# Patient Record
Sex: MALE | Race: WHITE | NOT HISPANIC OR LATINO | URBAN - METROPOLITAN AREA
[De-identification: names, ages, dates, MRNs, and addresses within clinical notes are randomized per-mention and may not be internally consistent; named-entity substitution may affect disease eponyms.]

---

## 2024-08-30 ENCOUNTER — EMERGENCY (EMERGENCY)
Facility: HOSPITAL | Age: 79
LOS: 1 days | Discharge: ANOTHER HOSPITAL | End: 2024-08-30
Attending: STUDENT IN AN ORGANIZED HEALTH CARE EDUCATION/TRAINING PROGRAM | Admitting: EMERGENCY MEDICINE
Payer: MEDICARE

## 2024-08-30 VITALS
DIASTOLIC BLOOD PRESSURE: 72 MMHG | OXYGEN SATURATION: 99 % | TEMPERATURE: 98 F | HEART RATE: 155 BPM | WEIGHT: 139.99 LBS | SYSTOLIC BLOOD PRESSURE: 105 MMHG | RESPIRATION RATE: 22 BRPM

## 2024-08-30 LAB
ALBUMIN SERPL ELPH-MCNC: 2.9 G/DL — LOW (ref 3.3–5)
ALP SERPL-CCNC: 204 U/L — HIGH (ref 40–120)
ALT FLD-CCNC: 15 U/L — SIGNIFICANT CHANGE UP (ref 4–41)
ANION GAP SERPL CALC-SCNC: 22 MMOL/L — HIGH (ref 7–14)
ANISOCYTOSIS BLD QL: SLIGHT — SIGNIFICANT CHANGE UP
APTT BLD: 21.9 SEC — LOW (ref 24.5–35.6)
AST SERPL-CCNC: 71 U/L — HIGH (ref 4–40)
BASOPHILS # BLD AUTO: 0 K/UL — SIGNIFICANT CHANGE UP (ref 0–0.2)
BASOPHILS NFR BLD AUTO: 0 % — SIGNIFICANT CHANGE UP (ref 0–2)
BILIRUB SERPL-MCNC: 1 MG/DL — SIGNIFICANT CHANGE UP (ref 0.2–1.2)
BLD GP AB SCN SERPL QL: NEGATIVE — SIGNIFICANT CHANGE UP
BLOOD GAS VENOUS COMPREHENSIVE RESULT: SIGNIFICANT CHANGE UP
BLOOD GAS VENOUS COMPREHENSIVE RESULT: SIGNIFICANT CHANGE UP
BUN SERPL-MCNC: 47 MG/DL — HIGH (ref 7–23)
CALCIUM SERPL-MCNC: 9.6 MG/DL — SIGNIFICANT CHANGE UP (ref 8.4–10.5)
CHLORIDE SERPL-SCNC: 98 MMOL/L — SIGNIFICANT CHANGE UP (ref 98–107)
CK MB BLD-MCNC: 0.9 % — SIGNIFICANT CHANGE UP (ref 0–2.5)
CK MB CFR SERPL CALC: 5.3 NG/ML — SIGNIFICANT CHANGE UP
CK SERPL-CCNC: 585 U/L — HIGH (ref 30–200)
CO2 SERPL-SCNC: 15 MMOL/L — LOW (ref 22–31)
CREAT SERPL-MCNC: 1.39 MG/DL — HIGH (ref 0.5–1.3)
EGFR: 52 ML/MIN/1.73M2 — LOW
EOSINOPHIL # BLD AUTO: 0 K/UL — SIGNIFICANT CHANGE UP (ref 0–0.5)
EOSINOPHIL NFR BLD AUTO: 0 % — SIGNIFICANT CHANGE UP (ref 0–6)
GLUCOSE SERPL-MCNC: 86 MG/DL — SIGNIFICANT CHANGE UP (ref 70–99)
HCT VFR BLD CALC: 28.8 % — LOW (ref 39–50)
HGB BLD-MCNC: 9 G/DL — LOW (ref 13–17)
HYPOCHROMIA BLD QL: SLIGHT — SIGNIFICANT CHANGE UP
IANC: 20.24 K/UL — HIGH (ref 1.8–7.4)
INR BLD: 1.18 RATIO — SIGNIFICANT CHANGE UP (ref 0.85–1.18)
LYMPHOCYTES # BLD AUTO: 0.38 K/UL — LOW (ref 1–3.3)
LYMPHOCYTES # BLD AUTO: 1.7 % — LOW (ref 13–44)
MCHC RBC-ENTMCNC: 26.5 PG — LOW (ref 27–34)
MCHC RBC-ENTMCNC: 31.3 GM/DL — LOW (ref 32–36)
MCV RBC AUTO: 85 FL — SIGNIFICANT CHANGE UP (ref 80–100)
MICROCYTES BLD QL: SLIGHT — SIGNIFICANT CHANGE UP
MONOCYTES # BLD AUTO: 1.56 K/UL — HIGH (ref 0–0.9)
MONOCYTES NFR BLD AUTO: 7 % — SIGNIFICANT CHANGE UP (ref 2–14)
NEUTROPHILS # BLD AUTO: 20.29 K/UL — HIGH (ref 1.8–7.4)
NEUTROPHILS NFR BLD AUTO: 81.7 % — HIGH (ref 43–77)
NEUTS BAND # BLD: 9.6 % — HIGH (ref 0–6)
NRBC # BLD: 1 /100 WBCS — HIGH (ref 0–0)
PLAT MORPH BLD: NORMAL — SIGNIFICANT CHANGE UP
PLATELET # BLD AUTO: 191 K/UL — SIGNIFICANT CHANGE UP (ref 150–400)
PLATELET COUNT - ESTIMATE: NORMAL — SIGNIFICANT CHANGE UP
POIKILOCYTOSIS BLD QL AUTO: SLIGHT — SIGNIFICANT CHANGE UP
POLYCHROMASIA BLD QL SMEAR: SLIGHT — SIGNIFICANT CHANGE UP
POTASSIUM SERPL-MCNC: 4.3 MMOL/L — SIGNIFICANT CHANGE UP (ref 3.5–5.3)
POTASSIUM SERPL-SCNC: 4.3 MMOL/L — SIGNIFICANT CHANGE UP (ref 3.5–5.3)
PROT SERPL-MCNC: 7.3 G/DL — SIGNIFICANT CHANGE UP (ref 6–8.3)
PROTHROM AB SERPL-ACNC: 13.2 SEC — HIGH (ref 9.5–13)
RBC # BLD: 3.39 M/UL — LOW (ref 4.2–5.8)
RBC # FLD: 14.6 % — HIGH (ref 10.3–14.5)
RBC BLD AUTO: ABNORMAL
RH IG SCN BLD-IMP: NEGATIVE — SIGNIFICANT CHANGE UP
SODIUM SERPL-SCNC: 135 MMOL/L — SIGNIFICANT CHANGE UP (ref 135–145)
TROPONIN T, HIGH SENSITIVITY RESULT: 2192 NG/L — CRITICAL HIGH
TSH SERPL-MCNC: 5.99 UIU/ML — HIGH (ref 0.27–4.2)
WBC # BLD: 22.22 K/UL — HIGH (ref 3.8–10.5)
WBC # FLD AUTO: 22.22 K/UL — HIGH (ref 3.8–10.5)

## 2024-08-30 PROCEDURE — 70450 CT HEAD/BRAIN W/O DYE: CPT | Mod: 26,MC

## 2024-08-30 PROCEDURE — 99284 EMERGENCY DEPT VISIT MOD MDM: CPT

## 2024-08-30 PROCEDURE — 99291 CRITICAL CARE FIRST HOUR: CPT

## 2024-08-30 PROCEDURE — 74177 CT ABD & PELVIS W/CONTRAST: CPT | Mod: 26,MC

## 2024-08-30 PROCEDURE — 71260 CT THORAX DX C+: CPT | Mod: 26,MC

## 2024-08-30 PROCEDURE — 72125 CT NECK SPINE W/O DYE: CPT | Mod: 26,MC

## 2024-08-30 PROCEDURE — 93010 ELECTROCARDIOGRAM REPORT: CPT

## 2024-08-30 RX ORDER — SODIUM CHLORIDE 9 MG/ML
2000 INJECTION INTRAMUSCULAR; INTRAVENOUS; SUBCUTANEOUS ONCE
Refills: 0 | Status: COMPLETED | OUTPATIENT
Start: 2024-08-30 | End: 2024-08-30

## 2024-08-30 RX ORDER — CEFEPIME 2 G/1
1000 INJECTION, POWDER, FOR SOLUTION INTRAVENOUS ONCE
Refills: 0 | Status: COMPLETED | OUTPATIENT
Start: 2024-08-30 | End: 2024-08-30

## 2024-08-30 RX ORDER — VANCOMYCIN/0.9 % SOD CHLORIDE 1.75G/25
1000 PLASTIC BAG, INJECTION (ML) INTRAVENOUS ONCE
Refills: 0 | Status: COMPLETED | OUTPATIENT
Start: 2024-08-30 | End: 2024-08-30

## 2024-08-30 RX ADMIN — Medication 250 MILLIGRAM(S): at 22:20

## 2024-08-30 RX ADMIN — CEFEPIME 100 MILLIGRAM(S): 2 INJECTION, POWDER, FOR SOLUTION INTRAVENOUS at 21:20

## 2024-08-30 RX ADMIN — SODIUM CHLORIDE 2000 MILLILITER(S): 9 INJECTION INTRAMUSCULAR; INTRAVENOUS; SUBCUTANEOUS at 20:26

## 2024-08-30 NOTE — ED ADULT NURSE NOTE - OBJECTIVE STATEMENT
Linnea RN: Bedside report received from DALILA Rosales. Pt received in stretcher, A&O x 3 (family at bedside reports poor recall for past few months), ambulatory self at baseline, pmhx MI, HTN, CAD w/stents, coming to ED as code sepsis. As per pt family members, pt had multiple falls today, fell out of bathtub and rolled out of bed with impact to elbow and knee, small abrasion noted to left side knee, no active bleeding noted, denies blood thinners, head strike, or LOC. Pt family members also report decreased p.o intake, and appeared pale and weak at home with slower response. Upon assessment, pt noted to be tachycardic and tachypneic, non-rebreather mask in place, zoll pads in place, MD Burk at bedside, VS as noted in flowsheets, bilateral 20G IVs noted to right and left ACs, labs drawn and sent, fluid boluses infusing. At 2028, cardiac rhythm changed from A-fib to NSR on monitor, repeat EKG completed and given to MD. Pt reporting thrist, responds to physical and verbal stimuli, appears pale. Safety maintained, comfort provided, report given to primary DALILA Musa and DALILA Barrera.

## 2024-08-30 NOTE — ED PROVIDER NOTE - CLINICAL SUMMARY MEDICAL DECISION MAKING FREE TEXT BOX
79 yr male with PMH HTN, CAD, cardiac stent presents today with his son. Sons states he has been feeling weak, low energy, barely getting out of bed and three falls within the past week. He has been upset, not eating since his wife . Brought in he was hypotensive, in rapid afib with RVR.     : at first HR was 150-160 on monitor, tachypneic, hypotensive with systolic in the 70s. Pads were put on, he got 79 yr male with PMH HTN, CAD, cardiac stent presents today with his son. Sons states he has been feeling weak, low energy, barely getting out of bed and three falls within the past week. He has been upset, not eating since his wife . HE has had BMs but has decreased urination. Brought in he was hypotensive, in rapid afib with RVR.     : at first HR was 150-160 on monitor, tachypneic, hypotensive with systolic in the 70s. Pads were put on, his BP improved with 2L NS  PE: diffuse abdominal tenderness, scrotal edema, extreme scrotal tenderness  ddx: Sepsis, UTI, abscess  Plan: Abx, CT AP w contrast, tele, consult surgery and urology

## 2024-08-30 NOTE — ED PROVIDER NOTE - ATTENDING CONTRIBUTION TO CARE
I have discussed the patient's case presentation with resident. I have also personally performed a face-to-face evaluation of the patient. I agree with the resident's assessment and plan, with the following exceptions:    79 year old male with PMHx HTN, CAD s/p PCI, hx prostate cancer in remission (last chemo over one year ago), BIB EMS from home for severe AMS a/w tachycardia. Patient does not have a known history of AFib. Initial EKG performed in triage shows AF w/ RVR, HR in 130s. Patient is severely lethargic, profoundly hypotensive 60s/30s. We placed pads and prepped airway equipment with preparation for electrical cardioversion per ACLS protocol for unstable tachycardia; however, his HR improved dramatically after initiating IV crystalloid bolus. RUSH exam revealed very collapsible IVC, supporting dehydration as a primary cause. Repeat EKG after HR improvement reveals conversion to NSR with no e/o acute ischemia, no ST elevations ot depressions.    DDx includes shock from sepsis vs. hypovolemia; DDx also includes ACS vs. renal failure vs. other metabolic derangement. HR has improved dramatically at this time. BP hypotensive, slowly improving with 2L NS bolus. Initiated broad-spectrum antibiotics for sepsis. Will continue to monitor. May require vasopressors if still hypotensive despite adequate IV crystalloid. Will pursue CT head, CT C/A/P, and admission.

## 2024-08-30 NOTE — ED ADULT NURSE REASSESSMENT NOTE - NS ED NURSE REASSESS COMMENT FT1
Linnea CONNER and MD Miles is at the bedside. Pt has Zole pads on. Pt came in to ED with Afib. Pt is on NSR on the cardiac monitor. Pt is hypotensive, MD Miles is aware. Pt is on non rebreather mask. Family is at the bedside.

## 2024-08-30 NOTE — ED ADULT TRIAGE NOTE - CHIEF COMPLAINT QUOTE
as per pt's son, pt fell in bath tub today injuring left elbow, multiple falls as per son, pt has decreased po intake x 3 days, 2 weeks diminished activity at home. dry mucus membranes, pt appears weak, pale, slow to respond to stimulation.   med hx MI, HTN, cad with stents, poor recall/ memories over 3 months

## 2024-08-30 NOTE — ED PROVIDER NOTE - OBJECTIVE STATEMENT
79 yr male with PMH HTN, CAD, cardiac stent presents today with his son. Sons states he has been feeling weak, low energy, barely getting out of bed and three falls within the past week. He has been upseat, not eating since his wife . Brought in he was hypotensive, in rapid afib with RVR. 79 yr male with PMH HTN, CAD, cardiac stent presents today with his son. Sons states he has been feeling weak, low energy, barely getting out of bed and three falls within the past week. He has been upset, not eating since his wife . Brought in he was hypotensive, in rapid afib with RVR.

## 2024-08-30 NOTE — ED ADULT NURSE REASSESSMENT NOTE - NS ED NURSE REASSESS COMMENT FT1
Pt is resting comfortably in the stretcher. Pt went to CT scan, I accompanied the patient with the zole. Pt is awaiting CT scan results. Bed is in the lowest position an safety maintained throughout.

## 2024-08-30 NOTE — ED PROVIDER NOTE - PROGRESS NOTE DETAILS
Spoke with urology they said they would do a formal consult but that they wouldn't do anything for a mass invasion into the penile shaft Spoke with gen surgery they will see the patient Nathaniel: Received in sign out pending CAT scans.  CAT scans came back with concern for rectal mass perforation with extension into the penile shaft and perineal abscess ( follows with [facility] for mass).  Urology consulted will see patient surgery consulted to see patient daughter Tiffanie ( [phone]) was updated at bedside and all questions answered clinda was also added.  Genital exam performed patient having exquisite tenderness to palpation of the bilateral testicles no overt crepitus felt abdomen is soft nontender at this time.  He is stating that he has 8 out of 10 pain therefore will write for a dose of ofirmev which we discussed with the daughter and his persistent pain will then advance to narcotics. While trying to expedite the MR, radiology mentioned they don't think MR is best study for urologies hopes. I called urology and spoke with how they can get in touch with rads directly - resident I spoke with general surgery who stated they were speaking with the patient and family to determine if patient is surgical candidate and whether not he would like surgery.  They inform me they will call me back once they have determined whether or not patient is willing to have surgery and if he is a surgery candidate.    Jose Denise MD (PGY 2) [place] att: Spoke with the patient's daughter who requests transfer [place] [facility] [place] if a surgical intervention is required. Spoke with transfer center; obtained cardiology clearance since the patient came in with afib RVR initially and elevated troponins but a non-ischemic ecg; therefore, will transfer.

## 2024-08-30 NOTE — ED ADULT NURSE NOTE - NSFALLRISKINTERV_ED_ALL_ED

## 2024-08-30 NOTE — ED PROVIDER NOTE - DATE/TIME 4
[FreeTextEntry1] : This 74yo ,  x2, LMP at 52 referred by Dr. Lisa for evaluation of poorly differentiated adenocarcinoma. Patient reports PMB began in September. On 21, pt presented to [facility] with VB and a pelvic US revealed AV 4.5 x 5.5 x 4.9cm uterus, enlarged abnormal appearance to endometrial canal which is hypervascular, ovaries not visualized. She followed up with Dr. Lisa and due to pelvic pressure and hematuria was referred to urology. Urine cytology on 10/1/21 revealed suspicious for malignancy. On 10/7/21, patient had a pap smear revealing atypical glandular cells favor neoplastic, HPV neg.  On 21, had combined gyn and urology surgery, D&C, cystoscopy and left ureter biopsy and stent placement with pathology revealing fragments of poorly differentiated carcinoma with foci of adenocarcinoma ? serous features; Tissue at cervical os with mostly poorly differentiated carcinoma associated with abundant necrosis and inflammation,left ureter biopsy with mostly crushed tissue and no definite malignancy.  She was placed on Provera 10mg. She received PRBC's prior to surgery. She admits to pelvic cramping.\par \par Ctscan abd/[id number]/18/21-enlarged heterogeneous myomatous uterus compressed, 3.6cm lower right mesenteric or duplication cyst, diverticulosis. \par \par Health maintenance:\par \par Pap smear-10/7/21-reports wnl \par Breast US --reports wnl, Mammo-years ago \par Colonoscopy-unsure\par DEXA-never\par 
31-Aug-2024 06:21

## 2024-08-31 VITALS
HEART RATE: 80 BPM | SYSTOLIC BLOOD PRESSURE: 105 MMHG | DIASTOLIC BLOOD PRESSURE: 70 MMHG | RESPIRATION RATE: 16 BRPM | OXYGEN SATURATION: 99 %

## 2024-08-31 LAB
ADD ON TEST-SPECIMEN IN LAB: SIGNIFICANT CHANGE UP
ALBUMIN SERPL ELPH-MCNC: 2.7 G/DL — LOW (ref 3.3–5)
ALP SERPL-CCNC: 101 U/L — SIGNIFICANT CHANGE UP (ref 40–120)
ALT FLD-CCNC: 13 U/L — SIGNIFICANT CHANGE UP (ref 4–41)
ANION GAP SERPL CALC-SCNC: 14 MMOL/L — SIGNIFICANT CHANGE UP (ref 7–14)
AST SERPL-CCNC: 59 U/L — HIGH (ref 4–40)
BASOPHILS # BLD AUTO: 0.05 K/UL — SIGNIFICANT CHANGE UP (ref 0–0.2)
BASOPHILS NFR BLD AUTO: 0.3 % — SIGNIFICANT CHANGE UP (ref 0–2)
BILIRUB SERPL-MCNC: 0.6 MG/DL — SIGNIFICANT CHANGE UP (ref 0.2–1.2)
BLD GP AB SCN SERPL QL: NEGATIVE — SIGNIFICANT CHANGE UP
BLOOD GAS VENOUS COMPREHENSIVE RESULT: SIGNIFICANT CHANGE UP
BUN SERPL-MCNC: 46 MG/DL — HIGH (ref 7–23)
CALCIUM SERPL-MCNC: 8.5 MG/DL — SIGNIFICANT CHANGE UP (ref 8.4–10.5)
CHLORIDE SERPL-SCNC: 100 MMOL/L — SIGNIFICANT CHANGE UP (ref 98–107)
CO2 SERPL-SCNC: 22 MMOL/L — SIGNIFICANT CHANGE UP (ref 22–31)
CREAT SERPL-MCNC: 1.49 MG/DL — HIGH (ref 0.5–1.3)
EGFR: 47 ML/MIN/1.73M2 — LOW
EOSINOPHIL # BLD AUTO: 0.02 K/UL — SIGNIFICANT CHANGE UP (ref 0–0.5)
EOSINOPHIL NFR BLD AUTO: 0.1 % — SIGNIFICANT CHANGE UP (ref 0–6)
GLUCOSE SERPL-MCNC: 113 MG/DL — HIGH (ref 70–99)
HCT VFR BLD CALC: 24.3 % — LOW (ref 39–50)
HGB BLD-MCNC: 7.6 G/DL — LOW (ref 13–17)
IANC: 15.34 K/UL — HIGH (ref 1.8–7.4)
IMM GRANULOCYTES NFR BLD AUTO: 1.5 % — HIGH (ref 0–0.9)
INR BLD: 1.23 RATIO — HIGH (ref 0.85–1.18)
LYMPHOCYTES # BLD AUTO: 0.77 K/UL — LOW (ref 1–3.3)
LYMPHOCYTES # BLD AUTO: 4.2 % — LOW (ref 13–44)
MCHC RBC-ENTMCNC: 26.3 PG — LOW (ref 27–34)
MCHC RBC-ENTMCNC: 31.3 GM/DL — LOW (ref 32–36)
MCV RBC AUTO: 84.1 FL — SIGNIFICANT CHANGE UP (ref 80–100)
MONOCYTES # BLD AUTO: 2.07 K/UL — HIGH (ref 0–0.9)
MONOCYTES NFR BLD AUTO: 11.2 % — SIGNIFICANT CHANGE UP (ref 2–14)
NEUTROPHILS # BLD AUTO: 15.34 K/UL — HIGH (ref 1.8–7.4)
NEUTROPHILS NFR BLD AUTO: 82.7 % — HIGH (ref 43–77)
NRBC # BLD: 0 /100 WBCS — SIGNIFICANT CHANGE UP (ref 0–0)
NRBC # FLD: 0 K/UL — SIGNIFICANT CHANGE UP (ref 0–0)
PLATELET # BLD AUTO: 144 K/UL — LOW (ref 150–400)
POTASSIUM SERPL-MCNC: 3.9 MMOL/L — SIGNIFICANT CHANGE UP (ref 3.5–5.3)
POTASSIUM SERPL-SCNC: 3.9 MMOL/L — SIGNIFICANT CHANGE UP (ref 3.5–5.3)
PROT SERPL-MCNC: 5.9 G/DL — LOW (ref 6–8.3)
PROTHROM AB SERPL-ACNC: 13.8 SEC — HIGH (ref 9.5–13)
RBC # BLD: 2.89 M/UL — LOW (ref 4.2–5.8)
RBC # FLD: 15 % — HIGH (ref 10.3–14.5)
RH IG SCN BLD-IMP: NEGATIVE — SIGNIFICANT CHANGE UP
SODIUM SERPL-SCNC: 136 MMOL/L — SIGNIFICANT CHANGE UP (ref 135–145)
TROPONIN T, HIGH SENSITIVITY RESULT: 1352 NG/L — CRITICAL HIGH
WBC # BLD: 18.53 K/UL — HIGH (ref 3.8–10.5)
WBC # FLD AUTO: 18.53 K/UL — HIGH (ref 3.8–10.5)

## 2024-08-31 PROCEDURE — 99282 EMERGENCY DEPT VISIT SF MDM: CPT

## 2024-08-31 PROCEDURE — 99284 EMERGENCY DEPT VISIT MOD MDM: CPT | Mod: GC

## 2024-08-31 RX ORDER — HYDROMORPHONE HYDROCHLORIDE 2 MG/1
0.2 TABLET ORAL ONCE
Refills: 0 | Status: DISCONTINUED | OUTPATIENT
Start: 2024-08-31 | End: 2024-08-31

## 2024-08-31 RX ORDER — CLINDAMYCIN PHOSPHATE 150 MG/ML
900 VIAL (ML) INJECTION ONCE
Refills: 0 | Status: COMPLETED | OUTPATIENT
Start: 2024-08-31 | End: 2024-08-31

## 2024-08-31 RX ORDER — LIDOCAINE HCL 20 MG/ML
15 VIAL (ML) INJECTION ONCE
Refills: 0 | Status: COMPLETED | OUTPATIENT
Start: 2024-08-31 | End: 2024-08-31

## 2024-08-31 RX ORDER — ACETAMINOPHEN 325 MG/1
1000 TABLET ORAL ONCE
Refills: 0 | Status: COMPLETED | OUTPATIENT
Start: 2024-08-31 | End: 2024-08-31

## 2024-08-31 RX ORDER — HYDROMORPHONE HYDROCHLORIDE 2 MG/1
1 TABLET ORAL ONCE
Refills: 0 | Status: DISCONTINUED | OUTPATIENT
Start: 2024-08-31 | End: 2024-08-31

## 2024-08-31 RX ADMIN — HYDROMORPHONE HYDROCHLORIDE 1 MILLIGRAM(S): 2 TABLET ORAL at 09:13

## 2024-08-31 RX ADMIN — HYDROMORPHONE HYDROCHLORIDE 1 MILLIGRAM(S): 2 TABLET ORAL at 08:42

## 2024-08-31 RX ADMIN — Medication 15 MILLILITER(S): at 08:15

## 2024-08-31 RX ADMIN — Medication 100 MILLIGRAM(S): at 01:47

## 2024-08-31 RX ADMIN — HYDROMORPHONE HYDROCHLORIDE 0.2 MILLIGRAM(S): 2 TABLET ORAL at 03:19

## 2024-08-31 RX ADMIN — ACETAMINOPHEN 400 MILLIGRAM(S): 325 TABLET ORAL at 01:19

## 2024-08-31 NOTE — ED ADULT NURSE REASSESSMENT NOTE - NS ED NURSE REASSESS COMMENT FT1
pt yelling  for help on arrival/ pt has swelling to scrotum and testicular area/  sacral area red pt turned and positioned  and cleaned up/ pt made comfortable and  given med for pain

## 2024-08-31 NOTE — CONSULT NOTE ADULT - ATTENDING COMMENTS
Recommended colostomy with I&D of perineal abscess today, spoke with daughter, she refused colostomy and general anesthesia, asked for [place] [facility] [place], discussed with ED physicians.
Followup MRI

## 2024-08-31 NOTE — ED ADULT NURSE REASSESSMENT NOTE - NS ED NURSE REASSESS COMMENT FT1
Break RN: Pt received in stretcher in room 27. Pt resting comfortably in stretcher. pt offered no complains at present. respiration even and non-labored. NSR on monitor. in NAD. Abx administered as per ordered. Pending official CT result and surgery consult

## 2024-08-31 NOTE — CHART NOTE - NSCHARTNOTEFT_GEN_A_CORE
Brief Cardiology Chart Note    Called by ED for cardiac eval prior to transfer to [facility] for ongoing care.    In brief, 78yo M with CAD, HTN, HLD, metastatic colon ca who presented with fatigue and weakness, found to have perforated rectal mass with associated fluid collection. Initially in AFib with RVR which resolved, troponins found to be elevated 2192->1352. Per pt and family request, they would like to be transferred to [facility] where they receive their cancer care. Cardiology asked to comment on elevated troponins prior to transfer.    On eval, patient lying in bed, denies having CP, SOB, or palpitations during hospital stay. ECG reviewed, no ischemic changes. Last TTE 9/7/2023 with EF 72%, normal RV function, no valvular abnormalities. Last cath on file from 2010, notable for significant 3VD, no intervention performed at that time.     Presentation most c/w demand ischemia secondary to perforated rectal mass, AFib with RVR likely secondary to perforation and sepsis as well. Unlikely Type I NSTEMI, though patient with significant coronary disease. At this time it appears likely patient will need urgent surgical intervention, which benefit of surgery outweigh risks of continued cardiac workup. Patient is asymptomatic from a cardiac standpoint with a nonischemic ECG. No clear contraindication to transfer to [facility] at this time.    Mikie Dykes MD  Cardiology Fellow

## 2024-08-31 NOTE — CONSULT NOTE ADULT - ASSESSMENT
ASSESSMENT:  Sami Chong is a 79 y.o. man with history of CAD s/p stents (ASA), HTN, HLD, and rectal cancer (dx 2022) s/p chemo and radiation ([id number]) who presented to the ED on 8/30 for fatigue, weakness, and falls. Surgery consultation placed for concern for perirectal abscess.     CT demonstrating a perforated rectal mass with an associated perirectal collection. Air also noted in the perineum and at the root of the penis.    US used at bedside in an attempt to find a drainable collection. Nothing accessible at bedside identified. Long discussion with urology. Determined that further, dedicated scrotum imaging would be needed.       PLAN:  - Repeat labs; CBC, CMP, troponin, lactate  - Request cardiology consultation for evaluation of elevated troponin and new onset of rapid atrial fibrillation  - Urology recommending Scrotum US and MRI  - Further intervention pending imaging and discussion with urology      B-team Surgery  x [id number] ASSESSMENT:  Sami Chong is a 79 y.o. man with history of CAD s/p stents (ASA), HTN, HLD, and rectal cancer (dx 2022) s/p chemo and radiation ([id number]) who presented to the ED on 8/30 for fatigue, weakness, and falls. Surgery consultation placed for concern for perirectal abscess.     CT demonstrating a perforated rectal mass with an associated perirectal collection. Air also noted in the perineum and at the root of the penis.    US used at bedside in an attempt to find a drainable collection. Nothing accessible at bedside identified. Long discussion with urology. Determined that further, dedicated scrotum imaging would be needed.       PLAN:  - Repeat labs; CBC, CMP, troponin, lactate  - Request cardiology consultation for evaluation of elevated troponin and new onset of rapid atrial fibrillation  - Urology recommending Scrotum US and MRI  - Further intervention pending imaging and discussion with urology  - Plan discussed with Dr. Lee Lindquist on behalf of Dr. Paco Santoro.       A-team Surgery  x [phone]

## 2024-08-31 NOTE — CONSULT NOTE ADULT - ASSESSMENT
80 y/o male with a h/o CAD, s/p cardiac stents, HTN and rectal ca presenting to the ER after a fall with overall progressive decline over the past couple of months with weight loss, lack of appetite, weakness, and decreased mobility, hypotensive and in rapid Afib upon presentation, leokocytosis to 22, lactate 8.5, troponin to 2192 with improvement after IV fluids and antibiotics, CT concerning for perforated rectal mass, left perirectal collection located above the pelvic floor with spread of infection below the pelvic floor, to the anorectal region, and into the perineum and partially imaged abscess in the perineum.  Attempt to drain abscess at bedside along with Gen Surg, but drainable collection not clearly visualized on bedside ultrasound.    -Continue supportive care  -IV antibiotics  -follow-up all cultures  -please obtain MRI of pelvis to include the scrotum to better visualize the extent of infection.  -Distended bladder on imaging, discussed placement of naranjo, but both daughter (HCP), and son are refusing for naranjo to be placed.  -[facility] addressed with daughterTiffanie, the HCP, ([phone]) and patient is a full code, wanting most measures performed if it were life vs death situations.   He has voiced his wishes to not have surgery for the rectal cancer in the past to avoid needing an ostomy.    -Discussed with Dr. Willis.  -Urology, [id number] 78 y/o male with a h/o CAD, s/p cardiac stents, HTN and rectal ca presenting to the ER after a fall with overall progressive decline over the past couple of months with weight loss, lack of appetite, weakness, and decreased mobility, hypotensive and in rapid Afib upon presentation, leokocytosis to 22, lactate 8.5, troponin to 2192 with improvement after IV fluids and antibiotics, CT concerning for perforated rectal mass, left perirectal collection located above the pelvic floor with spread of infection below the pelvic floor, to the anorectal region, and into the perineum and partially imaged abscess in the perineum.  Attempt to drain abscess at bedside along with Gen Surg, but drainable collection not clearly visualized on bedside ultrasound.    -Continue supportive care  -IV antibiotics  -follow-up all cultures  -please obtain MRI of pelvis to include the scrotum to better visualize the extent of infection.  - urology available if general surgery wishes to pursue operative intervention  -Distended bladder on imaging, discussed placement of naranjo, but both daughter (HCP), and son are refusing for naranjo to be placed.  -[facility] addressed with daughterTiffanie, the HCP, ([phone]) and patient is a full code, wanting most measures performed if it were life vs death situations.   He has voiced his wishes to not have surgery for the rectal cancer in the past to avoid needing an ostomy.    -Discussed with Dr. Willis.  -Urology, [id number]

## 2024-08-31 NOTE — CONSULT NOTE ADULT - SUBJECTIVE AND OBJECTIVE BOX
General Surgery Consult      Consulting surgical team: CHIDI      HPI:  Sami Chong is a 79 y.o. man with history of CAD s/p stents (ASA), HTN, HLD, and rectal cancer (dx 2022) s/p chemo and radiation ([id number]) who presented to the ED on 8/30 for fatigue, weakness, and falls. Surgery consultation placed for concern for perirectal abscess.     Daughter states that the patient finished his last chemotherapy treatment 3/2024; all treatment was at [facility] [place]. Daughter reports that the patient had an MRI at that time which showed that the tumor was improved. States that it was initially stage 2, but reduced in size.     Daughter states that the patient was initially well following his treatment; ambulatory, able to leave the house, and performing his ADLs. For the past month, the patient has been mostly confined to his bed, not eating or drinking.     Daughter states that the patient has a long history of fecal incontinence that pre-dates his rectal cancer. She states that it was somewhat improved following his treatment, but worsened again.     Discussion with daughter concerning the patient's goals of care. Daughter, who is the healthcare proxy, states that the patient would want all life-saving measures, including prolonged intubation, CPR, and any type of surgical intervention. Initially hesitant regarding possible ostomy, but agreed to the possibility following family discussion.     Social history is significant for being an active daily smoker. Daughter states that the patient continued to smoke throughout his chemotherapy treatment.     At the time of presentation to the ED, the patient was found to be in rapid atrial fibrillation. The patients tachycardia and hypotension resolved with fluid resuscitation. Initial troponin was 2192; reduced to 1352 6 hours later.       PAST MEDICAL HISTORY:  HTN  HLD  CAD  Rectal Cancer      PAST SURGICAL HISTORY:  Coronary stent      ALLERGIES:  No Known Allergies      VITALS & I/Os:  Vital Signs Last 24 Hrs  T(C): 36.9 (31 Aug 2024 04:07), Max: 37.9 (30 Aug 2024 20:45)  T(F): 98.5 (31 Aug 2024 04:07), Max: 100.2 (30 Aug 2024 20:45)  HR: 74 (31 Aug 2024 04:07) (74 - 167)  BP: 100/66 (31 Aug 2024 04:45) (76/55 - 112/78)  BP(mean): 75 (31 Aug 2024 04:45) (63 - 75)  RR: 15 (31 Aug 2024 04:07) (15 - 28)  SpO2: 100% (31 Aug 2024 04:07) (98% - 100%)    Parameters below as of 31 Aug 2024 04:07  Patient On (Oxygen Delivery Method): room air      PHYSICAL EXAM:  General: awake, answering simple questions, redirectable   Respiratory: Nonlabored  Cardiovascular: normotensive, regular rate  Abdominal: Soft, nondistended, nontender. No rebound or guarding. No organomegaly, no palpable mass.  Rectum: patulous anus with minimal surrounding erythema, no induration or fluctuance, left side of rectum is mildly tender to palpation, tenderness continues up the left side of the perineum to the left hemiscrotum and base of penis  Extremities: Warm      LABS:                        9.0    22.22 )-----------( 191      ( 30 Aug 2024 20:49 )             28.8     08-30    135  |  98  |  47<H>  ----------------------------<  86  4.3   |  15<L>  |  1.39<H>    Ca    9.6      30 Aug 2024 20:49    TPro  7.3  /  Alb  2.9<L>  /  TBili  1.0  /  DBili  x   /  AST  71<H>  /  ALT  15  /  AlkPhos  204<H>  08-30    Lactate:  08-30 @ 23:17  2.3  08-30 @ 20:49  8.5    PT/INR - ( 30 Aug 2024 20:49 )   PT: 13.2 sec;   INR: 1.18 ratio      PTT - ( 30 Aug 2024 20:49 )  PTT:21.9 sec    CARDIAC MARKERS ( 30 Aug 2024 23:31 )  x     / x     / x     / x     / 5.3 ng/mL  CARDIAC MARKERS ( 30 Aug 2024 20:49 )  x     / x     / x     / x     / 5.6 ng/mL    Urinalysis Basic - ( 30 Aug 2024 20:49 )    Color: x / Appearance: x / SG: x / pH: x  Gluc: 86 mg/dL / Ketone: x  / Bili: x / Urobili: x   Blood: x / Protein: x / Nitrite: x   Leuk Esterase: x / RBC: x / WBC x   Sq Epi: x / Non Sq Epi: x / Bacteria: x      IMAGING:  CT CAP:  LUNGS AND LARGE AIRWAYS: Patent central airways.  Bibasilar subsegmental   atelectasis.  Right upper lobe calcified granuloma.  Approximately 1.8 x 1.4 cm right middle lobe nodule (301:50).  Right   upper lobe nodules measure approximately 2.5 mm (301:22) and 3 mm   (301:23), 2 mm (301:27).  Approximately 5 mm left upper lobe nodule   (301:28).  Right lower lobe pulmonary nodules measure approximately 4 mm   (301:37), 2 mm (301:62), 2 mm (301:63), 5 mm (301:65), and 6 mm (301:71).    A left lower lobe pulmonary nodule measures 6 mm (301:60).  PLEURA: Trace pleural effusions bilaterally, right greater than left.  VESSELS: Aortic root is fusiformly dilated to approximately 4 cm in   transverse caliber at the sinuses of Valsalva (602:50).  No   atherosclerotic calcifications of the thoracic aorta and arch vessels.  HEART: Heart size is normal.  Severe atherosclerotic calcification of the   coronary arteries. No pericardial effusion.  MEDIASTINUM AND SUJATA: No lymphadenopathy.  CHEST WALL AND LOWER NECK: Within normal limits.    ABDOMEN AND PELVIS:  LIVER: Indeterminate hypoattenuating left renal lesion measuring 1.4 x 1   cm, high CT attenuation greater than simple fluid and also demonstrates a   thin internal septation.  Punctate parenchymal calcification in the liver   (301:79), probably a granuloma.  BILE DUCTS: Normal caliber.  GALLBLADDER: Within normal limits.  SPLEEN: Within normal limits.  PANCREAS: Within normal limits.  ADRENALS: Heterogeneous cystic and solid left adrenal mass measures   approximately 3.5 x 3 cm (301:81).  KIDNEYS/URETERS: Left renal cysts measure up to 7.2 cm (301:102).  Right   renal cysts measure up to 2.6 cm (301:111).  Indeterminant right lower   pole renal lesion measures 1.6 x 1.6 cm (301:113).  Subcentimeter   hypoattenuating foci in the kidneys are too small to be characterized by   CT technique.    BLADDER: Distended with small bladder diverticulum.  Dependent   calcification in the bladder may represent bladder wall calcification   and/or small bladder calculi.  REPRODUCTIVE ORGANS: Enlarged prostate.  Edema and gas in the penis   related to a perineal abscess; see further details below.    BOWEL/PERITONEUM/RETROPERITONEUM: No bowel obstruction.  Appendix not   visualized.  Right-sided and left-sided colonic diverticulosis without   evidence for acute diverticulitis.  There is perforated rectal wall mass   with a kenny defect in the left lateral wall of the rectum.  There is an   adjacent 2.5 x 1.5 cm (AP x LR) loculated left perirectal collection of   gas and fluid along the left side of the rectum, located superior to the   pelvic floor.  There is ill-defined fluid and gas tracking inferiorly,   along the posterior aspect of the rectum, to the anorectal region, below   the pelvic floor.  There is partially imaged abscess in the perineum   extending into the root of the penis, that measures at least 7.4 x 2.3 cm   when measured in the oblique axial plane (key image #6). There are   additional foci of fluid and gas tracking along the penile shaft.  There   appear to be multiple surgical clips/biopsy clips around the rectum and   posterior to the left side of the bladder.  There is no remote   pneumoperitoneum.  VESSELS: Infrarenal abdominal aortic aneurysm measures up to   approximately 4.8 x 5.1 cm (AP x LR), when measured in the sagittal and   coronal planes (601:70 and 602:53-54).  LYMPH NODES: An aortocaval lymph node measures 8 mm short axis (301:111).  ABDOMINAL WALL: Perineal abscess as described above.  BONES: Mild degenerative changes spine.  Mild hip osteoarthrosis   bilaterally..  No acute fracture or suspicious osseous lesion..    IMPRESSION:  CT CHEST:  1.  No CT evidence of pneumonia or pulmonary edema.  2.  Trace pleural effusions bilaterally, right greater than left.  3.  Approximately 1.8 x 1.4 cm right middle lobe nodule, suspicious for   pulmonary metastasis.  4.  Multiple additional pulmonary nodules in both lungs, measuring up to   approximately 6 mm.  3.4 cm left adrenal heterogenous enhancing nodule,   which may represent an angiomyolipoma versus a metastasis.  5.  Fusiform dilatation of the aortic root to 4 cm at the sinuses of   Valsalva.    CT ABDOMEN/PELVIS:  1.  Perforated rectal mass.  Approximately 2.5 x 1.5 cm (AP x LR) left   perirectal collection located above the pelvic floor.  There is spread of   infection below the pelvic floor, to the anorectal region, and into the   perineum .  Partially imaged abscess in the perineum, extending into the   root of the penis, measures at least 7.4 x 2.3 cm (AP x LR).  There are   additional foci of fluid and gas tracking along the penile shaft.   Surgical consultation is recommended.  2.  Heterogeneous cystic and solid left adrenal mass measuring 3.5 x 3 cm   is suspicious for a metastasis until proven otherwise.  This may be   further characterized with MRI.  3. An indeterminate 1.4 x 1 cm left hepatic lesion may represent a   complex cyst or hemangioma comma however a metastasis is not completely   excluded.  This may be further characterized with MRI  4. An indeterminate 1.6 x 1.6 cm right lower pole renal lesion may   represent a complex/hemorrhagic cyst or solid mass This may be further   characterized with MRI.  5.  Aneurysmal dilatation of the infrarenal abdominal aorta to   approximately 4.8 x 5.1 cm.                                                                                               Colorectal Surgery Consult      Consulting surgical team: JAN      HPI:  Sami Chong is a 79 y.o. man with history of CAD s/p stents (ASA), HTN, HLD, and rectal cancer (dx 2022) s/p chemo and radiation ([id number]) who presented to the ED on 8/30 for fatigue, weakness, and falls. Surgery consultation placed for concern for perirectal abscess.     Daughter states that the patient finished his last chemotherapy treatment 3/2024; all treatment was at [facility] [place]. Daughter reports that the patient had an MRI at that time which showed that the tumor was improved. States that it was initially stage 2, but reduced in size.     Daughter states that the patient was initially well following his treatment; ambulatory, able to leave the house, and performing his ADLs. For the past month, the patient has been mostly confined to his bed, not eating or drinking.     Daughter states that the patient has a long history of fecal incontinence that pre-dates his rectal cancer. She states that it was somewhat improved following his treatment, but worsened again.     Discussion with daughter concerning the patient's goals of care. Daughter, who is the healthcare proxy, states that the patient would want all life-saving measures, including prolonged intubation, CPR, and any type of surgical intervention. Initially hesitant regarding possible ostomy, but agreed to the possibility following family discussion.     Social history is significant for being an active daily smoker. Daughter states that the patient continued to smoke throughout his chemotherapy treatment.     At the time of presentation to the ED, the patient was found to be in rapid atrial fibrillation. The patients tachycardia and hypotension resolved with fluid resuscitation. Initial troponin was 2192; reduced to 1352 6 hours later.       PAST MEDICAL HISTORY:  HTN  HLD  CAD  Rectal Cancer      PAST SURGICAL HISTORY:  Coronary stent      ALLERGIES:  No Known Allergies      VITALS & I/Os:  Vital Signs Last 24 Hrs  T(C): 36.9 (31 Aug 2024 04:07), Max: 37.9 (30 Aug 2024 20:45)  T(F): 98.5 (31 Aug 2024 04:07), Max: 100.2 (30 Aug 2024 20:45)  HR: 74 (31 Aug 2024 04:07) (74 - 167)  BP: 100/66 (31 Aug 2024 04:45) (76/55 - 112/78)  BP(mean): 75 (31 Aug 2024 04:45) (63 - 75)  RR: 15 (31 Aug 2024 04:07) (15 - 28)  SpO2: 100% (31 Aug 2024 04:07) (98% - 100%)    Parameters below as of 31 Aug 2024 04:07  Patient On (Oxygen Delivery Method): room air      PHYSICAL EXAM:  General: awake, answering simple questions, redirectable   Respiratory: Nonlabored  Cardiovascular: normotensive, regular rate  Abdominal: Soft, nondistended, nontender. No rebound or guarding. No organomegaly, no palpable mass.  Rectum: patulous anus with minimal surrounding erythema, no induration or fluctuance, left side of rectum is mildly tender to palpation, tenderness continues up the left side of the perineum to the left hemiscrotum and base of penis  Extremities: Warm      LABS:                        9.0    22.22 )-----------( 191      ( 30 Aug 2024 20:49 )             28.8     08-30    135  |  98  |  47<H>  ----------------------------<  86  4.3   |  15<L>  |  1.39<H>    Ca    9.6      30 Aug 2024 20:49    TPro  7.3  /  Alb  2.9<L>  /  TBili  1.0  /  DBili  x   /  AST  71<H>  /  ALT  15  /  AlkPhos  204<H>  08-30    Lactate:  08-30 @ 23:17  2.3  08-30 @ 20:49  8.5    PT/INR - ( 30 Aug 2024 20:49 )   PT: 13.2 sec;   INR: 1.18 ratio      PTT - ( 30 Aug 2024 20:49 )  PTT:21.9 sec    CARDIAC MARKERS ( 30 Aug 2024 23:31 )  x     / x     / x     / x     / 5.3 ng/mL  CARDIAC MARKERS ( 30 Aug 2024 20:49 )  x     / x     / x     / x     / 5.6 ng/mL    Urinalysis Basic - ( 30 Aug 2024 20:49 )    Color: x / Appearance: x / SG: x / pH: x  Gluc: 86 mg/dL / Ketone: x  / Bili: x / Urobili: x   Blood: x / Protein: x / Nitrite: x   Leuk Esterase: x / RBC: x / WBC x   Sq Epi: x / Non Sq Epi: x / Bacteria: x      IMAGING:  CT CAP:  LUNGS AND LARGE AIRWAYS: Patent central airways.  Bibasilar subsegmental   atelectasis.  Right upper lobe calcified granuloma.  Approximately 1.8 x 1.4 cm right middle lobe nodule (301:50).  Right   upper lobe nodules measure approximately 2.5 mm (301:22) and 3 mm   (301:23), 2 mm (301:27).  Approximately 5 mm left upper lobe nodule   (301:28).  Right lower lobe pulmonary nodules measure approximately 4 mm   (301:37), 2 mm (301:62), 2 mm (301:63), 5 mm (301:65), and 6 mm (301:71).    A left lower lobe pulmonary nodule measures 6 mm (301:60).  PLEURA: Trace pleural effusions bilaterally, right greater than left.  VESSELS: Aortic root is fusiformly dilated to approximately 4 cm in   transverse caliber at the sinuses of Valsalva (602:50).  No   atherosclerotic calcifications of the thoracic aorta and arch vessels.  HEART: Heart size is normal.  Severe atherosclerotic calcification of the   coronary arteries. No pericardial effusion.  MEDIASTINUM AND SUJATA: No lymphadenopathy.  CHEST WALL AND LOWER NECK: Within normal limits.    ABDOMEN AND PELVIS:  LIVER: Indeterminate hypoattenuating left renal lesion measuring 1.4 x 1   cm, high CT attenuation greater than simple fluid and also demonstrates a   thin internal septation.  Punctate parenchymal calcification in the liver   (301:79), probably a granuloma.  BILE DUCTS: Normal caliber.  GALLBLADDER: Within normal limits.  SPLEEN: Within normal limits.  PANCREAS: Within normal limits.  ADRENALS: Heterogeneous cystic and solid left adrenal mass measures   approximately 3.5 x 3 cm (301:81).  KIDNEYS/URETERS: Left renal cysts measure up to 7.2 cm (301:102).  Right   renal cysts measure up to 2.6 cm (301:111).  Indeterminant right lower   pole renal lesion measures 1.6 x 1.6 cm (301:113).  Subcentimeter   hypoattenuating foci in the kidneys are too small to be characterized by   CT technique.    BLADDER: Distended with small bladder diverticulum.  Dependent   calcification in the bladder may represent bladder wall calcification   and/or small bladder calculi.  REPRODUCTIVE ORGANS: Enlarged prostate.  Edema and gas in the penis   related to a perineal abscess; see further details below.    BOWEL/PERITONEUM/RETROPERITONEUM: No bowel obstruction.  Appendix not   visualized.  Right-sided and left-sided colonic diverticulosis without   evidence for acute diverticulitis.  There is perforated rectal wall mass   with a kenny defect in the left lateral wall of the rectum.  There is an   adjacent 2.5 x 1.5 cm (AP x LR) loculated left perirectal collection of   gas and fluid along the left side of the rectum, located superior to the   pelvic floor.  There is ill-defined fluid and gas tracking inferiorly,   along the posterior aspect of the rectum, to the anorectal region, below   the pelvic floor.  There is partially imaged abscess in the perineum   extending into the root of the penis, that measures at least 7.4 x 2.3 cm   when measured in the oblique axial plane (key image #6). There are   additional foci of fluid and gas tracking along the penile shaft.  There   appear to be multiple surgical clips/biopsy clips around the rectum and   posterior to the left side of the bladder.  There is no remote   pneumoperitoneum.  VESSELS: Infrarenal abdominal aortic aneurysm measures up to   approximately 4.8 x 5.1 cm (AP x LR), when measured in the sagittal and   coronal planes (601:70 and 602:53-54).  LYMPH NODES: An aortocaval lymph node measures 8 mm short axis (301:111).  ABDOMINAL WALL: Perineal abscess as described above.  BONES: Mild degenerative changes spine.  Mild hip osteoarthrosis   bilaterally..  No acute fracture or suspicious osseous lesion..    IMPRESSION:  CT CHEST:  1.  No CT evidence of pneumonia or pulmonary edema.  2.  Trace pleural effusions bilaterally, right greater than left.  3.  Approximately 1.8 x 1.4 cm right middle lobe nodule, suspicious for   pulmonary metastasis.  4.  Multiple additional pulmonary nodules in both lungs, measuring up to   approximately 6 mm.  3.4 cm left adrenal heterogenous enhancing nodule,   which may represent an angiomyolipoma versus a metastasis.  5.  Fusiform dilatation of the aortic root to 4 cm at the sinuses of   Valsalva.    CT ABDOMEN/PELVIS:  1.  Perforated rectal mass.  Approximately 2.5 x 1.5 cm (AP x LR) left   perirectal collection located above the pelvic floor.  There is spread of   infection below the pelvic floor, to the anorectal region, and into the   perineum .  Partially imaged abscess in the perineum, extending into the   root of the penis, measures at least 7.4 x 2.3 cm (AP x LR).  There are   additional foci of fluid and gas tracking along the penile shaft.   Surgical consultation is recommended.  2.  Heterogeneous cystic and solid left adrenal mass measuring 3.5 x 3 cm   is suspicious for a metastasis until proven otherwise.  This may be   further characterized with MRI.  3. An indeterminate 1.4 x 1 cm left hepatic lesion may represent a   complex cyst or hemangioma comma however a metastasis is not completely   excluded.  This may be further characterized with MRI  4. An indeterminate 1.6 x 1.6 cm right lower pole renal lesion may   represent a complex/hemorrhagic cyst or solid mass This may be further   characterized with MRI.  5.  Aneurysmal dilatation of the infrarenal abdominal aorta to   approximately 4.8 x 5.1 cm.

## 2024-08-31 NOTE — ED ADULT NURSE REASSESSMENT NOTE - NS ED NURSE REASSESS COMMENT FT1
Pt is resting in the stretcher. Family is at the bedside. Pt is medicated as ordered. Surgery team is at the bedside assessing the patient. MD Rivera is aware of patients BP. Bed is in the lowest position, safety rails are up and safety maintained throughout. Pt is resting in the stretcher. Family is at the bedside. Pt is medicated as ordered. Surgery team is at the bedside assessing the patient. MD Rivera is aware of patients BP. Pt changed, cleaned and repositioned. Bed is in the lowest position, safety rails are up and safety maintained throughout.

## 2024-08-31 NOTE — CONSULT NOTE ADULT - SUBJECTIVE AND OBJECTIVE BOX
HPI:  78 y/o male with a h/o CAD, s/p cardiac stents, HTN and rectal ca presenting to the ER after a fall.  Overall progressive decline over the past couple of months with weight loss, lack of appetite, weakness, and decreased mobility.  He was found by his family after a fall and was brought to the ER where he was found to be hypotensive to the 70s, and in AFib with RVR, WBC 22, creatinine 1.39, lactate 8.5, troponin. He received 2L NS bolus, and IV Vanco, Cefepime and Clinda with improvement in vitals and conversion back to NSR.  Lactate improved to 2.5 and troponin down to 1352. CT performed showing perforated rectal mass.  Approximately 2.5 x 1.5 cm (AP x LR) left perirectal collection located above the pelvic floor.  There is spread of infection below the pelvic floor, to the anorectal region, and into the perineum.  Partially imaged abscess in the perineum, extending into the root of the penis, measures at least 7.4 x 2.3 cm (AP x LR).  There are additional foci of fluid and gas tracking along the penile shaft.     PAST MEDICAL & SURGICAL HISTORY:  CAD s/p cardiac stent    HTN    rectal cancer, s/p chemo at [facility] [place], last scan about a year ago    MEDICATIONS:  Norvasc 5mg daily  ASA 81mg po daily  Irbesartan 300mg po daily  Crestor 10mg po daily  Niacin 500mg  Toprol XL 50mg QD  Travatan 0.004% drops  Xiidra 5% drops  Brimonidine 0.1% drops    FAMILY HISTORY:    Allergies  No Known Allergies    SOCIAL HISTORY:  Lives with son  Active smoker    REVIEW OF SYSTEMS: Otherwise negative as stated in HPI    PHYSICAL EXAM:  Vital Signs Last 24 Hrs  T(C): 36.9 (31 Aug 2024 04:07), Max: 37.9 (30 Aug 2024 20:45)  T(F): 98.5 (31 Aug 2024 04:07), Max: 100.2 (30 Aug 2024 20:45)  HR: 74 (31 Aug 2024 04:07) (74 - 167)  BP: 100/66 (31 Aug 2024 04:45) (76/55 - 112/78)  BP(mean): 75 (31 Aug 2024 04:45) (63 - 75)  RR: 15 (31 Aug 2024 04:07) (15 - 28)  SpO2: 100% (31 Aug 2024 04:07) (98% - 100%)    Parameters below as of 31 Aug 2024 04:07  Patient On (Oxygen Delivery Method): room air    General: chronically ill appearing,     Respiratory and Thorax: no resp distress   	  Cardiovascular:     Gastrointestinal: soft, non tender, no distention.    Genitourinary: Glans uncircumcised, foreskin retracts easily, no penile lesions or drainage, no tenderness, minimal crepitus appreciated on ventral aspect at base.    Scrotal edema and erythema present with diffuse tenderness more severe on the left side., no crepitus appreciated, no fluctuance appreciated.     Perineum with minimum tenderness, no crepitus or fluctuance appreciated.   Bedside u/s of the scrotum performed with Gen Surg without drainable collection visualized.    Extremities: no tenderness    LABS:                        9.0    22.22 )-----------( 191      ( 30 Aug 2024 20:49 )             28.8     08-30    135  |  98  |  47<H>  ----------------------------<  86  4.3   |  15<L>  |  1.39<H>    Ca    9.6      30 Aug 2024 20:49    TPro  7.3  /  Alb  2.9<L>  /  TBili  1.0  /  DBili  x   /  AST  71<H>  /  ALT  15  /  AlkPhos  204<H>  08-30    PT/INR - ( 30 Aug 2024 20:49 )   PT: 13.2 sec;   INR: 1.18 ratio    PTT - ( 30 Aug 2024 20:49 )  PTT:21.9 sec    Urinalysis Basic - ( 30 Aug 2024 20:49 )  Color: x / Appearance: x / SG: x / pH: x  Gluc: 86 mg/dL / Ketone: x  / Bili: x / Urobili: x   Blood: x / Protein: x / Nitrite: x   Leuk Esterase: x / RBC: x / WBC x   Sq Epi: x / Non Sq Epi: x / Bacteria: x    RADIOLOGY:  < from: CT Abdomen and Pelvis w/ IV Cont (08.30.24 @ 23:47) >  FINDINGS:  CHEST:  LUNGS AND LARGE AIRWAYS: Patent central airways.  Bibasilar subsegmental   atelectasis.  Right upper lobe calcified granuloma.  Approximately 1.8 x 1.4 cm right middle lobe nodule (301:50).  Right   upper lobe nodules measure approximately 2.5 mm (301:22) and 3 mm   (301:23), 2 mm (301:27).  Approximately 5 mmleft upper lobe nodule   (301:28).  Right lower lobe pulmonary nodules measure approximately 4 mm   (301:37), 2 mm (301:62), 2 mm (301:63), 5 mm (301:65), and 6 mm (301:71).    A left lower lobe pulmonary nodule measures 6 mm (301:60).  PLEURA: Tracepleural effusions bilaterally, right greater than left.  VESSELS: Aortic root is fusiformly dilated to approximately 4 cm in   transverse caliber at the sinuses of Valsalva (602:50).  No   atherosclerotic calcifications of the thoracic aorta and archvessels.  HEART: Heart size is normal.  Severe atherosclerotic calcification of the   coronary arteries. No pericardial effusion.  MEDIASTINUM AND SUJATA: No lymphadenopathy.  CHEST WALL AND LOWER NECK: Within normal limits.    ABDOMEN AND PELVIS:  LIVER: Indeterminate hypoattenuating left renal lesion measuring 1.4 x 1   cm, high CT attenuation greater than simple fluid and also demonstrates a   thin internal septation.  Punctate parenchymal calcification in the liver   (301:79), probably a granuloma.  BILE DUCTS: Normal caliber.  GALLBLADDER: Within normal limits.  SPLEEN: Within normal limits.  PANCREAS: Within normal limits.  ADRENALS: Heterogeneous cystic and solid left adrenal mass measures   approximately 3.5 x 3 cm (301:81).  KIDNEYS/URETERS: Left renal cysts measure up to 7.2 cm (301:102).  Right   renal cysts measure up to 2.6 cm (301:111).  Indeterminant right lower   pole renal lesion measures 1.6 x 1.6 cm (301:113).  Subcentimeter   hypoattenuating foci in the kidneys are too small to be characterized by   CT technique.    BLADDER: Distended with small bladder diverticulum.  Dependent   calcification in the bladder may represent bladder wall calcification   and/or small bladder calculi.  REPRODUCTIVE ORGANS: Enlarged prostate.  Edema and gas in the penis   related to a perineal abscess; see further details below.    BOWEL/PERITONEUM/RETROPERITONEUM: No bowel obstruction.  Appendix not   visualized.  Right-sided and left-sided colonic diverticulosis without   evidence for acute diverticulitis.  There is perforated rectal wall mass   with a kenny defect in the left lateral wall of the rectum.  There is an   adjacent 2.5 x 1.5 cm (AP x LR) loculated left perirectal collection of   gas and fluid along the left side of the rectum, located superior to the   pelvic floor.  There is ill-defined fluid and gas tracking inferiorly,   along the posterior aspect of the rectum, to the anorectal region, below   the pelvic floor.  There is partially imaged abscess in the perineum   extending into the root of the penis, that measures at least 7.4 x 2.3 cm   when measured in the oblique axial plane (key image #6). There are   additional foci of fluid and gas tracking along the penile shaft.  There   appear to be multiple surgical clips/biopsy clips around the rectum and   posterior to the left side of the bladder.  There is no remote   pneumoperitoneum.  VESSELS: Infrarenal abdominal aortic aneurysm measures up to   approximately 4.8 x 5.1 cm (AP x LR), when measured in the sagittal and   coronal planes (601:70 and 602:53-54).  LYMPH NODES: An aortocaval lymph node measures 8 mm short axis (301:111).  ABDOMINAL WALL: Perineal abscess as described above.  BONES: Mild degenerative changes spine.  Mild hip osteoarthrosis  bilaterally..  No acute fracture or suspicious osseous lesion..    IMPRESSION:  CT CHEST:  1.  No CT evidence of pneumonia or pulmonary edema.  2.  Trace pleural effusions bilaterally, right greater than left.  3.  Approximately 1.8 x 1.4 cm right middle lobe nodule, suspicious for   pulmonary metastasis.  4.  Multiple additional pulmonary nodules in both lungs, measuring up to   approximately 6 mm.  3.4 cm left adrenal heterogenous enhancing nodule,   which may represent an angiomyolipoma versus a metastasis.  5.  Fusiform dilatation of the aortic root to 4 cm at the sinuses of   Valsalva.    CT ABDOMEN/PELVIS:  1.  Perforated rectal mass.  Approximately 2.5 x 1.5 cm (AP x LR) left   perirectal collection located above the pelvic floor.  There is spread of   infection below the pelvic floor, to the anorectal region, and into the   perineum .  Partially imaged abscess in the perineum, extending into the   root of the penis, measures at least 7.4 x 2.3 cm (AP x LR).  There are   additional foci of fluid and gas tracking along the penile shaft.   Surgical consultation is recommended.  2.  Heterogeneous cystic and solid left adrenal mass measuring 3.5 x 3 cm   is suspicious for a metastasis until proven otherwise.  This may be   further characterized with MRI.  3. An indeterminate 1.4 x 1 cm left hepatic lesion may represent a   complex cyst or hemangioma comma however a metastasis is not completely   excluded.  This may be further characterized with MRI  4. An indeterminate 1.6 x 1.6 cm right lower pole renal lesion may   represent a complex/hemorrhagic cyst or solid mass This may be further   characterized with MRI.  5.  Aneurysmal dilatation of the infrarenal abdominal aorta to   approximately 4.8 x 5.1 cm.    Dr. Daysi Fritz radiology discussed the major findings with Dr. DAVE Armstrong on 8/31/2024 at 12:55 AM.  Read back confirmation was obtained.    --- End of Report ---    ***Please see the addendum at the top of this report. It may contain   additional important information or changes.****      DAYSI QUINTANA MD; Resident Radiologist  This document has been electronically signed.  DRAGAN BLACKMAN MD; Attending Radiologist  This document has been electronically signed. Aug 31 2024  1:49AM  1st Addendum: DRAGAN BLACKMAN MD; Attending Radiologist  The first addendum was electronically signed on: Aug 31 2024  1:57AM.    < end of copied text >   HPI:  80 y/o male with a h/o CAD, s/p cardiac stents, HTN and rectal ca presenting to the ER after a fall.  Overall progressive decline over the past couple of months with weight loss, lack of appetite, weakness, and decreased mobility.  He was found by his family after a fall and was brought to the ER where he was found to be hypotensive to the 70s, and in AFib with RVR, WBC 22, creatinine 1.39, lactate 8.5, troponin. He received 2L NS bolus, and IV Vanco, Cefepime and Clinda with improvement in vitals and conversion back to NSR.  Lactate improved to 2.5 and troponin down to 1352. CT performed showing perforated rectal mass.  Approximately 2.5 x 1.5 cm (AP x LR) left perirectal collection located above the pelvic floor.  There is spread of infection below the pelvic floor, to the anorectal region, and into the perineum.  Partially imaged abscess in the perineum, extending into the root of the penis, measures at least 7.4 x 2.3 cm (AP x LR).  There are additional foci of fluid and gas tracking along the penile shaft.     PAST MEDICAL & SURGICAL HISTORY:  CAD s/p cardiac stent    HTN    rectal cancer, s/p chemo at [facility] [place], last scan about a year ago    MEDICATIONS:  Norvasc 5mg daily  ASA 81mg po daily  Irbesartan 300mg po daily  Crestor 10mg po daily  Niacin 500mg  Toprol XL 50mg QD  Travatan 0.004% drops  Xiidra 5% drops  Brimonidine 0.1% drops    FAMILY HISTORY:    Allergies  No Known Allergies    SOCIAL HISTORY:  Lives with son  Active smoker    REVIEW OF SYSTEMS: Otherwise negative as stated in HPI    PHYSICAL EXAM:  Vital Signs Last 24 Hrs  T(C): 36.9 (31 Aug 2024 04:07), Max: 37.9 (30 Aug 2024 20:45)  T(F): 98.5 (31 Aug 2024 04:07), Max: 100.2 (30 Aug 2024 20:45)  HR: 74 (31 Aug 2024 04:07) (74 - 167)  BP: 100/66 (31 Aug 2024 04:45) (76/55 - 112/78)  BP(mean): 75 (31 Aug 2024 04:45) (63 - 75)  RR: 15 (31 Aug 2024 04:07) (15 - 28)  SpO2: 100% (31 Aug 2024 04:07) (98% - 100%)    Parameters below as of 31 Aug 2024 04:07  Patient On (Oxygen Delivery Method): room air    General: chronically ill appearing,     Respiratory and Thorax: no resp distress   	  Cardiovascular: regular    Gastrointestinal: soft, non tender, no distention.    Genitourinary: Glans uncircumcised, foreskin retracts easily, no penile lesions or drainage, no tenderness, minimal crepitus appreciated on ventral aspect at base.    Scrotal edema and erythema present with diffuse tenderness more severe on the left side., no crepitus appreciated, no fluctuance appreciated.     Perineum with minimum tenderness, no crepitus or fluctuance appreciated.   Bedside u/s of the scrotum performed with Gen Surg without drainable collection visualized.    Extremities: no tenderness    LABS:                        9.0    22.22 )-----------( 191      ( 30 Aug 2024 20:49 )             28.8     08-30    135  |  98  |  47<H>  ----------------------------<  86  4.3   |  15<L>  |  1.39<H>    Ca    9.6      30 Aug 2024 20:49    TPro  7.3  /  Alb  2.9<L>  /  TBili  1.0  /  DBili  x   /  AST  71<H>  /  ALT  15  /  AlkPhos  204<H>  08-30    PT/INR - ( 30 Aug 2024 20:49 )   PT: 13.2 sec;   INR: 1.18 ratio    PTT - ( 30 Aug 2024 20:49 )  PTT:21.9 sec    Urinalysis Basic - ( 30 Aug 2024 20:49 )  Color: x / Appearance: x / SG: x / pH: x  Gluc: 86 mg/dL / Ketone: x  / Bili: x / Urobili: x   Blood: x / Protein: x / Nitrite: x   Leuk Esterase: x / RBC: x / WBC x   Sq Epi: x / Non Sq Epi: x / Bacteria: x    RADIOLOGY:  < from: CT Abdomen and Pelvis w/ IV Cont (08.30.24 @ 23:47) >  FINDINGS:  CHEST:  LUNGS AND LARGE AIRWAYS: Patent central airways.  Bibasilar subsegmental   atelectasis.  Right upper lobe calcified granuloma.  Approximately 1.8 x 1.4 cm right middle lobe nodule (301:50).  Right   upper lobe nodules measure approximately 2.5 mm (301:22) and 3 mm   (301:23), 2 mm (301:27).  Approximately 5 mmleft upper lobe nodule   (301:28).  Right lower lobe pulmonary nodules measure approximately 4 mm   (301:37), 2 mm (301:62), 2 mm (301:63), 5 mm (301:65), and 6 mm (301:71).    A left lower lobe pulmonary nodule measures 6 mm (301:60).  PLEURA: Tracepleural effusions bilaterally, right greater than left.  VESSELS: Aortic root is fusiformly dilated to approximately 4 cm in   transverse caliber at the sinuses of Valsalva (602:50).  No   atherosclerotic calcifications of the thoracic aorta and archvessels.  HEART: Heart size is normal.  Severe atherosclerotic calcification of the   coronary arteries. No pericardial effusion.  MEDIASTINUM AND SUJATA: No lymphadenopathy.  CHEST WALL AND LOWER NECK: Within normal limits.    ABDOMEN AND PELVIS:  LIVER: Indeterminate hypoattenuating left renal lesion measuring 1.4 x 1   cm, high CT attenuation greater than simple fluid and also demonstrates a   thin internal septation.  Punctate parenchymal calcification in the liver   (301:79), probably a granuloma.  BILE DUCTS: Normal caliber.  GALLBLADDER: Within normal limits.  SPLEEN: Within normal limits.  PANCREAS: Within normal limits.  ADRENALS: Heterogeneous cystic and solid left adrenal mass measures   approximately 3.5 x 3 cm (301:81).  KIDNEYS/URETERS: Left renal cysts measure up to 7.2 cm (301:102).  Right   renal cysts measure up to 2.6 cm (301:111).  Indeterminant right lower   pole renal lesion measures 1.6 x 1.6 cm (301:113).  Subcentimeter   hypoattenuating foci in the kidneys are too small to be characterized by   CT technique.    BLADDER: Distended with small bladder diverticulum.  Dependent   calcification in the bladder may represent bladder wall calcification   and/or small bladder calculi.  REPRODUCTIVE ORGANS: Enlarged prostate.  Edema and gas in the penis   related to a perineal abscess; see further details below.    BOWEL/PERITONEUM/RETROPERITONEUM: No bowel obstruction.  Appendix not   visualized.  Right-sided and left-sided colonic diverticulosis without   evidence for acute diverticulitis.  There is perforated rectal wall mass   with a kenny defect in the left lateral wall of the rectum.  There is an   adjacent 2.5 x 1.5 cm (AP x LR) loculated left perirectal collection of   gas and fluid along the left side of the rectum, located superior to the   pelvic floor.  There is ill-defined fluid and gas tracking inferiorly,   along the posterior aspect of the rectum, to the anorectal region, below   the pelvic floor.  There is partially imaged abscess in the perineum   extending into the root of the penis, that measures at least 7.4 x 2.3 cm   when measured in the oblique axial plane (key image #6). There are   additional foci of fluid and gas tracking along the penile shaft.  There   appear to be multiple surgical clips/biopsy clips around the rectum and   posterior to the left side of the bladder.  There is no remote   pneumoperitoneum.  VESSELS: Infrarenal abdominal aortic aneurysm measures up to   approximately 4.8 x 5.1 cm (AP x LR), when measured in the sagittal and   coronal planes (601:70 and 602:53-54).  LYMPH NODES: An aortocaval lymph node measures 8 mm short axis (301:111).  ABDOMINAL WALL: Perineal abscess as described above.  BONES: Mild degenerative changes spine.  Mild hip osteoarthrosis  bilaterally..  No acute fracture or suspicious osseous lesion..    IMPRESSION:  CT CHEST:  1.  No CT evidence of pneumonia or pulmonary edema.  2.  Trace pleural effusions bilaterally, right greater than left.  3.  Approximately 1.8 x 1.4 cm right middle lobe nodule, suspicious for   pulmonary metastasis.  4.  Multiple additional pulmonary nodules in both lungs, measuring up to   approximately 6 mm.  3.4 cm left adrenal heterogenous enhancing nodule,   which may represent an angiomyolipoma versus a metastasis.  5.  Fusiform dilatation of the aortic root to 4 cm at the sinuses of   Valsalva.    CT ABDOMEN/PELVIS:  1.  Perforated rectal mass.  Approximately 2.5 x 1.5 cm (AP x LR) left   perirectal collection located above the pelvic floor.  There is spread of   infection below the pelvic floor, to the anorectal region, and into the   perineum .  Partially imaged abscess in the perineum, extending into the   root of the penis, measures at least 7.4 x 2.3 cm (AP x LR).  There are   additional foci of fluid and gas tracking along the penile shaft.   Surgical consultation is recommended.  2.  Heterogeneous cystic and solid left adrenal mass measuring 3.5 x 3 cm   is suspicious for a metastasis until proven otherwise.  This may be   further characterized with MRI.  3. An indeterminate 1.4 x 1 cm left hepatic lesion may represent a   complex cyst or hemangioma comma however a metastasis is not completely   excluded.  This may be further characterized with MRI  4. An indeterminate 1.6 x 1.6 cm right lower pole renal lesion may   represent a complex/hemorrhagic cyst or solid mass This may be further   characterized with MRI.  5.  Aneurysmal dilatation of the infrarenal abdominal aorta to   approximately 4.8 x 5.1 cm.    Dr. Daysi Fritz radiology discussed the major findings with Dr. DAVE Armstrong on 8/31/2024 at 12:55 AM.  Read back confirmation was obtained.    --- End of Report ---    ***Please see the addendum at the top of this report. It may contain   additional important information or changes.****      DAYSI QUINTANA MD; Resident Radiologist  This document has been electronically signed.  DRAGAN BLACKMAN MD; Attending Radiologist  This document has been electronically signed. Aug 31 2024  1:49AM  1st Addendum: DRAGAN BLACKMAN MD; Attending Radiologist  The first addendum was electronically signed on: Aug 31 2024  1:57AM.    < end of copied text >   HPI:  80 y/o male with a h/o CAD, s/p cardiac stents, HTN and rectal ca presenting to the ER after a fall.  Overall progressive decline over the past couple of months with weight loss, lack of appetite, weakness, and decreased mobility.  He was found by his family after a fall and was brought to the ER where he was found to be hypotensive to the 70s, and in AFib with RVR, WBC 22, creatinine 1.39, lactate 8.5, troponin 2192. He received 2L NS bolus, and IV Vanco, Cefepime and Clinda with improvement in vitals and conversion back to NSR.  Lactate improved to 2.5 and troponin down to 1352. CT performed showing perforated rectal mass.  Approximately 2.5 x 1.5 cm (AP x LR) left perirectal collection located above the pelvic floor.  There is spread of infection below the pelvic floor, to the anorectal region, and into the perineum.  Partially imaged abscess in the perineum, extending into the root of the penis, measures at least 7.4 x 2.3 cm (AP x LR).  There are additional foci of fluid and gas tracking along the penile shaft.     PAST MEDICAL & SURGICAL HISTORY:  CAD s/p cardiac stent    HTN    rectal cancer, s/p chemo at [facility] [place], last scan about a year ago    MEDICATIONS:  Norvasc 5mg daily  ASA 81mg po daily  Irbesartan 300mg po daily  Crestor 10mg po daily  Niacin 500mg  Toprol XL 50mg QD  Travatan 0.004% drops  Xiidra 5% drops  Brimonidine 0.1% drops    FAMILY HISTORY:    Allergies  No Known Allergies    SOCIAL HISTORY:  Lives with son  Active smoker    REVIEW OF SYSTEMS: Otherwise negative as stated in HPI    PHYSICAL EXAM:  Vital Signs Last 24 Hrs  T(C): 36.9 (31 Aug 2024 04:07), Max: 37.9 (30 Aug 2024 20:45)  T(F): 98.5 (31 Aug 2024 04:07), Max: 100.2 (30 Aug 2024 20:45)  HR: 74 (31 Aug 2024 04:07) (74 - 167)  BP: 100/66 (31 Aug 2024 04:45) (76/55 - 112/78)  BP(mean): 75 (31 Aug 2024 04:45) (63 - 75)  RR: 15 (31 Aug 2024 04:07) (15 - 28)  SpO2: 100% (31 Aug 2024 04:07) (98% - 100%)    Parameters below as of 31 Aug 2024 04:07  Patient On (Oxygen Delivery Method): room air    General: chronically ill appearing,     Respiratory and Thorax: no resp distress   	  Cardiovascular: regular    Gastrointestinal: soft, non tender, no distention.    Genitourinary: Glans uncircumcised, foreskin retracts easily, no penile lesions or drainage, no tenderness, minimal crepitus appreciated on ventral aspect at base.    Scrotal edema and erythema present with diffuse tenderness more severe on the left side., no crepitus appreciated, no fluctuance appreciated.     Perineum with minimum tenderness, no crepitus or fluctuance appreciated.   Bedside u/s of the scrotum performed with Gen Surg without drainable collection visualized.    Extremities: no tenderness    LABS:                        9.0    22.22 )-----------( 191      ( 30 Aug 2024 20:49 )             28.8     08-30    135  |  98  |  47<H>  ----------------------------<  86  4.3   |  15<L>  |  1.39<H>    Ca    9.6      30 Aug 2024 20:49    TPro  7.3  /  Alb  2.9<L>  /  TBili  1.0  /  DBili  x   /  AST  71<H>  /  ALT  15  /  AlkPhos  204<H>  08-30    PT/INR - ( 30 Aug 2024 20:49 )   PT: 13.2 sec;   INR: 1.18 ratio    PTT - ( 30 Aug 2024 20:49 )  PTT:21.9 sec    Urinalysis Basic - ( 30 Aug 2024 20:49 )  Color: x / Appearance: x / SG: x / pH: x  Gluc: 86 mg/dL / Ketone: x  / Bili: x / Urobili: x   Blood: x / Protein: x / Nitrite: x   Leuk Esterase: x / RBC: x / WBC x   Sq Epi: x / Non Sq Epi: x / Bacteria: x    RADIOLOGY:  < from: CT Abdomen and Pelvis w/ IV Cont (08.30.24 @ 23:47) >  FINDINGS:  CHEST:  LUNGS AND LARGE AIRWAYS: Patent central airways.  Bibasilar subsegmental   atelectasis.  Right upper lobe calcified granuloma.  Approximately 1.8 x 1.4 cm right middle lobe nodule (301:50).  Right   upper lobe nodules measure approximately 2.5 mm (301:22) and 3 mm   (301:23), 2 mm (301:27).  Approximately 5 mmleft upper lobe nodule   (301:28).  Right lower lobe pulmonary nodules measure approximately 4 mm   (301:37), 2 mm (301:62), 2 mm (301:63), 5 mm (301:65), and 6 mm (301:71).    A left lower lobe pulmonary nodule measures 6 mm (301:60).  PLEURA: Tracepleural effusions bilaterally, right greater than left.  VESSELS: Aortic root is fusiformly dilated to approximately 4 cm in   transverse caliber at the sinuses of Valsalva (602:50).  No   atherosclerotic calcifications of the thoracic aorta and archvessels.  HEART: Heart size is normal.  Severe atherosclerotic calcification of the   coronary arteries. No pericardial effusion.  MEDIASTINUM AND SUJATA: No lymphadenopathy.  CHEST WALL AND LOWER NECK: Within normal limits.    ABDOMEN AND PELVIS:  LIVER: Indeterminate hypoattenuating left renal lesion measuring 1.4 x 1   cm, high CT attenuation greater than simple fluid and also demonstrates a   thin internal septation.  Punctate parenchymal calcification in the liver   (301:79), probably a granuloma.  BILE DUCTS: Normal caliber.  GALLBLADDER: Within normal limits.  SPLEEN: Within normal limits.  PANCREAS: Within normal limits.  ADRENALS: Heterogeneous cystic and solid left adrenal mass measures   approximately 3.5 x 3 cm (301:81).  KIDNEYS/URETERS: Left renal cysts measure up to 7.2 cm (301:102).  Right   renal cysts measure up to 2.6 cm (301:111).  Indeterminant right lower   pole renal lesion measures 1.6 x 1.6 cm (301:113).  Subcentimeter   hypoattenuating foci in the kidneys are too small to be characterized by   CT technique.    BLADDER: Distended with small bladder diverticulum.  Dependent   calcification in the bladder may represent bladder wall calcification   and/or small bladder calculi.  REPRODUCTIVE ORGANS: Enlarged prostate.  Edema and gas in the penis   related to a perineal abscess; see further details below.    BOWEL/PERITONEUM/RETROPERITONEUM: No bowel obstruction.  Appendix not   visualized.  Right-sided and left-sided colonic diverticulosis without   evidence for acute diverticulitis.  There is perforated rectal wall mass   with a kenny defect in the left lateral wall of the rectum.  There is an   adjacent 2.5 x 1.5 cm (AP x LR) loculated left perirectal collection of   gas and fluid along the left side of the rectum, located superior to the   pelvic floor.  There is ill-defined fluid and gas tracking inferiorly,   along the posterior aspect of the rectum, to the anorectal region, below   the pelvic floor.  There is partially imaged abscess in the perineum   extending into the root of the penis, that measures at least 7.4 x 2.3 cm   when measured in the oblique axial plane (key image #6). There are   additional foci of fluid and gas tracking along the penile shaft.  There   appear to be multiple surgical clips/biopsy clips around the rectum and   posterior to the left side of the bladder.  There is no remote   pneumoperitoneum.  VESSELS: Infrarenal abdominal aortic aneurysm measures up to   approximately 4.8 x 5.1 cm (AP x LR), when measured in the sagittal and   coronal planes (601:70 and 602:53-54).  LYMPH NODES: An aortocaval lymph node measures 8 mm short axis (301:111).  ABDOMINAL WALL: Perineal abscess as described above.  BONES: Mild degenerative changes spine.  Mild hip osteoarthrosis  bilaterally..  No acute fracture or suspicious osseous lesion..    IMPRESSION:  CT CHEST:  1.  No CT evidence of pneumonia or pulmonary edema.  2.  Trace pleural effusions bilaterally, right greater than left.  3.  Approximately 1.8 x 1.4 cm right middle lobe nodule, suspicious for   pulmonary metastasis.  4.  Multiple additional pulmonary nodules in both lungs, measuring up to   approximately 6 mm.  3.4 cm left adrenal heterogenous enhancing nodule,   which may represent an angiomyolipoma versus a metastasis.  5.  Fusiform dilatation of the aortic root to 4 cm at the sinuses of   Valsalva.    CT ABDOMEN/PELVIS:  1.  Perforated rectal mass.  Approximately 2.5 x 1.5 cm (AP x LR) left   perirectal collection located above the pelvic floor.  There is spread of   infection below the pelvic floor, to the anorectal region, and into the   perineum .  Partially imaged abscess in the perineum, extending into the   root of the penis, measures at least 7.4 x 2.3 cm (AP x LR).  There are   additional foci of fluid and gas tracking along the penile shaft.   Surgical consultation is recommended.  2.  Heterogeneous cystic and solid left adrenal mass measuring 3.5 x 3 cm   is suspicious for a metastasis until proven otherwise.  This may be   further characterized with MRI.  3. An indeterminate 1.4 x 1 cm left hepatic lesion may represent a   complex cyst or hemangioma comma however a metastasis is not completely   excluded.  This may be further characterized with MRI  4. An indeterminate 1.6 x 1.6 cm right lower pole renal lesion may   represent a complex/hemorrhagic cyst or solid mass This may be further   characterized with MRI.  5.  Aneurysmal dilatation of the infrarenal abdominal aorta to   approximately 4.8 x 5.1 cm.    Dr. Daysi Fritz radiology discussed the major findings with Dr. DAVE Armstrong on 8/31/2024 at 12:55 AM.  Read back confirmation was obtained.    --- End of Report ---    ***Please see the addendum at the top of this report. It may contain   additional important information or changes.****      DAYSI QUINTANA MD; Resident Radiologist  This document has been electronically signed.  DRAGAN BLACKMAN MD; Attending Radiologist  This document has been electronically signed. Aug 31 2024  1:49AM  1st Addendum: DRAGAN BLACKMAN MD; Attending Radiologist  The first addendum was electronically signed on: Aug 31 2024  1:57AM.    < end of copied text >

## 2024-09-01 LAB
-  BACTEROIDES FRAGILIS: SIGNIFICANT CHANGE UP
GRAM STN FLD: ABNORMAL
GRAM STN FLD: ABNORMAL
METHOD TYPE: SIGNIFICANT CHANGE UP

## 2024-09-01 NOTE — ED POST DISCHARGE NOTE - ADDITIONAL DOCUMENTATION
Received call for +blood culture gram neg rods in anaerobic bottle.  Attempted to call all numbers on file and unable to leave a message.  Pt transferred to [facility].  will call [phone] line and leave message to follow up.

## 2024-09-02 LAB
CULTURE RESULTS: ABNORMAL
E COLI DNA BLD POS QL NAA+NON-PROBE: SIGNIFICANT CHANGE UP
METHOD TYPE: SIGNIFICANT CHANGE UP
ORGANISM # SPEC MICROSCOPIC CNT: ABNORMAL
ORGANISM # SPEC MICROSCOPIC CNT: ABNORMAL
SPECIMEN SOURCE: SIGNIFICANT CHANGE UP

## 2024-09-03 LAB
-  AMPICILLIN/SULBACTAM: SIGNIFICANT CHANGE UP
-  AMPICILLIN: SIGNIFICANT CHANGE UP
-  AZTREONAM: SIGNIFICANT CHANGE UP
-  CEFAZOLIN: SIGNIFICANT CHANGE UP
-  CEFEPIME: SIGNIFICANT CHANGE UP
-  CEFOXITIN: SIGNIFICANT CHANGE UP
-  CEFTRIAXONE: SIGNIFICANT CHANGE UP
-  CIPROFLOXACIN: SIGNIFICANT CHANGE UP
-  ERTAPENEM: SIGNIFICANT CHANGE UP
-  GENTAMICIN: SIGNIFICANT CHANGE UP
-  IMIPENEM: SIGNIFICANT CHANGE UP
-  LEVOFLOXACIN: SIGNIFICANT CHANGE UP
-  MEROPENEM: SIGNIFICANT CHANGE UP
-  PIPERACILLIN/TAZOBACTAM: SIGNIFICANT CHANGE UP
-  TOBRAMYCIN: SIGNIFICANT CHANGE UP
-  TRIMETHOPRIM/SULFAMETHOXAZOLE: SIGNIFICANT CHANGE UP
CULTURE RESULTS: ABNORMAL
METHOD TYPE: SIGNIFICANT CHANGE UP
ORGANISM # SPEC MICROSCOPIC CNT: ABNORMAL
SPECIMEN SOURCE: SIGNIFICANT CHANGE UP

## 2024-10-12 ENCOUNTER — INPATIENT (INPATIENT)
Facility: HOSPITAL | Age: 79
LOS: 30 days | Discharge: HOME | End: 2024-11-12
Attending: UROLOGY | Admitting: UROLOGY
Payer: MEDICARE

## 2024-10-12 VITALS
DIASTOLIC BLOOD PRESSURE: 73 MMHG | OXYGEN SATURATION: 98 % | TEMPERATURE: 98 F | HEART RATE: 126 BPM | SYSTOLIC BLOOD PRESSURE: 103 MMHG | RESPIRATION RATE: 18 BRPM

## 2024-10-12 DIAGNOSIS — L02.219 CUTANEOUS ABSCESS OF TRUNK, UNSPECIFIED: ICD-10-CM

## 2024-10-12 LAB
ADD ON TEST-SPECIMEN IN LAB: SIGNIFICANT CHANGE UP
ALBUMIN SERPL ELPH-MCNC: 2.5 G/DL — LOW (ref 3.3–5)
ALP SERPL-CCNC: 73 U/L — SIGNIFICANT CHANGE UP (ref 40–120)
ALT FLD-CCNC: <5 U/L — SIGNIFICANT CHANGE UP (ref 4–41)
ANION GAP SERPL CALC-SCNC: 11 MMOL/L — SIGNIFICANT CHANGE UP (ref 7–14)
ANION GAP SERPL CALC-SCNC: 14 MMOL/L — SIGNIFICANT CHANGE UP (ref 7–14)
APPEARANCE UR: ABNORMAL
APTT BLD: 30.5 SEC — SIGNIFICANT CHANGE UP (ref 24.5–35.6)
AST SERPL-CCNC: 32 U/L — SIGNIFICANT CHANGE UP (ref 4–40)
BASOPHILS # BLD AUTO: 0.07 K/UL — SIGNIFICANT CHANGE UP (ref 0–0.2)
BASOPHILS NFR BLD AUTO: 1.1 % — SIGNIFICANT CHANGE UP (ref 0–2)
BILIRUB SERPL-MCNC: 0.6 MG/DL — SIGNIFICANT CHANGE UP (ref 0.2–1.2)
BILIRUB UR-MCNC: NEGATIVE — SIGNIFICANT CHANGE UP
BLD GP AB SCN SERPL QL: NEGATIVE — SIGNIFICANT CHANGE UP
BLOOD GAS VENOUS COMPREHENSIVE RESULT: SIGNIFICANT CHANGE UP
BUN SERPL-MCNC: 12 MG/DL — SIGNIFICANT CHANGE UP (ref 7–23)
BUN SERPL-MCNC: 9 MG/DL — SIGNIFICANT CHANGE UP (ref 7–23)
CALCIUM SERPL-MCNC: 8.6 MG/DL — SIGNIFICANT CHANGE UP (ref 8.4–10.5)
CALCIUM SERPL-MCNC: 8.7 MG/DL — SIGNIFICANT CHANGE UP (ref 8.4–10.5)
CHLORIDE SERPL-SCNC: 96 MMOL/L — LOW (ref 98–107)
CHLORIDE SERPL-SCNC: 98 MMOL/L — SIGNIFICANT CHANGE UP (ref 98–107)
CO2 SERPL-SCNC: 22 MMOL/L — SIGNIFICANT CHANGE UP (ref 22–31)
CO2 SERPL-SCNC: 23 MMOL/L — SIGNIFICANT CHANGE UP (ref 22–31)
COLOR SPEC: YELLOW — SIGNIFICANT CHANGE UP
CREAT SERPL-MCNC: 0.62 MG/DL — SIGNIFICANT CHANGE UP (ref 0.5–1.3)
CREAT SERPL-MCNC: 0.74 MG/DL — SIGNIFICANT CHANGE UP (ref 0.5–1.3)
DIFF PNL FLD: NEGATIVE — SIGNIFICANT CHANGE UP
EGFR: 92 ML/MIN/1.73M2 — SIGNIFICANT CHANGE UP
EGFR: 97 ML/MIN/1.73M2 — SIGNIFICANT CHANGE UP
EOSINOPHIL # BLD AUTO: 0.03 K/UL — SIGNIFICANT CHANGE UP (ref 0–0.5)
EOSINOPHIL NFR BLD AUTO: 0.5 % — SIGNIFICANT CHANGE UP (ref 0–6)
GAS PNL BLDV: SIGNIFICANT CHANGE UP
GLUCOSE SERPL-MCNC: 108 MG/DL — HIGH (ref 70–99)
GLUCOSE SERPL-MCNC: 99 MG/DL — SIGNIFICANT CHANGE UP (ref 70–99)
GLUCOSE UR QL: NEGATIVE MG/DL — SIGNIFICANT CHANGE UP
HCT VFR BLD CALC: 31.8 % — LOW (ref 39–50)
HCT VFR BLD CALC: 33.5 % — LOW (ref 39–50)
HGB BLD-MCNC: 9.6 G/DL — LOW (ref 13–17)
HGB BLD-MCNC: 9.8 G/DL — LOW (ref 13–17)
IANC: 3.97 K/UL — SIGNIFICANT CHANGE UP (ref 1.8–7.4)
IMM GRANULOCYTES NFR BLD AUTO: 0.5 % — SIGNIFICANT CHANGE UP (ref 0–0.9)
INR BLD: 1.17 RATIO — HIGH (ref 0.85–1.16)
KETONES UR-MCNC: NEGATIVE MG/DL — SIGNIFICANT CHANGE UP
LEUKOCYTE ESTERASE UR-ACNC: ABNORMAL
LYMPHOCYTES # BLD AUTO: 1.17 K/UL — SIGNIFICANT CHANGE UP (ref 1–3.3)
LYMPHOCYTES # BLD AUTO: 18.8 % — SIGNIFICANT CHANGE UP (ref 13–44)
MAGNESIUM SERPL-MCNC: 2 MG/DL — SIGNIFICANT CHANGE UP (ref 1.6–2.6)
MAGNESIUM SERPL-MCNC: 2 MG/DL — SIGNIFICANT CHANGE UP (ref 1.6–2.6)
MCHC RBC-ENTMCNC: 24.3 PG — LOW (ref 27–34)
MCHC RBC-ENTMCNC: 25.1 PG — LOW (ref 27–34)
MCHC RBC-ENTMCNC: 29.3 GM/DL — LOW (ref 32–36)
MCHC RBC-ENTMCNC: 30.2 GM/DL — LOW (ref 32–36)
MCV RBC AUTO: 82.9 FL — SIGNIFICANT CHANGE UP (ref 80–100)
MCV RBC AUTO: 83.2 FL — SIGNIFICANT CHANGE UP (ref 80–100)
MONOCYTES # BLD AUTO: 0.95 K/UL — HIGH (ref 0–0.9)
MONOCYTES NFR BLD AUTO: 15.3 % — HIGH (ref 2–14)
NEUTROPHILS # BLD AUTO: 3.97 K/UL — SIGNIFICANT CHANGE UP (ref 1.8–7.4)
NEUTROPHILS NFR BLD AUTO: 63.8 % — SIGNIFICANT CHANGE UP (ref 43–77)
NITRITE UR-MCNC: NEGATIVE — SIGNIFICANT CHANGE UP
NRBC # BLD: 0 /100 WBCS — SIGNIFICANT CHANGE UP (ref 0–0)
NRBC # BLD: 0 /100 WBCS — SIGNIFICANT CHANGE UP (ref 0–0)
NRBC # FLD: 0 K/UL — SIGNIFICANT CHANGE UP (ref 0–0)
NRBC # FLD: 0 K/UL — SIGNIFICANT CHANGE UP (ref 0–0)
NT-PROBNP SERPL-SCNC: HIGH PG/ML
PH UR: 7 — SIGNIFICANT CHANGE UP (ref 5–8)
PHOSPHATE SERPL-MCNC: 3.4 MG/DL — SIGNIFICANT CHANGE UP (ref 2.5–4.5)
PLATELET # BLD AUTO: 204 K/UL — SIGNIFICANT CHANGE UP (ref 150–400)
PLATELET # BLD AUTO: 230 K/UL — SIGNIFICANT CHANGE UP (ref 150–400)
POTASSIUM SERPL-MCNC: 3.6 MMOL/L — SIGNIFICANT CHANGE UP (ref 3.5–5.3)
POTASSIUM SERPL-MCNC: 3.8 MMOL/L — SIGNIFICANT CHANGE UP (ref 3.5–5.3)
POTASSIUM SERPL-SCNC: 3.6 MMOL/L — SIGNIFICANT CHANGE UP (ref 3.5–5.3)
POTASSIUM SERPL-SCNC: 3.8 MMOL/L — SIGNIFICANT CHANGE UP (ref 3.5–5.3)
PROT SERPL-MCNC: 6.3 G/DL — SIGNIFICANT CHANGE UP (ref 6–8.3)
PROT UR-MCNC: SIGNIFICANT CHANGE UP MG/DL
PROTHROM AB SERPL-ACNC: 13.5 SEC — HIGH (ref 9.9–13.4)
RBC # BLD: 3.82 M/UL — LOW (ref 4.2–5.8)
RBC # BLD: 4.04 M/UL — LOW (ref 4.2–5.8)
RBC # FLD: 16.9 % — HIGH (ref 10.3–14.5)
RBC # FLD: 17.2 % — HIGH (ref 10.3–14.5)
RH IG SCN BLD-IMP: NEGATIVE — SIGNIFICANT CHANGE UP
SODIUM SERPL-SCNC: 132 MMOL/L — LOW (ref 135–145)
SODIUM SERPL-SCNC: 132 MMOL/L — LOW (ref 135–145)
SP GR SPEC: 1.01 — SIGNIFICANT CHANGE UP (ref 1–1.03)
TROPONIN T, HIGH SENSITIVITY RESULT: 62 NG/L — CRITICAL HIGH
TROPONIN T, HIGH SENSITIVITY RESULT: 63 NG/L — CRITICAL HIGH
TROPONIN T, HIGH SENSITIVITY RESULT: 81 NG/L — CRITICAL HIGH
TSH SERPL-MCNC: 23.86 UIU/ML — HIGH (ref 0.27–4.2)
UROBILINOGEN FLD QL: 1 MG/DL — SIGNIFICANT CHANGE UP (ref 0.2–1)
WBC # BLD: 6.22 K/UL — SIGNIFICANT CHANGE UP (ref 3.8–10.5)
WBC # BLD: 6.46 K/UL — SIGNIFICANT CHANGE UP (ref 3.8–10.5)
WBC # FLD AUTO: 6.22 K/UL — SIGNIFICANT CHANGE UP (ref 3.8–10.5)
WBC # FLD AUTO: 6.46 K/UL — SIGNIFICANT CHANGE UP (ref 3.8–10.5)

## 2024-10-12 PROCEDURE — 11006 DBRDMT SKIN XTRNL GENT PER: CPT

## 2024-10-12 PROCEDURE — 46040 I&D ISCHIORCT&/PERIRCT ABSC: CPT

## 2024-10-12 PROCEDURE — 74176 CT ABD & PELVIS W/O CONTRAST: CPT | Mod: 26,MC

## 2024-10-12 PROCEDURE — 99291 CRITICAL CARE FIRST HOUR: CPT

## 2024-10-12 PROCEDURE — 99222 1ST HOSP IP/OBS MODERATE 55: CPT

## 2024-10-12 PROCEDURE — 99223 1ST HOSP IP/OBS HIGH 75: CPT | Mod: 57,25

## 2024-10-12 PROCEDURE — 55100 DRAINAGE OF SCROTUM ABSCESS: CPT

## 2024-10-12 PROCEDURE — 71045 X-RAY EXAM CHEST 1 VIEW: CPT | Mod: 26

## 2024-10-12 RX ORDER — ASPIRIN/MAG CARB/ALUMINUM AMIN 325 MG
81 TABLET ORAL DAILY
Refills: 0 | Status: DISCONTINUED | OUTPATIENT
Start: 2024-10-12 | End: 2024-11-12

## 2024-10-12 RX ORDER — FENTANYL CITRAT/DEXTROSE 5%/PF 1250MCG/50
25 PATIENT CONTROLLED ANALGESIA SYRINGE INTRAVENOUS
Refills: 0 | Status: DISCONTINUED | OUTPATIENT
Start: 2024-10-12 | End: 2024-10-12

## 2024-10-12 RX ORDER — HEPARIN SODIUM 10000 [USP'U]/ML
5000 INJECTION INTRAVENOUS; SUBCUTANEOUS EVERY 8 HOURS
Refills: 0 | Status: DISCONTINUED | OUTPATIENT
Start: 2024-10-12 | End: 2024-10-19

## 2024-10-12 RX ORDER — SODIUM CHLORIDE 9 MG/ML
500 INJECTION, SOLUTION INTRAMUSCULAR; INTRAVENOUS; SUBCUTANEOUS ONCE
Refills: 0 | Status: COMPLETED | OUTPATIENT
Start: 2024-10-12 | End: 2024-10-12

## 2024-10-12 RX ORDER — ACETAMINOPHEN 500 MG
1000 TABLET ORAL EVERY 6 HOURS
Refills: 0 | Status: DISCONTINUED | OUTPATIENT
Start: 2024-10-12 | End: 2024-10-13

## 2024-10-12 RX ORDER — NICOTINE POLACRILEX 4 MG/1
1 GUM, CHEWING ORAL DAILY
Refills: 0 | Status: DISCONTINUED | OUTPATIENT
Start: 2024-10-12 | End: 2024-10-28

## 2024-10-12 RX ORDER — MORPHINE SULFATE 30 MG/1
4 TABLET, EXTENDED RELEASE ORAL ONCE
Refills: 0 | Status: DISCONTINUED | OUTPATIENT
Start: 2024-10-12 | End: 2024-10-12

## 2024-10-12 RX ORDER — FENTANYL CITRAT/DEXTROSE 5%/PF 1250MCG/50
50 PATIENT CONTROLLED ANALGESIA SYRINGE INTRAVENOUS
Refills: 0 | Status: DISCONTINUED | OUTPATIENT
Start: 2024-10-12 | End: 2024-10-12

## 2024-10-12 RX ORDER — VANCOMYCIN HYDROCHLORIDE 50 MG/ML
1000 KIT ORAL EVERY 12 HOURS
Refills: 0 | Status: DISCONTINUED | OUTPATIENT
Start: 2024-10-12 | End: 2024-10-14

## 2024-10-12 RX ORDER — HYDROMORPHONE HCL/0.9% NACL/PF 6 MG/30 ML
0.2 PATIENT CONTROLLED ANALGESIA SYRINGE INTRAVENOUS EVERY 4 HOURS
Refills: 0 | Status: DISCONTINUED | OUTPATIENT
Start: 2024-10-12 | End: 2024-10-13

## 2024-10-12 RX ORDER — PIPERACILLIN AND TAZOBACTAM .5; 4 G/20ML; G/20ML
3.38 INJECTION, POWDER, LYOPHILIZED, FOR SOLUTION INTRAVENOUS ONCE
Refills: 0 | Status: DISCONTINUED | OUTPATIENT
Start: 2024-10-12 | End: 2024-10-12

## 2024-10-12 RX ORDER — CLINDAMYCIN PHOSPHATE 150 MG/ML
900 VIAL (ML) INJECTION ONCE
Refills: 0 | Status: COMPLETED | OUTPATIENT
Start: 2024-10-12 | End: 2024-10-12

## 2024-10-12 RX ORDER — ROSUVASTATIN CALCIUM 10 MG
10 TABLET ORAL AT BEDTIME
Refills: 0 | Status: DISCONTINUED | OUTPATIENT
Start: 2024-10-12 | End: 2024-11-12

## 2024-10-12 RX ORDER — PIPERACILLIN AND TAZOBACTAM .5; 4 G/20ML; G/20ML
3.38 INJECTION, POWDER, LYOPHILIZED, FOR SOLUTION INTRAVENOUS ONCE
Refills: 0 | Status: COMPLETED | OUTPATIENT
Start: 2024-10-12 | End: 2024-10-12

## 2024-10-12 RX ORDER — CLINDAMYCIN PHOSPHATE 150 MG/ML
VIAL (ML) INJECTION
Refills: 0 | Status: DISCONTINUED | OUTPATIENT
Start: 2024-10-12 | End: 2024-10-14

## 2024-10-12 RX ORDER — ONDANSETRON HYDROCHLORIDE 2 MG/ML
4 INJECTION, SOLUTION INTRAMUSCULAR; INTRAVENOUS ONCE
Refills: 0 | Status: DISCONTINUED | OUTPATIENT
Start: 2024-10-12 | End: 2024-10-12

## 2024-10-12 RX ORDER — PIPERACILLIN AND TAZOBACTAM .5; 4 G/20ML; G/20ML
3.38 INJECTION, POWDER, LYOPHILIZED, FOR SOLUTION INTRAVENOUS EVERY 8 HOURS
Refills: 0 | Status: DISCONTINUED | OUTPATIENT
Start: 2024-10-12 | End: 2024-10-16

## 2024-10-12 RX ORDER — CLINDAMYCIN PHOSPHATE 150 MG/ML
900 VIAL (ML) INJECTION EVERY 8 HOURS
Refills: 0 | Status: DISCONTINUED | OUTPATIENT
Start: 2024-10-12 | End: 2024-10-14

## 2024-10-12 RX ORDER — LIDOCAINE HCL 60 MG/3 ML
20 SYRINGE (ML) INJECTION ONCE
Refills: 0 | Status: COMPLETED | OUTPATIENT
Start: 2024-10-12 | End: 2024-10-12

## 2024-10-12 RX ORDER — METOPROLOL TARTRATE 50 MG
50 TABLET ORAL DAILY
Refills: 0 | Status: DISCONTINUED | OUTPATIENT
Start: 2024-10-12 | End: 2024-11-12

## 2024-10-12 RX ORDER — TIMOLOL MALEATE 0.5 %
1 DROPS OPHTHALMIC (EYE) EVERY 12 HOURS
Refills: 0 | Status: DISCONTINUED | OUTPATIENT
Start: 2024-10-12 | End: 2024-11-12

## 2024-10-12 RX ORDER — HYDROMORPHONE HCL/0.9% NACL/PF 6 MG/30 ML
0.5 PATIENT CONTROLLED ANALGESIA SYRINGE INTRAVENOUS EVERY 4 HOURS
Refills: 0 | Status: DISCONTINUED | OUTPATIENT
Start: 2024-10-12 | End: 2024-10-13

## 2024-10-12 RX ORDER — LATANOPROST 0.005 %
1 DROPS OPHTHALMIC (EYE) AT BEDTIME
Refills: 0 | Status: DISCONTINUED | OUTPATIENT
Start: 2024-10-12 | End: 2024-11-12

## 2024-10-12 RX ORDER — VANCOMYCIN HYDROCHLORIDE 50 MG/ML
1000 KIT ORAL ONCE
Refills: 0 | Status: COMPLETED | OUTPATIENT
Start: 2024-10-12 | End: 2024-10-12

## 2024-10-12 RX ORDER — BRIMONIDINE TARTRATE 0.2 %
1 DROPS OPHTHALMIC (EYE) EVERY 12 HOURS
Refills: 0 | Status: DISCONTINUED | OUTPATIENT
Start: 2024-10-12 | End: 2024-11-12

## 2024-10-12 RX ADMIN — Medication 100 MILLIGRAM(S): at 08:00

## 2024-10-12 RX ADMIN — Medication 75 MILLILITER(S): at 23:38

## 2024-10-12 RX ADMIN — VANCOMYCIN HYDROCHLORIDE 250 MILLIGRAM(S): KIT at 08:06

## 2024-10-12 RX ADMIN — Medication 75 MILLILITER(S): at 18:16

## 2024-10-12 RX ADMIN — Medication 20 MILLILITER(S): at 11:00

## 2024-10-12 RX ADMIN — PIPERACILLIN AND TAZOBACTAM 25 GRAM(S): .5; 4 INJECTION, POWDER, LYOPHILIZED, FOR SOLUTION INTRAVENOUS at 18:19

## 2024-10-12 RX ADMIN — VANCOMYCIN HYDROCHLORIDE 250 MILLIGRAM(S): KIT at 20:13

## 2024-10-12 RX ADMIN — MORPHINE SULFATE 4 MILLIGRAM(S): 30 TABLET, EXTENDED RELEASE ORAL at 06:45

## 2024-10-12 RX ADMIN — MORPHINE SULFATE 4 MILLIGRAM(S): 30 TABLET, EXTENDED RELEASE ORAL at 10:35

## 2024-10-12 RX ADMIN — MORPHINE SULFATE 4 MILLIGRAM(S): 30 TABLET, EXTENDED RELEASE ORAL at 07:09

## 2024-10-12 RX ADMIN — SODIUM CHLORIDE 500 MILLILITER(S): 9 INJECTION, SOLUTION INTRAMUSCULAR; INTRAVENOUS; SUBCUTANEOUS at 03:45

## 2024-10-12 RX ADMIN — Medication 1000 MILLIGRAM(S): at 18:43

## 2024-10-12 RX ADMIN — SODIUM CHLORIDE 500 MILLILITER(S): 9 INJECTION, SOLUTION INTRAMUSCULAR; INTRAVENOUS; SUBCUTANEOUS at 05:40

## 2024-10-12 RX ADMIN — SODIUM CHLORIDE 500 MILLILITER(S): 9 INJECTION, SOLUTION INTRAMUSCULAR; INTRAVENOUS; SUBCUTANEOUS at 10:35

## 2024-10-12 RX ADMIN — PIPERACILLIN AND TAZOBACTAM 200 GRAM(S): .5; 4 INJECTION, POWDER, LYOPHILIZED, FOR SOLUTION INTRAVENOUS at 03:45

## 2024-10-12 RX ADMIN — SODIUM CHLORIDE 500 MILLILITER(S): 9 INJECTION, SOLUTION INTRAMUSCULAR; INTRAVENOUS; SUBCUTANEOUS at 02:00

## 2024-10-12 RX ADMIN — Medication 1000 MILLILITER(S): at 17:30

## 2024-10-12 RX ADMIN — Medication 400 MILLIGRAM(S): at 23:37

## 2024-10-12 RX ADMIN — HEPARIN SODIUM 5000 UNIT(S): 10000 INJECTION INTRAVENOUS; SUBCUTANEOUS at 22:54

## 2024-10-12 RX ADMIN — Medication 100 MILLIGRAM(S): at 18:16

## 2024-10-12 RX ADMIN — Medication 400 MILLIGRAM(S): at 18:19

## 2024-10-12 RX ADMIN — MORPHINE SULFATE 4 MILLIGRAM(S): 30 TABLET, EXTENDED RELEASE ORAL at 02:00

## 2024-10-12 RX ADMIN — MORPHINE SULFATE 4 MILLIGRAM(S): 30 TABLET, EXTENDED RELEASE ORAL at 03:45

## 2024-10-12 NOTE — H&P ADULT - NSHPLABSRESULTS_GEN_ALL_CORE
LABS:      10-12 @ 02:28    WBC 6.22  / Hct 33.5  / SCr 0.62     10-12    132[L]  |  98  |  9   ----------------------------<  99  3.8   |  23  |  0.62    Ca    8.7      12 Oct 2024 02:28  Mg     2.00     10-12    TPro  6.3  /  Alb  2.5[L]  /  TBili  0.6  /  DBili  x   /  AST  32  /  ALT  <5  /  AlkPhos  73  10-12    PT/INR - ( 12 Oct 2024 02:28 )   PT: 13.5 sec;   INR: 1.17 ratio         PTT - ( 12 Oct 2024 02:28 )  PTT:30.5 sec  Urinalysis Basic - ( 12 Oct 2024 04:12 )    Color: Yellow / Appearance: Cloudy / S.012 / pH: x  Gluc: x / Ketone: Negative mg/dL  / Bili: Negative / Urobili: 1.0 mg/dL   Blood: x / Protein: Trace mg/dL / Nitrite: Negative   Leuk Esterase: Large / RBC: 1 /HPF / WBC 58 /HPF   Sq Epi: x / Non Sq Epi: 0 /HPF / Bacteria: Occasional /HPF        Urine Cx: pending  Blood Cx: pending    RADIOLOGY:  FINDINGS:  LOWER CHEST: Coronary artery calcifications versus stents. Moderate left   and small right pleural effusions with associated atelectasis. Lower lobe   nodules measuring up to 0.8 cm in the left lower lobe. Centrilobular and   paraseptal emphysema. Atelectasis or scarring in the right middle lobe.    LIVER: Lefthepatic lobe cysts. Scattered calcified granulomas.  BILE DUCTS: Normal caliber.  GALLBLADDER: Within normal limits.  SPLEEN: Within normal limits.  PANCREAS: Within normal limits.  ADRENALS: 3.2 cm indeterminate left adrenal nodule.  KIDNEYS/URETERS: Bilateral renal cysts as well as additional   indeterminate lesions which may represent hemorrhagic or proteinaceous   cysts.    BLADDER: Zamarripa catheter.  REPRODUCTIVE ORGANS: Prostate gland is enlarged. Ill-defined fluid and   gas within the scrotum.    BOWEL: Surgical clips in the perirectal space. Diverticulosis of the   colon. No evidence of acute diverticulitis. Diverting left lower quadrant   ostomy. No bowel obstruction. Appendix is normal. Fluid and gas is also   present in the perirectal space and probably perineum.  PERITONEUM/RETROPERITONEUM: Within normal limits.  VESSELS: Atherosclerotic disease of the abdominal aorta. There is bilobed   infrarenal abdominal aortic aneurysm measuring up to 5.1 cm. 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  LYMPH NODES: No lymphadenopathy.  ABDOMINAL WALL: Diffuse anasarca. Left lower quadrant diverting colostomy  BONES: Degenerative changes.    IMPRESSION:  Ill-defined fluid and gas collection in the scrotum. Fluid and gas is   also present in the perirectal space and probably perineum. 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    Moderate left and small right pleural effusions with adjacent compressive   atelectasis. Bibasilar pulmonary nodules compatible with metastatic   disease.    There is bilobed infrarenal abdominal aortic aneurysm measuring up to 5.1   cm. 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    Bilateral indeterminant renal lesions. Consider renal ultrasound or   abdominal MRI for further evaluation.

## 2024-10-12 NOTE — ED PROVIDER NOTE - GASTROINTESTINAL, MLM
Abdomen soft, non-tender, no guarding., colostomy present, chronic naranjo, wounds to suprapubic area, penile shaft

## 2024-10-12 NOTE — PATIENT PROFILE ADULT - NSPROPTRIGHTBILLOFRIGHTS_GEN_A_NUR
Writer spoke with internist Rickey Karimi to verify patient's chemo regimen. Patient received cycle 1 BR, cycle 2 R, cycle 3 BR. She had not further questions. patient

## 2024-10-12 NOTE — ED ADULT NURSE NOTE - OBJECTIVE STATEMENT
79 year old male brought to room 3. Pt c/o chest pain x1 hour. Received 324 ASA, 20G Lft hand infusing 1LNS.  Denies SOB, n/v. Hx MI, HTN, CHF, CAD x2 stents  Patient started by DALILA Riggs.   patient laying in semi fowlers position on the stretcher. patient alert and oriented times three. patient denies shortness of breath, chest pain, nausea, vomiting, chill, fever. Patient normal sinus on the monitor. Respirations equal and adequate. Patients 20 gauge left hand and 20 gauge left a.c patent, no signs of infiltration. Safety measures in place, call bell within reach. Patient stable upon leaving the room.   Patient has a wound on back but does not allow us to examine the wound. MD Jara at the bedside also aware.   Patient came in with naranjo, patient refuses for me to assess the naranjo size. MD Jara aware.

## 2024-10-12 NOTE — ASU PATIENT PROFILE, ADULT - CAREGIVER RELATION TO PATIENT
daughter HCP Tremfya Counseling: I discussed with the patient the risks of guselkumab including but not limited to immunosuppression, serious infections, and drug reactions.  The patient understands that monitoring is required including a PPD at baseline and must alert us or the primary physician if symptoms of infection or other concerning signs are noted.

## 2024-10-12 NOTE — ED ADULT TRIAGE NOTE - CHIEF COMPLAINT QUOTE
Pt c/o chest pain x1 hour. Hx MI, HTN, CHF, CAD x2 Pt c/o chest pain x1 hour. Denies SOB, n/v. Hx MI, HTN, CHF, CAD x2 Pt c/o chest pain x1 hour. Received 324 ASA, 20G Lft hand infusing 1LNS.  Denies SOB, n/v. Hx MI, HTN, CHF, CAD x2 Pt c/o chest pain x1 hour. Received 324 ASA, 20G Lft hand infusing 1LNS.  Denies SOB, n/v. Hx MI, HTN, CHF, CAD x2 stents

## 2024-10-12 NOTE — BRIEF OPERATIVE NOTE - NSICDXBRIEFPROCEDURE_GEN_ALL_CORE_FT
PROCEDURES:  Debridement of necrotic skin and soft tissue from genitalia and perineum 12-Oct-2024 14:48:25  Jennifer Vigil   Ears: no ear pain and no hearing problems. Nose: no nasal congestion and no nasal drainage. Mouth/Throat: no dysphagia, no hoarseness and no throat pain. Neck: no lumps, no pain, no stiffness and no swollen glands.

## 2024-10-12 NOTE — PRE-OP CHECKLIST - 1.
Pt examined and  skin breakdowns noted. fragile     pressure injury sacral unstageable, left forearm Skin tear , IAD MAD

## 2024-10-12 NOTE — ED PROVIDER NOTE - CLINICAL SUMMARY MEDICAL DECISION MAKING FREE TEXT BOX
Marek LUCIANO:  Patient is a 80 yo M with history of CAD s/p stents (ASA), HTN, HLD, and rectal cancer (dx 2022) s/p chemo and radiation ([id number]) at [facility] [place], who presented to the ED on 8/30 for fatigue, weakness, and falls, found to have perirectal abscess, Per previous admission CT 8/30/24: There is a perforated rectal mass.  Approximately 2.5 x 1.5 cm (AP x LR) left perirectal collection located above the pelvic floor.  There is spread of infection below the pelvic floor, to the anorectal region, and into the perineum.  Partially imaged abscess in the perineum, extending into the root of the penis, measures at least 7.4 x 2.3 cm (AP x LR).  There are additional foci of fluid and gas tracking along the penile shaft.   Patient was transferred to [facility] for further treatment, now here after being discharged earlier today for not feeling well and complaining of dizziness. Upon arrival, patient found to be in atrial fibrillation, not on AC. He has a known right leg DVT. Patient's son is at bedside and he called his sister who is the HCP. Accordingly, patient has a history of atrial fibrillation. He has been hospitalized for about 4 weeks at [facility] (after being discharged and returning there), treated with IV antibiotics for the open wound infection. Daughter states that patient is full code, wants all treatments and [facility] physicians discharged him today without antibiotics. She states that they disagreed with their plan and feels they wanted to make him palliative but that is not what the patient wants. Patient reports he was home and standing and felt very dizzy and felt he needed to go back to the hospital. He denies any fevers. He states he has pain in his rectum. On exam, patient is chronically ill, underweight, clear lungs, irregularly irregular rhythm, abd soft, open wounds are dressed, tender. Plan for sepsis labs, zosyn, pain control, IVF. Patient is in afib. Will give fluids before trying Cardizem. Daughter states that patient did not react well to contrast and it caused him to bleed. She states [facility] did noncontrast CT. Will order noncontrast CT as last imaging is from 8/2024.

## 2024-10-12 NOTE — H&P ADULT - HISTORY OF PRESENT ILLNESS
HPI:  78 yo M with history of CAD s/p stents in  (ASA), locally advanced rectal cancer s/p neoadjuvant therapy with chemoRT (completed 2022), consolidation CAPOX (2023), brachytherapy (3/2024), presenting for generalized weakness, chest discomfort, rectal pain. Pt was here at [facility] in  for fatigue, weakness, and falls, found to have perforated rectal mass with perirectal collection, 2.5 x 1.5 cm. Partially imaged abscess in the perineum, extending into the root of the penis, measures at least 7.4 x 2.3 cm.  There are additional foci of fluid and gas tracking along the penile shaft.     Seen by Dr. Santoro at that time, offered I&D and diverting colostomy but left and went to [facility] for care, and at [facility] s/p lap diverting end sigmoid colostomy with cystoscopy and naranjo placement across defect and perineal/scrotal drainage by [facility] .    He presented to [facility] 1 day after discharge from [facility] complaining of dizziness and malaise. In ED he was found to be in atrial fibrillation. He is not on AC, h/o a known right leg DVT. Per daughter, patient had recently been treated for multiple scrotal/perineal area abscess with IV abx and debridement at [facility]. Patient is full code at this time.    On evaluation in the ED, patient is afebrile, -120s in Afib, SBP high 90s-low 100s. Pt AOx1. Denies pain at rest,  fever, chills, dizziness, SOB.     WBC 6.22  Hgb 9.8  Cr. 0.62  Troponins 62 and BNP 31,000  Lactate 2.5  Urine from naranjo showing LE, bacteria, WBC    In ED, received clinda/vanc/zosyn        PAST MEDICAL & SURGICAL HISTORY:  CAD s/p cardiac stent    HTN    rectal cancer, s/p chemo at [facility]       MEDICATIONS  (STANDING):  piperacillin/tazobactam IVPB.- 3.375 Gram(s) IV Intermittent once  piperacillin/tazobactam IVPB.. 3.375 Gram(s) IV Intermittent every 8 hours    MEDICATIONS  (PRN):      FAMILY HISTORY:      Allergies    No Known Allergies    Intolerances        SOCIAL HISTORY:active smoker    REVIEW OF SYSTEMS:   Otherwise negative as stated in HPI    Physical Exam  Vital signs  T(C): 36.7 (10-12-24 @ 11:36), Max: 36.8 (10-12-24 @ 00:53)  HR: 118 (10-12-24 @ 11:44)  BP: 112/75 (10-12-24 @ 11:44)  SpO2: 100% (10-12-24 @ 11:44)  Wt(kg): --    Output      Gen: AOx1, resting with mild discomfort.  Abd: suprapubic tenderness, fluctuance, generalized pain on palpation.   : naranjo in place with erosion at the meatus.   Penis - There are multiple abscesses with some active purulent drainage and a scrotal eschar.   Scrotum - Skin is dusky in appearance. There is an open wound on the scrotum through which the naranjo can be palpated as well as purulent drainage. There is pain on palpation of the scrotum. There are multiple areas of induration but no crepitus.   Perineum - Indurated skin, abscess with fluctuance and purulence on expression    LABS:      10-12 @ 02:28    WBC 6.22  / Hct 33.5  / SCr 0.62     10-12    132[L]  |  98  |  9   ----------------------------<  99  3.8   |  23  |  0.62    Ca    8.7      12 Oct 2024 02:28  Mg     2.00     10-12    TPro  6.3  /  Alb  2.5[L]  /  TBili  0.6  /  DBili  x   /  AST  32  /  ALT  <5  /  AlkPhos  73  10-12    PT/INR - ( 12 Oct 2024 02:28 )   PT: 13.5 sec;   INR: 1.17 ratio         PTT - ( 12 Oct 2024 02:28 )  PTT:30.5 sec  Urinalysis Basic - ( 12 Oct 2024 04:12 )    Color: Yellow / Appearance: Cloudy / S.012 / pH: x  Gluc: x / Ketone: Negative mg/dL  / Bili: Negative / Urobili: 1.0 mg/dL   Blood: x / Protein: Trace mg/dL / Nitrite: Negative   Leuk Esterase: Large / RBC: 1 /HPF / WBC 58 /HPF   Sq Epi: x / Non Sq Epi: 0 /HPF / Bacteria: Occasional /HPF        Urine Cx: pending  Blood Cx: pending    RADIOLOGY:  FINDINGS:  LOWER CHEST: Coronary artery calcifications versus stents. Moderate left   and small right pleural effusions with associated atelectasis. Lower lobe   nodules measuring up to 0.8 cm in the left lower lobe. Centrilobular and   paraseptal emphysema. Atelectasis or scarring in the right middle lobe.    LIVER: Lefthepatic lobe cysts. Scattered calcified granulomas.  BILE DUCTS: Normal caliber.  GALLBLADDER: Within normal limits.  SPLEEN: Within normal limits.  PANCREAS: Within normal limits.  ADRENALS: 3.2 cm indeterminate left adrenal nodule.  KIDNEYS/URETERS: Bilateral renal cysts as well as additional   indeterminate lesions which may represent hemorrhagic or proteinaceous   cysts.    BLADDER: Naranjo catheter.  REPRODUCTIVE ORGANS: Prostate gland is enlarged. Ill-defined fluid and   gas within the scrotum.    BOWEL: Surgical clips in the perirectal space. Diverticulosis of the   colon. No evidence of acute diverticulitis. Diverting left lower quadrant   ostomy. No bowel obstruction. Appendix is normal. Fluid and gas is also   present in the perirectal space and probably perineum.  PERITONEUM/RETROPERITONEUM: Within normal limits.  VESSELS: Atherosclerotic disease of the abdominal aorta. There is bilobed   infrarenal abdominal aortic aneurysm measuring up to 5.1 cm. 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  LYMPH NODES: No lymphadenopathy.  ABDOMINAL WALL: Diffuse anasarca. Left lower quadrant diverting colostomy  BONES: Degenerative changes.    IMPRESSION:  Ill-defined fluid and gas collection in the scrotum. Fluid and gas is   also present in the perirectal space and probably perineum. 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    Moderate left and small right pleural effusions with adjacent compressive   atelectasis. Bibasilar pulmonary nodules compatible with metastatic   disease.    There is bilobed infrarenal abdominal aortic aneurysm measuring up to 5.1   cm. 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    Bilateral indeterminant renal lesions. Consider renal ultrasound or   abdominal MRI for further evaluation.     HPI:  80 yo M with history of CAD s/p stents in 2012 (ASA), locally advanced rectal cancer s/p neoadjuvant therapy with chemoRT (completed 11/2022), consolidation CAPOX (2/2023), brachytherapy (3/2024), presenting for generalized weakness, chest discomfort, rectal pain. Pt was here at [facility] in 8/30 for fatigue, weakness, and falls, found to have perforated rectal mass with perirectal collection, 2.5 x 1.5 cm. Partially imaged abscess in the perineum, extending into the root of the penis, measures at least 7.4 x 2.3 cm.  There are additional foci of fluid and gas tracking along the penile shaft.     Seen by Dr. Santoro at that time, offered I&D and diverting colostomy but left and went to [facility] for care, and at [facility] s/p lap diverting end sigmoid colostomy with cystoscopy and naranjo placement across defect and perineal/scrotal drainage by [facility] 8/30.    He presented to [facility] 1 day after discharge from [facility] complaining of dizziness and malaise. In ED he was found to be in atrial fibrillation. He is not on AC, h/o a known right leg DVT. Per daughter, patient had recently been treated for multiple scrotal/perineal area abscess with IV abx and debridement at [facility]. Patient is full code at this time.    On evaluation in the ED, patient is afebrile, -120s in Afib, SBP high 90s-low 100s. Pt AOx1. Denies pain at rest,  fever, chills, dizziness, SOB.     WBC 6.22  Hgb 9.8  Cr. 0.62  Troponins 62 and BNP 31,000  Lactate 2.5  Urine from naranjo showing LE, bacteria, WBC    In ED, received clinda/vanc/zosyn        PAST MEDICAL & SURGICAL HISTORY:  CAD s/p cardiac stent    HTN    rectal cancer, s/p chemo at [facility]       MEDICATIONS  (STANDING):  piperacillin/tazobactam IVPB.- 3.375 Gram(s) IV Intermittent once  piperacillin/tazobactam IVPB.. 3.375 Gram(s) IV Intermittent every 8 hours    MEDICATIONS  (PRN):      FAMILY HISTORY:      Allergies    No Known Allergies    Intolerances        SOCIAL HISTORY:active smoker    REVIEW OF SYSTEMS:   Otherwise negative as stated in HPI

## 2024-10-12 NOTE — H&P ADULT - NSHPPHYSICALEXAM_GEN_ALL_CORE
Physical Exam  Vital signs  T(C): 36.7 (10-12-24 @ 11:36), Max: 36.8 (10-12-24 @ 00:53)  HR: 118 (10-12-24 @ 11:44)  BP: 112/75 (10-12-24 @ 11:44)  SpO2: 100% (10-12-24 @ 11:44)  Wt(kg): --    Output      Gen: AOx1, resting with mild discomfort.  Abd: suprapubic tenderness, fluctuance, generalized pain on palpation.   : naranjo in place with erosion at the meatus.   Penis - There are multiple abscesses with some active purulent drainage and a scrotal eschar.   Scrotum - Skin is dusky in appearance. There is an open wound on the scrotum through which the naranjo can be palpated as well as purulent drainage. There is pain on palpation of the scrotum. There are multiple areas of induration but no crepitus.   Perineum - Indurated skin, abscess with fluctuance and purulence on expression

## 2024-10-12 NOTE — ED PROVIDER NOTE - PROGRESS NOTE DETAILS
Per radiology, CT IMPRESSION:  Ill-defined fluid and gas collection in the scrotum. Fluid and gas is also  present in the perirectal space and probably perineum. Findings   compatible with Tara&apos;s gangrene.    Moderate left and small right pleural effusions with adjacent compressive  atelectasis.    There is bilobed infrarenal abdominal aortic aneurysm measuring up to 5.1   cm. 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    Surgery paged. [place] PGY3: Surgery paged and spectra [phone] called twice with no response for Tara's gangrene Consult discussed with surgery, will see patient. Requesting urology be on board. Urology paged. Discussed consult with urology, will see patient. Tarun Blanton, PGY3 - Urology coordinated with surgery regarding bedside versus OR procedure versus operation regarding the flexion collection in the perineum and also the suprapubic area.  Daughter was updated by the consulting team.  Pending recommendations from surgery and urology. Tarun Blanton, PGY3 - Urology coordinated with surgery regarding bedside versus OR procedure versus operation regarding the infection/gas collection in the perineum and also the suprapubic area.  Daughter was updated by the consulting team.  Pending recommendations from surgery and urology. Tarun Blanton, PGY3 - Neurology updated regarding the plan.  Plan stated to bedside drain at the suprapubic area that is concerning for abscess.  Surgery not doing any bedside or OR interventions for this.  Urology recommending medicine admission due to complicated medical problems not only pertaining to the urology.  Will start admission to medicine.

## 2024-10-12 NOTE — ED ADULT NURSE REASSESSMENT NOTE - NS ED NURSE REASSESS COMMENT FT1
Colostomy bag emptied at bedside, wound care MD at bedside working with pt, will continue to monitor.

## 2024-10-12 NOTE — ED PROVIDER NOTE - ATTENDING CONTRIBUTION TO CARE
Marek LUCIANO:  Patient is a 78 yo M with history of CAD s/p stents (ASA), HTN, HLD, and rectal cancer (dx 2022) s/p chemo and radiation ([id number]) at [facility], who presented to the ED on 8/30 for fatigue, weakness, and falls, found to have perirectal abscess, Per previous admission CT 8/30/24: There is a perforated rectal mass.  Approximately 2.5 x 1.5 cm (AP x LR) left perirectal collection located above the pelvic floor.  There is spread of infection below the pelvic floor, to the anorectal region, and into the perineum.  Partially imaged abscess in the perineum, extending into the root of the penis, measures at least 7.4 x 2.3 cm (AP x LR).  There are additional foci of fluid and gas tracking along the penile shaft.   Patient was transferred to [facility] for further treatment, now here after being discharged earlier today for not feeling well and complaining of dizziness. Upon arrival, patient found to be in atrial fibrillation, not on AC. He has a known right leg DVT. Patient's son is at bedside and he called his sister who is the HCP. Accordingly, patient has a history of atrial fibrillation. He has been hospitalized for about 4 weeks at [facility] (after being discharged and returning there), treated with IV antibiotics for the open wound infection. Daughter states that patient is full code, wants all treatments and [facility] physicians discharged him today without antibiotics. She states that they disagreed with their plan and feels they wanted to make him palliative but that is not what the patient wants. Patient reports he was home and standing and felt very dizzy and felt he needed to go back to the hospital. He denies any fevers. He states he has pain in his rectum. On exam, patient is chronically ill, underweight, clear lungs, irregularly irregular rhythm, abd soft, open wounds are dressed, tender. Limited exam secondary to patient pain and declining to turn. Plan for sepsis labs, zosyn, pain control, IVF. Patient is in afib. Will give fluids before trying Cardizem. Daughter states that patient did not react well to contrast and it caused him to bleed. She states [facility] did noncontrast CT. Will order noncontrast CT as last imaging is from 8/2024.    Update: Per radiology, CT IMPRESSION:  Ill-defined fluid and gas collection in the scrotum. Fluid and gas is also  present in the perirectal space and probably perineum. Findings   compatible with Tara&apos;s gangrene.    Moderate left and small right pleural effusions with adjacent compressive  atelectasis.    There is bilobed infrarenal abdominal aortic aneurysm measuring up to 5.1   cm. 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    ********    Vancomycin/ Clindamycin ordered.  Surgery and urology evaluated patient. Admitted to urology for emergent OR debridement.

## 2024-10-12 NOTE — CONSULT NOTE ADULT - SUBJECTIVE AND OBJECTIVE BOX
DENISE HISTORY OF PRESENT ILLNESS:  TR LEWIS is a 79y Male     PAST MEDICAL HISTORY:     PAST SURGICAL HISTORY: No significant past surgical history        FAMILY HISTORY:     SOCIAL HISTORY:    CODE STATUS:     HOME MEDICATIONS:    ALLERGIES: No Known Allergies      VITAL SIGNS:  ICU Vital Signs Last 24 Hrs  T(C): 36.8 (12 Oct 2024 12:44), Max: 36.8 (12 Oct 2024 00:53)  T(F): 98.2 (12 Oct 2024 12:44), Max: 98.3 (12 Oct 2024 04:48)  HR: 98 (12 Oct 2024 12:44) (75 - 126)  BP: 115/79 (12 Oct 2024 12:44) (101/77 - 126/77)  BP(mean): --  ABP: --  ABP(mean): --  RR: 18 (12 Oct 2024 12:44) (15 - 18)  SpO2: 98% (12 Oct 2024 12:44) (98% - 100%)    O2 Parameters below as of 12 Oct 2024 11:44  Patient On (Oxygen Delivery Method): room air            NEURO  Exam:  fentaNYL    Injectable 50 MICROGram(s) IV Push every 30 minutes PRN Severe Pain (7 - 10)  fentaNYL    Injectable 25 MICROGram(s) IV Push every 5 minutes PRN Moderate Pain (4 - 6)  ondansetron Injectable 4 milliGRAM(s) IV Push once PRN Nausea and/or Vomiting      RESPIRATORY  Mechanical Ventilation:     Exam:      CARDIOVASCULAR  VBG - ( 12 Oct 2024 02:28 )  pH: 7.40  /  pCO2: 45    /  pO2: 27    / HCO3: 28    / Base Excess: 2.7   /  SaO2: 39.2   Lactate: 2.5              Exam:  Cardiac Rhythm:      GI/NUTRITION  Exam:  Diet:      GENITOURINARY/RENAL      Weight (kg): 63 (10-12 @ 14:08)  10-12    132[L]  |  98  |  9   ----------------------------<  99  3.8   |  23  |  0.62    Ca    8.7      12 Oct 2024 02:28  Mg     2.00     10-12    TPro  6.3  /  Alb  2.5[L]  /  TBili  0.6  /  DBili  x   /  AST  32  /  ALT  <5  /  AlkPhos  73  10-12    [ ] Zamarripa catheter, indication: urine output monitoring in critically ill patient    HEMATOLOGIC  [ ] VTE Prophylaxis:                          9.8    6.22  )-----------( 230      ( 12 Oct 2024 02:28 )             33.5     PT/INR - ( 12 Oct 2024 02:28 )   PT: 13.5 sec;   INR: 1.17 ratio         PTT - ( 12 Oct 2024 02:28 )  PTT:30.5 sec  Transfusion: [ ] PRBC	[ ] Platelets	[ ] FFP	[ ] Cryoprecipitate      INFECTIOUS DISEASES  piperacillin/tazobactam IVPB.- 3.375 Gram(s) IV Intermittent once  piperacillin/tazobactam IVPB.. 3.375 Gram(s) IV Intermittent every 8 hours    RECENT CULTURES:      ENDOCRINE    CAPILLARY BLOOD GLUCOSE      POCT Blood Glucose.: 115 mg/dL (12 Oct 2024 01:59)      PATIENT CARE ACCESS DEVICES:  [ ] Peripheral IV  [ ] Central Venous Line	[ ] R	[ ] L	[ ] IJ	[ ] Fem	[ ] SC	Placed:   [ ] Arterial Line		[ ] R	[ ] L	[ ] Fem	[ ] Rad	[ ] Ax	Placed:   [ ] PICC:					[ ] Mediport  [ ] Urinary Catheter, Date Placed:   [x] Necessity of urinary, arterial, and venous catheters discussed    OTHER MEDICATIONS:     IMAGING STUDIES: [facility] SICU Consult Note    HPI: 79yr M with past medical history of CAD s/p stents in 2012 (ASA), locally advanced rectal cancer s/p neoadjuvant therapy with chemoRT (completed 11/2022), consolidation CAPOX (2/2023), brachytherapy (3/2024), presenting for generalized weakness, chest discomfort, rectal pain. 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 Patient was transferred to [facility] at that the time per his request and underwent lap diverting end sigmoid colostomy with cystoscopy and naranjo placement across defect and perineal/scrotal drainage. Patient now presenting to the [facility] ED with one day of dizziness and malaise since discharge from [facility]. In ED he was found to be in atrial fibrillation (not on a/c) but hemodynamically stable, Labs significant for WBC 6.22, lactate 2.5, trops 62, BNP 31,000. Patient is now s/p scrotal incision and drainage with placement of penrose drains x3. Patient requiring vasopressors, Jovani up to 0.5 during the case, and patient transferred to SICU for further hemodynamic monitoring.    PAST MEDICAL HISTORY:   Rectal ca s/p chemo/rads  CAD s/p stents 2012    PAST SURGICAL HISTORY:  s/p diverting end colostomy at [facility] 8/30/24    ALLERGIES: No Known Allergies    VITAL SIGNS:  ICU Vital Signs Last 24 Hrs  T(C): 36.8 (12 Oct 2024 12:44), Max: 36.8 (12 Oct 2024 00:53)  T(F): 98.2 (12 Oct 2024 12:44), Max: 98.3 (12 Oct 2024 04:48)  HR: 98 (12 Oct 2024 12:44) (75 - 126)  BP: 115/79 (12 Oct 2024 12:44) (101/77 - 126/77)  BP(mean): --  ABP: --  ABP(mean): --  RR: 18 (12 Oct 2024 12:44) (15 - 18)  SpO2: 98% (12 Oct 2024 12:44) (98% - 100%)    O2 Parameters below as of 12 Oct 2024 11:44  Patient On (Oxygen Delivery Method): room air    NEURO  Exam:  fentaNYL    Injectable 50 MICROGram(s) IV Push every 30 minutes PRN Severe Pain (7 - 10)  fentaNYL    Injectable 25 MICROGram(s) IV Push every 5 minutes PRN Moderate Pain (4 - 6)  ondansetron Injectable 4 milliGRAM(s) IV Push once PRN Nausea and/or Vomiting    RESPIRATORY  Exam: Symmetric chest rise bilaterally, on 2L NC, saturating well    CARDIOVASCULAR  VBG - ( 12 Oct 2024 02:28 )  pH: 7.40  /  pCO2: 45    /  pO2: 27    / HCO3: 28    / Base Excess: 2.7   /  SaO2: 39.2   Lactate: 2.5    Exam: Regular rate and rhythm, sinus in the 80s    GI/NUTRITION  Exam: Soft, non-tender, non-distended, RLQ end colostomy with stool in bag  Diet: NPO    GENITOURINARY/RENAL  Exam: Chronic naranjo in place, scrotal penrose x3 with overlying abd    Weight (kg): 63 (10-12 @ 14:08)  10-12    132[L]  |  98  |  9   ----------------------------<  99  3.8   |  23  |  0.62    Ca    8.7      12 Oct 2024 02:28  Mg     2.00     10-12    TPro  6.3  /  Alb  2.5[L]  /  TBili  0.6  /  DBili  x   /  AST  32  /  ALT  <5  /  AlkPhos  73  10-12    [x] Naranjo catheter, indication: chronic naranjo, multiple recent I&D of perineum/scrotum    HEMATOLOGIC  [x] VTE Prophylaxis:                9.8    6.22  )-----------( 230      ( 12 Oct 2024 02:28 )             33.5     PT/INR - ( 12 Oct 2024 02:28 )   PT: 13.5 sec;   INR: 1.17 ratio      PTT - ( 12 Oct 2024 02:28 )  PTT:30.5 sec  Transfusion: [ ] PRBC	[ ] Platelets	[ ] FFP	[ ] Cryoprecipitate    INFECTIOUS DISEASES  piperacillin/tazobactam IVPB.- 3.375 Gram(s) IV Intermittent once  piperacillin/tazobactam IVPB.. 3.375 Gram(s) IV Intermittent every 8 hours      POCT Blood Glucose.: 115 mg/dL (12 Oct 2024 01:59)    PATIENT CARE ACCESS DEVICES:  [x] Peripheral IV  [ ] Central Venous Line	[ ] R	[ ] L	[ ] IJ	[ ] Fem	[ ] SC	Placed:   [ ] Arterial Line		[ ] R	[ ] L	[ ] Fem	[ ] Rad	[ ] Ax	Placed:   [ ] PICC:					[ ] Mediport  [x] Urinary Catheter, Chronic naranjo, unknown when placed  [x] Necessity of urinary, arterial, and venous catheters discussed [facility] SICU Consult Note    HPI: 79yr M with past medical history of CAD s/p stents in 2012 (ASA), locally advanced rectal cancer s/p neoadjuvant therapy with chemoRT (completed 11/2022), consolidation CAPOX (2/2023), brachytherapy (3/2024), presenting for generalized weakness, chest discomfort, rectal pain. 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 Patient was transferred to [facility] at that the time per his request and underwent lap diverting end sigmoid colostomy with cystoscopy and naranjo placement across defect and perineal/scrotal drainage. Patient now presenting to the [facility] ED with one day of dizziness and malaise since discharge from [facility]. In ED he was found to be in atrial fibrillation (not on a/c) but hemodynamically stable, Labs significant for WBC 6.22, lactate 2.5, trops 62, BNP 31,000. Patient is now s/p scrotal incision and drainage with placement of penrose drains x3. Patient requiring vasopressors, Jovani up to 0.5 during the case, and patient transferred to SICU for further hemodynamic monitoring.    PAST MEDICAL HISTORY:   Rectal ca s/p chemo/rads  CAD s/p stents 2012    PAST SURGICAL HISTORY:  s/p diverting end colostomy at [facility] 8/30/24    ALLERGIES: No Known Allergies    VITAL SIGNS:  ICU Vital Signs Last 24 Hrs  T(C): 36.8 (12 Oct 2024 12:44), Max: 36.8 (12 Oct 2024 00:53)  T(F): 98.2 (12 Oct 2024 12:44), Max: 98.3 (12 Oct 2024 04:48)  HR: 98 (12 Oct 2024 12:44) (75 - 126)  BP: 115/79 (12 Oct 2024 12:44) (101/77 - 126/77)  BP(mean): --  ABP: --  ABP(mean): --  RR: 18 (12 Oct 2024 12:44) (15 - 18)  SpO2: 98% (12 Oct 2024 12:44) (98% - 100%)    O2 Parameters below as of 12 Oct 2024 11:44  Patient On (Oxygen Delivery Method): room air    NEURO  Exam: A&O x2, moving all 4 spontaneously  fentaNYL    Injectable 50 MICROGram(s) IV Push every 30 minutes PRN Severe Pain (7 - 10)  fentaNYL    Injectable 25 MICROGram(s) IV Push every 5 minutes PRN Moderate Pain (4 - 6)  ondansetron Injectable 4 milliGRAM(s) IV Push once PRN Nausea and/or Vomiting    RESPIRATORY  Exam: Symmetric chest rise bilaterally, on 2L NC, saturating well    CARDIOVASCULAR  VBG - ( 12 Oct 2024 02:28 )  pH: 7.40  /  pCO2: 45    /  pO2: 27    / HCO3: 28    / Base Excess: 2.7   /  SaO2: 39.2   Lactate: 2.5    Exam: Regular rate and rhythm, sinus in the 80s    GI/NUTRITION  Exam: Soft, non-tender, non-distended, RLQ end colostomy with stool in bag  Diet: NPO    GENITOURINARY/RENAL  Exam: Chronic naranjo in place, scrotal penrose x3 with overlying abd    Weight (kg): 63 (10-12 @ 14:08)  10-12    132[L]  |  98  |  9   ----------------------------<  99  3.8   |  23  |  0.62    Ca    8.7      12 Oct 2024 02:28  Mg     2.00     10-12    TPro  6.3  /  Alb  2.5[L]  /  TBili  0.6  /  DBili  x   /  AST  32  /  ALT  <5  /  AlkPhos  73  10-12    [x] Naranjo catheter, indication: chronic naranjo, multiple recent I&D of perineum/scrotum    HEMATOLOGIC  [x] VTE Prophylaxis:                9.8    6.22  )-----------( 230      ( 12 Oct 2024 02:28 )             33.5     PT/INR - ( 12 Oct 2024 02:28 )   PT: 13.5 sec;   INR: 1.17 ratio      PTT - ( 12 Oct 2024 02:28 )  PTT:30.5 sec  Transfusion: [ ] PRBC	[ ] Platelets	[ ] FFP	[ ] Cryoprecipitate    INFECTIOUS DISEASES  piperacillin/tazobactam IVPB.- 3.375 Gram(s) IV Intermittent once  piperacillin/tazobactam IVPB.. 3.375 Gram(s) IV Intermittent every 8 hours      POCT Blood Glucose.: 115 mg/dL (12 Oct 2024 01:59)    PATIENT CARE ACCESS DEVICES:  [x] Peripheral IV  [ ] Central Venous Line	[ ] R	[ ] L	[ ] IJ	[ ] Fem	[ ] SC	Placed:   [ ] Arterial Line		[ ] R	[ ] L	[ ] Fem	[ ] Rad	[ ] Ax	Placed:   [ ] PICC:					[ ] Mediport  [x] Urinary Catheter, Chronic naranjo, unknown when placed  [x] Necessity of urinary, arterial, and venous catheters discussed

## 2024-10-12 NOTE — CONSULT NOTE ADULT - ASSESSMENT
80 yo M with history of CAD s/p stents in 2012 (ASA), locally advanced rectal cancer s/p neoadjuvant therapy with chemoRT (completed 11/2022), consolidation CAPOX (2/2023), brachytherapy (3/2024), presenting for generalized weakness, chest discomfort, rectal pain. Pt was here at [facility] in 8/30 for fatigue, weakness, and falls, found to have perforated rectal mass with perirectal collection, 2.5 x 1.5 cm with spread to infection in anorectal/perineal region with fluid/gas tracking along penile shaft, however pt left for [facility] and s/p diverting end sigmoid colostomy, cystoscopy and naranjo placement across defect and perineal/scrotal drainage 8/30. Recently at [facility] for several weeks, discharged day prior to presentation for perineal infections, treated with abx. 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    RECS:  - No acute colorectal surgery intervention  - IV abx  - Urology to do bedside I&D of suprapubic collection  - Recommend GOC discussion    Discussed with fellow    A Team [id number]

## 2024-10-12 NOTE — H&P ADULT - ATTENDING COMMENTS
seen and examined  reviewed prior imaging and hospital course  79 y.o. 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 Was admitted for almost 2 weeks (sent home and then returned to ER) and was discharged yesterday. Represented today with worsening abdominal pain  CT w/ gas forming collection in suprapubic region, para-penile region, scrotal region (although this is open)    Initially planned for bedside I+D of suprapubic region, but exam notable for worsening appearance of skin (from erythematous to dusky) and decision to take patient emergenly to OR for debridement    Discussed w/ patient's son and daughter (who is health care proxy) - patient w/ elevated troponins, locally advanced cancer and life threatening infection. Discussed poor prognosis. She expressed her dad's wishes to be full code and to perform life-saving measures. Discussed that he will likely go to ICU after this procedure and may require additional debridements. Discussed unlikely to heal well given patient's poor condition and locally advanced malignancy  Will proceed to OR for fourniers debridement. Gen surg consulted as well - no plans for colorectal intervention  Will continue naranjo catheter (naranjo can be palpated through scrotum - likely eroded due to local infection / malignancy  Recommend palliative care consult - was also done at [facility] and family elected to forgo comfort care and continue full code / all measures

## 2024-10-12 NOTE — ED ADULT NURSE REASSESSMENT NOTE - NS ED NURSE REASSESS COMMENT FT1
patient laying in semi fowlers position on the stretcher. patient alert and oriented times three. patient denies shortness of breath, chest pain, nausea, vomiting, chill, fever. Patient normal sinus on the monitor. Respirations equal and adequate. Patients IVs patent, no signs of infiltration. Safety measures in place, call bell within reach. Patient stable upon assessment.

## 2024-10-12 NOTE — ED ADULT NURSE NOTE - CHIEF COMPLAINT QUOTE
Pt c/o chest pain x1 hour. Received 324 ASA, 20G Lft hand infusing 1LNS.  Denies SOB, n/v. Hx MI, HTN, CHF, CAD x2 stents

## 2024-10-12 NOTE — ED PROVIDER NOTE - OBJECTIVE STATEMENT
Marek LUCIANO:  Patient is a 78 yo M with history of CAD s/p stents (ASA), HTN, HLD, and rectal cancer (dx 2022) s/p chemo and radiation ([id number]) [place] [facility] [place], who presented to the ED on 8/30 for fatigue, weakness, and falls, found to have perirectal abscess, transferred to [facility] for further treatment, now here after being discharged earlier today for not feeling well and complaining of dizziness. Upon arrival     Daughter states that the patient was initially well following his treatment; ambulatory, able to leave the house, and performing his ADLs. For the past month, the patient has been mostly confined to his bed, not eating or drinking.     Daughter states that the patient has a long history of fecal incontinence that pre-dates his rectal cancer. She states that it was somewhat improved following his treatment, but worsened again.     Discussion with daughter concerning the patient's goals of care. Daughter, who is the healthcare proxy, states that the patient would want all life-saving measures, including prolonged intubation, CPR, and any type of surgical intervention. Initially hesitant regarding possible ostomy, but agreed to the possibility following family discussion.     Social history is significant for being an active daily smoker. Daughter states that the patient continued to smoke throughout his chemotherapy treatment.     At the time of presentation to the ED, the patient was found to be in rapid atrial fibrillation. The patients tachycardia and hypotension resolved with fluid resuscitation. Initial troponin was 2192; reduced to 1352 6 hours later. Marek LUCIANO:  Patient is a 78 yo M with history of CAD s/p stents (ASA), HTN, HLD, and rectal cancer (dx 2022) s/p chemo and [place] ([id number][place] [facility] to [place] ED on 8/30 for fatigue, weakness, and falls, found to have perirectal abscess, Per previous admission CT 8/30/24: There is a perforated rectal mass.  Approximately 2.5 x 1.5 cm (AP x LR) left perirectal collection located above the pelvic floor.  There is spread of infection below the pelvic floor, to the anorectal region, and into the perineum.  Partially imaged abscess in the perineum, extending into the root of the penis, measures at least 7.4 x 2.3 cm (AP x LR).  There are additional foci of fluid and gas tracking along the penile shaft.   Patient was [place] to [facility] [place], now here after being discharged earlier today for not feeling well and complaining of dizziness. Upon arrival, patient found to be in atrial fibrillation, not on AC. He has a known right leg DVT. Patient's son is at bedside and he called his sister who is the HCP. Accordingly, patient has a history of atrial fibrillation. He has been hospitalized for about 4 weeks at [facility] (after being [place] and returning there), treated with IV antibiotics for the open wound infection. Daughter states that patient is full code, wants all treatments and [facility] physicians discharged him today without antibiotics. She states that they disagreed with their plan and feels they wanted to make him palliative but that is not what the patient wants. Patient reports he was home and standing and felt very dizzy and felt he needed to go back to the hospital. He denies any fevers. He states he has pain in his rectum.     Medications: Crestor, Metoprolol, morphine, ativan, combigan Marek LUCIANO:  Patient is a 80 yo M with history of CAD s/p stents (ASA), HTN, HLD, and rectal cancer (dx 2022) s/p chemo and radiation ([id number]) [place] [facility] to the ED on 8/30 for fatigue, weakness, and falls, found to have perirectal abscess, Per previous admission CT 8/30/24: There is a perforated rectal mass.  Approximately 2.5 x 1.5 cm (AP x LR) left perirectal collection located above the pelvic floor.  There is spread of infection below the pelvic floor, to the anorectal region, and into the perineum.  Partially imaged abscess in the perineum, extending into the root of the penis, measures at least 7.4 x 2.3 cm (AP x LR).  There are additional foci of fluid and gas tracking along the penile shaft. On 8/31/2024, patient had a laparoscopic diverting end sigmoid colostomy with cystoscopy and Zamarripa placement across defect and perineal/scrotal drainage by urology.  Patient was transferred to [facility] [place], now here after being discharged earlier today for not feeling well and complaining of dizziness. Upon arrival, patient found to be in atrial fibrillation, not on AC. He has a known right leg DVT. Patient's son is at bedside and he called his sister who is the HCP. Accordingly, patient has a history of atrial fibrillation. He has been hospitalized for about 4 weeks at [facility] [place] and returning there), treated with IV antibiotics for the open wound infection. Daughter states that patient is full code, wants all treatments and [facility] physicians discharged him today without antibiotics. She states that they disagreed with their plan and feels they wanted to make him palliative but that is not what the patient wants. Patient reports he was home and standing and felt very dizzy and felt he needed to go back to the hospital. He denies any fevers. He states he has pain in his rectum.     Medications: Crestor, Metoprolol, morphine, ativan, combigan Marek LUCIANO:  Patient is a 80 yo M with history of CAD s/p stents (ASA), HTN, HLD, and rectal cancer (dx 2022) s/p chemo and radiation ([id number]) [place] [facility] [place], who presented to the ED on 8/30 for fatigue, weakness, and falls, found to have perirectal abscess, Per previous admission CT 8/30/24: There is a perforated rectal mass.  Approximately 2.5 x 1.5 cm (AP x LR) left perirectal collection located above the pelvic floor.  There is spread of infection below the pelvic floor, to the anorectal region, and into the perineum.  Partially imaged abscess in the perineum, extending into the root of the penis, measures at least 7.4 x 2.3 cm (AP x LR).  There are additional foci of fluid and gas tracking along the penile shaft.       Patient was transferred to [facility] [place]. On 8/31/2024, patient had a laparoscopic diverting end sigmoid colostomy with cystoscopy and Zamarripa , placement across defect and perineal/scrotal drainage by urology.    Patient is now here after being discharged earlier today for not feeling well and complaining of dizziness. Upon arrival, patient found to be in atrial fibrillation, not on AC. He has a known right leg DVT. Patient's son is at bedside and he called his sister who is the HCP. Accordingly, patient has a history of atrial fibrillation. He has been hospitalized for about 4 weeks at [facility] (after being discharged and returning there), treated with IV antibiotics for the open wound infection. Daughter states that patient is full code, wants all treatments and [facility] physicians discharged him today without antibiotics. She states that they disagreed with their plan and feels they wanted to make him palliative but that is not what the patient wants. Patient reports he was home and standing and felt very dizzy and felt he needed to go back to the hospital. He denies any fevers. He states he has pain in his rectum.     Medications: Crestor, Metoprolol, morphine, ativan, combigan

## 2024-10-12 NOTE — CONSULT NOTE ADULT - SUBJECTIVE AND OBJECTIVE BOX
COLORECTAL SURGERY CONSULT NOTE  --------------------------------------------------------------------------------------------  HPI:  78 yo M with history of CAD s/p stents in  (ASA), locally advanced rectal cancer s/p neoadjuvant therapy with chemoRT (completed 2022), consolidation CAPOX (2023), brachytherapy (3/2024), presenting for generalized weakness, chest discomfort, rectal pain. Pt was here at [facility] in  for fatigue, weakness, and falls, found to have perforated rectal mass with perirectal collection, 2.5 x 1.5 cm. Partially imaged abscess in the perineum, extending into the root of the penis, measures at least 7.4 x 2.3 cm.  There are additional foci of fluid and gas tracking along the penile shaft.     Seen by Dr. Santoro at that time, offered I&D and diverting colostomy but left and went to [facility] [place], and at [facility] s/p lap diverting end sigmoid colostomy with cystoscopy and naranjo placement across defect and perineal/scrotal drainage by CRS and Urology .      Now here after being discharged earlier today from [facility] for not feeling well and complaining of dizziness. Upon arrival, patient found to be in atrial fibrillation, not on AC. He has a known right leg DVT. Patient's son is at bedside and he called his sister who is the HCP. Accordingly, patient has a history of atrial fibrillation. He has been hospitalized for about 4 weeks at [facility] (after being discharged and returning there), treated with IV antibiotics for the open wound infection. Daughter states that patient is full code, wants all treatments and [facility] physicians discharged him today without antibiotics. She states that they disagreed with their plan and feels they wanted to make him palliative but that is not what the patient wants. Patient reports he was home and standing and felt very dizzy and felt he needed to go back to the hospital. He denies any fevers. He states he has pain in his rectum.       Afebrile, tachycardic to 120s, -110s        PAST MEDICAL & SURGICAL HISTORY:    FAMILY HISTORY:  [] Family history not pertinent as reviewed with the patient and family    SOCIAL HISTORY:  ***    ALLERGIES: No Known Allergies      HOME MEDICATIONS: ***    CURRENT MEDICATIONS  MEDICATIONS (STANDING):   MEDICATIONS (PRN):  --------------------------------------------------------------------------------------------    Vitals:   T(C): 36.7 (10-12-24 @ 07:09), Max: 36.8 (10-12-24 @ 00:53)  HR: 75 (10-12-24 @ 07:09) (75 - 126)  BP: 105/80 (10-12-24 @ 07:09) (103/73 - 110/80)  RR: 18 (10-12-24 @ 07:09) (18 - 18)  SpO2: 98% (10-12-24 @ 07:09) (98% - 98%)  CAPILLARY BLOOD GLUCOSE      POCT Blood Glucose.: 115 mg/dL (12 Oct 2024 01:59)            PHYSICAL EXAM:  General: NAD, Lying in bed comfortably  Neuro:   HEENT:   Cardio:  Resp:  GI/Abd:  Vascular:   Musculoskeletal:   --------------------------------------------------------------------------------------------    LABS  CBC (10-12 @ 02:)                              9.8[L]                         6.22    )----------------(  230        63.8  % Neutrophils, 18.8  % Lymphocytes, ANC: 3.97                                33.5[L]    BMP (10-12 @ 02:28)             132[L]  |  98      |  9     		Ca++ --      Ca 8.7                ---------------------------------( 99    		Mg 2.00               3.8     |  23      |  0.62  			Ph --        LFTs (10-12 @ 02:28)      TPro 6.3 / Alb 2.5[L] / TBili 0.6 / DBili -- / AST 32 / ALT <5 / AlkPhos 73    Coags (10-12 @ 02:28)  aPTT 30.5 / INR 1.17[H] / PT 13.5[H]        VBG (10-12 @ 02:28)     7.40 / 45 / 27 / 28 / 2.7 / 39.2[L]%     Lactate: 2.5[H]    --------------------------------------------------------------------------------------------    MICROBIOLOGY  Urinalysis (10-12 @ 04:12):     Color: Yellow / Appearance: Cloudy[!] / S.012 / pH: 7.0 / Gluc: Negative / Ketones: Negative / Bili: Negative / Urobili: 1.0 / Protein :Trace / Nitrites: Negative / Leuk.Est: Large[!] / RBC: 1 / WBC: 58[H] / Sq Epi:  / Non Sq Epi: 0 / Bacteria Occasional[!]         --------------------------------------------------------------------------------------------    IMAGING  ***    -------------------------------------------------------------------------------------------- COLORECTAL SURGERY CONSULT NOTE  --------------------------------------------------------------------------------------------  HPI:  80 yo M with history of CAD s/p stents in  (ASA), locally advanced rectal cancer s/p neoadjuvant therapy with chemoRT (completed 2022), consolidation CAPOX (2023), brachytherapy (3/2024), presenting for generalized weakness, chest discomfort, rectal pain. Pt was here at [facility] in  for fatigue, weakness, and falls, found to have perforated rectal mass with perirectal collection, 2.5 x 1.5 cm. Partially imaged abscess in the perineum, extending into the root of the penis, measures at least 7.4 x 2.3 cm.  There are additional foci of fluid and gas tracking along the penile shaft.     Seen by Dr. Santoro at that time, offered I&D and diverting colostomy but left and went [facility], and at [facility] s/p lap diverting end sigmoid colostomy with cystoscopy and naranjo placement across defect and perineal/scrotal drainage by CRS and Urology .      Now here after being discharged earlier today from [facility] for not feeling well and complaining of dizziness. Upon arrival, patient found to be in atrial fibrillation, not on AC. He has a known right leg DVT. Patient's son is at bedside and he called his sister who is the HCP. Accordingly, patient has a history of atrial fibrillation. He has been hospitalized for about 4 weeks at [facility] (after being discharged and returning there), treated with IV antibiotics for the open wound infection. Daughter states that patient is full code, wants all treatments and [facility] physicians discharged him today without antibiotics. She states that they disagreed with their plan and feels they wanted to make him palliative but that is not what the patient wants. Patient reports he was home and standing and felt very dizzy and felt he needed to go back to the hospital. He denies any fevers. He states he has pain in his rectum.       Afebrile, tachycardic to 120s, -110s    WBC 6.22  Hgb 9.8  Troponins 62 and BNP 31,000  Lactate 2.5  Urine from naranjo showing LE, bacteria, WBC    In ED, clinda/vanc/zosyn  1.5L NS      PAST MEDICAL & SURGICAL HISTORY:    FAMILY HISTORY:  [] Family history not pertinent as reviewed with the patient and family    SOCIAL HISTORY:  ***    ALLERGIES: No Known Allergies      HOME MEDICATIONS: ***    CURRENT MEDICATIONS  MEDICATIONS (STANDING):   MEDICATIONS (PRN):  --------------------------------------------------------------------------------------------    Vitals:   T(C): 36.7 (10-12-24 @ 07:09), Max: 36.8 (10-12-24 @ 00:53)  HR: 75 (10-12-24 @ 07:09) (75 - 126)  BP: 105/80 (10-12-24 @ 07:09) (103/73 - 110/80)  RR: 18 (10-12-24 @ 07:09) (18 - 18)  SpO2: 98% (10-12-24 @ 07:09) (98% - 98%)  CAPILLARY BLOOD GLUCOSE      POCT Blood Glucose.: 115 mg/dL (12 Oct 2024 01:59)            PHYSICAL EXAM:  General: NAD, Lying in bed comfortably  Neuro:   HEENT:   Cardio:  Resp:  GI/Abd:  Vascular:   Musculoskeletal:   --------------------------------------------------------------------------------------------    LABS  CBC (10-12 @ 02:28)                              9.8[L]                         6.22    )----------------(  230        63.8  % Neutrophils, 18.8  % Lymphocytes, ANC: 3.97                                33.5[L]    BMP (10-12 @ 02:)             132[L]  |  98      |  9     		Ca++ --      Ca 8.7                ---------------------------------( 99    		Mg 2.00               3.8     |  23      |  0.62  			Ph --        LFTs (10-12 @ 02:28)      TPro 6.3 / Alb 2.5[L] / TBili 0.6 / DBili -- / AST 32 / ALT <5 / AlkPhos 73    Coags (10-12 @ 02:)  aPTT 30.5 / INR 1.17[H] / PT 13.5[H]        VBG (10-12 @ 02:)     7.40 / 45 / 27 / 28 / 2.7 / 39.2[L]%     Lactate: 2.5[H]    --------------------------------------------------------------------------------------------    MICROBIOLOGY  Urinalysis (10-12 @ 04:12):     Color: Yellow / Appearance: Cloudy[!] / S.012 / pH: 7.0 / Gluc: Negative / Ketones: Negative / Bili: Negative / Urobili: 1.0 / Protein :Trace / Nitrites: Negative / Leuk.Est: Large[!] / RBC: 1 / WBC: 58[H] / Sq Epi:  / Non Sq Epi: 0 / Bacteria Occasional[!]         --------------------------------------------------------------------------------------------    IMAGING  ***    -------------------------------------------------------------------------------------------- COLORECTAL SURGERY CONSULT NOTE  --------------------------------------------------------------------------------------------  HPI:  78 yo M with history of CAD s/p stents in  (ASA), locally advanced rectal cancer s/p neoadjuvant therapy with chemoRT (completed 2022), consolidation CAPOX (2023), brachytherapy (3/2024), presenting for generalized weakness, chest discomfort, rectal pain. Pt was here at [facility] in  for fatigue, weakness, and falls, found to have perforated rectal mass with perirectal collection, 2.5 x 1.5 cm. Partially imaged abscess in the perineum, extending into the root of the penis, measures at least 7.4 x 2.3 cm.  There are additional foci of fluid and gas tracking along the penile shaft.     Seen by Dr. Santoro at that time, offered I&D and diverting colostomy but left and went to [facility] for care, and at [facility] s/p lap diverting end sigmoid colostomy with cystoscopy and naranjo placement across defect and perineal/scrotal drainage by Colorectal Surgery and Urology .    Presents today after being discharged earlier yesterday from [facility] for not feeling well and complaining of dizziness. Upon arrival, patient found to be in atrial fibrillation, not on AC. He has a known right leg DVT. Per daughter, patient was at [facility] for about 4 weeks being treated for infections in his scrotal/perineal area with IV abx. Daughter reports [facility] approached [facility] and possible transition to comfort care however daughter who is health care proxy states patient is full code at this time.    On evaluation in the ED, patient is afebrile, -120s in Afib, SBP high 90s-low 100s. Pt AOx1, currently denying pain, fever, chills, dizziness, palpitations, difficulty breathing.    WBC 6.22  Hgb 9.8  Troponins 62 and BNP 31,000  Lactate 2.5  Urine from naranjo showing LE, bacteria, WBC    In ED, received clinda/vanc/zosyn  1.5L NS      PAST MEDICAL & SURGICAL HISTORY:    FAMILY HISTORY:  [] Family history not pertinent as reviewed with the patient and family    SOCIAL HISTORY:  ***    ALLERGIES: No Known Allergies      HOME MEDICATIONS: ***    CURRENT MEDICATIONS  MEDICATIONS (STANDING):   MEDICATIONS (PRN):  --------------------------------------------------------------------------------------------    Vitals:   T(C): 36.7 (10-12-24 @ 07:09), Max: 36.8 (10-12-24 @ 00:53)  HR: 75 (10-12-24 @ 07:09) (75 - 126)  BP: 105/80 (10-12-24 @ 07:09) (103/73 - 110/80)  RR: 18 (10-12-24 @ 07:09) (18 - 18)  SpO2: 98% (10-12-24 @ 07:09) (98% - 98%)  CAPILLARY BLOOD GLUCOSE      POCT Blood Glucose.: 115 mg/dL (12 Oct 2024 01:59)            PHYSICAL EXAM:  General: NAD, Lying in bed comfortably  Neuro: AO x1  HEENT: No gross deformities  Cardio: Afib with -120s, SBP high 90s-low 100s  Resp: Breathing comfortably on room air  GI/Abd: Nontender, nondistended, colostomy pink/patent with gas and stool in bag  : Multiple open wounds in suprapubic, penile shaft, and scrotum, erythema at suprapubic region; ~5x5cm necrotic wound at scrotum; ~3x3cm necrotic wound at penile shaft  --------------------------------------------------------------------------------------------    LABS  CBC (10-12 @ 02:)                              9.8[L]                         6.22    )----------------(  230        63.8  % Neutrophils, 18.8  % Lymphocytes, ANC: 3.97                                33.5[L]    BMP (10-12 @ 02:)             132[L]  |  98      |  9     		Ca++ --      Ca 8.7                ---------------------------------( 99    		Mg 2.00               3.8     |  23      |  0.62  			Ph --        LFTs (10-12 @ 02:)      TPro 6.3 / Alb 2.5[L] / TBili 0.6 / DBili -- / AST 32 / ALT <5 / AlkPhos 73    Coags (10-12 @ 02:)  aPTT 30.5 / INR 1.17[H] / PT 13.5[H]        VBG (10-12 @ 02:)     7.40 / 45 / 27 / 28 / 2.7 / 39.2[L]%     Lactate: 2.5[H]    --------------------------------------------------------------------------------------------    MICROBIOLOGY  Urinalysis (10-12 @ 04:12):     Color: Yellow / Appearance: Cloudy[!] / S.012 / pH: 7.0 / Gluc: Negative / Ketones: Negative / Bili: Negative / Urobili: 1.0 / Protein :Trace / Nitrites: Negative / Leuk.Est: Large[!] / RBC: 1 / WBC: 58[H] / Sq Epi:  / Non Sq Epi: 0 / Bacteria Occasional[!]         --------------------------------------------------------------------------------------------    IMAGING  < from: CT Abdomen and Pelvis No Cont (10.12.24 @ 02:27) >  FINDINGS:  Lungs: Multiple bilateral pulmonary nodules. Centrilobular and paraseptal  emphysema. Atelectasis or scarring in the right middle lobe.  Pleural spaces: Moderate left and small right pleural effusions with   adjacent  compressive atelectasis.  Coronary arteries: Atherosclerotic disease of the coronary arteries.    Liver: Calcified granulomata in the liver.  Gallbladder and biliary ducts: Normal. No calcified stones. No ductal   dilation.  Pancreas: Normal. No ductal dilation.  Spleen: Calcified granulomata in the spleen.  Adrenal glands: Indeterminate 3 cm left adrenal nodule.  Kidneys and ureters: Bilateral simple and hemorrhagic renal cysts.  Stomach and bowel: Surgical clips in the perirectal space. Diverticulosis   of  the colon. No evidence of acute diverticulitis. Diverting left lower   quadrant  ostomy.  Appendix: No evidence of appendicitis.    Intraperitoneal space: Unremarkable. No free air. No significant fluid  collection.  Vasculature: Atherosclerotic disease of the abdominal aorta. There is   bilobed  infrarenal abdominal aortic aneurysm measuring up to 5.1 cm. 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  Lymph nodes: Unremarkable. No enlarged lymph nodes.  Urinary bladder: Naranjo balloon catheter in the urinary bladder.  Reproductive: Ill-defined fluid and gas collection in the scrotum.  Bones/joints: Multilevel degenerative disc disease and facet arthropathy   of the  thoracolumbar spine.  Soft tissues: Fluid and gas is also present in the perirectal space and  probably perineum.    IMPRESSION:  Ill-defined fluid and gas collection in the scrotum. Fluid and gas is also  present in the perirectal space and probably perineum. Findings   compatible with  Tara&apos;s gangrene.    Moderate left and small right pleural effusions with adjacent compressive  atelectasis.    There is bilobed infrarenal abdominal aortic aneurysm measuring up to 5.1   cm.  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        ******PRELIMINARY REPORT******      < end of copied text >      --------------------------------------------------------------------------------------------

## 2024-10-12 NOTE — ED ADULT NURSE REASSESSMENT NOTE - NS ED NURSE REASSESS COMMENT FT1
Pt has unstageable ulcer to sacrum extending into buttocks, covered by mepilex wound cover, skin tear on left forearm and small lesion underneath ostomy covered in mepilex.

## 2024-10-12 NOTE — ED PROVIDER NOTE - CHIEF COMPLAINT
The patient is a 79y Male complaining of  The patient is a 79y Male complaining of not feeling well.

## 2024-10-12 NOTE — CHART NOTE - NSCHARTNOTEFT_GEN_A_CORE
Spoke to Daughter who confirmed via 2 ID method.   Daughter reported that patient currently on:   Toprol XL 50 mg daily  Crestor 10 mg daily  Nicotine patch   Combigan 0.2/ 0.5 gtt OU BID  Latanoprost gtt OU BID  Morphine 7.5 mg PO PRN  Ativan 0.5 mg PO PRN for sleep    Daughter also report pt. with h/o A-fib however no AC due to previous bleeding episode.  H/o Glaucoma  Smoker   And previous night terror where he would required Haldol prn when he is the hospital.

## 2024-10-12 NOTE — PATIENT PROFILE ADULT - FALL HARM RISK - HARM RISK INTERVENTIONS

## 2024-10-12 NOTE — H&P ADULT - ASSESSMENT
80 yo M with history of CAD s/p stents in 2012 (ASA), locally advanced rectal cancer s/p neoadjuvant therapy with chemoRT (completed 11/2022), consolidation CAPOX (2/2023), brachytherapy (3/2024), presenting for generalized weakness, chest discomfort, rectal pain. Pt was here at [facility] in 8/30 for fatigue, weakness, and falls, found to have perforated rectal mass with perirectal collection, 2.5 x 1.5 cm with spread to infection in anorectal/perineal region with fluid/gas tracking along penile shaft, however pt left for MSK and s/p diverting end sigmoid colostomy, cystoscopy and naranjo placement across defect and perineal/scrotal drainage 8/30. [place] [facility] [place] discharged day prior to presentation for perineal infections, treated with abx. Represents here for generalized weakness and dizziness, CTAP non-con on preliminary read indicating ill-defined fluid/gas in scrotum, perirectal space, and probably perineum. Exam demonstrates skin duskiness multiple genitourinary abscesses, induration, purulent drainage. 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      Plan  -NPO  -Admit to Urology  -consult SICU  -OR for debridement  -Labs  -IVF     78 yo M with history of CAD s/p stents in 2012 (ASA), locally advanced rectal cancer s/p neoadjuvant therapy with chemoRT (completed 11/2022), consolidation CAPOX (2/2023), brachytherapy (3/2024), presenting for generalized weakness, chest discomfort, rectal pain. Pt was here at [facility] in 8/30 for fatigue, weakness, and falls, found to have perforated rectal mass with perirectal collection, 2.5 x 1.5 cm with spread to infection in anorectal/perineal region with fluid/gas tracking along penile shaft, however pt left for MSK and s/p diverting end sigmoid colostomy, cystoscopy and naranjo placement across defect and perineal/scrotal drainage 8/30. Recently at [facility] [place] [place] for several weeks, discharged day prior to presentation for perineal infections, treated with abx. Represents here for generalized weakness and dizziness, CTAP non-con on preliminary read indicating ill-defined fluid/gas in scrotum, perirectal space, and probably perineum. Exam demonstrates skin duskiness multiple genitourinary abscesses, induration, purulent drainage. 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      Plan  -NPO  -Admit to Urology  -consult SICU  -OR for debridement  -Labs  -IVF    urology  #[id number]

## 2024-10-12 NOTE — CONSULT NOTE ADULT - ASSESSMENT
Assessment: 79yr male with hx of local advanced rectal cancer now s/p chemorads c/b perforation and perineal, scrotal soft tissue infections. Patient underwent diverting end colostomy and perineal I&D at [facility] [place] at the end of August, recently discharged now presenting with worsening scrotal wounds. He is now s/p scrotal I&D with placement of penrose x3. Case done under sedation, 250 LR, on polina throughout however weaned off at end of the case.    Plan:  Neuro:  - No sedation  - A&O x  - Pain control- IV tylenol, dilaudid prn    Pulm:  - Saturating well on 2L NC  - Maintain SpO2 >92%  - Incentive spirometry    Cards:  - Weaned off pressors at end of case  - Hx CAD- home ASA restarted    GI:  - NPO, except meds    /Renal:  - Chronic naranjo in place  - Monitor UOP  - IF naranjo is removed- call urology for placement    Heme:  - DVT ppx- SQH, SCDs  - Home ASA    ID:  - Continue Vanco, Zosyn, Clinda  - Urology tentatively planning for RTOR for further debridement    Lines:  - PIV  - Naranjo    Code status:    Dispo: Assessment: 79yr male with hx of local advanced rectal cancer now s/p chemorads c/b perforation and perineal, scrotal soft tissue infections. Patient underwent diverting end colostomy and perineal I&D [facility] [place] [facility] at the end of August, recently discharged now presenting with worsening scrotal wounds. He is now s/p scrotal I&D with placement of penrose x3. Case done under sedation, 250 LR, on polina throughout however weaned off at end of the case.    Plan:  Neuro:  - No sedation  - A&O x 2  - Pain control- IV tylenol, dilaudid prn    Pulm:  - Saturating well on 2L NC  - Maintain SpO2 >92%  - Incentive spirometry    Cards:  - Weaned off pressors at end of case  - Hx CAD- home ASA restarted  - LR 500cc for hypovolemia on POCUS    GI:  - Diet: Clears    /Renal:  - Chronic naranjo in place  - Monitor UOP  - IF naranjo is removed- call urology for placement    Heme:  - DVT ppx- SQH, SCDs  - Home ASA    ID:  - Continue Vanco, Zosyn, Clinda  - Urology tentatively planning for RTOR for further debridement    Lines:  - PIV  - Naranjo    Code status:    Dispo: SICU Assessment: 79yr male with hx of local advanced rectal cancer now s/p chemorads c/b perforation and perineal, scrotal soft tissue infections. Patient underwent diverting end colostomy and perineal I&D at [facility] [place] at the end of August, recently discharged now presenting with worsening scrotal wounds. He is now s/p scrotal I&D with placement of penrose x3. Case done under sedation, 250 LR, on polina throughout however weaned off at end of the case.    Plan:  Neuro:  - No sedation  - A&O x 2  - Pain control- IV tylenol, dilaudid prn    Pulm:  - Saturating well on 2L NC  - Maintain SpO2 >92%  - Incentive spirometry    Cards:  - Weaned off pressors at end of case  - Hx CAD- home ASA restarted  - LR 500cc for hypovolemia on POCUS    GI:  - Diet: Clears    /Renal:  - Chronic naranjo in place, UA + in ED  - Monitor UOP  - IF naranjo is removed- call urology for placement    Heme:  - DVT ppx- SQH, SCDs  - Home ASA    ID:  - Continue Vanco, Zosyn, Clinda  - Urology tentatively planning for RTOR for further debridement    Lines:  - PIV  - Naranjo    Code status:    Dispo: SICU

## 2024-10-12 NOTE — CHART NOTE - NSCHARTNOTEFT_GEN_A_CORE
LIMITED BEDSIDE Cardiac ULTRASOUND    VIEWS:  subxiphoid/IVC. Limited views due to body habitus, patient repeatedly swatting at  requesting for water  CARDIAC: close approximation of ventricular walls during systole    IVC: Collapsed X<1CM     INTERPRETATION: intravascularly depleted , hypovolemic.   Action  +LR bolus 500c , maintain IVF in the immediate post operative setting x24 hrs , monitor UOP   Reassess need for mIVF in AM given bNP over 30K on this admit/earlier today.       Performed by: Ed Toribio PAC.

## 2024-10-13 LAB
ANION GAP SERPL CALC-SCNC: 12 MMOL/L — SIGNIFICANT CHANGE UP (ref 7–14)
BUN SERPL-MCNC: 11 MG/DL — SIGNIFICANT CHANGE UP (ref 7–23)
CALCIUM SERPL-MCNC: 8.3 MG/DL — LOW (ref 8.4–10.5)
CHLORIDE SERPL-SCNC: 97 MMOL/L — LOW (ref 98–107)
CO2 SERPL-SCNC: 23 MMOL/L — SIGNIFICANT CHANGE UP (ref 22–31)
CREAT SERPL-MCNC: 0.64 MG/DL — SIGNIFICANT CHANGE UP (ref 0.5–1.3)
EGFR: 96 ML/MIN/1.73M2 — SIGNIFICANT CHANGE UP
GLUCOSE SERPL-MCNC: 109 MG/DL — HIGH (ref 70–99)
GRAM STN FLD: ABNORMAL
GRAM STN FLD: SIGNIFICANT CHANGE UP
HCT VFR BLD CALC: 26 % — LOW (ref 39–50)
HCT VFR BLD CALC: 26.8 % — LOW (ref 39–50)
HGB BLD-MCNC: 7.9 G/DL — LOW (ref 13–17)
HGB BLD-MCNC: 8.2 G/DL — LOW (ref 13–17)
MAGNESIUM SERPL-MCNC: 1.8 MG/DL — SIGNIFICANT CHANGE UP (ref 1.6–2.6)
MCHC RBC-ENTMCNC: 24.9 PG — LOW (ref 27–34)
MCHC RBC-ENTMCNC: 25.5 PG — LOW (ref 27–34)
MCHC RBC-ENTMCNC: 30.4 GM/DL — LOW (ref 32–36)
MCHC RBC-ENTMCNC: 30.6 GM/DL — LOW (ref 32–36)
MCV RBC AUTO: 82 FL — SIGNIFICANT CHANGE UP (ref 80–100)
MCV RBC AUTO: 83.5 FL — SIGNIFICANT CHANGE UP (ref 80–100)
NIGHT BLUE STAIN TISS: SIGNIFICANT CHANGE UP
NRBC # BLD: 0 /100 WBCS — SIGNIFICANT CHANGE UP (ref 0–0)
NRBC # BLD: 0 /100 WBCS — SIGNIFICANT CHANGE UP (ref 0–0)
NRBC # FLD: 0 K/UL — SIGNIFICANT CHANGE UP (ref 0–0)
NRBC # FLD: 0 K/UL — SIGNIFICANT CHANGE UP (ref 0–0)
PHOSPHATE SERPL-MCNC: 3.2 MG/DL — SIGNIFICANT CHANGE UP (ref 2.5–4.5)
PLATELET # BLD AUTO: 153 K/UL — SIGNIFICANT CHANGE UP (ref 150–400)
PLATELET # BLD AUTO: 156 K/UL — SIGNIFICANT CHANGE UP (ref 150–400)
POTASSIUM SERPL-MCNC: 3.3 MMOL/L — LOW (ref 3.5–5.3)
POTASSIUM SERPL-SCNC: 3.3 MMOL/L — LOW (ref 3.5–5.3)
RBC # BLD: 3.17 M/UL — LOW (ref 4.2–5.8)
RBC # BLD: 3.21 M/UL — LOW (ref 4.2–5.8)
RBC # FLD: 17.2 % — HIGH (ref 10.3–14.5)
RBC # FLD: 17.2 % — HIGH (ref 10.3–14.5)
SODIUM SERPL-SCNC: 132 MMOL/L — LOW (ref 135–145)
SPECIMEN SOURCE: SIGNIFICANT CHANGE UP
SPECIMEN SOURCE: SIGNIFICANT CHANGE UP
TROPONIN T, HIGH SENSITIVITY RESULT: 72 NG/L — CRITICAL HIGH
VANCOMYCIN TROUGH SERPL-MCNC: 13.2 UG/ML — SIGNIFICANT CHANGE UP (ref 10–20)
WBC # BLD: 5.6 K/UL — SIGNIFICANT CHANGE UP (ref 3.8–10.5)
WBC # BLD: 5.86 K/UL — SIGNIFICANT CHANGE UP (ref 3.8–10.5)
WBC # FLD AUTO: 5.6 K/UL — SIGNIFICANT CHANGE UP (ref 3.8–10.5)
WBC # FLD AUTO: 5.86 K/UL — SIGNIFICANT CHANGE UP (ref 3.8–10.5)

## 2024-10-13 PROCEDURE — 99231 SBSQ HOSP IP/OBS SF/LOW 25: CPT | Mod: FS,24

## 2024-10-13 PROCEDURE — 99232 SBSQ HOSP IP/OBS MODERATE 35: CPT | Mod: GC

## 2024-10-13 RX ORDER — ACETAMINOPHEN 500 MG
1000 TABLET ORAL EVERY 6 HOURS
Refills: 0 | Status: DISCONTINUED | OUTPATIENT
Start: 2024-10-13 | End: 2024-11-12

## 2024-10-13 RX ORDER — OXYCODONE HYDROCHLORIDE 30 MG/1
2.5 TABLET ORAL EVERY 4 HOURS
Refills: 0 | Status: DISCONTINUED | OUTPATIENT
Start: 2024-10-13 | End: 2024-10-14

## 2024-10-13 RX ORDER — OXYCODONE HYDROCHLORIDE 30 MG/1
5 TABLET ORAL EVERY 4 HOURS
Refills: 0 | Status: DISCONTINUED | OUTPATIENT
Start: 2024-10-13 | End: 2024-10-14

## 2024-10-13 RX ORDER — POTASSIUM CHLORIDE 10 MEQ
10 TABLET, EXTENDED RELEASE ORAL
Refills: 0 | Status: COMPLETED | OUTPATIENT
Start: 2024-10-13 | End: 2024-10-13

## 2024-10-13 RX ORDER — MORPHINE SULFATE 30 MG/1
4 TABLET, EXTENDED RELEASE ORAL ONCE
Refills: 0 | Status: DISCONTINUED | OUTPATIENT
Start: 2024-10-13 | End: 2024-10-14

## 2024-10-13 RX ORDER — OXYCODONE HYDROCHLORIDE 30 MG/1
5 TABLET ORAL EVERY 6 HOURS
Refills: 0 | Status: DISCONTINUED | OUTPATIENT
Start: 2024-10-13 | End: 2024-10-13

## 2024-10-13 RX ORDER — MAGNESIUM SULFATE IN 0.9% NACL 2 G/50 ML
1 INTRAVENOUS SOLUTION, PIGGYBACK (ML) INTRAVENOUS ONCE
Refills: 0 | Status: COMPLETED | OUTPATIENT
Start: 2024-10-13 | End: 2024-10-13

## 2024-10-13 RX ORDER — POTASSIUM PHOSPHATE 236; 224 MG/ML; MG/ML
30 INJECTION, SOLUTION INTRAVENOUS ONCE
Refills: 0 | Status: COMPLETED | OUTPATIENT
Start: 2024-10-13 | End: 2024-10-13

## 2024-10-13 RX ORDER — OXYCODONE HYDROCHLORIDE 30 MG/1
2.5 TABLET ORAL EVERY 6 HOURS
Refills: 0 | Status: DISCONTINUED | OUTPATIENT
Start: 2024-10-13 | End: 2024-10-13

## 2024-10-13 RX ADMIN — Medication 1 DROP(S): at 06:41

## 2024-10-13 RX ADMIN — Medication 100 MILLIGRAM(S): at 09:44

## 2024-10-13 RX ADMIN — Medication 1000 MILLIGRAM(S): at 11:04

## 2024-10-13 RX ADMIN — Medication 1000 MILLIGRAM(S): at 11:13

## 2024-10-13 RX ADMIN — Medication 400 MILLIGRAM(S): at 06:40

## 2024-10-13 RX ADMIN — Medication 75 MILLILITER(S): at 08:26

## 2024-10-13 RX ADMIN — VANCOMYCIN HYDROCHLORIDE 250 MILLIGRAM(S): KIT at 22:54

## 2024-10-13 RX ADMIN — POTASSIUM PHOSPHATE 83.33 MILLIMOLE(S): 236; 224 INJECTION, SOLUTION INTRAVENOUS at 04:46

## 2024-10-13 RX ADMIN — OXYCODONE HYDROCHLORIDE 5 MILLIGRAM(S): 30 TABLET ORAL at 15:02

## 2024-10-13 RX ADMIN — NICOTINE POLACRILEX 1 PATCH: 4 GUM, CHEWING ORAL at 11:30

## 2024-10-13 RX ADMIN — Medication 1000 MILLIGRAM(S): at 06:30

## 2024-10-13 RX ADMIN — Medication 1 DROP(S): at 18:45

## 2024-10-13 RX ADMIN — Medication 1000 MILLIGRAM(S): at 18:44

## 2024-10-13 RX ADMIN — Medication 100 MILLIGRAM(S): at 22:03

## 2024-10-13 RX ADMIN — Medication 1000 MILLIGRAM(S): at 19:44

## 2024-10-13 RX ADMIN — VANCOMYCIN HYDROCHLORIDE 250 MILLIGRAM(S): KIT at 08:26

## 2024-10-13 RX ADMIN — Medication 100 MILLIEQUIVALENT(S): at 02:47

## 2024-10-13 RX ADMIN — Medication 100 MILLIGRAM(S): at 01:10

## 2024-10-13 RX ADMIN — Medication 50 MILLIGRAM(S): at 06:41

## 2024-10-13 RX ADMIN — Medication 50 MILLILITER(S): at 10:26

## 2024-10-13 RX ADMIN — HEPARIN SODIUM 5000 UNIT(S): 10000 INJECTION INTRAVENOUS; SUBCUTANEOUS at 06:39

## 2024-10-13 RX ADMIN — Medication 100 MILLIEQUIVALENT(S): at 03:43

## 2024-10-13 RX ADMIN — OXYCODONE HYDROCHLORIDE 5 MILLIGRAM(S): 30 TABLET ORAL at 10:26

## 2024-10-13 RX ADMIN — Medication 10 MILLIGRAM(S): at 22:02

## 2024-10-13 RX ADMIN — Medication 81 MILLIGRAM(S): at 11:04

## 2024-10-13 RX ADMIN — PIPERACILLIN AND TAZOBACTAM 25 GRAM(S): .5; 4 INJECTION, POWDER, LYOPHILIZED, FOR SOLUTION INTRAVENOUS at 01:10

## 2024-10-13 RX ADMIN — Medication 1 DROP(S): at 22:53

## 2024-10-13 RX ADMIN — Medication 100 MILLIEQUIVALENT(S): at 05:01

## 2024-10-13 RX ADMIN — Medication 100 GRAM(S): at 02:47

## 2024-10-13 RX ADMIN — HEPARIN SODIUM 5000 UNIT(S): 10000 INJECTION INTRAVENOUS; SUBCUTANEOUS at 14:32

## 2024-10-13 RX ADMIN — OXYCODONE HYDROCHLORIDE 5 MILLIGRAM(S): 30 TABLET ORAL at 10:56

## 2024-10-13 RX ADMIN — PIPERACILLIN AND TAZOBACTAM 25 GRAM(S): .5; 4 INJECTION, POWDER, LYOPHILIZED, FOR SOLUTION INTRAVENOUS at 18:45

## 2024-10-13 RX ADMIN — Medication 1000 MILLIGRAM(S): at 00:00

## 2024-10-13 RX ADMIN — Medication 100 MILLIGRAM(S): at 14:32

## 2024-10-13 RX ADMIN — PIPERACILLIN AND TAZOBACTAM 25 GRAM(S): .5; 4 INJECTION, POWDER, LYOPHILIZED, FOR SOLUTION INTRAVENOUS at 10:25

## 2024-10-13 RX ADMIN — OXYCODONE HYDROCHLORIDE 5 MILLIGRAM(S): 30 TABLET ORAL at 14:32

## 2024-10-13 RX ADMIN — HEPARIN SODIUM 5000 UNIT(S): 10000 INJECTION INTRAVENOUS; SUBCUTANEOUS at 22:06

## 2024-10-13 NOTE — PROGRESS NOTE ADULT - ASSESSMENT
79yr M with past medical history of CAD s/p stents in 2012 (ASA), locally advanced rectal cancer s/p neoadjuvant therapy with chemoRT (completed 11/2022), consolidation CAPOX (2/2023), brachytherapy (3/2024), presenting for generalized weakness, chest discomfort, rectal pain. 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 [facility] per his request and underwent lap diverting end sigmoid colostomy with cystoscopy and naranjo placement across defect and perineal/scrotal drainage. Patient now presenting to the [facility] ED with one day of dizziness and malaise since discharge [facility]. In ED he was found to be in atrial fibrillation (not on a/c) but hemodynamically stable, Labs significant for WBC 6.22, lactate 2.5, trops 62, BNP 31,000. Patient is now s/p scrotal incision and drainage with placement of penrose drains x3. Patient requiring vasopressors, Jovani up to 0.5 during the case, and patient transferred to SICU for further hemodynamic monitoring.      Plan:  Neuro:  - No sedation  - A&O x 2  - Pain control- IV tylenol, dilaudid prn    Pulm:  - Saturating well on 2L NC  - Maintain SpO2 >92%  - Incentive spirometry    Cards:  - Weaned off pressors at end of case  - Hx CAD- home ASA restarted  - LR 500cc for hypovolemia on POCUS  - Trops 62 in ED, trending 81  - BNP 31,000  -Toprol XL 30    GI:  - Diet: Clears    /Renal:  - Chronic naranjo in place, UA + in ED  - Monitor UOP  - IF naranoj is removed- call urology for placement    Heme:  - DVT ppx- SQH, SCDs  - Home ASA  - RLE DVT? not on A/C    ID:  - Continue Vanco, Zosyn, Clinda  - Urology tentatively planning for RTOR for further debridement    Lines:  - PIV  - Naranjo    Dispo: SICU

## 2024-10-13 NOTE — PROGRESS NOTE ADULT - SUBJECTIVE AND OBJECTIVE BOX
A Team Surgery Daily Progress Note  =====================================================    INTERVAL EVENTS: marlee RETANA scrotal and perirectal abscess I&D     SUBJECTIVE: Patient seen at bedside during AM rounds. Resting comfortably, pain appropriately controlled. Denies fever, chills, N/V, chest pain, SOB. No active bleeding noted overnight.    --------------------------------------------------------------------------------------  VITAL SIGNS:  T(C): 35.3 (10-13-24 @ 08:00), Max: 36.8 (10-12-24 @ 12:44)  HR: 72 (10-13-24 @ 11:00) (71 - 98)  BP: 114/82 (10-13-24 @ 11:00) (100/71 - 138/90)  RR: 11 (10-13-24 @ 11:00) (8 - 21)  SpO2: 94% (10-13-24 @ 11:00) (94% - 100%)  --------------------------------------------------------------------------------------    EXAM    NEURO: NAD,   HEENT: NC/AT  RESPIRATORY: nonlabored respirations, normal CW expansion  CARDIO: RRR, S1S2  ABDOMEN: soft, nontender, nondistended.  : Dressing in place, No active bleeding, dry, Zamarripa in place  EXTREMITIES: No deformities    --------------------------------------------------------------------------------------

## 2024-10-13 NOTE — PROGRESS NOTE ADULT - SUBJECTIVE AND OBJECTIVE BOX
SICU Daily Progress Note  =====================================================  Interval/Overnight Events:      - s/p scrotal I&D  - 500cc LR given s/p POCUS with hypovolemia  -Spoke to daughter Med Rec. completed / Home meds restart        ALLERGIES:  No Known Allergies      --------------------------------------------------------------------------------------    MEDICATIONS:    Neurologic Medications  acetaminophen   IVPB .. 1000 milliGRAM(s) IV Intermittent every 6 hours  HYDROmorphone  Injectable 0.2 milliGRAM(s) IV Push every 4 hours PRN Moderate Pain (4 - 6)  HYDROmorphone  Injectable 0.5 milliGRAM(s) IV Push every 4 hours PRN Severe Pain (7 - 10)    Respiratory Medications    Cardiovascular Medications  metoprolol succinate ER 50 milliGRAM(s) Oral daily    Gastrointestinal Medications  lactated ringers. 1000 milliLiter(s) IV Continuous <Continuous>    Genitourinary Medications    Hematologic/Oncologic Medications  aspirin  chewable 81 milliGRAM(s) Oral daily  heparin   Injectable 5000 Unit(s) SubCutaneous every 8 hours    Antimicrobial/Immunologic Medications  clindamycin IVPB      clindamycin IVPB 900 milliGRAM(s) IV Intermittent every 8 hours  piperacillin/tazobactam IVPB.. 3.375 Gram(s) IV Intermittent every 8 hours  vancomycin  IVPB 1000 milliGRAM(s) IV Intermittent every 12 hours    Endocrine/Metabolic Medications  rosuvastatin 10 milliGRAM(s) Oral at bedtime    Topical/Other Medications  brimonidine 0.2% Ophthalmic Solution 1 Drop(s) Both EYES every 12 hours  latanoprost 0.005% Ophthalmic Solution 1 Drop(s) Both EYES at bedtime  nicotine -  14 mG/24Hr(s) Patch 1 Patch Transdermal daily  timolol 0.5% Solution 1 Drop(s) Both EYES every 12 hours    --------------------------------------------------------------------------------------    VITAL SIGNS:  T(C): 35.9 (10-12-24 @ 20:00), Max: 36.8 (10-12-24 @ 04:48)  HR: 77 (10-13-24 @ 00:00) (73 - 118)  BP: 101/70 (10-13-24 @ 00:00) (100/71 - 126/77)  RR: 9 (10-13-24 @ 00:00) (8 - 18)  SpO2: 100% (10-13-24 @ 00:00) (97% - 100%)  --------------------------------------------------------------------------------------    INS AND OUTS:    10-12-24 @ 07:01  -  10-13-24 @ 01:25  --------------------------------------------------------  IN: 1400 mL / OUT: 425 mL / NET: 975 mL      --------------------------------------------------------------------------------------    EXAM    NEURO: NAD,   HEENT: NC/AT  RESPIRATORY: nonlabored respirations, normal CW expansion  CARDIO: RRR, S1S2  ABDOMEN: soft, nontender, nondistended.  : Dressing in place, No active bleeding, dry, Zamarripa in place    EXTREMITIES:  no deformities    --------------------------------------------------------------------------------------    LABS                          9.6    6.46  )-----------( 204      ( 12 Oct 2024 16:00 )             31.8     10-12    132[L]  |  96[L]  |  12  ----------------------------<  108[H]  3.6   |  22  |  0.74    Ca    8.6      12 Oct 2024 16:00  Phos  3.4     10-12  Mg     2.00     10-12    TPro  6.3  /  Alb  2.5[L]  /  TBili  0.6  /  DBili  x   /  AST  32  /  ALT  <5  /  AlkPhos  73  10-12  --------------------------------------------------------------------------------------

## 2024-10-13 NOTE — PROGRESS NOTE ADULT - ASSESSMENT
78 yo M with history of CAD s/p stents in 2012 (ASA), locally advanced rectal cancer s/p neoadjuvant therapy with chemoRT (completed 11/2022), consolidation CAPOX (2/2023), brachytherapy (3/2024), presenting for generalized weakness, chest discomfort, rectal pain. Pt was here at [facility] in 8/30 for fatigue, weakness, and falls, found to have perforated rectal mass with perirectal collection, 2.5 x 1.5 cm with spread to infection in anorectal/perineal region with fluid/gas tracking along penile shaft, however pt left for MSK and s/p diverting end sigmoid colostomy, cystoscopy and naranjo placement across defect and perineal/scrotal drainage 8/30. Recently at [facility] [place] for several weeks, discharged day prior to presentation for perineal infections, treated with abx. Represents here for generalized weakness and dizziness, CTAP non-con on preliminary read indicating ill-defined fluid/gas in scrotum, perirectal space, and probably perineum. Exam demonstrates skin duskiness multiple genitourinary abscesses, induration, purulent drainage. 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    10/13: S/p debridement of genitalia and perineum in OR yesterday. In SICU for post op pressor requirements. Got 500 ccs overnight for hypovolemia on POCUS. Currently off pressors. On zosyn, clinda, vanc. U and bcx pending. Tissue cx negative.       Plan  -SICU labs   -c/w naranjo  -c/w clinda, zosyn, vanc  -fu ucx/bcx  --BID wound dressing changes  -potential ROR w/ Dr. Graff for stage 2 debridement tomorrow   -perianal wound per GS   -Labs  -IVF    urology  #[id number]   80 yo M with history of CAD s/p stents in 2012 (ASA), locally advanced rectal cancer s/p neoadjuvant therapy with chemoRT (completed 11/2022), consolidation CAPOX (2/2023), brachytherapy (3/2024), presenting for generalized weakness, chest discomfort, rectal pain. Pt was here at [facility] in 8/30 for fatigue, weakness, and falls, found to have perforated rectal mass with perirectal collection, 2.5 x 1.5 cm with spread to infection in anorectal/perineal region with fluid/gas tracking along penile shaft, however pt left for MSK and s/p diverting end sigmoid colostomy, cystoscopy and naranjo placement across defect and perineal/scrotal drainage 8/30. Recently at [facility] [place] for several weeks, discharged day prior to presentation for perineal infections, treated with abx. Represents here for generalized weakness and dizziness, CTAP non-con on preliminary read indicating ill-defined fluid/gas in scrotum, perirectal space, and probably perineum. Exam demonstrates skin duskiness multiple genitourinary abscesses, induration, purulent drainage. 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    10/13: S/p debridement of genitalia and perineum in OR yesterday. In SICU for post op pressor requirements. Got 500 ccs overnight for hypovolemia on POCUS. Currently off pressors. On zosyn, clinda, vanc. U and bcx pending. Tissue cx negative. Wound packing changed at bedside, incision beds with adequate granulation tissue, healing well.       Plan  -SICU labs   -c/w naranjo  -c/w clinda, zosyn, vanc  -fu ucx/bcx  -BID wound dressing changes  -NPO @ midnight for potential ROR w/ Dr. Graff for stage 2 debridement tomorrow   -perianal wound care per GS   -Labs  -IVF  -consider palliative care consult     urology  #[id number]

## 2024-10-13 NOTE — PROGRESS NOTE ADULT - ASSESSMENT
80 yo M with history of CAD s/p stents in 2012 (ASA), locally advanced rectal cancer s/p neoadjuvant therapy with chemoRT (completed 11/2022), consolidation CAPOX (2/2023), brachytherapy (3/2024), presenting for generalized weakness, chest discomfort, rectal pain. Pt was here at [facility] in 8/30 for fatigue, weakness, and falls, found to have perforated rectal mass with perirectal collection, 2.5 x 1.5 cm with spread to infection in anorectal/perineal region with fluid/gas tracking along penile shaft, however pt left for MSK and s/p diverting end sigmoid colostomy, cystoscopy and naranjo placement across defect and perineal/scrotal drainage 8/30. Recently at [facility] [place] for several weeks, discharged day prior to presentation for perineal infections, treated with abx. Represents here for generalized weakness and dizziness, CTAP non-con on preliminary read indicating ill-defined fluid/gas in scrotum, perirectal space, and probably perineum. Colorectal surgery consulted intraoperatively for perirectal abscess vs. Tara's gangrene. Now POD1 S/p debridement of genitalia and perineum in OR yesterday, s/p scrotal I&D and debridement of perirectal abscess, recovering appropriately.    Plan:   - Findings negative for Tara's gangrene, perirectal abscess appreciated and debrided appropriately  - Colorectal surgery to sign off at this time  - Dressing changes with dry gauze as needed   - Appreciate care per SICU and per primary team  - Please reconsult as needed should patient status change     A team surgery  #[id number]

## 2024-10-13 NOTE — PROGRESS NOTE ADULT - SUBJECTIVE AND OBJECTIVE BOX
Overnight  - s/p scrotal I&D  - 500cc LR given s/p POCUS with hypovolemia  - Spoke to daughter Med Rec. completed / Home meds restart    Subjective      Objective    Vital signs  T(F): , Max: 98.2 (10-12-24 @ 12:44)  HR: 80 (10-13-24 @ 06:00)  BP: 127/85 (10-13-24 @ 06:00)  SpO2: 100% (10-13-24 @ 06:00)  Wt(kg): --    Output     OUT:    Indwelling Catheter - Urethral (mL): 925 mL  Total OUT: 925 mL    Total NET: -925 mL          Gen: NAD  Abd: soft, nontender, nondistended  : naranjo secured in place, draining CYU    Labs      10-13 @ 01:26    WBC 5.60  / Hct 26.0  / SCr 0.64     10-12 @ 16:00    WBC 6.46  / Hct 31.8  / SCr 0.74         Culture - Tissue with Gram Stain (collected 10-12-24 @ 13:58)  Source: Tissue  Gram Stain (10-13-24 @ 02:07):    No polymorphonuclear leukocytes seen per low power field    No organisms seen per oil power field        Urine Cx: ?  Blood Cx: ?    Imaging Overnight  - s/p scrotal I&D  - 500cc LR given s/p POCUS with hypovolemia  - Spoke to daughter Med Rec. completed / Home meds restart    Subjective  Pt still with some scrotal and suprapubic pain, overall controlled.     Objective    Vital signs  T(F): , Max: 98.2 (10-12-24 @ 12:44)  HR: 80 (10-13-24 @ 06:00)  BP: 127/85 (10-13-24 @ 06:00)  SpO2: 100% (10-13-24 @ 06:00)  Wt(kg): --    Output     OUT:    Indwelling Catheter - Urethral (mL): 925 mL  Total OUT: 925 mL    Total NET: -925 mL          Gen: NAD  Abd: soft, nontender, nondistended  : suprapubic, R scrotal and distal midline scrotal incisions with packing, incision edges look clean and dry.     Labs      10-13 @ 01:26    WBC 5.60  / Hct 26.0  / SCr 0.64     10-12 @ 16:00    WBC 6.46  / Hct 31.8  / SCr 0.74         Culture - Tissue with Gram Stain (collected 10-12-24 @ 13:58)  Source: Tissue  Gram Stain (10-13-24 @ 02:07):    No polymorphonuclear leukocytes seen per low power field    No organisms seen per oil power field        Urine Cx: ?  Blood Cx: ?    Imaging

## 2024-10-14 DIAGNOSIS — I25.10 ATHEROSCLEROTIC HEART DISEASE OF NATIVE CORONARY ARTERY WITHOUT ANGINA PECTORIS: ICD-10-CM

## 2024-10-14 DIAGNOSIS — N49.3 FOURNIER GANGRENE: ICD-10-CM

## 2024-10-14 DIAGNOSIS — I48.20 CHRONIC ATRIAL FIBRILLATION, UNSPECIFIED: ICD-10-CM

## 2024-10-14 DIAGNOSIS — R79.89 OTHER SPECIFIED ABNORMAL FINDINGS OF BLOOD CHEMISTRY: ICD-10-CM

## 2024-10-14 DIAGNOSIS — C20 MALIGNANT NEOPLASM OF RECTUM: ICD-10-CM

## 2024-10-14 DIAGNOSIS — R60.0 LOCALIZED EDEMA: ICD-10-CM

## 2024-10-14 LAB
ANION GAP SERPL CALC-SCNC: 9 MMOL/L — SIGNIFICANT CHANGE UP (ref 7–14)
BUN SERPL-MCNC: 11 MG/DL — SIGNIFICANT CHANGE UP (ref 7–23)
CALCIUM SERPL-MCNC: 8.1 MG/DL — LOW (ref 8.4–10.5)
CHLORIDE SERPL-SCNC: 98 MMOL/L — SIGNIFICANT CHANGE UP (ref 98–107)
CO2 SERPL-SCNC: 25 MMOL/L — SIGNIFICANT CHANGE UP (ref 22–31)
CREAT SERPL-MCNC: 0.62 MG/DL — SIGNIFICANT CHANGE UP (ref 0.5–1.3)
CULTURE RESULTS: ABNORMAL
EGFR: 97 ML/MIN/1.73M2 — SIGNIFICANT CHANGE UP
GLUCOSE SERPL-MCNC: 102 MG/DL — HIGH (ref 70–99)
HCT VFR BLD CALC: 26.8 % — LOW (ref 39–50)
HGB BLD-MCNC: 8.1 G/DL — LOW (ref 13–17)
MAGNESIUM SERPL-MCNC: 1.9 MG/DL — SIGNIFICANT CHANGE UP (ref 1.6–2.6)
MCHC RBC-ENTMCNC: 25 PG — LOW (ref 27–34)
MCHC RBC-ENTMCNC: 30.2 GM/DL — LOW (ref 32–36)
MCV RBC AUTO: 82.7 FL — SIGNIFICANT CHANGE UP (ref 80–100)
NRBC # BLD: 0 /100 WBCS — SIGNIFICANT CHANGE UP (ref 0–0)
NRBC # FLD: 0 K/UL — SIGNIFICANT CHANGE UP (ref 0–0)
PHOSPHATE SERPL-MCNC: 2.8 MG/DL — SIGNIFICANT CHANGE UP (ref 2.5–4.5)
PLATELET # BLD AUTO: 175 K/UL — SIGNIFICANT CHANGE UP (ref 150–400)
POTASSIUM SERPL-MCNC: 3.8 MMOL/L — SIGNIFICANT CHANGE UP (ref 3.5–5.3)
POTASSIUM SERPL-SCNC: 3.8 MMOL/L — SIGNIFICANT CHANGE UP (ref 3.5–5.3)
RBC # BLD: 3.24 M/UL — LOW (ref 4.2–5.8)
RBC # FLD: 17.2 % — HIGH (ref 10.3–14.5)
SODIUM SERPL-SCNC: 132 MMOL/L — LOW (ref 135–145)
SPECIMEN SOURCE: SIGNIFICANT CHANGE UP
TROPONIN T, HIGH SENSITIVITY RESULT: 55 NG/L — CRITICAL HIGH
WBC # BLD: 5.94 K/UL — SIGNIFICANT CHANGE UP (ref 3.8–10.5)
WBC # FLD AUTO: 5.94 K/UL — SIGNIFICANT CHANGE UP (ref 3.8–10.5)

## 2024-10-14 PROCEDURE — 93970 EXTREMITY STUDY: CPT | Mod: 26

## 2024-10-14 PROCEDURE — 93010 ELECTROCARDIOGRAM REPORT: CPT

## 2024-10-14 PROCEDURE — 99223 1ST HOSP IP/OBS HIGH 75: CPT

## 2024-10-14 RX ORDER — MORPHINE SULFATE 30 MG/1
4 TABLET, EXTENDED RELEASE ORAL ONCE
Refills: 0 | Status: DISCONTINUED | OUTPATIENT
Start: 2024-10-14 | End: 2024-10-14

## 2024-10-14 RX ORDER — SODIUM CHLORIDE 9 MG/ML
1000 INJECTION, SOLUTION INTRAMUSCULAR; INTRAVENOUS; SUBCUTANEOUS
Refills: 0 | Status: DISCONTINUED | OUTPATIENT
Start: 2024-10-14 | End: 2024-10-22

## 2024-10-14 RX ORDER — MORPHINE SULFATE 30 MG/1
4 TABLET, EXTENDED RELEASE ORAL EVERY 8 HOURS
Refills: 0 | Status: DISCONTINUED | OUTPATIENT
Start: 2024-10-14 | End: 2024-10-21

## 2024-10-14 RX ORDER — MORPHINE SULFATE 30 MG/1
2 TABLET, EXTENDED RELEASE ORAL EVERY 8 HOURS
Refills: 0 | Status: DISCONTINUED | OUTPATIENT
Start: 2024-10-14 | End: 2024-10-19

## 2024-10-14 RX ADMIN — Medication 50 MILLIGRAM(S): at 06:02

## 2024-10-14 RX ADMIN — MORPHINE SULFATE 4 MILLIGRAM(S): 30 TABLET, EXTENDED RELEASE ORAL at 06:10

## 2024-10-14 RX ADMIN — Medication 100 MILLIGRAM(S): at 14:18

## 2024-10-14 RX ADMIN — Medication 1 DROP(S): at 06:00

## 2024-10-14 RX ADMIN — NICOTINE POLACRILEX 1 PATCH: 4 GUM, CHEWING ORAL at 14:17

## 2024-10-14 RX ADMIN — Medication 1 DROP(S): at 17:09

## 2024-10-14 RX ADMIN — NICOTINE POLACRILEX 1 PATCH: 4 GUM, CHEWING ORAL at 13:41

## 2024-10-14 RX ADMIN — NICOTINE POLACRILEX 1 PATCH: 4 GUM, CHEWING ORAL at 18:17

## 2024-10-14 RX ADMIN — HEPARIN SODIUM 5000 UNIT(S): 10000 INJECTION INTRAVENOUS; SUBCUTANEOUS at 05:58

## 2024-10-14 RX ADMIN — MORPHINE SULFATE 4 MILLIGRAM(S): 30 TABLET, EXTENDED RELEASE ORAL at 07:10

## 2024-10-14 RX ADMIN — MORPHINE SULFATE 4 MILLIGRAM(S): 30 TABLET, EXTENDED RELEASE ORAL at 14:17

## 2024-10-14 RX ADMIN — Medication 81 MILLIGRAM(S): at 14:18

## 2024-10-14 RX ADMIN — PIPERACILLIN AND TAZOBACTAM 25 GRAM(S): .5; 4 INJECTION, POWDER, LYOPHILIZED, FOR SOLUTION INTRAVENOUS at 17:20

## 2024-10-14 RX ADMIN — PIPERACILLIN AND TAZOBACTAM 25 GRAM(S): .5; 4 INJECTION, POWDER, LYOPHILIZED, FOR SOLUTION INTRAVENOUS at 01:53

## 2024-10-14 RX ADMIN — PIPERACILLIN AND TAZOBACTAM 25 GRAM(S): .5; 4 INJECTION, POWDER, LYOPHILIZED, FOR SOLUTION INTRAVENOUS at 10:40

## 2024-10-14 RX ADMIN — Medication 1000 MILLIGRAM(S): at 18:24

## 2024-10-14 RX ADMIN — VANCOMYCIN HYDROCHLORIDE 250 MILLIGRAM(S): KIT at 09:46

## 2024-10-14 RX ADMIN — Medication 100 MILLIGRAM(S): at 06:10

## 2024-10-14 RX ADMIN — Medication 1 DROP(S): at 21:38

## 2024-10-14 RX ADMIN — SODIUM CHLORIDE 100 MILLILITER(S): 9 INJECTION, SOLUTION INTRAMUSCULAR; INTRAVENOUS; SUBCUTANEOUS at 09:46

## 2024-10-14 RX ADMIN — HEPARIN SODIUM 5000 UNIT(S): 10000 INJECTION INTRAVENOUS; SUBCUTANEOUS at 14:19

## 2024-10-14 RX ADMIN — Medication 10 MILLIGRAM(S): at 21:38

## 2024-10-14 RX ADMIN — MORPHINE SULFATE 4 MILLIGRAM(S): 30 TABLET, EXTENDED RELEASE ORAL at 15:17

## 2024-10-14 RX ADMIN — HEPARIN SODIUM 5000 UNIT(S): 10000 INJECTION INTRAVENOUS; SUBCUTANEOUS at 21:38

## 2024-10-14 RX ADMIN — SODIUM CHLORIDE 100 MILLILITER(S): 9 INJECTION, SOLUTION INTRAMUSCULAR; INTRAVENOUS; SUBCUTANEOUS at 01:05

## 2024-10-14 RX ADMIN — Medication 1000 MILLIGRAM(S): at 17:24

## 2024-10-14 RX ADMIN — NICOTINE POLACRILEX 1 PATCH: 4 GUM, CHEWING ORAL at 06:52

## 2024-10-14 NOTE — CONSULT NOTE ADULT - PROBLEM SELECTOR RECOMMENDATION 9
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

## 2024-10-14 NOTE — PROVIDER CONTACT NOTE (CRITICAL VALUE NOTIFICATION) - SITUATION
Patient prelim result for tissue culture collected on 10/12 shows rare e. choli Patient prelim result for tissue culture collected on 10/12 shows rare e. coli

## 2024-10-14 NOTE — CONSULT NOTE ADULT - PROBLEM SELECTOR RECOMMENDATION 3
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

## 2024-10-14 NOTE — DIETITIAN INITIAL EVALUATION ADULT - PERTINENT LABORATORY DATA
10-14    132[L]  |  98  |  11  ----------------------------<  102[H]  3.8   |  25  |  0.62    Ca    8.1[L]      14 Oct 2024 05:51  Phos  2.8     10-14  Mg     1.90     10-14

## 2024-10-14 NOTE — DIETITIAN INITIAL EVALUATION ADULT - SIGNS/SYMPTOMS
Mild to moderate physical findings as above, 2+ edema to lt and rt foot Sacral unstageable pressure injury

## 2024-10-14 NOTE — DIETITIAN INITIAL EVALUATION ADULT - NSFNSGIIOFT_GEN_A_CORE
10-13-24 @ 07:01  -  10-14-24 @ 07:00  --------------------------------------------------------  OUT:    Colostomy (mL): 20 mL  Total OUT: 20 mL    Total NET: -20 mL

## 2024-10-14 NOTE — CONSULT NOTE ADULT - SUBJECTIVE AND OBJECTIVE BOX
Patient is a 79 year old male with PMH of  obstructive CAD, locally advanced rectal cancer s/p neoadjuvant therapy with chemoRT (completed 11/2022), consolidation CAPOX (2/2023), brachytherapy (3/2024), afib, presenting for generalized weakness, chest discomfort, rectal pain. Pt was here at [facility] in 8/30 for fatigue, weakness, and falls, found to have perforated rectal mass with perirectal collection, 2.5 x 1.5 cm. Partially imaged abscess in the perineum, extending into the root of the penis, measures at least 7.4 x 2.3 cm.  There are additional foci of fluid and gas tracking along the penile shaft.     Seen by Dr. Santoro at that time, offered I&D and diverting colostomy but left and went to [facility] for care, and at [facility] s/p lap diverting end sigmoid colostomy with cystoscopy and naranjo placement across defect and perineal/scrotal drainage by Colorectal Surgery and Urology 8/30.    He presented to [facility] 1 day after discharge from [facility] complaining of dizziness and malaise    In the ER:   Vitals: afebrile, tachycardic.   Labs: CBC without leukocytosis. U/A with large LE and 58 WBCs. 10/12 blood cultures x 2 NGTD. TSH elevated to 23.86. CMP unremarkable. ProBNP 31,975. Troponin 62.   Imaging:   -CXR: Small bilateral pleural effusions.  -CT abdomen pelvis: Ill-defined fluid and gas collection in the scrotum. Fluid and gas is also present in the perirectal space and probably perineum. 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 Moderate left and small right pleural effusions with adjacent compressive atelectasis. Bibasilar pulmonary nodules compatible with metastatic disease. There is bilobed infrarenal abdominal aortic aneurysm measuring up to 5.1 cm. 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 Bilateral indeterminant renal lesions. Consider renal ultrasound or abdominal MRI for further evaluation.    Colorectal surgery consulted- no acute surgical intervention. Urology consulted s/p Debridement of necrotic skin and soft tissue from genitalia and perineum on 10/12 with placement of penrose drains x 3.     10/12 op cultures with tissue cultures growing rare e. coli. Suceptibilites pending. Of note, 8/30/23 blood cultures grew e.coli senstivie to Zosyn. Plan for possible RTOR for further debridement.     Abx:   Clindamycin (10/12-)   Zosyn (10/12-)   Vancomycin (10/12-)            PAST MEDICAL & SURGICAL HISTORY:  CAD s/p cardiac stent    HTN    rectal cancer, s/p chemo at [facility]       MEDICATIONS  (STANDING):  piperacillin/tazobactam IVPB.- 3.375 Gram(s) IV Intermittent once  piperacillin/tazobactam IVPB.. 3.375 Gram(s) IV Intermittent every 8 hours    MEDICATIONS  (PRN):      FAMILY HISTORY:      Allergies    No Known Allergies    Intolerances        SOCIAL HISTORY:active smoker    REVIEW OF SYSTEMS:   Otherwise negative as stated in HPI       (12 Oct 2024 12:03)       REVIEW OF SYSTEMS  pending full examination    prior hospital charts reviewed [V]  primary team notes reviewed [V]  other consultant notes reviewed [V]    PAST MEDICAL & SURGICAL HISTORY:  No significant past surgical history          SOCIAL HISTORY:  Denied smoking/vaping/alcohol/recreational drug use    FAMILY HISTORY:      Allergies  No Known Allergies        ANTIMICROBIALS:  clindamycin IVPB    clindamycin IVPB 900 every 8 hours  piperacillin/tazobactam IVPB.. 3.375 every 8 hours  vancomycin  IVPB 1000 every 12 hours      ANTIMICROBIALS (past 90 days):  MEDICATIONS  (STANDING):    clindamycin IVPB   100 mL/Hr IV Intermittent (10-12-24 @ 08:00)    clindamycin IVPB   100 mL/Hr IV Intermittent (10-12-24 @ 18:16)    clindamycin IVPB   100 mL/Hr IV Intermittent (10-14-24 @ 06:10)   100 mL/Hr IV Intermittent (10-13-24 @ 22:03)   100 mL/Hr IV Intermittent (10-13-24 @ 14:32)   100 mL/Hr IV Intermittent (10-13-24 @ 09:44)   100 mL/Hr IV Intermittent (10-13-24 @ 01:10)    piperacillin/tazobactam IVPB..   25 mL/Hr IV Intermittent (10-14-24 @ 10:40)   25 mL/Hr IV Intermittent (10-14-24 @ 01:53)   25 mL/Hr IV Intermittent (10-13-24 @ 18:45)   25 mL/Hr IV Intermittent (10-13-24 @ 10:25)   25 mL/Hr IV Intermittent (10-13-24 @ 01:10)   25 mL/Hr IV Intermittent (10-12-24 @ 18:19)    piperacillin/tazobactam IVPB...   200 mL/Hr IV Intermittent (10-12-24 @ 03:45)    vancomycin  IVPB   250 mL/Hr IV Intermittent (10-14-24 @ 09:46)   250 mL/Hr IV Intermittent (10-13-24 @ 22:54)   250 mL/Hr IV Intermittent (10-13-24 @ 08:26)   250 mL/Hr IV Intermittent (10-12-24 @ 20:13)    vancomycin  IVPB.   250 mL/Hr IV Intermittent (10-12-24 @ 08:06)        OTHER MEDS:   MEDICATIONS  (STANDING):  acetaminophen     Tablet .. 1000 every 6 hours  aspirin  chewable 81 daily  heparin   Injectable 5000 every 8 hours  metoprolol succinate ER 50 daily  oxyCODONE    IR 5 every 4 hours PRN  oxyCODONE    IR 2.5 every 4 hours PRN  rosuvastatin 10 at bedtime      VITALS:  Vital Signs Last 24 Hrs  T(F): 97.7 (10-14-24 @ 10:00), Max: 98.3 (10-12-24 @ 04:48)    Vital Signs Last 24 Hrs  HR: 78 (10-14-24 @ 10:00) (69 - 78)  BP: 124/76 (10-14-24 @ 10:00) (89/53 - 129/81)  RR: 17 (10-14-24 @ 10:00)  SpO2: 97% (10-14-24 @ 10:00) (93% - 100%)  Wt(kg): --    EXAM:  pending full examination      Labs:                        8.1    5.94  )-----------( 175      ( 14 Oct 2024 05:51 )             26.8     10-14    132[L]  |  98  |  11  ----------------------------<  102[H]  3.8   |  25  |  0.62    Ca    8.1[L]      14 Oct 2024 05:51  Phos  2.8     10-14  Mg     1.90     10-14        WBC Trend:  WBC Count: 5.94 (10-14-24 @ 05:51)  WBC Count: 5.86 (10-13-24 @ 08:30)  WBC Count: 5.60 (10-13-24 @ 01:26)  WBC Count: 6.46 (10-12-24 @ 16:00)      Auto Neutrophil #: 3.97 K/uL (10-12-24 @ 02:28)  Auto Neutrophil #: 15.34 K/uL (08-31-24 @ 05:48)  Auto Neutrophil #: 20.29 K/uL (08-30-24 @ 20:49)  Band Neutrophils %: 9.6 % (08-30-24 @ 20:49)      Creatine Trend:  Creatinine: 0.62 (10-14)  Creatinine: 0.64 (10-13)  Creatinine: 0.74 (10-12)  Creatinine: 0.62 (10-12)      Liver Biochemical Testing Trend:  Alanine Aminotransferase (ALT/SGPT): <5 (10-12)  Alanine Aminotransferase (ALT/SGPT): 13 (08-31)  Alanine Aminotransferase (ALT/SGPT): 15 (08-30)  Aspartate Aminotransferase (AST/SGOT): 32 (10-12-24 @ 02:28)  Aspartate Aminotransferase (AST/SGOT): 59 (08-31-24 @ 05:48)  Aspartate Aminotransferase (AST/SGOT): 71 (08-30-24 @ 20:49)  Bilirubin Total: 0.6 (10-12)  Bilirubin Total: 0.6 (08-31)  Bilirubin Total: 1.0 (08-30)      Trend LDH      Auto Eosinophil %: 0.5 % (10-12-24 @ 02:28)      MICROBIOLOGY:  Vancomycin Level, Trough: 13.2 (10-13 @ 21:20)        Culture - Fungal, Tissue (collected 12 Oct 2024 13:58)  Source: Tissue  Preliminary Report:    No growth    Culture - Acid Fast - Tissue w/Smear (collected 12 Oct 2024 13:58)  Source: Tissue    Culture - Tissue with Gram Stain (collected 12 Oct 2024 13:58)  Source: Tissue  Preliminary Report:    Rare Escherichia coli    Culture - Blood (collected 12 Oct 2024 02:10)  Source: .Blood BLOOD  Preliminary Report:    No growth at 48 Hours    Culture - Blood (collected 12 Oct 2024 01:55)  Source: .Blood BLOOD  Preliminary Report:    No growth at 48 Hours    Culture - Blood (collected 30 Aug 2024 20:40)  Source: .Blood Blood-Peripheral  Final Report:    Growth in anaerobic bottle: Bacteroides fragilis "Susceptibilities not    performed"    Direct identification is available within approximately 3-5    hours either by Blood Panel Multiplexed PCR or Direct    MALDI-TOF. Details: [email or web address]  Organism: Blood Culture PCR  Organism: Blood Culture PCR    Sensitivities:      Method Type: PCR      -  Bacteroides fragilis: Detec    Culture - Blood (collected 30 Aug 2024 20:20)  Source: .Blood Blood-Peripheral  Final Report:    Growth in anaerobic bottle: Escherichia coli  Organism: Escherichia coli  Blood Culture PCR  Organism: Blood Culture PCR    Sensitivities:      Method Type: PCR      -  Escherichia coli: Detec  Organism: Escherichia coli    Sensitivities:      Method Type: JOHAN      -  Ampicillin: R >16 These ampicillin results predict results for amoxicillin      -  Ampicillin/Sulbactam: I 16/8      -  Aztreonam: S <=4      -  Cefazolin: R >16      -  Cefepime: S <=2      -  Cefoxitin: I 16      -  Ceftriaxone: S <=1      -  Ciprofloxacin: R >2      -  Ertapenem: S <=0.5      -  Gentamicin: S <=2      -  Imipenem: S <=1      -  Levofloxacin: R >4      -  Meropenem: S <=1      -  Piperacillin/Tazobactam: S <=8      -  Tobramycin: S <=2      -  Trimethoprim/Sulfamethoxazole: R >2/38                                            Troponin T, High Sensitivity Result: 55 (10-14)  Troponin T, High Sensitivity Result: 72 (10-13)  Troponin T, High Sensitivity Result: 81 (10-12)  Troponin T, High Sensitivity Result: 63 (10-12)  Troponin T, High Sensitivity Result: 62 (10-12)    Blood Gas Venous - Lactate: 1.6 (10-12 @ 16:00)  Blood Gas Venous - Lactate: 2.5 (10-12 @ 02:28)        RADIOLOGY:  imaging below personally reviewed   "Hx obtained from chart review as patient is a poor historian; declines to answers most questions and physical exam. Patient is a 79 year old male with PMH of obstructive CAD, locally advanced rectal cancer s/p neoadjuvant therapy with chemoRT (completed 11/2022), consolidation CAPOX (2/2023), brachytherapy (3/2024), afib, presenting for generalized weakness, chest discomfort, rectal pain. Pt was here at [facility] in 8/30 for fatigue, weakness, and falls, found to have perforated rectal mass with perirectal collection, 2.5 x 1.5 cm. Partially imaged abscess in the perineum, extending into the root of the penis, measures at least 7.4 x 2.3 cm.  There are additional foci of fluid and gas tracking along the penile shaft. Seen by Dr. Santoro at that time, offered I&D and diverting colostomy but left and went [place] [facility] [place], and [place] [facility] [place] s/p lap diverting end sigmoid colostomy with cystoscopy and naranjo placement across defect and perineal/scrotal drainage by Colorectal Surgery and Urology 8/30. He presented to [facility] 1 day after discharge from [facility] complaining of dizziness and malaise."    In the ER:   Vitals: afebrile, tachycardic.   Labs: CBC without leukocytosis. U/A with large LE and 58 WBCs. 10/12 blood cultures x 2 NGTD. TSH elevated to 23.86. CMP unremarkable. ProBNP 31,975. Troponin 62.   Imaging:   -CXR: Small bilateral pleural effusions.  -CT abdomen pelvis: Ill-defined fluid and gas collection in the scrotum. Fluid and gas is also present in the perirectal space and probably perineum. 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 Moderate left and small right pleural effusions with adjacent compressive atelectasis. Bibasilar pulmonary nodules compatible with metastatic disease. There is bilobed infrarenal abdominal aortic aneurysm measuring up to 5.1 cm. 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 Bilateral indeterminant renal lesions. Consider renal ultrasound or abdominal MRI for further evaluation.    Colorectal surgery consulted- no acute surgical intervention. Urology consulted s/p Debridement of necrotic skin and soft tissue from genitalia and perineum on 10/12 with placement of penrose drains x 3.     10/12 op cultures with tissue cultures growing rare e. coli. Suceptibilites pending. Of note, 8/30/23 blood cultures grew e.coli senstivie to Zosyn.     ROS limited at this time given patient clinical status     Abx:   Clindamycin (10/12-)   Zosyn (10/12-)   Vancomycin (10/12-)            PAST MEDICAL & SURGICAL HISTORY:  CAD s/p cardiac stent    HTN    rectal cancer, s/p chemo at [facility]       MEDICATIONS  (STANDING):  piperacillin/tazobactam IVPB.- 3.375 Gram(s) IV Intermittent once  piperacillin/tazobactam IVPB.. 3.375 Gram(s) IV Intermittent every 8 hours    MEDICATIONS  (PRN):      FAMILY HISTORY:  -unable to obtain at this time     Allergies    No Known Allergies    Intolerances        SOCIAL HISTORY:active smoker    REVIEW OF SYSTEMS:   Otherwise negative as stated in HPI       (12 Oct 2024 12:03)       REVIEW OF SYSTEMS  Limited secondary to patient's clinical condition.     prior hospital charts reviewed [V]  primary team notes reviewed [V]  other consultant notes reviewed [V]    PAST MEDICAL & SURGICAL HISTORY:  No significant past surgical history          SOCIAL HISTORY:  Denied smoking/vaping/alcohol/recreational drug use    FAMILY HISTORY:  -unable to obtain       Allergies  No Known Allergies        ANTIMICROBIALS:  clindamycin IVPB    clindamycin IVPB 900 every 8 hours  piperacillin/tazobactam IVPB.. 3.375 every 8 hours  vancomycin  IVPB 1000 every 12 hours      ANTIMICROBIALS (past 90 days):  MEDICATIONS  (STANDING):    clindamycin IVPB   100 mL/Hr IV Intermittent (10-12-24 @ 08:00)    clindamycin IVPB   100 mL/Hr IV Intermittent (10-12-24 @ 18:16)    clindamycin IVPB   100 mL/Hr IV Intermittent (10-14-24 @ 06:10)   100 mL/Hr IV Intermittent (10-13-24 @ 22:03)   100 mL/Hr IV Intermittent (10-13-24 @ 14:32)   100 mL/Hr IV Intermittent (10-13-24 @ 09:44)   100 mL/Hr IV Intermittent (10-13-24 @ 01:10)    piperacillin/tazobactam IVPB..   25 mL/Hr IV Intermittent (10-14-24 @ 10:40)   25 mL/Hr IV Intermittent (10-14-24 @ 01:53)   25 mL/Hr IV Intermittent (10-13-24 @ 18:45)   25 mL/Hr IV Intermittent (10-13-24 @ 10:25)   25 mL/Hr IV Intermittent (10-13-24 @ 01:10)   25 mL/Hr IV Intermittent (10-12-24 @ 18:19)    piperacillin/tazobactam IVPB...   200 mL/Hr IV Intermittent (10-12-24 @ 03:45)    vancomycin  IVPB   250 mL/Hr IV Intermittent (10-14-24 @ 09:46)   250 mL/Hr IV Intermittent (10-13-24 @ 22:54)   250 mL/Hr IV Intermittent (10-13-24 @ 08:26)   250 mL/Hr IV Intermittent (10-12-24 @ 20:13)    vancomycin  IVPB.   250 mL/Hr IV Intermittent (10-12-24 @ 08:06)        OTHER MEDS:   MEDICATIONS  (STANDING):  acetaminophen     Tablet .. 1000 every 6 hours  aspirin  chewable 81 daily  heparin   Injectable 5000 every 8 hours  metoprolol succinate ER 50 daily  oxyCODONE    IR 5 every 4 hours PRN  oxyCODONE    IR 2.5 every 4 hours PRN  rosuvastatin 10 at bedtime      VITALS:  Vital Signs Last 24 Hrs  T(F): 97.7 (10-14-24 @ 10:00), Max: 98.3 (10-12-24 @ 04:48)    Vital Signs Last 24 Hrs  HR: 78 (10-14-24 @ 10:00) (69 - 78)  BP: 124/76 (10-14-24 @ 10:00) (89/53 - 129/81)  RR: 17 (10-14-24 @ 10:00)  SpO2: 97% (10-14-24 @ 10:00) (93% - 100%)  Wt(kg): --    EXAM:  General: Patient appears comfortable, no acute distress but intermittently declining physical exam   HEENT: NCAT, anicteric sclera  CV: +S1/S2, RRR, no M/R/G  Lungs: +decreased breath sounds at the bases bilaterally   Abd:  BS4+, Soft, NTND, no guarding  : able to only visualize superior aspect of wound as patient not amenable to exam at this time. Suprapubic region with clean open wound with subcutaneous tissue noted with overlying packing- no superimposed cellulitis. +scrotal region with penrose drains in place with surrounding erythema but unable to inspect further   Neuro: AAOx 1. No focal deficits noted.   Ext: No cyanosis. +2+ pitting edema of b/l lower extremities   Msk: freely moving upper and lower extremities   Skin: sacral wound not visualized     Labs:                        8.1    5.94  )-----------( 175      ( 14 Oct 2024 05:51 )             26.8     10-14    132[L]  |  98  |  11  ----------------------------<  102[H]  3.8   |  25  |  0.62    Ca    8.1[L]      14 Oct 2024 05:51  Phos  2.8     10-14  Mg     1.90     10-14        WBC Trend:  WBC Count: 5.94 (10-14-24 @ 05:51)  WBC Count: 5.86 (10-13-24 @ 08:30)  WBC Count: 5.60 (10-13-24 @ 01:26)  WBC Count: 6.46 (10-12-24 @ 16:00)      Auto Neutrophil #: 3.97 K/uL (10-12-24 @ 02:28)  Auto Neutrophil #: 15.34 K/uL (08-31-24 @ 05:48)  Auto Neutrophil #: 20.29 K/uL (08-30-24 @ 20:49)  Band Neutrophils %: 9.6 % (08-30-24 @ 20:49)      Creatine Trend:  Creatinine: 0.62 (10-14)  Creatinine: 0.64 (10-13)  Creatinine: 0.74 (10-12)  Creatinine: 0.62 (10-12)      Liver Biochemical Testing Trend:  Alanine Aminotransferase (ALT/SGPT): <5 (10-12)  Alanine Aminotransferase (ALT/SGPT): 13 (08-31)  Alanine Aminotransferase (ALT/SGPT): 15 (08-30)  Aspartate Aminotransferase (AST/SGOT): 32 (10-12-24 @ 02:28)  Aspartate Aminotransferase (AST/SGOT): 59 (08-31-24 @ 05:48)  Aspartate Aminotransferase (AST/SGOT): 71 (08-30-24 @ 20:49)  Bilirubin Total: 0.6 (10-12)  Bilirubin Total: 0.6 (08-31)  Bilirubin Total: 1.0 (08-30)      Trend LDH      Auto Eosinophil %: 0.5 % (10-12-24 @ 02:28)      MICROBIOLOGY:  Vancomycin Level, Trough: 13.2 (10-13 @ 21:20)        Culture - Fungal, Tissue (collected 12 Oct 2024 13:58)  Source: Tissue  Preliminary Report:    No growth    Culture - Acid Fast - Tissue w/Smear (collected 12 Oct 2024 13:58)  Source: Tissue    Culture - Tissue with Gram Stain (collected 12 Oct 2024 13:58)  Source: Tissue  Preliminary Report:    Rare Escherichia coli    Culture - Blood (collected 12 Oct 2024 02:10)  Source: .Blood BLOOD  Preliminary Report:    No growth at 48 Hours    Culture - Blood (collected 12 Oct 2024 01:55)  Source: .Blood BLOOD  Preliminary Report:    No growth at 48 Hours    Culture - Blood (collected 30 Aug 2024 20:40)  Source: .Blood Blood-Peripheral  Final Report:    Growth in anaerobic bottle: Bacteroides fragilis "Susceptibilities not    performed"    Direct identification is available within approximately 3-5    hours either by Blood Panel Multiplexed PCR or Direct    MALDI-TOF. Details: [email or web address]  Organism: Blood Culture PCR  Organism: Blood Culture PCR    Sensitivities:      Method Type: PCR      -  Bacteroides fragilis: Detec    Culture - Blood (collected 30 Aug 2024 20:20)  Source: .Blood Blood-Peripheral  Final Report:    Growth in anaerobic bottle: Escherichia coli  Organism: Escherichia coli  Blood Culture PCR  Organism: Blood Culture PCR    Sensitivities:      Method Type: PCR      -  Escherichia coli: Detec  Organism: Escherichia coli    Sensitivities:      Method Type: JOHAN      -  Ampicillin: R >16 These ampicillin results predict results for amoxicillin      -  Ampicillin/Sulbactam: I 16/8      -  Aztreonam: S <=4      -  Cefazolin: R >16      -  Cefepime: S <=2      -  Cefoxitin: I 16      -  Ceftriaxone: S <=1      -  Ciprofloxacin: R >2      -  Ertapenem: S <=0.5      -  Gentamicin: S <=2      -  Imipenem: S <=1      -  Levofloxacin: R >4      -  Meropenem: S <=1      -  Piperacillin/Tazobactam: S <=8      -  Tobramycin: S <=2      -  Trimethoprim/Sulfamethoxazole: R >2/38                                            Troponin T, High Sensitivity Result: 55 (10-14)  Troponin T, High Sensitivity Result: 72 (10-13)  Troponin T, High Sensitivity Result: 81 (10-12)  Troponin T, High Sensitivity Result: 63 (10-12)  Troponin T, High Sensitivity Result: 62 (10-12)    Blood Gas Venous - Lactate: 1.6 (10-12 @ 16:00)  Blood Gas Venous - Lactate: 2.5 (10-12 @ 02:28)        RADIOLOGY:    ACC: [id number] EXAM:  CT ABDOMEN AND PELVIS   ORDERED BY: SANTIAGO BRICEÑO     PROCEDURE DATE:  10/12/2024          INTERPRETATION:  CLINICAL INFORMATION: Colorectal cancer, history of open   wound and abscess    COMPARISON: 8/30/2024    CONTRAST/COMPLICATIONS:  IV Contrast: None  Oral Contrast: None  Complications: None    PROCEDURE:  CT of the Abdomen and Pelvis was performed.  Sagittal and coronal reformats were performed.    FINDINGS:  LOWER CHEST: Coronary artery calcifications versus stents. Moderate left   and small right pleural effusions with associated atelectasis. Lower lobe   nodules measuring up to 0.8 cm in the left lower lobe. Centrilobular and   paraseptal emphysema. Atelectasis or scarring in the right middle lobe.    LIVER: Lefthepatic lobe cysts. Scattered calcified granulomas.  BILE DUCTS: Normal caliber.  GALLBLADDER: Within normal limits.  SPLEEN: Within normal limits.  PANCREAS: Within normal limits.  ADRENALS: 3.2 cm indeterminate left adrenal nodule.  KIDNEYS/URETERS: Bilateral renal cysts as well as additional   indeterminate lesions which may represent hemorrhagic or proteinaceous   cysts.    BLADDER: Naranjo catheter.  REPRODUCTIVE ORGANS: Prostate gland is enlarged. Ill-defined fluid and   gas within the scrotum.    BOWEL: Surgical clips in the perirectal space. Diverticulosis of the   colon. No evidence of acute diverticulitis. Diverting left lower quadrant   ostomy. No bowel obstruction. Appendix is normal. Fluid and gas is also   present in the perirectal space and probably perineum.  PERITONEUM/RETROPERITONEUM: Within normal limits.  VESSELS: Atherosclerotic disease of the abdominal aorta. There is bilobed   infrarenal abdominal aortic aneurysm measuring up to 5.1 cm. 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  LYMPH NODES: No lymphadenopathy.  ABDOMINAL WALL: Diffuse anasarca. Left lower quadrant diverting colostomy  BONES: Degenerative changes.    IMPRESSION:  Ill-defined fluid and gas collection in the scrotum. Fluid and gas is   also present in the perirectal space and probably perineum. 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    Moderate left and small right pleural effusions with adjacent compressive   atelectasis. Bibasilar pulmonary nodules compatible with metastatic   disease.    There is bilobed infrarenal abdominal aortic aneurysm measuring up to 5.1   cm. 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    Bilateral indeterminant renal lesions. Consider renal ultrasound or   abdominal MRI for further evaluation.    Additional findings as above.    Critical findings were discussed with Dr. Briceño  by Dr. Mcclain on   10/12/2024 6:22:30 AM.    --- End of Report ---            DESEAN MCNULTY MD; Attending Radiologist  This document has been electronically signed. Oct 12 2024 10:38AM   "Hx obtained from chart review as patient is a poor historian; declines to answers most questions and physical exam. Patient is a 79 year old male with PMH of obstructive CAD, locally advanced rectal cancer s/p neoadjuvant therapy with chemoRT (completed 11/2022), consolidation CAPOX (2/2023), brachytherapy (3/2024), afib, presenting for generalized weakness, chest discomfort, rectal pain. Pt was here at [facility] in 8/30 for fatigue, weakness, and falls, found to have perforated rectal mass with perirectal collection, 2.5 x 1.5 cm. Partially imaged abscess in the perineum, extending into the root of the penis, measures at least 7.4 x 2.3 cm.  There are additional foci of fluid and gas tracking along the penile shaft. Seen by Dr. Santoro at that time, offered I&D and diverting colostomy but left and went to [facility], and at [facility] s/p lap diverting end sigmoid colostomy with cystoscopy and naranjo placement across defect and perineal/scrotal drainage by Colorectal Surgery and Urology 8/30. He presented to [facility] 1 day after discharge from [facility] [place] complaining of dizziness and malaise."    In the ER:   Vitals: afebrile, tachycardic.   Labs: CBC without leukocytosis. U/A with large LE and 58 WBCs. 10/12 blood cultures x 2 NGTD. TSH elevated to 23.86. CMP unremarkable. ProBNP 31,975. Troponin 62.   Imaging:   -CXR: Small bilateral pleural effusions.  -CT abdomen pelvis: Ill-defined fluid and gas collection in the scrotum. Fluid and gas is also present in the perirectal space and probably perineum. 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 Moderate left and small right pleural effusions with adjacent compressive atelectasis. Bibasilar pulmonary nodules compatible with metastatic disease. There is bilobed infrarenal abdominal aortic aneurysm measuring up to 5.1 cm. 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 Bilateral indeterminant renal lesions. Consider renal ultrasound or abdominal MRI for further evaluation.    Colorectal surgery consulted- no acute surgical intervention. Urology consulted s/p Debridement of necrotic skin and soft tissue from genitalia and perineum on 10/12 with placement of penrose drains x 3.     10/12 op cultures with tissue cultures growing rare e. coli. Suceptibilites pending. Of note, 8/30/23 blood cultures grew bacteroides and e.coli senstivie to Zosyn.     ROS limited at this time given patient clinical status     Abx:   Clindamycin (10/12-)   Zosyn (10/12-)   Vancomycin (10/12-)            PAST MEDICAL & SURGICAL HISTORY:  CAD s/p cardiac stent    HTN    rectal cancer, s/p chemo at [facility] [place]       MEDICATIONS  (STANDING):  piperacillin/tazobactam IVPB.- 3.375 Gram(s) IV Intermittent once  piperacillin/tazobactam IVPB.. 3.375 Gram(s) IV Intermittent every 8 hours    MEDICATIONS  (PRN):      FAMILY HISTORY:  -unable to obtain at this time     Allergies    No Known Allergies    Intolerances        SOCIAL HISTORY:active smoker    REVIEW OF SYSTEMS:   Otherwise negative as stated in HPI       (12 Oct 2024 12:03)       REVIEW OF SYSTEMS  Limited secondary to patient's clinical condition.     prior hospital charts reviewed [V]  primary team notes reviewed [V]  other consultant notes reviewed [V]    PAST MEDICAL & SURGICAL HISTORY:  No significant past surgical history          SOCIAL HISTORY:  Denied smoking/vaping/alcohol/recreational drug use    FAMILY HISTORY:  -unable to obtain       Allergies  No Known Allergies        ANTIMICROBIALS:  clindamycin IVPB    clindamycin IVPB 900 every 8 hours  piperacillin/tazobactam IVPB.. 3.375 every 8 hours  vancomycin  IVPB 1000 every 12 hours      ANTIMICROBIALS (past 90 days):  MEDICATIONS  (STANDING):    clindamycin IVPB   100 mL/Hr IV Intermittent (10-12-24 @ 08:00)    clindamycin IVPB   100 mL/Hr IV Intermittent (10-12-24 @ 18:16)    clindamycin IVPB   100 mL/Hr IV Intermittent (10-14-24 @ 06:10)   100 mL/Hr IV Intermittent (10-13-24 @ 22:03)   100 mL/Hr IV Intermittent (10-13-24 @ 14:32)   100 mL/Hr IV Intermittent (10-13-24 @ 09:44)   100 mL/Hr IV Intermittent (10-13-24 @ 01:10)    piperacillin/tazobactam IVPB..   25 mL/Hr IV Intermittent (10-14-24 @ 10:40)   25 mL/Hr IV Intermittent (10-14-24 @ 01:53)   25 mL/Hr IV Intermittent (10-13-24 @ 18:45)   25 mL/Hr IV Intermittent (10-13-24 @ 10:25)   25 mL/Hr IV Intermittent (10-13-24 @ 01:10)   25 mL/Hr IV Intermittent (10-12-24 @ 18:19)    piperacillin/tazobactam IVPB...   200 mL/Hr IV Intermittent (10-12-24 @ 03:45)    vancomycin  IVPB   250 mL/Hr IV Intermittent (10-14-24 @ 09:46)   250 mL/Hr IV Intermittent (10-13-24 @ 22:54)   250 mL/Hr IV Intermittent (10-13-24 @ 08:26)   250 mL/Hr IV Intermittent (10-12-24 @ 20:13)    vancomycin  IVPB.   250 mL/Hr IV Intermittent (10-12-24 @ 08:06)        OTHER MEDS:   MEDICATIONS  (STANDING):  acetaminophen     Tablet .. 1000 every 6 hours  aspirin  chewable 81 daily  heparin   Injectable 5000 every 8 hours  metoprolol succinate ER 50 daily  oxyCODONE    IR 5 every 4 hours PRN  oxyCODONE    IR 2.5 every 4 hours PRN  rosuvastatin 10 at bedtime      VITALS:  Vital Signs Last 24 Hrs  T(F): 97.7 (10-14-24 @ 10:00), Max: 98.3 (10-12-24 @ 04:48)    Vital Signs Last 24 Hrs  HR: 78 (10-14-24 @ 10:00) (69 - 78)  BP: 124/76 (10-14-24 @ 10:00) (89/53 - 129/81)  RR: 17 (10-14-24 @ 10:00)  SpO2: 97% (10-14-24 @ 10:00) (93% - 100%)  Wt(kg): --    EXAM:  General: Patient appears comfortable, no acute distress but intermittently declining physical exam   HEENT: NCAT, anicteric sclera  CV: +S1/S2, RRR, no M/R/G  Lungs: +decreased breath sounds at the bases bilaterally   Abd:  +colostomy bag in place with pink stoma visualized. BS4+, Soft, NTND, no guarding  : able to only visualize superior aspect of wound as patient not amenable to exam at this time. Suprapubic region with clean open wound with subcutaneous tissue noted with overlying packing- no superimposed cellulitis. +scrotal region with penrose drains in place with surrounding erythema but unable to inspect further   Neuro: AAOx 1. No focal deficits noted.   Ext: No cyanosis. +2+ pitting edema of b/l lower extremities   Msk: freely moving upper and lower extremities   Skin: sacral wound not visualized     Labs:                        8.1    5.94  )-----------( 175      ( 14 Oct 2024 05:51 )             26.8     10-14    132[L]  |  98  |  11  ----------------------------<  102[H]  3.8   |  25  |  0.62    Ca    8.1[L]      14 Oct 2024 05:51  Phos  2.8     10-14  Mg     1.90     10-14        WBC Trend:  WBC Count: 5.94 (10-14-24 @ 05:51)  WBC Count: 5.86 (10-13-24 @ 08:30)  WBC Count: 5.60 (10-13-24 @ 01:26)  WBC Count: 6.46 (10-12-24 @ 16:00)      Auto Neutrophil #: 3.97 K/uL (10-12-24 @ 02:28)  Auto Neutrophil #: 15.34 K/uL (08-31-24 @ 05:48)  Auto Neutrophil #: 20.29 K/uL (08-30-24 @ 20:49)  Band Neutrophils %: 9.6 % (08-30-24 @ 20:49)      Creatine Trend:  Creatinine: 0.62 (10-14)  Creatinine: 0.64 (10-13)  Creatinine: 0.74 (10-12)  Creatinine: 0.62 (10-12)      Liver Biochemical Testing Trend:  Alanine Aminotransferase (ALT/SGPT): <5 (10-12)  Alanine Aminotransferase (ALT/SGPT): 13 (08-31)  Alanine Aminotransferase (ALT/SGPT): 15 (08-30)  Aspartate Aminotransferase (AST/SGOT): 32 (10-12-24 @ 02:28)  Aspartate Aminotransferase (AST/SGOT): 59 (08-31-24 @ 05:48)  Aspartate Aminotransferase (AST/SGOT): 71 (08-30-24 @ 20:49)  Bilirubin Total: 0.6 (10-12)  Bilirubin Total: 0.6 (08-31)  Bilirubin Total: 1.0 (08-30)      Trend LDH      Auto Eosinophil %: 0.5 % (10-12-24 @ 02:28)      MICROBIOLOGY:  Vancomycin Level, Trough: 13.2 (10-13 @ 21:20)        Culture - Fungal, Tissue (collected 12 Oct 2024 13:58)  Source: Tissue  Preliminary Report:    No growth    Culture - Acid Fast - Tissue w/Smear (collected 12 Oct 2024 13:58)  Source: Tissue    Culture - Tissue with Gram Stain (collected 12 Oct 2024 13:58)  Source: Tissue  Preliminary Report:    Rare Escherichia coli    Culture - Blood (collected 12 Oct 2024 02:10)  Source: .Blood BLOOD  Preliminary Report:    No growth at 48 Hours    Culture - Blood (collected 12 Oct 2024 01:55)  Source: .Blood BLOOD  Preliminary Report:    No growth at 48 Hours    Culture - Blood (collected 30 Aug 2024 20:40)  Source: .Blood Blood-Peripheral  Final Report:    Growth in anaerobic bottle: Bacteroides fragilis "Susceptibilities not    performed"    Direct identification is available within approximately 3-5    hours either by Blood Panel Multiplexed PCR or Direct    MALDI-TOF. Details: [email or web address]  Organism: Blood Culture PCR  Organism: Blood Culture PCR    Sensitivities:      Method Type: PCR      -  Bacteroides fragilis: Detec    Culture - Blood (collected 30 Aug 2024 20:20)  Source: .Blood Blood-Peripheral  Final Report:    Growth in anaerobic bottle: Escherichia coli  Organism: Escherichia coli  Blood Culture PCR  Organism: Blood Culture PCR    Sensitivities:      Method Type: PCR      -  Escherichia coli: Detec  Organism: Escherichia coli    Sensitivities:      Method Type: JOHAN      -  Ampicillin: R >16 These ampicillin results predict results for amoxicillin      -  Ampicillin/Sulbactam: I 16/8      -  Aztreonam: S <=4      -  Cefazolin: R >16      -  Cefepime: S <=2      -  Cefoxitin: I 16      -  Ceftriaxone: S <=1      -  Ciprofloxacin: R >2      -  Ertapenem: S <=0.5      -  Gentamicin: S <=2      -  Imipenem: S <=1      -  Levofloxacin: R >4      -  Meropenem: S <=1      -  Piperacillin/Tazobactam: S <=8      -  Tobramycin: S <=2      -  Trimethoprim/Sulfamethoxazole: R >2/38                                            Troponin T, High Sensitivity Result: 55 (10-14)  Troponin T, High Sensitivity Result: 72 (10-13)  Troponin T, High Sensitivity Result: 81 (10-12)  Troponin T, High Sensitivity Result: 63 (10-12)  Troponin T, High Sensitivity Result: 62 (10-12)    Blood Gas Venous - Lactate: 1.6 (10-12 @ 16:00)  Blood Gas Venous - Lactate: 2.5 (10-12 @ 02:28)        RADIOLOGY:    ACC: [id number] EXAM:  CT ABDOMEN AND PELVIS   ORDERED BY: SANTIAGO BRICEÑO     PROCEDURE DATE:  10/12/2024          INTERPRETATION:  CLINICAL INFORMATION: Colorectal cancer, history of open   wound and abscess    COMPARISON: 8/30/2024    CONTRAST/COMPLICATIONS:  IV Contrast: None  Oral Contrast: None  Complications: None    PROCEDURE:  CT of the Abdomen and Pelvis was performed.  Sagittal and coronal reformats were performed.    FINDINGS:  LOWER CHEST: Coronary artery calcifications versus stents. Moderate left   and small right pleural effusions with associated atelectasis. Lower lobe   nodules measuring up to 0.8 cm in the left lower lobe. Centrilobular and   paraseptal emphysema. Atelectasis or scarring in the right middle lobe.    LIVER: Lefthepatic lobe cysts. Scattered calcified granulomas.  BILE DUCTS: Normal caliber.  GALLBLADDER: Within normal limits.  SPLEEN: Within normal limits.  PANCREAS: Within normal limits.  ADRENALS: 3.2 cm indeterminate left adrenal nodule.  KIDNEYS/URETERS: Bilateral renal cysts as well as additional   indeterminate lesions which may represent hemorrhagic or proteinaceous   cysts.    BLADDER: Naranjo catheter.  REPRODUCTIVE ORGANS: Prostate gland is enlarged. Ill-defined fluid and   gas within the scrotum.    BOWEL: Surgical clips in the perirectal space. Diverticulosis of the   colon. No evidence of acute diverticulitis. Diverting left lower quadrant   ostomy. No bowel obstruction. Appendix is normal. Fluid and gas is also   present in the perirectal space and probably perineum.  PERITONEUM/RETROPERITONEUM: Within normal limits.  VESSELS: Atherosclerotic disease of the abdominal aorta. There is bilobed   infrarenal abdominal aortic aneurysm measuring up to 5.1 cm. 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  LYMPH NODES: No lymphadenopathy.  ABDOMINAL WALL: Diffuse anasarca. Left lower quadrant diverting colostomy  BONES: Degenerative changes.    IMPRESSION:  Ill-defined fluid and gas collection in the scrotum. Fluid and gas is   also present in the perirectal space and probably perineum. 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    Moderate left and small right pleural effusions with adjacent compressive   atelectasis. Bibasilar pulmonary nodules compatible with metastatic   disease.    There is bilobed infrarenal abdominal aortic aneurysm measuring up to 5.1   cm. 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    Bilateral indeterminant renal lesions. Consider renal ultrasound or   abdominal MRI for further evaluation.    Additional findings as above.    Critical findings were discussed with Dr. Briceño  by Dr. Mcclain on   10/12/2024 6:22:30 AM.    --- End of Report ---            DESEAN MCNULTY MD; Attending Radiologist  This document has been electronically signed. Oct 12 2024 10:38AM

## 2024-10-14 NOTE — DIETITIAN INITIAL EVALUATION ADULT - ADD RECOMMEND
1) Monitor weights, PO intake/diet tolerance, skin integrity, pertinent labs.   2) PO intake to meet >75% estimated needs.

## 2024-10-14 NOTE — PHYSICAL THERAPY INITIAL EVALUATION ADULT - NSPTDISCHREC_GEN_A_CORE
to be determined, patient refusing all functional mobility at this time, please continue to follow PT notes as patient may be a rehab candidate to be determined, patient refusing all functional mobility at this time, please continue to follow PT notes and progress, as patient may be a rehab candidate

## 2024-10-14 NOTE — DIETITIAN INITIAL EVALUATION ADULT - ETIOLOGY
in setting of chronic illness - h/o rectal CA; rectal mass, s/p cp;pstp,y In setting of increased physiological demand  in setting of chronic illness - h/o rectal CA; rectal mass, s/p colostomy

## 2024-10-14 NOTE — PROGRESS NOTE ADULT - ASSESSMENT
80 yo M with history of CAD s/p stents in 2012 (ASA), locally advanced rectal cancer s/p neoadjuvant therapy with chemoRT (completed 11/2022), consolidation CAPOX (2/2023), brachytherapy (3/2024), presenting for generalized weakness, chest discomfort, rectal pain. Pt was here at [facility] in 8/30 for fatigue, weakness, and falls, found to have perforated rectal mass with perirectal collection, 2.5 x 1.5 cm with spread to infection in anorectal/perineal region with fluid/gas tracking along penile shaft, however pt left for MSK and s/p diverting end sigmoid colostomy, cystoscopy and naranjo placement across defect and perineal/scrotal drainage 8/30. [place] [facility] [place] day prior to presentation for perineal infections, treated with abx. Represents here for generalized weakness and dizziness, CTAP non-con on preliminary read indicating ill-defined fluid/gas in scrotum, perirectal space, and probably perineum. Exam demonstrates skin duskiness multiple genitourinary abscesses, induration, purulent drainage. 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    10/13: S/p debridement of genitalia and perineum in OR yesterday. In SICU for post op pressor requirements. Got 500 ccs overnight for hypovolemia on POCUS. Currently off pressors. On zosyn, clinda, vanc. U and bcx pending. Tissue cx negative. Wound packing changed at bedside, incision beds with adequate granulation tissue, healing well. Transferred to floor  10/14 - wounds clean; re-packed; penroses in place    Plan  -labs   -c/w naranjo  -c/w clinda, zosyn, vanc  -fu ucx/bcx  -BID wound dressing change

## 2024-10-14 NOTE — DIETITIAN INITIAL EVALUATION ADULT - NS FNS DIET ORDER
Diet, Regular:     Start Time: 20:00  Liquid Protein Supplement     Qty per Day:  3  Supplement Feeding Modality:  Oral  Ensure Plus High Protein Cans or Servings Per Day:  1       Frequency:  Two Times a day (10-14-24 @ 10:33)

## 2024-10-14 NOTE — PHYSICAL THERAPY INITIAL EVALUATION ADULT - PERTINENT HX OF CURRENT PROBLEM, REHAB EVAL
Patient is a 79 year old male with history locally advanced rectal cancer s/p neoadjuvant therapy with chemo radiation (completed 11/2022), presenting for generalized weakness, chest discomfort, rectal pain. Pt was here at [facility] in 8/30 for fatigue, weakness, and falls, found to have perforated rectal mass with perirectal collection, spread to infection in anorectal/perineal region with fluid/gas tracking along penile shaft, however pt left for MSK and s/p diverting end sigmoid colostomy, cystoscopy and naranjo placement across defect and perineal/scrotal drainage 8/30. [place] [facility] [place] day prior to presentation for perineal infections, treated with abx. Represents here for generalized weakness and dizziness, CTAP non-con on preliminary read indicating ill-defined fluid/gas in scrotum, perirectal space, and probably perineum. Colorectal surgery consulted intraoperatively for perirectal abscess vs. Tara's gangrene. Now POD #2 S/p debridement of genitalia and perineum. s/p scrotal I&D and debridement of perirectal abscess.

## 2024-10-14 NOTE — CONSULT NOTE ADULT - ASSESSMENT
Patient is a 79 year old male with PMH of obstructive CAD, locally advanced rectal cancer s/p neoadjuvant therapy with chemoRT (completed 11/2022), consolidation CAPOX (2/2023), brachytherapy (3/2024), afib, presenting for generalized weakness, chest discomfort, rectal pain. Pt was here at [facility] in 8/30 for fatigue, weakness, and falls, found to have perforated rectal mass with perirectal collection, 2.5 x 1.5 cm. Patient offered I&D and diverting colostomy at the time but left and went to [facility] for care, and at [facility] s/p lap diverting end sigmoid colostomy with cystoscopy and naranjo placement across defect and perineal/scrotal drainage by Colorectal Surgery and Urology 8/30. He presented to [facility] 1 day after discharge from [facility] complaining of dizziness and malaise.. Infectious workup here notable for CT abdominal imaging concerning for Tara's gangrene s/p Debridement of necrotic skin and soft tissue from genitalia and perineum on 10/12 with placement of penrose drains x 3. 10/12 op cultures with tissue cultures growing rare e. coli. Suceptibilites pending. Patient clinically stable since OR intervention without recurrent fevers and no leukocytosis    Abx:   Clindamycin (10/12-)   Zosyn (10/12-)   Vancomycin (10/12-)     #Tara's gangrene   #perirectal abscess   -stop Clindamycin and Vancomycin. continue with Zosyn 3.375 gm IVPB q8h pending susceptibilities   -f/u all culture data   -monitor WBC and fever curve     Case seen and discussed with Dr. Julian who agrees with assessment and plan. Note not final until attending addendum.

## 2024-10-14 NOTE — DIETITIAN NUTRITION RISK NOTIFICATION - TREATMENT: THE FOLLOWING DIET HAS BEEN RECOMMENDED
Diet, Regular:     Start Time: 20:00  Liquid Protein Supplement     Qty per Day:  3  Supplement Feeding Modality:  Oral  Ensure Plus High Protein Cans or Servings Per Day:  1       Frequency:  Two Times a day (10-14-24 @ 10:33) [Active]

## 2024-10-14 NOTE — DIETITIAN INITIAL EVALUATION ADULT - ORAL NUTRITION SUPPLEMENTS
Change supplement to Ensure Plus High Protein Therapeutic Shake 3x daily (1050kcal, 60g protein).

## 2024-10-14 NOTE — CONSULT NOTE ADULT - PROBLEM SELECTOR RECOMMENDATION 6
Patient noted with RLE pitting edema up to shin  - no erythema or calf tenderness  - also noted with elevated pBNP up to 31K on 10/12  - no previous values available for comparison  - patient currently appearing largely euvolemic on exam  - will check RLE DVT study  - recheck pBNP with next set of labs

## 2024-10-14 NOTE — CONSULT NOTE ADULT - ASSESSMENT
Assessment/Plan: 80 yo M with history of CAD s/p stents in 2012 (ASA), locally advanced rectal cancer s/p neoadjuvant therapy with chemoRT (completed 11/2022), consolidation CAPOX (2/2023), brachytherapy (3/2024), presenting for generalized weakness, chest discomfort, rectal pain. Pt was here at [facility] on 8/30 for fatigue, weakness, and falls, found to have perforated rectal mass with perirectal collection, 2.5 x 1.5 cm with spread to infection in anorectal/perineal region with fluid/gas tracking along penile shaft, however pt left for MSK. At [facility] underwent laparoscopic diverting end sigmoid colostomy, cystoscopy and naranjo placement across defect and perineal/scrotal drainage 8/30. At [facility] for several weeks for perineal infections, treated with Abx as well. Discharged day prior to presentation at [facility]. 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 Now POD 2 s/p debridement of genitalia and perineum in OR, scrotal I&D and debridement of perirectal abscess with Urology.    Wound Consult requested to assist w/ management of sacrum extending distally to sacral/gluteal cleft stage 4 pressure injury  -Does not connected to wounds of scrotal/perineum  -Wound base with moistening adherent eschar/slough, bone palpable in close proximity just beneath necrotic base.  -No sharp debridement indicated at this time; eschar adherent; no palpable drainable collections, no fluctuance, no induration, no crepitus.  -CT with no mention of sacral ulcer  -urine and stool contained, (+) colostomy and indwelling naranjo catheter.  -Topical recommendations: cleanse wound and periwound skin with NS, pat dry. Apply Liquid barrier film to periwound skin. Apply medihoney gel to wound base, pack sacral/gluteal cleft with Aquacel hydrofiber ribbon. Cover with silicone foam with border. Change daily and prn if soiled/compromised.  -Continue to offload pressure; low airloss support surface, turn and position per protocol with use of fluidized positioning devices, continue moisture management with use of single breathable incontinence pad, continue to offload heels with complete cair boots.  -Upon discharge patient should qualify for low airloss support surface; case management/social work to please follow up when medically appropriate.  -Nutritional consult for optimization in patient with healing full thickness surgical wounds and stage 4 pressure injury.  -Appreciate Urology recommendations/management of surgical wounds to genitalia perineum, scrotum.  -Consider Ostomy consult if needed for Sigmoid Colostomy; pouching recommendations.    Upon discharge follow up at outpatient [facility]. [address] [place] [address]. [phone].    Patient seen with Dr. Marcum today. Findings and plan discussed with patient, primary RN, and Urology PA JAROCHO Jones.   All questions and concerns addressed to meet patient's satisfaction.  Will continue to follow periodically while inpatient, please reconsult earlier as needed.  Remainder of care per primary team.  Thank you.    AMRITA Dooley, CWN (MS Teams)    If after 4PM or before 7:30AM on Mon-Friday or weekend/holiday please contact general surgery for urgent matters.   Team A- [id number]   Team B- [id number]  For non-urgent matters e-mail [email or web address]    We spent  75 minutes face to face with this patient of which more than 50% of the time was spent counseling & coordinating care of this pt Assessment/Plan: 78 yo M with history of CAD s/p stents in 2012 (ASA), locally advanced rectal cancer s/p neoadjuvant therapy with chemoRT (completed 11/2022), consolidation CAPOX (2/2023), brachytherapy (3/2024), presenting for generalized weakness, chest discomfort, rectal pain. Pt was here at [facility] on 8/30 for fatigue, weakness, and falls, found to have perforated rectal mass with perirectal collection, 2.5 x 1.5 cm with spread to infection in anorectal/perineal region with fluid/gas tracking along penile shaft, however pt left for MSK. At [facility] underwent laparoscopic diverting end sigmoid colostomy, cystoscopy and naranjo placement across defect and perineal/scrotal drainage 8/30. At [facility] for several weeks for perineal infections, treated with Abx as well. Discharged day prior to presentation at [facility]. 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 Now POD 2 s/p debridement of genitalia and perineum in OR, scrotal I&D and debridement of perirectal abscess with Urology.    Wound Consult requested to assist w/ management of sacrum extending distally to sacral/gluteal cleft stage 4 pressure injury  -Does not connected to wounds of scrotal/perineum  -Wound base with moistening adherent eschar/slough, bone palpable in close proximity just beneath necrotic base.  -No sharp debridement indicated at this time; eschar adherent; no palpable drainable collections, no fluctuance, no induration, no crepitus.  -CT with no mention of sacral ulcer  -urine and stool contained, (+) colostomy and indwelling naranjo catheter.  -Topical recommendations: cleanse wound and periwound skin with NS, pat dry. Apply Liquid barrier film to periwound skin. Apply medihoney gel to wound base, pack sacral/gluteal cleft with Aquacel hydrofiber ribbon. Cover with silicone foam with border. Change daily and prn if soiled/compromised.  -Continue to offload pressure; low airloss support surface, turn and position per protocol with use of fluidized positioning devices, continue moisture management with use of single breathable incontinence pad, continue to offload heels with complete cair boots.  - Foam dressing to RLQ abdominal laparoscopic incision site  -Upon discharge patient should qualify for low airloss support surface; case management/social work to please follow up when medically appropriate.  -Nutritional consult for optimization in patient with healing full thickness surgical wounds and stage 4 pressure injury.  -Appreciate Urology recommendations/management of surgical wounds to genitalia perineum, scrotum.  -Consider Ostomy consult if needed for Sigmoid Colostomy; pouching recommendations.    Upon discharge follow up at outpatient [facility]. [address] [facility]. [phone].    Patient seen with Dr. Marcum today. Findings and plan discussed with patient, primary RN, and Urology PA JAROCHO Jones.   All questions and concerns addressed to meet patient's satisfaction.  Will continue to follow periodically while inpatient, please reconsult earlier as needed.  Remainder of care per primary team.  Thank you.    AMRITA Dooley, ERIC (MS Teams)    If after 4PM or before 7:30AM on Mon-Friday or weekend/holiday please contact general surgery for urgent matters.   Team A- [id number]   Team B- [id number]  For non-urgent matters e-mail [email or web address]

## 2024-10-14 NOTE — CONSULT NOTE ADULT - SUBJECTIVE AND OBJECTIVE BOX
HPI: Briefly, this is a 78 y/o M with history of CAD s/p stents in 2012, locally advanced rectal cancer s/p neoadjuvant therapy with chemoRT (completed 11/2022), consolidation CAPOX (2/2023), brachytherapy (3/2024), presenting for generalized weakness, chest discomfort, rectal pain. Patient was recently admitted at [facility] in 8/30 for fatigue, weakness, and falls, found to have perforated rectal mass with perirectal collection, 2.5 x 1.5 cm with spread to infection in anorectal/perineal region with fluid/gas tracking along penile shaft, however [place] [facility] and is now s/p diverting end sigmoid colostomy, cystoscopy and naranjo placement across defect and perineal/scrotal drainage 8/30. He was [place] [facility] [place] and was discharged day prior to presentation for perineal infections, treated with abx. 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 Patient now s/p OR on 10/12 for debridement of genitalia and perineum. Patient was briefly in SICU due to post-operative pressor requirements. Now weaned off pressors and downgraded to the surgical floor. Medicine consulted for comanagement of medical conditions.    Patient seen and examined at bedside. Patient resting comfortably. Notes that he still has significant pain around his rectal/perineal area. Denies any fevers or chills. Denies any chest pain, palpitations, dizziness or shortness of breath.     PAST MEDICAL & SURGICAL HISTORY:  No significant past surgical history    Review of Systems:   CONSTITUTIONAL: No fever, weight loss, or fatigue  HEENT: No eye pain, no sinus or throat pain  RESPIRATORY: No cough, wheezing, No shortness of breath  CARDIOVASCULAR: No chest pain, palpitations, dizziness, or leg swelling  GASTROINTESTINAL: No abdominal or epigastric pain. No nausea, vomiting, or hematemesis; No diarrhea or constipation. No melena or hematochezia.  GENITOURINARY: No dysuria, frequency, hematuria, or incontinence  NEUROLOGICAL: No headaches, memory loss, loss of strength, numbness, or tremors  SKIN: No itching, burning, rashes, or lesions   MUSCULOSKELETAL: No joint pain or swelling; No muscle, back, or extremity pain    Allergies  No Known Allergies  Intolerances    Social History: Current Smoker    FAMILY HISTORY:    MEDICATIONS  (STANDING):  acetaminophen     Tablet .. 1000 milliGRAM(s) Oral every 6 hours  aspirin  chewable 81 milliGRAM(s) Oral daily  brimonidine 0.2% Ophthalmic Solution 1 Drop(s) Both EYES every 12 hours  clindamycin IVPB      clindamycin IVPB 900 milliGRAM(s) IV Intermittent every 8 hours  heparin   Injectable 5000 Unit(s) SubCutaneous every 8 hours  latanoprost 0.005% Ophthalmic Solution 1 Drop(s) Both EYES at bedtime  metoprolol succinate ER 50 milliGRAM(s) Oral daily  nicotine -  14 mG/24Hr(s) Patch 1 Patch Transdermal daily  piperacillin/tazobactam IVPB.. 3.375 Gram(s) IV Intermittent every 8 hours  rosuvastatin 10 milliGRAM(s) Oral at bedtime  sodium chloride 0.9%. 1000 milliLiter(s) (100 mL/Hr) IV Continuous <Continuous>  timolol 0.5% Solution 1 Drop(s) Both EYES every 12 hours  vancomycin  IVPB 1000 milliGRAM(s) IV Intermittent every 12 hours    MEDICATIONS  (PRN):  oxyCODONE    IR 5 milliGRAM(s) Oral every 4 hours PRN Severe Pain (7 - 10)  oxyCODONE    IR 2.5 milliGRAM(s) Oral every 4 hours PRN Moderate Pain (4 - 6)    Vital Signs Last 24 Hrs  T(C): 36.5 (14 Oct 2024 10:00), Max: 36.6 (14 Oct 2024 02:17)  T(F): 97.7 (14 Oct 2024 10:00), Max: 97.9 (14 Oct 2024 02:17)  HR: 78 (14 Oct 2024 10:00) (69 - 78)  BP: 124/76 (14 Oct 2024 10:00) (89/53 - 129/81)  BP(mean): 85 (13 Oct 2024 16:00) (79 - 85)  RR: 17 (14 Oct 2024 10:00) (12 - 18)  SpO2: 97% (14 Oct 2024 10:00) (93% - 100%)    Parameters below as of 14 Oct 2024 10:00  Patient On (Oxygen Delivery Method): room air    CAPILLARY BLOOD GLUCOSE    PHYSICAL EXAM:  GENERAL: NAD, well-developed  HEAD:  Atraumatic, Normocephalic  EYES: eyes tracking  NECK: Supple, No JVD  CHEST/LUNG: Clear to auscultation bilaterally; No wheeze  HEART: Regular rate and rhythm; No murmurs, rubs, or gallops  ABDOMEN: Soft, Nontender, Nondistended; Bowel sounds present  : naranjo in place, perineal wounds with pack/drains present  EXTREMITIES: 1+ pitting edema noted over RLE  NEUROLOGY: non-focal  SKIN: No rashes or lesions    LABS:                        8.1    5.94  )-----------( 175      ( 14 Oct 2024 05:51 )             26.8     10-14    132[L]  |  98  |  11  ----------------------------<  102[H]  3.8   |  25  |  0.62    Ca    8.1[L]      14 Oct 2024 05:51  Phos  2.8     10-14  Mg     1.90     10-14    Urinalysis Basic - ( 14 Oct 2024 05:51 )    Color: x / Appearance: x / SG: x / pH: x  Gluc: 102 mg/dL / Ketone: x  / Bili: x / Urobili: x   Blood: x / Protein: x / Nitrite: x   Leuk Esterase: x / RBC: x / WBC x   Sq Epi: x / Non Sq Epi: x / Bacteria: x    EKG(personally reviewed):    RADIOLOGY & ADDITIONAL TESTS:    Imaging Personally Reviewed: EDI    Consultant(s) Notes Reviewed: Y    Care Discussed with Consultants/Other Providers: Y, discussed with primary urology team

## 2024-10-14 NOTE — PHYSICAL THERAPY INITIAL EVALUATION ADULT - ADDITIONAL COMMENTS
Patient presenting with confusion throughout interview; please confirm with case management/social work documentation for accurate prior level of function and home set up. Patient state he lives in a private home with his dog with 7 steps to enter and was fully independent with all aspects of mobility and self care.    Patient left in bed, NAD, all lines and tubes intact, bed alarm on, call bell within reach, head of bed elevated >30 degrees, RN Ernestina aware of PT

## 2024-10-14 NOTE — CONSULT NOTE ADULT - PROBLEM SELECTOR RECOMMENDATION 5
Per chart review, patients family reported that patient does have history of atrial fibrillation  - not on AC currently due to history of bleeding episodes  - most recent EKG from 10/12 reviewed, patient with atrial fibrillation  - now with rates controlled, and appearing to be back in sinus rhythm  - please recheck EKG, now that rates have improved and patient is more stable  - c/w toprol 50mg qd

## 2024-10-14 NOTE — CONSULT NOTE ADULT - PROBLEM SELECTOR RECOMMENDATION 4
Patient noted with troponin elevated to 70s-80s on current admission  - on recent admission in August noted with troponin elevation to 2000s  - at that time suspected to be 2/2 demand ischemia per cardiology  - current troponin trend noted to be downtrending, again likely demand i/s/o active infection/hypotension while in SICU  - patient currently without any chest pain/SOB  - no need to trend troponin further unless patient develops active symptoms

## 2024-10-14 NOTE — PHYSICAL THERAPY INITIAL EVALUATION ADULT - RANGE OF MOTION EXAMINATION, REHAB EVAL
patient refused active/passive assessment of right upper extremity, stated he broke his arm following a fall and stated "DO NOT TOUCH IT"/Left UE ROM was WFL (within functional limits)/bilateral lower extremity ROM was WFL (within functional limits)

## 2024-10-14 NOTE — PHYSICAL THERAPY INITIAL EVALUATION ADULT - BED MOBILITY LIMITATIONS, REHAB EVAL
patient refused any functional mobility at this time, became agitated with encouragement and passive movement, DALILA Do at bedside

## 2024-10-14 NOTE — CONSULT NOTE ADULT - PROBLEM SELECTOR RECOMMENDATION 2
Patient with history of locally advanced rectal cancer  - follows with [facility] oncology  - GOC addressed on previous admissions with patient's daughter, Tiffanie.   - patient and family reportedly wanted all interventions  - agree with palliative care consult to further discuss goals of care

## 2024-10-14 NOTE — PROVIDER CONTACT NOTE (CRITICAL VALUE NOTIFICATION) - ASSESSMENT
prelim result for tissue culture collected on 10/12 shows rare e. choli prelim result for tissue culture collected on 10/12 shows rare e. coli

## 2024-10-14 NOTE — PHYSICAL THERAPY INITIAL EVALUATION ADULT - MANUAL MUSCLE TESTING RESULTS, REHAB EVAL
Left upper extremity strength grossly 3-/5, bilateral lower extremity strength grossly 2+/5 upon MMT and functional assessment

## 2024-10-14 NOTE — DIETITIAN INITIAL EVALUATION ADULT - OTHER INFO
Nutrition assessment for consult as above.    10/14/24 Urology PA: 78 yo M with history of CAD s/p stents in 2012 (ASA), locally advanced rectal cancer s/p neoadjuvant therapy with chemoRT (completed 11/2022), consolidation CAPOX (2/2023), brachytherapy (3/2024), presenting for generalized weakness, chest discomfort, rectal pain. Pt was here at [facility] in 8/30 for fatigue, weakness, and falls, found to have perforated rectal mass with perirectal collection, 2.5 x 1.5 cm with spread to infection in anorectal/perineal region with fluid/gas tracking along penile shaft, however pt left for MSK and s/p diverting end sigmoid colostomy, cystoscopy and naranjo placement across defect and perineal/scrotal drainage 8/30. Recently at [facility] for several weeks, discharged day prior to presentation for perineal infections, treated with abx. Represents here for generalized weakness and dizziness, CTAP non-con on preliminary read indicating ill-defined fluid/gas in scrotum, perirectal space, and probably perineum. Exam demonstrates skin duskiness multiple genitourinary abscesses, induration, purulent drainage. 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      Patient with fair PO Intake; diet being supplemented with Ensure Max and LPS, noted with pressure injury to sacrum (unstageable). Spoke to PA, discussed changing Ensure Max to Ensure Plus High Protein.  Patient has not had breakfast yet at time of RD visit. Patient denies having any issues with eating and presented as an overall poor historian surrounding diet and weight history. Estimated usual body weight to be ~141 pounds.  Adm weight 10/12 - 63kg; 10/13 - 62.1kg; weight today 10/14 - 58.3kg.  Weight Hx:  8/30/24 per HIE - 63.5kg. Patient with 5.2kg wt loss / 8.2% in ~1.5 months.     Patient with LLQ colostomy, no reported nausea, vomiting.

## 2024-10-14 NOTE — CONSULT NOTE ADULT - SUBJECTIVE AND OBJECTIVE BOX
Wound SURGERY CONSULT NOTE    HPI:  80 yo M with history of CAD s/p stents in 2012 (ASA), locally advanced rectal cancer s/p neoadjuvant therapy with chemoRT (completed 11/2022), consolidation CAPOX (2/2023), brachytherapy (3/2024), presenting for generalized weakness, chest discomfort, rectal pain. Pt was here at [facility] in 8/30 for fatigue, weakness, and falls, found to have perforated rectal mass with perirectal collection, 2.5 x 1.5 cm. Partially imaged abscess in the perineum, extending into the root of the penis, measures at least 7.4 x 2.3 cm.  There are additional foci of fluid and gas tracking along the penile shaft.     Seen by Dr. Santoro at that time, offered I&D and diverting colostomy but [place] [facility] [place]. [place] [facility] [place]p laparoscopic diverting end sigmoid colostomy with cystoscopy and naranjo placement across defect and perineal/scrotal drainage by Colorectal Surgery and Urology 8/30 with prolonged hospital stay.    He presented to [facility] 1 day after [place] [facility] complaining of dizziness and malaise. In ED he was found to be in atrial fibrillation. He is not on AC, h/o a known right leg DVT. Per daughter, patient had recently been treated for multiple scrotal/perineal area abscess with IV abx and [place] [facility] [place] Patient is full code at this time.    On evaluation in the ED, patient afebrile, -120s in Afib, SBP high 90s-low 100s. Pt AOx1. Denies pain at rest,  fever, chills, dizziness, SOB.     WBC 6.22  Hgb 9.8  Cr. 0.62  Troponins 62 and BNP 31,000  Lactate 2.5  Urine from naranjo showing LE, bacteria, WBC    In ED, received clinda/vanc/zosyn  (12 Oct 2024 12:03)    Seen by Urology c/f Tara Gangrene. 10/12 s/p OR debridement of necrotic skin and soft tissue from genitalia and perineum. Required SICU for post op pressors. Received 500ccs for hypovolemia on POCUS. Subsequently off pressors. On zosyn, clinda, vanc. Urine and bcx pending. Tissue cx negative. Per urology notes wound bases clean, penrose drains in place. Transferred to floor on 10/13.      Wound consult requested to assist w/ management of sacral pressure injury, present on admission. Per nursing documentation sacral wound with nonviable base. Patient A&Ox 1 - 2, agitated during encounter, unable to provide subjective data. Repeatedly requesting water; primary RN at bedside addressing patient's requests. Unable to answer questions appropriately due to agitation/current mental status.      Current Diet: Diet, Regular:     Start Time: 20:00  Liquid Protein Supplement     Qty per Day:  3  Supplement Feeding Modality:  Oral  Ensure Plus High Protein Cans or Servings Per Day:  1       Frequency:  Two Times a day (10-14-24 @ 10:33)      PAST MEDICAL & SURGICAL HISTORY:  CAD s/p stents in 2012 (ASA)   locally advanced rectal cancer s/p neoadjuvant therapy with chemoRT (completed 11/2022), consolidation CAPOX (2/2023), brachytherapy (3/2024)    laparoscopic diverting end sigmoid colostomy with cystoscopy and naranjo placement across defect and perineal/scrotal drainage- 8/30/2024      REVIEW OF SYSTEMS: Limited, A&O x 1-2.   Agitated during encounter, unable to answer questions appropriately at time of encounter.      MEDICATIONS  (STANDING):  acetaminophen     Tablet .. 1000 milliGRAM(s) Oral every 6 hours  aspirin  chewable 81 milliGRAM(s) Oral daily  brimonidine 0.2% Ophthalmic Solution 1 Drop(s) Both EYES every 12 hours  clindamycin IVPB      clindamycin IVPB 900 milliGRAM(s) IV Intermittent every 8 hours  heparin   Injectable 5000 Unit(s) SubCutaneous every 8 hours  latanoprost 0.005% Ophthalmic Solution 1 Drop(s) Both EYES at bedtime  metoprolol succinate ER 50 milliGRAM(s) Oral daily  nicotine -  14 mG/24Hr(s) Patch 1 Patch Transdermal daily  piperacillin/tazobactam IVPB.. 3.375 Gram(s) IV Intermittent every 8 hours  rosuvastatin 10 milliGRAM(s) Oral at bedtime  sodium chloride 0.9%. 1000 milliLiter(s) (100 mL/Hr) IV Continuous <Continuous>  timolol 0.5% Solution 1 Drop(s) Both EYES every 12 hours  vancomycin  IVPB 1000 milliGRAM(s) IV Intermittent every 12 hours    MEDICATIONS  (PRN):  oxyCODONE    IR 5 milliGRAM(s) Oral every 4 hours PRN Severe Pain (7 - 10)  oxyCODONE    IR 2.5 milliGRAM(s) Oral every 4 hours PRN Moderate Pain (4 - 6)      Allergies    No Known Allergies    Intolerances        SOCIAL HISTORY:  Unable to obtain, see ROS.  Per H&P; active smoker.    FAMILY HISTORY:      PHYSICAL EXAM:  Vital Signs Last 24 Hrs  T(C): 36.5 (14 Oct 2024 10:00), Max: 36.6 (14 Oct 2024 02:17)  T(F): 97.7 (14 Oct 2024 10:00), Max: 97.9 (14 Oct 2024 02:17)  HR: 78 (14 Oct 2024 10:00) (69 - 78)  BP: 124/76 (14 Oct 2024 10:00) (89/53 - 129/81)  BP(mean): 85 (13 Oct 2024 16:00) (79 - 85)  RR: 17 (14 Oct 2024 10:00) (12 - 21)  SpO2: 97% (14 Oct 2024 10:00) (93% - 100%)    Parameters below as of 14 Oct 2024 10:00  Patient On (Oxygen Delivery Method): room air    Wt: 63 kg (10-)      Constitutional: A&O x 1-2, agitated, NAD. Frail  (+) low airloss support surface, (+) fluidized positioning devices, (+) complete cair boots  HEENT:  NC/AT, nonicteric, mucosa moist  Cardiovascular: rate regular  Respiratory: nonlabored, room air, equal chest expansion.  Gastrointestinal: soft NT/ND, LLQ Colostomy with intact pouch in place; stoma pink-moist viable; scant mucous in pouch (+) flatulence.  Multiple laparoscopic healing surgical sites; RLQ transverse laparoscopic site with pin-point ulceration to medial aspect extending 0.3cm in depth, scant serous drainage; no purulent drainage; juan jose-incisional skin intact; no erythema, no edema, no increased warmth, no induration, no fluctuance.   : (+) indwelling naranjo catheter; exam deferred to Urology, dressing c/d/i  Musculoskeletal: no gross deformities/contractures  Vascular: BLE equally warm, (+)2 dp pulses, capillary refill < 3 seconds; left heel scar, skin intact.  Skin:  frail, good skin turgor  Sacrum extending distally within sacral/gluteal cleft- stage 4 pressure injury (patient turned to left side)  -[id number]  -Sacrum with adherent softening eschar; tan-moist adherent slough within sacral/gluteal cleft, bone palpable beneath slough, no visible.  -Small serofibrinous drainage; no purulent drainage, no odor.   -Periwound skin without induration, no fluctuance, no crepitus, no erythema, no edema, no increased warmth.        LABS/ CULTURES/ RADIOLOGY:                        8.1    5.94  )-----------( 175      ( 14 Oct 2024 05:51 )             26.8       132  |  98  |  11  ----------------------------<  102      [10-14-24 @ 05:51]  3.8   |  25  |  0.62        Ca     8.1     [10-14-24 @ 05:51]      Mg     1.90     [10-14-24 @ 05:51]      Phos  2.8     [10-14-24 @ 05:51]          Culture - Blood (collected 10-12-24 @ 02:10)  Source: .Blood BLOOD  Preliminary Report (10-14-24 @ 09:01):    No growth at 48 Hours    Culture - Blood (collected 10-12-24 @ 01:55)  Source: .Blood BLOOD  Preliminary Report (10-14-24 @ 09:01):    No growth at 48 Hours        Culture - Tissue with Gram Stain (10.12.24 @ 13:58)   Gram Stain:   No polymorphonuclear leukocytes seen per low power field   No organisms seen per oil power field  Specimen Source: Tissue  Culture Results:   Rare Escherichia coli        ACC: [id number] EXAM:  CT ABDOMEN AND PELVIS   ORDERED BY: SANTIAGO BRICEÑO     PROCEDURE DATE:  10/12/2024          INTERPRETATION:  CLINICAL INFORMATION: Colorectal cancer, history of open   wound and abscess    COMPARISON: 8/30/2024    CONTRAST/COMPLICATIONS:  IV Contrast: None  Oral Contrast: None  Complications: None    PROCEDURE:  CT of the Abdomen and Pelvis was performed.  Sagittal and coronal reformats were performed.    FINDINGS:  LOWER CHEST: Coronary artery calcifications versus stents. Moderate left   and small right pleural effusions with associated atelectasis. Lower lobe   nodules measuring up to 0.8 cm in the left lower lobe. Centrilobular and   paraseptal emphysema. Atelectasis or scarring in the right middle lobe.    LIVER: Lefthepatic lobe cysts. Scattered calcified granulomas.  BILE DUCTS: Normal caliber.  GALLBLADDER: Within normal limits.  SPLEEN: Within normal limits.  PANCREAS: Within normal limits.  ADRENALS: 3.2 cm indeterminate left adrenal nodule.  KIDNEYS/URETERS: Bilateral renal cysts as well as additional   indeterminate lesions which may represent hemorrhagic or proteinaceous   cysts.    BLADDER: Naranjo catheter.  REPRODUCTIVE ORGANS: Prostate gland is enlarged. Ill-defined fluid and   gas within the scrotum.    BOWEL: Surgical clips in the perirectal space. Diverticulosis of the   colon. No evidence of acute diverticulitis. Diverting left lower quadrant   ostomy. No bowel obstruction. Appendix is normal. Fluid and gas is also   present in the perirectal space and probably perineum.  PERITONEUM/RETROPERITONEUM: Within normal limits.  VESSELS: Atherosclerotic disease of the abdominal aorta. There is bilobed   infrarenal abdominal aortic aneurysm measuring up to 5.1 cm. 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  LYMPH NODES: No lymphadenopathy.  ABDOMINAL WALL: Diffuse anasarca. Left lower quadrant diverting colostomy  BONES: Degenerative changes.    IMPRESSION:  Ill-defined fluid and gas collection in the scrotum. Fluid and gas is   also present in the perirectal space and probably perineum. 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    Moderate left and small right pleural effusions with adjacent compressive   atelectasis. Bibasilar pulmonary nodules compatible with metastatic   disease.    There is bilobed infrarenal abdominal aortic aneurysm measuring up to 5.1   cm. 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    Bilateral indeterminant renal lesions. Consider renal ultrasound or   abdominal MRI for further evaluation.    Additional findings as above.    Critical findings were discussed with Dr. Briceño  by Dr. Mcclain on   10/12/2024 6:22:30 AM.    --- End of Report ---            DESEAN MCNULTY MD; Attending Radiologist  This document has been electronically signed. Oct 12 2024 10:38AM

## 2024-10-14 NOTE — CONSULT NOTE ADULT - ASSESSMENT
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 Medicine following for comanagement.

## 2024-10-14 NOTE — DIETITIAN INITIAL EVALUATION ADULT - PERTINENT MEDS FT
MEDICATIONS  (STANDING):  acetaminophen     Tablet .. 1000 milliGRAM(s) Oral every 6 hours  aspirin  chewable 81 milliGRAM(s) Oral daily  brimonidine 0.2% Ophthalmic Solution 1 Drop(s) Both EYES every 12 hours  clindamycin IVPB      clindamycin IVPB 900 milliGRAM(s) IV Intermittent every 8 hours  heparin   Injectable 5000 Unit(s) SubCutaneous every 8 hours  latanoprost 0.005% Ophthalmic Solution 1 Drop(s) Both EYES at bedtime  metoprolol succinate ER 50 milliGRAM(s) Oral daily  nicotine -  14 mG/24Hr(s) Patch 1 Patch Transdermal daily  piperacillin/tazobactam IVPB.. 3.375 Gram(s) IV Intermittent every 8 hours  rosuvastatin 10 milliGRAM(s) Oral at bedtime  sodium chloride 0.9%. 1000 milliLiter(s) (100 mL/Hr) IV Continuous <Continuous>  timolol 0.5% Solution 1 Drop(s) Both EYES every 12 hours  vancomycin  IVPB 1000 milliGRAM(s) IV Intermittent every 12 hours    MEDICATIONS  (PRN):  oxyCODONE    IR 2.5 milliGRAM(s) Oral every 4 hours PRN Moderate Pain (4 - 6)  oxyCODONE    IR 5 milliGRAM(s) Oral every 4 hours PRN Severe Pain (7 - 10)

## 2024-10-14 NOTE — PHYSICAL THERAPY INITIAL EVALUATION ADULT - GENERAL OBSERVATIONS, REHAB EVAL
Patient found semi-reclined in bed NAD, A&Ox1, +naranjo, +ostomy, patient OK for PT per RN Ernestina, spO2 97% on room air HR 79

## 2024-10-14 NOTE — PROGRESS NOTE ADULT - SUBJECTIVE AND OBJECTIVE BOX
POD #2  Afeb 108/65 88 99%RA    Pt has no c/o  Abd- soft  Wounds - re-packed; clean  Penroses in place  Zamarripa - 1L (in perineum)

## 2024-10-14 NOTE — PROVIDER CONTACT NOTE (CRITICAL VALUE NOTIFICATION) - TEST AND RESULT REPORTED:
Pharmacy requesting refill for...    olmesartan-hydrochlorothiazide (BENICAR HCT) 20-12.5 MG per tablet 90 tablet 4 11/13/2019     Sig: TAKE ONE TABLET BY MOUTH ONE TIME DAILY     Sent to pharmacy as: Olmesartan Medoxomil-HCTZ 20-12.5 MG Oral Tablet    Class: Eprescribe      Last visit - 11/3/20  No appt  scheduled   Troponin - 55

## 2024-10-15 DIAGNOSIS — E03.8 OTHER SPECIFIED HYPOTHYROIDISM: ICD-10-CM

## 2024-10-15 LAB
ADD ON TEST-SPECIMEN IN LAB: SIGNIFICANT CHANGE UP
ANION GAP SERPL CALC-SCNC: 9 MMOL/L — SIGNIFICANT CHANGE UP (ref 7–14)
BUN SERPL-MCNC: 7 MG/DL — SIGNIFICANT CHANGE UP (ref 7–23)
CALCIUM SERPL-MCNC: 8 MG/DL — LOW (ref 8.4–10.5)
CHLORIDE SERPL-SCNC: 100 MMOL/L — SIGNIFICANT CHANGE UP (ref 98–107)
CO2 SERPL-SCNC: 25 MMOL/L — SIGNIFICANT CHANGE UP (ref 22–31)
CREAT SERPL-MCNC: 0.56 MG/DL — SIGNIFICANT CHANGE UP (ref 0.5–1.3)
EGFR: 100 ML/MIN/1.73M2 — SIGNIFICANT CHANGE UP
GLUCOSE SERPL-MCNC: 138 MG/DL — HIGH (ref 70–99)
HCT VFR BLD CALC: 27.2 % — LOW (ref 39–50)
HGB BLD-MCNC: 8.3 G/DL — LOW (ref 13–17)
MAGNESIUM SERPL-MCNC: 1.7 MG/DL — SIGNIFICANT CHANGE UP (ref 1.6–2.6)
MCHC RBC-ENTMCNC: 25.4 PG — LOW (ref 27–34)
MCHC RBC-ENTMCNC: 30.5 GM/DL — LOW (ref 32–36)
MCV RBC AUTO: 83.2 FL — SIGNIFICANT CHANGE UP (ref 80–100)
NRBC # BLD: 0 /100 WBCS — SIGNIFICANT CHANGE UP (ref 0–0)
NRBC # FLD: 0 K/UL — SIGNIFICANT CHANGE UP (ref 0–0)
NT-PROBNP SERPL-SCNC: HIGH PG/ML
PHOSPHATE SERPL-MCNC: 2 MG/DL — LOW (ref 2.5–4.5)
PLATELET # BLD AUTO: 182 K/UL — SIGNIFICANT CHANGE UP (ref 150–400)
POTASSIUM SERPL-MCNC: 3.3 MMOL/L — LOW (ref 3.5–5.3)
POTASSIUM SERPL-SCNC: 3.3 MMOL/L — LOW (ref 3.5–5.3)
RBC # BLD: 3.27 M/UL — LOW (ref 4.2–5.8)
RBC # FLD: 17.4 % — HIGH (ref 10.3–14.5)
SODIUM SERPL-SCNC: 134 MMOL/L — LOW (ref 135–145)
T4 FREE SERPL-MCNC: 1 NG/DL — SIGNIFICANT CHANGE UP (ref 0.9–1.8)
TSH SERPL-MCNC: 26.1 UIU/ML — HIGH (ref 0.27–4.2)
WBC # BLD: 5.14 K/UL — SIGNIFICANT CHANGE UP (ref 3.8–10.5)
WBC # FLD AUTO: 5.14 K/UL — SIGNIFICANT CHANGE UP (ref 3.8–10.5)

## 2024-10-15 PROCEDURE — 99233 SBSQ HOSP IP/OBS HIGH 50: CPT

## 2024-10-15 PROCEDURE — 99232 SBSQ HOSP IP/OBS MODERATE 35: CPT

## 2024-10-15 RX ORDER — POTASSIUM CHLORIDE 10 MEQ
20 TABLET, EXTENDED RELEASE ORAL ONCE
Refills: 0 | Status: COMPLETED | OUTPATIENT
Start: 2024-10-15 | End: 2024-10-15

## 2024-10-15 RX ORDER — POTASSIUM PHOSPHATE 236; 224 MG/ML; MG/ML
15 INJECTION, SOLUTION INTRAVENOUS ONCE
Refills: 0 | Status: COMPLETED | OUTPATIENT
Start: 2024-10-15 | End: 2024-10-15

## 2024-10-15 RX ORDER — MORPHINE SULFATE 30 MG/1
2 TABLET, EXTENDED RELEASE ORAL ONCE
Refills: 0 | Status: DISCONTINUED | OUTPATIENT
Start: 2024-10-15 | End: 2024-10-15

## 2024-10-15 RX ORDER — FLUCONAZOLE, SODIUM CHLORIDE 2 MG/ML
200 INJECTION INTRAVENOUS DAILY
Refills: 0 | Status: ACTIVE | OUTPATIENT
Start: 2024-10-15 | End: 2025-09-13

## 2024-10-15 RX ORDER — FLUCONAZOLE, SODIUM CHLORIDE 2 MG/ML
100 INJECTION INTRAVENOUS EVERY 24 HOURS
Refills: 0 | Status: DISCONTINUED | OUTPATIENT
Start: 2024-10-15 | End: 2024-10-15

## 2024-10-15 RX ADMIN — MORPHINE SULFATE 2 MILLIGRAM(S): 30 TABLET, EXTENDED RELEASE ORAL at 06:46

## 2024-10-15 RX ADMIN — Medication 1 DROP(S): at 06:17

## 2024-10-15 RX ADMIN — MORPHINE SULFATE 2 MILLIGRAM(S): 30 TABLET, EXTENDED RELEASE ORAL at 04:45

## 2024-10-15 RX ADMIN — PIPERACILLIN AND TAZOBACTAM 25 GRAM(S): .5; 4 INJECTION, POWDER, LYOPHILIZED, FOR SOLUTION INTRAVENOUS at 01:40

## 2024-10-15 RX ADMIN — Medication 1 DROP(S): at 18:06

## 2024-10-15 RX ADMIN — MORPHINE SULFATE 4 MILLIGRAM(S): 30 TABLET, EXTENDED RELEASE ORAL at 12:50

## 2024-10-15 RX ADMIN — NICOTINE POLACRILEX 1 PATCH: 4 GUM, CHEWING ORAL at 14:30

## 2024-10-15 RX ADMIN — PIPERACILLIN AND TAZOBACTAM 25 GRAM(S): .5; 4 INJECTION, POWDER, LYOPHILIZED, FOR SOLUTION INTRAVENOUS at 19:17

## 2024-10-15 RX ADMIN — HEPARIN SODIUM 5000 UNIT(S): 10000 INJECTION INTRAVENOUS; SUBCUTANEOUS at 06:17

## 2024-10-15 RX ADMIN — Medication 20 MILLIEQUIVALENT(S): at 14:46

## 2024-10-15 RX ADMIN — NICOTINE POLACRILEX 1 PATCH: 4 GUM, CHEWING ORAL at 18:21

## 2024-10-15 RX ADMIN — Medication 50 MILLIGRAM(S): at 06:17

## 2024-10-15 RX ADMIN — MORPHINE SULFATE 2 MILLIGRAM(S): 30 TABLET, EXTENDED RELEASE ORAL at 03:45

## 2024-10-15 RX ADMIN — FLUCONAZOLE, SODIUM CHLORIDE 200 MILLIGRAM(S): 2 INJECTION INTRAVENOUS at 12:24

## 2024-10-15 RX ADMIN — Medication 1 DROP(S): at 22:30

## 2024-10-15 RX ADMIN — POTASSIUM PHOSPHATE 62.5 MILLIMOLE(S): 236; 224 INJECTION, SOLUTION INTRAVENOUS at 14:45

## 2024-10-15 RX ADMIN — Medication 10 MILLIGRAM(S): at 22:30

## 2024-10-15 RX ADMIN — Medication 1000 MILLIGRAM(S): at 07:17

## 2024-10-15 RX ADMIN — Medication 1000 MILLIGRAM(S): at 06:17

## 2024-10-15 RX ADMIN — MORPHINE SULFATE 4 MILLIGRAM(S): 30 TABLET, EXTENDED RELEASE ORAL at 23:41

## 2024-10-15 RX ADMIN — MORPHINE SULFATE 4 MILLIGRAM(S): 30 TABLET, EXTENDED RELEASE ORAL at 12:07

## 2024-10-15 RX ADMIN — MORPHINE SULFATE 2 MILLIGRAM(S): 30 TABLET, EXTENDED RELEASE ORAL at 07:46

## 2024-10-15 RX ADMIN — NICOTINE POLACRILEX 1 PATCH: 4 GUM, CHEWING ORAL at 06:32

## 2024-10-15 RX ADMIN — Medication 1000 MILLIGRAM(S): at 13:23

## 2024-10-15 RX ADMIN — Medication 1000 MILLIGRAM(S): at 18:07

## 2024-10-15 RX ADMIN — HEPARIN SODIUM 5000 UNIT(S): 10000 INJECTION INTRAVENOUS; SUBCUTANEOUS at 22:30

## 2024-10-15 RX ADMIN — NICOTINE POLACRILEX 1 PATCH: 4 GUM, CHEWING ORAL at 14:38

## 2024-10-15 RX ADMIN — MORPHINE SULFATE 4 MILLIGRAM(S): 30 TABLET, EXTENDED RELEASE ORAL at 22:41

## 2024-10-15 RX ADMIN — PIPERACILLIN AND TAZOBACTAM 25 GRAM(S): .5; 4 INJECTION, POWDER, LYOPHILIZED, FOR SOLUTION INTRAVENOUS at 12:05

## 2024-10-15 RX ADMIN — Medication 1000 MILLIGRAM(S): at 12:23

## 2024-10-15 RX ADMIN — HEPARIN SODIUM 5000 UNIT(S): 10000 INJECTION INTRAVENOUS; SUBCUTANEOUS at 14:37

## 2024-10-15 NOTE — PROGRESS NOTE ADULT - SUBJECTIVE AND OBJECTIVE BOX
[facility] Department of Hospital Medicine  Pankaj Butler MD  Available on MS Teams  Pager: [phone]    Patient is a 79y old  Male who presents with a chief complaint of suprapubic pain (15 Oct 2024 07:15)    OVERNIGHT EVENTS: No acute events overnight.    SUBJECTIVE: Pt seen and examined at bedside this morning. Alert and conversant. Reports that he continues to experience pain around his groin/rectum. Denies any chest pain or shortness of breath. Denies any palpitations or dizziness. Notes that he has been told that he has had right leg swelling in the past. Denies any pain or weakness in his legs.     ADDITIONAL REVIEW OF SYSTEMS: as above    MEDICATIONS  (STANDING):  acetaminophen     Tablet .. 1000 milliGRAM(s) Oral every 6 hours  aspirin  chewable 81 milliGRAM(s) Oral daily  brimonidine 0.2% Ophthalmic Solution 1 Drop(s) Both EYES every 12 hours  fluconAZOLE   Tablet 200 milliGRAM(s) Oral daily  heparin   Injectable 5000 Unit(s) SubCutaneous every 8 hours  latanoprost 0.005% Ophthalmic Solution 1 Drop(s) Both EYES at bedtime  metoprolol succinate ER 50 milliGRAM(s) Oral daily  nicotine -  14 mG/24Hr(s) Patch 1 Patch Transdermal daily  piperacillin/tazobactam IVPB.. 3.375 Gram(s) IV Intermittent every 8 hours  rosuvastatin 10 milliGRAM(s) Oral at bedtime  sodium chloride 0.9%. 1000 milliLiter(s) (100 mL/Hr) IV Continuous <Continuous>  timolol 0.5% Solution 1 Drop(s) Both EYES every 12 hours    MEDICATIONS  (PRN):  morphine  - Injectable 2 milliGRAM(s) IV Push every 8 hours PRN Moderate Pain (4 - 6)  morphine  - Injectable 4 milliGRAM(s) IV Push every 8 hours PRN Severe Pain (7 - 10)    CAPILLARY BLOOD GLUCOSE    I&O's Summary    14 Oct 2024 07:01  -  15 Oct 2024 07:00  --------------------------------------------------------  IN: 0 mL / OUT: 2575 mL / NET: -2575 mL    15 Oct 2024 07:01  -  15 Oct 2024 13:22  --------------------------------------------------------  IN: 0 mL / OUT: 400 mL / NET: -400 mL    PHYSICAL EXAM:  Vital Signs Last 24 Hrs  T(C): 36.8 (15 Oct 2024 09:42), Max: 36.8 (15 Oct 2024 09:42)  T(F): 98.2 (15 Oct 2024 09:42), Max: 98.2 (15 Oct 2024 09:42)  HR: 76 (15 Oct 2024 09:42) (67 - 76)  BP: 128/76 (15 Oct 2024 09:42) (110/68 - 132/77)  BP(mean): --  RR: 18 (15 Oct 2024 09:42) (16 - 18)  SpO2: 97% (15 Oct 2024 09:42) (96% - 99%)    Parameters below as of 15 Oct 2024 09:42  Patient On (Oxygen Delivery Method): room air    PHYSICAL EXAM:  GENERAL: NAD, well-developed  HEAD:  Atraumatic, Normocephalic  EYES: eyes tracking  NECK: Supple, No JVD  CHEST/LUNG: Clear to auscultation bilaterally; No wheeze  HEART: Regular rate and rhythm; No murmurs, rubs, or gallops  ABDOMEN: Soft, Nontender, Nondistended; Bowel sounds present  : naranjo in place, perineal wounds with packing/drains present  EXTREMITIES: 1+ pitting edema noted over RLE  NEUROLOGY: non-focal    LABS:                        8.3    5.14  )-----------( 182      ( 15 Oct 2024 09:27 )             27.2     10-15    134[L]  |  100  |  7   ----------------------------<  138[H]  3.3[L]   |  25  |  0.56    Ca    8.0[L]      15 Oct 2024 09:27  Phos  2.0     10-15  Mg     1.70     10-15    Urinalysis Basic - ( 15 Oct 2024 09:27 )    Color: x / Appearance: x / SG: x / pH: x  Gluc: 138 mg/dL / Ketone: x  / Bili: x / Urobili: x   Blood: x / Protein: x / Nitrite: x   Leuk Esterase: x / RBC: x / WBC x   Sq Epi: x / Non Sq Epi: x / Bacteria: x    Culture - Fungal, Tissue (collected 12 Oct 2024 13:58)  Source: Tissue  Preliminary Report (14 Oct 2024 10:09):    No growth    Culture - Acid Fast - Tissue w/Smear (collected 12 Oct 2024 13:58)  Source: Tissue    Culture - Tissue with Gram Stain (collected 12 Oct 2024 13:58)  Source: Tissue  Gram Stain (13 Oct 2024 22:35):    No polymorphonuclear leukocytes seen per low power field    No organisms seen per oil power field  Preliminary Report (13 Oct 2024 22:35):    Rare Escherichia coli    RADIOLOGY & ADDITIONAL TESTS:    Results Reviewed: Y  Imaging Personally Reviewed: Y  Electrocardiogram Personally Reviewed: Y, reviewed EKG from 10/14    COORDINATION OF CARE:  Care Discussed with Consultants/Other Providers [Y/N]: Y, discussed with primary urology team  Prior or Outpatient Records Reviewed [Y/N]:

## 2024-10-15 NOTE — PROGRESS NOTE ADULT - ASSESSMENT
78 yo M with history of CAD s/p stents in 2012 (ASA), locally advanced rectal cancer s/p neoadjuvant therapy with chemoRT (completed 11/2022), consolidation CAPOX (2/2023), brachytherapy (3/2024), presenting for generalized weakness, chest discomfort, rectal pain. Pt was here at [facility] in 8/30 for fatigue, weakness, and falls, found to have perforated rectal mass with perirectal collection, 2.5 x 1.5 cm with spread to infection in anorectal/perineal region with fluid/gas tracking along penile shaft, however pt left for [facility] and s/p diverting end sigmoid colostomy, cystoscopy and naranjo placement across defect and perineal/scrotal drainage 8/30. Recently at [facility] for several weeks, discharged day prior to presentation for perineal infections, treated with abx. Represents here for generalized weakness and dizziness, CTAP non-con on preliminary read indicating ill-defined fluid/gas in scrotum, perirectal space, and probably perineum. Exam demonstrates skin duskiness multiple genitourinary abscesses, induration, purulent drainage. 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    10/13: S/p debridement of genitalia and perineum in OR yesterday. In SICU for post op pressor requirements. Got 500 ccs overnight for hypovolemia on POCUS. Currently off pressors. On zosyn, clinda, vanc. U and bcx pending. Tissue cx negative. Wound packing changed at bedside, incision beds with adequate granulation tissue, healing well. Transferred to floor  10/14 - wounds clean; re-packed; penroses in place; ID consult  10/15 - pt refuses blood draw, and refuses dressing change (it has been 24 hrs since last one; he refused last nite also)    Plan  - cont zosyn  - will continue to try to do dressing change and blood draw  - will speak to daughter

## 2024-10-15 NOTE — PROGRESS NOTE ADULT - PROBLEM SELECTOR PLAN 1
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

## 2024-10-15 NOTE — PROGRESS NOTE ADULT - PROBLEM SELECTOR PLAN 2
Patient with history of locally advanced rectal cancer  - follows with [facility] oncology  - GOC addressed on previous admissions with patient's daughter, Tiffanie.   - patient and family reportedly wanted all interventions  - agree with palliative care consult to further discuss goals of care. Patient with history of locally advanced rectal cancer  - follows with [facility] oncology  - GOC addressed on previous admissions with patient's daughter, Tiffanie.   - patient and family reportedly wish to have all interventions

## 2024-10-15 NOTE — PROGRESS NOTE ADULT - PROBLEM SELECTOR PLAN 4
Patient noted with troponin elevated to 70s-80s on current admission  - on recent admission in August noted with troponin elevation to 2000s  - at that time suspected to be 2/2 demand ischemia per cardiology  - current troponin trend noted to be downtrending, again likely demand i/s/o active infection/hypotension while in SICU  - patient currently without any chest pain/SOB  - no need to trend troponin further unless patient develops active symptoms. Patient noted with troponin elevated to 70s-80s on current admission  - on recent admission in August noted with troponin elevation to 2000s  - at that time suspected to be 2/2 demand ischemia per cardiology  - current troponin trend noted to be downtrending, again likely demand i/s/o active infection/hypotension while in SICU  - patient currently without any chest pain/SOB  - no need to trend troponin further unless patient develops active symptoms.  - plan to check TTE i/s/o elevated pBNP

## 2024-10-15 NOTE — PROGRESS NOTE ADULT - SUBJECTIVE AND OBJECTIVE BOX
POD #3  Afeb 118/69 74 96%RA    Pt adamantly refuses blood draw, and will not allow us to change dressing;       our resident was on the phone with his daughter last nite, claims he is not demented;       pt refused dressing change last nite also  Abd- not examined  Zamarripa [phone]  Colostomy - 0

## 2024-10-15 NOTE — PROGRESS NOTE ADULT - PROBLEM SELECTOR PLAN 5
Patient noted with RLE pitting edema up to shin  - no erythema or calf tenderness  - also noted with elevated pBNP up to 31K on 10/12  - no previous values available for comparison  - patient currently appearing largely euvolemic on exam  - will check RLE DVT study  - recheck pBNP with next set of labs. Patient noted with RLE pitting edema up to shin  - no erythema or calf tenderness  - also noted with elevated pBNP up to 31K on 10/12  - no previous values available for comparison  - patient currently appearing largely euvolemic on exam  - bilateral LE DVT study without any DVTs  - repeat pBNP showing improvement to [id number]  - would recommend checking TTE to reassess EF as patient with high pBNP but otherwise does not appear grossly overloaded

## 2024-10-15 NOTE — PROGRESS NOTE ADULT - PROBLEM SELECTOR PLAN 7
Per chart review, patients family reported that patient does have history of atrial fibrillation  - not on AC currently due to history of bleeding episodes  - most recent EKG from 10/12 reviewed, patient with atrial fibrillation  - now with rates controlled, and appearing to be back in sinus rhythm  - please recheck EKG, now that rates have improved and patient is more stable  - c/w toprol 50mg qd. Per chart review, patients family reported that patient does have history of atrial fibrillation  - not on AC currently due to history of bleeding episodes  - most recent EKG from 10/12 reviewed, patient with atrial fibrillation  - now with rates controlled, and appearing to be back in sinus rhythm  - repeat EKG from 10/14 reviewed, NSR, no gross ischemic changes, unchanged BBB noted in lateral leads  - c/w toprol 50mg qd

## 2024-10-16 LAB
-  AMPICILLIN/SULBACTAM: SIGNIFICANT CHANGE UP
-  AMPICILLIN: SIGNIFICANT CHANGE UP
-  AZTREONAM: SIGNIFICANT CHANGE UP
-  CEFAZOLIN: SIGNIFICANT CHANGE UP
-  CEFEPIME: SIGNIFICANT CHANGE UP
-  CEFTRIAXONE: SIGNIFICANT CHANGE UP
-  CIPROFLOXACIN: SIGNIFICANT CHANGE UP
-  ERTAPENEM: SIGNIFICANT CHANGE UP
-  GENTAMICIN: SIGNIFICANT CHANGE UP
-  IMIPENEM: SIGNIFICANT CHANGE UP
-  LEVOFLOXACIN: SIGNIFICANT CHANGE UP
-  MEROPENEM: SIGNIFICANT CHANGE UP
-  PIPERACILLIN/TAZOBACTAM: SIGNIFICANT CHANGE UP
-  TOBRAMYCIN: SIGNIFICANT CHANGE UP
-  TRIMETHOPRIM/SULFAMETHOXAZOLE: SIGNIFICANT CHANGE UP
METHOD TYPE: SIGNIFICANT CHANGE UP

## 2024-10-16 PROCEDURE — 93306 TTE W/DOPPLER COMPLETE: CPT | Mod: 26

## 2024-10-16 PROCEDURE — 99232 SBSQ HOSP IP/OBS MODERATE 35: CPT

## 2024-10-16 RX ORDER — ERTAPENEM SODIUM 1 G/1
1000 INJECTION, POWDER, LYOPHILIZED, FOR SOLUTION INTRAMUSCULAR; INTRAVENOUS EVERY 24 HOURS
Refills: 0 | Status: COMPLETED | OUTPATIENT
Start: 2024-10-16 | End: 2024-10-23

## 2024-10-16 RX ORDER — MORPHINE SULFATE 30 MG/1
2 TABLET, EXTENDED RELEASE ORAL ONCE
Refills: 0 | Status: DISCONTINUED | OUTPATIENT
Start: 2024-10-16 | End: 2024-10-16

## 2024-10-16 RX ADMIN — Medication 1000 MILLIGRAM(S): at 17:30

## 2024-10-16 RX ADMIN — Medication 50 MILLIGRAM(S): at 05:35

## 2024-10-16 RX ADMIN — Medication 1 DROP(S): at 05:32

## 2024-10-16 RX ADMIN — MORPHINE SULFATE 4 MILLIGRAM(S): 30 TABLET, EXTENDED RELEASE ORAL at 10:04

## 2024-10-16 RX ADMIN — HEPARIN SODIUM 5000 UNIT(S): 10000 INJECTION INTRAVENOUS; SUBCUTANEOUS at 15:50

## 2024-10-16 RX ADMIN — Medication 10 MILLIGRAM(S): at 22:37

## 2024-10-16 RX ADMIN — MORPHINE SULFATE 2 MILLIGRAM(S): 30 TABLET, EXTENDED RELEASE ORAL at 23:37

## 2024-10-16 RX ADMIN — Medication 1 DROP(S): at 17:30

## 2024-10-16 RX ADMIN — FLUCONAZOLE, SODIUM CHLORIDE 200 MILLIGRAM(S): 2 INJECTION INTRAVENOUS at 12:58

## 2024-10-16 RX ADMIN — Medication 81 MILLIGRAM(S): at 12:58

## 2024-10-16 RX ADMIN — Medication 1000 MILLIGRAM(S): at 12:59

## 2024-10-16 RX ADMIN — NICOTINE POLACRILEX 1 PATCH: 4 GUM, CHEWING ORAL at 19:34

## 2024-10-16 RX ADMIN — NICOTINE POLACRILEX 1 PATCH: 4 GUM, CHEWING ORAL at 12:58

## 2024-10-16 RX ADMIN — MORPHINE SULFATE 4 MILLIGRAM(S): 30 TABLET, EXTENDED RELEASE ORAL at 17:30

## 2024-10-16 RX ADMIN — Medication 1 DROP(S): at 22:37

## 2024-10-16 RX ADMIN — PIPERACILLIN AND TAZOBACTAM 25 GRAM(S): .5; 4 INJECTION, POWDER, LYOPHILIZED, FOR SOLUTION INTRAVENOUS at 01:10

## 2024-10-16 RX ADMIN — MORPHINE SULFATE 4 MILLIGRAM(S): 30 TABLET, EXTENDED RELEASE ORAL at 09:04

## 2024-10-16 RX ADMIN — Medication 1000 MILLIGRAM(S): at 13:59

## 2024-10-16 RX ADMIN — MORPHINE SULFATE 2 MILLIGRAM(S): 30 TABLET, EXTENDED RELEASE ORAL at 22:37

## 2024-10-16 RX ADMIN — MORPHINE SULFATE 4 MILLIGRAM(S): 30 TABLET, EXTENDED RELEASE ORAL at 18:30

## 2024-10-16 RX ADMIN — ERTAPENEM SODIUM 120 MILLIGRAM(S): 1 INJECTION, POWDER, LYOPHILIZED, FOR SOLUTION INTRAMUSCULAR; INTRAVENOUS at 22:37

## 2024-10-16 RX ADMIN — HEPARIN SODIUM 5000 UNIT(S): 10000 INJECTION INTRAVENOUS; SUBCUTANEOUS at 05:35

## 2024-10-16 RX ADMIN — Medication 1000 MILLIGRAM(S): at 18:30

## 2024-10-16 RX ADMIN — PIPERACILLIN AND TAZOBACTAM 25 GRAM(S): .5; 4 INJECTION, POWDER, LYOPHILIZED, FOR SOLUTION INTRAVENOUS at 10:56

## 2024-10-16 RX ADMIN — HEPARIN SODIUM 5000 UNIT(S): 10000 INJECTION INTRAVENOUS; SUBCUTANEOUS at 22:37

## 2024-10-16 RX ADMIN — SODIUM CHLORIDE 100 MILLILITER(S): 9 INJECTION, SOLUTION INTRAMUSCULAR; INTRAVENOUS; SUBCUTANEOUS at 05:40

## 2024-10-16 NOTE — PROGRESS NOTE ADULT - ASSESSMENT
79-year-old male with a past medical history of CAD status post PCI, locally advanced rectal care concern status post neoadjuvant therapy with chemotherapy, presented to the hospital due to weakness, chest discomfort and rectal pain.     Patient with recent hospital admission 8/30/2024 found to have perforated rectal mass with perirectal collection.  Patient left hospital 1 to [facility] undergoing a lap diverting end sigmoid colostomy with cystoscopy and Zamarripa placement as well as perineal scrotal drainage.     [facility] a day after discharge due to dizziness and malaise.  In the ED, patient found to have atrial fibrillation, right leg DVT.  Patient is afebrile, otherwise hemodynamically stable.  Labs show no leukocytosis, CT abdomen/pelvis showed gas-forming collection of the suprapubic region, Toribio penile region and scrotal region.  Initial I&D planned however aborted due to worsening exam and patient taken emergently to the OR for debridement.  Patient was given vancomycin, piperacillin/tazobactam and clindamycin.     In the OR, perirectal abscess was noted and debrided.  Latest labs show no leukocytosis, anemia 0.1/26.8, BMP with renal function within normal limits, blood cultures from 8/30/2024 with E. coli, sensitivities reviewed.  Culture obtained from the OR also with E. coli.     #Abnormal imaging of the pelvis   #Perirectal abscess , yeast  growth  #History of E.coli bacteremia    Recommendations  Continue piperacillin/tazobactam   OR culture with yeast  Continue fluconazole for yeast in OR culture - follow speciation and sensitivity  Bcx negative to date  Sugery input  Follow fever curve and WBC count    Phillip Julian MD  Division of Infectious Diseases 79-year-old male with a past medical history of CAD status post PCI, locally advanced rectal care concern status post neoadjuvant therapy with chemotherapy, presented to the hospital due to weakness, chest discomfort and rectal pain.     Patient with recent hospital admission 8/30/2024 found to have perforated rectal mass with perirectal collection.  Patient left hospital 1 to [facility] undergoing a lap diverting end sigmoid colostomy with cystoscopy and Zamarripa placement as well as perineal scrotal drainage.     [facility] a day after discharge due to dizziness and malaise.  In the ED, patient found to have atrial fibrillation, right leg DVT.  Patient is afebrile, otherwise hemodynamically stable.  Labs show no leukocytosis, CT abdomen/pelvis showed gas-forming collection of the suprapubic region, Toribio penile region and scrotal region.  Initial I&D planned however aborted due to worsening exam and patient taken emergently to the OR for debridement.  Patient was given vancomycin, piperacillin/tazobactam and clindamycin.     In the OR, perirectal abscess was noted and debrided.  Latest labs show no leukocytosis, anemia 0.1/26.8, BMP with renal function within normal limits, blood cultures from 8/30/2024 with E. coli, sensitivities reviewed.  Culture obtained from the OR also with E. coli.     #Abnormal imaging of the pelvis   #Perirectal abscess , yeast  growth  #History of E.coli bacteremia    Recommendations  Continue piperacillin/tazobactam   OR culture with yeast, E.coli  Continue fluconazole for yeast in OR culture  Follow finalization of OR cultures  Bcx negative to date  Surgery input  Follow fever curve and WBC count    Phillip Julian MD  Division of Infectious Diseases

## 2024-10-16 NOTE — PROGRESS NOTE ADULT - SUBJECTIVE AND OBJECTIVE BOX
[facility] Department of Hospital Medicine  Pankaj Butler MD  Available on MS Teams  Pager: [phone]    Patient is a 79y old  Male who presents with a chief complaint of suprapubic pain (16 Oct 2024 07:43)    OVERNIGHT EVENTS: No acute events overnight.    SUBJECTIVE: Pt seen and examined at bedside this morning. Resting comfortably. Continues to note rectal pain. Denies any other acute complaints. Denies any CP or SOB. Denies any dizziness or palpitations. States that he has had swelling of his right leg for 30 years.    ADDITIONAL REVIEW OF SYSTEMS: as above    MEDICATIONS  (STANDING):  acetaminophen     Tablet .. 1000 milliGRAM(s) Oral every 6 hours  aspirin  chewable 81 milliGRAM(s) Oral daily  brimonidine 0.2% Ophthalmic Solution 1 Drop(s) Both EYES every 12 hours  fluconAZOLE   Tablet 200 milliGRAM(s) Oral daily  heparin   Injectable 5000 Unit(s) SubCutaneous every 8 hours  latanoprost 0.005% Ophthalmic Solution 1 Drop(s) Both EYES at bedtime  metoprolol succinate ER 50 milliGRAM(s) Oral daily  nicotine -  14 mG/24Hr(s) Patch 1 Patch Transdermal daily  piperacillin/tazobactam IVPB.. 3.375 Gram(s) IV Intermittent every 8 hours  rosuvastatin 10 milliGRAM(s) Oral at bedtime  sodium chloride 0.9%. 1000 milliLiter(s) (100 mL/Hr) IV Continuous <Continuous>  timolol 0.5% Solution 1 Drop(s) Both EYES every 12 hours    MEDICATIONS  (PRN):  morphine  - Injectable 2 milliGRAM(s) IV Push every 8 hours PRN Moderate Pain (4 - 6)  morphine  - Injectable 4 milliGRAM(s) IV Push every 8 hours PRN Severe Pain (7 - 10)    CAPILLARY BLOOD GLUCOSE    I&O's Summary    15 Oct 2024 07:01  -  16 Oct 2024 07:00  --------------------------------------------------------  IN: 0 mL / OUT: 3350 mL / NET: -3350 mL    16 Oct 2024 07:01  -  16 Oct 2024 12:32  --------------------------------------------------------  IN: 0 mL / OUT: 1000 mL / NET: -1000 mL    PHYSICAL EXAM:  Vital Signs Last 24 Hrs  T(C): 36.7 (16 Oct 2024 09:07), Max: 36.7 (16 Oct 2024 09:07)  T(F): 98 (16 Oct 2024 09:07), Max: 98 (16 Oct 2024 09:07)  HR: 72 (16 Oct 2024 09:07) (70 - 78)  BP: 142/86 (16 Oct 2024 09:07) (119/64 - 142/87)  BP(mean): --  RR: 18 (16 Oct 2024 09:07) (16 - 18)  SpO2: 98% (16 Oct 2024 09:07) (95% - 100%)    Parameters below as of 16 Oct 2024 09:07  Patient On (Oxygen Delivery Method): room air    GENERAL: NAD, well-developed  HEAD:  Atraumatic, Normocephalic  EYES: eyes tracking  NECK: Supple, No JVD  CHEST/LUNG: Clear to auscultation bilaterally; No wheeze  HEART: Regular rate and rhythm; No murmurs, rubs, or gallops  ABDOMEN: Soft, Nontender, Nondistended; Bowel sounds present  : naranjo in place, perineal wounds with packing/drains present  EXTREMITIES: 1+ pitting edema noted over RLE  NEUROLOGY: non-focal    LABS:                        8.3    5.14  )-----------( 182      ( 15 Oct 2024 09:27 )             27.2     10-15    134[L]  |  100  |  7   ----------------------------<  138[H]  3.3[L]   |  25  |  0.56    Ca    8.0[L]      15 Oct 2024 09:27  Phos  2.0     10-15  Mg     1.70     10-15    Urinalysis Basic - ( 15 Oct 2024 09:27 )    Color: x / Appearance: x / SG: x / pH: x  Gluc: 138 mg/dL / Ketone: x  / Bili: x / Urobili: x   Blood: x / Protein: x / Nitrite: x   Leuk Esterase: x / RBC: x / WBC x   Sq Epi: x / Non Sq Epi: x / Bacteria: x    RADIOLOGY & ADDITIONAL TESTS:    Results Reviewed: Y  Imaging Personally Reviewed:  Electrocardiogram Personally Reviewed:    COORDINATION OF CARE:  Care Discussed with Consultants/Other Providers [Y/N]: Y, discussed with primary urology team.  Prior or Outpatient Records Reviewed [Y/N]:

## 2024-10-16 NOTE — PROGRESS NOTE ADULT - ASSESSMENT
78 yo M with history of CAD s/p stents in 2012 (ASA), locally advanced rectal cancer s/p neoadjuvant therapy with chemoRT (completed 11/2022), consolidation CAPOX (2/2023), brachytherapy (3/2024), presenting for generalized weakness, chest discomfort, rectal pain. Pt was here at [facility] in 8/30 for fatigue, weakness, and falls, found to have perforated rectal mass with perirectal collection, 2.5 x 1.5 cm with spread to infection in anorectal/perineal region with fluid/gas tracking along penile shaft, however pt left for [facility]K and s/p diverting end sigmoid colostomy, cystoscopy and naranjo placement across defect and perineal/scrotal drainage 8/30. Recently at [facility] for several weeks, discharged day prior to presentation for perineal infections, treated with abx. Represents here for generalized weakness and dizziness, CTAP non-con on preliminary read indicating ill-defined fluid/gas in scrotum, perirectal space, and probably perineum. Exam demonstrates skin duskiness multiple genitourinary abscesses, induration, purulent drainage. 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    10/13: S/p debridement of genitalia and perineum in OR yesterday. In SICU for post op pressor requirements. Got 500 ccs overnight for hypovolemia on POCUS. Currently off pressors. On zosyn, clinda, vanc. U and bcx pending. Tissue cx negative. Wound packing changed at bedside, incision beds with adequate granulation tissue, healing well. Transferred to floor  10/14 - wounds clean; re-packed; penroses in place; ID consult  10/15 - pt refuses blood draw, and refuses dressing change (it has been 24 hrs since last one; he refused last nite also)                 later on he did allow it - wound appears clean, no necrotic tissue; labs drawn  10/16 - pro-BNP high so echo today    Plan  - cont zosyn  - labs  - echo  - dressing change

## 2024-10-16 NOTE — PROGRESS NOTE ADULT - PROBLEM SELECTOR PLAN 1
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

## 2024-10-16 NOTE — PROGRESS NOTE ADULT - PROBLEM SELECTOR PLAN 4
Patient noted with troponin elevated to 70s-80s on current admission  - on recent admission in August noted with troponin elevation to 2000s  - at that time suspected to be 2/2 demand ischemia per cardiology  - current troponin trend noted to be downtrending, again likely demand i/s/o active infection/hypotension while in SICU  - patient currently without any chest pain/SOB  - no need to trend troponin further unless patient develops active symptoms.  - plan to check TTE i/s/o elevated pBNP

## 2024-10-16 NOTE — PROGRESS NOTE ADULT - PROBLEM SELECTOR PLAN 5
Patient noted with RLE pitting edema up to shin  - no erythema or calf tenderness  - also noted with elevated pBNP up to 31K on 10/12  - no previous values available for comparison  - patient currently appearing largely euvolemic on exam  - bilateral LE DVT study without any DVTs  - repeat pBNP showing improvement to 74656m  - would recommend checking TTE to reassess EF as patient with high pBNP but otherwise does not appear grossly overloaded

## 2024-10-16 NOTE — PROGRESS NOTE ADULT - PROBLEM SELECTOR PLAN 7
Per chart review, patients family reported that patient does have history of atrial fibrillation  - not on AC currently due to history of bleeding episodes  - most recent EKG from 10/12 reviewed, patient with atrial fibrillation  - now with rates controlled, and appearing to be back in sinus rhythm  - repeat EKG from 10/14 reviewed, NSR, no gross ischemic changes, unchanged BBB noted in lateral leads  - c/w toprol 50mg qd

## 2024-10-16 NOTE — PROGRESS NOTE ADULT - PROBLEM SELECTOR PLAN 2
Patient with history of locally advanced rectal cancer  - [place] [facility] [place]  - GOC addressed on previous admissions with patient's daughter, Tiffanie.   - patient and family reportedly wish to have all interventions

## 2024-10-16 NOTE — PROGRESS NOTE ADULT - SUBJECTIVE AND OBJECTIVE BOX
POD #4  Afeb 138/80 70 96%RA    Pt has no new c/o  Abd- soft NT ND   Zamarripa 2L  Col - gas  Pt refused twice to get up with P.T.; claims he has too much pain  Refused dressing change

## 2024-10-17 LAB
ANION GAP SERPL CALC-SCNC: 10 MMOL/L — SIGNIFICANT CHANGE UP (ref 7–14)
BUN SERPL-MCNC: 6 MG/DL — LOW (ref 7–23)
CALCIUM SERPL-MCNC: 8.2 MG/DL — LOW (ref 8.4–10.5)
CHLORIDE SERPL-SCNC: 101 MMOL/L — SIGNIFICANT CHANGE UP (ref 98–107)
CO2 SERPL-SCNC: 23 MMOL/L — SIGNIFICANT CHANGE UP (ref 22–31)
CREAT SERPL-MCNC: 0.47 MG/DL — LOW (ref 0.5–1.3)
CULTURE RESULTS: ABNORMAL
CULTURE RESULTS: SIGNIFICANT CHANGE UP
CULTURE RESULTS: SIGNIFICANT CHANGE UP
EGFR: 106 ML/MIN/1.73M2 — SIGNIFICANT CHANGE UP
GLUCOSE SERPL-MCNC: 99 MG/DL — SIGNIFICANT CHANGE UP (ref 70–99)
HCT VFR BLD CALC: 29.9 % — LOW (ref 39–50)
HGB BLD-MCNC: 9 G/DL — LOW (ref 13–17)
MCHC RBC-ENTMCNC: 25.3 PG — LOW (ref 27–34)
MCHC RBC-ENTMCNC: 30.1 GM/DL — LOW (ref 32–36)
MCV RBC AUTO: 84 FL — SIGNIFICANT CHANGE UP (ref 80–100)
NRBC # BLD: 0 /100 WBCS — SIGNIFICANT CHANGE UP (ref 0–0)
NRBC # FLD: 0 K/UL — SIGNIFICANT CHANGE UP (ref 0–0)
ORGANISM # SPEC MICROSCOPIC CNT: ABNORMAL
ORGANISM # SPEC MICROSCOPIC CNT: ABNORMAL
PLATELET # BLD AUTO: 183 K/UL — SIGNIFICANT CHANGE UP (ref 150–400)
POTASSIUM SERPL-MCNC: 3.4 MMOL/L — LOW (ref 3.5–5.3)
POTASSIUM SERPL-SCNC: 3.4 MMOL/L — LOW (ref 3.5–5.3)
RBC # BLD: 3.56 M/UL — LOW (ref 4.2–5.8)
RBC # FLD: 18 % — HIGH (ref 10.3–14.5)
SODIUM SERPL-SCNC: 134 MMOL/L — LOW (ref 135–145)
SPECIMEN SOURCE: SIGNIFICANT CHANGE UP
WBC # BLD: 5.33 K/UL — SIGNIFICANT CHANGE UP (ref 3.8–10.5)
WBC # FLD AUTO: 5.33 K/UL — SIGNIFICANT CHANGE UP (ref 3.8–10.5)

## 2024-10-17 PROCEDURE — 99233 SBSQ HOSP IP/OBS HIGH 50: CPT

## 2024-10-17 PROCEDURE — 99223 1ST HOSP IP/OBS HIGH 75: CPT

## 2024-10-17 RX ORDER — LOSARTAN POTASSIUM 25 MG/1
25 TABLET ORAL DAILY
Refills: 0 | Status: DISCONTINUED | OUTPATIENT
Start: 2024-10-17 | End: 2024-11-12

## 2024-10-17 RX ORDER — CASPOFUNGIN ACETATE 70 MG/10.8ML
50 INJECTION, POWDER, LYOPHILIZED, FOR SOLUTION INTRAVENOUS EVERY 24 HOURS
Refills: 0 | Status: DISCONTINUED | OUTPATIENT
Start: 2024-10-17 | End: 2024-10-18

## 2024-10-17 RX ORDER — MORPHINE SULFATE 30 MG/1
2 TABLET, EXTENDED RELEASE ORAL ONCE
Refills: 0 | Status: DISCONTINUED | OUTPATIENT
Start: 2024-10-17 | End: 2024-10-17

## 2024-10-17 RX ORDER — POTASSIUM CHLORIDE 10 MEQ
40 TABLET, EXTENDED RELEASE ORAL ONCE
Refills: 0 | Status: COMPLETED | OUTPATIENT
Start: 2024-10-17 | End: 2024-10-17

## 2024-10-17 RX ADMIN — Medication 1000 MILLIGRAM(S): at 06:25

## 2024-10-17 RX ADMIN — Medication 1 DROP(S): at 05:25

## 2024-10-17 RX ADMIN — HEPARIN SODIUM 5000 UNIT(S): 10000 INJECTION INTRAVENOUS; SUBCUTANEOUS at 13:26

## 2024-10-17 RX ADMIN — MORPHINE SULFATE 4 MILLIGRAM(S): 30 TABLET, EXTENDED RELEASE ORAL at 18:19

## 2024-10-17 RX ADMIN — MORPHINE SULFATE 4 MILLIGRAM(S): 30 TABLET, EXTENDED RELEASE ORAL at 17:22

## 2024-10-17 RX ADMIN — HEPARIN SODIUM 5000 UNIT(S): 10000 INJECTION INTRAVENOUS; SUBCUTANEOUS at 05:25

## 2024-10-17 RX ADMIN — Medication 1 DROP(S): at 17:18

## 2024-10-17 RX ADMIN — MORPHINE SULFATE 4 MILLIGRAM(S): 30 TABLET, EXTENDED RELEASE ORAL at 05:24

## 2024-10-17 RX ADMIN — MORPHINE SULFATE 2 MILLIGRAM(S): 30 TABLET, EXTENDED RELEASE ORAL at 22:15

## 2024-10-17 RX ADMIN — ERTAPENEM SODIUM 120 MILLIGRAM(S): 1 INJECTION, POWDER, LYOPHILIZED, FOR SOLUTION INTRAMUSCULAR; INTRAVENOUS at 22:08

## 2024-10-17 RX ADMIN — Medication 1 DROP(S): at 22:08

## 2024-10-17 RX ADMIN — Medication 1000 MILLIGRAM(S): at 12:06

## 2024-10-17 RX ADMIN — Medication 40 MILLIEQUIVALENT(S): at 11:51

## 2024-10-17 RX ADMIN — Medication 1 DROP(S): at 05:26

## 2024-10-17 RX ADMIN — Medication 10 MILLIGRAM(S): at 22:08

## 2024-10-17 RX ADMIN — MORPHINE SULFATE 4 MILLIGRAM(S): 30 TABLET, EXTENDED RELEASE ORAL at 06:25

## 2024-10-17 RX ADMIN — CASPOFUNGIN ACETATE 260 MILLIGRAM(S): 70 INJECTION, POWDER, LYOPHILIZED, FOR SOLUTION INTRAVENOUS at 22:08

## 2024-10-17 RX ADMIN — NICOTINE POLACRILEX 1 PATCH: 4 GUM, CHEWING ORAL at 08:00

## 2024-10-17 RX ADMIN — MORPHINE SULFATE 2 MILLIGRAM(S): 30 TABLET, EXTENDED RELEASE ORAL at 23:15

## 2024-10-17 RX ADMIN — NICOTINE POLACRILEX 1 PATCH: 4 GUM, CHEWING ORAL at 12:06

## 2024-10-17 RX ADMIN — Medication 1000 MILLIGRAM(S): at 05:25

## 2024-10-17 RX ADMIN — HEPARIN SODIUM 5000 UNIT(S): 10000 INJECTION INTRAVENOUS; SUBCUTANEOUS at 22:08

## 2024-10-17 RX ADMIN — NICOTINE POLACRILEX 1 PATCH: 4 GUM, CHEWING ORAL at 18:40

## 2024-10-17 RX ADMIN — Medication 50 MILLIGRAM(S): at 05:25

## 2024-10-17 NOTE — CONSULT NOTE ADULT - SUBJECTIVE AND OBJECTIVE BOX
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 At time of admission patient noted to be in AF and converted to NSR. The hospital course was c/b septic shock requiring SICU stay.  Cardiology now consulted for newly depressed EF noted on TTE and AF management.   HE is currently asymptomatic from a cardiovascular standpoint without CP or SOB. At baseline he is able to ambulated about 1-2 blocks, he does not describe why he cannot go further but denies GALARZA on CP. Only complaint is pain at surgical site.       WBC 6.22  Hgb 9.8  Cr. 0.62  Troponins 62 and BNP 31,000  Lactate 2.5  Urine from naranjo showing LE, bacteria, WBC    In ED, received clinda/vanc/zosyn        PAST MEDICAL & SURGICAL HISTORY:  CAD s/p cardiac stent    HTN    rectal cancer, s/p chemo at [facility] [place]       MEDICATIONS  (STANDING):  piperacillin/tazobactam IVPB.- 3.375 Gram(s) IV Intermittent once  piperacillin/tazobactam IVPB.. 3.375 Gram(s) IV Intermittent every 8 hours    MEDICATIONS  (PRN):      FAMILY HISTORY:      Allergies    No Known Allergies    Intolerances        SOCIAL HISTORY:active smoker    REVIEW OF SYSTEMS:   Otherwise negative as stated in HPI       (12 Oct 2024 12:03)      PMHx:       PSHx:   No significant past surgical history        Allergies:  No Known Allergies      Home Meds:    Current Medications:   acetaminophen     Tablet .. 1000 milliGRAM(s) Oral every 6 hours  aspirin  chewable 81 milliGRAM(s) Oral daily  brimonidine 0.2% Ophthalmic Solution 1 Drop(s) Both EYES every 12 hours  ertapenem  IVPB 1000 milliGRAM(s) IV Intermittent every 24 hours  fluconAZOLE   Tablet 200 milliGRAM(s) Oral daily  heparin   Injectable 5000 Unit(s) SubCutaneous every 8 hours  latanoprost 0.005% Ophthalmic Solution 1 Drop(s) Both EYES at bedtime  metoprolol succinate ER 50 milliGRAM(s) Oral daily  morphine  - Injectable 4 milliGRAM(s) IV Push every 8 hours PRN  morphine  - Injectable 2 milliGRAM(s) IV Push every 8 hours PRN  nicotine -  14 mG/24Hr(s) Patch 1 Patch Transdermal daily  rosuvastatin 10 milliGRAM(s) Oral at bedtime  sodium chloride 0.9%. 1000 milliLiter(s) IV Continuous <Continuous>  timolol 0.5% Solution 1 Drop(s) Both EYES every 12 hours      FAMILY HISTORY:      REVIEW OF SYSTEMS:  All other review of systems is negative unless indicated above.    Physical Exam:  T(F): 97.5 (10-17), Max: 97.8 (10-16)  HR: 73 (10-17) (72 - 81)  BP: 136/84 (10-17) (136/80 - 140/81)  RR: 18 (10-17)  SpO2: 98% (10-17)  Appearance: No acute distress  Eyes:  EOMI  HEENT: Normal oral mucosa  Cardiovascular: RRR, S1, S2, no murmurs, rubs, or gallops; trace LE edema; no JVD  Respiratory: Clear to auscultation bilaterally  Gastrointestinal: soft, non-tender, non-distended  Extremities: no lower extremity edema   Neurologic: Non-focal  Psychiatry: AAOx3, mood & affect appropriate    Cardiovascular Diagnostic Testing:    ECG:   NSR    AF    Echo:   CONCLUSIONS:      1. Left ventricular cavity is normal in size. Left ventricular wall thickness is normal. Left ventricular systolic function is moderately to severely decreased with an ejection fraction visually estimated at 35to 40 %. Global left ventricular hypokinesis.   2. Enlarged right ventricular cavity size, with normal wall thickness, and probably normal right ventricular systolic function.   3. Left atrium is moderately dilated.   4. No prior echocardiogram is available for comparison.   5. Small pericardial effusion.   6. Left pleural effusion noted.      Labs: Personally reviewed                        9.0    5.33  )-----------( 183      ( 17 Oct 2024 05:40 )             29.9     10-17    134[L]  |  101  |  6[L]  ----------------------------<  99  3.4[L]   |  23  |  0.47[L]    Ca    8.2[L]      17 Oct 2024 05:40          CARDIAC MARKERS ( 14 Oct 2024 05:51 )  55 ng/L / x     / x     / x     / x     / x      CARDIAC MARKERS ( 13 Oct 2024 01:26 )  72 ng/L / x     / x     / x     / x     / x      CARDIAC MARKERS ( 12 Oct 2024 16:00 )  81 ng/L / x     / x     / x     / x     / x      CARDIAC MARKERS ( 12 Oct 2024 03:40 )  63 ng/L / x     / x     / x     / x     / x      CARDIAC MARKERS ( 12 Oct 2024 02:28 )  62 ng/L / x     / x     / x     / x     / x                  Thyroid Stimulating Hormone, Serum: 26.10 uIU/mL (10-15 @ 09:27)  Thyroid Stimulating Hormone, Serum: 23.86 uIU/mL (10-12 @ 02:28)       Patient seen and evaluated at bedside    HPI:  80 yo M with history of CAD s/p PCI LAD in 2012 (ASA), AF, locally advanced rectal cancer s/p neoadjuvant therapy with chemoRT (completed 11/2022), consolidation CAPOX (2/2023), brachytherapy (3/2024), presenting for generalized weakness, rectal pain, 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 At time of admission patient noted to be in AF and converted to NSR. The hospital course was c/b septic shock requiring SICU stay.  Cardiology now consulted for newly depressed EF noted on TTE and AF management.   HE is currently asymptomatic from a cardiovascular standpoint without CP or SOB. At baseline he is able to ambulated about 1-2 blocks, he does not describe why he cannot go further but denies GALARZA on CP. Only complaint is pain at surgical site.       WBC 6.22  Hgb 9.8  Cr. 0.62  Troponins 62 and BNP 31,000  Lactate 2.5  Urine from naranjo showing LE, bacteria, WBC    In ED, received clinda/vanc/zosyn        PAST MEDICAL & SURGICAL HISTORY:  CAD s/p cardiac stent    HTN    rectal cancer, s/p chemo at [facility] [place]       MEDICATIONS  (STANDING):  piperacillin/tazobactam IVPB.- 3.375 Gram(s) IV Intermittent once  piperacillin/tazobactam IVPB.. 3.375 Gram(s) IV Intermittent every 8 hours    MEDICATIONS  (PRN):      FAMILY HISTORY:      Allergies    No Known Allergies    Intolerances        SOCIAL HISTORY:active smoker    REVIEW OF SYSTEMS:   Otherwise negative as stated in HPI       (12 Oct 2024 12:03)      PMHx:       PSHx:   No significant past surgical history        Allergies:  No Known Allergies      Home Meds:    Current Medications:   acetaminophen     Tablet .. 1000 milliGRAM(s) Oral every 6 hours  aspirin  chewable 81 milliGRAM(s) Oral daily  brimonidine 0.2% Ophthalmic Solution 1 Drop(s) Both EYES every 12 hours  ertapenem  IVPB 1000 milliGRAM(s) IV Intermittent every 24 hours  fluconAZOLE   Tablet 200 milliGRAM(s) Oral daily  heparin   Injectable 5000 Unit(s) SubCutaneous every 8 hours  latanoprost 0.005% Ophthalmic Solution 1 Drop(s) Both EYES at bedtime  metoprolol succinate ER 50 milliGRAM(s) Oral daily  morphine  - Injectable 4 milliGRAM(s) IV Push every 8 hours PRN  morphine  - Injectable 2 milliGRAM(s) IV Push every 8 hours PRN  nicotine -  14 mG/24Hr(s) Patch 1 Patch Transdermal daily  rosuvastatin 10 milliGRAM(s) Oral at bedtime  sodium chloride 0.9%. 1000 milliLiter(s) IV Continuous <Continuous>  timolol 0.5% Solution 1 Drop(s) Both EYES every 12 hours      FAMILY HISTORY:      REVIEW OF SYSTEMS:  All other review of systems is negative unless indicated above.    Physical Exam:  T(F): 97.5 (10-17), Max: 97.8 (10-16)  HR: 73 (10-17) (72 - 81)  BP: 136/84 (10-17) (136/80 - 140/81)  RR: 18 (10-17)  SpO2: 98% (10-17)  Appearance: No acute distress  Eyes:  EOMI  HEENT: Normal oral mucosa  Cardiovascular: RRR, S1, S2, no murmurs, rubs, or gallops; trace LE edema; no JVD  Respiratory: Clear to auscultation bilaterally  Gastrointestinal: soft, non-tender, non-distended  Extremities: no lower extremity edema   Neurologic: Non-focal  Psychiatry: AAOx3, mood & affect appropriate    Cardiovascular Diagnostic Testing:    ECG:   NSR    AF    Echo:   CONCLUSIONS:      1. Left ventricular cavity is normal in size. Left ventricular wall thickness is normal. Left ventricular systolic function is moderately to severely decreased with an ejection fraction visually estimated at 35to 40 %. Global left ventricular hypokinesis.   2. Enlarged right ventricular cavity size, with normal wall thickness, and probably normal right ventricular systolic function.   3. Left atrium is moderately dilated.   4. No prior echocardiogram is available for comparison.   5. Small pericardial effusion.   6. Left pleural effusion noted.      Labs: Personally reviewed                        9.0    5.33  )-----------( 183      ( 17 Oct 2024 05:40 )             29.9     10-17    134[L]  |  101  |  6[L]  ----------------------------<  99  3.4[L]   |  23  |  0.47[L]    Ca    8.2[L]      17 Oct 2024 05:40          CARDIAC MARKERS ( 14 Oct 2024 05:51 )  55 ng/L / x     / x     / x     / x     / x      CARDIAC MARKERS ( 13 Oct 2024 01:26 )  72 ng/L / x     / x     / x     / x     / x      CARDIAC MARKERS ( 12 Oct 2024 16:00 )  81 ng/L / x     / x     / x     / x     / x      CARDIAC MARKERS ( 12 Oct 2024 03:40 )  63 ng/L / x     / x     / x     / x     / x      CARDIAC MARKERS ( 12 Oct 2024 02:28 )  62 ng/L / x     / x     / x     / x     / x                  Thyroid Stimulating Hormone, Serum: 26.10 uIU/mL (10-15 @ 09:27)  Thyroid Stimulating Hormone, Serum: 23.86 uIU/mL (10-12 @ 02:28)

## 2024-10-17 NOTE — PROGRESS NOTE ADULT - SUBJECTIVE AND OBJECTIVE BOX
Subjective  More lucid this evening, allowed team to change dressing after giving morphine.     Objective    Vital signs  T(F): , Max: 97.8 (10-16-24 @ 22:00)  HR: 69 (10-17-24 @ 17:31)  BP: 132/79 (10-17-24 @ 17:31)  SpO2: 99% (10-17-24 @ 17:31)  Wt(kg): --    Output     OUT:    Indwelling Catheter - Urethral (mL): 3125 mL  Total OUT: 3125 mL    Total NET: -3125 mL      OUT:    Indwelling Catheter - Urethral (mL): 1100 mL  Total OUT: 1100 mL    Total NET: -1100 mL          Gen: NAD  Abd: soft, nontender, nondistended  : incisions with clean margins and appropriate granulation tissue, penrose drains in place    Labs      10-17 @ 05:40    WBC 5.33  / Hct 29.9  / SCr 0.47         Culture - Fungal, Tissue (collected 10-12-24 @ 13:58)  Source: Tissue  Preliminary Report (10-16-24 @ 10:16):    Rare Yeast    Identification and susceptibility to follow.    Culture - Tissue with Gram Stain (collected 10-12-24 @ 13:58)  Source: Tissue  Gram Stain (10-13-24 @ 22:35):    No polymorphonuclear leukocytes seen per low power field    No organisms seen per oil power field  Final Report (10-17-24 @ 17:34):    Rare Escherichia coli ESBL  Organism: Escherichia coli ESBL (10-17-24 @ 17:34)  Organism: Escherichia coli ESBL (10-17-24 @ 17:34)      -  Levofloxacin: R >4      -  Tobramycin: S <=2      -  Aztreonam: R <=4      -  Gentamicin: S <=2      -  Cefazolin: R >16      -  Cefepime: R <=2      -  Piperacillin/Tazobactam: R <=8      -  Ciprofloxacin: R >2      -  Imipenem: S <=1      -  Ceftriaxone: R 2      -  Ampicillin: R >16 These ampicillin results predict results for amoxicillin      Method Type: JOHAN      -  Meropenem: S <=1      -  Ampicillin/Sulbactam: R >16/8      -  Trimethoprim/Sulfamethoxazole: R >2/38      -  Ertapenem: S <=0.5    Culture - Acid Fast - Tissue w/Smear (collected 10-12-24 @ 13:58)  Source: Tissue  Preliminary Report (10-16-24 @ 23:07):    Culture is being performed.    Culture - Urine (collected 10-12-24 @ 04:12)  Source: Clean Catch Clean Catch (Midstream)  Final Report (10-14-24 @ 22:08):    10,000 - 49,000 CFU/mL Candida albicans "Susceptibilities not performed"    Culture - Blood (collected 10-12-24 @ 02:10)  Source: .Blood BLOOD  Final Report (10-17-24 @ 09:01):    No growth at 5 days    Culture - Blood (collected 10-12-24 @ 01:55)  Source: .Blood BLOOD  Final Report (10-17-24 @ 09:01):    No growth at 5 days        Urine Cx: ?  Blood Cx: ?    Imaging

## 2024-10-17 NOTE — PROVIDER CONTACT NOTE (CRITICAL VALUE NOTIFICATION) - ACTION/TREATMENT ORDERED:
Provider aware troponin is trending down
Team aware. Safety maintained, monitoring ongoing
Patient antibiotic regimen modified

## 2024-10-17 NOTE — PROGRESS NOTE ADULT - ASSESSMENT
78 yo M with history of CAD s/p stents in 2012 (ASA), locally advanced rectal cancer s/p neoadjuvant therapy with chemoRT (completed 11/2022), consolidation CAPOX (2/2023), brachytherapy (3/2024), presenting for generalized weakness, chest discomfort, rectal pain. Pt was here at [facility] in 8/30 for fatigue, weakness, and falls, found to have perforated rectal mass with perirectal collection, 2.5 x 1.5 cm with spread to infection in anorectal/perineal region with fluid/gas tracking along penile shaft, however pt left for MSK and s/p diverting end sigmoid colostomy, cystoscopy and naranjo placement across defect and perineal/scrotal drainage 8/30. Recently at [facility] for several weeks, discharged day prior to presentation for perineal infections, treated with abx. Represents here for generalized weakness and dizziness, CTAP non-con on preliminary read indicating ill-defined fluid/gas in scrotum, perirectal space, and probably perineum. Exam demonstrates skin duskiness multiple genitourinary abscesses, induration, purulent drainage. 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    10/13: S/p debridement of genitalia and perineum in OR yesterday. In SICU for post op pressor requirements. Got 500 ccs overnight for hypovolemia on POCUS. Currently off pressors. On zosyn, clinda, vanc. U and bcx pending. Tissue cx negative. Wound packing changed at bedside, incision beds with adequate granulation tissue, healing well. Transferred to floor  10/14 - wounds clean; re-packed; penroses in place; ID consult  10/15 - pt refuses blood draw, and refuses dressing change (it has been 24 hrs since last one; he refused last nite also)                 later on he did allow it - wound appears clean, no necrotic tissue; labs drawn  10/16 - pro-BNP high so echo today  10/17 - changed dressing, took out subprapubic incision penrose drain, left scrotal penrose drain. Echo with reduced EF - cards says iso sepsis, no further workup needed. Changed abx from fluconazole to caspo (2 week course) per ID. Can c/w erta for ESBL e.coli. Cards saying he will need AC eventually,     Plan  - cont erta and caspo   - labs  - dressing change

## 2024-10-17 NOTE — PROGRESS NOTE ADULT - PROBLEM SELECTOR PLAN 4
Patient noted with troponin elevated to 70s-80s on current admission  - on recent admission in August noted with troponin elevation to 2000s  - at that time suspected to be 2/2 demand ischemia per cardiology  - current troponin trend noted to be downtrending, again likely demand i/s/o active infection/hypotension while in SICU  - patient currently without any chest pain/SOB  - no need to trend troponin further unless patient develops active symptoms.  - TTE performed 10/16, showing global LV hypokinesis, EF 35-40%  - per cardiology documentation from Aug 2024, patient had normal EF in 2023  - recommend cardiology evaluation for possible ischemic eval (as EF seems to have decreased from prior) as well as for optimization of GDMT  - patient otherwise appearing euvolemic at this time

## 2024-10-17 NOTE — PROGRESS NOTE ADULT - PROBLEM SELECTOR PLAN 2
Patient with history of locally advanced rectal cancer  - follows with [facility] oncology  - GOC addressed on previous admissions with patient's daughter, Tiffanie.   - patient and family reportedly wish to have all interventions

## 2024-10-17 NOTE — PROGRESS NOTE ADULT - ASSESSMENT
79-year-old male with a past medical history of CAD status post PCI, locally advanced rectal CA s/p neoadjuvant therapy with chemotherapy, presented to the hospital due to weakness, chest discomfort and rectal pain.     Patient with recent hospital admission 8/30/2024 found to have perforated rectal mass with perirectal collection.  Patient left hospital 1 to [facility] undergoing a lap diverting end sigmoid colostomy with cystoscopy and Zamarripa placement as well as perineal scrotal drainage.     [facility] a day after discharge due to dizziness and malaise.  In the ED, patient found to have atrial fibrillation, right leg DVT.  Patient is afebrile, otherwise hemodynamically stable.  Labs show no leukocytosis, CT abdomen/pelvis showed gas-forming collection of the suprapubic region, Toribio penile region and scrotal region.  Initial I&D planned however aborted due to worsening exam and patient taken emergently to the OR for debridement.  Patient was given vancomycin, piperacillin/tazobactam and clindamycin.     In the OR, perirectal abscess was noted and debrided.  Latest labs show no leukocytosis, anemia 0.1/26.8, BMP with renal function within normal limits, blood cultures from 8/30/2024 with E. coli, sensitivities reviewed.  Culture obtained from the OR also with E. coli.     #Abnormal imaging of the pelvis   #Perirectal abscess , yeast  growth  #History of E.coli bacteremia    Overall, 78 y/o M w/ PMHx of CAD status post PCI, locally advanced rectal CA s/p neoadjuvant therapy w/ chemo, recent perforated rectal mass with perirectal collection s/p lap diverting end sigmoid colostomy with cystoscopy and Zamarripa placement as well as perineal scrotal drainage, 8/30/24, now presenting with CT abdomen/pelvis showed gas-forming collection of the suprapubic region now s/p debridement of necrotic skin and soft tissue from genitalia and perineum on 10/12.  Cx w/ ESBL E Coli, yeast     Recommendations:   1. C/w Ertapenem 1 g q24, change fluconazole to Caspofungin 50 mg daily awaiting ID of yeast   2. Will likely need 2 week course given extensive infection     Thank you for this consult. Inpatient ID team will follow.    Adam Toro M.D.  Attending Physician  Division of Infectious Diseases  Department of Medicine    Please contact through MS Teams message.  Office: [phone] (after 5 PM or weekend)

## 2024-10-17 NOTE — PROGRESS NOTE ADULT - SUBJECTIVE AND OBJECTIVE BOX
[facility] Department of Hospital Medicine  Pankaj Butler MD  Available on MS Teams  Pager: [phone]    Patient is a 79y old  Male who presents with a chief complaint of suprapubic pain (16 Oct 2024 16:11)    OVERNIGHT EVENTS: No acute events overnight.    SUBJECTIVE: Pt seen and examined at bedside this morning. Resting comfortably. Denies any acute complaints today. Notes that pain is well controlled at the moment. Denies any nausea or vomiting. Denies any CP or difficulty breathing. Laying flat without any discomfort.    ADDITIONAL REVIEW OF SYSTEMS: as above.    MEDICATIONS  (STANDING):  acetaminophen     Tablet .. 1000 milliGRAM(s) Oral every 6 hours  aspirin  chewable 81 milliGRAM(s) Oral daily  brimonidine 0.2% Ophthalmic Solution 1 Drop(s) Both EYES every 12 hours  ertapenem  IVPB 1000 milliGRAM(s) IV Intermittent every 24 hours  fluconAZOLE   Tablet 200 milliGRAM(s) Oral daily  heparin   Injectable 5000 Unit(s) SubCutaneous every 8 hours  latanoprost 0.005% Ophthalmic Solution 1 Drop(s) Both EYES at bedtime  metoprolol succinate ER 50 milliGRAM(s) Oral daily  nicotine -  14 mG/24Hr(s) Patch 1 Patch Transdermal daily  rosuvastatin 10 milliGRAM(s) Oral at bedtime  sodium chloride 0.9%. 1000 milliLiter(s) (100 mL/Hr) IV Continuous <Continuous>  timolol 0.5% Solution 1 Drop(s) Both EYES every 12 hours    MEDICATIONS  (PRN):  morphine  - Injectable 4 milliGRAM(s) IV Push every 8 hours PRN Severe Pain (7 - 10)  morphine  - Injectable 2 milliGRAM(s) IV Push every 8 hours PRN Moderate Pain (4 - 6)    CAPILLARY BLOOD GLUCOSE    I&O's Summary    16 Oct 2024 07:01  -  17 Oct 2024 07:00  --------------------------------------------------------  IN: 0 mL / OUT: 3125 mL / NET: -3125 mL    17 Oct 2024 07:01  -  17 Oct 2024 12:11  --------------------------------------------------------  IN: 0 mL / OUT: 700 mL / NET: -700 mL    PHYSICAL EXAM:  Vital Signs Last 24 Hrs  T(C): 36.3 (17 Oct 2024 09:31), Max: 36.6 (16 Oct 2024 17:25)  T(F): 97.3 (17 Oct 2024 09:31), Max: 97.8 (16 Oct 2024 17:25)  HR: 73 (17 Oct 2024 09:31) (72 - 81)  BP: 136/80 (17 Oct 2024 09:31) (136/80 - 140/81)  BP(mean): --  RR: 18 (17 Oct 2024 09:31) (17 - 18)  SpO2: 99% (17 Oct 2024 09:31) (97% - 99%)    Parameters below as of 17 Oct 2024 09:31  Patient On (Oxygen Delivery Method): room air    GENERAL: NAD, well-developed  HEAD:  Atraumatic, Normocephalic  EYES: eyes tracking  NECK: Supple, No JVD  CHEST/LUNG: Clear to auscultation bilaterally; No wheeze  HEART: Regular rate and rhythm; No murmurs, rubs, or gallops  ABDOMEN: Soft, Nontender, Nondistended; Bowel sounds present  : naranjo in place, perineal wounds with packing/drains present  EXTREMITIES: 1+ pitting edema noted over RLE  NEUROLOGY: non-focal    LABS:                        9.0    5.33  )-----------( 183      ( 17 Oct 2024 05:40 )             29.9     10-17    134[L]  |  101  |  6[L]  ----------------------------<  99  3.4[L]   |  23  |  0.47[L]    Ca    8.2[L]      17 Oct 2024 05:40    Urinalysis Basic - ( 17 Oct 2024 05:40 )    Color: x / Appearance: x / SG: x / pH: x  Gluc: 99 mg/dL / Ketone: x  / Bili: x / Urobili: x   Blood: x / Protein: x / Nitrite: x   Leuk Esterase: x / RBC: x / WBC x   Sq Epi: x / Non Sq Epi: x / Bacteria: x    RADIOLOGY & ADDITIONAL TESTS:  < from: TTE W or WO Ultrasound Enhancing Agent (10.16.24 @ 07:13) >  CONCLUSIONS:      1. Left ventricular cavity is normal in size. Left ventricular wall thickness is normal. Left ventricular systolic function is moderately to severely decreased with an ejection fraction visually estimated at 35to 40 %. Global left ventricular hypokinesis.   2. Enlarged right ventricular cavity size, with normal wall thickness, and probably normal right ventricular systolic function.   3. Left atrium is moderately dilated.   4. No prior echocardiogram is available for comparison.   5. Small pericardial effusion.   6. Left pleural effusion noted.    < end of copied text >    Results Reviewed: Y  Imaging Personally Reviewed:   Electrocardiogram Personally Reviewed: Y    COORDINATION OF CARE:  Care Discussed with Consultants/Other Providers [Y/N]: Y, discussed with primary team  Prior or Outpatient Records Reviewed [Y/N]:

## 2024-10-17 NOTE — CONSULT NOTE ADULT - ASSESSMENT
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 TTE is now notable for newly reduced LVEF to 35-40%, globally reduced. He does have a history of multivessel CAD which may be contributing, however, he a sepsis induced myopathy is high on the differential given lack of RWMA. He remains HDS and near euvolemia.       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 TTE is now notable for newly reduced LVEF to 35-40%, lobally reduced. He does have a history of multivessel CAD which may be contributing, however, he a sepsis induced myopathy is high on the differential given lack of RWMA. He remains HDS and near euvolemia.       #AF CHADSVASC 4  #HFrEF  - GDMT: Continue Toprol XL 50 mg, Start losartan 25 mg daily - will add additional GDMT in coming days  - If no contraindication from surgical standpoint - high thromboembolic risk from AF, recommend AC. If procedures are planned can start heparin gtt vs therapeutic lovenox. If no procedures planned, can start Eliquis 5 mg BID  - Will consider ischemic eval when acute medical illness is resolved   - Continue ASA and statin    Please see attending attestation for final recommendations        Frankie Mendoza MD  Cardiology Fellow     All Cardiology service information can be found 24/7 on [email or web address], password: [employer]

## 2024-10-17 NOTE — PROVIDER CONTACT NOTE (CRITICAL VALUE NOTIFICATION) - BACKGROUND
Pt admitted 10/12 for abscess of tissue
admitted for cutaneous abscess
admitted for cutaneous abscess of trunk

## 2024-10-17 NOTE — PROGRESS NOTE ADULT - SUBJECTIVE AND OBJECTIVE BOX
Infectious Diseases Follow Up:    Patient is a 79y old  Male who presents with a chief complaint of suprapubic pain (17 Oct 2024 12:10)      Interval History/ROS:  Afebrile ON, pt denies abdominal pain     Allergies  No Known Allergies        ANTIMICROBIALS:  ertapenem  IVPB 1000 every 24 hours  fluconAZOLE   Tablet 200 daily      Current Abx:     Previous Abx     OTHER MEDS:  MEDICATIONS  (STANDING):  acetaminophen     Tablet .. 1000 every 6 hours  aspirin  chewable 81 daily  heparin   Injectable 5000 every 8 hours  metoprolol succinate ER 50 daily  morphine  - Injectable 4 every 8 hours PRN  morphine  - Injectable 2 every 8 hours PRN  rosuvastatin 10 at bedtime      Vital Signs Last 24 Hrs  T(C): 36.4 (17 Oct 2024 13:30), Max: 36.6 (16 Oct 2024 17:25)  T(F): 97.5 (17 Oct 2024 13:30), Max: 97.8 (16 Oct 2024 17:25)  HR: 73 (17 Oct 2024 13:30) (72 - 81)  BP: 136/84 (17 Oct 2024 13:30) (136/80 - 140/81)  BP(mean): --  RR: 18 (17 Oct 2024 13:30) (18 - 18)  SpO2: 98% (17 Oct 2024 13:30) (97% - 99%)    Parameters below as of 17 Oct 2024 13:30  Patient On (Oxygen Delivery Method): room air        PHYSICAL EXAM:  GENERAL: NAD, well-developed  HEAD:  Atraumatic, Normocephalic  EYES: EOMI, , conjunctiva and sclera clear  CHEST/LUNG: Clear to auscultation bilaterally; No wheeze  HEART: Regular rate and rhythm; No murmurs, rubs, or gallops  ABDOMEN: Soft, Nontender, Nondistended; Bowel sounds present  PSYCH: AAOx3                            9.0    5.33  )-----------( 183      ( 17 Oct 2024 05:40 )             29.9       10-17    134[L]  |  101  |  6[L]  ----------------------------<  99  3.4[L]   |  23  |  0.47[L]    Ca    8.2[L]      17 Oct 2024 05:40        Urinalysis Basic - ( 17 Oct 2024 05:40 )    Color: x / Appearance: x / SG: x / pH: x  Gluc: 99 mg/dL / Ketone: x  / Bili: x / Urobili: x   Blood: x / Protein: x / Nitrite: x   Leuk Esterase: x / RBC: x / WBC x   Sq Epi: x / Non Sq Epi: x / Bacteria: x        MICROBIOLOGY:  v  Tissue  10-12-24   Rare Escherichia coli  --  Escherichia coli ESBL      Clean Catch Clean Catch (Midstream)  10-12-24   10,000 - 49,000 CFU/mL Candida albicans "Susceptibilities not performed"  --  --      .Blood BLOOD  10-12-24   No growth at 5 days  --  --      .Blood BLOOD  10-12-24   No growth at 5 days  --  --                RADIOLOGY:  < from: CT Abdomen and Pelvis No Cont (10.12.24 @ 02:27) >  IMPRESSION:  Ill-defined fluid and gas collection in the scrotum. Fluid and gas is   also present in the perirectal space and probably perineum. 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    Moderate left and small right pleural effusions with adjacent compressive   atelectasis. Bibasilar pulmonary nodules compatible with metastatic   disease.    There is bilobed infrarenal abdominal aortic aneurysm measuring up to 5.1   cm. 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    Bilateral indeterminant renal lesions. Consider renal ultrasound or   abdominal MRI for further evaluation.    < end of copied text >

## 2024-10-17 NOTE — PROGRESS NOTE ADULT - PROBLEM SELECTOR PLAN 5
Patient noted with RLE pitting edema up to shin  - no erythema or calf tenderness  - also noted with elevated pBNP up to 31K on 10/12  - no previous values available for comparison  - patient currently appearing largely euvolemic on exam  - bilateral LE DVT study without any DVTs  - repeat pBNP showing improvement to [id number]  - TTE performed 10/16, showing global LV hypokinesis, EF 35-40%  - LE edema otherwise unilateral and appearing stable

## 2024-10-17 NOTE — PROGRESS NOTE ADULT - PROBLEM SELECTOR PLAN 1
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

## 2024-10-18 PROCEDURE — 99232 SBSQ HOSP IP/OBS MODERATE 35: CPT

## 2024-10-18 RX ORDER — MORPHINE SULFATE 30 MG/1
2 TABLET, EXTENDED RELEASE ORAL ONCE
Refills: 0 | Status: DISCONTINUED | OUTPATIENT
Start: 2024-10-18 | End: 2024-10-18

## 2024-10-18 RX ORDER — FLUCONAZOLE, SODIUM CHLORIDE 2 MG/ML
400 INJECTION INTRAVENOUS EVERY 24 HOURS
Refills: 0 | Status: DISCONTINUED | OUTPATIENT
Start: 2024-10-18 | End: 2024-10-30

## 2024-10-18 RX ADMIN — LOSARTAN POTASSIUM 25 MILLIGRAM(S): 25 TABLET ORAL at 05:01

## 2024-10-18 RX ADMIN — HEPARIN SODIUM 5000 UNIT(S): 10000 INJECTION INTRAVENOUS; SUBCUTANEOUS at 13:08

## 2024-10-18 RX ADMIN — Medication 10 MILLIGRAM(S): at 21:39

## 2024-10-18 RX ADMIN — Medication 1 DROP(S): at 18:30

## 2024-10-18 RX ADMIN — NICOTINE POLACRILEX 1 PATCH: 4 GUM, CHEWING ORAL at 05:10

## 2024-10-18 RX ADMIN — FLUCONAZOLE, SODIUM CHLORIDE 400 MILLIGRAM(S): 2 INJECTION INTRAVENOUS at 21:38

## 2024-10-18 RX ADMIN — Medication 1000 MILLIGRAM(S): at 05:02

## 2024-10-18 RX ADMIN — NICOTINE POLACRILEX 1 PATCH: 4 GUM, CHEWING ORAL at 12:49

## 2024-10-18 RX ADMIN — MORPHINE SULFATE 4 MILLIGRAM(S): 30 TABLET, EXTENDED RELEASE ORAL at 19:00

## 2024-10-18 RX ADMIN — HEPARIN SODIUM 5000 UNIT(S): 10000 INJECTION INTRAVENOUS; SUBCUTANEOUS at 05:01

## 2024-10-18 RX ADMIN — MORPHINE SULFATE 4 MILLIGRAM(S): 30 TABLET, EXTENDED RELEASE ORAL at 18:34

## 2024-10-18 RX ADMIN — NICOTINE POLACRILEX 1 PATCH: 4 GUM, CHEWING ORAL at 18:54

## 2024-10-18 RX ADMIN — MORPHINE SULFATE 2 MILLIGRAM(S): 30 TABLET, EXTENDED RELEASE ORAL at 21:40

## 2024-10-18 RX ADMIN — Medication 1000 MILLIGRAM(S): at 13:08

## 2024-10-18 RX ADMIN — Medication 1000 MILLIGRAM(S): at 06:02

## 2024-10-18 RX ADMIN — Medication 1 DROP(S): at 21:39

## 2024-10-18 RX ADMIN — Medication 1 DROP(S): at 05:01

## 2024-10-18 RX ADMIN — Medication 81 MILLIGRAM(S): at 13:08

## 2024-10-18 RX ADMIN — MORPHINE SULFATE 2 MILLIGRAM(S): 30 TABLET, EXTENDED RELEASE ORAL at 22:40

## 2024-10-18 RX ADMIN — HEPARIN SODIUM 5000 UNIT(S): 10000 INJECTION INTRAVENOUS; SUBCUTANEOUS at 21:39

## 2024-10-18 RX ADMIN — MORPHINE SULFATE 4 MILLIGRAM(S): 30 TABLET, EXTENDED RELEASE ORAL at 05:02

## 2024-10-18 RX ADMIN — MORPHINE SULFATE 4 MILLIGRAM(S): 30 TABLET, EXTENDED RELEASE ORAL at 04:22

## 2024-10-18 RX ADMIN — Medication 1000 MILLIGRAM(S): at 13:15

## 2024-10-18 RX ADMIN — ERTAPENEM SODIUM 120 MILLIGRAM(S): 1 INJECTION, POWDER, LYOPHILIZED, FOR SOLUTION INTRAMUSCULAR; INTRAVENOUS at 21:39

## 2024-10-18 RX ADMIN — NICOTINE POLACRILEX 1 PATCH: 4 GUM, CHEWING ORAL at 13:09

## 2024-10-18 NOTE — PROGRESS NOTE ADULT - SUBJECTIVE AND OBJECTIVE BOX
Subjective  Feels ok, pain controlled. Allowed dressing change this morning.     Objective    Vital signs  T(F): , Max: 98.3 (10-18-24 @ 05:00)  HR: 75 (10-18-24 @ 09:32)  BP: 142/82 (10-18-24 @ 09:32)  SpO2: 98% (10-18-24 @ 09:32)  Wt(kg): --    Output     OUT:    Indwelling Catheter - Urethral (mL): 3350 mL  Total OUT: 3350 mL    Total NET: -3350 mL      OUT:    Indwelling Catheter - Urethral (mL): 700 mL  Total OUT: 700 mL    Total NET: -700 mL        Gen: NAD  Abd: soft, nontender, nondistended  : incisions with clean margins and appropriate granulation tissue, penrose drains out       Labs      10-17 @ 05:40    WBC 5.33  / Hct 29.9  / SCr 0.47         Culture - Fungal, Tissue (collected 10-12-24 @ 13:58)  Source: Tissue  Preliminary Report (10-16-24 @ 10:16):    Rare Yeast    Identification and susceptibility to follow.    Culture - Acid Fast - Tissue w/Smear (collected 10-12-24 @ 13:58)  Source: Tissue  Preliminary Report (10-16-24 @ 23:07):    Culture is being performed.    Culture - Tissue with Gram Stain (collected 10-12-24 @ 13:58)  Source: Tissue  Gram Stain (10-13-24 @ 22:35):    No polymorphonuclear leukocytes seen per low power field    No organisms seen per oil power field  Final Report (10-17-24 @ 17:34):    Rare Escherichia coli ESBL  Organism: Escherichia coli ESBL (10-17-24 @ 17:34)  Organism: Escherichia coli ESBL (10-17-24 @ 17:34)      Method Type: JOHAN      -  Ampicillin: R >16 These ampicillin results predict results for amoxicillin      -  Ampicillin/Sulbactam: R >16/8      -  Aztreonam: R <=4      -  Cefazolin: R >16      -  Cefepime: R <=2      -  Ceftriaxone: R 2      -  Ciprofloxacin: R >2      -  Ertapenem: S <=0.5      -  Gentamicin: S <=2      -  Imipenem: S <=1      -  Levofloxacin: R >4      -  Meropenem: S <=1      -  Piperacillin/Tazobactam: R <=8      -  Tobramycin: S <=2      -  Trimethoprim/Sulfamethoxazole: R >2/38    Culture - Urine (collected 10-12-24 @ 04:12)  Source: Clean Catch Clean Catch (Midstream)  Final Report (10-14-24 @ 22:08):    10,000 - 49,000 CFU/mL Candida albicans "Susceptibilities not performed"    Culture - Blood (collected 10-12-24 @ 02:10)  Source: .Blood BLOOD  Final Report (10-17-24 @ 09:01):    No growth at 5 days    Culture - Blood (collected 10-12-24 @ 01:55)  Source: .Blood BLOOD  Final Report (10-17-24 @ 09:01):    No growth at 5 days        Urine Cx: ?  Blood Cx: ?    Imaging

## 2024-10-18 NOTE — PROGRESS NOTE ADULT - PROBLEM SELECTOR PLAN 4
Patient noted with troponin elevated to 70s-80s on current admission  - on recent admission in August noted with troponin elevation to 2000s  - at that time suspected to be 2/2 demand ischemia per cardiology  - current troponin trend noted to be downtrending, again likely demand i/s/o active infection/hypotension while in SICU  - patient currently without any chest pain/SOB  - no need to trend troponin further unless patient develops active symptoms.  - TTE performed 10/16, showing global LV hypokinesis, EF 35-40%  - per cardiology documentation from Aug 2024, patient had normal EF in 2023  - patient otherwise appearing euvolemic at this time  - cardiology eval appreciated, patient started on Losartan 25mg qd for GDMT, also recommended for ischemic eval once stable from infection/surgical standpoint

## 2024-10-18 NOTE — PROGRESS NOTE ADULT - SUBJECTIVE AND OBJECTIVE BOX
Patient seen and examined at bedside.    Overnight Events:   NAEON  No acute complaints today   Tolerating GDMT     REVIEW OF SYSTEMS:  All other review of systems is negative unless indicated above.            Current Meds:  acetaminophen     Tablet .. 1000 milliGRAM(s) Oral every 6 hours  aspirin  chewable 81 milliGRAM(s) Oral daily  brimonidine 0.2% Ophthalmic Solution 1 Drop(s) Both EYES every 12 hours  ertapenem  IVPB 1000 milliGRAM(s) IV Intermittent every 24 hours  fluconAZOLE   Tablet 400 milliGRAM(s) Oral every 24 hours  heparin   Injectable 5000 Unit(s) SubCutaneous every 8 hours  latanoprost 0.005% Ophthalmic Solution 1 Drop(s) Both EYES at bedtime  losartan 25 milliGRAM(s) Oral daily  metoprolol succinate ER 50 milliGRAM(s) Oral daily  morphine  - Injectable 2 milliGRAM(s) IV Push every 8 hours PRN  morphine  - Injectable 4 milliGRAM(s) IV Push every 8 hours PRN  nicotine -  14 mG/24Hr(s) Patch 1 Patch Transdermal daily  rosuvastatin 10 milliGRAM(s) Oral at bedtime  sodium chloride 0.9%. 1000 milliLiter(s) IV Continuous <Continuous>  timolol 0.5% Solution 1 Drop(s) Both EYES every 12 hours      Vitals:  T(F): 97.6 (10-18), Max: 98.3 (10-18)  HR: 75 (10-18) (55 - 75)  BP: 142/82 (10-18) (132/79 - 142/82)  RR: 18 (10-18)  SpO2: 98% (10-18)  I&O's Summary    17 Oct 2024 07:01  -  18 Oct 2024 07:00  --------------------------------------------------------  IN: 0 mL / OUT: 3375 mL / NET: -3375 mL    18 Oct 2024 07:01  -  18 Oct 2024 12:49  --------------------------------------------------------  IN: 0 mL / OUT: 700 mL / NET: -700 mL        Physical Exam:  Appearance: No acute distress  Eyes:  EOMI  HEENT: Normal oral mucosa  Cardiovascular: RRR, S1, S2, no murmurs, rubs, or gallops; trace LE edema; no JVD  Respiratory: Clear to auscultation bilaterally  Gastrointestinal: soft, non-tender, non-distended  Extremities: no lower extremity edema   Neurologic: Non-focal  Psychiatry: AAOx3, mood & affect appropriate                          9.0    5.33  )-----------( 183      ( 17 Oct 2024 05:40 )             29.9     10-17    134[L]  |  101  |  6[L]  ----------------------------<  99  3.4[L]   |  23  |  0.47[L]    Ca    8.2[L]      17 Oct 2024 05:40        Echo:   CONCLUSIONS:      1. Left ventricular cavity is normal in size. Left ventricular wall thickness is normal. Left ventricular systolic function is moderately to severely decreased with an ejection fraction visually estimated at 35to 40 %. Global left ventricular hypokinesis.   2. Enlarged right ventricular cavity size, with normal wall thickness, and probably normal right ventricular systolic function.   3. Left atrium is moderately dilated.   4. No prior echocardiogram is available for comparison.   5. Small pericardial effusion.   6. Left pleural effusion noted.

## 2024-10-18 NOTE — PROGRESS NOTE ADULT - PROBLEM SELECTOR PLAN 2
Patient with history of locally advanced rectal cancer  - follows with [facility] oncology  - GOC addressed on previous admissions with patient's daughter, Tiffanie.   - patient and family reportedly wish to have all interventions

## 2024-10-18 NOTE — PROGRESS NOTE ADULT - ASSESSMENT
78 yo M with history of CAD s/p stents in 2012 (ASA), locally advanced rectal cancer s/p neoadjuvant therapy with chemoRT (completed 11/2022), consolidation CAPOX (2/2023), brachytherapy (3/2024), presenting for generalized weakness, chest discomfort, rectal pain. Pt was here at [facility] in 8/30 for fatigue, weakness, and falls, found to have perforated rectal mass with perirectal collection, 2.5 x 1.5 cm with spread to infection in anorectal/perineal region with fluid/gas tracking along penile shaft, however pt left for MSK and s/p diverting end sigmoid colostomy, cystoscopy and naranjo placement across defect and perineal/scrotal drainage 8/30. Recently at [facility] for several weeks, discharged day prior to presentation for perineal infections, treated with abx. Represents here for generalized weakness and dizziness, CTAP non-con on preliminary read indicating ill-defined fluid/gas in scrotum, perirectal space, and probably perineum. Exam demonstrates skin duskiness multiple genitourinary abscesses, induration, purulent drainage. 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    10/13: S/p debridement of genitalia and perineum in OR yesterday. In SICU for post op pressor requirements. Got 500 ccs overnight for hypovolemia on POCUS. Currently off pressors. On zosyn, clinda, vanc. U and bcx pending. Tissue cx negative. Wound packing changed at bedside, incision beds with adequate granulation tissue, healing well. Transferred to floor  10/14 - wounds clean; re-packed; penroses in place; ID consult  10/15 - pt refuses blood draw, and refuses dressing change (it has been 24 hrs since last one; he refused last nite also)                 later on he did allow it - wound appears clean, no necrotic tissue; labs drawn  10/16 - pro-BNP high so echo today  10/17 - changed dressing, took out subprapubic incision penrose drain, left scrotal penrose drain. Echo with reduced EF - cards says iso sepsis, no further workup needed. Changed abx from fluconazole to caspo (2 week course) per ID. Can c/w erta for ESBL e.coli. Cards saying he will need AC eventually,   10/18 - changed dressing this am, last penrose in scrotum d/c'ed. wound care spoke with, can try changing wet to dry dressings to aquacel and change qd. Pt's daughter will hire own aid for wound care and PT, but will need homecare for erta and caspo IV at home.     Plan  - cont erta and caspo   - labs  - dressing change  - PT suggesting rehab

## 2024-10-18 NOTE — PROGRESS NOTE ADULT - SUBJECTIVE AND OBJECTIVE BOX
[facility] Department of Hospital Medicine  Pankaj Butler MD  Available on MS Teams  Pager: [phone]    Patient is a 79y old  Male who presents with a chief complaint of suprapubic pain (18 Oct 2024 10:19)    OVERNIGHT EVENTS: No acute events overnight.    SUBJECTIVE: Pt seen and examined at bedside this morning. Endorsing rectal/perineal pain. Continues to deny any chest pain or shortness of breath. Denies any fevers, dizziness or nausea/vomiting.    ADDITIONAL REVIEW OF SYSTEMS: as above.    MEDICATIONS  (STANDING):  acetaminophen     Tablet .. 1000 milliGRAM(s) Oral every 6 hours  aspirin  chewable 81 milliGRAM(s) Oral daily  brimonidine 0.2% Ophthalmic Solution 1 Drop(s) Both EYES every 12 hours  caspofungin IVPB 50 milliGRAM(s) IV Intermittent every 24 hours  ertapenem  IVPB 1000 milliGRAM(s) IV Intermittent every 24 hours  heparin   Injectable 5000 Unit(s) SubCutaneous every 8 hours  latanoprost 0.005% Ophthalmic Solution 1 Drop(s) Both EYES at bedtime  losartan 25 milliGRAM(s) Oral daily  metoprolol succinate ER 50 milliGRAM(s) Oral daily  nicotine -  14 mG/24Hr(s) Patch 1 Patch Transdermal daily  rosuvastatin 10 milliGRAM(s) Oral at bedtime  sodium chloride 0.9%. 1000 milliLiter(s) (100 mL/Hr) IV Continuous <Continuous>  timolol 0.5% Solution 1 Drop(s) Both EYES every 12 hours    MEDICATIONS  (PRN):  morphine  - Injectable 2 milliGRAM(s) IV Push every 8 hours PRN Moderate Pain (4 - 6)  morphine  - Injectable 4 milliGRAM(s) IV Push every 8 hours PRN Severe Pain (7 - 10)    CAPILLARY BLOOD GLUCOSE    I&O's Summary    17 Oct 2024 07:01  -  18 Oct 2024 07:00  --------------------------------------------------------  IN: 0 mL / OUT: 3375 mL / NET: -3375 mL    18 Oct 2024 07:01  -  18 Oct 2024 12:01  --------------------------------------------------------  IN: 0 mL / OUT: 700 mL / NET: -700 mL    PHYSICAL EXAM:  Vital Signs Last 24 Hrs  T(C): 36.4 (18 Oct 2024 09:32), Max: 36.8 (17 Oct 2024 22:08)  T(F): 97.6 (18 Oct 2024 09:32), Max: 98.3 (18 Oct 2024 05:00)  HR: 75 (18 Oct 2024 09:32) (55 - 75)  BP: 142/82 (18 Oct 2024 09:32) (132/79 - 142/82)  BP(mean): --  RR: 18 (18 Oct 2024 09:32) (17 - 18)  SpO2: 98% (18 Oct 2024 09:32) (97% - 99%)    Parameters below as of 18 Oct 2024 09:32  Patient On (Oxygen Delivery Method): room air    GENERAL: NAD, well-developed  HEAD:  Atraumatic, Normocephalic  EYES: eyes tracking  NECK: Supple, No JVD  CHEST/LUNG: Clear to auscultation bilaterally; No wheeze  HEART: Regular rate and rhythm; No murmurs, rubs, or gallops  ABDOMEN: Soft, Nontender, Nondistended; Bowel sounds present  EXTREMITIES: 1+ pitting edema noted over RLE  NEUROLOGY: non-focal    LABS:                        9.0    5.33  )-----------( 183      ( 17 Oct 2024 05:40 )             29.9     10-17    134[L]  |  101  |  6[L]  ----------------------------<  99  3.4[L]   |  23  |  0.47[L]    Ca    8.2[L]      17 Oct 2024 05:40    Urinalysis Basic - ( 17 Oct 2024 05:40 )    Color: x / Appearance: x / SG: x / pH: x  Gluc: 99 mg/dL / Ketone: x  / Bili: x / Urobili: x   Blood: x / Protein: x / Nitrite: x   Leuk Esterase: x / RBC: x / WBC x   Sq Epi: x / Non Sq Epi: x / Bacteria: x    RADIOLOGY & ADDITIONAL TESTS:    Results Reviewed: Y  Imaging Personally Reviewed:   Electrocardiogram Personally Reviewed: Y    COORDINATION OF CARE:  Care Discussed with Consultants/Other Providers [Y/N]:  Prior or Outpatient Records Reviewed [Y/N]:

## 2024-10-18 NOTE — PROGRESS NOTE ADULT - ASSESSMENT
79-year-old male with a past medical history of CAD status post PCI, locally advanced rectal CA s/p neoadjuvant therapy with chemotherapy, presented to the hospital due to weakness, chest discomfort and rectal pain.     Patient with recent hospital admission 8/30/2024 found to have perforated rectal mass with perirectal collection.  Patient left hospital 1 to [facility] undergoing a lap diverting end sigmoid colostomy with cystoscopy and Zamarripa placement as well as perineal scrotal drainage.     [facility] a day after discharge due to dizziness and malaise.  In the ED, patient found to have atrial fibrillation, right leg DVT.  Patient is afebrile, otherwise hemodynamically stable.  Labs show no leukocytosis, CT abdomen/pelvis showed gas-forming collection of the suprapubic region, Toribio penile region and scrotal region.  Initial I&D planned however aborted due to worsening exam and patient taken emergently to the OR for debridement.  Patient was given vancomycin, piperacillin/tazobactam and clindamycin.     In the OR, perirectal abscess was noted and debrided.  Latest labs show no leukocytosis, anemia 0.1/26.8, BMP with renal function within normal limits, blood cultures from 8/30/2024 with E. coli, sensitivities reviewed.  Culture obtained from the OR also with E. coli.     #Tara's gangrene   #Abnormal imaging of the pelvis   #Perirectal abscess , yeast  growth  #History of E.coli bacteremia    Overall, 80 y/o M w/ PMHx of CAD status post PCI, locally advanced rectal CA s/p neoadjuvant therapy w/ chemo, recent perforated rectal mass with perirectal collection s/p lap diverting end sigmoid colostomy with cystoscopy and Zamarripa placement as well as perineal scrotal drainage, 8/30/24, now presenting with CT abdomen/pelvis showed gas-forming collection of the suprapubic region now s/p debridement of necrotic skin and soft tissue from genitalia and perineum on 10/12.  Cx w/ ESBL E Coli, yeast     Recommendations:   1. C/w Ertapenem 1 g q24, can restart Fluconazole 400 mg q24 given candida albicans.   2. Will likely need 2 week course given extensive infection , EOT 10/29    Thank you for this consult. Inpatient ID team will follow.    Adam Toro M.D.  Attending Physician  Division of Infectious Diseases  Department of Medicine    Please contact through MS Teams message.  Office: [phone] (after 5 PM or weekend)

## 2024-10-18 NOTE — PROGRESS NOTE ADULT - PROBLEM SELECTOR PLAN 1
Patient presenting with rectal pain and generalized weakness  - CT imaging and exam on admission with findings concerning for Tara's gangrene  - patient now s/p debridement of genitalia and perineum by urology team on 10/12  - briefly requiring SICU level of care for pressor support due to post-operative hypotension  - now downgraded to surgical floor, wounds healing well  - ID following, recommendations appreciated  - most recent OR cultures with E Coli ESBL, patient switched to ertapenem/caspofungin per sensitivities

## 2024-10-18 NOTE — PROGRESS NOTE ADULT - PROBLEM SELECTOR PLAN 5
Patient noted with RLE pitting edema up to shin  - no erythema or calf tenderness  - also noted with elevated pBNP up to 31K on 10/12  - no previous values available for comparison  - patient currently appearing largely euvolemic on exam  - bilateral LE DVT study without any DVTs  - repeat pBNP showing improvement to [id number]  - TTE performed 10/16, showing global LV hypokinesis, EF 35-40%  - LE edema otherwise unilateral and appearing stable

## 2024-10-18 NOTE — PROGRESS NOTE ADULT - PROBLEM SELECTOR PLAN 7
Per chart review, patients family reported that patient does have history of atrial fibrillation  - not on AC currently due to history of bleeding episodes  - most recent EKG from 10/12 reviewed, patient with atrial fibrillation  - now with rates controlled, and appearing to be back in sinus rhythm  - repeat EKG from 10/14 reviewed, NSR, no gross ischemic changes, unchanged BBB noted in lateral leads  - c/w toprol 50mg qd  - can consider starting full AC with therapeutic lovenox if cleared from surgical standpoint however another risk/benefit discussion may be warranted with patient/family

## 2024-10-18 NOTE — PROGRESS NOTE ADULT - ASSESSMENT
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 TTE is now notable for newly reduced LVEF to 35-40%, lobally reduced. He does have a history of multivessel CAD which may be contributing, however, he a sepsis induced myopathy is high on the differential given lack of RWMA. He remains HDS and near euvolemia.       #AF CHADSVASC 4  #HFrEF  - GDMT: Continue Toprol XL 50 mg, losartan 25 mg daily, start spironolactone 25 mg on 10/19 if renal function and K stable/WNL  - If no contraindication from surgical standpoint - high thromboembolic risk from AF, recommend AC. If procedures are planned can start heparin gtt vs therapeutic lovenox. If no procedures planned, can start Eliquis 5 mg BID  - Will consider ischemic eval when acute medical illness is resolved but not indicated at this time  - Continue ASA and statin    Please see attending attestation for final recommendations        Frankie Mendoza MD  Cardiology Fellow     All Cardiology service information can be found 24/7 on [email or web address], password: [email or web address]

## 2024-10-18 NOTE — PROGRESS NOTE ADULT - SUBJECTIVE AND OBJECTIVE BOX
Infectious Diseases Follow Up:    Patient is a 79y old  Male who presents with a chief complaint of suprapubic pain (18 Oct 2024 10:19)      Interval History/ROS:  Afebrile, denies abdominal pain     Allergies  No Known Allergies        ANTIMICROBIALS:  caspofungin IVPB 50 every 24 hours  ertapenem  IVPB 1000 every 24 hours      Current Abx:     Previous Abx     OTHER MEDS:  MEDICATIONS  (STANDING):  acetaminophen     Tablet .. 1000 every 6 hours  aspirin  chewable 81 daily  heparin   Injectable 5000 every 8 hours  losartan 25 daily  metoprolol succinate ER 50 daily  morphine  - Injectable 4 every 8 hours PRN  morphine  - Injectable 2 every 8 hours PRN  rosuvastatin 10 at bedtime      Vital Signs Last 24 Hrs  T(C): 36.4 (18 Oct 2024 09:32), Max: 36.8 (17 Oct 2024 22:08)  T(F): 97.6 (18 Oct 2024 09:32), Max: 98.3 (18 Oct 2024 05:00)  HR: 75 (18 Oct 2024 09:32) (55 - 75)  BP: 142/82 (18 Oct 2024 09:32) (132/79 - 142/82)  BP(mean): --  RR: 18 (18 Oct 2024 09:32) (17 - 18)  SpO2: 98% (18 Oct 2024 09:32) (97% - 99%)    Parameters below as of 18 Oct 2024 09:32  Patient On (Oxygen Delivery Method): room air        PHYSICAL EXAM:  GENERAL: NAD, well-developed  HEAD:  Atraumatic, Normocephalic  EYES: EOMI, conjunctiva and sclera clear  CHEST/LUNG: Clear to auscultation bilaterally; No wheeze  HEART: Regular rate and rhythm; No murmurs, rubs, or gallops  ABDOMEN: Soft, Nontender, Nondistended; +Ostomy w/ stool   PSYCH: AAOx3                          9.0    5.33  )-----------( 183      ( 17 Oct 2024 05:40 )             29.9       10-17    134[L]  |  101  |  6[L]  ----------------------------<  99  3.4[L]   |  23  |  0.47[L]    Ca    8.2[L]      17 Oct 2024 05:40        Urinalysis Basic - ( 17 Oct 2024 05:40 )    Color: x / Appearance: x / SG: x / pH: x  Gluc: 99 mg/dL / Ketone: x  / Bili: x / Urobili: x   Blood: x / Protein: x / Nitrite: x   Leuk Esterase: x / RBC: x / WBC x   Sq Epi: x / Non Sq Epi: x / Bacteria: x        MICROBIOLOGY:  v  Tissue  10-12-24   Rare Escherichia coli ESBL  --  Escherichia coli ESBL      Clean Catch Clean Catch (Midstream)  10-12-24   10,000 - 49,000 CFU/mL Candida albicans "Susceptibilities not performed"  --  --      .Blood BLOOD  10-12-24   No growth at 5 days  --  --      .Blood BLOOD  10-12-24   No growth at 5 days  --  --                RADIOLOGY:

## 2024-10-19 LAB
ANION GAP SERPL CALC-SCNC: 10 MMOL/L — SIGNIFICANT CHANGE UP (ref 7–14)
BUN SERPL-MCNC: 6 MG/DL — LOW (ref 7–23)
CALCIUM SERPL-MCNC: 8.1 MG/DL — LOW (ref 8.4–10.5)
CHLORIDE SERPL-SCNC: 102 MMOL/L — SIGNIFICANT CHANGE UP (ref 98–107)
CO2 SERPL-SCNC: 23 MMOL/L — SIGNIFICANT CHANGE UP (ref 22–31)
CREAT SERPL-MCNC: 0.44 MG/DL — LOW (ref 0.5–1.3)
EGFR: 108 ML/MIN/1.73M2 — SIGNIFICANT CHANGE UP
GLUCOSE SERPL-MCNC: 108 MG/DL — HIGH (ref 70–99)
HCT VFR BLD CALC: 29.3 % — LOW (ref 39–50)
HGB BLD-MCNC: 8.9 G/DL — LOW (ref 13–17)
MCHC RBC-ENTMCNC: 25.6 PG — LOW (ref 27–34)
MCHC RBC-ENTMCNC: 30.4 GM/DL — LOW (ref 32–36)
MCV RBC AUTO: 84.2 FL — SIGNIFICANT CHANGE UP (ref 80–100)
NRBC # BLD: 0 /100 WBCS — SIGNIFICANT CHANGE UP (ref 0–0)
NRBC # FLD: 0 K/UL — SIGNIFICANT CHANGE UP (ref 0–0)
PLATELET # BLD AUTO: 183 K/UL — SIGNIFICANT CHANGE UP (ref 150–400)
POTASSIUM SERPL-MCNC: 3.2 MMOL/L — LOW (ref 3.5–5.3)
POTASSIUM SERPL-SCNC: 3.2 MMOL/L — LOW (ref 3.5–5.3)
RBC # BLD: 3.48 M/UL — LOW (ref 4.2–5.8)
RBC # FLD: 18.2 % — HIGH (ref 10.3–14.5)
SODIUM SERPL-SCNC: 135 MMOL/L — SIGNIFICANT CHANGE UP (ref 135–145)
WBC # BLD: 6.87 K/UL — SIGNIFICANT CHANGE UP (ref 3.8–10.5)
WBC # FLD AUTO: 6.87 K/UL — SIGNIFICANT CHANGE UP (ref 3.8–10.5)

## 2024-10-19 PROCEDURE — 99232 SBSQ HOSP IP/OBS MODERATE 35: CPT

## 2024-10-19 RX ORDER — POTASSIUM CHLORIDE 10 MEQ
20 TABLET, EXTENDED RELEASE ORAL
Refills: 0 | Status: DISCONTINUED | OUTPATIENT
Start: 2024-10-19 | End: 2024-10-19

## 2024-10-19 RX ORDER — APIXABAN 5 MG/1
5 TABLET, FILM COATED ORAL EVERY 12 HOURS
Refills: 0 | Status: DISCONTINUED | OUTPATIENT
Start: 2024-10-19 | End: 2024-10-22

## 2024-10-19 RX ORDER — MORPHINE SULFATE 30 MG/1
2 TABLET, EXTENDED RELEASE ORAL ONCE
Refills: 0 | Status: DISCONTINUED | OUTPATIENT
Start: 2024-10-19 | End: 2024-10-19

## 2024-10-19 RX ORDER — POTASSIUM CHLORIDE 10 MEQ
10 TABLET, EXTENDED RELEASE ORAL
Refills: 0 | Status: COMPLETED | OUTPATIENT
Start: 2024-10-19 | End: 2024-10-19

## 2024-10-19 RX ORDER — SPIRONOLACTONE 100 MG
25 TABLET ORAL DAILY
Refills: 0 | Status: DISCONTINUED | OUTPATIENT
Start: 2024-10-19 | End: 2024-11-12

## 2024-10-19 RX ADMIN — MORPHINE SULFATE 4 MILLIGRAM(S): 30 TABLET, EXTENDED RELEASE ORAL at 03:34

## 2024-10-19 RX ADMIN — MORPHINE SULFATE 2 MILLIGRAM(S): 30 TABLET, EXTENDED RELEASE ORAL at 11:37

## 2024-10-19 RX ADMIN — Medication 1 DROP(S): at 22:16

## 2024-10-19 RX ADMIN — Medication 1 DROP(S): at 18:58

## 2024-10-19 RX ADMIN — NICOTINE POLACRILEX 1 PATCH: 4 GUM, CHEWING ORAL at 05:32

## 2024-10-19 RX ADMIN — MORPHINE SULFATE 2 MILLIGRAM(S): 30 TABLET, EXTENDED RELEASE ORAL at 12:37

## 2024-10-19 RX ADMIN — MORPHINE SULFATE 2 MILLIGRAM(S): 30 TABLET, EXTENDED RELEASE ORAL at 17:49

## 2024-10-19 RX ADMIN — HEPARIN SODIUM 5000 UNIT(S): 10000 INJECTION INTRAVENOUS; SUBCUTANEOUS at 05:22

## 2024-10-19 RX ADMIN — NICOTINE POLACRILEX 1 PATCH: 4 GUM, CHEWING ORAL at 14:26

## 2024-10-19 RX ADMIN — MORPHINE SULFATE 4 MILLIGRAM(S): 30 TABLET, EXTENDED RELEASE ORAL at 02:34

## 2024-10-19 RX ADMIN — Medication 1000 MILLIGRAM(S): at 13:29

## 2024-10-19 RX ADMIN — Medication 1000 MILLIGRAM(S): at 06:22

## 2024-10-19 RX ADMIN — ERTAPENEM SODIUM 120 MILLIGRAM(S): 1 INJECTION, POWDER, LYOPHILIZED, FOR SOLUTION INTRAMUSCULAR; INTRAVENOUS at 22:07

## 2024-10-19 RX ADMIN — Medication 100 MILLIEQUIVALENT(S): at 14:47

## 2024-10-19 RX ADMIN — Medication 1 DROP(S): at 05:22

## 2024-10-19 RX ADMIN — Medication 10 MILLIGRAM(S): at 22:08

## 2024-10-19 RX ADMIN — Medication 1000 MILLIGRAM(S): at 18:57

## 2024-10-19 RX ADMIN — Medication 1000 MILLIGRAM(S): at 19:57

## 2024-10-19 RX ADMIN — Medication 100 MILLIEQUIVALENT(S): at 12:12

## 2024-10-19 RX ADMIN — MORPHINE SULFATE 2 MILLIGRAM(S): 30 TABLET, EXTENDED RELEASE ORAL at 16:49

## 2024-10-19 RX ADMIN — Medication 81 MILLIGRAM(S): at 11:37

## 2024-10-19 RX ADMIN — LOSARTAN POTASSIUM 25 MILLIGRAM(S): 25 TABLET ORAL at 05:22

## 2024-10-19 RX ADMIN — MORPHINE SULFATE 4 MILLIGRAM(S): 30 TABLET, EXTENDED RELEASE ORAL at 23:11

## 2024-10-19 RX ADMIN — NICOTINE POLACRILEX 1 PATCH: 4 GUM, CHEWING ORAL at 18:59

## 2024-10-19 RX ADMIN — FLUCONAZOLE, SODIUM CHLORIDE 400 MILLIGRAM(S): 2 INJECTION INTRAVENOUS at 20:55

## 2024-10-19 RX ADMIN — NICOTINE POLACRILEX 1 PATCH: 4 GUM, CHEWING ORAL at 11:38

## 2024-10-19 RX ADMIN — APIXABAN 5 MILLIGRAM(S): 5 TABLET, FILM COATED ORAL at 18:58

## 2024-10-19 RX ADMIN — Medication 100 MILLIEQUIVALENT(S): at 13:29

## 2024-10-19 RX ADMIN — Medication 1000 MILLIGRAM(S): at 14:29

## 2024-10-19 RX ADMIN — Medication 1000 MILLIGRAM(S): at 23:03

## 2024-10-19 RX ADMIN — Medication 50 MILLIGRAM(S): at 05:23

## 2024-10-19 RX ADMIN — Medication 1000 MILLIGRAM(S): at 05:22

## 2024-10-19 NOTE — PROGRESS NOTE ADULT - PROBLEM SELECTOR PLAN 2
Patient with history of locally advanced rectal cancer  - follows with [facility] oncology  - GOC addressed on previous admissions with patient's daughter, Tiffanie.   - patient and family reportedly wish to have all interventions

## 2024-10-19 NOTE — PROGRESS NOTE ADULT - ASSESSMENT
80 yo M with history of CAD s/p stents in 2012 (ASA), locally advanced rectal cancer s/p neoadjuvant therapy with chemoRT (completed 11/2022), consolidation CAPOX (2/2023), brachytherapy (3/2024), presenting for generalized weakness, chest discomfort, rectal pain. Pt was here at [facility] in 8/30 for fatigue, weakness, and falls, found to have perforated rectal mass with perirectal collection, 2.5 x 1.5 cm with spread to infection in anorectal/perineal region with fluid/gas tracking along penile shaft, however pt left for MSK and s/p diverting end sigmoid colostomy, cystoscopy and naranjo placement across defect and perineal/scrotal drainage 8/30. Recently at [facility] for several weeks, discharged day prior to presentation for perineal infections, treated with abx. Represents here for generalized weakness and dizziness, CTAP non-con on preliminary read indicating ill-defined fluid/gas in scrotum, perirectal space, and probably perineum. Exam demonstrates skin duskiness multiple genitourinary abscesses, induration, purulent drainage. 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    10/13: S/p debridement of genitalia and perineum in OR yesterday. In SICU for post op pressor requirements. Got 500 ccs overnight for hypovolemia on POCUS. Currently off pressors. On zosyn, clinda, vanc. U and bcx pending. Tissue cx negative. Wound packing changed at bedside, incision beds with adequate granulation tissue, healing well. Transferred to floor  10/14 - wounds clean; re-packed; penroses in place; ID consult  10/15 - pt refuses blood draw, and refuses dressing change (it has been 24 hrs since last one; he refused last nite also)                 later on he did allow it - wound appears clean, no necrotic tissue; labs drawn  10/16 - pro-BNP high so echo today  10/17 - changed dressing, took out subprapubic incision penrose drain, left scrotal penrose drain. Echo with reduced EF - cards says iso sepsis, no further workup needed. Changed abx from fluconazole to caspo (2 week course) per ID. Can c/w erta for ESBL e.coli. Cards saying he will need AC eventually,   10/18 - changed dressing this am, last penrose in scrotum d/c'ed. wound care spoke with, can try changing wet to dry dressings to aquacel and change qd. Pt's daughter will hire own aid for wound care and PT, but will need homecare for erta and caspo IV at home.   10/19- changed dressing to aquacel, started Eliquis    Plan  - cont erta and fluconazole  - labs stable; K repleted  - dressing change qd with aquacel per wound care   - continue naranjo  - restart Eliquis today   - PT suggesting rehab  - f/u medicine, cards, ID, wound care

## 2024-10-19 NOTE — PROGRESS NOTE ADULT - SUBJECTIVE AND OBJECTIVE BOX
Merlin Mathew, MD   Hospitalist  Pager #[phone]    PROGRESS NOTE:     Patient is a 79y old  Male who presents with a chief complaint of suprapubic pain (19 Oct 2024 11:37)      SUBJECTIVE / OVERNIGHT EVENTS:  Patient frustrated, reporting pain, feels he has not been cleaned up   Per RN- pt refused to be cleaned up, now agreeable   ADDITIONAL REVIEW OF SYSTEMS:    MEDICATIONS  (STANDING):  acetaminophen     Tablet .. 1000 milliGRAM(s) Oral every 6 hours  apixaban 5 milliGRAM(s) Oral every 12 hours  aspirin  chewable 81 milliGRAM(s) Oral daily  brimonidine 0.2% Ophthalmic Solution 1 Drop(s) Both EYES every 12 hours  ertapenem  IVPB 1000 milliGRAM(s) IV Intermittent every 24 hours  fluconAZOLE   Tablet 400 milliGRAM(s) Oral every 24 hours  latanoprost 0.005% Ophthalmic Solution 1 Drop(s) Both EYES at bedtime  losartan 25 milliGRAM(s) Oral daily  metoprolol succinate ER 50 milliGRAM(s) Oral daily  nicotine -  14 mG/24Hr(s) Patch 1 Patch Transdermal daily  potassium chloride  10 mEq/100 mL IVPB 10 milliEquivalent(s) IV Intermittent every 1 hour  rosuvastatin 10 milliGRAM(s) Oral at bedtime  sodium chloride 0.9%. 1000 milliLiter(s) (100 mL/Hr) IV Continuous <Continuous>  timolol 0.5% Solution 1 Drop(s) Both EYES every 12 hours    MEDICATIONS  (PRN):  morphine  - Injectable 2 milliGRAM(s) IV Push every 8 hours PRN Moderate Pain (4 - 6)  morphine  - Injectable 4 milliGRAM(s) IV Push every 8 hours PRN Severe Pain (7 - 10)      CAPILLARY BLOOD GLUCOSE        I&O's Summary    18 Oct 2024 07:01  -  19 Oct 2024 07:00  --------------------------------------------------------  IN: 0 mL / OUT: 2360 mL / NET: -2360 mL    19 Oct 2024 07:01  -  19 Oct 2024 13:33  --------------------------------------------------------  IN: 0 mL / OUT: 300 mL / NET: -300 mL        PHYSICAL EXAM:  Vital Signs Last 24 Hrs  T(C): 36.3 (19 Oct 2024 08:50), Max: 36.9 (18 Oct 2024 17:02)  T(F): 97.4 (19 Oct 2024 08:50), Max: 98.5 (18 Oct 2024 17:02)  HR: 75 (19 Oct 2024 12:03) (74 - 92)  BP: 140/88 (19 Oct 2024 12:03) (123/71 - 147/86)  BP(mean): --  RR: 18 (19 Oct 2024 08:50) (17 - 18)  SpO2: 98% (19 Oct 2024 08:50) (98% - 100%)    Parameters below as of 19 Oct 2024 08:50  Patient On (Oxygen Delivery Method): room air      Patient refused PE   Gen: Laying in bed comfortably   Resp: Symmetrical chest rise, no accessory muscle use, normal respiratory effort  Abd: Does not appear distended   Neuro: Speaking in full sentences       LABS:                        8.9    6.87  )-----------( 183      ( 19 Oct 2024 08:00 )             29.3     10-19    135  |  102  |  6[L]  ----------------------------<  108[H]  3.2[L]   |  23  |  0.44[L]    Ca    8.1[L]      19 Oct 2024 08:00            Urinalysis Basic - ( 19 Oct 2024 08:00 )    Color: x / Appearance: x / SG: x / pH: x  Gluc: 108 mg/dL / Ketone: x  / Bili: x / Urobili: x   Blood: x / Protein: x / Nitrite: x   Leuk Esterase: x / RBC: x / WBC x   Sq Epi: x / Non Sq Epi: x / Bacteria: x          RADIOLOGY & ADDITIONAL TESTS:  Results Reviewed:   Imaging Personally Reviewed:  Electrocardiogram Personally Reviewed:    COORDINATION OF CARE:  Care Discussed with Consultants/Other Providers [Y/N]:  Prior or Outpatient Records Reviewed [Y/N]:

## 2024-10-19 NOTE — PROGRESS NOTE ADULT - SUBJECTIVE AND OBJECTIVE BOX
Subjective    Patient seen and examined. Complains of thirst. Denies pain at rest.     Objective    Vital signs  T(F): , Max: 98.5 (10-18-24 @ 17:02)  HR: 74 (10-19-24 @ 08:50)  BP: 130/74 (10-19-24 @ 08:50)  SpO2: 98% (10-19-24 @ 08:50)  Wt(kg): --    Output     10-18 @ 07:01  -  10-19 @ 07:00  --------------------------------------------------------  IN: 0 mL / OUT: 2360 mL / NET: -2360 mL    10-19 @ 07:01  -  10-19 @ 11:37  --------------------------------------------------------  IN: 0 mL / OUT: 300 mL / NET: -300 mL        General: NAD  Abdomen: soft/non-tender/non-distended  : naranjo in place draining cyu. Debridement wounds at base of mons, base of penile shaft and scrotum. Wet dressings removed and dry aquacel placed     Labs      10-19 @ 08:00    WBC 6.87  / Hct 29.3  / SCr 0.44

## 2024-10-19 NOTE — PROGRESS NOTE ADULT - ASSESSMENT
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 Medicine following for comanagement.

## 2024-10-19 NOTE — PROGRESS NOTE ADULT - PROBLEM SELECTOR PLAN 4
Patient noted with troponin elevated to 70s-80s on current admission. Recent admission in August with Gabrielle elevation to 2000's thought to be 2/2 demand ischemia per Cardiology.   - current troponin trend noted to be downtrending, again likely demand i/s/o active infection/hypotension while in SICU  - patient currently without any chest pain/SOB, no need to trend troponin further unless patient develops active symptoms.  - TTE performed 10/16, showing global LV hypokinesis, EF 35-40%  - cardiology eval appreciated, patient started on Losartan 25mg qd for GDMT, also recommended for ischemic eval once stable from infection/surgical standpoint

## 2024-10-19 NOTE — PROGRESS NOTE ADULT - PROBLEM SELECTOR PLAN 1
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

## 2024-10-19 NOTE — PROGRESS NOTE ADULT - PROBLEM SELECTOR PLAN 7
Per chart review, patients family reported that patient does have history of atrial fibrillation  - not on AC currently due to history of bleeding episodes  - most recent EKG from 10/12 reviewed, patient with atrial fibrillation  - now with rates controlled, and appearing to be back in sinus rhythm  - repeat EKG from 10/14 reviewed, NSR, no gross ischemic changes, unchanged BBB noted in lateral leads  - c/w toprol 50mg qd  [ ] Consider initiating Lovenox or Eliquis if cleared from surgical standpoint after discussion with family Per chart review, patients family reported that patient does have history of atrial fibrillation  - not on AC currently due to history of bleeding episodes  - most recent EKG from 10/12 reviewed, patient with atrial fibrillation  - now with rates controlled, and appearing to be back in sinus rhythm  - repeat EKG from 10/14 reviewed, NSR, no gross ischemic changes, unchanged BBB noted in lateral leads  - c/w toprol 50mg qd  - Eliquis started

## 2024-10-19 NOTE — PROGRESS NOTE ADULT - PROBLEM SELECTOR PLAN 5
Patient noted with RLE pitting edema up to shin, no erythema or calf tenderness  - also noted with elevated pBNP up to 31K on 10/12  - patient currently appearing largely euvolemic on exam  - bilateral LE DVT study without any DVTs  - repeat pBNP showing improvement to [id number]  - TTE performed 10/16, showing global LV hypokinesis, EF 35-40%  - LE edema otherwise unilateral and appearing stable

## 2024-10-20 LAB
-  FLUCONAZOLE: SIGNIFICANT CHANGE UP
ANION GAP SERPL CALC-SCNC: 9 MMOL/L — SIGNIFICANT CHANGE UP (ref 7–14)
BUN SERPL-MCNC: 11 MG/DL — SIGNIFICANT CHANGE UP (ref 7–23)
CALCIUM SERPL-MCNC: 8.1 MG/DL — LOW (ref 8.4–10.5)
CHLORIDE SERPL-SCNC: 101 MMOL/L — SIGNIFICANT CHANGE UP (ref 98–107)
CO2 SERPL-SCNC: 23 MMOL/L — SIGNIFICANT CHANGE UP (ref 22–31)
CREAT SERPL-MCNC: 0.47 MG/DL — LOW (ref 0.5–1.3)
EGFR: 106 ML/MIN/1.73M2 — SIGNIFICANT CHANGE UP
GLUCOSE SERPL-MCNC: 104 MG/DL — HIGH (ref 70–99)
HCT VFR BLD CALC: 29.3 % — LOW (ref 39–50)
HGB BLD-MCNC: 9 G/DL — LOW (ref 13–17)
MCHC RBC-ENTMCNC: 25.7 PG — LOW (ref 27–34)
MCHC RBC-ENTMCNC: 30.7 GM/DL — LOW (ref 32–36)
MCV RBC AUTO: 83.7 FL — SIGNIFICANT CHANGE UP (ref 80–100)
METHOD TYPE: SIGNIFICANT CHANGE UP
NRBC # BLD: 0 /100 WBCS — SIGNIFICANT CHANGE UP (ref 0–0)
NRBC # FLD: 0 K/UL — SIGNIFICANT CHANGE UP (ref 0–0)
PLATELET # BLD AUTO: 178 K/UL — SIGNIFICANT CHANGE UP (ref 150–400)
POTASSIUM SERPL-MCNC: 3.5 MMOL/L — SIGNIFICANT CHANGE UP (ref 3.5–5.3)
POTASSIUM SERPL-SCNC: 3.5 MMOL/L — SIGNIFICANT CHANGE UP (ref 3.5–5.3)
RBC # BLD: 3.5 M/UL — LOW (ref 4.2–5.8)
RBC # FLD: 18.2 % — HIGH (ref 10.3–14.5)
SODIUM SERPL-SCNC: 133 MMOL/L — LOW (ref 135–145)
WBC # BLD: 5.41 K/UL — SIGNIFICANT CHANGE UP (ref 3.8–10.5)
WBC # FLD AUTO: 5.41 K/UL — SIGNIFICANT CHANGE UP (ref 3.8–10.5)

## 2024-10-20 PROCEDURE — 99232 SBSQ HOSP IP/OBS MODERATE 35: CPT

## 2024-10-20 RX ORDER — MORPHINE SULFATE 30 MG/1
2 TABLET, EXTENDED RELEASE ORAL ONCE
Refills: 0 | Status: DISCONTINUED | OUTPATIENT
Start: 2024-10-20 | End: 2024-10-24

## 2024-10-20 RX ADMIN — FLUCONAZOLE, SODIUM CHLORIDE 400 MILLIGRAM(S): 2 INJECTION INTRAVENOUS at 21:14

## 2024-10-20 RX ADMIN — Medication 1000 MILLIGRAM(S): at 06:35

## 2024-10-20 RX ADMIN — NICOTINE POLACRILEX 1 PATCH: 4 GUM, CHEWING ORAL at 07:00

## 2024-10-20 RX ADMIN — ERTAPENEM SODIUM 120 MILLIGRAM(S): 1 INJECTION, POWDER, LYOPHILIZED, FOR SOLUTION INTRAMUSCULAR; INTRAVENOUS at 22:20

## 2024-10-20 RX ADMIN — MORPHINE SULFATE 4 MILLIGRAM(S): 30 TABLET, EXTENDED RELEASE ORAL at 23:27

## 2024-10-20 RX ADMIN — APIXABAN 5 MILLIGRAM(S): 5 TABLET, FILM COATED ORAL at 18:25

## 2024-10-20 RX ADMIN — Medication 1 DROP(S): at 21:13

## 2024-10-20 RX ADMIN — Medication 1 DROP(S): at 18:25

## 2024-10-20 RX ADMIN — Medication 10 MILLIGRAM(S): at 21:13

## 2024-10-20 RX ADMIN — Medication 1000 MILLIGRAM(S): at 13:03

## 2024-10-20 RX ADMIN — MORPHINE SULFATE 4 MILLIGRAM(S): 30 TABLET, EXTENDED RELEASE ORAL at 08:00

## 2024-10-20 RX ADMIN — Medication 1000 MILLIGRAM(S): at 18:25

## 2024-10-20 RX ADMIN — NICOTINE POLACRILEX 1 PATCH: 4 GUM, CHEWING ORAL at 17:55

## 2024-10-20 RX ADMIN — Medication 81 MILLIGRAM(S): at 13:02

## 2024-10-20 RX ADMIN — MORPHINE SULFATE 4 MILLIGRAM(S): 30 TABLET, EXTENDED RELEASE ORAL at 00:11

## 2024-10-20 RX ADMIN — Medication 25 MILLIGRAM(S): at 05:34

## 2024-10-20 RX ADMIN — Medication 1000 MILLIGRAM(S): at 05:35

## 2024-10-20 RX ADMIN — Medication 1000 MILLIGRAM(S): at 14:03

## 2024-10-20 RX ADMIN — NICOTINE POLACRILEX 1 PATCH: 4 GUM, CHEWING ORAL at 13:01

## 2024-10-20 RX ADMIN — Medication 1 DROP(S): at 05:35

## 2024-10-20 RX ADMIN — MORPHINE SULFATE 4 MILLIGRAM(S): 30 TABLET, EXTENDED RELEASE ORAL at 15:47

## 2024-10-20 RX ADMIN — APIXABAN 5 MILLIGRAM(S): 5 TABLET, FILM COATED ORAL at 05:33

## 2024-10-20 RX ADMIN — LOSARTAN POTASSIUM 25 MILLIGRAM(S): 25 TABLET ORAL at 05:34

## 2024-10-20 RX ADMIN — MORPHINE SULFATE 4 MILLIGRAM(S): 30 TABLET, EXTENDED RELEASE ORAL at 15:27

## 2024-10-20 RX ADMIN — MORPHINE SULFATE 4 MILLIGRAM(S): 30 TABLET, EXTENDED RELEASE ORAL at 07:19

## 2024-10-20 RX ADMIN — Medication 1000 MILLIGRAM(S): at 00:03

## 2024-10-20 RX ADMIN — Medication 50 MILLIGRAM(S): at 05:34

## 2024-10-20 NOTE — PROGRESS NOTE ADULT - PROBLEM SELECTOR PLAN 2
Patient with history of locally advanced rectal cancer  - [place] [facility] [place]  - GOC addressed on previous admissions with patient's daughter, Tiffanie.   - patient and family reportedly wish to have all interventions

## 2024-10-20 NOTE — PROGRESS NOTE ADULT - PROBLEM SELECTOR PLAN 5
Patient noted with RLE pitting edema up to shin, no erythema or calf tenderness  - also noted with elevated pBNP up to 31K on 10/12  - patient currently appearing largely euvolemic on exam  - bilateral LE DVT study without any DVTs  - repeat pBNP showing improvement to [id number]  - TTE performed 10/16, showing global LV hypokinesis, EF 35-40%  - LE edema otherwise unilateral and appearing stable

## 2024-10-20 NOTE — PROGRESS NOTE ADULT - PROBLEM SELECTOR PLAN 7
Per chart review, patients family reported that patient does have history of atrial fibrillation  - not on AC currently due to history of bleeding episodes  - most recent EKG from 10/12 reviewed, patient with atrial fibrillation  - now with rates controlled, and appearing to be back in sinus rhythm  - repeat EKG from 10/14 reviewed, NSR, no gross ischemic changes, unchanged BBB noted in lateral leads  - c/w toprol 50mg qd  - Eliquis started

## 2024-10-20 NOTE — PROGRESS NOTE ADULT - SUBJECTIVE AND OBJECTIVE BOX
Subjective    Patient seen and examined. Feels okay, no complaints.   Refused exam during AM rounds      Objective    Vital signs  T(F): , Max: 97.9 (10-19-24 @ 13:38)  HR: 81 (10-20-24 @ 09:29)  BP: 135/93 (10-20-24 @ 09:29)  SpO2: 100% (10-20-24 @ 09:29)  Wt(kg): --    Output     10-19 @ 07:01  -  10-20 @ 07:00  --------------------------------------------------------  IN: 0 mL / OUT: 2100 mL / NET: -2100 mL        General: NAD  Refused exam during AM rounds  Will change wound dressing later today with pre-medication for pain     Labs      10-20 @ 06:23    WBC 5.41  / Hct 29.3  / SCr 0.47     10-19 @ 08:00    WBC 6.87  / Hct 29.3  / SCr 0.44

## 2024-10-20 NOTE — PROGRESS NOTE ADULT - SUBJECTIVE AND OBJECTIVE BOX
Merlin Mathew, MD   Hospitalist  Pager #[phone]    PROGRESS NOTE:     Patient is a 79y old  Male who presents with a chief complaint of suprapubic pain (20 Oct 2024 10:36)      SUBJECTIVE / OVERNIGHT EVENTS:  NEON   Patient confused, thinks hospital staff stole food  Reports some pain     ADDITIONAL REVIEW OF SYSTEMS:    MEDICATIONS  (STANDING):  acetaminophen     Tablet .. 1000 milliGRAM(s) Oral every 6 hours  apixaban 5 milliGRAM(s) Oral every 12 hours  aspirin  chewable 81 milliGRAM(s) Oral daily  brimonidine 0.2% Ophthalmic Solution 1 Drop(s) Both EYES every 12 hours  ertapenem  IVPB 1000 milliGRAM(s) IV Intermittent every 24 hours  fluconAZOLE   Tablet 400 milliGRAM(s) Oral every 24 hours  latanoprost 0.005% Ophthalmic Solution 1 Drop(s) Both EYES at bedtime  losartan 25 milliGRAM(s) Oral daily  metoprolol succinate ER 50 milliGRAM(s) Oral daily  morphine  - Injectable 2 milliGRAM(s) IV Push once  nicotine -  14 mG/24Hr(s) Patch 1 Patch Transdermal daily  rosuvastatin 10 milliGRAM(s) Oral at bedtime  sodium chloride 0.9%. 1000 milliLiter(s) (100 mL/Hr) IV Continuous <Continuous>  spironolactone 25 milliGRAM(s) Oral daily  timolol 0.5% Solution 1 Drop(s) Both EYES every 12 hours    MEDICATIONS  (PRN):  morphine  - Injectable 4 milliGRAM(s) IV Push every 8 hours PRN Severe Pain (7 - 10)  morphine  - Injectable 2 milliGRAM(s) IV Push every 8 hours PRN Moderate Pain (4 - 6)      CAPILLARY BLOOD GLUCOSE        I&O's Summary    19 Oct 2024 07:01  -  20 Oct 2024 07:00  --------------------------------------------------------  IN: 0 mL / OUT: 2100 mL / NET: -2100 mL        PHYSICAL EXAM:  Vital Signs Last 24 Hrs  T(C): 36.3 (20 Oct 2024 09:29), Max: 36.5 (19 Oct 2024 18:02)  T(F): 97.4 (20 Oct 2024 09:29), Max: 97.7 (19 Oct 2024 18:02)  HR: 81 (20 Oct 2024 09:29) (75 - 81)  BP: 135/93 (20 Oct 2024 09:29) (120/77 - 154/92)  BP(mean): --  RR: 18 (20 Oct 2024 09:29) (17 - 19)  SpO2: 100% (20 Oct 2024 09:29) (97% - 100%)    Parameters below as of 20 Oct 2024 09:29  Patient On (Oxygen Delivery Method): room air        CONSTITUTIONAL: NAD, resting comfortably   RESPIRATORY: Normal respiratory effort; lungs are clear to auscultation bilaterally  CARDIOVASCULAR: Regular rate and rhythm, normal S1 and S2, no murmur/rub/gallop; 1+ LE edema   ABDOMEN: Nontender to palpation, normoactive bowel sounds, no rebound/guarding; No hepatosplenomegaly  MUSCULOSKELETAL: no clubbing or cyanosis of digits; no joint swelling or tenderness to palpation  PSYCH: A+O to person, place, and time; affect appropriate    LABS:                        9.0    5.41  )-----------( 178      ( 20 Oct 2024 06:23 )             29.3     10-20    133[L]  |  101  |  11  ----------------------------<  104[H]  3.5   |  23  |  0.47[L]    Ca    8.1[L]      20 Oct 2024 06:23            Urinalysis Basic - ( 20 Oct 2024 06:23 )    Color: x / Appearance: x / SG: x / pH: x  Gluc: 104 mg/dL / Ketone: x  / Bili: x / Urobili: x   Blood: x / Protein: x / Nitrite: x   Leuk Esterase: x / RBC: x / WBC x   Sq Epi: x / Non Sq Epi: x / Bacteria: x          RADIOLOGY & ADDITIONAL TESTS:  Results Reviewed:   Imaging Personally Reviewed:  Electrocardiogram Personally Reviewed:    COORDINATION OF CARE:  Care Discussed with Consultants/Other Providers [Y/N]:  Prior or Outpatient Records Reviewed [Y/N]:

## 2024-10-20 NOTE — PROGRESS NOTE ADULT - ASSESSMENT
78 yo M with history of CAD s/p stents in 2012 (ASA), locally advanced rectal cancer s/p neoadjuvant therapy with chemoRT (completed 11/2022), consolidation CAPOX (2/2023), brachytherapy (3/2024), presenting for generalized weakness, chest discomfort, rectal pain. Pt was here at [facility] in 8/30 for fatigue, weakness, and falls, found to have perforated rectal mass with perirectal collection, 2.5 x 1.5 cm with spread to infection in anorectal/perineal region with fluid/gas tracking along penile shaft, however pt left for MSK and s/p diverting end sigmoid colostomy, cystoscopy and naranjo placement across defect and perineal/scrotal drainage 8/30. [place] [facility] [place] discharged day prior to presentation for perineal infections, treated with abx. Represents here for generalized weakness and dizziness, CTAP non-con on preliminary read indicating ill-defined fluid/gas in scrotum, perirectal space, and probably perineum. Exam demonstrates skin duskiness multiple genitourinary abscesses, induration, purulent drainage. 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    10/13: S/p debridement of genitalia and perineum in OR yesterday. In SICU for post op pressor requirements. Got 500 ccs overnight for hypovolemia on POCUS. Currently off pressors. On zosyn, clinda, vanc. U and bcx pending. Tissue cx negative. Wound packing changed at bedside, incision beds with adequate granulation tissue, healing well. Transferred to floor  10/14 - wounds clean; re-packed; penroses in place; ID consult  10/15 - pt refuses blood draw, and refuses dressing change (it has been 24 hrs since last one; he refused last nite also)                 later on he did allow it - wound appears clean, no necrotic tissue; labs drawn  10/16 - pro-BNP high so echo today  10/17 - changed dressing, took out subprapubic incision penrose drain, left scrotal penrose drain. Echo with reduced EF - cards says iso sepsis, no further workup needed. Changed abx from fluconazole to caspo (2 week course) per ID. Can c/w erta for ESBL e.coli. Cards saying he will need AC eventually,   10/18 - changed dressing this am, last penrose in scrotum d/c'ed. wound care spoke with, can try changing wet to dry dressings to aquacel and change qd. Pt's daughter will hire own aid for wound care and PT, but will need homecare for erta and caspo IV at home.   10/19- changed dressing to aquacel, started Eliquis  10/20- refused exam in AM, dressing to be changed with aquacel in PM     Plan  - cont erta and fluconazole  - labs stable; K improved to 3.5  - dressing change qd with aquacel per wound care   - continue naranjo  - Eliquis restarted 10/19   - PT suggesting rehab  - f/u medicine, cards, ID, wound care  - will need home care on discharge for IV ertapenem

## 2024-10-21 PROCEDURE — 99232 SBSQ HOSP IP/OBS MODERATE 35: CPT

## 2024-10-21 PROCEDURE — 99232 SBSQ HOSP IP/OBS MODERATE 35: CPT | Mod: 24

## 2024-10-21 RX ORDER — FAMOTIDINE 10 MG/ML
20 INJECTION INTRAVENOUS ONCE
Refills: 0 | Status: COMPLETED | OUTPATIENT
Start: 2024-10-21 | End: 2024-10-21

## 2024-10-21 RX ADMIN — Medication 1 DROP(S): at 05:34

## 2024-10-21 RX ADMIN — NICOTINE POLACRILEX 1 PATCH: 4 GUM, CHEWING ORAL at 07:40

## 2024-10-21 RX ADMIN — FLUCONAZOLE, SODIUM CHLORIDE 400 MILLIGRAM(S): 2 INJECTION INTRAVENOUS at 21:33

## 2024-10-21 RX ADMIN — Medication 50 MILLIGRAM(S): at 05:34

## 2024-10-21 RX ADMIN — NICOTINE POLACRILEX 1 PATCH: 4 GUM, CHEWING ORAL at 12:42

## 2024-10-21 RX ADMIN — Medication 1 DROP(S): at 18:24

## 2024-10-21 RX ADMIN — Medication 10 MILLIGRAM(S): at 21:33

## 2024-10-21 RX ADMIN — Medication 81 MILLIGRAM(S): at 12:41

## 2024-10-21 RX ADMIN — MORPHINE SULFATE 4 MILLIGRAM(S): 30 TABLET, EXTENDED RELEASE ORAL at 22:35

## 2024-10-21 RX ADMIN — Medication 25 MILLIGRAM(S): at 05:35

## 2024-10-21 RX ADMIN — Medication 1 DROP(S): at 21:34

## 2024-10-21 RX ADMIN — FAMOTIDINE 20 MILLIGRAM(S): 10 INJECTION INTRAVENOUS at 23:04

## 2024-10-21 RX ADMIN — Medication 1000 MILLIGRAM(S): at 14:26

## 2024-10-21 RX ADMIN — Medication 1000 MILLIGRAM(S): at 07:37

## 2024-10-21 RX ADMIN — Medication 1000 MILLIGRAM(S): at 22:34

## 2024-10-21 RX ADMIN — Medication 1000 MILLIGRAM(S): at 15:20

## 2024-10-21 RX ADMIN — MORPHINE SULFATE 4 MILLIGRAM(S): 30 TABLET, EXTENDED RELEASE ORAL at 21:35

## 2024-10-21 RX ADMIN — MORPHINE SULFATE 4 MILLIGRAM(S): 30 TABLET, EXTENDED RELEASE ORAL at 00:10

## 2024-10-21 RX ADMIN — ERTAPENEM SODIUM 120 MILLIGRAM(S): 1 INJECTION, POWDER, LYOPHILIZED, FOR SOLUTION INTRAMUSCULAR; INTRAVENOUS at 21:42

## 2024-10-21 RX ADMIN — Medication 1000 MILLIGRAM(S): at 07:53

## 2024-10-21 RX ADMIN — Medication 1000 MILLIGRAM(S): at 21:34

## 2024-10-21 RX ADMIN — APIXABAN 5 MILLIGRAM(S): 5 TABLET, FILM COATED ORAL at 05:35

## 2024-10-21 RX ADMIN — LOSARTAN POTASSIUM 25 MILLIGRAM(S): 25 TABLET ORAL at 05:35

## 2024-10-21 RX ADMIN — APIXABAN 5 MILLIGRAM(S): 5 TABLET, FILM COATED ORAL at 18:25

## 2024-10-21 NOTE — PROGRESS NOTE ADULT - SUBJECTIVE AND OBJECTIVE BOX
Infectious Diseases Follow Up:    Patient is a 79y old  Male who presents with a chief complaint of suprapubic pain (21 Oct 2024 09:50)      Interval History/ROS:  Pt w/o acute complaints     Allergies  No Known Allergies        ANTIMICROBIALS:  ertapenem  IVPB 1000 every 24 hours  fluconAZOLE   Tablet 400 every 24 hours      Current Abx:     Previous Abx     OTHER MEDS:  MEDICATIONS  (STANDING):  acetaminophen     Tablet .. 1000 every 6 hours  apixaban 5 every 12 hours  aspirin  chewable 81 daily  losartan 25 daily  metoprolol succinate ER 50 daily  morphine  - Injectable 2 once  morphine  - Injectable 4 every 8 hours PRN  morphine  - Injectable 2 every 8 hours PRN  rosuvastatin 10 at bedtime  spironolactone 25 daily      Vital Signs Last 24 Hrs  T(C): 36.5 (21 Oct 2024 14:00), Max: 36.7 (21 Oct 2024 09:18)  T(F): 97.7 (21 Oct 2024 14:00), Max: 98.1 (21 Oct 2024 09:18)  HR: 77 (21 Oct 2024 14:00) (77 - 85)  BP: 114/70 (21 Oct 2024 14:00) (114/70 - 142/71)  BP(mean): 92 (20 Oct 2024 15:22) (92 - 92)  RR: 16 (21 Oct 2024 14:00) (16 - 18)  SpO2: 100% (21 Oct 2024 14:00) (98% - 100%)    Parameters below as of 21 Oct 2024 14:00  Patient On (Oxygen Delivery Method): room air        PHYSICAL EXAM:  GENERAL: NAD, well-developed  HEAD:  Atraumatic, Normocephalic  EYES: EOMI, conjunctiva and sclera clear  CHEST/LUNG: Clear to auscultation bilaterally; No wheeze  HEART: Regular rate and rhythm; No murmurs, rubs, or gallops  ABDOMEN: Soft, Nontender, Nondistended; +Ostomy w/ stool   PSYCH: AAOx3                            9.0    5.41  )-----------( 178      ( 20 Oct 2024 06:23 )             29.3       10-20    133[L]  |  101  |  11  ----------------------------<  104[H]  3.5   |  23  |  0.47[L]    Ca    8.1[L]      20 Oct 2024 06:23        Urinalysis Basic - ( 20 Oct 2024 06:23 )    Color: x / Appearance: x / SG: x / pH: x  Gluc: 104 mg/dL / Ketone: x  / Bili: x / Urobili: x   Blood: x / Protein: x / Nitrite: x   Leuk Esterase: x / RBC: x / WBC x   Sq Epi: x / Non Sq Epi: x / Bacteria: x        MICROBIOLOGY:  v  Tissue  10-12-24   Rare Escherichia coli ESBL  --  Escherichia coli ESBL      Clean Catch Clean Catch (Midstream)  10-12-24   10,000 - 49,000 CFU/mL Candida albicans "Susceptibilities not performed"  --  --      .Blood BLOOD  10-12-24   No growth at 5 days  --  --      .Blood BLOOD  10-12-24   No growth at 5 days  --  --                RADIOLOGY:

## 2024-10-21 NOTE — PROGRESS NOTE ADULT - ASSESSMENT
79-year-old male with a past medical history of CAD status post PCI, locally advanced rectal CA s/p neoadjuvant therapy with chemotherapy, presented to the hospital due to weakness, chest discomfort and rectal pain.     Patient with recent hospital admission 8/30/2024 found to have perforated rectal mass with perirectal collection.  Patient left hospital 1 to [facility] undergoing a lap diverting end sigmoid colostomy with cystoscopy and Zamarripa placement as well as perineal scrotal drainage.     [facility] a day after discharge due to dizziness and malaise.  In the ED, patient found to have atrial fibrillation, right leg DVT.  Patient is afebrile, otherwise hemodynamically stable.  Labs show no leukocytosis, CT abdomen/pelvis showed gas-forming collection of the suprapubic region, Toribio penile region and scrotal region.  Initial I&D planned however aborted due to worsening exam and patient taken emergently to the OR for debridement.  Patient was given vancomycin, piperacillin/tazobactam and clindamycin.     In the OR, perirectal abscess was noted and debrided.  Latest labs show no leukocytosis, anemia 0.1/26.8, BMP with renal function within normal limits, blood cultures from 8/30/2024 with E. coli, sensitivities reviewed.  Culture obtained from the OR also with E. coli.     #Tara's gangrene   #Abnormal imaging of the pelvis   #Perirectal abscess , yeast  growth  #History of E.coli bacteremia    Overall, 78 y/o M w/ PMHx of CAD status post PCI, locally advanced rectal CA s/p neoadjuvant therapy w/ chemo, recent perforated rectal mass with perirectal collection s/p lap diverting end sigmoid colostomy with cystoscopy and Zamarripa placement as well as perineal scrotal drainage, 8/30/24, now presenting with CT abdomen/pelvis showed gas-forming collection of the suprapubic region now s/p debridement of necrotic skin and soft tissue from genitalia and perineum on 10/12.  Cx w/ ESBL E Coli, yeast     Recommendations:   1. C/w Ertapenem 1 g q24, Fluconazole 400 mg q24 given candida albicans.   2. Will likely need 2 week course given extensive infection , EOT 10/29    Thank you for this consult. Inpatient ID team will follow.    Adam Toro M.D.  Attending Physician  Division of Infectious Diseases  Department of Medicine    Please contact through MS Teams message.  Office: [phone] (after 5 PM or weekend)

## 2024-10-21 NOTE — PROGRESS NOTE ADULT - PROBLEM SELECTOR PLAN 5
Patient noted with RLE pitting edema up to shin, no erythema or calf tenderness  - also noted with elevated pBNP up to 31K on 10/12  - patient currently appearing largely euvolemic on exam  - bilateral LE DVT study without any DVTs  - repeat pBNP showing improvement to 56671t  - TTE performed 10/16, showing global LV hypokinesis, EF 35-40%  - LE edema otherwise unilateral and appearing stable

## 2024-10-21 NOTE — PROGRESS NOTE ADULT - SUBJECTIVE AND OBJECTIVE BOX
Merlin Mathew, MD   Hospitalist  Pager #[phone]    PROGRESS NOTE:     Patient is a 79y old  Male who presents with a chief complaint of suprapubic pain (21 Oct 2024 07:44)      SUBJECTIVE / OVERNIGHT EVENTS:  NEON     ADDITIONAL REVIEW OF SYSTEMS:    MEDICATIONS  (STANDING):  acetaminophen     Tablet .. 1000 milliGRAM(s) Oral every 6 hours  apixaban 5 milliGRAM(s) Oral every 12 hours  aspirin  chewable 81 milliGRAM(s) Oral daily  brimonidine 0.2% Ophthalmic Solution 1 Drop(s) Both EYES every 12 hours  ertapenem  IVPB 1000 milliGRAM(s) IV Intermittent every 24 hours  fluconAZOLE   Tablet 400 milliGRAM(s) Oral every 24 hours  latanoprost 0.005% Ophthalmic Solution 1 Drop(s) Both EYES at bedtime  losartan 25 milliGRAM(s) Oral daily  metoprolol succinate ER 50 milliGRAM(s) Oral daily  morphine  - Injectable 2 milliGRAM(s) IV Push once  nicotine -  14 mG/24Hr(s) Patch 1 Patch Transdermal daily  rosuvastatin 10 milliGRAM(s) Oral at bedtime  sodium chloride 0.9%. 1000 milliLiter(s) (100 mL/Hr) IV Continuous <Continuous>  spironolactone 25 milliGRAM(s) Oral daily  timolol 0.5% Solution 1 Drop(s) Both EYES every 12 hours    MEDICATIONS  (PRN):  morphine  - Injectable 4 milliGRAM(s) IV Push every 8 hours PRN Severe Pain (7 - 10)  morphine  - Injectable 2 milliGRAM(s) IV Push every 8 hours PRN Moderate Pain (4 - 6)      CAPILLARY BLOOD GLUCOSE        I&O's Summary    20 Oct 2024 07:01  -  21 Oct 2024 07:00  --------------------------------------------------------  IN: 0 mL / OUT: 2200 mL / NET: -2200 mL    21 Oct 2024 07:01  -  21 Oct 2024 09:50  --------------------------------------------------------  IN: 0 mL / OUT: 600 mL / NET: -600 mL        PHYSICAL EXAM:  Vital Signs Last 24 Hrs  T(C): 36.7 (21 Oct 2024 09:18), Max: 36.7 (21 Oct 2024 09:18)  T(F): 98.1 (21 Oct 2024 09:18), Max: 98.1 (21 Oct 2024 09:18)  HR: 77 (21 Oct 2024 09:18) (77 - 85)  BP: 142/71 (21 Oct 2024 09:18) (134/75 - 142/71)  BP(mean): 92 (20 Oct 2024 15:22) (92 - 92)  RR: 17 (21 Oct 2024 09:18) (16 - 18)  SpO2: 98% (21 Oct 2024 09:18) (98% - 100%)    Parameters below as of 21 Oct 2024 09:18  Patient On (Oxygen Delivery Method): room air      CONSTITUTIONAL: NAD, resting comfortably   RESPIRATORY: Normal respiratory effort; lungs are clear to auscultation bilaterally  CARDIOVASCULAR: Regular rate and rhythm, normal S1 and S2, no murmur/rub/gallop; 1+ LE edema   ABDOMEN: Nontender to palpation, normoactive bowel sounds, no rebound/guarding; No hepatosplenomegaly  MUSCULOSKELETAL: no clubbing or cyanosis of digits; no joint swelling or tenderness to palpation  PSYCH: A+O to person, place, and time; affect appropriate      LABS:                        9.0    5.41  )-----------( 178      ( 20 Oct 2024 06:23 )             29.3     10-20    133[L]  |  101  |  11  ----------------------------<  104[H]  3.5   |  23  |  0.47[L]    Ca    8.1[L]      20 Oct 2024 06:23            Urinalysis Basic - ( 20 Oct 2024 06:23 )    Color: x / Appearance: x / SG: x / pH: x  Gluc: 104 mg/dL / Ketone: x  / Bili: x / Urobili: x   Blood: x / Protein: x / Nitrite: x   Leuk Esterase: x / RBC: x / WBC x   Sq Epi: x / Non Sq Epi: x / Bacteria: x          RADIOLOGY & ADDITIONAL TESTS:  Results Reviewed:   Imaging Personally Reviewed:  Electrocardiogram Personally Reviewed:    COORDINATION OF CARE:  Care Discussed with Consultants/Other Providers [Y/N]:  Prior or Outpatient Records Reviewed [Y/N]:   Merlin Mathew, MD   Hospitalist  Pager #[phone]    PROGRESS NOTE:     Patient is a 79y old  Male who presents with a chief complaint of suprapubic pain (21 Oct 2024 07:44)      SUBJECTIVE / OVERNIGHT EVENTS:  NEON   Requesting to eat   ADDITIONAL REVIEW OF SYSTEMS:    MEDICATIONS  (STANDING):  acetaminophen     Tablet .. 1000 milliGRAM(s) Oral every 6 hours  apixaban 5 milliGRAM(s) Oral every 12 hours  aspirin  chewable 81 milliGRAM(s) Oral daily  brimonidine 0.2% Ophthalmic Solution 1 Drop(s) Both EYES every 12 hours  ertapenem  IVPB 1000 milliGRAM(s) IV Intermittent every 24 hours  fluconAZOLE   Tablet 400 milliGRAM(s) Oral every 24 hours  latanoprost 0.005% Ophthalmic Solution 1 Drop(s) Both EYES at bedtime  losartan 25 milliGRAM(s) Oral daily  metoprolol succinate ER 50 milliGRAM(s) Oral daily  morphine  - Injectable 2 milliGRAM(s) IV Push once  nicotine -  14 mG/24Hr(s) Patch 1 Patch Transdermal daily  rosuvastatin 10 milliGRAM(s) Oral at bedtime  sodium chloride 0.9%. 1000 milliLiter(s) (100 mL/Hr) IV Continuous <Continuous>  spironolactone 25 milliGRAM(s) Oral daily  timolol 0.5% Solution 1 Drop(s) Both EYES every 12 hours    MEDICATIONS  (PRN):  morphine  - Injectable 4 milliGRAM(s) IV Push every 8 hours PRN Severe Pain (7 - 10)  morphine  - Injectable 2 milliGRAM(s) IV Push every 8 hours PRN Moderate Pain (4 - 6)      CAPILLARY BLOOD GLUCOSE        I&O's Summary    20 Oct 2024 07:01  -  21 Oct 2024 07:00  --------------------------------------------------------  IN: 0 mL / OUT: 2200 mL / NET: -2200 mL    21 Oct 2024 07:01  -  21 Oct 2024 09:50  --------------------------------------------------------  IN: 0 mL / OUT: 600 mL / NET: -600 mL        PHYSICAL EXAM:  Vital Signs Last 24 Hrs  T(C): 36.7 (21 Oct 2024 09:18), Max: 36.7 (21 Oct 2024 09:18)  T(F): 98.1 (21 Oct 2024 09:18), Max: 98.1 (21 Oct 2024 09:18)  HR: 77 (21 Oct 2024 09:18) (77 - 85)  BP: 142/71 (21 Oct 2024 09:18) (134/75 - 142/71)  BP(mean): 92 (20 Oct 2024 15:22) (92 - 92)  RR: 17 (21 Oct 2024 09:18) (16 - 18)  SpO2: 98% (21 Oct 2024 09:18) (98% - 100%)    Parameters below as of 21 Oct 2024 09:18  Patient On (Oxygen Delivery Method): room air      CONSTITUTIONAL: NAD, resting comfortably   RESPIRATORY: Normal respiratory effort; lungs are clear to auscultation bilaterally  CARDIOVASCULAR: Regular rate and rhythm, normal S1 and S2, no murmur/rub/gallop; 1+ LE edema   ABDOMEN: Nontender to palpation, normoactive bowel sounds, no rebound/guarding; No hepatosplenomegaly  MUSCULOSKELETAL: RUE swelling, no erythema or TTP, otherwise no clubbing or cyanosis of digits; no joint swelling or tenderness to palpation  PSYCH: A+O to person, place, and time; affect appropriate      LABS:                        9.0    5.41  )-----------( 178      ( 20 Oct 2024 06:23 )             29.3     10-20    133[L]  |  101  |  11  ----------------------------<  104[H]  3.5   |  23  |  0.47[L]    Ca    8.1[L]      20 Oct 2024 06:23            Urinalysis Basic - ( 20 Oct 2024 06:23 )    Color: x / Appearance: x / SG: x / pH: x  Gluc: 104 mg/dL / Ketone: x  / Bili: x / Urobili: x   Blood: x / Protein: x / Nitrite: x   Leuk Esterase: x / RBC: x / WBC x   Sq Epi: x / Non Sq Epi: x / Bacteria: x          RADIOLOGY & ADDITIONAL TESTS:  Results Reviewed:   Imaging Personally Reviewed:  Electrocardiogram Personally Reviewed:    COORDINATION OF CARE:  Care Discussed with Consultants/Other Providers [Y/N]:  Prior or Outpatient Records Reviewed [Y/N]:

## 2024-10-21 NOTE — PROGRESS NOTE ADULT - ASSESSMENT
78 yo M with history of CAD s/p stents in 2012 (ASA), locally advanced rectal cancer s/p neoadjuvant therapy with chemoRT (completed 11/2022), consolidation CAPOX (2/2023), brachytherapy (3/2024), presenting for generalized weakness, chest discomfort, rectal pain. Pt was here at [facility] in 8/30 for fatigue, weakness, and falls, found to have perforated rectal mass with perirectal collection, 2.5 x 1.5 cm with spread to infection in anorectal/perineal region with fluid/gas tracking along penile shaft, however pt left for MSK and s/p diverting end sigmoid colostomy, cystoscopy and naranjo placement across defect and perineal/scrotal drainage 8/30. Recently at [facility] [place] weeks, discharged day prior to presentation for perineal infections, treated with abx. Represents here for generalized weakness and dizziness, CTAP non-con on preliminary read indicating ill-defined fluid/gas in scrotum, perirectal space, and probably perineum. Exam demonstrates skin duskiness multiple genitourinary abscesses, induration, purulent drainage. 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    10/13: S/p debridement of genitalia and perineum in OR yesterday. In SICU for post op pressor requirements. Got 500 ccs overnight for hypovolemia on POCUS. Currently off pressors. On zosyn, clinda, vanc. U and bcx pending. Tissue cx negative. Wound packing changed at bedside, incision beds with adequate granulation tissue, healing well. Transferred to floor  10/14 - wounds clean; re-packed; penroses in place; ID consult  10/15 - pt refuses blood draw, and refuses dressing change (it has been 24 hrs since last one; he refused last nite also)                 later on he did allow it - wound appears clean, no necrotic tissue; labs drawn  10/16 - pro-BNP high so echo today  10/17 - changed dressing, took out subprapubic incision penrose drain, left scrotal penrose drain. Echo with reduced EF - cards says iso sepsis, no further workup needed. Changed abx from fluconazole to caspo (2 week course) per ID. Can c/w erta for ESBL e.coli. Cards saying he will need AC eventually,   10/18 - changed dressing this am, last penrose in scrotum d/c'ed. wound care spoke with, can try changing wet to dry dressings to aquacel and change qd. Pt's daughter will hire own aid for wound care and PT, but will need homecare for erta and caspo IV at home.   10/19- changed dressing to aquacel, started Eliquis  10/20- refused exam in AM, dressing to be changed with aquacel in PM   10/21 - changed dressing against pt wishes, held hands down; will speak with SW/HC about dispo planning    Plan  - cont erta and fluconazole  - dressing change qd with aquacel per wound care   - continue naranjo; will change before d/c  - Eliquis restarted 10/19   - PT suggesting rehab but daughter wants him to go home  - f/u medicine, cards, ID, wound care  - will need home care on discharge for IV ertapenem

## 2024-10-21 NOTE — PROGRESS NOTE ADULT - PROBLEM SELECTOR PLAN 2
Patient with history of locally advanced rectal cancer  - follows with [facility]ology  - GOC addressed on previous admissions with patient's daughter, Tiffanie.   - patient and family reportedly wish to have all interventions Patient with history of locally advanced rectal cancer  - follows with [facility] oncology  - GOC addressed on previous admissions with patient's daughter, Tiffanie.   - patient and family reportedly wish to have all interventions    # RUE swelling   [ ] Check Duplex of RUE to r/o VTE

## 2024-10-21 NOTE — PROGRESS NOTE ADULT - SUBJECTIVE AND OBJECTIVE BOX
POD #9  Pt refuses VS and labs  Pt has no new c/o  Pt held down for exam and dressing change  Abd- soft ; aquacel applied to SP area and scrotum; some necrotic tissue noted  Zamarripa 900  Col - +   POD #9  Pt refuses VS and labs  Pt has no new c/o  Pt at first refused dressing change, but we just held his hands and he was ok with it;  dressing changed  Abd- soft ; aquacel applied to SP area and scrotum; some necrotic tissue noted  Zamarripa 900  Col - +

## 2024-10-21 NOTE — PROGRESS NOTE ADULT - PROBLEM SELECTOR PLAN 1
Patient presenting with rectal pain and generalized weakness  - CT imaging and exam on admission with findings concerning for Tara's gangrene  - patient now s/p debridement of genitalia and perineum by urology team on 10/12  - briefly requiring SICU level of care for pressor support due to post-operative hypotension  - most recent OR cultures with E Coli ESBL  - Cont Ertapenem and Fluconazole   - ID following, recommendations appreciated

## 2024-10-22 PROCEDURE — 99233 SBSQ HOSP IP/OBS HIGH 50: CPT

## 2024-10-22 PROCEDURE — 93010 ELECTROCARDIOGRAM REPORT: CPT

## 2024-10-22 PROCEDURE — 99232 SBSQ HOSP IP/OBS MODERATE 35: CPT

## 2024-10-22 PROCEDURE — 93971 EXTREMITY STUDY: CPT | Mod: 26,RT

## 2024-10-22 RX ORDER — MORPHINE SULFATE 30 MG/1
4 TABLET, EXTENDED RELEASE ORAL EVERY 8 HOURS
Refills: 0 | Status: DISCONTINUED | OUTPATIENT
Start: 2024-10-22 | End: 2024-10-24

## 2024-10-22 RX ORDER — HYDROXYZINE HCL 25 MG
25 TABLET ORAL EVERY 6 HOURS
Refills: 0 | Status: DISCONTINUED | OUTPATIENT
Start: 2024-10-22 | End: 2024-10-23

## 2024-10-22 RX ORDER — HEPARIN SODIUM 10000 [USP'U]/ML
5000 INJECTION INTRAVENOUS; SUBCUTANEOUS EVERY 8 HOURS
Refills: 0 | Status: DISCONTINUED | OUTPATIENT
Start: 2024-10-22 | End: 2024-11-12

## 2024-10-22 RX ADMIN — Medication 1 DROP(S): at 06:54

## 2024-10-22 RX ADMIN — NICOTINE POLACRILEX 1 PATCH: 4 GUM, CHEWING ORAL at 08:12

## 2024-10-22 RX ADMIN — FLUCONAZOLE, SODIUM CHLORIDE 400 MILLIGRAM(S): 2 INJECTION INTRAVENOUS at 21:26

## 2024-10-22 RX ADMIN — NICOTINE POLACRILEX 1 PATCH: 4 GUM, CHEWING ORAL at 17:38

## 2024-10-22 RX ADMIN — Medication 25 MILLIGRAM(S): at 06:54

## 2024-10-22 RX ADMIN — Medication 1000 MILLIGRAM(S): at 21:26

## 2024-10-22 RX ADMIN — APIXABAN 5 MILLIGRAM(S): 5 TABLET, FILM COATED ORAL at 06:54

## 2024-10-22 RX ADMIN — Medication 1000 MILLIGRAM(S): at 10:42

## 2024-10-22 RX ADMIN — Medication 1000 MILLIGRAM(S): at 11:42

## 2024-10-22 RX ADMIN — MORPHINE SULFATE 4 MILLIGRAM(S): 30 TABLET, EXTENDED RELEASE ORAL at 17:31

## 2024-10-22 RX ADMIN — Medication 1 DROP(S): at 21:26

## 2024-10-22 RX ADMIN — ERTAPENEM SODIUM 120 MILLIGRAM(S): 1 INJECTION, POWDER, LYOPHILIZED, FOR SOLUTION INTRAMUSCULAR; INTRAVENOUS at 19:34

## 2024-10-22 RX ADMIN — Medication 1 DROP(S): at 17:32

## 2024-10-22 RX ADMIN — NICOTINE POLACRILEX 1 PATCH: 4 GUM, CHEWING ORAL at 12:28

## 2024-10-22 RX ADMIN — MORPHINE SULFATE 4 MILLIGRAM(S): 30 TABLET, EXTENDED RELEASE ORAL at 18:31

## 2024-10-22 RX ADMIN — MORPHINE SULFATE 4 MILLIGRAM(S): 30 TABLET, EXTENDED RELEASE ORAL at 09:57

## 2024-10-22 RX ADMIN — MORPHINE SULFATE 4 MILLIGRAM(S): 30 TABLET, EXTENDED RELEASE ORAL at 08:57

## 2024-10-22 RX ADMIN — HEPARIN SODIUM 5000 UNIT(S): 10000 INJECTION INTRAVENOUS; SUBCUTANEOUS at 15:24

## 2024-10-22 RX ADMIN — Medication 1000 MILLIGRAM(S): at 23:26

## 2024-10-22 RX ADMIN — LOSARTAN POTASSIUM 25 MILLIGRAM(S): 25 TABLET ORAL at 06:54

## 2024-10-22 RX ADMIN — Medication 50 MILLIGRAM(S): at 06:53

## 2024-10-22 RX ADMIN — Medication 81 MILLIGRAM(S): at 12:28

## 2024-10-22 RX ADMIN — NICOTINE POLACRILEX 1 PATCH: 4 GUM, CHEWING ORAL at 12:00

## 2024-10-22 RX ADMIN — Medication 10 MILLIGRAM(S): at 21:25

## 2024-10-22 RX ADMIN — HEPARIN SODIUM 5000 UNIT(S): 10000 INJECTION INTRAVENOUS; SUBCUTANEOUS at 21:25

## 2024-10-22 NOTE — PROGRESS NOTE ADULT - PROBLEM SELECTOR PLAN 2
Patient with history of locally advanced rectal cancer  - follows with [facility] oncology  - GOC addressed on previous admissions with patient's daughter, Tiffanie.   - patient and family reportedly wish to have all interventions    # RUE swelling   [ ] Check Duplex of RUE to r/o VTE

## 2024-10-22 NOTE — PROGRESS NOTE ADULT - ASSESSMENT
79-year-old male with a past medical history of CAD status post PCI, locally advanced rectal CA s/p neoadjuvant therapy with chemotherapy, presented to the hospital due to weakness, chest discomfort and rectal pain.     Patient with recent hospital admission 8/30/2024 found to have perforated rectal mass with perirectal collection.  Patient left hospital 1 to [facility] undergoing a lap diverting end sigmoid colostomy with cystoscopy and Zamarripa placement as well as perineal scrotal drainage.     [facility] a day after discharge due to dizziness and malaise.  In the ED, patient found to have atrial fibrillation, right leg DVT.  Patient is afebrile, otherwise hemodynamically stable.  Labs show no leukocytosis, CT abdomen/pelvis showed gas-forming collection of the suprapubic region, Toribio penile region and scrotal region.  Initial I&D planned however aborted due to worsening exam and patient taken emergently to the OR for debridement.  Patient was given vancomycin, piperacillin/tazobactam and clindamycin.     In the OR, perirectal abscess was noted and debrided.  Latest labs show no leukocytosis, anemia 0.1/26.8, BMP with renal function within normal limits, blood cultures from 8/30/2024 with E. coli, sensitivities reviewed.  Culture obtained from the OR also with E. coli.     #Tara's gangrene   #Abnormal imaging of the pelvis   #Perirectal abscess , yeast  growth  #History of E.coli bacteremia    Overall, 78 y/o M w/ PMHx of CAD status post PCI, locally advanced rectal CA s/p neoadjuvant therapy w/ chemo, recent perforated rectal mass with perirectal collection s/p lap diverting end sigmoid colostomy with cystoscopy and Zamarripa placement as well as perineal scrotal drainage, 8/30/24, now presenting with CT abdomen/pelvis showed gas-forming collection of the suprapubic region now s/p debridement of necrotic skin and soft tissue from genitalia and perineum on 10/12.  Cx w/ ESBL E Coli, yeast     Recommendations:   1. C/w Ertapenem 1 g q24, Fluconazole 400 mg q24 given candida albicans.   2. Will need 2 week course given extensive infection , EOT 10/29    Thank you for this consult. Inpatient ID team will follow.    Adam Toro M.D.  Attending Physician  Division of Infectious Diseases  Department of Medicine    Please contact through MS Teams message.  Office: [phone] (after 5 PM or weekend)

## 2024-10-22 NOTE — PROGRESS NOTE ADULT - PROBLEM SELECTOR PLAN 7
Per chart review, patients family reported that patient does have history of atrial fibrillation  - not on AC currently due to history of bleeding episodes  - most recent EKG from 10/12 reviewed, patient with atrial fibrillation  - now with rates controlled, and appearing to be back in sinus rhythm  - repeat EKG from 10/22 reviewed, NSR, no gross ischemic changes, unchanged BBB noted in lateral leads  - c/w toprol 50mg qd  - Eliquis started, patient w/ rectal bleeding noted this PM. Would recommend risk v benefit discussion w/ family re continuing AC.

## 2024-10-22 NOTE — PROGRESS NOTE ADULT - ASSESSMENT
Assessment/Plan: 78 yo M with history of CAD s/p stents in 2012 (ASA), locally advanced rectal cancer s/p neoadjuvant therapy with chemoRT (completed 11/2022), consolidation CAPOX (2/2023), brachytherapy (3/2024), presenting for generalized weakness, chest discomfort, rectal pain. Pt was here at [facility] on 8/30 for fatigue, weakness, and falls, found to have perforated rectal mass with perirectal collection, 2.5 x 1.5 cm with spread to infection in anorectal/perineal region with fluid/gas tracking along penile shaft, however pt left for [facility]. At [facility] underwent laparoscopic diverting end sigmoid colostomy, cystoscopy and naranjo placement across defect and perineal/scrotal drainage 8/30. At [facility] for several weeks for perineal infections, treated with Abx as well. Discharged day prior to presentation at [facility]. 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 Now s/p debridement of genitalia and perineum in OR, scrotal I&D and debridement of perirectal abscess with Urology. Followed by ID, tissue cultures with ESBL E Coli, Yumiko albicans, on ertapenem and fluconazole with end date 10/29; abx management per ID.     Wound follow up for sacrum extending distally to sacral/gluteal cleft stage 4 pressure injury. Urology requesting topical recommendations for surgical sites.    Surgical Sites s/p debridement of genitalia and perineum in OR  -Suprapubic, anterior base and right of penile shaft, anterior scrotum; all full thickness wounds some with minimally moist adherent eschar/slough, areas of pink-moist granulation. Anterior scrotum with depth of 2.5cm; remaining wounds without undermining/tunneling.  -No purulent drainage, no odor.   -No s/s of acute soft tissue infection  -Topical recommendations: cleanse wounds and periwound skin with NS. Apply Liquid barrier film to periwound skin. Apply Medihoney gel to wound bases. Pack depth of anterior scrotum with Aquacel ribbon, leave at least 2" out at end to ensure full removal of packing with subsequent dressing changes. Cover with all with 4x4 gauze and abdominal pads. Can secure with disposable stretch underwear or paper tape. Change daily.      Sacral/gluteal cleft stage 4 pressure injury  -Does not connected to wounds of scrotal/perineum  -Wound base with moistening adherent eschar/slough, bone palpable in close proximity just beneath necrotic base.  -No sharp debridement indicated at this time; eschar adherent; no palpable drainable collections, no fluctuance, no induration, no crepitus.  -CT with no mention of sacral ulcer  -urine and stool contained, (+) colostomy and indwelling naranjo catheter.  -Topical recommendations: cleanse wound and periwound skin with NS, pat dry. Apply Liquid barrier film to periwound skin. Apply medihoney gel to wound base, pack sacral/gluteal cleft with Aquacel hydrofiber ribbon. Cover with silicone foam with border. Change daily and prn if soiled/compromised.  -Continue to offload pressure; low airloss support surface, turn and position per protocol with use of fluidized positioning devices, continue moisture management with use of single breathable incontinence pad, continue to offload heels with complete cair boots.  - Foam dressing to RLQ abdominal laparoscopic incision site  -Upon discharge patient should qualify for low airloss support surface; case management/social work to please follow up when medically appropriate.  -Nutritional consult for optimization in patient with healing full thickness surgical wounds and stage 4 pressure injury.  -Appreciate Urology recommendations/management of surgical wounds to genitalia perineum, scrotum.  -Consider Ostomy consult if needed for Sigmoid Colostomy; pouching recommendations.    Upon discharge follow up at outpatient [facility]. [address] [place] [address]. [phone].    Findings and plan discussed with patient, family and primary team. All questions and concerns addressed to meet patient and family's satisfaction.  Will continue to follow perioidcally while inpatient, please reconsult earlier as needed.  Remainder of care per primary team.  Thank you.    AMRITA Dooley, ZENONN   MS TEAMS    If after 4PM or before 7:30AM on Mon-Friday or weekend/holiday please contact general surgery for urgent matters.   Team A- [id number]   Team B- [id number]  For non-urgent matters e-mail [email or web address]    I spent 35 minutes face-to-face with this patient of which more than 50% of the time was spent counseling/coordinating care of this patient. Assessment/Plan: 78 yo M with history of CAD s/p stents in 2012 (ASA), locally advanced rectal cancer s/p neoadjuvant therapy with chemoRT (completed 11/2022), consolidation CAPOX (2/2023), brachytherapy (3/2024), presenting for generalized weakness, chest discomfort, rectal pain. Pt was here at [facility] on 8/30 for fatigue, weakness, and falls, found to have perforated rectal mass with perirectal collection, 2.5 x 1.5 cm with spread to infection in anorectal/perineal region with fluid/gas tracking along penile shaft, however pt left for [facility]. At [facility] underwent laparoscopic diverting end sigmoid colostomy, cystoscopy and naranjo placement across defect and perineal/scrotal drainage 8/30. At [facility] for several weeks for perineal infections, treated with Abx as well. Discharged day prior to presentation at [facility]. 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 Now s/p debridement of genitalia and perineum in OR, scrotal I&D and debridement of perirectal abscess with Urology. Followed by ID, tissue cultures with ESBL E Coli, Uymiko albicans, on ertapenem and fluconazole with end date 10/29; abx management per ID.     Wound follow up for sacrum extending distally to sacral/gluteal cleft stage 4 pressure injury. Urology requesting topical recommendations for surgical sites.    Surgical Sites s/p debridement of genitalia and perineum in OR  -Suprapubic, anterior base and right of penile shaft, anterior scrotum; all full thickness wounds some with minimally moist adherent eschar/slough, areas of pink-moist granulation. Anterior scrotum with depth of 2.5cm; remaining wounds without undermining/tunneling.  -No purulent drainage, no odor.   -No s/s of acute soft tissue infection  -Topical recommendations: cleanse wounds and periwound skin with NS. Apply Liquid barrier film to periwound skin. Apply Medihoney gel to wound bases. Pack depth of anterior scrotum with Aquacel ribbon, leave at least 2" out at end to ensure full removal of packing with subsequent dressing changes. Cover with all with 4x4 gauze and abdominal pads. Can secure with disposable stretch underwear or paper tape. Change daily.      Sacral/gluteal cleft stage 4 pressure injury  -stable, no major changes in tissue type/dimensions.   -Does not connect to wounds of scrotal/perineum  -Wound base with moistening adherent eschar/slough, bone palpable in close proximity just beneath necrotic base.  -No sharp debridement indicated at this time; eschar adherent; no palpable drainable collections, no fluctuance, no induration, no crepitus.  -CT with no mention of sacral ulcer  -urine and stool contained, (+) colostomy and indwelling naranjo catheter.  -Topical recommendations: cleanse wound and periwound skin with NS, pat dry. Apply Liquid barrier film to periwound skin. Apply medihoney gel to wound base, pack sacral/gluteal cleft with Aquacel hydrofiber ribbon. Cover with silicone foam with border. Change daily and prn if soiled/compromised.  -Continue to offload pressure; low airloss support surface, turn and position per protocol with use of fluidized positioning devices, continue moisture management with use of single breathable incontinence pad, continue to offload heels with complete cair boots.  -Upon discharge patient should qualify for low airloss support surface; case management/social work to please follow up when medically appropriate.    Additional recommendations:  -Urology team JAYSON Truong made aware of serosanguineous drainage from rectum, consider Colorectal surgery consult.  -Appreciate nutrition recommendations for optimization in patient with healing full thickness surgical wounds and stage 4 pressure injury.  -Appreciate Urology recommendations/management of surgical wounds to genitalia perineum, scrotum; see topical recommendations above..  -Appreciate Ostomy recommendations/education per [facility] nursing service line for Sigmoid Colostomy.  -Consider Psychiatry consult; patient unable to sleep; per daughter patient with Anxiety, recurrent nightmares disrupting sleep patterns; progressively worsened since his wife passed 5 years ago, exacerbated by his sister's passing this year. Of note: Tiffanie (patient's daughter and HCP) shared Seroquel was not helpful, Ramelteon resulted in hallucinations. Was successful with Ativan.  -Per records, Tiffanie did not want palliative care consult; if patient/family becomes amenable, I agree with Palliative care consult and goals of care conversation.      Upon discharge follow up at outpatient [facility]. [address] [facility]. [phone].    Findings and plan discussed with patient, daughter Tiffanie and primary team, Urology JAYSON Truong.   All questions and concerns addressed to meet patient and family's satisfaction.  Will continue to follow periodically while inpatient, please reconsult earlier as needed.  Remainder of care per primary team.  Thank you.    AMRITA Dooley, ZENONN   MS TEAMS    If after 4PM or before 7:30AM on Mon-Friday or weekend/holiday please contact general surgery for urgent matters.   Team A- [id number]   Team B- [id number]  For non-urgent matters e-mail [email or web address]    I spent 35 minutes face-to-face with this patient of which more than 50% of the time was spent counseling/coordinating care of this patient.

## 2024-10-22 NOTE — PROGRESS NOTE ADULT - SUBJECTIVE AND OBJECTIVE BOX
Merlin Mathew, MD   Hospitalist  Pager #[phone]    PROGRESS NOTE:     Patient is a 79y old  Male who presents with a chief complaint of suprapubic pain (22 Oct 2024 10:05)      SUBJECTIVE / OVERNIGHT EVENTS:  NEON   Patient reporting pain in bilateral shoulders, abdomen and backside. Reported chest pain to family earlier today but points to epigastric area when asked where chest pain is   Denies pain with deep breathing     ADDITIONAL REVIEW OF SYSTEMS:    MEDICATIONS  (STANDING):  acetaminophen     Tablet .. 1000 milliGRAM(s) Oral every 6 hours  aspirin  chewable 81 milliGRAM(s) Oral daily  brimonidine 0.2% Ophthalmic Solution 1 Drop(s) Both EYES every 12 hours  ertapenem  IVPB 1000 milliGRAM(s) IV Intermittent every 24 hours  fluconAZOLE   Tablet 400 milliGRAM(s) Oral every 24 hours  heparin   Injectable 5000 Unit(s) SubCutaneous every 8 hours  latanoprost 0.005% Ophthalmic Solution 1 Drop(s) Both EYES at bedtime  losartan 25 milliGRAM(s) Oral daily  metoprolol succinate ER 50 milliGRAM(s) Oral daily  morphine  - Injectable 2 milliGRAM(s) IV Push once  nicotine -  14 mG/24Hr(s) Patch 1 Patch Transdermal daily  rosuvastatin 10 milliGRAM(s) Oral at bedtime  spironolactone 25 milliGRAM(s) Oral daily  timolol 0.5% Solution 1 Drop(s) Both EYES every 12 hours    MEDICATIONS  (PRN):  hydrOXYzine hydrochloride 25 milliGRAM(s) Oral every 6 hours PRN Anxiety  morphine  - Injectable 4 milliGRAM(s) IV Push every 8 hours PRN Severe Pain (7 - 10)      CAPILLARY BLOOD GLUCOSE        I&O's Summary    21 Oct 2024 07:01  -  22 Oct 2024 07:00  --------------------------------------------------------  IN: 0 mL / OUT: 2150 mL / NET: -2150 mL    22 Oct 2024 07:01  -  22 Oct 2024 12:37  --------------------------------------------------------  IN: 0 mL / OUT: 700 mL / NET: -700 mL        PHYSICAL EXAM:  Vital Signs Last 24 Hrs  T(C): 36.7 (22 Oct 2024 09:16), Max: 36.9 (21 Oct 2024 21:10)  T(F): 98.1 (22 Oct 2024 09:16), Max: 98.5 (21 Oct 2024 21:10)  HR: 67 (22 Oct 2024 09:16) (67 - 82)  BP: 135/82 (22 Oct 2024 09:16) (114/70 - 142/88)  BP(mean): --  RR: 18 (22 Oct 2024 09:16) (16 - 18)  SpO2: 100% (22 Oct 2024 09:16) (98% - 100%)    Parameters below as of 22 Oct 2024 09:16  Patient On (Oxygen Delivery Method): room air      CONSTITUTIONAL: NAD, resting comfortably   RESPIRATORY: Normal respiratory effort; lungs are clear to auscultation bilaterally  CARDIOVASCULAR: Regular rate and rhythm, normal S1 and S2, no murmur/rub/gallop; 1+ LE edema   ABDOMEN: + Colostomy w/ gas and stool in bag, no sig TTP  MUSCULOSKELETAL: RUE swelling, no erythema or TTP, otherwise no clubbing or cyanosis of digits; no joint swelling or tenderness to palpation  PSYCH: Alert, answering questions appropriately     LABS:    RADIOLOGY & ADDITIONAL TESTS:  Results Reviewed:   Imaging Personally Reviewed:  Electrocardiogram Personally Reviewed:    COORDINATION OF CARE:  Care Discussed with Consultants/Other Providers [Y/N]:  Prior or Outpatient Records Reviewed [Y/N]:

## 2024-10-22 NOTE — PROGRESS NOTE ADULT - PROBLEM SELECTOR PLAN 5
Patient noted with RLE pitting edema up to shin, no erythema or calf tenderness  - also noted with elevated pBNP up to 31K on 10/12  - patient currently appearing largely euvolemic on exam  - bilateral LE DVT study without any DVTs  - repeat pBNP showing improvement to 09585u  - TTE performed 10/16, showing global LV hypokinesis, EF 35-40%  - LE edema otherwise unilateral and appearing stable

## 2024-10-22 NOTE — PROGRESS NOTE ADULT - SUBJECTIVE AND OBJECTIVE BOX
[facility]-- WOUND TEAM -- FOLLOW UP NOTE  --------------------------------------------------------------------------------    subjective: Patient seen and examined at bedside. Patient reports he has not been able to sleep the past few nights, does not have an appetite, feels generalized pain. Denies CP, fever, chills, n/v. Asked that we call his daughter Tiffanie while at bedside.  Per patient's daughter he struggles with anxiety regularly. Started about 5 years ago when his wife passed, was exacerbated after the passing of his sister this year. Wakes up often throughout the night with nightmares, difficulty falling back to sleep. Per Tiffanie, Seroquel was not helpful, Ramelteon resulted in hallucinations. Was successful with Ativan. Additionally, Tiffanie reported that her dad's eating habits are "weird," feels he is currently at baseline with appetite.    Interval HPI/24 hour events:   -Surgical sites currently managed with Aquacel daily  -Followed by Ostomy nurse for Ostomy teaching, meeting planned with Daughter for ostomy teaching/wound care education for 10AM tomorrow (10/23)  -On ertapenem and fluconazole with end date 10/29  -PT recommended discharge to Rehab, per notes Tiffanie would like patient to be discharged home.      Chart reviewed including labs and relevant images      Diet:  Diet, Regular:     Start Time: 20:00  Liquid Protein Supplement     Qty per Day:  3  Supplement Feeding Modality:  Oral  Ensure Plus High Protein Cans or Servings Per Day:  1       Frequency:  Two Times a day (10-14-24 @ 10:33)      ROS: General, skin see above.  All other systems negative.    ALLERGIES & MEDICATIONS  --------------------------------------------------------------------------------  Allergies    No Known Allergies    Intolerances          STANDING INPATIENT MEDICATIONS    acetaminophen     Tablet .. 1000 milliGRAM(s) Oral every 6 hours  apixaban 5 milliGRAM(s) Oral every 12 hours  aspirin  chewable 81 milliGRAM(s) Oral daily  brimonidine 0.2% Ophthalmic Solution 1 Drop(s) Both EYES every 12 hours  ertapenem  IVPB 1000 milliGRAM(s) IV Intermittent every 24 hours  fluconAZOLE   Tablet 400 milliGRAM(s) Oral every 24 hours  latanoprost 0.005% Ophthalmic Solution 1 Drop(s) Both EYES at bedtime  losartan 25 milliGRAM(s) Oral daily  metoprolol succinate ER 50 milliGRAM(s) Oral daily  morphine  - Injectable 2 milliGRAM(s) IV Push once  nicotine -  14 mG/24Hr(s) Patch 1 Patch Transdermal daily  rosuvastatin 10 milliGRAM(s) Oral at bedtime  spironolactone 25 milliGRAM(s) Oral daily  timolol 0.5% Solution 1 Drop(s) Both EYES every 12 hours      PRN INPATIENT MEDICATION  morphine  - Injectable 4 milliGRAM(s) IV Push every 8 hours PRN        Vital signs:  T(C): 36.7 (10-22-24 @ 09:16), Max: 36.9 (10-21-24 @ 21:10)  HR: 67 (10-22-24 @ 09:16) (67 - 82)  BP: 135/82 (10-22-24 @ 09:16) (114/70 - 142/88)  RR: 18 (10-22-24 @ 09:16) (16 - 18)  SpO2: 100% (10-22-24 @ 09:16) (98% - 100%)  Wt(kg): 59.6 kg (10-20-24)        10-21-24 @ 07:01  -  10-22-24 @ 07:00  --------------------------------------------------------  IN: 0 mL / OUT: 2150 mL / NET: -2150 mL    10-22-24 @ 07:01  -  10-22-24 @ 10:06  --------------------------------------------------------  IN: 0 mL / OUT: 700 mL / NET: -700 mL        Constitutional: A&O x 1-2, agitated, was able to cooperate for first half of encounter. Became aggressive during encounter refused assessment of sacrum.   NAD. Frail. Bedound. (+) low airloss support surface, (+) fluidized positioning devices, (+) complete cair boots  HEENT:  NC/AT, nonicteric, mucosa dry.  Cardiovascular: rate regular  Respiratory: nonlabored, room air, equal chest expansion.  Gastrointestinal: soft NT/ND, LLQ Colostomy with intact pouch in place; stoma pink-moist viable; moderate pasty stool. Healed laparoscopic surgical sites.  : (+) indwelling naranjo catheter.  Multiple surgical wounds:  -Suprapubic- [id number]- 25% adherent tan moist slough, 25% dry-adherent nonfluctuant eschar, 50% pink-moist granular base  -Anterior base of penile shaft- 1cmx2.5cmx0.5cm- 25% adherent tan moist slough, 75% pink-moist granular base  -Right of penile shaft- 1.5cmx0.3cmx0.5cm- 100% pink-moist granular base.  -Anterior scrotum- 2.5cmx1.8cmx2.5cm- 100% tan-moist loosening slough, fixed at base.  *All with scant serous drainage. No purulent drainage, no odor. Periwound skin intact; no erythema, no edema, no increased warmth, no fluctuance, no crepitus.  Musculoskeletal: no gross deformities/contractures  Vascular: BLE equally warm, (+)2 dp pulses, capillary refill < 3 seconds; left heel scar, skin intact.  Skin:  frail, good skin turgor  Sacrum extending distally within sacral/gluteal cleft- stage 4 pressure injury (patient turned to left side)  **Patient refused assessment of posterior trunk/sacrum, holding TV remote threatening to throw it/hit me with it. Exam was terminated. Patient's daughter was called. Agreed to re-attempt assessment in 1- 2 hours.        LABS/ CULTURES/ RADIOLOGY:    Culture - Tissue with Gram Stain (10.12.24 @ 13:58)   - Ampicillin: R >16 These ampicillin results predict results for amoxicillin  - Tobramycin: S <=2  - Trimethoprim/Sulfamethoxazole: R >2/38  - Ceftriaxone: R 2  - Ciprofloxacin: R >2  - Ertapenem: S <=0.5  - Gentamicin: S <=2  - Imipenem: S <=1  - Levofloxacin: R >4  - Meropenem: S <=1  - Piperacillin/Tazobactam: R <=8  Gram Stain:   No polymorphonuclear leukocytes seen per low power field   No organisms seen per oil power field  - Ampicillin/Sulbactam: R >16/8  - Aztreonam: R <=4  - Cefazolin: R >16  - Cefepime: R <=2  Specimen Source: Tissue  Culture Results:   Rare Escherichia coli ESBL  Organism Identification: Escherichia coli ESBL  Organism: Escherichia coli ESBL  Method Type: JOHAN      Culture - Fungal, Tissue (10.12.24 @ 13:58)   - Fluconazole: S 0.5 Fluconazole: Susceptibility depends on achieving the maximum possible blood level. Doses higher than the standard dosing amount (6mg/kg/day) may be needed in adults with normal renal function and body habitus.   This test was developed and itsperformance characteristics determined by [facility], [place], [place] It has not been cleared or approved by the U.S. Food and Drug Administration. S - Susceptible; SDD - Susceptible Dose Dependent; I - Intermediate; R - Resistant; N/I - No Interpretation Available  Specimen Source: Tissue  Culture Results:   Rare Candida albicans  Organism Identification: Candida albicans  Organism: Candida albicans  Method Type: YSTMIC      Culture - Blood (10.12.24 @ 01:55)   Specimen Source: .Blood BLOOD  Culture Results:   No growth at 5 days        CAPILLARY BLOOD GLUCOSE            ACC: [id number] EXAM:  CT ABDOMEN AND PELVIS   ORDERED BY: SANTIAGO BRICEÑO     PROCEDURE DATE:  10/12/2024          INTERPRETATION:  CLINICAL INFORMATION: Colorectal cancer, history of open   wound and abscess    COMPARISON: 8/30/2024    CONTRAST/COMPLICATIONS:  IV Contrast: None  Oral Contrast: None  Complications: None    PROCEDURE:  CT of the Abdomen and Pelvis was performed.  Sagittal and coronal reformats were performed.    FINDINGS:  LOWER CHEST: Coronary artery calcifications versus stents. Moderate left   and small right pleural effusions with associated atelectasis. Lower lobe   nodules measuring up to 0.8 cm in the left lower lobe. Centrilobular and   paraseptal emphysema. Atelectasis or scarring in the right middle lobe.    LIVER: Lefthepatic lobe cysts. Scattered calcified granulomas.  BILE DUCTS: Normal caliber.  GALLBLADDER: Within normal limits.  SPLEEN: Within normal limits.  PANCREAS: Within normal limits.  ADRENALS: 3.2 cm indeterminate left adrenal nodule.  KIDNEYS/URETERS: Bilateral renal cysts as well as additional   indeterminate lesions which may represent hemorrhagic or proteinaceous   cysts.    BLADDER: Naranjo catheter.  REPRODUCTIVE ORGANS: Prostate gland is enlarged. Ill-defined fluid and   gas within the scrotum.    BOWEL: Surgical clips in the perirectal space. Diverticulosis of the   colon. No evidence of acute diverticulitis. Diverting left lower quadrant   ostomy. No bowel obstruction. Appendix is normal. Fluid and gas is also   present in the perirectal space and probably perineum.  PERITONEUM/RETROPERITONEUM: Within normal limits.  VESSELS: Atherosclerotic disease of the abdominal aorta. There is bilobed   infrarenal abdominal aortic aneurysm measuring up to 5.1 cm. 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  LYMPH NODES: No lymphadenopathy.  ABDOMINAL WALL: Diffuse anasarca. Left lower quadrant diverting colostomy  BONES: Degenerative changes.    IMPRESSION:  Ill-defined fluid and gas collection in the scrotum. Fluid and gas is   also present in the perirectal space and probably perineum. 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    Moderate left and small right pleural effusions with adjacent compressive   atelectasis. Bibasilar pulmonary nodules compatible with metastatic   disease.    There is bilobed infrarenal abdominal aortic aneurysm measuring up to 5.1   cm. 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    Bilateral indeterminant renal lesions. Consider renal ultrasound or   abdominal MRI for further evaluation.    Additional findings as above.    Critical findings were discussed with Dr. Briceño  by Dr. Mcclain on   10/12/2024 6:22:30 AM.    --- End of Report ---            DESEAN MCNULTY MD; Attending Radiologist  This document has been electronically signed. Oct 12 2024 10:38AM                               [facility]-- WOUND TEAM -- FOLLOW UP NOTE  --------------------------------------------------------------------------------    subjective: Patient seen and examined at bedside. Patient reports he has not been able to sleep the past few nights, does not have an appetite, feels generalized pain. Denies CP, fever, chills, n/v. Asked that we call his daughter Tiffanie while at bedside.  Per patient's daughter he struggles with anxiety regularly. Started about 5 years ago when his wife passed, was exacerbated after the passing of his sister this year. Wakes up often throughout the night with nightmares, difficulty falling back to sleep. Per Tiffanie, Seroquel was not helpful, Ramelteon resulted in hallucinations. Was successful with Ativan. Additionally, Tiffanie reported that her dad's eating habits are "weird," feels he is currently at baseline with appetite.    Interval HPI/24 hour events:   -Surgical sites currently managed with Aquacel daily  -Followed by Ostomy nurse for Ostomy teaching, meeting planned with Daughter for ostomy teaching/wound care education for 10AM tomorrow (10/23)  -On ertapenem and fluconazole with end date 10/29  -PT recommended discharge to Rehab, per notes Tiffanie would like patient to be discharged home.      Chart reviewed including labs and relevant images      Diet:  Diet, Regular:     Start Time: 20:00  Liquid Protein Supplement     Qty per Day:  3  Supplement Feeding Modality:  Oral  Ensure Plus High Protein Cans or Servings Per Day:  1       Frequency:  Two Times a day (10-14-24 @ 10:33)      ROS: General, skin see above.  All other systems negative.    ALLERGIES & MEDICATIONS  --------------------------------------------------------------------------------  Allergies    No Known Allergies    Intolerances          STANDING INPATIENT MEDICATIONS    acetaminophen     Tablet .. 1000 milliGRAM(s) Oral every 6 hours  apixaban 5 milliGRAM(s) Oral every 12 hours  aspirin  chewable 81 milliGRAM(s) Oral daily  brimonidine 0.2% Ophthalmic Solution 1 Drop(s) Both EYES every 12 hours  ertapenem  IVPB 1000 milliGRAM(s) IV Intermittent every 24 hours  fluconAZOLE   Tablet 400 milliGRAM(s) Oral every 24 hours  latanoprost 0.005% Ophthalmic Solution 1 Drop(s) Both EYES at bedtime  losartan 25 milliGRAM(s) Oral daily  metoprolol succinate ER 50 milliGRAM(s) Oral daily  morphine  - Injectable 2 milliGRAM(s) IV Push once  nicotine -  14 mG/24Hr(s) Patch 1 Patch Transdermal daily  rosuvastatin 10 milliGRAM(s) Oral at bedtime  spironolactone 25 milliGRAM(s) Oral daily  timolol 0.5% Solution 1 Drop(s) Both EYES every 12 hours      PRN INPATIENT MEDICATION  morphine  - Injectable 4 milliGRAM(s) IV Push every 8 hours PRN        Vital signs:  T(C): 36.7 (10-22-24 @ 09:16), Max: 36.9 (10-21-24 @ 21:10)  HR: 67 (10-22-24 @ 09:16) (67 - 82)  BP: 135/82 (10-22-24 @ 09:16) (114/70 - 142/88)  RR: 18 (10-22-24 @ 09:16) (16 - 18)  SpO2: 100% (10-22-24 @ 09:16) (98% - 100%)  Wt(kg): 59.6 kg (10-20-24)        10-21-24 @ 07:01  -  10-22-24 @ 07:00  --------------------------------------------------------  IN: 0 mL / OUT: 2150 mL / NET: -2150 mL    10-22-24 @ 07:01  -  10-22-24 @ 10:06  --------------------------------------------------------  IN: 0 mL / OUT: 700 mL / NET: -700 mL        Constitutional: A&O x 1-2, agitated, was able to cooperate for first half of encounter. Became aggressive during encounter refused assessment of sacrum.   NAD. Frail. Bedound. (+) low airloss support surface, (+) fluidized positioning devices, (+) complete cair boots  HEENT:  NC/AT, nonicteric, mucosa dry.  Cardiovascular: rate regular  Respiratory: nonlabored, room air, equal chest expansion.  Gastrointestinal: soft NT/ND, LLQ Colostomy with intact pouch in place; stoma pink-moist viable; moderate pasty stool. Healed laparoscopic surgical sites.  Small-moderate serosanguineous drainage from rectum.  : (+) indwelling naranjo catheter.  Multiple surgical wounds:  -Suprapubic- [id number]- 25% adherent tan moist slough, 25% dry-adherent nonfluctuant eschar, 50% pink-moist granular base  -Anterior base of penile shaft- 1cmx2.5cmx0.5cm- 25% adherent tan moist slough, 75% pink-moist granular base  -Right of penile shaft- 1.5cmx0.3cmx0.5cm- 100% pink-moist granular base.  -Anterior scrotum- 2.5cmx1.8cmx2.5cm- 100% tan-moist loosening slough, fixed at base.  *All with scant serous drainage. No purulent drainage, no odor. Periwound skin intact; no erythema, no edema, no increased warmth, no fluctuance, no crepitus.  Musculoskeletal: no gross deformities/contractures  Vascular: BLE equally warm, (+)2 dp pulses, capillary refill < 3 seconds.  Skin:  frail, good skin turgor  Sacrum extending distally within sacral/gluteal cleft- stage 4 pressure injury   **Patient refused assessment of posterior trunk/sacrum, holding TV remote threatening to throw it/hit me with it. Exam was terminated. Patient's daughter was called. Agreed to re-attempt assessment in 1- 2 hours.    Addendum: Returned to patient for assessment of sacrum with primary RN, patient cooperative.  Sacrum extending distally within sacral/gluteal cleft- stage 4 pressure injury (patient turned to right side)  -2rtx1yrg1.5cm (prev 0qel1jwq0.5cm)  -Sacrum with adherent softening nonfluctuant eschar; tan-moist adherent slough within sacral/gluteal cleft, bone palpable beneath slough, not visible.  -Small serofibrinous drainage; no purulent drainage, no odor.   -Periwound skin without induration, no fluctuance, no crepitus, no erythema, no edema, no increased warmth.        LABS/ CULTURES/ RADIOLOGY:    Culture - Tissue with Gram Stain (10.12.24 @ 13:58)   - Ampicillin: R >16 These ampicillin results predict results for amoxicillin  - Tobramycin: S <=2  - Trimethoprim/Sulfamethoxazole: R >2/38  - Ceftriaxone: R 2  - Ciprofloxacin: R >2  - Ertapenem: S <=0.5  - Gentamicin: S <=2  - Imipenem: S <=1  - Levofloxacin: R >4  - Meropenem: S <=1  - Piperacillin/Tazobactam: R <=8  Gram Stain:   No polymorphonuclear leukocytes seen per low power field   No organisms seen per oil power field  - Ampicillin/Sulbactam: R >16/8  - Aztreonam: R <=4  - Cefazolin: R >16  - Cefepime: R <=2  Specimen Source: Tissue  Culture Results:   Rare Escherichia coli ESBL  Organism Identification: Escherichia coli ESBL  Organism: Escherichia coli ESBL  Method Type: JOHAN      Culture - Fungal, Tissue (10.12.24 @ 13:58)   - Fluconazole: S 0.5 Fluconazole: Susceptibility depends on achieving the maximum possible blood level. Doses higher than the standard dosing amount (6mg/kg/day) may be needed in adults with normal renal function and body habitus.   This test was developed and itsperformance characteristics determined by [facility], [place], [place] It has not been cleared or approved by the U.S. Food and Drug Administration. S - Susceptible; SDD - Susceptible Dose Dependent; I - Intermediate; R - Resistant; N/I - No Interpretation Available  Specimen Source: Tissue  Culture Results:   Rare Candida albicans  Organism Identification: Candida albicans  Organism: Candida albicans  Method Type: YSTMIC      Culture - Blood (10.12.24 @ 01:55)   Specimen Source: .Blood BLOOD  Culture Results:   No growth at 5 days        CAPILLARY BLOOD GLUCOSE            ACC: [id number] EXAM:  CT ABDOMEN AND PELVIS   ORDERED BY: SANTIAGO BRICEÑO     PROCEDURE DATE:  10/12/2024          INTERPRETATION:  CLINICAL INFORMATION: Colorectal cancer, history of open   wound and abscess    COMPARISON: 8/30/2024    CONTRAST/COMPLICATIONS:  IV Contrast: None  Oral Contrast: None  Complications: None    PROCEDURE:  CT of the Abdomen and Pelvis was performed.  Sagittal and coronal reformats were performed.    FINDINGS:  LOWER CHEST: Coronary artery calcifications versus stents. Moderate left   and small right pleural effusions with associated atelectasis. Lower lobe   nodules measuring up to 0.8 cm in the left lower lobe. Centrilobular and   paraseptal emphysema. Atelectasis or scarring in the right middle lobe.    LIVER: Lefthepatic lobe cysts. Scattered calcified granulomas.  BILE DUCTS: Normal caliber.  GALLBLADDER: Within normal limits.  SPLEEN: Within normal limits.  PANCREAS: Within normal limits.  ADRENALS: 3.2 cm indeterminate left adrenal nodule.  KIDNEYS/URETERS: Bilateral renal cysts as well as additional   indeterminate lesions which may represent hemorrhagic or proteinaceous   cysts.    BLADDER: Naranjo catheter.  REPRODUCTIVE ORGANS: Prostate gland is enlarged. Ill-defined fluid and   gas within the scrotum.    BOWEL: Surgical clips in the perirectal space. Diverticulosis of the   colon. No evidence of acute diverticulitis. Diverting left lower quadrant   ostomy. No bowel obstruction. Appendix is normal. Fluid and gas is also   present in the perirectal space and probably perineum.  PERITONEUM/RETROPERITONEUM: Within normal limits.  VESSELS: Atherosclerotic disease of the abdominal aorta. There is bilobed   infrarenal abdominal aortic aneurysm measuring up to 5.1 cm. 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  LYMPH NODES: No lymphadenopathy.  ABDOMINAL WALL: Diffuse anasarca. Left lower quadrant diverting colostomy  BONES: Degenerative changes.    IMPRESSION:  Ill-defined fluid and gas collection in the scrotum. Fluid and gas is   also present in the perirectal space and probably perineum. 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    Moderate left and small right pleural effusions with adjacent compressive   atelectasis. Bibasilar pulmonary nodules compatible with metastatic   disease.    There is bilobed infrarenal abdominal aortic aneurysm measuring up to 5.1   cm. 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    Bilateral indeterminant renal lesions. Consider renal ultrasound or   abdominal MRI for further evaluation.    Additional findings as above.    Critical findings were discussed with Dr. Briceño  by Dr. Mcclain on   10/12/2024 6:22:30 AM.    --- End of Report ---            DESEAN MCNULTY MD; Attending Radiologist  This document has been electronically signed. Oct 12 2024 10:38AM

## 2024-10-22 NOTE — DIETITIAN NUTRITION RISK NOTIFICATION - MALNUTRITION EVALUATION AS DEMONSTRATED BY (ADULTS > 20 YEARS OF AGE)
Loss of subcutaneous fat.../Loss of muscle...
Loss of subcutaneous fat.../Loss of muscle.../Fluid accumulation...

## 2024-10-22 NOTE — PROGRESS NOTE ADULT - SUBJECTIVE AND OBJECTIVE BOX
Infectious Diseases Follow Up:    Patient is a 79y old  Male who presents with a chief complaint of suprapubic pain (22 Oct 2024 12:37)      Interval History/ROS:  Pt w/ some buttocks pain, denies fevers, chills, abdominal pain     Allergies  No Known Allergies        ANTIMICROBIALS:  ertapenem  IVPB 1000 every 24 hours  fluconAZOLE   Tablet 400 every 24 hours      Current Abx:     Previous Abx     OTHER MEDS:  MEDICATIONS  (STANDING):  acetaminophen     Tablet .. 1000 every 6 hours  aspirin  chewable 81 daily  heparin   Injectable 5000 every 8 hours  hydrOXYzine hydrochloride 25 every 6 hours PRN  losartan 25 daily  metoprolol succinate ER 50 daily  morphine  - Injectable 2 once  morphine  - Injectable 4 every 8 hours PRN  rosuvastatin 10 at bedtime  spironolactone 25 daily      Vital Signs Last 24 Hrs  T(C): 36.8 (22 Oct 2024 13:42), Max: 36.9 (21 Oct 2024 21:10)  T(F): 98.2 (22 Oct 2024 13:42), Max: 98.5 (21 Oct 2024 21:10)  HR: 71 (22 Oct 2024 13:42) (67 - 82)  BP: 111/61 (22 Oct 2024 13:42) (111/61 - 142/88)  BP(mean): --  RR: 18 (22 Oct 2024 13:42) (16 - 18)  SpO2: 100% (22 Oct 2024 13:42) (98% - 100%)    Parameters below as of 22 Oct 2024 13:42  Patient On (Oxygen Delivery Method): room air        PHYSICAL EXAM:  GENERAL: NAD, well-developed  HEAD:  Atraumatic, Normocephalic  EYES: EOMI, conjunctiva and sclera clear  CHEST/LUNG: Clear to auscultation bilaterally; No wheeze  HEART: Regular rate and rhythm; No murmurs, rubs, or gallops  ABDOMEN: Soft, Nontender, Nondistended; +Ostomy w/ stool   PSYCH: AAOx3            MICROBIOLOGY:  v  Tissue  10-12-24   Rare Escherichia coli ESBL  --  Escherichia coli ESBL      Clean Catch Clean Catch (Midstream)  10-12-24   10,000 - 49,000 CFU/mL Candida albicans "Susceptibilities not performed"  --  --      .Blood BLOOD  10-12-24   No growth at 5 days  --  --      .Blood BLOOD  10-12-24   No growth at 5 days  --  --                RADIOLOGY:

## 2024-10-22 NOTE — PROGRESS NOTE ADULT - PROBLEM SELECTOR PLAN 1
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

## 2024-10-22 NOTE — CHART NOTE - NSCHARTNOTEFT_GEN_A_CORE
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 Medicine following for comanagement.    Diet Prescription: Diet, Regular:     Start Time: 20:00  Liquid Protein Supplement     Qty per Day:  3  Supplement Feeding Modality:  Oral  Ensure Plus High Protein Cans or Servings Per Day:  1       Frequency:  Two Times a day (10-14-24 @ 10:33)      Food Allergy/Intolerance: NKFA    Nutrition Course:   -   Propofol at current rate of  mL/hr which if maintained over 24 hours would provide ~ non-nutritive lipid kcals.  Enteral Nutrition: Current nutrition prescription provides mL formula, kcal, gm protein, mL free water. This meets kcal/kg, gm protein/kg @ dosing/current/ideal weight __ kg.   Parenteral Nutrition: TPN 2L infusing at 83ml/hr (105 gm amino acids, 280 gm dextrose, 45 gm SMOF lipids) to provide 1822 сергей/day (30 сергей/kg and 1.7 gm protein/kg per dosing wt 60.9 kg)  -    Pertinent Medications: MEDICATIONS  (STANDING):  acetaminophen     Tablet .. 1000 milliGRAM(s) Oral every 6 hours  apixaban 5 milliGRAM(s) Oral every 12 hours  aspirin  chewable 81 milliGRAM(s) Oral daily  brimonidine 0.2% Ophthalmic Solution 1 Drop(s) Both EYES every 12 hours  ertapenem  IVPB 1000 milliGRAM(s) IV Intermittent every 24 hours  fluconAZOLE   Tablet 400 milliGRAM(s) Oral every 24 hours  latanoprost 0.005% Ophthalmic Solution 1 Drop(s) Both EYES at bedtime  losartan 25 milliGRAM(s) Oral daily  metoprolol succinate ER 50 milliGRAM(s) Oral daily  morphine  - Injectable 2 milliGRAM(s) IV Push once  nicotine -  14 mG/24Hr(s) Patch 1 Patch Transdermal daily  rosuvastatin 10 milliGRAM(s) Oral at bedtime  spironolactone 25 milliGRAM(s) Oral daily  timolol 0.5% Solution 1 Drop(s) Both EYES every 12 hours    MEDICATIONS  (PRN):  morphine  - Injectable 4 milliGRAM(s) IV Push every 8 hours PRN Severe Pain (7 - 10)    [] Relevant notes on medications:     Pertinent Labs: 10-20 Na133 mmol/L[L] Glu 104 mg/dL[H] K+ 3.5 mmol/L Cr  0.47 mg/dL[L] BUN 11 mg/dL            CAPILLARY BLOOD GLUCOSE        [] Relevant notes on labs:     Weight:   Weight Assessment:   Height: in / cm  IBW: lbs / kg +/-10%  BMI: kg/m^2    Physical Assessment, per flowsheets:  Edema: 1+ rt arm per RN flowsheets; previously as much as 2-3+ edema, see RN flowsheets  Pressure Injury: Stage IV sacrum  Nutrition Focused Physical Exam: [] conducted   Muscle wasting [] temporal [] clavicle [] shoulder [] scapula [] thigh [] calf [] dorsal hand    Fat Loss [] buccal [] orbital [] triceps [] ribs     Estimated Needs:   [] No change since previous assessment -OR - [ZX] Recalculated  Est Needs based on: [] actual weight [] dosing weight [X] IBW  Kcal  Protein      Previous Nutrition Diagnosis:   [ ] Inadequate Energy Intake [ ]Inadequate Oral Intake [ ] Excessive Energy Intake   [ ] Underweight [ ] Increased Nutrient Needs [ ] Overweight/Obesity   [ ] Altered GI Function [ ] Unintended Weight Loss [ ] Food & Nutrition Related Knowledge Deficit [ ] Malnutrition   Nutrition Diagnosis is [ ] ongoing  [ ] resolved [ ] not applicable   New Nutrition Diagnosis: [ ] not applicable     Education:  [ ] Provided pt with verbal / written education on   [ ] Provided on previous assessment by RD  [ ] Not applicable secondary to   [ ] Pt refused     Interventions:   1) Continue diet/supplement as ordered:   2) Please document % meal intake in flowsheets to assess adequacy of PO Intake.  3) Obtain and record current weight to best assess for acute changes in nutritional status.    [] Consider alternate means of nutrition/hydration if consistent with goals of care/patient/family wishes.  [] Current medical condition precludes nutrition intervention at this time.   [] Consider appetite stimulating medication if medically appropriate.    Monitor & Evaluate:  PO intake, tolerance to diet/supplement, nutrition related lab values, weight trends, BMs/GI distress, hydration status, skin integrity.    Ernestina Underwood MS, RD, CDN; Pager #[phone]  Also available on [employer] Teams NUTRITION FOLLOW UP NOTE    Pt seen for f/u Moderate Malnutrition     SOURCE: [X] Patient [X] Medical record [] RN/PCA [] Family/Caregiver [] Patient unavailable [] Patient inappropriate (disoriented, nonverbal, intubated/sedated) [X] Other: IDT    Medical Course: 10/21/24 Hospitalist Attending - 78 y/o M with history of CAD s/p stents in 2012, locally advanced rectal cancer s/p neoadjuvant therapy with chemoRT (completed 11/2022), consolidation CAPOX (2/2023), brachytherapy (3/2024), presenting for generalized weakness and rectal pain, 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 Patient continues on IV ABtx.    Diet Prescription: Diet, Regular:     Start Time: 20:00  Liquid Protein Supplement     Qty per Day:  3  Supplement Feeding Modality:  Oral  Ensure Plus High Protein Cans or Servings Per Day:  1       Frequency:  Two Times a day (10-14-24 @ 10:33)    Food Allergy/Intolerance: NKFA    Nutrition Course: Diet being supplemented with Ensure Plus High Protein Therapeutic Shake 3x daily (1050kcal, 60g protein) and LPS SF 30 mL 3x day (300kcal, 45gm protein), as patient with advanced stage pressure injury.  Patient with weight loss since admission, likely in setting of improved fluid edema / diuretic therapy (Spironolactone).      Pertinent Medications: MEDICATIONS  (STANDING):  acetaminophen     Tablet .. 1000 milliGRAM(s) Oral every 6 hours  apixaban 5 milliGRAM(s) Oral every 12 hours  aspirin  chewable 81 milliGRAM(s) Oral daily  brimonidine 0.2% Ophthalmic Solution 1 Drop(s) Both EYES every 12 hours  ertapenem  IVPB 1000 milliGRAM(s) IV Intermittent every 24 hours  fluconAZOLE   Tablet 400 milliGRAM(s) Oral every 24 hours  latanoprost 0.005% Ophthalmic Solution 1 Drop(s) Both EYES at bedtime  losartan 25 milliGRAM(s) Oral daily  metoprolol succinate ER 50 milliGRAM(s) Oral daily  morphine  - Injectable 2 milliGRAM(s) IV Push once  nicotine -  14 mG/24Hr(s) Patch 1 Patch Transdermal daily  rosuvastatin 10 milliGRAM(s) Oral at bedtime  spironolactone 25 milliGRAM(s) Oral daily  timolol 0.5% Solution 1 Drop(s) Both EYES every 12 hours    MEDICATIONS  (PRN):  morphine  - Injectable 4 milliGRAM(s) IV Push every 8 hours PRN Severe Pain (7 - 10)    Pertinent Labs: 10-20 Na133 mmol/L[L] Glu 104 mg/dL[H] K+ 3.5 mmol/L Cr  0.47 mg/dL[L] BUN 11 mg/dL    [] Relevant notes on labs: n/a     Weight: 10/20 - 59.6kg      10/15 - 65.4kg      10/14 - 58.3kg      10/13 - 62.1kg      10/12 - 63kg (Adm)  Weight Assessment:   Height: 185cm  IBW:   BMI:     Physical Assessment, per flowsheets:  Edema: 1+ rt arm per RN flowsheets; previously as much as 2-3+ edema, see RN flowsheets  Pressure Injury: Stage IV sacrum  Nutrition Focused Physical Exam: [] conducted   Muscle wasting [] temporal [] clavicle [] shoulder [] scapula [] thigh [] calf [] dorsal hand    Fat Loss [] buccal [] orbital [] triceps [] ribs     Estimated Needs:   [] No change since previous assessment -OR - [ZX] Recalculated  Est Needs based on: [] actual weight [] dosing weight [X] IBW  Kcal  Protein      Previous Nutrition Diagnosis:   [ ] Inadequate Energy Intake [ ]Inadequate Oral Intake [ ] Excessive Energy Intake   [ ] Underweight [ ] Increased Nutrient Needs [ ] Overweight/Obesity   [ ] Altered GI Function [ ] Unintended Weight Loss [ ] Food & Nutrition Related Knowledge Deficit [ ] Malnutrition   Nutrition Diagnosis is [ ] ongoing  [ ] resolved [ ] not applicable   New Nutrition Diagnosis: [ ] not applicable     Education:  [ ] Provided pt with verbal / written education on   [ ] Provided on previous assessment by RD  [ ] Not applicable secondary to   [ ] Pt refused     Interventions:   1) Continue diet/supplement as ordered:   2) Please document % meal intake in flowsheets to assess adequacy of PO Intake.  3) Obtain and record current weight to best assess for acute changes in nutritional status.    [] Consider alternate means of nutrition/hydration if consistent with goals of care/patient/family wishes.  [] Current medical condition precludes nutrition intervention at this time.   [] Consider appetite stimulating medication if medically appropriate.    Monitor & Evaluate:  PO intake, tolerance to diet/supplement, nutrition related lab values, weight trends, BMs/GI distress, hydration status, skin integrity.    Ernestina Underwood MS, RD, CDN; Pager #[phone]  Also available on [employer] Teams NUTRITION FOLLOW UP NOTE    Pt seen for f/u Moderate Malnutrition     SOURCE: [X] Patient [X] Medical record [] RN/PCA [] Family/Caregiver [] Patient unavailable [] Patient inappropriate (disoriented, nonverbal, intubated/sedated) [X] Other: IDT    Medical Course: 10/21/24 Hospitalist Attending - 80 y/o M with history of CAD s/p stents in 2012, locally advanced rectal cancer s/p neoadjuvant therapy with chemoRT (completed 11/2022), consolidation CAPOX (2/2023), brachytherapy (3/2024), presenting for generalized weakness and rectal pain, 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 Patient continues on IV ABtx.    Diet Prescription: Diet, Regular:     Start Time: 20:00  Liquid Protein Supplement     Qty per Day:  3  Supplement Feeding Modality:  Oral  Ensure Plus High Protein Cans or Servings Per Day:  1       Frequency:  Two Times a day (10-14-24 @ 10:33)    Food Allergy/Intolerance: NKFA    Nutrition Course: PO intake poor to fair based on review of available RN flowsheet documentation. Patient needs assistance with feeding. Diet being supplemented with Ensure Plus High Protein Therapeutic Shake 3x daily (1050kcal, 60g protein) and LPS SF 30 mL 3x day (300kcal, 45gm protein), as patient with advanced stage pressure injury.  Patient with weight loss since admission, likely in setting of improved fluid edema / diuretic therapy (Spironolactone).  Estimated needs reassessed accordingly, see below.    Pertinent Medications: MEDICATIONS  (STANDING):  acetaminophen     Tablet .. 1000 milliGRAM(s) Oral every 6 hours  apixaban 5 milliGRAM(s) Oral every 12 hours  aspirin  chewable 81 milliGRAM(s) Oral daily  brimonidine 0.2% Ophthalmic Solution 1 Drop(s) Both EYES every 12 hours  ertapenem  IVPB 1000 milliGRAM(s) IV Intermittent every 24 hours  fluconAZOLE   Tablet 400 milliGRAM(s) Oral every 24 hours  latanoprost 0.005% Ophthalmic Solution 1 Drop(s) Both EYES at bedtime  losartan 25 milliGRAM(s) Oral daily  metoprolol succinate ER 50 milliGRAM(s) Oral daily  morphine  - Injectable 2 milliGRAM(s) IV Push once  nicotine -  14 mG/24Hr(s) Patch 1 Patch Transdermal daily  rosuvastatin 10 milliGRAM(s) Oral at bedtime  spironolactone 25 milliGRAM(s) Oral daily  timolol 0.5% Solution 1 Drop(s) Both EYES every 12 hours    MEDICATIONS  (PRN):  morphine  - Injectable 4 milliGRAM(s) IV Push every 8 hours PRN Severe Pain (7 - 10)    Pertinent Labs: 10-20 Na133 mmol/L[L] Glu 104 mg/dL[H] K+ 3.5 mmol/L Cr  0.47 mg/dL[L] BUN 11 mg/dL    [] Relevant notes on labs: n/a     Weight: 10/20 - 59.6kg      10/15 - 65.4kg      10/14 - 58.3kg      10/13 - 62.1kg      10/12 - 63kg (Adm)  Weight Assessment: 3.4kg wt loss / 5.4% weight change in ~1 week, possibly contributed by improved edema / diuretic therapy.  Height: 185cm  IBW: 83.6kg +/- 10%  BMI: 17.3kg/m2    Physical Assessment, per flowsheets:  Edema: 1+ rt arm per RN flowsheets; previously as much as 2-3+ edema, see RN flowsheets  Pressure Injury: Stage IV sacrum   Nutrition Focused Physical Exam: [] conducted   Muscle wasting [] temporal [] clavicle [] shoulder [] scapula [] thigh [] calf [] dorsal hand    Fat Loss [] buccal [] orbital [] triceps [] ribs     Estimated Needs:   [] No change since previous assessment -OR - [X] Recalculated  Est Needs based on: [X] actual weight - 59.6kg (10/20) [] dosing weight [ ] IBW  Kcal 30-35kcal/ec=1240-7505mtuq  Protein 1.4-1.6gm/kg=83-95gm protein     Previous Nutrition Diagnosis:   1: Increased Nutrient Needs  2: Moderate Malnutrition   Nutrition Diagnosis is [ ] ongoing  [ ] resolved [ ] not applicable   New Nutrition Diagnosis: [ ] not applicable     Education:  [ ] Provided pt with verbal / written education on   [ ] Provided on previous assessment by RD  [X ] Not applicable secondary to mental status (confused, disoriented)  [ ] Pt refused     Interventions:   1) Continue diet/supplement as ordered.  2) Please document % meal intake in flowsheets to assess adequacy of PO Intake.  3) Obtain and record current weight to best assess for acute changes in nutritional status.    [] Consider alternate means of nutrition/hydration if consistent with goals of care/patient/family wishes.  [] Current medical condition precludes nutrition intervention at this time.   [X] Consider appetite stimulating medication if medically appropriate.    Monitor & Evaluate:  PO intake, tolerance to diet/supplement, nutrition related lab values, weight trends, BMs/GI distress, hydration status, skin integrity.    Ernestina Underwood, MS, RD, CDN; Pager #[phone]  Also available on [employer] Teams NUTRITION FOLLOW UP NOTE    Pt seen for f/u Moderate Malnutrition     SOURCE: [X] Patient [X] Medical record [X] RN/PCA [] Family/Caregiver [] Patient unavailable [] Patient inappropriate (disoriented, nonverbal, intubated/sedated) [X] Other: IDT    Medical Course: 10/21/24 Hospitalist Attending - 78 y/o M with history of CAD s/p stents in 2012, locally advanced rectal cancer s/p neoadjuvant therapy with chemoRT (completed 11/2022), consolidation CAPOX (2/2023), brachytherapy (3/2024), presenting for generalized weakness and rectal pain, 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 Patient continues on IV ABtx.    Diet Prescription: Diet, Regular:     Start Time: 20:00  Liquid Protein Supplement     Qty per Day:  3  Supplement Feeding Modality:  Oral  Ensure Plus High Protein Cans or Servings Per Day:  1       Frequency:  Two Times a day (10-14-24 @ 10:33)    Food Allergy/Intolerance: NKFA    Nutrition Course: PO intake poor to fair based on review of available RN flowsheet documentation. Patient with fair PO intake of lunch, per PCA, had his chicken, 2 yogurts and a fruit cup, tolerated without any reported issues.  Patient needs assistance with feeding. Diet being supplemented with Ensure Plus High Protein Therapeutic Shake 3x daily (1050kcal, 60g protein) and LPS SF 30 mL 3x day (300kcal, 45gm protein), as patient with advanced stage pressure injury.  Patient reported good acceptance to supplement, noted patient with Ensure on bedside table that he has been drinking.  Patient with weight loss since admission, likely in setting of improved fluid edema / diuretic therapy (Spironolactone).  Estimated needs reassessed accordingly, see below. Patient did present with more pronounced muscle / fat loss on reassessment today, see below.    Pertinent Medications: MEDICATIONS  (STANDING):  acetaminophen     Tablet .. 1000 milliGRAM(s) Oral every 6 hours  apixaban 5 milliGRAM(s) Oral every 12 hours  aspirin  chewable 81 milliGRAM(s) Oral daily  brimonidine 0.2% Ophthalmic Solution 1 Drop(s) Both EYES every 12 hours  ertapenem  IVPB 1000 milliGRAM(s) IV Intermittent every 24 hours  fluconAZOLE   Tablet 400 milliGRAM(s) Oral every 24 hours  latanoprost 0.005% Ophthalmic Solution 1 Drop(s) Both EYES at bedtime  losartan 25 milliGRAM(s) Oral daily  metoprolol succinate ER 50 milliGRAM(s) Oral daily  morphine  - Injectable 2 milliGRAM(s) IV Push once  nicotine -  14 mG/24Hr(s) Patch 1 Patch Transdermal daily  rosuvastatin 10 milliGRAM(s) Oral at bedtime  spironolactone 25 milliGRAM(s) Oral daily  timolol 0.5% Solution 1 Drop(s) Both EYES every 12 hours    MEDICATIONS  (PRN):  morphine  - Injectable 4 milliGRAM(s) IV Push every 8 hours PRN Severe Pain (7 - 10)    Pertinent Labs: 10-20 Na133 mmol/L[L] Glu 104 mg/dL[H] K+ 3.5 mmol/L Cr  0.47 mg/dL[L] BUN 11 mg/dL    [] Relevant notes on labs: n/a     Weight: 10/20 - 59.6kg      10/15 - 65.4kg      10/14 - 58.3kg      10/13 - 62.1kg      10/12 - 63kg (Adm)  Weight Assessment: 3.4kg wt loss / 5.4% weight change in ~1 week, possibly contributed by improved edema / diuretic therapy.  Height: 185cm  IBW: 83.6kg +/- 10%  BMI: 17.3kg/m2    Physical Assessment, per flowsheets:  Edema: 1+ rt arm per RN flowsheets; previously as much as 2-3+ edema, see RN flowsheets  Pressure Injury: Stage IV sacrum   Nutrition Focused Physical Exam: [X] conducted   Muscle wasting [MODERATE] temporal [SEVERE] clavicle [unable to assess] shoulder [unable to assess] scapula [deferred] thigh [deferred] calf [MODERATE] dorsal hand    Fat Loss [MILD] buccal [MODERATE] orbital [SEVERE] triceps [SEVERE] ribs     Estimated Needs:   [] No change since previous assessment -OR - [X] Recalculated  Est Needs based on: [X] actual weight - 59.6kg (10/20) [] dosing weight [ ] IBW  Kcal 30-35kcal/bd=6671-1438rsic  Protein 1.4-1.6gm/kg=83-95gm protein     Previous Nutrition Diagnosis:   1: Increased Nutrient Needs - (ongoing)  2: Moderate Malnutrition - not applicable    New Nutrition Diagnosis: *Severe Malnutrition* in setting of chronic illness (catabolic disease process - hx rectal CA, rectal mass, chronic infection) as evidenced by findings of severe muscle and fat depletion on reassessment.     Education:  [ ] Provided pt with verbal / written education on   [ ] Provided on previous assessment by RD  [X ] Not applicable secondary to mental status (confused, disoriented)  [ ] Pt refused     Interventions:   1) Continue diet/supplement as ordered. Encourage PO intake / assist with meals.  2) Please document % meal intake in flowsheets to assess adequacy of PO Intake.  3) Obtain and record current weight to best assess for acute changes in nutritional status.    Monitor & Evaluate:  PO intake, tolerance to diet/supplement, nutrition related lab values, weight trends, BMs/GI distress, hydration status, skin integrity.    Ernestina Underwood, MS, RD, CDN; Pager #[phone]  Also available on [employer] Teams NUTRITION FOLLOW UP NOTE    Pt seen for f/u Moderate Malnutrition     SOURCE: [X] Patient [X] Medical record [X] RN/PCA [] Family/Caregiver [] Patient unavailable [] Patient inappropriate (disoriented, nonverbal, intubated/sedated) [X] Other: IDT    Medical Course: 10/21/24 Hospitalist Attending - 80 y/o M with history of CAD s/p stents in 2012, locally advanced rectal cancer s/p neoadjuvant therapy with chemoRT (completed 11/2022), consolidation CAPOX (2/2023), brachytherapy (3/2024), presenting for generalized weakness and rectal pain, 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 Patient continues on IV ABtx.    Diet Prescription: Diet, Regular:     Start Time: 20:00  Liquid Protein Supplement     Qty per Day:  3  Supplement Feeding Modality:  Oral  Ensure Plus High Protein Cans or Servings Per Day:  1       Frequency:  Two Times a day (10-14-24 @ 10:33)    Food Allergy/Intolerance: NKFA    Nutrition Course: PO intake poor to fair based on review of available RN flowsheet documentation. Patient with fair PO intake of lunch, per PCA, had his chicken, 2 yogurts and a fruit cup, tolerated without any reported issues.  Patient needs assistance with feeding. Diet being supplemented with Ensure Plus High Protein Therapeutic Shake 3x daily (1050kcal, 60g protein) and LPS SF 30 mL 3x day (300kcal, 45gm protein), as patient with advanced stage pressure injury.  Patient reported good acceptance to supplement, noted patient with Ensure on bedside table that he has been drinking.  Patient with weight loss since admission, likely in setting of improved fluid edema / diuretic therapy (Spironolactone), but cannot r/o weight loss unrelated to current illnesses.  Estimated needs reassessed accordingly, see below. Patient did present with more pronounced muscle / fat loss on reassessment today, see below.    Pertinent Medications: MEDICATIONS  (STANDING):  acetaminophen     Tablet .. 1000 milliGRAM(s) Oral every 6 hours  apixaban 5 milliGRAM(s) Oral every 12 hours  aspirin  chewable 81 milliGRAM(s) Oral daily  brimonidine 0.2% Ophthalmic Solution 1 Drop(s) Both EYES every 12 hours  ertapenem  IVPB 1000 milliGRAM(s) IV Intermittent every 24 hours  fluconAZOLE   Tablet 400 milliGRAM(s) Oral every 24 hours  latanoprost 0.005% Ophthalmic Solution 1 Drop(s) Both EYES at bedtime  losartan 25 milliGRAM(s) Oral daily  metoprolol succinate ER 50 milliGRAM(s) Oral daily  morphine  - Injectable 2 milliGRAM(s) IV Push once  nicotine -  14 mG/24Hr(s) Patch 1 Patch Transdermal daily  rosuvastatin 10 milliGRAM(s) Oral at bedtime  spironolactone 25 milliGRAM(s) Oral daily  timolol 0.5% Solution 1 Drop(s) Both EYES every 12 hours    MEDICATIONS  (PRN):  morphine  - Injectable 4 milliGRAM(s) IV Push every 8 hours PRN Severe Pain (7 - 10)    Pertinent Labs: 10-20 Na133 mmol/L[L] Glu 104 mg/dL[H] K+ 3.5 mmol/L Cr  0.47 mg/dL[L] BUN 11 mg/dL    [] Relevant notes on labs: n/a     Weight: 10/20 - 59.6kg      10/15 - 65.4kg      10/14 - 58.3kg      10/13 - 62.1kg      10/12 - 63kg (Adm)  Weight Assessment: 3.4kg wt loss / 5.4% weight change in ~1 week, possibly contributed by improved edema / diuretic therapy.  Height: 185cm  IBW: 83.6kg +/- 10%  BMI: 17.3kg/m2    Physical Assessment, per flowsheets:  Edema: 1+ rt arm per RN flowsheets; previously as much as 2-3+ edema, see RN flowsheets  Pressure Injury: Stage IV sacrum   Nutrition Focused Physical Exam: [X] conducted   Muscle wasting [MODERATE] temporal [SEVERE] clavicle [unable to assess] shoulder [unable to assess] scapula [deferred] thigh [deferred] calf [MODERATE] dorsal hand    Fat Loss [MILD] buccal [MODERATE] orbital [SEVERE] triceps [SEVERE] ribs     Estimated Needs:   [] No change since previous assessment -OR - [X] Recalculated  Est Needs based on: [X] actual weight - 59.6kg (10/20) [] dosing weight [ ] IBW  Kcal 30-35kcal/qq=0588-5398orrp  Protein 1.4-1.6gm/kg=83-95gm protein     Previous Nutrition Diagnosis:   1: Increased Nutrient Needs - (ongoing)  2: Moderate Malnutrition - not applicable    New Nutrition Diagnosis: *Severe Malnutrition* in setting of chronic illness (catabolic disease process - hx rectal CA, rectal mass, chronic infection) as evidenced by findings of severe muscle and fat depletion on reassessment.     Education:  [ ] Provided pt with verbal / written education on   [ ] Provided on previous assessment by RD  [X ] Not applicable secondary to mental status (confused, disoriented)  [ ] Pt refused     Interventions:   1) Continue diet/supplement as ordered. Encourage PO intake / assist with meals.  2) Please document % meal intake in flowsheets to assess adequacy of PO Intake.  3) Obtain and record current weight to best assess for acute changes in nutritional status.    Monitor & Evaluate:  PO intake, tolerance to diet/supplement, nutrition related lab values, weight trends, BMs/GI distress, hydration status, skin integrity.    Ernestina Underwood, MS, RD, CDN; Pager #[phone]  Also available on [employer] Teams

## 2024-10-22 NOTE — PROGRESS NOTE ADULT - SUBJECTIVE AND OBJECTIVE BOX
POD #10  Afeb 115/69 79 100%RA    Pt has no c/o; did not refuse exam today  Abd- soft  SP dressing changed; eschar noted  Scrotum - small incisions, also eschar; aquacel applied  Zamarripa - 1.6 L

## 2024-10-22 NOTE — PROGRESS NOTE ADULT - PROBLEM SELECTOR PLAN 4
Patient noted with troponin elevated to 70s-80s on current admission. Recent admission in August with Tiana elevation to 2000's thought to be 2/2 demand ischemia per Cardiology.   - Elevated TIANA again likely demand i/s/o active infection/hypotension while in SICU  - patient currently without any chest pain/SOB, no need to trend troponin further unless patient develops active symptoms.  - TTE performed 10/16, showing global LV hypokinesis, EF 35-40%  - cardiology eval appreciated, patient started on Losartan 25mg qd for GDMT, also recommended for ischemic eval once stable from infection/surgical standpoint

## 2024-10-22 NOTE — PROGRESS NOTE ADULT - ASSESSMENT
80 yo M with history of CAD s/p stents in 2012 (ASA), locally advanced rectal cancer s/p neoadjuvant therapy with chemoRT (completed 11/2022), consolidation CAPOX (2/2023), brachytherapy (3/2024), presenting for generalized weakness, chest discomfort, rectal pain. Pt was here at [facility] in 8/30 for fatigue, weakness, and falls, found to have perforated rectal mass with perirectal collection, 2.5 x 1.5 cm with spread to infection in anorectal/perineal region with fluid/gas tracking along penile shaft, however pt left for MSK and s/p diverting end sigmoid colostomy, cystoscopy and naranjo placement across defect and perineal/scrotal drainage 8/30. [place] [facility] [place] discharged day prior to presentation for perineal infections, treated with abx. Represents here for generalized weakness and dizziness, CTAP non-con on preliminary read indicating ill-defined fluid/gas in scrotum, perirectal space, and probably perineum. Exam demonstrates skin duskiness multiple genitourinary abscesses, induration, purulent drainage. 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    10/13: S/p debridement of genitalia and perineum in OR yesterday. In SICU for post op pressor requirements. Got 500 ccs overnight for hypovolemia on POCUS. Currently off pressors. On zosyn, clinda, vanc. U and bcx pending. Tissue cx negative. Wound packing changed at bedside, incision beds with adequate granulation tissue, healing well. Transferred to floor  10/14 - wounds clean; re-packed; penroses in place; ID consult  10/15 - pt refuses blood draw, and refuses dressing change (it has been 24 hrs since last one; he refused last nite also)                 later on he did allow it - wound appears clean, no necrotic tissue; labs drawn  10/16 - pro-BNP high so echo today  10/17 - changed dressing, took out subprapubic incision penrose drain, left scrotal penrose drain. Echo with reduced EF - cards says iso sepsis, no further workup needed. Changed abx from fluconazole to caspo (2 week course) per ID. Can c/w erta for ESBL e.coli. Cards saying he will need AC eventually,   10/18 - changed dressing this am, last penrose in scrotum d/c'ed. wound care spoke with, can try changing wet to dry dressings to aquacel and change qd. Pt's daughter will hire own aid for wound care and PT, but will need homecare for erta and caspo IV at home.   10/19- changed dressing to aquacel, started Eliquis  10/20- refused exam in AM, dressing to be changed with aquacel in PM   10/21 - changed dressing ; will speak with SW/HC about dispo planning; spoke with daughter for plan; see note by   10/22 - dressing changed; R. arm has swelling, though improved from last hospitalization, as per daughter; [place] [facility] [place], but we will repeat    Plan  - duplex R. arm  - cont erta and fluconazole 10/29  - dressing change qd with aquacel per wound care   - continue naranjo; will change before d/c  - Eliquis restarted 10/19   - PT suggesting rehab but daughter wants him to go home  - f/u medicine, cards, ID, wound care

## 2024-10-23 LAB
ANION GAP SERPL CALC-SCNC: 14 MMOL/L — SIGNIFICANT CHANGE UP (ref 7–14)
BASOPHILS # BLD AUTO: 0.06 K/UL — SIGNIFICANT CHANGE UP (ref 0–0.2)
BASOPHILS NFR BLD AUTO: 0.8 % — SIGNIFICANT CHANGE UP (ref 0–2)
BLD GP AB SCN SERPL QL: NEGATIVE — SIGNIFICANT CHANGE UP
BUN SERPL-MCNC: 10 MG/DL — SIGNIFICANT CHANGE UP (ref 7–23)
CALCIUM SERPL-MCNC: 8.3 MG/DL — LOW (ref 8.4–10.5)
CHLORIDE SERPL-SCNC: 100 MMOL/L — SIGNIFICANT CHANGE UP (ref 98–107)
CO2 SERPL-SCNC: 18 MMOL/L — LOW (ref 22–31)
CREAT SERPL-MCNC: 0.44 MG/DL — LOW (ref 0.5–1.3)
EGFR: 108 ML/MIN/1.73M2 — SIGNIFICANT CHANGE UP
EOSINOPHIL # BLD AUTO: 0.07 K/UL — SIGNIFICANT CHANGE UP (ref 0–0.5)
EOSINOPHIL NFR BLD AUTO: 0.9 % — SIGNIFICANT CHANGE UP (ref 0–6)
GLUCOSE SERPL-MCNC: 118 MG/DL — HIGH (ref 70–99)
HCT VFR BLD CALC: 32.1 % — LOW (ref 39–50)
HGB BLD-MCNC: 9.6 G/DL — LOW (ref 13–17)
IANC: 4.6 K/UL — SIGNIFICANT CHANGE UP (ref 1.8–7.4)
IMM GRANULOCYTES NFR BLD AUTO: 0.4 % — SIGNIFICANT CHANGE UP (ref 0–0.9)
LYMPHOCYTES # BLD AUTO: 2.06 K/UL — SIGNIFICANT CHANGE UP (ref 1–3.3)
LYMPHOCYTES # BLD AUTO: 26 % — SIGNIFICANT CHANGE UP (ref 13–44)
MCHC RBC-ENTMCNC: 25.7 PG — LOW (ref 27–34)
MCHC RBC-ENTMCNC: 29.9 GM/DL — LOW (ref 32–36)
MCV RBC AUTO: 86.1 FL — SIGNIFICANT CHANGE UP (ref 80–100)
MONOCYTES # BLD AUTO: 1.1 K/UL — HIGH (ref 0–0.9)
MONOCYTES NFR BLD AUTO: 13.9 % — SIGNIFICANT CHANGE UP (ref 2–14)
NEUTROPHILS # BLD AUTO: 4.6 K/UL — SIGNIFICANT CHANGE UP (ref 1.8–7.4)
NEUTROPHILS NFR BLD AUTO: 58 % — SIGNIFICANT CHANGE UP (ref 43–77)
NRBC # BLD: 0 /100 WBCS — SIGNIFICANT CHANGE UP (ref 0–0)
NRBC # FLD: 0 K/UL — SIGNIFICANT CHANGE UP (ref 0–0)
PLATELET # BLD AUTO: 218 K/UL — SIGNIFICANT CHANGE UP (ref 150–400)
POTASSIUM SERPL-MCNC: 4.1 MMOL/L — SIGNIFICANT CHANGE UP (ref 3.5–5.3)
POTASSIUM SERPL-SCNC: 4.1 MMOL/L — SIGNIFICANT CHANGE UP (ref 3.5–5.3)
RBC # BLD: 3.73 M/UL — LOW (ref 4.2–5.8)
RBC # FLD: 19.2 % — HIGH (ref 10.3–14.5)
RH IG SCN BLD-IMP: NEGATIVE — SIGNIFICANT CHANGE UP
SODIUM SERPL-SCNC: 132 MMOL/L — LOW (ref 135–145)
WBC # BLD: 7.92 K/UL — SIGNIFICANT CHANGE UP (ref 3.8–10.5)
WBC # FLD AUTO: 7.92 K/UL — SIGNIFICANT CHANGE UP (ref 3.8–10.5)

## 2024-10-23 PROCEDURE — 73060 X-RAY EXAM OF HUMERUS: CPT | Mod: 26,RT

## 2024-10-23 PROCEDURE — 99233 SBSQ HOSP IP/OBS HIGH 50: CPT

## 2024-10-23 PROCEDURE — 99232 SBSQ HOSP IP/OBS MODERATE 35: CPT

## 2024-10-23 PROCEDURE — 99223 1ST HOSP IP/OBS HIGH 75: CPT

## 2024-10-23 PROCEDURE — 73000 X-RAY EXAM OF COLLAR BONE: CPT | Mod: 26,RT

## 2024-10-23 PROCEDURE — 73030 X-RAY EXAM OF SHOULDER: CPT | Mod: 26,RT

## 2024-10-23 PROCEDURE — 73080 X-RAY EXAM OF ELBOW: CPT | Mod: 26,RT

## 2024-10-23 RX ORDER — ERTAPENEM SODIUM 1 G/1
1000 INJECTION, POWDER, LYOPHILIZED, FOR SOLUTION INTRAMUSCULAR; INTRAVENOUS EVERY 24 HOURS
Refills: 0 | Status: DISCONTINUED | OUTPATIENT
Start: 2024-10-24 | End: 2024-10-29

## 2024-10-23 RX ADMIN — Medication 50 MILLIGRAM(S): at 05:20

## 2024-10-23 RX ADMIN — Medication 1000 MILLIGRAM(S): at 05:19

## 2024-10-23 RX ADMIN — Medication 1000 MILLIGRAM(S): at 12:20

## 2024-10-23 RX ADMIN — Medication 1 DROP(S): at 05:20

## 2024-10-23 RX ADMIN — MORPHINE SULFATE 4 MILLIGRAM(S): 30 TABLET, EXTENDED RELEASE ORAL at 02:00

## 2024-10-23 RX ADMIN — HEPARIN SODIUM 5000 UNIT(S): 10000 INJECTION INTRAVENOUS; SUBCUTANEOUS at 21:13

## 2024-10-23 RX ADMIN — Medication 1 DROP(S): at 18:39

## 2024-10-23 RX ADMIN — Medication 1000 MILLIGRAM(S): at 18:39

## 2024-10-23 RX ADMIN — ERTAPENEM SODIUM 120 MILLIGRAM(S): 1 INJECTION, POWDER, LYOPHILIZED, FOR SOLUTION INTRAMUSCULAR; INTRAVENOUS at 18:39

## 2024-10-23 RX ADMIN — MORPHINE SULFATE 4 MILLIGRAM(S): 30 TABLET, EXTENDED RELEASE ORAL at 01:35

## 2024-10-23 RX ADMIN — Medication 1 DROP(S): at 21:13

## 2024-10-23 RX ADMIN — Medication 1000 MILLIGRAM(S): at 11:25

## 2024-10-23 RX ADMIN — Medication 10 MILLIGRAM(S): at 21:13

## 2024-10-23 RX ADMIN — MORPHINE SULFATE 4 MILLIGRAM(S): 30 TABLET, EXTENDED RELEASE ORAL at 13:00

## 2024-10-23 RX ADMIN — HEPARIN SODIUM 5000 UNIT(S): 10000 INJECTION INTRAVENOUS; SUBCUTANEOUS at 13:24

## 2024-10-23 RX ADMIN — NICOTINE POLACRILEX 1 PATCH: 4 GUM, CHEWING ORAL at 13:23

## 2024-10-23 RX ADMIN — Medication 1000 MILLIGRAM(S): at 07:00

## 2024-10-23 RX ADMIN — HEPARIN SODIUM 5000 UNIT(S): 10000 INJECTION INTRAVENOUS; SUBCUTANEOUS at 05:20

## 2024-10-23 RX ADMIN — Medication 1 DROP(S): at 18:40

## 2024-10-23 RX ADMIN — Medication 1000 MILLIGRAM(S): at 19:16

## 2024-10-23 RX ADMIN — NICOTINE POLACRILEX 1 PATCH: 4 GUM, CHEWING ORAL at 12:45

## 2024-10-23 RX ADMIN — Medication 81 MILLIGRAM(S): at 11:25

## 2024-10-23 RX ADMIN — Medication 25 MILLIGRAM(S): at 05:20

## 2024-10-23 RX ADMIN — NICOTINE POLACRILEX 1 PATCH: 4 GUM, CHEWING ORAL at 18:53

## 2024-10-23 RX ADMIN — FLUCONAZOLE, SODIUM CHLORIDE 400 MILLIGRAM(S): 2 INJECTION INTRAVENOUS at 21:14

## 2024-10-23 RX ADMIN — MORPHINE SULFATE 4 MILLIGRAM(S): 30 TABLET, EXTENDED RELEASE ORAL at 12:01

## 2024-10-23 RX ADMIN — LOSARTAN POTASSIUM 25 MILLIGRAM(S): 25 TABLET ORAL at 05:20

## 2024-10-23 NOTE — PROGRESS NOTE ADULT - PROBLEM SELECTOR PLAN 7
Pafib noted on EKG 10/12, reverted to NSR.   - Was not on AC PTA 2/2 hx of bleeding. Started on Eliquis this admission but had rectal bleeding.   - c/w toprol 50mg qd  - Would recommend risk v benefit discussion w/ family re continuing AC  - Eliquis discontinued

## 2024-10-23 NOTE — PROGRESS NOTE ADULT - SUBJECTIVE AND OBJECTIVE BOX
POD #11  Afeb 129/88 89 100%RA    Pt appears to be confused this AM  Abd- pt not allowing us to look  Zamarripa 1L

## 2024-10-23 NOTE — PROGRESS NOTE ADULT - PROBLEM SELECTOR PLAN 4
Patient noted with troponin elevated to 70s-80s on current admission. Recent admission in August with Tiana elevation to 2000's thought to be 2/2 demand ischemia per Cardiology.   - Elevated TIANA again likely demand i/s/o active infection/hypotension while in SICU  - patient currently without any chest pain/SOB, no need to trend troponin further unless patient develops active symptoms.  - TTE performed 10/16, showing global LV hypokinesis, EF 35-40%  - cardiology eval appreciated, patient started on Losartan 25mg qd for GDMT, also recommended for ischemic eval once stable from infection/surgical standpoint. Primary team to discuss timing.

## 2024-10-23 NOTE — PROGRESS NOTE ADULT - ASSESSMENT
79-year-old male with a past medical history of CAD status post PCI, locally advanced rectal CA s/p neoadjuvant therapy with chemotherapy, presented to the hospital due to weakness, chest discomfort and rectal pain.     Patient with recent hospital admission 8/30/2024 found to have perforated rectal mass with perirectal collection.  Patient left hospital 1 to [facility] undergoing a lap diverting end sigmoid colostomy with cystoscopy and Zamarripa placement as well as perineal scrotal drainage.     LIJ a day after discharge due to dizziness and malaise.  In the ED, patient found to have atrial fibrillation, right leg DVT.  Patient is afebrile, otherwise hemodynamically stable.  Labs show no leukocytosis, CT abdomen/pelvis showed gas-forming collection of the suprapubic region, Toribio penile region and scrotal region.  Initial I&D planned however aborted due to worsening exam and patient taken emergently to the OR for debridement.  Patient was given vancomycin, piperacillin/tazobactam and clindamycin.     In the OR, perirectal abscess was noted and debrided.  Latest labs show no leukocytosis, anemia 0.1/26.8, BMP with renal function within normal limits, blood cultures from 8/30/2024 with E. coli, sensitivities reviewed.  Culture obtained from the OR also with E. coli.     #Tara's gangrene   #Abnormal imaging of the pelvis   #Perirectal abscess , yeast  growth  #History of E.coli bacteremia    Overall, 78 y/o M w/ PMHx of CAD status post PCI, locally advanced rectal CA s/p neoadjuvant therapy w/ chemo, recent perforated rectal mass with perirectal collection s/p lap diverting end sigmoid colostomy with cystoscopy and Zamarripa placement as well as perineal scrotal drainage, 8/30/24, now presenting with CT abdomen/pelvis showed gas-forming collection of the suprapubic region now s/p debridement of necrotic skin and soft tissue from genitalia and perineum on 10/12.  Cx w/ ESBL E Coli, yeast     Recommendations:   1. C/w Ertapenem 1 g q24, Fluconazole 400 mg q24 given candida albicans.   2. Will need 2 week course given extensive infection , EOT 10/29    Thank you for this consult. Inpatient ID team will follow.    Adam Toro M.D.  Attending Physician  Division of Infectious Diseases  Department of Medicine    Please contact through MS Teams message.  Office: [phone] (after 5 PM or weekend)

## 2024-10-23 NOTE — BH CONSULTATION LIAISON ASSESSMENT NOTE - NSBHATTESTCOMMENTATTENDFT_PSY_A_CORE
Chart reviewed. Discussed with student. Likely sleep-wake reversal delirium. Agree with above assessment; will continue to assess on 10/24.

## 2024-10-23 NOTE — PROGRESS NOTE ADULT - SUBJECTIVE AND OBJECTIVE BOX
Merlin Mathew, MD   Hospitalist  Pager #[phone]    PROGRESS NOTE:     Patient is a 79y old  Male who presents with a chief complaint of suprapubic pain (23 Oct 2024 11:23)      SUBJECTIVE / OVERNIGHT EVENTS: NEON   Patient reports some pain in his backside related to recent cleaning   Denies any CP, SOB today     ADDITIONAL REVIEW OF SYSTEMS:    MEDICATIONS  (STANDING):  acetaminophen     Tablet .. 1000 milliGRAM(s) Oral every 6 hours  aspirin  chewable 81 milliGRAM(s) Oral daily  brimonidine 0.2% Ophthalmic Solution 1 Drop(s) Both EYES every 12 hours  ertapenem  IVPB 1000 milliGRAM(s) IV Intermittent every 24 hours  fluconAZOLE   Tablet 400 milliGRAM(s) Oral every 24 hours  heparin   Injectable 5000 Unit(s) SubCutaneous every 8 hours  latanoprost 0.005% Ophthalmic Solution 1 Drop(s) Both EYES at bedtime  losartan 25 milliGRAM(s) Oral daily  metoprolol succinate ER 50 milliGRAM(s) Oral daily  morphine  - Injectable 2 milliGRAM(s) IV Push once  nicotine -  14 mG/24Hr(s) Patch 1 Patch Transdermal daily  rosuvastatin 10 milliGRAM(s) Oral at bedtime  spironolactone 25 milliGRAM(s) Oral daily  timolol 0.5% Solution 1 Drop(s) Both EYES every 12 hours    MEDICATIONS  (PRN):  morphine  - Injectable 4 milliGRAM(s) IV Push every 8 hours PRN Severe Pain (7 - 10)      CAPILLARY BLOOD GLUCOSE        I&O's Summary    22 Oct 2024 07:01  -  23 Oct 2024 07:00  --------------------------------------------------------  IN: 0 mL / OUT: 2152 mL / NET: -2152 mL        PHYSICAL EXAM:  Vital Signs Last 24 Hrs  T(C): 36.6 (23 Oct 2024 10:02), Max: 36.9 (22 Oct 2024 17:28)  T(F): 97.9 (23 Oct 2024 10:02), Max: 98.5 (22 Oct 2024 17:28)  HR: 79 (23 Oct 2024 10:02) (71 - 89)  BP: 139/84 (23 Oct 2024 10:02) (111/61 - 139/84)  BP(mean): --  RR: 18 (23 Oct 2024 10:02) (16 - 18)  SpO2: 100% (23 Oct 2024 10:02) (98% - 100%)    Parameters below as of 23 Oct 2024 10:02  Patient On (Oxygen Delivery Method): room air    CONSTITUTIONAL: NAD, resting comfortably   RESPIRATORY: Normal respiratory effort; lungs are clear to auscultation bilaterally  CARDIOVASCULAR: Regular rate and rhythm, normal S1 and S2, no murmur/rub/gallop; 1+ LE edema   ABDOMEN: + Colostomy w/ gas and stool in bag, no sig TTP  MUSCULOSKELETAL: RUE swelling, no erythema or TTP, otherwise no clubbing or cyanosis of digits; no joint swelling or tenderness to palpation  PSYCH: Alert, answering questions appropriately     LABS:                      RADIOLOGY & ADDITIONAL TESTS:  Results Reviewed:   Imaging Personally Reviewed:  Electrocardiogram Personally Reviewed:    COORDINATION OF CARE:  Care Discussed with Consultants/Other Providers [Y/N]:  Prior or Outpatient Records Reviewed [Y/N]:

## 2024-10-23 NOTE — BH CONSULTATION LIAISON ASSESSMENT NOTE - SUMMARY
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 Psych consulted for insomnia and anxiety.    10/23: Pt was lethargic and fell asleep at beginning of interview. Was not given any PRN medication and does not have past psychiatric history. VS stable. Per chart, pt was confused this morning and refused abdominal exam, although has history of refusing labs/wound care/physical exams during current admission to hospital.     Plan:   - Routine observation  - No standing psychiatric medications needed  - Will f/u tomorrow AM (10/24) to reassess pt

## 2024-10-23 NOTE — PROGRESS NOTE ADULT - ASSESSMENT
78 yo M with history of CAD s/p stents in 2012 (ASA), locally advanced rectal cancer s/p neoadjuvant therapy with chemoRT (completed 11/2022), consolidation CAPOX (2/2023), brachytherapy (3/2024), presenting for generalized weakness, chest discomfort, rectal pain. Pt was here at [facility] in 8/30 for fatigue, weakness, and falls, found to have perforated rectal mass with perirectal collection, 2.5 x 1.5 cm with spread to infection in anorectal/perineal region with fluid/gas tracking along penile shaft, however pt left for MSK and s/p diverting end sigmoid colostomy, cystoscopy and naranjo placement across defect and perineal/scrotal drainage 8/30. Recently at [facility] [place] weeks, discharged day prior to presentation for perineal infections, treated with abx. Represents here for generalized weakness and dizziness, CTAP non-con on preliminary read indicating ill-defined fluid/gas in scrotum, perirectal space, and probably perineum. Exam demonstrates skin duskiness multiple genitourinary abscesses, induration, purulent drainage. 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    10/13: S/p debridement of genitalia and perineum in OR yesterday. In SICU for post op pressor requirements. Got 500 ccs overnight for hypovolemia on POCUS. Currently off pressors. On zosyn, clinda, vanc. U and bcx pending. Tissue cx negative. Wound packing changed at bedside, incision beds with adequate granulation tissue, healing well. Transferred to floor  10/14 - wounds clean; re-packed; penroses in place; ID consult  10/15 - pt refuses blood draw, and refuses dressing change (it has been 24 hrs since last one; he refused last nite also)                 later on he did allow it - wound appears clean, no necrotic tissue; labs drawn  10/16 - pro-BNP high so echo today  10/17 - changed dressing, took out subprapubic incision penrose drain, left scrotal penrose drain. Echo with reduced EF - cards says iso sepsis, no further workup needed. Changed abx from fluconazole to caspo (2 week course) per ID. Can c/w erta for ESBL e.coli. Cards saying he will need AC eventually,   10/18 - changed dressing this am, last penrose in scrotum d/c'ed. wound care spoke with, can try changing wet to dry dressings to aquacel and change qd. Pt's daughter will hire own aid for wound care and PT, but will need homecare for erta and caspo IV at home.   10/19- changed dressing to aquacel, started Eliquis  10/20- refused exam in AM, dressing to be changed with aquacel in PM   10/21 - changed dressing ; will speak with SW/HC about dispo planning; spoke with daughter for plan; see note by   10/22 - dressing changed; R. arm has swelling, though improved from last hospitalization, as per daughter; testing at [facility] did not reveal a clot, but we will repeat                Pt had blood from rectum today, daughter claims that happened at [facility] when eliquis was re-started; I d/c'd eliquis               d/w at great length his needs for home; she does not want to take him home until ABX's are done (next Tues); she wants study R. upper ext swelling even though it was done                at [facility] and was neg  10/23 - daughter supposedly will come today to learn dressing and colostomy care; we will change dressing then, and speak again to daughter about rectal Ca and GOC; apparently she has                  not wanted to discuss it in the past               R. arm duplex neg  Plan  - cont erta and fluconazole 10/29  - dressing change qd with medi-honey aquacel per wound care   - continue naranjo; will change before d/c  - Eliquis stopped  - meeting today with daughter

## 2024-10-23 NOTE — PROGRESS NOTE ADULT - SUBJECTIVE AND OBJECTIVE BOX
Infectious Diseases Follow Up:    Patient is a 79y old  Male who presents with a chief complaint of suprapubic pain (23 Oct 2024 07:43)      Interval History/ROS:  No acute events, denies abdominal pain     Allergies  No Known Allergies        ANTIMICROBIALS:  ertapenem  IVPB 1000 every 24 hours  fluconAZOLE   Tablet 400 every 24 hours      Current Abx:     Previous Abx     OTHER MEDS:  MEDICATIONS  (STANDING):  acetaminophen     Tablet .. 1000 every 6 hours  aspirin  chewable 81 daily  heparin   Injectable 5000 every 8 hours  hydrOXYzine hydrochloride 25 every 6 hours PRN  losartan 25 daily  metoprolol succinate ER 50 daily  morphine  - Injectable 2 once  morphine  - Injectable 4 every 8 hours PRN  rosuvastatin 10 at bedtime  spironolactone 25 daily      Vital Signs Last 24 Hrs  T(C): 36.6 (23 Oct 2024 10:02), Max: 36.9 (22 Oct 2024 17:28)  T(F): 97.9 (23 Oct 2024 10:02), Max: 98.5 (22 Oct 2024 17:28)  HR: 79 (23 Oct 2024 10:02) (71 - 89)  BP: 139/84 (23 Oct 2024 10:02) (111/61 - 139/84)  BP(mean): --  RR: 18 (23 Oct 2024 10:02) (16 - 18)  SpO2: 100% (23 Oct 2024 10:02) (98% - 100%)    Parameters below as of 23 Oct 2024 10:02  Patient On (Oxygen Delivery Method): room air        PHYSICAL EXAM:  GENERAL: NAD, well-developed  HEAD:  Atraumatic, Normocephalic  EYES: EOMI, conjunctiva and sclera clear  CHEST/LUNG: Clear to auscultation bilaterally; No wheeze  HEART: Regular rate and rhythm; No murmurs, rubs, or gallops  ABDOMEN: Soft, Nontender, Nondistended; +Ostomy w/ stool   PSYCH: AAOx3                MICROBIOLOGY:  v  Tissue  10-12-24   Rare Escherichia coli ESBL  --  Escherichia coli ESBL      Clean Catch Clean Catch (Midstream)  10-12-24   10,000 - 49,000 CFU/mL Candida albicans "Susceptibilities not performed"  --  --      .Blood BLOOD  10-12-24   No growth at 5 days  --  --      .Blood BLOOD  10-12-24   No growth at 5 days  --  --                RADIOLOGY:

## 2024-10-23 NOTE — PROGRESS NOTE ADULT - PROBLEM SELECTOR PLAN 1
Patient presenting with rectal pain and generalized weakness  - CT imaging and exam on admission with findings concerning for Taar's gangrene  - patient now s/p debridement of genitalia and perineum by urology team on 10/12  - briefly requiring SICU level of care for pressor support due to post-operative hypotension  - OR cultures with E Coli ESBL  - Cont Ertapenem and Fluconazole through 10/29  - ID following, recommendations appreciated

## 2024-10-23 NOTE — PROGRESS NOTE ADULT - PROBLEM SELECTOR PLAN 2
Patient with history of locally advanced rectal cancer  - [place] [facility] [place]  - GOC addressed on previous admissions with patient's daughter, Tiffanie.   - patient and family reportedly wish to have all interventions    # RUE swelling   - Duplex negative for VTE

## 2024-10-23 NOTE — BH CONSULTATION LIAISON ASSESSMENT NOTE - NSBHCHARTREVIEWVS_PSY_A_CORE FT
Vital Signs Last 24 Hrs  T(C): 36.3 (23 Oct 2024 13:34), Max: 36.9 (22 Oct 2024 17:28)  T(F): 97.4 (23 Oct 2024 13:34), Max: 98.5 (22 Oct 2024 17:28)  HR: 73 (23 Oct 2024 13:34) (73 - 89)  BP: 129/76 (23 Oct 2024 13:34) (113/77 - 139/84)  BP(mean): --  RR: 16 (23 Oct 2024 13:34) (16 - 18)  SpO2: 100% (23 Oct 2024 13:34) (98% - 100%)    Parameters below as of 23 Oct 2024 13:34  Patient On (Oxygen Delivery Method): room air

## 2024-10-23 NOTE — BH CONSULTATION LIAISON ASSESSMENT NOTE - HPI (INCLUDE ILLNESS QUALITY, SEVERITY, DURATION, TIMING, CONTEXT, MODIFYING FACTORS, ASSOCIATED SIGNS AND SYMPTOMS)
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 Psych consulted for insomnia and anxiety.    On initial exam pt was extremely drowsy and was unable to fully participate in conversation. Oriented to person, but fell asleep during interview and unable to arouse so could not fully assess patient.   Per chart review, pt was confused this morning and did not allow for abd exam, has history of refusing labs/wound care occasionally.

## 2024-10-23 NOTE — BH CONSULTATION LIAISON ASSESSMENT NOTE - CURRENT MEDICATION
MEDICATIONS  (STANDING):  acetaminophen     Tablet .. 1000 milliGRAM(s) Oral every 6 hours  aspirin  chewable 81 milliGRAM(s) Oral daily  brimonidine 0.2% Ophthalmic Solution 1 Drop(s) Both EYES every 12 hours  ertapenem  IVPB 1000 milliGRAM(s) IV Intermittent every 24 hours  fluconAZOLE   Tablet 400 milliGRAM(s) Oral every 24 hours  heparin   Injectable 5000 Unit(s) SubCutaneous every 8 hours  latanoprost 0.005% Ophthalmic Solution 1 Drop(s) Both EYES at bedtime  losartan 25 milliGRAM(s) Oral daily  metoprolol succinate ER 50 milliGRAM(s) Oral daily  morphine  - Injectable 2 milliGRAM(s) IV Push once  nicotine -  14 mG/24Hr(s) Patch 1 Patch Transdermal daily  rosuvastatin 10 milliGRAM(s) Oral at bedtime  spironolactone 25 milliGRAM(s) Oral daily  timolol 0.5% Solution 1 Drop(s) Both EYES every 12 hours    MEDICATIONS  (PRN):  morphine  - Injectable 4 milliGRAM(s) IV Push every 8 hours PRN Severe Pain (7 - 10)

## 2024-10-23 NOTE — PROGRESS NOTE ADULT - PROBLEM SELECTOR PLAN 5
Patient noted with RLE pitting edema up to shin, no erythema or calf tenderness  - pBNP 31K >24K, but otherwise appears largely euvolemic   - bilateral LE DVT study without any DVTs  - TTE performed 10/16, showing global LV hypokinesis, EF 35-40%

## 2024-10-24 PROCEDURE — 99232 SBSQ HOSP IP/OBS MODERATE 35: CPT

## 2024-10-24 PROCEDURE — 74019 RADEX ABDOMEN 2 VIEWS: CPT | Mod: 26

## 2024-10-24 PROCEDURE — 99231 SBSQ HOSP IP/OBS SF/LOW 25: CPT

## 2024-10-24 RX ORDER — MORPHINE SULFATE 30 MG/1
2 TABLET, EXTENDED RELEASE ORAL EVERY 8 HOURS
Refills: 0 | Status: DISCONTINUED | OUTPATIENT
Start: 2024-10-24 | End: 2024-10-25

## 2024-10-24 RX ADMIN — MORPHINE SULFATE 4 MILLIGRAM(S): 30 TABLET, EXTENDED RELEASE ORAL at 00:30

## 2024-10-24 RX ADMIN — Medication 1000 MILLIGRAM(S): at 07:23

## 2024-10-24 RX ADMIN — HEPARIN SODIUM 5000 UNIT(S): 10000 INJECTION INTRAVENOUS; SUBCUTANEOUS at 22:27

## 2024-10-24 RX ADMIN — Medication 10 MILLIGRAM(S): at 22:27

## 2024-10-24 RX ADMIN — NICOTINE POLACRILEX 1 PATCH: 4 GUM, CHEWING ORAL at 20:01

## 2024-10-24 RX ADMIN — Medication 1 DROP(S): at 18:36

## 2024-10-24 RX ADMIN — MORPHINE SULFATE 4 MILLIGRAM(S): 30 TABLET, EXTENDED RELEASE ORAL at 00:17

## 2024-10-24 RX ADMIN — Medication 1000 MILLIGRAM(S): at 00:18

## 2024-10-24 RX ADMIN — Medication 1000 MILLIGRAM(S): at 01:00

## 2024-10-24 RX ADMIN — Medication 1 DROP(S): at 06:35

## 2024-10-24 RX ADMIN — FLUCONAZOLE, SODIUM CHLORIDE 400 MILLIGRAM(S): 2 INJECTION INTRAVENOUS at 22:26

## 2024-10-24 RX ADMIN — NICOTINE POLACRILEX 1 PATCH: 4 GUM, CHEWING ORAL at 12:14

## 2024-10-24 RX ADMIN — Medication 1 DROP(S): at 18:37

## 2024-10-24 RX ADMIN — Medication 1000 MILLIGRAM(S): at 18:36

## 2024-10-24 RX ADMIN — NICOTINE POLACRILEX 1 PATCH: 4 GUM, CHEWING ORAL at 07:23

## 2024-10-24 RX ADMIN — Medication 1000 MILLIGRAM(S): at 12:13

## 2024-10-24 RX ADMIN — HEPARIN SODIUM 5000 UNIT(S): 10000 INJECTION INTRAVENOUS; SUBCUTANEOUS at 06:34

## 2024-10-24 RX ADMIN — Medication 1000 MILLIGRAM(S): at 18:30

## 2024-10-24 RX ADMIN — Medication 75 MILLILITER(S): at 19:32

## 2024-10-24 RX ADMIN — Medication 1000 MILLIGRAM(S): at 13:10

## 2024-10-24 RX ADMIN — ERTAPENEM SODIUM 120 MILLIGRAM(S): 1 INJECTION, POWDER, LYOPHILIZED, FOR SOLUTION INTRAMUSCULAR; INTRAVENOUS at 16:27

## 2024-10-24 RX ADMIN — Medication 81 MILLIGRAM(S): at 12:14

## 2024-10-24 RX ADMIN — Medication 1000 MILLIGRAM(S): at 06:34

## 2024-10-24 RX ADMIN — NICOTINE POLACRILEX 1 PATCH: 4 GUM, CHEWING ORAL at 12:10

## 2024-10-24 RX ADMIN — Medication 1 DROP(S): at 22:27

## 2024-10-24 NOTE — PROVIDER CONTACT NOTE (OTHER) - SITUATION
Low urine output 325ml in 12 hrs, poor oral intake- <10% of meal Low urine output 325ml in 12 hrs, poor oral intake- <10% of meal, no output to colostomy bag

## 2024-10-24 NOTE — PROGRESS NOTE ADULT - PROBLEM SELECTOR PLAN 1
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

## 2024-10-24 NOTE — BH CONSULTATION LIAISON PROGRESS NOTE - NSBHFUPINTERVALHXFT_PSY_A_CORE
On exam pt was extremely lethargic and difficult to arouse, with little to no response to verbal stimulation or tactile stimulation, but able to be woken up by sternal rub. However, pt unable to fully answer questions, seemed to be mumbling so could not fully understand pt. Was only able to say name when asked. Per chart pt receives 4 mg morphine q8h for pain, which could be contributing to lethargic state.    Daughter was called (healthcare proxy) who had requested psych consult d/t worries for insomnia secondary to anxiety. Stated that pt has had increased anxiety and poor sleep x 5 yrs after death of wife and sister. Reports pt has nightmares that continue as pt wakes up and contributes to pts confusion. Stated that pt had began cognitive decline x 1 yr ago. Pt inability to communicate began x 2 days ago. According to daughter pt is sometimes unable to "talk clearly" but does have moments of clear speech. Was able to tell daughter he does not know why he cannot speak clearly. States that he is forward thinking and wants to get better.  Per daughter, during [place] to [facility] [place] had similar sx, which was believed to be post op delirium and was treated with Haldol. She endorses that pt is dehydrated after stopping IVF, that he is not able to drink enough water PO and believes that is contributing to presenting sx along with long standing anxiety/depression leading to poor sleep. Daughter does not want pt on antipsychotics

## 2024-10-24 NOTE — PROGRESS NOTE ADULT - SUBJECTIVE AND OBJECTIVE BOX
Merlin Mathew, MD   Hospitalist  Pager #[phone]    PROGRESS NOTE:     Patient is a 79y old  Male who presents with a chief complaint of suprapubic pain (24 Oct 2024 11:26)      SUBJECTIVE / OVERNIGHT EVENTS:  NEON   Patient sleeping, did not answer many questions   ADDITIONAL REVIEW OF SYSTEMS:    MEDICATIONS  (STANDING):  acetaminophen     Tablet .. 1000 milliGRAM(s) Oral every 6 hours  aspirin  chewable 81 milliGRAM(s) Oral daily  brimonidine 0.2% Ophthalmic Solution 1 Drop(s) Both EYES every 12 hours  ertapenem  IVPB 1000 milliGRAM(s) IV Intermittent every 24 hours  fluconAZOLE   Tablet 400 milliGRAM(s) Oral every 24 hours  heparin   Injectable 5000 Unit(s) SubCutaneous every 8 hours  latanoprost 0.005% Ophthalmic Solution 1 Drop(s) Both EYES at bedtime  losartan 25 milliGRAM(s) Oral daily  metoprolol succinate ER 50 milliGRAM(s) Oral daily  morphine  - Injectable 2 milliGRAM(s) IV Push once  nicotine -  14 mG/24Hr(s) Patch 1 Patch Transdermal daily  rosuvastatin 10 milliGRAM(s) Oral at bedtime  spironolactone 25 milliGRAM(s) Oral daily  timolol 0.5% Solution 1 Drop(s) Both EYES every 12 hours    MEDICATIONS  (PRN):  morphine  - Injectable 4 milliGRAM(s) IV Push every 8 hours PRN Severe Pain (7 - 10)      CAPILLARY BLOOD GLUCOSE        I&O's Summary    23 Oct 2024 07:01  -  24 Oct 2024 07:00  --------------------------------------------------------  IN: 0 mL / OUT: 1475 mL / NET: -1475 mL    24 Oct 2024 07:01  -  24 Oct 2024 14:29  --------------------------------------------------------  IN: 0 mL / OUT: 275 mL / NET: -275 mL        PHYSICAL EXAM:  Vital Signs Last 24 Hrs  T(C): 36.6 (24 Oct 2024 13:55), Max: 36.8 (24 Oct 2024 09:30)  T(F): 97.9 (24 Oct 2024 13:55), Max: 98.3 (24 Oct 2024 09:30)  HR: 87 (24 Oct 2024 13:55) (72 - 102)  BP: 105/74 (24 Oct 2024 13:55) (105/74 - 121/85)  BP(mean): --  RR: 16 (24 Oct 2024 13:55) (16 - 18)  SpO2: 100% (24 Oct 2024 13:55) (99% - 100%)    Parameters below as of 24 Oct 2024 13:55  Patient On (Oxygen Delivery Method): room air      CONSTITUTIONAL: NAD, resting comfortably   RESPIRATORY: Normal respiratory effort; lungs are clear to auscultation bilaterally  CARDIOVASCULAR: Regular rate and rhythm, normal S1 and S2, no murmur/rub/gallop; 1+ LE edema   ABDOMEN: + Colostomy w/ gas and stool in bag, no sig TTP  MUSCULOSKELETAL: RUE swelling, no erythema or TTP, otherwise no clubbing or cyanosis of digits; no joint swelling or tenderness to palpation  PSYCH: Alert     LABS:                        9.6    7.92  )-----------( 218      ( 23 Oct 2024 12:45 )             32.1     10-23    132[L]  |  100  |  10  ----------------------------<  118[H]  4.1   |  18[L]  |  0.44[L]    Ca    8.3[L]      23 Oct 2024 12:45            Urinalysis Basic - ( 23 Oct 2024 12:45 )    Color: x / Appearance: x / SG: x / pH: x  Gluc: 118 mg/dL / Ketone: x  / Bili: x / Urobili: x   Blood: x / Protein: x / Nitrite: x   Leuk Esterase: x / RBC: x / WBC x   Sq Epi: x / Non Sq Epi: x / Bacteria: x          RADIOLOGY & ADDITIONAL TESTS:  Results Reviewed:   Imaging Personally Reviewed:  Electrocardiogram Personally Reviewed:    COORDINATION OF CARE:  Care Discussed with Consultants/Other Providers [Y/N]:  Prior or Outpatient Records Reviewed [Y/N]:

## 2024-10-24 NOTE — PROGRESS NOTE ADULT - NS ATTEND AMEND GEN_ALL_CORE FT
pt resting urin in cath yellow clear.    nad ncat   pt seen by consult liason psych today with recommednation for stnadin seroquel.    wound care pending home disposition. pt resting, nad ncat urine yellow     seen by consult liaison psych today   recommendation for standing Seroquel,    wound care pending home dispo and home care

## 2024-10-24 NOTE — PROGRESS NOTE ADULT - PROBLEM SELECTOR PLAN 2
Patient with history of locally advanced rectal cancer  - follows with [facility] [place]ology  - GOC addressed on previous admissions with patient's daughter, Tiffanie.   - patient and family reportedly wish to have all interventions    # RUE swelling   - Duplex negative for VTE

## 2024-10-24 NOTE — BH CONSULTATION LIAISON PROGRESS NOTE - NSBHASSESSMENTFT_PSY_ALL_CORE
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 Psych consulted for insomnia and anxiety.    10/23: Pt was lethargic and fell asleep at beginning of interview. Was not given any PRN medication and does not have past psychiatric history. VS stable. Per chart, pt was confused this morning and refused abdominal exam, although has history of refusing labs/wound care/physical exams during current admission to hospital.     10/24: Pt still lethargic, only arousable to sternal rub. Per daughter (healthcare proxy), pt has history of poor sleep d/t feelings of anxiety and depression x 5 yrs. Has had similar sx during prior admission to [facility] which was treated with Haldol. Pt daughter endorses worsening sx starting after IVF were discontinued and that pt is not able to drink enough water PO.    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 Psych consulted for insomnia and anxiety.    10/23: Pt was lethargic and fell asleep at beginning of interview. Was not given any PRN medication and does not have past psychiatric history. VS stable. Per chart, pt was confused this morning and refused abdominal exam, although has history of refusing labs/wound care/physical exams during current admission to hospital.     10/24: Pt still lethargic, only arousable to sternal rub. Per daughter (healthcare proxy), pt has history of poor sleep d/t feelings of anxiety and depression x 5 yrs. Has had similar sx during prior admission to [facility] which was treated with Haldol. Pt daughter endorses worsening sx starting after IVF were discontinued and that pt is not able to drink enough water PO.    Plan:   - Routine observation  - START Seroquel 35mg PO Q 8PM standing to promote improved sleep overnight. Do not give if QTC above 500.   - Use Haldol 0.5-1mg IM/IV Q 8 PRN severe agitation.   - Dispo: No role for inpt psych. Alternative dispo: [facility] vs home when optimized.

## 2024-10-24 NOTE — PROGRESS NOTE ADULT - SUBJECTIVE AND OBJECTIVE BOX
Overnight events:  None    Subjective:  Pt offers no complaints    Objective:    Vital signs  T(C): , Max: 36.8 (10-24-24 @ 09:30)  HR: 102 (10-24-24 @ 09:30)  BP: 119/89 (10-24-24 @ 09:30)  SpO2: 100% (10-24-24 @ 09:30)  Wt(kg): --    Output   Zamarripa: 500  Colostomy: 0 (according to RN pt had large BM yesterday)  10-23 @ 07:01  -  10-24 @ 07:00  --------------------------------------------------------  IN: 0 mL / OUT: 1475 mL / NET: -1475 mL    10-24 @ 07:01  -  10-24 @ 11:26  --------------------------------------------------------  IN: 0 mL / OUT: 100 mL / NET: -100 mL        Gen: NAD  Abd: soft nontender, suprapubic wound with some eschar, no purulence, penile wound improving and scrotal wounds with some eschar as well but no purulence, no surrounding cellulitis, dressings changed    Lab: none today                        9.6    7.92  )-----------( 218      ( 23 Oct 2024 12:45 )             32.1     23 Oct 2024 12:45    132    |  100    |  10     ----------------------------<  118    4.1     |  18     |  0.44     Ca    8.3        23 Oct 2024 12:45

## 2024-10-24 NOTE — PROVIDER CONTACT NOTE (OTHER) - ASSESSMENT
Pt. is bedbound, confused, on room air, V/S stable. Has pressure injuries and surgical incisional wounds. Pt. is bedbound, confused, on room air, V/S stable. Has pressure injuries and surgical incisional wounds. Abd x-ray was done today to r/o obstruction, pending result.

## 2024-10-25 DIAGNOSIS — R53.83 OTHER FATIGUE: ICD-10-CM

## 2024-10-25 LAB
AMMONIA BLD-MCNC: 12 UMOL/L — SIGNIFICANT CHANGE UP (ref 11–55)
ANION GAP SERPL CALC-SCNC: 9 MMOL/L — SIGNIFICANT CHANGE UP (ref 7–14)
BUN SERPL-MCNC: 11 MG/DL — SIGNIFICANT CHANGE UP (ref 7–23)
CALCIUM SERPL-MCNC: 8.5 MG/DL — SIGNIFICANT CHANGE UP (ref 8.4–10.5)
CHLORIDE SERPL-SCNC: 99 MMOL/L — SIGNIFICANT CHANGE UP (ref 98–107)
CO2 SERPL-SCNC: 25 MMOL/L — SIGNIFICANT CHANGE UP (ref 22–31)
CREAT SERPL-MCNC: 0.54 MG/DL — SIGNIFICANT CHANGE UP (ref 0.5–1.3)
EGFR: 101 ML/MIN/1.73M2 — SIGNIFICANT CHANGE UP
GAS PNL BLDV: SIGNIFICANT CHANGE UP
GLUCOSE SERPL-MCNC: 121 MG/DL — HIGH (ref 70–99)
HCT VFR BLD CALC: 27.2 % — LOW (ref 39–50)
HGB BLD-MCNC: 8.2 G/DL — LOW (ref 13–17)
MCHC RBC-ENTMCNC: 25.5 PG — LOW (ref 27–34)
MCHC RBC-ENTMCNC: 30.1 GM/DL — LOW (ref 32–36)
MCV RBC AUTO: 84.7 FL — SIGNIFICANT CHANGE UP (ref 80–100)
NRBC # BLD: 0 /100 WBCS — SIGNIFICANT CHANGE UP (ref 0–0)
NRBC # FLD: 0 K/UL — SIGNIFICANT CHANGE UP (ref 0–0)
PLATELET # BLD AUTO: 244 K/UL — SIGNIFICANT CHANGE UP (ref 150–400)
POTASSIUM SERPL-MCNC: 3.6 MMOL/L — SIGNIFICANT CHANGE UP (ref 3.5–5.3)
POTASSIUM SERPL-SCNC: 3.6 MMOL/L — SIGNIFICANT CHANGE UP (ref 3.5–5.3)
RBC # BLD: 3.21 M/UL — LOW (ref 4.2–5.8)
RBC # FLD: 19.1 % — HIGH (ref 10.3–14.5)
SODIUM SERPL-SCNC: 133 MMOL/L — LOW (ref 135–145)
WBC # BLD: 6.2 K/UL — SIGNIFICANT CHANGE UP (ref 3.8–10.5)
WBC # FLD AUTO: 6.2 K/UL — SIGNIFICANT CHANGE UP (ref 3.8–10.5)

## 2024-10-25 PROCEDURE — 99232 SBSQ HOSP IP/OBS MODERATE 35: CPT

## 2024-10-25 RX ORDER — MAGNESIUM, ALUMINUM HYDROXIDE 200-200 MG
30 TABLET,CHEWABLE ORAL EVERY 6 HOURS
Refills: 0 | Status: DISCONTINUED | OUTPATIENT
Start: 2024-10-25 | End: 2024-11-12

## 2024-10-25 RX ORDER — MORPHINE SULFATE 30 MG/1
2 TABLET, EXTENDED RELEASE ORAL ONCE
Refills: 0 | Status: DISCONTINUED | OUTPATIENT
Start: 2024-10-25 | End: 2024-10-25

## 2024-10-25 RX ORDER — PANTOPRAZOLE SODIUM 40 MG/1
40 TABLET, DELAYED RELEASE ORAL ONCE
Refills: 0 | Status: COMPLETED | OUTPATIENT
Start: 2024-10-25 | End: 2024-10-25

## 2024-10-25 RX ADMIN — MORPHINE SULFATE 2 MILLIGRAM(S): 30 TABLET, EXTENDED RELEASE ORAL at 14:20

## 2024-10-25 RX ADMIN — FLUCONAZOLE, SODIUM CHLORIDE 400 MILLIGRAM(S): 2 INJECTION INTRAVENOUS at 21:55

## 2024-10-25 RX ADMIN — Medication 25 MILLIGRAM(S): at 07:27

## 2024-10-25 RX ADMIN — PANTOPRAZOLE SODIUM 40 MILLIGRAM(S): 40 TABLET, DELAYED RELEASE ORAL at 23:16

## 2024-10-25 RX ADMIN — Medication 1000 MILLIGRAM(S): at 00:18

## 2024-10-25 RX ADMIN — Medication 1000 MILLIGRAM(S): at 14:20

## 2024-10-25 RX ADMIN — Medication 1 DROP(S): at 21:57

## 2024-10-25 RX ADMIN — MORPHINE SULFATE 2 MILLIGRAM(S): 30 TABLET, EXTENDED RELEASE ORAL at 19:41

## 2024-10-25 RX ADMIN — LOSARTAN POTASSIUM 25 MILLIGRAM(S): 25 TABLET ORAL at 07:27

## 2024-10-25 RX ADMIN — Medication 1000 MILLIGRAM(S): at 08:24

## 2024-10-25 RX ADMIN — NICOTINE POLACRILEX 1 PATCH: 4 GUM, CHEWING ORAL at 07:00

## 2024-10-25 RX ADMIN — NICOTINE POLACRILEX 1 PATCH: 4 GUM, CHEWING ORAL at 18:04

## 2024-10-25 RX ADMIN — Medication 81 MILLIGRAM(S): at 13:20

## 2024-10-25 RX ADMIN — Medication 1 DROP(S): at 18:15

## 2024-10-25 RX ADMIN — Medication 1000 MILLIGRAM(S): at 07:27

## 2024-10-25 RX ADMIN — HEPARIN SODIUM 5000 UNIT(S): 10000 INJECTION INTRAVENOUS; SUBCUTANEOUS at 21:54

## 2024-10-25 RX ADMIN — Medication 1 DROP(S): at 07:28

## 2024-10-25 RX ADMIN — Medication 10 MILLIGRAM(S): at 21:54

## 2024-10-25 RX ADMIN — Medication 1 DROP(S): at 18:14

## 2024-10-25 RX ADMIN — Medication 1 DROP(S): at 07:29

## 2024-10-25 RX ADMIN — HEPARIN SODIUM 5000 UNIT(S): 10000 INJECTION INTRAVENOUS; SUBCUTANEOUS at 07:27

## 2024-10-25 RX ADMIN — Medication 1000 MILLIGRAM(S): at 19:14

## 2024-10-25 RX ADMIN — NICOTINE POLACRILEX 1 PATCH: 4 GUM, CHEWING ORAL at 13:53

## 2024-10-25 RX ADMIN — Medication 1000 MILLIGRAM(S): at 18:14

## 2024-10-25 RX ADMIN — Medication 1000 MILLIGRAM(S): at 13:20

## 2024-10-25 RX ADMIN — ERTAPENEM SODIUM 120 MILLIGRAM(S): 1 INJECTION, POWDER, LYOPHILIZED, FOR SOLUTION INTRAMUSCULAR; INTRAVENOUS at 15:46

## 2024-10-25 RX ADMIN — Medication 50 MILLIGRAM(S): at 07:27

## 2024-10-25 RX ADMIN — NICOTINE POLACRILEX 1 PATCH: 4 GUM, CHEWING ORAL at 13:20

## 2024-10-25 RX ADMIN — Medication 1000 MILLIGRAM(S): at 01:18

## 2024-10-25 RX ADMIN — MORPHINE SULFATE 2 MILLIGRAM(S): 30 TABLET, EXTENDED RELEASE ORAL at 13:34

## 2024-10-25 RX ADMIN — HEPARIN SODIUM 5000 UNIT(S): 10000 INJECTION INTRAVENOUS; SUBCUTANEOUS at 15:46

## 2024-10-25 RX ADMIN — MORPHINE SULFATE 2 MILLIGRAM(S): 30 TABLET, EXTENDED RELEASE ORAL at 20:41

## 2024-10-25 NOTE — PROGRESS NOTE ADULT - PROBLEM SELECTOR PLAN 1
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

## 2024-10-25 NOTE — BH CONSULTATION LIAISON PROGRESS NOTE - NSBHMSESPABN_PSY_A_CORE
[facility] was informed that patient no longer sees Dr. Mauro. They state that they noted it in their system.  
Impaired articulation
Impaired articulation

## 2024-10-25 NOTE — BH CONSULTATION LIAISON PROGRESS NOTE - NSBHATTESTBILLING_PSY_A_CORE
35611-Jxrhwlidcp OBS or IP - moderate complexity OR 35-49 mins
08552-Mfcyedtzlt OBS or IP - moderate complexity OR 35-49 mins

## 2024-10-25 NOTE — CHART NOTE - NSCHARTNOTEFT_GEN_A_CORE
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

## 2024-10-25 NOTE — BH CONSULTATION LIAISON PROGRESS NOTE - CURRENT MEDICATION
MEDICATIONS  (STANDING):  acetaminophen     Tablet .. 1000 milliGRAM(s) Oral every 6 hours  aspirin  chewable 81 milliGRAM(s) Oral daily  brimonidine 0.2% Ophthalmic Solution 1 Drop(s) Both EYES every 12 hours  ertapenem  IVPB 1000 milliGRAM(s) IV Intermittent every 24 hours  fluconAZOLE   Tablet 400 milliGRAM(s) Oral every 24 hours  heparin   Injectable 5000 Unit(s) SubCutaneous every 8 hours  latanoprost 0.005% Ophthalmic Solution 1 Drop(s) Both EYES at bedtime  losartan 25 milliGRAM(s) Oral daily  metoprolol succinate ER 50 milliGRAM(s) Oral daily  morphine  - Injectable 2 milliGRAM(s) IV Push once  nicotine -  14 mG/24Hr(s) Patch 1 Patch Transdermal daily  rosuvastatin 10 milliGRAM(s) Oral at bedtime  spironolactone 25 milliGRAM(s) Oral daily  timolol 0.5% Solution 1 Drop(s) Both EYES every 12 hours    MEDICATIONS  (PRN):  morphine  - Injectable 4 milliGRAM(s) IV Push every 8 hours PRN Severe Pain (7 - 10)  
MEDICATIONS  (STANDING):  acetaminophen     Tablet .. 1000 milliGRAM(s) Oral every 6 hours  aspirin  chewable 81 milliGRAM(s) Oral daily  brimonidine 0.2% Ophthalmic Solution 1 Drop(s) Both EYES every 12 hours  dextrose 5% + sodium chloride 0.45%. 1000 milliLiter(s) (75 mL/Hr) IV Continuous <Continuous>  ertapenem  IVPB 1000 milliGRAM(s) IV Intermittent every 24 hours  fluconAZOLE   Tablet 400 milliGRAM(s) Oral every 24 hours  heparin   Injectable 5000 Unit(s) SubCutaneous every 8 hours  latanoprost 0.005% Ophthalmic Solution 1 Drop(s) Both EYES at bedtime  losartan 25 milliGRAM(s) Oral daily  metoprolol succinate ER 50 milliGRAM(s) Oral daily  nicotine -  14 mG/24Hr(s) Patch 1 Patch Transdermal daily  rosuvastatin 10 milliGRAM(s) Oral at bedtime  spironolactone 25 milliGRAM(s) Oral daily  timolol 0.5% Solution 1 Drop(s) Both EYES every 12 hours    MEDICATIONS  (PRN):  morphine  - Injectable 2 milliGRAM(s) IV Push every 8 hours PRN Severe Pain (7 - 10)

## 2024-10-25 NOTE — PROGRESS NOTE ADULT - SUBJECTIVE AND OBJECTIVE BOX
Infectious Diseases Follow Up:    Patient is a 79y old  Male who presents with a chief complaint of suprapubic pain (25 Oct 2024 08:54)      Interval History/ROS:  More lethargic today, opens eyes to name, denies abdominal pain, but quickly goes back to sleep.     Allergies  No Known Allergies        ANTIMICROBIALS:  ertapenem  IVPB 1000 every 24 hours  fluconAZOLE   Tablet 400 every 24 hours      Current Abx:     Previous Abx     OTHER MEDS:  MEDICATIONS  (STANDING):  acetaminophen     Tablet .. 1000 every 6 hours  aspirin  chewable 81 daily  heparin   Injectable 5000 every 8 hours  losartan 25 daily  metoprolol succinate ER 50 daily  morphine  - Injectable 2 every 8 hours PRN  rosuvastatin 10 at bedtime  spironolactone 25 daily      Vital Signs Last 24 Hrs  T(C): 36.3 (25 Oct 2024 09:51), Max: 36.7 (25 Oct 2024 06:50)  T(F): 97.4 (25 Oct 2024 09:51), Max: 98 (25 Oct 2024 06:50)  HR: 74 (25 Oct 2024 09:51) (74 - 88)  BP: 132/79 (25 Oct 2024 09:51) (105/74 - 148/93)  BP(mean): --  RR: 18 (25 Oct 2024 09:51) (16 - 18)  SpO2: 97% (25 Oct 2024 09:51) (97% - 100%)    Parameters below as of 25 Oct 2024 09:51  Patient On (Oxygen Delivery Method): room air      PHYSICAL EXAM:  GENERAL: NAD, well-developed  HEAD:  Atraumatic, Normocephalic  EYES: EOMI, conjunctiva and sclera clear  CHEST/LUNG: Clear to auscultation bilaterally; No wheeze  HEART: Regular rate and rhythm; No murmurs, rubs, or gallops  ABDOMEN: Soft, Nontender, Nondistended; +Ostomy w/ stool   PSYCH: AAOx3                            9.6    7.92  )-----------( 218      ( 23 Oct 2024 12:45 )             32.1       10-25    133[L]  |  99  |  11  ----------------------------<  121[H]  3.6   |  25  |  0.54    Ca    8.5      25 Oct 2024 10:05        Urinalysis Basic - ( 25 Oct 2024 10:05 )    Color: x / Appearance: x / SG: x / pH: x  Gluc: 121 mg/dL / Ketone: x  / Bili: x / Urobili: x   Blood: x / Protein: x / Nitrite: x   Leuk Esterase: x / RBC: x / WBC x   Sq Epi: x / Non Sq Epi: x / Bacteria: x        MICROBIOLOGY:  v  Tissue  10-12-24   Rare Escherichia coli ESBL  --  Escherichia coli ESBL      Clean Catch Clean Catch (Midstream)  10-12-24   10,000 - 49,000 CFU/mL Candida albicans "Susceptibilities not performed"  --  --      .Blood BLOOD  10-12-24   No growth at 5 days  --  --      .Blood BLOOD  10-12-24   No growth at 5 days  --  --                RADIOLOGY:

## 2024-10-25 NOTE — BH CONSULTATION LIAISON PROGRESS NOTE - NSBHCHARTREVIEWVS_PSY_A_CORE FT
Vital Signs Last 24 Hrs  T(C): 36.6 (24 Oct 2024 13:55), Max: 36.8 (24 Oct 2024 09:30)  T(F): 97.9 (24 Oct 2024 13:55), Max: 98.3 (24 Oct 2024 09:30)  HR: 87 (24 Oct 2024 13:55) (72 - 102)  BP: 105/74 (24 Oct 2024 13:55) (105/74 - 121/85)  BP(mean): --  RR: 16 (24 Oct 2024 13:55) (16 - 18)  SpO2: 100% (24 Oct 2024 13:55) (99% - 100%)    Parameters below as of 24 Oct 2024 13:55  Patient On (Oxygen Delivery Method): room air    
Vital Signs Last 24 Hrs  T(C): 36.3 (25 Oct 2024 09:51), Max: 36.7 (25 Oct 2024 06:50)  T(F): 97.4 (25 Oct 2024 09:51), Max: 98 (25 Oct 2024 06:50)  HR: 74 (25 Oct 2024 09:51) (74 - 88)  BP: 132/79 (25 Oct 2024 09:51) (105/74 - 148/93)  BP(mean): --  RR: 18 (25 Oct 2024 09:51) (16 - 18)  SpO2: 97% (25 Oct 2024 09:51) (97% - 100%)    Parameters below as of 25 Oct 2024 09:51  Patient On (Oxygen Delivery Method): room air

## 2024-10-25 NOTE — BH CONSULTATION LIAISON PROGRESS NOTE - NSBHASSESSMENTFT_PSY_ALL_CORE
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 Psych consulted for insomnia and anxiety.    10/23: Pt was lethargic and fell asleep at beginning of interview. Was not given any PRN medication and does not have past psychiatric history. VS stable. Per chart, pt was confused this morning and refused abdominal exam, although has history of refusing labs/wound care/physical exams during current admission to hospital.     10/24: Pt still lethargic, only arousable to sternal rub. Per daughter (healthcare proxy), pt has history of poor sleep d/t feelings of anxiety and depression x 5 yrs. Has had similar sx during prior [place] [facility] [place] with Haldol. Pt daughter endorses worsening sx starting after IVF were discontinued and that pt is not able to drink enough water PO.    10/25: Remains lethargic on exam, AxO1, was able to be aroused and answer some questions but became more lethargic as interview continued. Speech slightly improved, although not fully clear. Pt seems to be in sleep-wake reversal, as he has consistently been extremely lethargic/drowsy on exam in AM, with reported waking at night. Pt may benefit from PT, to help with daytime somnolence.      Plan:   - Routine observation  - START Seroquel 35mg PO Q 8PM standing to promote improved sleep overnight. Do not give if QTC above 500.   - Use Haldol 0.5-1mg IM/IV Q 8 PRN severe agitation.   - PT consult if indicated  - Dispo: No role for inpt psych. Alternative dispo: [facility] vs home when optimized. 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 Psych consulted for insomnia and anxiety.    10/23: Pt was lethargic and fell asleep at beginning of interview. Was not given any PRN medication and does not have past psychiatric history. VS stable. Per chart, pt was confused this morning and refused abdominal exam, although has history of refusing labs/wound care/physical exams during current admission to hospital.     10/24: Pt still lethargic, only arousable to sternal rub. Per daughter (healthcare proxy), pt has history of poor sleep d/t feelings of anxiety and depression x 5 yrs. Has had similar [place]x [place] [facility] [place]. Pt daughter endorses worsening sx starting after IVF were discontinued and that pt is not able to drink enough water PO.    10/25: Remains lethargic on exam, AxO1, was able to be aroused and answer some questions but became more lethargic as interview continued. Speech slightly improved, although not fully clear. Pt seems to be in sleep-wake reversal, as he has consistently been extremely lethargic/drowsy on exam in AM, with reported waking at night. Pt may benefit from PT, to help with daytime somnolence.    Update 10/25: As per daughter (healthcare proxy) today, does NOT want pt to receive Seroquel. Reports pt had been given seroquel for [place] of similar [place] [facility] but had become more delirious and agitated. Proxy does not want patient given antipsychotics.       Plan:   - Routine observation  - NO Seroquel 35mg PO Q 8PM  - Use Haldol 0.5-1mg IM/IV Q 8 PRN severe agitation.   - PT consult if indicated  - Dispo: No role for inpt psych. Alternative dispo: [facility] vs home when optimized. 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 Psych consulted for insomnia and anxiety.    10/23: Pt was lethargic and fell asleep at beginning of interview. Was not given any PRN medication and does not have past psychiatric history. VS stable. Per chart, pt was confused this morning and refused abdominal exam, although has history of refusing labs/wound care/physical exams during current admission to hospital.     10/24: Pt still lethargic, only arousable to sternal rub. Per daughter (healthcare proxy), pt has history of poor sleep d/t feelings of anxiety and depression x 5 yrs. Has had similar sx during prior admission to [facility] which was treated with Haldol. Pt daughter endorses worsening sx starting after IVF were discontinued and that pt is not able to drink enough water PO.    10/25: Remains lethargic on exam, AxO1, was able to be aroused and answer some questions but became more lethargic as interview continued. Speech slightly improved, although not fully clear. Pt seems to be in sleep-wake reversal, as he has consistently been extremely lethargic/drowsy on exam in AM, with reported waking at night. Pt may benefit from PT, to help with daytime somnolence.    Update 10/25: As per daughter (healthcare proxy) today, does NOT want pt to receive Seroquel. Reports pt had been given seroquel for treatment of similar symptoms at [facility] but had become more delirious and agitated. Proxy does not want patient given antipsychotics.       Plan:   - Routine observation  - DISCONTINUE Seroquel (family request)  - Delirium precautions: avoid anticholinergic/antihistaminic/benzodiazepine agents; keep Mg above 2 and K above 4; manage infectious/metabolic derrangements; maintain awake during day and asleep at night  - Use Haldol 0.5-1mg IM/IV Q 8 PRN severe agitation.   - PT consult if indicated to facilitate with deconditioning and to engage pt with activity  - Dispo: No role for inpt psych. Alternative dispo: [facility] vs home when optimized.  There is no psych acuity in this case (medical delirium) and family is opposed to standing medications to improve sleep-wake reversal; will sign off but please re-consult us as necessary

## 2024-10-25 NOTE — PROGRESS NOTE ADULT - PROBLEM SELECTOR PLAN 7
Patient noted with markedly elevated TSH up to 26 on 10/15  - FT4 noted to be WNL at 1.0  - currently alert and conversant, without gross lethargy/bradycardia or symptoms of hypothyroidism  - suspect that patient may have some degree of subclinical hypothyroidism  - would recommend outpatient follow up in 2-3 weeks for repeat thyroid function testing

## 2024-10-25 NOTE — PROGRESS NOTE ADULT - PROBLEM SELECTOR PLAN 2
Patient with history of locally advanced rectal cancer  - follows with [facility]ology  - GOC addressed on previous admissions with patient's daughter, Tiffanie.   - patient and family reportedly wish to have all interventions    # RUE swelling   - Duplex negative for VTE

## 2024-10-25 NOTE — PROGRESS NOTE ADULT - SUBJECTIVE AND OBJECTIVE BOX
Merlin Mathew, MD   Hospitalist  Pager #[phone]    PROGRESS NOTE:     Patient is a 79y old  Male who presents with a chief complaint of suprapubic pain (25 Oct 2024 10:57)      SUBJECTIVE / OVERNIGHT EVENTS:  NEON   Patient lethargic, wakes up to sternal rub   Poor PO, IVF re-started   ADDITIONAL REVIEW OF SYSTEMS:    MEDICATIONS  (STANDING):  acetaminophen     Tablet .. 1000 milliGRAM(s) Oral every 6 hours  aspirin  chewable 81 milliGRAM(s) Oral daily  brimonidine 0.2% Ophthalmic Solution 1 Drop(s) Both EYES every 12 hours  dextrose 5% + sodium chloride 0.45%. 1000 milliLiter(s) (75 mL/Hr) IV Continuous <Continuous>  ertapenem  IVPB 1000 milliGRAM(s) IV Intermittent every 24 hours  fluconAZOLE   Tablet 400 milliGRAM(s) Oral every 24 hours  heparin   Injectable 5000 Unit(s) SubCutaneous every 8 hours  latanoprost 0.005% Ophthalmic Solution 1 Drop(s) Both EYES at bedtime  losartan 25 milliGRAM(s) Oral daily  metoprolol succinate ER 50 milliGRAM(s) Oral daily  nicotine -  14 mG/24Hr(s) Patch 1 Patch Transdermal daily  rosuvastatin 10 milliGRAM(s) Oral at bedtime  spironolactone 25 milliGRAM(s) Oral daily  timolol 0.5% Solution 1 Drop(s) Both EYES every 12 hours    MEDICATIONS  (PRN):  morphine  - Injectable 2 milliGRAM(s) IV Push every 8 hours PRN Severe Pain (7 - 10)      CAPILLARY BLOOD GLUCOSE        I&O's Summary    24 Oct 2024 07:01  -  25 Oct 2024 07:00  --------------------------------------------------------  IN: 0 mL / OUT: 525 mL / NET: -525 mL    25 Oct 2024 07:01  -  25 Oct 2024 13:52  --------------------------------------------------------  IN: 0 mL / OUT: 125 mL / NET: -125 mL        PHYSICAL EXAM:  Vital Signs Last 24 Hrs  T(C): 36.3 (25 Oct 2024 09:51), Max: 36.7 (25 Oct 2024 06:50)  T(F): 97.4 (25 Oct 2024 09:51), Max: 98 (25 Oct 2024 06:50)  HR: 74 (25 Oct 2024 09:51) (74 - 88)  BP: 132/79 (25 Oct 2024 09:51) (105/74 - 148/93)  BP(mean): --  RR: 18 (25 Oct 2024 09:51) (16 - 18)  SpO2: 97% (25 Oct 2024 09:51) (97% - 100%)    Parameters below as of 25 Oct 2024 09:51  Patient On (Oxygen Delivery Method): room air        CONSTITUTIONAL: NAD, resting comfortably   RESPIRATORY: Normal respiratory effort; lungs are clear to auscultation bilaterally  CARDIOVASCULAR: Regular rate and rhythm, normal S1 and S2, no murmur/rub/gallop; 1+ LE edema   ABDOMEN: + Colostomy w/ gas and stool in bag, no sig TTP  MUSCULOSKELETAL: RUE swelling, no erythema or TTP, otherwise no clubbing or cyanosis of digits; no joint swelling or tenderness to palpation  PSYCH: Lethargic    LABS:    10-25    133[L]  |  99  |  11  ----------------------------<  121[H]  3.6   |  25  |  0.54    Ca    8.5      25 Oct 2024 10:05            Urinalysis Basic - ( 25 Oct 2024 10:05 )    Color: x / Appearance: x / SG: x / pH: x  Gluc: 121 mg/dL / Ketone: x  / Bili: x / Urobili: x   Blood: x / Protein: x / Nitrite: x   Leuk Esterase: x / RBC: x / WBC x   Sq Epi: x / Non Sq Epi: x / Bacteria: x          RADIOLOGY & ADDITIONAL TESTS:  Results Reviewed:   Imaging Personally Reviewed:  Electrocardiogram Personally Reviewed:    COORDINATION OF CARE:  Care Discussed with Consultants/Other Providers [Y/N]:  Prior or Outpatient Records Reviewed [Y/N]:

## 2024-10-25 NOTE — PROGRESS NOTE ADULT - ASSESSMENT
78 yo M with history of CAD s/p stents in 2012 ([facility]A), locally advanced rectal cancer s/p neoadjuvant therapy with chemoRT (completed 11/2022), consolidation CAPOX (2/2023), brachytherapy (3/2024), presenting for generalized weakness, chest discomfort, rectal pain. Pt was here at [facility] in 8/30 for fatigue, weakness, and falls, found to have perforated rectal mass with perirectal collection, 2.5 x 1.5 cm with spread to infection in anorectal/perineal region with fluid/gas tracking along penile shaft, however pt left for [facility] and s/p diverting end sigmoid colostomy, cystoscopy and naranjo placement across defect and perineal/scrotal drainage 8/30. Recently at [facility] for several weeks, discharged day prior to presentation for perineal infections, treated with abx. Represents here for generalized weakness and dizziness, CTAP non-con on preliminary read indicating ill-defined fluid/gas in scrotum, perirectal space, and probably perineum. Exam demonstrates skin duskiness multiple genitourinary abscesses, induration, purulent drainage. 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    10/13: S/p debridement of genitalia and perineum in OR yesterday. In SICU for post op pressor requirements. Got 500 ccs overnight for hypovolemia on POCUS. Currently off pressors. On zosyn, clinda, vanc. U and bcx pending. Tissue cx negative. Wound packing changed at bedside, incision beds with adequate granulation tissue, healing well. Transferred to floor  10/14 - wounds clean; re-packed; penroses in place; ID consult  10/15 - pt refuses blood draw, and refuses dressing change (it has been 24 hrs since last one; he refused last nite also)                 later on he did allow it - wound appears clean, no necrotic tissue; labs drawn  10/16 - pro-BNP high so echo today  10/17 - changed dressing, took out subprapubic incision penrose drain, left scrotal penrose drain. Echo with reduced EF - cards says iso sepsis, no further workup needed. Changed abx from fluconazole to caspo (2 week course) per ID. Can c/w erta for ESBL e.coli. Cards saying he will need AC eventually,   10/18 - changed dressing this am, last penrose in scrotum d/c'ed. wound care spoke with, can try changing wet to dry dressings to aquacel and change qd. Pt's daughter will hire own aid for wound care and PT, but will need homecare for erta and caspo IV at home.   10/19- changed dressing to aquacel, started Eliquis  10/20- refused exam in AM, dressing to be changed with aquacel in PM   10/21 - changed dressing ; will speak with SW/HC about dispo planning; spoke with daughter for plan; see note by   10/22 - dressing changed; R. arm has swelling, though improved from last hospitalization, as per daughter; testing at [facility] did not reveal a clot, but we will repeat                Pt had blood from rectum today, daughter claims that happened at [facility] when eliquis was re-started; I d/c'd eliquis               d/w at great length his needs for home; she does not want to take him home until ABX's are done (next Tues); she wants study R. upper ext swelling even though it was done at [facility] and was neg  10/23 - daughter supposedly will come today to learn dressing and colostomy care; we will change dressing then, and speak again to daughter about rectal Ca and GOC; apparently she has not wanted to discuss it in the past, R. arm duplex neg, xray negative, psych consult requested  10/24 - dressings changed this AM, yesterday daughter instructed as to dressing changes and colostomy care, psych to see pt again today as he did not participate in the interview, no stool in colostomy ? gas overnight, will get AXR  10/25 - dressing changed this AM. AXR shows nonobstructive distension.    - cont erta and fluconazole 10/29  - dressing change qd with medi-honey aquacel per wound care   - continue naranjo; will change before d/c   - continue to hold Eliquis   - f/u medicine  - f/u psych  - f/u ID

## 2024-10-25 NOTE — PROGRESS NOTE ADULT - SUBJECTIVE AND OBJECTIVE BOX
Subjective: no overnight events. seen and examined on round.     Objective    Vital signs  T(F): , Max: 98.3 (10-24-24 @ 09:30)  HR: 85 (10-25-24 @ 06:50)  BP: 148/93 (10-25-24 @ 06:50)  SpO2: 99% (10-25-24 @ 06:50)    Output     OUT:    Indwelling Catheter - Urethral (mL): 525 mL  Total OUT: 525 mL    Total NET: -525 mL    Gen: NAD  Abd: soft nontender, suprapubic wound with some eschar, no purulence, penile wound improving and scrotal wounds with some eschar as well but no purulence, no surrounding cellulitis, dressings changed  : naranjo with yellow urine    Labs: no labs today

## 2024-10-25 NOTE — PROGRESS NOTE ADULT - ASSESSMENT
79-year-old male with a past medical history of CAD status post PCI, locally advanced rectal CA s/p neoadjuvant therapy with chemotherapy, presented to the hospital due to weakness, chest discomfort and rectal pain.     Patient with recent hospital admission 8/30/2024 found to have perforated rectal mass with perirectal collection.  Patient left hospital 1 to [facility] undergoing a lap diverting end sigmoid colostomy with cystoscopy and Zamarripa placement as well as perineal scrotal drainage.     [facility] a day after discharge due to dizziness and malaise.  In the ED, patient found to have atrial fibrillation, right leg DVT.  Patient is afebrile, otherwise hemodynamically stable.  Labs show no leukocytosis, CT abdomen/pelvis showed gas-forming collection of the suprapubic region, Toribio penile region and scrotal region.  Initial I&D planned however aborted due to worsening exam and patient taken emergently to the OR for debridement.  Patient was given vancomycin, piperacillin/tazobactam and clindamycin.     In the OR, perirectal abscess was noted and debrided.  Latest labs show no leukocytosis, anemia 0.1/26.8, BMP with renal function within normal limits, blood cultures from 8/30/2024 with E. coli, sensitivities reviewed.  Culture obtained from the OR also with E. coli.     #Tara's gangrene   #Abnormal imaging of the pelvis   #Perirectal abscess , yeast  growth  #History of E.coli bacteremia    Overall, 80 y/o M w/ PMHx of CAD status post PCI, locally advanced rectal CA s/p neoadjuvant therapy w/ chemo, recent perforated rectal mass with perirectal collection s/p lap diverting end sigmoid colostomy with cystoscopy and Zamarripa placement as well as perineal scrotal drainage, 8/30/24, now presenting with CT abdomen/pelvis showed gas-forming collection of the suprapubic region now s/p debridement of necrotic skin and soft tissue from genitalia and perineum on 10/12.  Cx w/ ESBL E Coli, yeast     Recommendations:   1. C/w Ertapenem 1 g q24, Fluconazole 400 mg q24 given candida albicans.   2. Will need 2 week course given extensive infection , EOT 10/29  3. Pt more lethargic today, monitor for improvement, consider VBG and further w/u if not improving.     Thank you for this consult. Inpatient ID consult team will sign off.    Further changes in lab values, imaging studies, or clinical status will not be known to ID inpatient consultants unless specifically communicated by primary team.    Adam Toro MD  Attending Physician  Division of Infectious Diseases  Department of Medicine    Please contact through MS Teams message.  Office: [phone] (after 5 PM or weekend)

## 2024-10-25 NOTE — PROGRESS NOTE ADULT - PROBLEM SELECTOR PLAN 4
Patient with history of CAD, hx of stents years ago, report of cath in 2010 with triple vessel disease   - patient reportedly with TTE from 9/2023 with EF of 72%  - c/w aspirin 81mg qd  - c/w toprol 50mg qd  - c/w rosuvastatin 10mg qhs  - c/w losartan 25mg qd  - c/w [place] 25 QD  - TTE performed 10/16, showing global LV hypokinesis, EF 35-40%  [ ] Cardiology following, follow up recommendations re ischemic evaluation

## 2024-10-25 NOTE — BH CONSULTATION LIAISON PROGRESS NOTE - NSBHATTESTCOMMENTATTENDFT_PSY_A_CORE
Chart reviewed. Seen with student. Presents as somnolent/sedated, awakens only to sternal rub, but falls asleep rapidly. Cannot maintain wakefulness. No focal neuro deficits. Exam limited. Appears to be sleep-wake reversal associated with delirium after long medical stay. Agree with above assessment/recs. Will continue to follow.
Chart reviewed. Seen with student. Pt is somnolent, difficult to arouse. Irritable when awakened. Somewhat confused. Exam limited due to poor insight. RN reported at night he is awake. Agree with above assessment/recs. Will sign off as above; please re-consult as needed.

## 2024-10-25 NOTE — BH CONSULTATION LIAISON PROGRESS NOTE - NSBHFUPINTERVALHXFT_PSY_A_CORE
Pt was lethargic on exam, arousable to sternal rub. AxO1 (oriented to person only). Pt speech slightly more clear today, but still mumbling and able to answer some questions but as interview continued became lethargic again and was only able to shake head yes/no. Denies any paranoia, any mood symptoms, SI/HI, or AH/VH. Endorses feeling safe in hospital. States that he feels very tired and that he "just wants to sleep". Pts nurse reported pt waking up at night and being unable to fall asleep, although unclear if pt is intermittently sleeping/waking or if pt cannot sleep at all at night. Overnight, started IVF d/t low output along with poor PO intake.  Pt was lethargic on exam, arousable to sternal rub. AxO1 (oriented to person only). Pt speech slightly more clear today, but still mumbling and able to answer some questions but as interview continued became lethargic again and was only able to shake head yes/no. Denies any paranoia, any mood symptoms, SI/HI, or AH/VH. Endorses feeling safe in hospital. States that he feels very tired and that he "just wants to sleep". Pts nurse reported pt waking up at night and being unable to fall asleep, although unclear if pt is intermittently sleeping/waking or if pt cannot sleep at all at night. Overnight, started IVF d/t low output along with poor PO intake.    Per healthcare proxy, does NOT want pt started on Seroquel, d/t past use of seoquel for treatment of similar symptoms at [facility], but pt had caused pt to become more delirious and agitated. Proxy does not want patient given antipsychotics.    Pt was lethargic on exam, arousable to sternal rub. AxO1 (oriented to person only). Pt speech slightly more clear today, but still mumbling and able to answer some questions but as interview continued became lethargic again and was only able to shake head yes/no. Denies any paranoia, any mood symptoms, SI/HI, or AH/VH. Endorses feeling safe in hospital. States that he feels very tired and that he "just wants to sleep". Pts nurse reported pt waking up at night and being unable to fall asleep, although unclear if pt is intermittently sleeping/waking or if pt cannot sleep at all at night. Overnight, started IVF d/t low output along with poor PO intake.    Per healthcare proxy, does NOT want pt started on Seroquel, d/t past use of seoquel for treatment of similar symptoms at [facility], but had caused pt to become more delirious and agitated. Proxy does not want patient given antipsychotics.

## 2024-10-26 LAB
ANION GAP SERPL CALC-SCNC: 10 MMOL/L — SIGNIFICANT CHANGE UP (ref 7–14)
BLD GP AB SCN SERPL QL: NEGATIVE — SIGNIFICANT CHANGE UP
BUN SERPL-MCNC: 10 MG/DL — SIGNIFICANT CHANGE UP (ref 7–23)
CALCIUM SERPL-MCNC: 8.3 MG/DL — LOW (ref 8.4–10.5)
CHLORIDE SERPL-SCNC: 100 MMOL/L — SIGNIFICANT CHANGE UP (ref 98–107)
CO2 SERPL-SCNC: 21 MMOL/L — LOW (ref 22–31)
CREAT SERPL-MCNC: 0.48 MG/DL — LOW (ref 0.5–1.3)
EGFR: 105 ML/MIN/1.73M2 — SIGNIFICANT CHANGE UP
GLUCOSE SERPL-MCNC: 111 MG/DL — HIGH (ref 70–99)
HCT VFR BLD CALC: 28 % — LOW (ref 39–50)
HGB BLD-MCNC: 8.5 G/DL — LOW (ref 13–17)
MAGNESIUM SERPL-MCNC: 1.7 MG/DL — SIGNIFICANT CHANGE UP (ref 1.6–2.6)
MCHC RBC-ENTMCNC: 25.4 PG — LOW (ref 27–34)
MCHC RBC-ENTMCNC: 30.4 GM/DL — LOW (ref 32–36)
MCV RBC AUTO: 83.8 FL — SIGNIFICANT CHANGE UP (ref 80–100)
NRBC # BLD: 0 /100 WBCS — SIGNIFICANT CHANGE UP (ref 0–0)
NRBC # FLD: 0 K/UL — SIGNIFICANT CHANGE UP (ref 0–0)
PHOSPHATE SERPL-MCNC: 2.7 MG/DL — SIGNIFICANT CHANGE UP (ref 2.5–4.5)
PLATELET # BLD AUTO: 226 K/UL — SIGNIFICANT CHANGE UP (ref 150–400)
POTASSIUM SERPL-MCNC: 3.9 MMOL/L — SIGNIFICANT CHANGE UP (ref 3.5–5.3)
POTASSIUM SERPL-SCNC: 3.9 MMOL/L — SIGNIFICANT CHANGE UP (ref 3.5–5.3)
RBC # BLD: 3.34 M/UL — LOW (ref 4.2–5.8)
RBC # FLD: 19 % — HIGH (ref 10.3–14.5)
RH IG SCN BLD-IMP: NEGATIVE — SIGNIFICANT CHANGE UP
SODIUM SERPL-SCNC: 131 MMOL/L — LOW (ref 135–145)
WBC # BLD: 6.15 K/UL — SIGNIFICANT CHANGE UP (ref 3.8–10.5)
WBC # FLD AUTO: 6.15 K/UL — SIGNIFICANT CHANGE UP (ref 3.8–10.5)

## 2024-10-26 PROCEDURE — 99232 SBSQ HOSP IP/OBS MODERATE 35: CPT

## 2024-10-26 PROCEDURE — 99231 SBSQ HOSP IP/OBS SF/LOW 25: CPT | Mod: GC

## 2024-10-26 RX ORDER — OXYCODONE HYDROCHLORIDE 30 MG/1
2.5 TABLET ORAL EVERY 4 HOURS
Refills: 0 | Status: DISCONTINUED | OUTPATIENT
Start: 2024-10-26 | End: 2024-10-29

## 2024-10-26 RX ADMIN — NICOTINE POLACRILEX 1 PATCH: 4 GUM, CHEWING ORAL at 07:07

## 2024-10-26 RX ADMIN — Medication 1000 MILLIGRAM(S): at 07:56

## 2024-10-26 RX ADMIN — Medication 1000 MILLIGRAM(S): at 23:36

## 2024-10-26 RX ADMIN — Medication 1000 MILLIGRAM(S): at 06:56

## 2024-10-26 RX ADMIN — LOSARTAN POTASSIUM 25 MILLIGRAM(S): 25 TABLET ORAL at 06:57

## 2024-10-26 RX ADMIN — Medication 25 MILLIGRAM(S): at 06:57

## 2024-10-26 RX ADMIN — OXYCODONE HYDROCHLORIDE 2.5 MILLIGRAM(S): 30 TABLET ORAL at 23:40

## 2024-10-26 RX ADMIN — OXYCODONE HYDROCHLORIDE 2.5 MILLIGRAM(S): 30 TABLET ORAL at 18:10

## 2024-10-26 RX ADMIN — HEPARIN SODIUM 5000 UNIT(S): 10000 INJECTION INTRAVENOUS; SUBCUTANEOUS at 21:05

## 2024-10-26 RX ADMIN — Medication 1000 MILLIGRAM(S): at 12:03

## 2024-10-26 RX ADMIN — NICOTINE POLACRILEX 1 PATCH: 4 GUM, CHEWING ORAL at 12:26

## 2024-10-26 RX ADMIN — Medication 81 MILLIGRAM(S): at 12:02

## 2024-10-26 RX ADMIN — OXYCODONE HYDROCHLORIDE 2.5 MILLIGRAM(S): 30 TABLET ORAL at 11:03

## 2024-10-26 RX ADMIN — Medication 50 MILLIGRAM(S): at 06:56

## 2024-10-26 RX ADMIN — HEPARIN SODIUM 5000 UNIT(S): 10000 INJECTION INTRAVENOUS; SUBCUTANEOUS at 14:48

## 2024-10-26 RX ADMIN — FLUCONAZOLE, SODIUM CHLORIDE 400 MILLIGRAM(S): 2 INJECTION INTRAVENOUS at 21:05

## 2024-10-26 RX ADMIN — Medication 1 DROP(S): at 06:57

## 2024-10-26 RX ADMIN — OXYCODONE HYDROCHLORIDE 2.5 MILLIGRAM(S): 30 TABLET ORAL at 19:10

## 2024-10-26 RX ADMIN — Medication 1 DROP(S): at 21:05

## 2024-10-26 RX ADMIN — ERTAPENEM SODIUM 120 MILLIGRAM(S): 1 INJECTION, POWDER, LYOPHILIZED, FOR SOLUTION INTRAMUSCULAR; INTRAVENOUS at 16:03

## 2024-10-26 RX ADMIN — Medication 1000 MILLIGRAM(S): at 18:10

## 2024-10-26 RX ADMIN — Medication 1 DROP(S): at 18:17

## 2024-10-26 RX ADMIN — OXYCODONE HYDROCHLORIDE 2.5 MILLIGRAM(S): 30 TABLET ORAL at 10:02

## 2024-10-26 RX ADMIN — Medication 1000 MILLIGRAM(S): at 13:03

## 2024-10-26 RX ADMIN — Medication 1000 MILLIGRAM(S): at 19:10

## 2024-10-26 RX ADMIN — HEPARIN SODIUM 5000 UNIT(S): 10000 INJECTION INTRAVENOUS; SUBCUTANEOUS at 06:56

## 2024-10-26 RX ADMIN — NICOTINE POLACRILEX 1 PATCH: 4 GUM, CHEWING ORAL at 12:02

## 2024-10-26 RX ADMIN — NICOTINE POLACRILEX 1 PATCH: 4 GUM, CHEWING ORAL at 18:30

## 2024-10-26 RX ADMIN — Medication 10 MILLIGRAM(S): at 21:05

## 2024-10-26 NOTE — PROGRESS NOTE ADULT - SUBJECTIVE AND OBJECTIVE BOX
Dr. Dayanara Dominguez  Pager [phone]    PROGRESS NOTE:     Patient is a 79y old  Male who presents with a chief complaint of suprapubic pain (26 Oct 2024 09:13)      SUBJECTIVE / OVERNIGHT EVENTS: pt lucid this morning, talking to daughter on the phone  ADDITIONAL REVIEW OF SYSTEMS: afebrile, no chest pain/sob    MEDICATIONS  (STANDING):  acetaminophen     Tablet .. 1000 milliGRAM(s) Oral every 6 hours  aspirin  chewable 81 milliGRAM(s) Oral daily  brimonidine 0.2% Ophthalmic Solution 1 Drop(s) Both EYES every 12 hours  dextrose 5% + sodium chloride 0.45%. 1000 milliLiter(s) (75 mL/Hr) IV Continuous <Continuous>  ertapenem  IVPB 1000 milliGRAM(s) IV Intermittent every 24 hours  fluconAZOLE   Tablet 400 milliGRAM(s) Oral every 24 hours  heparin   Injectable 5000 Unit(s) SubCutaneous every 8 hours  latanoprost 0.005% Ophthalmic Solution 1 Drop(s) Both EYES at bedtime  losartan 25 milliGRAM(s) Oral daily  metoprolol succinate ER 50 milliGRAM(s) Oral daily  nicotine -  14 mG/24Hr(s) Patch 1 Patch Transdermal daily  rosuvastatin 10 milliGRAM(s) Oral at bedtime  spironolactone 25 milliGRAM(s) Oral daily  timolol 0.5% Solution 1 Drop(s) Both EYES every 12 hours    MEDICATIONS  (PRN):  aluminum hydroxide/magnesium hydroxide/simethicone Suspension 30 milliLiter(s) Oral every 6 hours PRN Dyspepsia  oxyCODONE    IR 2.5 milliGRAM(s) Oral every 4 hours PRN Moderate Pain (4 - 6)      CAPILLARY BLOOD GLUCOSE        I&O's Summary    25 Oct 2024 07:01  -  26 Oct 2024 07:00  --------------------------------------------------------  IN: 0 mL / OUT: 1425 mL / NET: -1425 mL    26 Oct 2024 07:01  -  26 Oct 2024 13:14  --------------------------------------------------------  IN: 0 mL / OUT: 400 mL / NET: -400 mL        PHYSICAL EXAM:  Vital Signs Last 24 Hrs  T(C): 36.6 (26 Oct 2024 10:10), Max: 37 (26 Oct 2024 06:56)  T(F): 97.9 (26 Oct 2024 10:10), Max: 98.6 (26 Oct 2024 06:56)  HR: 71 (26 Oct 2024 10:10) (70 - 72)  BP: 109/68 (26 Oct 2024 10:10) (109/68 - 150/93)  BP(mean): --  RR: 17 (26 Oct 2024 10:10) (17 - 18)  SpO2: 99% (26 Oct 2024 10:10) (98% - 100%)    Parameters below as of 26 Oct 2024 10:10  Patient On (Oxygen Delivery Method): room air      CONSTITUTIONAL: NAD, resting comfortably   RESPIRATORY: Normal respiratory effort; lungs are clear to auscultation bilaterally  CARDIOVASCULAR: Regular rate and rhythm, normal S1 and S2, no murmur/rub/gallop; 1+ LE edema   ABDOMEN: + Colostomy w/ stool in bag, no sig TTP  Zaamrripa in place  MUSCULOSKELETAL: RUE swelling, no erythema or TTP, otherwise no clubbing or cyanosis of digits; no joint swelling or tenderness to palpation  PSYCH: awake/alert x2     LABS:                        8.5    6.15  )-----------( 226      ( 26 Oct 2024 09:39 )             28.0     10-26    131[L]  |  100  |  10  ----------------------------<  111[H]  3.9   |  21[L]  |  0.48[L]    Ca    8.3[L]      26 Oct 2024 07:14  Phos  2.7     10-26  Mg     1.70     10-26            Urinalysis Basic - ( 26 Oct 2024 07:14 )    Color: x / Appearance: x / SG: x / pH: x  Gluc: 111 mg/dL / Ketone: x  / Bili: x / Urobili: x   Blood: x / Protein: x / Nitrite: x   Leuk Esterase: x / RBC: x / WBC x   Sq Epi: x / Non Sq Epi: x / Bacteria: x          RADIOLOGY & ADDITIONAL TESTS:  Results Reviewed:   Imaging Personally Reviewed:  Electrocardiogram Personally Reviewed:    COORDINATION OF CARE:  Care Discussed with Consultants/Other Providers [Y/N]:  Prior or Outpatient Records Reviewed [Y/N]:

## 2024-10-26 NOTE — PROGRESS NOTE ADULT - ASSESSMENT
Detail Level: Zone Topical Steroids Counseling: I discussed with the patient that prolonged use of topical steroids can result in the increased appearance of superficial blood vessels (telangiectasias), lightening (hypopigmentation) and thinning of the skin (atrophy).  Patient understands to avoid using high potency steroids in skin folds, the groin or the face.  The patient verbalized understanding of the proper use and possible adverse effects of topical steroids.  All of the patient's questions and concerns were addressed. 77 yo male with Hx diverticulosis, Afib (off elemis for prior episodes of rectal bleeding), locally advanced [place] [facility], know to the service for prior rectal abscess drainage currently on michael and fluconazole s/p multiple I/D for yuri's gangrene. Colorectal surgery consulted for bleeding stoma. Stoma with minimal scant sanguinous output but with nursing reports of apparent gross rectal bleeding. Patient remains hemodynamically stable with H/H 8.5/28.    Plan:  - Plan pending further discussion with colorectal surgery attending    [id number]  Thank you for allowing us to participate in the care of this patient.

## 2024-10-26 NOTE — PROGRESS NOTE ADULT - SUBJECTIVE AND OBJECTIVE BOX
Colorectal surgery reconsulted for bleeding colostomy.    Subjective:  Patient seen at bedside this AM. slightly confused, fatigued, without complaints. Denies chest pain, SOB. Tolerating diet without N/V.     24h Events:   - Overnight, no acute events    Objective:    Physical Exam:  GEN: resting in bed comfortably in NAD  RESP: no increased WOB  ABD: soft, non-distended, non-tender to palpation without rebound tenderness or guarding, stoma pink with formed stool in bag and scant sanguineus content, no clotting or active bleeding appreciated.   EXTR: warm, well-perfused without gross deformities; spontaneous movement in b/l U/L extrem  NEURO: awake, alert    Vital Signs  T(C): 36.3 (10-26 @ 14:50), Max: 37 (10-26 @ 06:56)  HR: 76 (10-26 @ 14:50) (70 - 76)  BP: 119/74 (10-26 @ 14:50) (109/68 - 150/93)  RR: 18 (10-26 @ 14:50) (17 - 18)  SpO2: 100% (10-26 @ 14:50) (98% - 100%)  10-25-24 @ 07:01  -  10-26-24 @ 07:00  --------------------------------------------------------  IN:  Total IN: 0 mL    OUT:    Colostomy (mL): 0 mL    Indwelling Catheter - Urethral (mL): 1425 mL  Total OUT: 1425 mL    Total NET: -1425 mL      10-26-24 @ 07:01  -  10-26-24 @ 15:43  --------------------------------------------------------  IN:  Total IN: 0 mL    OUT:    Colostomy (mL): 0 mL    Indwelling Catheter - Urethral (mL): 400 mL  Total OUT: 400 mL    Total NET: -400 mL          Labs:                        8.5    6.15  )-----------( 226      ( 26 Oct 2024 09:39 )             28.0   10-26    131[L]  |  100  |  10  ----------------------------<  111[H]  3.9   |  21[L]  |  0.48[L]    Ca    8.3[L]      26 Oct 2024 07:14  Phos  2.7     10-26  Mg     1.70     10-26      CAPILLARY BLOOD GLUCOSE          Medications:  MEDICATIONS  (STANDING):  acetaminophen     Tablet .. 1000 milliGRAM(s) Oral every 6 hours  aspirin  chewable 81 milliGRAM(s) Oral daily  brimonidine 0.2% Ophthalmic Solution 1 Drop(s) Both EYES every 12 hours  dextrose 5% + sodium chloride 0.45%. 1000 milliLiter(s) (75 mL/Hr) IV Continuous <Continuous>  ertapenem  IVPB 1000 milliGRAM(s) IV Intermittent every 24 hours  fluconAZOLE   Tablet 400 milliGRAM(s) Oral every 24 hours  heparin   Injectable 5000 Unit(s) SubCutaneous every 8 hours  latanoprost 0.005% Ophthalmic Solution 1 Drop(s) Both EYES at bedtime  losartan 25 milliGRAM(s) Oral daily  metoprolol succinate ER 50 milliGRAM(s) Oral daily  nicotine -  14 mG/24Hr(s) Patch 1 Patch Transdermal daily  rosuvastatin 10 milliGRAM(s) Oral at bedtime  spironolactone 25 milliGRAM(s) Oral daily  timolol 0.5% Solution 1 Drop(s) Both EYES every 12 hours    MEDICATIONS  (PRN):  aluminum hydroxide/magnesium hydroxide/simethicone Suspension 30 milliLiter(s) Oral every 6 hours PRN Dyspepsia  oxyCODONE    IR 2.5 milliGRAM(s) Oral every 4 hours PRN Moderate Pain (4 - 6)      Imaging:

## 2024-10-26 NOTE — PROGRESS NOTE ADULT - PROBLEM SELECTOR PLAN 1
Patient presenting with rectal pain and generalized weakness  - CT imaging and exam on admission with findings concerning for Tara's gangrene  - patient now s/p debridement of genitalia and perineum by urology team on 10/12  - briefly requiring SICU level of care for pressor support due to post-operative hypotension  - OR cultures with E Coli ESBL and candida albicans  - Cont Ertapenem and Fluconazole through 10/29  - ID following

## 2024-10-26 NOTE — PROGRESS NOTE ADULT - PROBLEM SELECTOR PLAN 2
Patient with history of locally advanced rectal cancer  - follows with [facility] oncology  - GOC addressed on previous admissions with patient's daughter, Tiffanie. Daughter wants pt to be full code.   - patient and family wish to have all interventions    # RUE swelling   - Duplex negative for VTE

## 2024-10-26 NOTE — PROGRESS NOTE ADULT - SUBJECTIVE AND OBJECTIVE BOX
Subjective  Denies fevers, chills, nausea, vomiting, SOB, CP.  Tolerating diet.    Objective    Vital signs  T(F): , Max: 98.6 (10-26-24 @ 06:56)  HR: 71 (10-26-24 @ 06:56)  BP: 129/74 (10-26-24 @ 06:56)  SpO2: 98% (10-26-24 @ 06:56)  Wt(kg): --    Output     OUT:    Indwelling Catheter - Urethral (mL): 1425 mL  Total OUT: 1425 mL    Total NET: -1425 mL          Gen: NAD  Abd: soft nontender, suprapubic wound with some eschar, no purulence, penile wound improving and scrotal wounds with some eschar as well but no purulence, no surrounding cellulitis, dressings changed  : naranjo with yellow urine      Labs      10-26 @ 07:14    WBC --    / Hct --    / SCr 0.48     10-25 @ 15:30    WBC 6.20  / Hct 27.2  / SCr --             Urine Cx: ?  Blood Cx: ?    Imaging

## 2024-10-26 NOTE — PROGRESS NOTE ADULT - ASSESSMENT
78 yo M with history of CAD s/p stents in 2012 (ASA), locally advanced rectal cancer s/p neoadjuvant therapy with chemoRT (completed 11/2022), consolidation CAPOX (2/2023), brachytherapy (3/2024), presenting for generalized weakness, chest discomfort, rectal pain. Pt was here at [facility] in 8/30 for fatigue, weakness, and falls, found to have perforated rectal mass with perirectal collection, 2.5 x 1.5 cm with spread to infection in anorectal/perineal region with fluid/gas tracking along penile shaft, however pt left for MSK and s/p diverting end sigmoid colostomy, cystoscopy and naranjo placement across defect and perineal/scrotal drainage 8/30. Recently at [facility] for several weeks, discharged day prior to presentation for perineal infections, treated with abx. Represents here for generalized weakness and dizziness, CTAP non-con on preliminary read indicating ill-defined fluid/gas in scrotum, perirectal space, and probably perineum. Exam demonstrates skin duskiness multiple genitourinary abscesses, induration, purulent drainage. 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    10/13: S/p debridement of genitalia and perineum in OR yesterday. In SICU for post op pressor requirements. Got 500 ccs overnight for hypovolemia on POCUS. Currently off pressors. On zosyn, clinda, vanc. U and bcx pending. Tissue cx negative. Wound packing changed at bedside, incision beds with adequate granulation tissue, healing well. Transferred to floor  10/14 - wounds clean; re-packed; penroses in place; ID consult  10/15 - pt refuses blood draw, and refuses dressing change (it has been 24 hrs since last one; he refused last nite also)                 later on he did allow it - wound appears clean, no necrotic tissue; labs drawn  10/16 - pro-BNP high so echo today  10/17 - changed dressing, took out subprapubic incision penrose drain, left scrotal penrose drain. Echo with reduced EF - cards says iso sepsis, no further workup needed. Changed abx from fluconazole to caspo (2 week course) per ID. Can c/w erta for ESBL e.coli. Cards saying he will need AC eventually,   10/18 - changed dressing this am, last penrose in scrotum d/c'ed. wound care spoke with, can try changing wet to dry dressings to aquacel and change qd. Pt's daughter will hire own aid for wound care and PT, but will need homecare for erta and caspo IV at home.   10/19- changed dressing to aquacel, started Eliquis  10/20- refused exam in AM, dressing to be changed with aquacel in PM   10/21 - changed dressing ; will speak with SW/HC about dispo planning; spoke with daughter for plan; see note by   10/22 - dressing changed; R. arm has swelling, though improved from last hospitalization, as per daughter; testing at [facility] did not reveal a clot, but we will repeat                Pt had blood from rectum today, daughter claims that happened at [facility] when eliquis was re-started; I d/c'd eliquis               d/w at great length his needs for home; she does not want to take him home until ABX's are done (next Tues); she wants study R. upper ext swelling even though it was done at [facility] and was neg  10/23 - daughter supposedly will come today to learn dressing and colostomy care; we will change dressing then, and speak again to daughter about rectal Ca and GOC; apparently she has not wanted to discuss it in the past, R. arm duplex neg, xray negative, psych consult requested  10/24 - dressings changed this AM, yesterday daughter instructed as to dressing changes and colostomy care, psych to see pt again today as he did not participate in the interview, no stool in colostomy ? gas overnight, will get AXR  10/25 - dressing changed this AM. AXR shows nonobstructive distension.  10/26 - new dark red output from colostomy, dressing changed this AM,     - consult surgery for new dark red colostomy output  - cont erta and fluconazole 10/29  - dressing change qd with medi-honey aquacel per wound care   - continue naranjo; will change before d/c   - continue to hold Eliquis   - f/u medicine  - f/u psych  - f/u ID

## 2024-10-26 NOTE — PROGRESS NOTE ADULT - PROBLEM SELECTOR PLAN 4
You can access the FollowMyHealth Patient Portal offered by [facility] by registering at the following website: [email or web address]. By joining [facility]’s FollowMyHealth portal, you will also be able to view your health information using other applications (apps) compatible with our system. Patient with history of CAD, hx of stents years ago, report of cath in 2010 with triple vessel disease   - patient reportedly with TTE from 9/2023 with EF of 72%  - c/w aspirin 81mg qd  - c/w toprol 50mg qd  - c/w rosuvastatin 10mg qhs  - c/w losartan 25mg qd  - c/w [place] 25 QD  - TTE performed 10/16, showing global LV hypokinesis, EF 35-40%  [ ] Cardiology following, follow up recommendations re ischemic evaluation

## 2024-10-27 PROCEDURE — 99232 SBSQ HOSP IP/OBS MODERATE 35: CPT | Mod: GC

## 2024-10-27 PROCEDURE — 99232 SBSQ HOSP IP/OBS MODERATE 35: CPT

## 2024-10-27 RX ADMIN — Medication 1 DROP(S): at 18:01

## 2024-10-27 RX ADMIN — OXYCODONE HYDROCHLORIDE 2.5 MILLIGRAM(S): 30 TABLET ORAL at 04:02

## 2024-10-27 RX ADMIN — Medication 1000 MILLIGRAM(S): at 00:36

## 2024-10-27 RX ADMIN — HEPARIN SODIUM 5000 UNIT(S): 10000 INJECTION INTRAVENOUS; SUBCUTANEOUS at 13:16

## 2024-10-27 RX ADMIN — HEPARIN SODIUM 5000 UNIT(S): 10000 INJECTION INTRAVENOUS; SUBCUTANEOUS at 05:00

## 2024-10-27 RX ADMIN — FLUCONAZOLE, SODIUM CHLORIDE 400 MILLIGRAM(S): 2 INJECTION INTRAVENOUS at 21:08

## 2024-10-27 RX ADMIN — Medication 1 DROP(S): at 18:00

## 2024-10-27 RX ADMIN — Medication 1 DROP(S): at 21:08

## 2024-10-27 RX ADMIN — NICOTINE POLACRILEX 1 PATCH: 4 GUM, CHEWING ORAL at 14:56

## 2024-10-27 RX ADMIN — Medication 1000 MILLIGRAM(S): at 05:00

## 2024-10-27 RX ADMIN — Medication 1000 MILLIGRAM(S): at 12:45

## 2024-10-27 RX ADMIN — Medication 81 MILLIGRAM(S): at 11:45

## 2024-10-27 RX ADMIN — ERTAPENEM SODIUM 120 MILLIGRAM(S): 1 INJECTION, POWDER, LYOPHILIZED, FOR SOLUTION INTRAMUSCULAR; INTRAVENOUS at 15:15

## 2024-10-27 RX ADMIN — Medication 1 DROP(S): at 05:03

## 2024-10-27 RX ADMIN — LOSARTAN POTASSIUM 25 MILLIGRAM(S): 25 TABLET ORAL at 05:01

## 2024-10-27 RX ADMIN — Medication 10 MILLIGRAM(S): at 21:08

## 2024-10-27 RX ADMIN — OXYCODONE HYDROCHLORIDE 2.5 MILLIGRAM(S): 30 TABLET ORAL at 21:07

## 2024-10-27 RX ADMIN — OXYCODONE HYDROCHLORIDE 2.5 MILLIGRAM(S): 30 TABLET ORAL at 05:02

## 2024-10-27 RX ADMIN — OXYCODONE HYDROCHLORIDE 2.5 MILLIGRAM(S): 30 TABLET ORAL at 00:36

## 2024-10-27 RX ADMIN — Medication 1000 MILLIGRAM(S): at 11:45

## 2024-10-27 RX ADMIN — Medication 25 MILLIGRAM(S): at 05:01

## 2024-10-27 RX ADMIN — HEPARIN SODIUM 5000 UNIT(S): 10000 INJECTION INTRAVENOUS; SUBCUTANEOUS at 21:08

## 2024-10-27 RX ADMIN — NICOTINE POLACRILEX 1 PATCH: 4 GUM, CHEWING ORAL at 07:16

## 2024-10-27 RX ADMIN — Medication 50 MILLIGRAM(S): at 05:00

## 2024-10-27 RX ADMIN — NICOTINE POLACRILEX 1 PATCH: 4 GUM, CHEWING ORAL at 12:22

## 2024-10-27 RX ADMIN — Medication 1000 MILLIGRAM(S): at 06:00

## 2024-10-27 RX ADMIN — OXYCODONE HYDROCHLORIDE 2.5 MILLIGRAM(S): 30 TABLET ORAL at 22:07

## 2024-10-27 RX ADMIN — NICOTINE POLACRILEX 1 PATCH: 4 GUM, CHEWING ORAL at 19:07

## 2024-10-27 NOTE — PROGRESS NOTE ADULT - SUBJECTIVE AND OBJECTIVE BOX
Interval Events:    No acute events overnight  Dressing changed at bedside  General surgery reevaluating  Having blood output from ostomy and when patient being turned    Denies fevers, chills, nausea, vomiting, SOB, CP.  Tolerating diet.    O: Vital Signs Last 24 Hrs  T(C): 37.1 (27 Oct 2024 09:49), Max: 37.1 (27 Oct 2024 09:49)  T(F): 98.8 (27 Oct 2024 09:49), Max: 98.8 (27 Oct 2024 09:49)  HR: 76 (27 Oct 2024 09:49) (64 - 76)  BP: 131/62 (27 Oct 2024 09:49) (110/66 - 136/84)  BP(mean): --  RR: 18 (27 Oct 2024 09:49) (17 - 18)  SpO2: 100% (27 Oct 2024 09:49) (98% - 100%)    Parameters below as of 27 Oct 2024 09:49  Patient On (Oxygen Delivery Method): room air          26 Oct 2024 07:01  -  27 Oct 2024 07:00  --------------------------------------------------------  IN:  Total IN: 0 mL    OUT:    Colostomy (mL): 0 mL    Indwelling Catheter - Urethral (mL): 1500 mL  Total OUT: 1500 mL    Total NET: -1500 mL      27 Oct 2024 07:01  -  27 Oct 2024 13:17  --------------------------------------------------------  IN:  Total IN: 0 mL    OUT:    Colostomy (mL): 150 mL    Indwelling Catheter - Urethral (mL): 900 mL  Total OUT: 1050 mL    Total NET: -1050 mL          Physical Exam:    Gen: NAD  Abd: soft nontender, suprapubic wound with some eschar, no purulence, penile wound improving and scrotal wounds with some eschar as well but no purulence, no surrounding cellulitis, dressings changed  : naranjo with yellow urine    Labs:                        8.5    6.15  )-----------( 226      ( 26 Oct 2024 09:39 )             28.0     26 Oct 2024 07:14    131    |  100    |  10     ----------------------------<  111    3.9     |  21     |  0.48     Ca    8.3        26 Oct 2024 07:14  Phos  2.7       26 Oct 2024 07:14  Mg     1.70      26 Oct 2024 07:14        CAPILLARY BLOOD GLUCOSE                Urinalysis Basic - ( 26 Oct 2024 07:14 )    Color: x / Appearance: x / SG: x / pH: x  Gluc: 111 mg/dL / Ketone: x  / Bili: x / Urobili: x   Blood: x / Protein: x / Nitrite: x   Leuk Esterase: x / RBC: x / WBC x   Sq Epi: x / Non Sq Epi: x / Bacteria: x        MEDICATIONS  (STANDING):  acetaminophen     Tablet .. 1000 milliGRAM(s) Oral every 6 hours  aspirin  chewable 81 milliGRAM(s) Oral daily  brimonidine 0.2% Ophthalmic Solution 1 Drop(s) Both EYES every 12 hours  dextrose 5% + sodium chloride 0.45%. 1000 milliLiter(s) (75 mL/Hr) IV Continuous <Continuous>  ertapenem  IVPB 1000 milliGRAM(s) IV Intermittent every 24 hours  fluconAZOLE   Tablet 400 milliGRAM(s) Oral every 24 hours  heparin   Injectable 5000 Unit(s) SubCutaneous every 8 hours  latanoprost 0.005% Ophthalmic Solution 1 Drop(s) Both EYES at bedtime  losartan 25 milliGRAM(s) Oral daily  metoprolol succinate ER 50 milliGRAM(s) Oral daily  nicotine -  14 mG/24Hr(s) Patch 1 Patch Transdermal daily  rosuvastatin 10 milliGRAM(s) Oral at bedtime  spironolactone 25 milliGRAM(s) Oral daily  timolol 0.5% Solution 1 Drop(s) Both EYES every 12 hours    MEDICATIONS  (PRN):  aluminum hydroxide/magnesium hydroxide/simethicone Suspension 30 milliLiter(s) Oral every 6 hours PRN Dyspepsia  oxyCODONE    IR 2.5 milliGRAM(s) Oral every 4 hours PRN Moderate Pain (4 - 6)

## 2024-10-27 NOTE — PROGRESS NOTE ADULT - SUBJECTIVE AND OBJECTIVE BOX
Dr. Dayanara Dominguez  Pager [phone]    PROGRESS NOTE:     Patient is a 79y old  Male who presents with a chief complaint of suprapubic pain (26 Oct 2024 15:42)      SUBJECTIVE / OVERNIGHT EVENTS: denies chest pain or sob  ADDITIONAL REVIEW OF SYSTEMS: afebrile , confused    MEDICATIONS  (STANDING):  acetaminophen     Tablet .. 1000 milliGRAM(s) Oral every 6 hours  aspirin  chewable 81 milliGRAM(s) Oral daily  brimonidine 0.2% Ophthalmic Solution 1 Drop(s) Both EYES every 12 hours  dextrose 5% + sodium chloride 0.45%. 1000 milliLiter(s) (75 mL/Hr) IV Continuous <Continuous>  ertapenem  IVPB 1000 milliGRAM(s) IV Intermittent every 24 hours  fluconAZOLE   Tablet 400 milliGRAM(s) Oral every 24 hours  heparin   Injectable 5000 Unit(s) SubCutaneous every 8 hours  latanoprost 0.005% Ophthalmic Solution 1 Drop(s) Both EYES at bedtime  losartan 25 milliGRAM(s) Oral daily  metoprolol succinate ER 50 milliGRAM(s) Oral daily  nicotine -  14 mG/24Hr(s) Patch 1 Patch Transdermal daily  rosuvastatin 10 milliGRAM(s) Oral at bedtime  spironolactone 25 milliGRAM(s) Oral daily  timolol 0.5% Solution 1 Drop(s) Both EYES every 12 hours    MEDICATIONS  (PRN):  aluminum hydroxide/magnesium hydroxide/simethicone Suspension 30 milliLiter(s) Oral every 6 hours PRN Dyspepsia  oxyCODONE    IR 2.5 milliGRAM(s) Oral every 4 hours PRN Moderate Pain (4 - 6)      CAPILLARY BLOOD GLUCOSE        I&O's Summary    26 Oct 2024 07:01  -  27 Oct 2024 07:00  --------------------------------------------------------  IN: 0 mL / OUT: 1500 mL / NET: -1500 mL    27 Oct 2024 07:01  -  27 Oct 2024 12:19  --------------------------------------------------------  IN: 0 mL / OUT: 1050 mL / NET: -1050 mL        PHYSICAL EXAM:  Vital Signs Last 24 Hrs  T(C): 37.1 (27 Oct 2024 09:49), Max: 37.1 (27 Oct 2024 09:49)  T(F): 98.8 (27 Oct 2024 09:49), Max: 98.8 (27 Oct 2024 09:49)  HR: 76 (27 Oct 2024 09:49) (64 - 76)  BP: 131/62 (27 Oct 2024 09:49) (110/66 - 136/84)  BP(mean): --  RR: 18 (27 Oct 2024 09:49) (17 - 18)  SpO2: 100% (27 Oct 2024 09:49) (98% - 100%)    Parameters below as of 27 Oct 2024 09:49  Patient On (Oxygen Delivery Method): room air      CONSTITUTIONAL: NAD, resting comfortably   RESPIRATORY: Normal respiratory effort; lungs are clear to auscultation bilaterally  CARDIOVASCULAR: Regular rate and rhythm, normal S1 and S2, no murmur/rub/gallop; 1+ LE edema   ABDOMEN: + Colostomy w/ stool in bag, no sig TTP  Zamarripa in place  MUSCULOSKELETAL: RUE swelling, no erythema or TTP, otherwise no clubbing or cyanosis of digits; no joint swelling or tenderness to palpation  PSYCH: awake/alert x1-2     LABS:                        8.5    6.15  )-----------( 226      ( 26 Oct 2024 09:39 )             28.0     10-26    131[L]  |  100  |  10  ----------------------------<  111[H]  3.9   |  21[L]  |  0.48[L]    Ca    8.3[L]      26 Oct 2024 07:14  Phos  2.7     10-26  Mg     1.70     10-26            Urinalysis Basic - ( 26 Oct 2024 07:14 )    Color: x / Appearance: x / SG: x / pH: x  Gluc: 111 mg/dL / Ketone: x  / Bili: x / Urobili: x   Blood: x / Protein: x / Nitrite: x   Leuk Esterase: x / RBC: x / WBC x   Sq Epi: x / Non Sq Epi: x / Bacteria: x          RADIOLOGY & ADDITIONAL TESTS:  Results Reviewed:   Imaging Personally Reviewed:  Electrocardiogram Personally Reviewed:    COORDINATION OF CARE:  Care Discussed with Consultants/Other Providers [Y/N]:  Prior or Outpatient Records Reviewed [Y/N]:

## 2024-10-27 NOTE — PROGRESS NOTE ADULT - ASSESSMENT
80 yo M with history of CAD s/p stents in 2012 (ASA), locally advanced rectal cancer s/p neoadjuvant therapy with chemoRT (completed 11/2022), consolidation CAPOX (2/2023), brachytherapy (3/2024), presenting for generalized weakness, chest discomfort, rectal pain. Pt was here at [facility] in 8/30 for fatigue, weakness, and falls, found to have perforated rectal mass with perirectal collection, 2.5 x 1.5 cm with spread to infection in anorectal/perineal region with fluid/gas tracking along penile shaft, however pt left for MSK and s/p diverting end sigmoid colostomy, cystoscopy and naranjo placement across defect and perineal/scrotal drainage 8/30. Recently at [facility] for several weeks, discharged day prior to presentation for perineal infections, treated with abx. Represents here for generalized weakness and dizziness, CTAP non-con on preliminary read indicating ill-defined fluid/gas in scrotum, perirectal space, and probably perineum. Exam demonstrates skin duskiness multiple genitourinary abscesses, induration, purulent drainage. 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    10/13: S/p debridement of genitalia and perineum in OR yesterday. In SICU for post op pressor requirements. Got 500 ccs overnight for hypovolemia on POCUS. Currently off pressors. On zosyn, clinda, vanc. U and bcx pending. Tissue cx negative. Wound packing changed at bedside, incision beds with adequate granulation tissue, healing well. Transferred to floor  10/14 - wounds clean; re-packed; penroses in place; ID consult  10/15 - pt refuses blood draw, and refuses dressing change (it has been 24 hrs since last one; he refused last nite also)                 later on he did allow it - wound appears clean, no necrotic tissue; labs drawn  10/16 - pro-BNP high so echo today  10/17 - changed dressing, took out subprapubic incision penrose drain, left scrotal penrose drain. Echo with reduced EF - cards says iso sepsis, no further workup needed. Changed abx from fluconazole to caspo (2 week course) per ID. Can c/w erta for ESBL e.coli. Cards saying he will need AC eventually,   10/18 - changed dressing this am, last penrose in scrotum d/c'ed. wound care spoke with, can try changing wet to dry dressings to aquacel and change qd. Pt's daughter will hire own aid for wound care and PT, but will need homecare for erta and caspo IV at home.   10/19- changed dressing to aquacel, started Eliquis  10/20- refused exam in AM, dressing to be changed with aquacel in PM   10/21 - changed dressing ; will speak with SW/HC about dispo planning; spoke with daughter for plan; see note by   10/22 - dressing changed; R. arm has swelling, though improved from last hospitalization, as per daughter; testing at [facility] did not reveal a clot, but we will repeat                Pt had blood from rectum today, daughter claims that happened at [facility] when eliquis was re-started; I d/c'd eliquis               d/w at great length his needs for home; she does not want to take him home until ABX's are done (next Tues); she wants study R. upper ext swelling even though it was done at [facility] and was neg  10/23 - daughter supposedly will come today to learn dressing and colostomy care; we will change dressing then, and speak again to daughter about rectal Ca and GOC; apparently she has not wanted to discuss it in the past, R. arm duplex neg, xray negative, psych consult requested  10/24 - dressings changed this AM, yesterday daughter instructed as to dressing changes and colostomy care, psych to see pt again today as he did not participate in the interview, no stool in colostomy ? gas overnight, will get AXR  10/25 - dressing changed this AM. AXR shows nonobstructive distension.  10/26 - new dark red output from colostomy, dressing changed this AM,   10/27 - persistent bloody output from ileostomy    - F/usurgery for new dark red colostomy output  - cont erta and fluconazole 10/29  - dressing change qd with medi-honey aquacel per wound care   - continue naranjo; will change before d/c   - continue to hold Eliquis   - f/u medicine  - f/u psych  - f/u ID

## 2024-10-27 NOTE — PROGRESS NOTE ADULT - PROBLEM SELECTOR PLAN 4
Patient with history of CAD, hx of stents years ago, report of cath in 2010 with triple vessel disease   - patient reportedly with TTE from 9/2023 with EF of 72%  - c/w aspirin 81mg qd  - c/w toprol 50mg qd  - c/w rosuvastatin 10mg qhs  - c/w losartan 25mg qd  - c/w [place] 25 QD  - TTE performed 10/16, showing global LV hypokinesis, EF 35-40%  [ ] Cardiology following, follow up recommendations re ischemic evaluation

## 2024-10-27 NOTE — PROGRESS NOTE ADULT - PROBLEM SELECTOR PLAN 2
Patient with history of locally advanced rectal cancer  - follows with [facility] oncology  - episodes of rectal bleed, likely d/t rectal CA, trend H/H, f/up colorectal surgery, H/H 8.5/28 on 10/26  - [facility] addressed on previous admissions with patient's daughter, Tiffanie. Daughter wants pt to be full code   - patient and family wish to have all interventions    # RUE swelling   - Duplex negative for VTE

## 2024-10-28 DIAGNOSIS — K92.1 MELENA: ICD-10-CM

## 2024-10-28 DIAGNOSIS — R41.0 DISORIENTATION, UNSPECIFIED: ICD-10-CM

## 2024-10-28 LAB
ANION GAP SERPL CALC-SCNC: 12 MMOL/L — SIGNIFICANT CHANGE UP (ref 7–14)
BUN SERPL-MCNC: 8 MG/DL — SIGNIFICANT CHANGE UP (ref 7–23)
CALCIUM SERPL-MCNC: 8.6 MG/DL — SIGNIFICANT CHANGE UP (ref 8.4–10.5)
CHLORIDE SERPL-SCNC: 99 MMOL/L — SIGNIFICANT CHANGE UP (ref 98–107)
CO2 SERPL-SCNC: 18 MMOL/L — LOW (ref 22–31)
CREAT SERPL-MCNC: 0.49 MG/DL — LOW (ref 0.5–1.3)
EGFR: 104 ML/MIN/1.73M2 — SIGNIFICANT CHANGE UP
GLUCOSE SERPL-MCNC: 105 MG/DL — HIGH (ref 70–99)
HCT VFR BLD CALC: 31.3 % — LOW (ref 39–50)
HGB BLD-MCNC: 9.3 G/DL — LOW (ref 13–17)
MAGNESIUM SERPL-MCNC: 1.7 MG/DL — SIGNIFICANT CHANGE UP (ref 1.6–2.6)
MCHC RBC-ENTMCNC: 26.6 PG — LOW (ref 27–34)
MCHC RBC-ENTMCNC: 29.7 GM/DL — LOW (ref 32–36)
MCV RBC AUTO: 89.7 FL — SIGNIFICANT CHANGE UP (ref 80–100)
NRBC # BLD: 0 /100 WBCS — SIGNIFICANT CHANGE UP (ref 0–0)
NRBC # FLD: 0 K/UL — SIGNIFICANT CHANGE UP (ref 0–0)
PHOSPHATE SERPL-MCNC: 2.4 MG/DL — LOW (ref 2.5–4.5)
PLATELET # BLD AUTO: 239 K/UL — SIGNIFICANT CHANGE UP (ref 150–400)
POTASSIUM SERPL-MCNC: 4.8 MMOL/L — SIGNIFICANT CHANGE UP (ref 3.5–5.3)
POTASSIUM SERPL-SCNC: 4.8 MMOL/L — SIGNIFICANT CHANGE UP (ref 3.5–5.3)
RBC # BLD: 3.49 M/UL — LOW (ref 4.2–5.8)
RBC # FLD: 19.5 % — HIGH (ref 10.3–14.5)
SODIUM SERPL-SCNC: 129 MMOL/L — LOW (ref 135–145)
WBC # BLD: 9.14 K/UL — SIGNIFICANT CHANGE UP (ref 3.8–10.5)
WBC # FLD AUTO: 9.14 K/UL — SIGNIFICANT CHANGE UP (ref 3.8–10.5)

## 2024-10-28 PROCEDURE — 99223 1ST HOSP IP/OBS HIGH 75: CPT | Mod: GC

## 2024-10-28 PROCEDURE — 99223 1ST HOSP IP/OBS HIGH 75: CPT

## 2024-10-28 PROCEDURE — 99232 SBSQ HOSP IP/OBS MODERATE 35: CPT

## 2024-10-28 RX ORDER — PANTOPRAZOLE SODIUM 40 MG/1
40 TABLET, DELAYED RELEASE ORAL
Refills: 0 | Status: DISCONTINUED | OUTPATIENT
Start: 2024-10-28 | End: 2024-11-12

## 2024-10-28 RX ORDER — NICOTINE POLACRILEX 4 MG/1
1 GUM, CHEWING ORAL DAILY
Refills: 0 | Status: DISCONTINUED | OUTPATIENT
Start: 2024-10-28 | End: 2024-11-12

## 2024-10-28 RX ADMIN — ERTAPENEM SODIUM 120 MILLIGRAM(S): 1 INJECTION, POWDER, LYOPHILIZED, FOR SOLUTION INTRAMUSCULAR; INTRAVENOUS at 17:32

## 2024-10-28 RX ADMIN — Medication 50 MILLIGRAM(S): at 06:35

## 2024-10-28 RX ADMIN — Medication 1000 MILLIGRAM(S): at 06:35

## 2024-10-28 RX ADMIN — Medication 10 MILLIGRAM(S): at 21:51

## 2024-10-28 RX ADMIN — Medication 1000 MILLIGRAM(S): at 14:00

## 2024-10-28 RX ADMIN — Medication 1 DROP(S): at 21:50

## 2024-10-28 RX ADMIN — HEPARIN SODIUM 5000 UNIT(S): 10000 INJECTION INTRAVENOUS; SUBCUTANEOUS at 21:51

## 2024-10-28 RX ADMIN — Medication 25 MILLIGRAM(S): at 06:35

## 2024-10-28 RX ADMIN — NICOTINE POLACRILEX 1 PATCH: 4 GUM, CHEWING ORAL at 06:42

## 2024-10-28 RX ADMIN — HEPARIN SODIUM 5000 UNIT(S): 10000 INJECTION INTRAVENOUS; SUBCUTANEOUS at 13:07

## 2024-10-28 RX ADMIN — Medication 1 DROP(S): at 06:34

## 2024-10-28 RX ADMIN — Medication 1 DROP(S): at 17:34

## 2024-10-28 RX ADMIN — LOSARTAN POTASSIUM 25 MILLIGRAM(S): 25 TABLET ORAL at 06:35

## 2024-10-28 RX ADMIN — FLUCONAZOLE, SODIUM CHLORIDE 400 MILLIGRAM(S): 2 INJECTION INTRAVENOUS at 21:50

## 2024-10-28 RX ADMIN — Medication 1000 MILLIGRAM(S): at 13:05

## 2024-10-28 RX ADMIN — NICOTINE POLACRILEX 1 PATCH: 4 GUM, CHEWING ORAL at 14:00

## 2024-10-28 RX ADMIN — Medication 1 DROP(S): at 17:35

## 2024-10-28 RX ADMIN — NICOTINE POLACRILEX 1 PATCH: 4 GUM, CHEWING ORAL at 18:19

## 2024-10-28 RX ADMIN — NICOTINE POLACRILEX 1 PATCH: 4 GUM, CHEWING ORAL at 13:06

## 2024-10-28 RX ADMIN — Medication 1000 MILLIGRAM(S): at 08:00

## 2024-10-28 RX ADMIN — HEPARIN SODIUM 5000 UNIT(S): 10000 INJECTION INTRAVENOUS; SUBCUTANEOUS at 06:35

## 2024-10-28 RX ADMIN — Medication 81 MILLIGRAM(S): at 13:05

## 2024-10-28 NOTE — PROGRESS NOTE ADULT - ASSESSMENT
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 Medicine following for comanagement.

## 2024-10-28 NOTE — DISCHARGE NOTE PROVIDER - NSDCFUADDINST_GEN_ALL_CORE_FT
Sacrum: cleanse wound and periwound skin with NS, pat dry. Apply Liquid barrier film to periwound skin. Apply medihoney gel to wound base, pack sacral/gluteal cleft with Aquacel hydrofiber ribbon. Cover with silicone foam with border. Change daily and prn if soiled/compromised.     Suprapubic area, and small wounds on scrotum: cleanse wound and periwound skin with NS, pat dry.  Apply medihoney gel to wound base, cover with Aquacel hydrofiber ribbon. Cover with dry dressing. Change daily and prn if soiled/compromised.   Supplies recommended for colostomy:  One piece soft convex drainable pouch (#0452)  LLQ colostomy: Cleanse with water, pat dry. If impaired peristomal skin noted: apply stoma powder, brush off excess, and seal with liquid barrier film by using tapping motions. Apply 1 piece drainable ostomy pouch with soft convex (pouch #[id number]). Change every 3 days and PRN if leaking.   Sacrum: cleanse wound and periwound skin with NS, pat dry. Apply Liquid barrier film to periwound skin. Apply medihoney gel to wound base, pack sacral/gluteal cleft with Aquacel hydrofiber ribbon. Cover with silicone foam with border. Change daily and prn if soiled/compromised.     Suprapubic area, and small wounds on scrotum: cleanse wound and periwound skin with NS, pat dry.  Apply medihoney gel to wound base, cover with Aquacel hydrofiber ribbon. On the small scrotal wounds, same then take a small Aquacel ribbon and place it into the wound. Cover with dry dressing. May change every other day, and prn if soiled/compromised.   Supplies recommended for colostomy:  One piece soft convex drainable pouch (#8958)  LLQ colostomy: Cleanse with water, pat dry. If impaired peristomal skin noted: apply stoma powder, brush off excess, and seal with liquid barrier film by using tapping motions. Apply 1 piece drainable ostomy pouch with soft convex (pouch #[id number]). Change every 3 days and PRN if leaking.  Zaamrripa catheter was changed Friday Nov 1....Sacrum: cleanse wound and periwound skin with NS, pat dry. Apply Liquid barrier film to periwound skin. Apply medihoney gel to wound base, pack sacral/gluteal cleft with Aquacel hydrofiber ribbon. Cover with silicone foam with border. Change daily and prn if soiled/compromised.     Suprapubic area, and small wounds on scrotum: cleanse wound and periwound skin with NS, pat dry.  Apply medihoney gel to wound base, cover with Aquacel hydrofiber ribbon. On the small scrotal wounds, same then take a small Aquacel ribbon and place it into the wound. Cover with dry dressing. May change every other day, and prn if soiled/compromised.   Supplies recommended for colostomy:  One piece soft convex drainable pouch (#8958)  LLQ colostomy: Cleanse with water, pat dry. If impaired peristomal skin noted: apply stoma powder, brush off excess, and seal with liquid barrier film by using tapping motions. Apply 1 piece drainable ostomy pouch with soft convex (pouch #[id number]). Change every 3 days and PRN if leaking.  Zamarripa catheter was changed Friday Nov 1....Sacrum: cleanse wound and periwound skin with NS, pat dry. Apply Liquid barrier film to periwound skin. Apply medihoney gel to wound base, pack sacral/gluteal cleft with Aquacel hydrofiber ribbon. Cover with silicone foam with border. Change daily and prn if soiled/compromised.     Suprapubic area, and small wounds on scrotum: cleanse wound and periwound skin with NS, pat dry.  Apply medihoney gel to wound base, cover with Aquacel hydrofiber ribbon. On the small scrotal wounds, do the same with medihoney, then take a small Aquacel ribbon and place it into the wound, leaving about an inch outside for easy removal. Cover with dry dressing. May change every other day, and prn if soiled/compromised.   Supplies recommended for colostomy:  One piece soft convex drainable pouch (#8958)  LLQ colostomy: Cleanse with water, pat dry. If impaired peristomal skin noted: apply stoma powder, brush off excess, and seal with liquid barrier film by using tapping motions. Apply 1 piece drainable ostomy pouch with soft convex (pouch #[id number]). Change every 3 days and PRN if leaking.

## 2024-10-28 NOTE — PROGRESS NOTE ADULT - PROBLEM SELECTOR PLAN 5
Patient with history of CAD, hx of stents years ago, report of cath in 2010 with triple vessel disease   - patient reportedly with TTE from 9/2023 with EF of 72%  - c/w aspirin 81mg qd  - c/w toprol 50mg qd  - c/w rosuvastatin 10mg qhs  - c/w losartan 25mg qd  - c/w [place] 25 QD  - TTE performed 10/16, showing global LV hypokinesis, EF 35-40%  [ ] Cardiology following, follow up recommendations re ischemic evaluation if in line with goals of care

## 2024-10-28 NOTE — PROGRESS NOTE ADULT - SUBJECTIVE AND OBJECTIVE BOX
SURGERY DAILY PROGRESS NOTE    SUBJECTIVE: No acute events overnight. Patient seen and evaluated on AM rounds. He denies abdominal pain. Continues to have dark red stool in the colostomy.    OBJECTIVE:  Vital Signs Last 24 Hrs  T(C): 36.7 (28 Oct 2024 06:35), Max: 37.1 (27 Oct 2024 09:49)  T(F): 98.1 (28 Oct 2024 06:35), Max: 98.8 (27 Oct 2024 09:49)  HR: 83 (28 Oct 2024 06:35) (76 - 83)  BP: 115/65 (28 Oct 2024 06:35) (115/65 - 131/62)  BP(mean): --  RR: 17 (28 Oct 2024 06:35) (17 - 18)  SpO2: 99% (28 Oct 2024 06:35) (97% - 100%)    Parameters below as of 28 Oct 2024 06:35  Patient On (Oxygen Delivery Method): room air    I&O's Detail    27 Oct 2024 07:01  -  28 Oct 2024 07:00  --------------------------------------------------------  IN:  Total IN: 0 mL    OUT:    Colostomy (mL): 150 mL    Indwelling Catheter - Urethral (mL): 2825 mL  Total OUT: 2975 mL    Total NET: -2975 mL    Daily   MEDICATIONS  (STANDING):  acetaminophen     Tablet .. 1000 milliGRAM(s) Oral every 6 hours  aspirin  chewable 81 milliGRAM(s) Oral daily  brimonidine 0.2% Ophthalmic Solution 1 Drop(s) Both EYES every 12 hours  dextrose 5% + sodium chloride 0.45%. 1000 milliLiter(s) (75 mL/Hr) IV Continuous <Continuous>  ertapenem  IVPB 1000 milliGRAM(s) IV Intermittent every 24 hours  fluconAZOLE   Tablet 400 milliGRAM(s) Oral every 24 hours  heparin   Injectable 5000 Unit(s) SubCutaneous every 8 hours  latanoprost 0.005% Ophthalmic Solution 1 Drop(s) Both EYES at bedtime  losartan 25 milliGRAM(s) Oral daily  metoprolol succinate ER 50 milliGRAM(s) Oral daily  nicotine -  14 mG/24Hr(s) Patch 1 Patch Transdermal daily  rosuvastatin 10 milliGRAM(s) Oral at bedtime  spironolactone 25 milliGRAM(s) Oral daily  timolol 0.5% Solution 1 Drop(s) Both EYES every 12 hours    MEDICATIONS  (PRN):  aluminum hydroxide/magnesium hydroxide/simethicone Suspension 30 milliLiter(s) Oral every 6 hours PRN Dyspepsia  oxyCODONE    IR 2.5 milliGRAM(s) Oral every 4 hours PRN Moderate Pain (4 - 6)      LABS:                        8.5    6.15  )-----------( 226      ( 26 Oct 2024 09:39 )             28.0     PHYSICAL EXAM:  Constitutional: NAD  Pulmonary: Symmetric chest rise bilaterally, no increased WOB  Gastrointestinal: Abdomen soft, nontender, nondistended, colostomy stoma pink with formed dark red output in the bag  Extremities: Warm, well perfused

## 2024-10-28 NOTE — PROGRESS NOTE ADULT - SUBJECTIVE AND OBJECTIVE BOX
POD #16  Afeb 130/87 78 97%RA    Pt has no new c/o; RN says he was up occ. overnite; no agitation  Abd- soft  Wound - pt would not let us change dressing  [address] 800  Colostomy

## 2024-10-28 NOTE — DISCHARGE NOTE PROVIDER - HOSPITAL COURSE
78 yo M with history of CAD s/p stents in 2012 (ASA), locally advanced rectal cancer s/p neoadjuvant therapy with chemoRT (completed 11/2022), consolidation CAPOX (2/2023), brachytherapy (3/2024), presenting for generalized weakness, chest discomfort, rectal pain. Pt was here at [facility] in 8/30 for fatigue, weakness, and falls, found to have perforated rectal mass with perirectal collection, 2.5 x 1.5 cm with spread to infection in anorectal/perineal region with fluid/gas tracking along penile shaft, however pt left for MSK and s/p diverting end sigmoid colostomy, cystoscopy and naranjo placement across defect and perineal/scrotal drainage 8/30. Recently at [facility] for several weeks, discharged day prior to presentation for perineal infections, treated with abx. Represents here for generalized weakness and dizziness, CTAP non-con on preliminary read indicating ill-defined fluid/gas in scrotum, perirectal space, and probably perineum. Exam demonstrates skin duskiness multiple genitourinary abscesses, induration, purulent drainage. 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    10/13: S/p debridement of genitalia and perineum in OR yesterday. In SICU for post op pressor requirements. Got 500 ccs overnight for hypovolemia on POCUS. Currently off pressors. On zosyn, clinda, vanc. U and bcx pending. Tissue cx negative. Wound packing changed at bedside, incision beds with adequate granulation tissue, healing well. Transferred to floor  10/14 - wounds clean; re-packed; penroses in place; ID consult  10/15 - pt refuses blood draw, and refuses dressing change (it has been 24 hrs since last one; he refused last nite also)                 later on he did allow it - wound appears clean, no necrotic tissue; labs drawn  10/16 - pro-BNP high so echo today  10/17 - changed dressing, took out subprapubic incision penrose drain, left scrotal penrose drain. Echo with reduced EF - cards says iso sepsis, no further workup needed. Changed abx from fluconazole to caspo (2 week course) per ID. Can c/w erta for ESBL e.coli. Cards saying he will need AC eventually,   10/18 - changed dressing this am, last penrose in scrotum d/c'ed. wound care spoke with, can try changing wet to dry dressings to aquacel and change qd. Pt's daughter will hire own aid for wound care and PT, but will need homecare for erta and caspo IV at home.   10/19- changed dressing to aquacel, started Eliquis  10/20- refused exam in AM, dressing to be changed with aquacel in PM   10/21 - changed dressing ; will speak with SW/HC about dispo planning; spoke with daughter for plan; see note by   10/22 - dressing changed; R. arm has swelling, though improved from last hospitalization, as per daughter; testing at [facility] did not reveal a clot, but we will repeat                Pt had blood from rectum today, daughter claims that happened at [facility] when eliquis was re-started; I d/c'd eliquis               d/w at great length his needs for home; she does not want to take him home until ABX's are done (next Tues); she wants study R. upper ext swelling even though it was done at [facility] and was neg  10/23 - daughter supposedly will come today to learn dressing and colostomy care; we will change dressing then, and speak again to daughter about rectal Ca and GOC; apparently she has not wanted to discuss it in the past, R. arm duplex neg, xray negative, psych consult requested  10/24 - dressings changed this AM, yesterday daughter instructed as to dressing changes and colostomy care, psych to see pt again today as he did not participate in the interview (haldol for agitation), no stool in colostomy ? gas overnight, will get AXR  10/25 - dressing changed this AM. AXR shows nonobstructive distension.  10/26 - new dark red output from colostomy, dressing changed this AM,   10/27 - persistent bloody output from colostomy  10/28 - no new events; will need to change dressing, pt not letting us do it now  Plan:  - F/u surgery for new dark red colostomy output  - cont erta and fluconazole 10/29  - dressing change qd with medi-honey aquacel per wound care   - continue naranjo; will change before d/c   - continue to hold Eliquis   - f/u medicine  - f/u psych - re-consult as needed 80 yo M with history of CAD s/p stents in 2012 (ASA), locally advanced rectal cancer s/p neoadjuvant therapy with chemoRT (completed 11/2022), consolidation CAPOX (2/2023), brachytherapy (3/2024), presenting for generalized weakness, chest discomfort, rectal pain. Pt was here at [facility] in 8/30 for fatigue, weakness, and falls, found to have perforated rectal mass with perirectal collection, 2.5 x 1.5 cm with spread to infection in anorectal/perineal region with fluid/gas tracking along penile shaft, however pt left for MSK and s/p diverting end sigmoid colostomy, cystoscopy and naranjo placement across defect and perineal/scrotal drainage 8/30. [place] [facility] [place] discharged day prior to presentation for perineal infections, treated with abx. Represents here for generalized weakness and dizziness, CTAP non-con on preliminary read indicating ill-defined fluid/gas in scrotum, perirectal space, and probably perineum. Exam demonstrates skin duskiness multiple genitourinary abscesses, induration, purulent drainage. 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    10/13: S/p debridement of genitalia and perineum in OR yesterday. In SICU for post op pressor requirements. Got 500 ccs overnight for hypovolemia on POCUS. Currently off pressors. On zosyn, clinda, vanc. U and bcx pending. Tissue cx negative. Wound packing changed at bedside, incision beds with adequate granulation tissue, healing well. Transferred to floor  10/14 - wounds clean; re-packed; penroses in place; ID consult  10/15 - pt refuses blood draw, and refuses dressing change (it has been 24 hrs since last one; he refused last nite also)                 later on he did allow it - wound appears clean, no necrotic tissue; labs drawn  10/16 - pro-BNP high so echo today  10/17 - changed dressing, took out subprapubic incision penrose drain, left scrotal penrose drain. Echo with reduced EF - cards says iso sepsis, no further workup needed. Changed abx from fluconazole to caspo (2 week course) per ID. Can c/w erta for ESBL e.coli. Cards saying he will need AC eventually,   10/18 - changed dressing this am, last penrose in scrotum d/c'ed. wound care spoke with, can try changing wet to dry dressings to aquacel and change qd. Pt's daughter will hire own aid for wound care and PT, but will need homecare for erta and caspo IV at home.   10/19- changed dressing to aquacel, started Eliquis  10/20- refused exam in AM, dressing to be changed with aquacel in PM   10/21 - changed dressing ; will speak with SW/HC about dispo planning; spoke with daughter for plan; see note by   10/22 - dressing changed; R. arm has swelling, though improved from last hospitalization, as per daughter; [place] [facility] [place], but we will repeat                Pt had blood from rectum today, daughter claims that happened at [facility] when eliquis was re-started; I d/c'd eliquis               d/w at great length his needs for home; she does not want to take him home until ABX's are done (next Tues); she wants study R. upper ext swelling even though it was done at [facility] and was neg  10/23 - daughter supposedly will come today to learn dressing and colostomy care; we will change dressing then, and speak again to daughter about rectal Ca and GOC; apparently she has not wanted to discuss it in the past, R. arm duplex neg, xray negative, psych consult requested  10/24 - dressings changed this AM, yesterday daughter instructed as to dressing changes and colostomy care, psych to see pt again today as he did not participate in the interview (haldol for agitation), no stool in colostomy ? gas overnight, will get AXR  10/25 - dressing changed this AM. AXR shows nonobstructive distension.  10/26 - new dark red output from colostomy, dressing changed this AM,   10/27 - persistent bloody output from colostomy  10/28 - no new events; will need to change dressing, pt not letting us do it now                     80 yo M with history of CAD s/p stents in 2012 (ASA), locally advanced rectal cancer s/p neoadjuvant therapy with chemoRT (completed 11/2022), consolidation CAPOX (2/2023), brachytherapy (3/2024), presenting for generalized weakness, chest discomfort, rectal pain. Pt was here at [facility] in 8/30 for fatigue, weakness, and falls, found to have perforated rectal mass with perirectal collection, 2.5 x 1.5 cm with spread to infection in anorectal/perineal region with fluid/gas tracking along penile shaft, however pt left for MSK and s/p diverting end sigmoid colostomy, cystoscopy and naranjo placement across defect and perineal/scrotal drainage 8/30. [place] [facility] [place], discharged day prior to presentation for perineal infections, treated with abx. Represents here for generalized weakness and dizziness, CTAP non-con on preliminary read indicating ill-defined fluid/gas in scrotum, perirectal space, and probably perineum. Exam demonstrates skin duskiness multiple genitourinary abscesses, induration, purulent drainage. 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    10/13: S/p debridement of genitalia and perineum in OR yesterday. In SICU for post op pressor requirements. Got 500 ccs overnight for hypovolemia on POCUS. Currently off pressors. On zosyn, clinda, vanc. U and bcx pending. Tissue cx negative. Wound packing changed at bedside, incision beds with adequate granulation tissue, healing well. Transferred to floor  10/14 - wounds clean; re-packed; penroses in place; ID consult  10/15 - pt refuses blood draw, and refuses dressing change (it has been 24 hrs since last one; he refused last nite also)                 later on he did allow it - wound appears clean, no necrotic tissue; labs drawn  10/16 - pro-BNP high so echo today  10/17 - changed dressing, took out subprapubic incision penrose drain, left scrotal penrose drain. Echo with reduced EF - cards says iso sepsis, no further workup needed. Changed abx from fluconazole to caspo (2 week course) per ID. Can c/w erta for ESBL e.coli. Cards saying he will need AC eventually,   10/18 - changed dressing this am, last penrose in scrotum d/c'ed. wound care spoke with, can try changing wet to dry dressings to aquacel and change qd. Pt's daughter will hire own aid for wound care and PT, but will need homecare for erta and caspo IV at home.   10/19- changed dressing to aquacel, started Eliquis  10/20- refused exam in AM, dressing to be changed with aquacel in PM   10/21 - changed dressing ; will speak with SW/HC about dispo planning; spoke with daughter for plan; see note by   10/22 - dressing changed; R. arm has swelling, though improved from last hospitalization, as per daughter; [place] [facility] [place] reveal a [place], but we will repeat                Pt had blood from rectum today, daughter claims that happened at [facility] [place] when eliquis was re-started; I d/c'd eliquis               d/w at great length his needs for home; she does not want to take him home until ABX's are done (next Tues); she wants study R. upper ext swelling even though it was done at [facility] [place] and was neg  10/23 - daughter supposedly will come today to learn dressing and colostomy care; we will change dressing then, and speak again to daughter about rectal Ca and GOC; apparently she has not wanted to discuss it in the past, R. arm duplex neg, xray negative, psych consult requested  10/24 - dressings changed this AM, yesterday daughter instructed as to dressing changes and colostomy care, psych to see pt again today as he did not participate in the interview (haldol for agitation), no stool in colostomy ? gas overnight, will get AXR  10/25 - dressing changed this AM. AXR shows nonobstructive distension.  10/26 - new dark red output from colostomy, dressing changed this AM,   10/27 - persistent bloody output from colostomy  10/28 - no new events; will need to change dressing, pt not letting us do it now               GI called (see note above)  10/29 - will speak to daughter today (tried calling 3X yesterday) to see if she wants us to pursue rectal and colost bleed (labs stable); gen surg at bedside; d/c erta/flucon  10/30 - Cards and GI consulted; daughter spoken to last nite; she did not want our house cardiologist, so no scope today; her cardiologist does not come here; will speak again to her today and hopefully will consent to scope; labs stable, naranjo exchanged over wire  10/31 - Cards spoke w/ pt's PMD cards, for coloscopy today, AM labs stable, Findings: There was evidence of a patent loop colostomy in the transverse colon. This was characterized by healthy appearing mucosa. Two separate stomal openings were appreciated on digital exam. The transverse colon was entered via one of the two stomal openings leading towards the proximal colon. Many small and large-mouthed    diverticula were found in the entire colon. There was no evidence of bleeding in the proximal colon. The terminal ileum appeared normal. There was no evidence of bleeding in the ileum. The colonoscope was subsequently introduced into the second stomal opening leading to the distal colon. A large blood clot was found in the descending colon filling the lumen. No active bleeding was appreciated.                      Impression:    Patent loop colostomy with healthy appearing mucosa in the transverse colon. Diverticulosis in the entire examined proximal colon. There was no evidence of bleeding in the proximal colon                    or ileum. Clotted blood in the descending colon. Suspect blood in the ostomy is secondary to refluxed blood from rectum. No specimens collected.   11/1 - naranjo changed over wire; stable, still with blood from rectum, brown from colostomy, team with speak with daughter pending plan, ? possible transfer back to [facility] [place]  11/2 - stable, still with blood from rectum, colostomy less blood more brown, plan pending conversation with daughter   11/3 - daughter and patient have decided to proceed with transfer to [facility] [place] under Dr. Kris Vega (oncologist) and Dr. Miugel Angel Johnson (surgeon)  11/4 - called [facility] [place] to transfer  11/5 - [facility] will not approve transfer; daughter called; after 35 min, she does not want "palliative"; she wants "curative"; we will call his oncologist today to see what he knows that we can tell her  11/6 - spoke with Dr Paez from [facility] [place];  he confirmed everything we thought; the daughter has been told that there is no more surgery or chemo that he can receive they tried in the past to shrink tumor, but it was not successful, so resection was not an option; Dr Paez believes comfort care is the best thing; he also said that when pt was at [facility] [place] they had to get admin to practically "throw them out".  11/7 - stable, no complaints, daughter still refusing palliative consult  11/8 - refusing exam this AM, will attempt later today  11/9 - refusing exam this AM, will attempt later today  11/10: no events  11/11 - no new events                   80 yo M with history of CAD s/p stents in 2012 (ASA), locally advanced rectal cancer s/p neoadjuvant therapy with chemoRT (completed 11/2022), consolidation CAPOX (2/2023), brachytherapy (3/2024), presenting for generalized weakness, chest discomfort, rectal pain. Pt was here at [facility] in 8/30 for fatigue, weakness, and falls, found to have perforated rectal mass with perirectal collection, 2.5 x 1.5 cm with spread to infection in anorectal/perineal region with fluid/gas tracking along penile shaft, however pt left for MSK and s/p diverting end sigmoid colostomy, cystoscopy and naranjo placement across defect and perineal/scrotal drainage 8/30. Recently at [facility] for several weeks, discharged day prior to presentation for perineal infections, treated with abx. Represents here for generalized weakness and dizziness, CTAP non-con on preliminary read indicating ill-defined fluid/gas in scrotum, perirectal space, and probably perineum. Exam demonstrates skin duskiness multiple genitourinary abscesses, induration, purulent drainage. 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    10/13: S/p debridement of genitalia and perineum in OR yesterday. In SICU for post op pressor requirements. Got 500 ccs overnight for hypovolemia on POCUS. Currently off pressors. On zosyn, clinda, vanc. U and bcx pending. Tissue cx negative. Wound packing changed at bedside, incision beds with adequate granulation tissue, healing well. Transferred to floor  10/14 - wounds clean; re-packed; penroses in place; ID consult  10/15 - pt refuses blood draw, and refuses dressing change (it has been 24 hrs since last one; he refused last nite also)                 later on he did allow it - wound appears clean, no necrotic tissue; labs drawn  10/16 - pro-BNP high so echo today  10/17 - changed dressing, took out subprapubic incision penrose drain, left scrotal penrose drain. Echo with reduced EF - cards says iso sepsis, no further workup needed. Changed abx from fluconazole to caspo (2 week course) per ID. Can c/w erta for ESBL e.coli. Cards saying he will need AC eventually,   10/18 - changed dressing this am, last penrose in scrotum d/c'ed. wound care spoke with, can try changing wet to dry dressings to aquacel and change qd. Pt's daughter will hire own aid for wound care and PT, but will need homecare for erta and caspo IV at home.   10/19- changed dressing to aquacel, started Eliquis  10/20- refused exam in AM, dressing to be changed with aquacel in PM   10/21 - changed dressing ; will speak with SW/HC about dispo planning; spoke with daughter for plan; see note by   10/22 - dressing changed; R. arm has swelling, though improved from last hospitalization, as per daughter; testing at [facility] did not reveal a clot, but we will repeat                Pt had blood from rectum today, daughter claims that happened at [facility] when eliquis was re-started; I d/c'd eliquis               d/w at great length his needs for home; she does not want to take him home until ABX's are done (next Tues); she wants study R. upper ext swelling even though it was done at [facility] and was neg  10/23 - daughter supposedly will come today to learn dressing and colostomy care; we will change dressing then, and speak again to daughter about rectal Ca and GOC; apparently she has not wanted to discuss it in the past, R. arm duplex neg, xray negative, psych consult requested  10/24 - dressings changed this AM, yesterday daughter instructed as to dressing changes and colostomy care, psych to see pt again today as he did not participate in the interview (haldol for agitation), no stool in colostomy ? gas overnight, will get AXR  10/25 - dressing changed this AM. AXR shows nonobstructive distension.  10/26 - new dark red output from colostomy, dressing changed this AM,   10/27 - persistent bloody output from colostomy  10/28 - no new events; will need to change dressing, pt not letting us do it now               GI called (see note above)  10/29 - will speak to daughter today (tried calling [facility]X yesterday) to see if she wants us to pursue rectal and colost bleed (labs stable); gen surg at bedside; d/c erta/flucon  10/30 - Cards and GI consulted; daughter spoken to last nite; she did not want our house cardiologist, so no scope today; her cardiologist does not come here; will speak again to her today and hopefully will consent to scope; labs stable, naranjo exchanged over wire  10/31 - Cards spoke w/ pt's PMD cards, for coloscopy today, AM labs stable, Findings: There was evidence of a patent loop colostomy in the transverse colon. This was characterized by healthy appearing mucosa. Two separate stomal openings were appreciated on digital exam. The transverse colon was entered via one of the two stomal openings leading towards the proximal colon. Many small and large-mouthed    diverticula were found in the entire colon. There was no evidence of bleeding in the proximal colon. The terminal ileum appeared normal. There was no evidence of bleeding in the ileum. The colonoscope was subsequently introduced into the second stomal opening leading to the distal colon. A large blood clot was found in the descending colon filling the lumen. No active bleeding was appreciated.                      Impression:    Patent loop colostomy with healthy appearing mucosa in the transverse colon. Diverticulosis in the entire examined proximal colon. There was no evidence of bleeding in the proximal colon                    or ileum. Clotted blood in the descending colon. Suspect blood in the ostomy is secondary to refluxed blood from rectum. No specimens collected.   11/1 - naranjo changed over wire; stable, still with blood from rectum, brown from colostomy, team with speak with daughter pending plan, ? possible transfer back to [facility]  11/2 - stable, still with blood from rectum, colostomy less blood more brown, plan pending conversation with daughter   11/3 - daughter and patient have decided to proceed with transfer to [facility] under Dr. Kris Vega (oncologist) and Dr. Miguel Angel Johnson (surgeon)  11/4 - called [facility] to transfer  11/5 - [facility] will not approve transfer; daughter called; after 35 min, she does not want "palliative"; she wants "curative"; we will call his oncologist today to see what he knows that we can tell her  11/6 - spoke with Dr Paez from [facility];  he confirmed everything we thought; the daughter has been told that there is no more surgery or chemo that he can receive they tried in the past to shrink tumor, but it was not successful, so resection was not an option; Dr Paez believes comfort care is the best thing; he also said that when pt was at [facility] in Sept they had to get admin to practically "throw them out".  11/7 - stable, no complaints, daughter still refusing palliative consult  11/8 - refusing exam this AM, will attempt later today  11/9 - refusing exam this AM, will attempt later today  11/10: no events  11/11 - no new events  11/12 - home today

## 2024-10-28 NOTE — DISCHARGE NOTE PROVIDER - NSDCMRMEDTOKEN_GEN_ALL_CORE_FT
Combigan 0.2%-0.5% ophthalmic solution: 1 drop(s) in each affected eye 2 times a day  Crestor 10 mg oral tablet: 1 tab(s) orally once a day HL  Latanoprost Ophthalmic: instillation once a day (at bedtime)  niacin 500 mg oral capsule: 1 cap(s) orally once a day  Toprol-XL 50 mg oral tablet, extended release: 1 tab(s) orally once a day   acetaminophen 500 mg oral tablet: 2 tab(s) orally every 6 hours  Combigan 0.2%-0.5% ophthalmic solution: 1 drop(s) in each affected eye 2 times a day  Crestor 10 mg oral tablet: 1 tab(s) orally once a day HL  Latanoprost Ophthalmic: instillation once a day (at bedtime)  niacin 500 mg oral capsule: 1 cap(s) orally once a day  Toprol-XL 50 mg oral tablet, extended release: 1 tab(s) orally once a day  Ultram 50 mg oral tablet: 1 tab(s) orally every 6 hours as needed for  severe pain MDD: 4   acetaminophen 500 mg oral tablet: 2 tab(s) orally every 6 hours  Combigan 0.2%-0.5% ophthalmic solution: 1 drop(s) in each affected eye 2 times a day  Crestor 10 mg oral tablet: 1 tab(s) orally once a day HL  Latanoprost Ophthalmic: instillation once a day (at bedtime)  Toprol-XL 50 mg oral tablet, extended release: 1 tab(s) orally once a day  Ultram 50 mg oral tablet: 1 tab(s) orally every 6 hours as needed for  severe pain MDD: 4   acetaminophen 500 mg oral tablet: 2 tab(s) orally every 6 hours  Combigan 0.2%-0.5% ophthalmic solution: 1 drop(s) in each affected eye 2 times a day  Crestor 10 mg oral tablet: 1 tab(s) orally once a day HL  Latanoprost Ophthalmic: instillation once a day (at bedtime)  losartan 25 mg oral tablet: 1 tab(s) orally once a day  spironolactone 25 mg oral tablet: 1 tab(s) orally once a day  Toprol-XL 50 mg oral tablet, extended release: 1 tab(s) orally once a day  Ultram 50 mg oral tablet: 1 tab(s) orally every 6 hours as needed for  severe pain MDD: 4

## 2024-10-28 NOTE — DISCHARGE NOTE PROVIDER - CARE PROVIDER_API CALL
Mikie Farley  Urology  [address] [place] [address], [facility] [address]  [place] [address]  Phone: [phone]  Fax: [phone]  Follow Up Time:

## 2024-10-28 NOTE — PROGRESS NOTE ADULT - ASSESSMENT
78 yo M with history of CAD s/p stents in 2012 (ASA), locally advanced rectal cancer s/p neoadjuvant therapy with chemoRT (completed 11/2022), consolidation CAPOX (2/2023), brachytherapy (3/2024), presenting for generalized weakness, chest discomfort, rectal pain. Pt was here at [facility] in 8/30 for fatigue, weakness, and falls, found to have perforated rectal mass with perirectal collection, 2.5 x 1.5 cm with spread to infection in anorectal/perineal region with fluid/gas tracking along penile shaft, however pt left for MSK and s/p diverting end sigmoid colostomy, cystoscopy and naranjo placement across defect and perineal/scrotal drainage 8/30. Recently at [facility] for several weeks, discharged day prior to presentation for perineal infections, treated with abx. Represents here for generalized weakness and dizziness, CTAP non-con on preliminary read indicating ill-defined fluid/gas in scrotum, perirectal space, and probably perineum. Exam demonstrates skin duskiness multiple genitourinary abscesses, induration, purulent drainage. 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    10/13: S/p debridement of genitalia and perineum in OR yesterday. In SICU for post op pressor requirements. Got 500 ccs overnight for hypovolemia on POCUS. Currently off pressors. On zosyn, clinda, vanc. U and bcx pending. Tissue cx negative. Wound packing changed at bedside, incision beds with adequate granulation tissue, healing well. Transferred to floor  10/14 - wounds clean; re-packed; penroses in place; ID consult  10/15 - pt refuses blood draw, and refuses dressing change (it has been 24 hrs since last one; he refused last nite also)                 later on he did allow it - wound appears clean, no necrotic tissue; labs drawn  10/16 - pro-BNP high so echo today  10/17 - changed dressing, took out subprapubic incision penrose drain, left scrotal penrose drain. Echo with reduced EF - cards says iso sepsis, no further workup needed. Changed abx from fluconazole to caspo (2 week course) per ID. Can c/w erta for ESBL e.coli. Cards saying he will need AC eventually,   10/18 - changed dressing this am, last penrose in scrotum d/c'ed. wound care spoke with, can try changing wet to dry dressings to aquacel and change qd. Pt's daughter will hire own aid for wound care and PT, but will need homecare for erta and caspo IV at home.   10/19- changed dressing to aquacel, started Eliquis  10/20- refused exam in AM, dressing to be changed with aquacel in PM   10/21 - changed dressing ; will speak with SW/HC about dispo planning; spoke with daughter for plan; see note by   10/22 - dressing changed; R. arm has swelling, though improved from last hospitalization, as per daughter; testing at [facility] did not reveal a clot, but we will repeat                Pt had blood from rectum today, daughter claims that happened at [facility] when eliquis was re-started; I d/c'd eliquis               d/w at great length his needs for home; she does not want to take him home until ABX's are done (next Tues); she wants study R. upper ext swelling even though it was done at [facility] and was neg  10/23 - daughter supposedly will come today to learn dressing and colostomy care; we will change dressing then, and speak again to daughter about rectal Ca and GOC; apparently she has not wanted to discuss it in the past, R. arm duplex neg, xray negative, psych consult requested  10/24 - dressings changed this AM, yesterday daughter instructed as to dressing changes and colostomy care, psych to see pt again today as he did not participate in the interview (haldol for agitation), no stool in colostomy ? gas overnight, will get AXR  10/25 - dressing changed this AM. AXR shows nonobstructive distension.  10/26 - new dark red output from colostomy, dressing changed this AM,   10/27 - persistent bloody output from colostomy  10/28 - no new events; will need to change dressing, pt not letting us do it now  Plan:  - F/u surgery for new dark red colostomy output  - cont erta and fluconazole 10/29  - dressing change qd with medi-honey aquacel per wound care   - continue naranjo; will change before d/c   - continue to hold Eliquis   - f/u medicine  - f/u psych - re-consult as needed

## 2024-10-28 NOTE — DISCHARGE NOTE PROVIDER - NSDCHHCONTACT_GEN_ALL_CORE_FT
As certified below, I, or a nurse practitioner or physician assistant working with me, had a face-to-face encounter that meets the physician face-to-face encounter requirements. (2) more than 100 beats/min

## 2024-10-28 NOTE — CONSULT NOTE ADULT - SUBJECTIVE AND OBJECTIVE BOX
HPI:    History below obtained from chart review, as patient is a poor historian and declines to answer most questions- he is somnolent.      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 Hospital course was complicated by septic shock requiring SICU stay. He also was found to have newly depressed EF on Echo LVEF 35-40%, likely a component of CAD vs. sepsis induced cardiomyopathy. Ischemic evaluation pending until acute medical illness is resolved. Last dose of eliquis was 10/22.     He has remained hemodynamically normal, however started having melena from his ostomy three days ago. He has had no output from his rectum. He has denied any abdominal pain. His last dose of eliquis was 10/23. Hgb has remained stable ~8-9, normocytic MCV 89. Plts are normal. BUN 8. Cr 0.49.     Allergies:  No Known Allergies      Home Medications:    Hospital Medications:  acetaminophen     Tablet .. 1000 milliGRAM(s) Oral every 6 hours  aluminum hydroxide/magnesium hydroxide/simethicone Suspension 30 milliLiter(s) Oral every 6 hours PRN  aspirin  chewable 81 milliGRAM(s) Oral daily  brimonidine 0.2% Ophthalmic Solution 1 Drop(s) Both EYES every 12 hours  dextrose 5% + sodium chloride 0.45%. 1000 milliLiter(s) IV Continuous <Continuous>  ertapenem  IVPB 1000 milliGRAM(s) IV Intermittent every 24 hours  fluconAZOLE   Tablet 400 milliGRAM(s) Oral every 24 hours  heparin   Injectable 5000 Unit(s) SubCutaneous every 8 hours  latanoprost 0.005% Ophthalmic Solution 1 Drop(s) Both EYES at bedtime  losartan 25 milliGRAM(s) Oral daily  metoprolol succinate ER 50 milliGRAM(s) Oral daily  nicotine - 21 mG/24Hr(s) Patch 1 Patch Transdermal daily  oxyCODONE    IR 2.5 milliGRAM(s) Oral every 4 hours PRN  pantoprazole    Tablet 40 milliGRAM(s) Oral before breakfast  rosuvastatin 10 milliGRAM(s) Oral at bedtime  spironolactone 25 milliGRAM(s) Oral daily  timolol 0.5% Solution 1 Drop(s) Both EYES every 12 hours      PMHX/PSHX:  No significant past surgical history    No significant past surgical history      ROS unable to be completed due to patient's mental status.     PHYSICAL EXAM:     GENERAL:  No acute distress  HEENT:  NCAT, no scleral icterus   CHEST:  no respiratory distress  HEART:  Regular rate and rhythm  ABDOMEN:  Soft, non-tender, non-distended, normoactive bowel sounds,  no masses, colostomy in place in RLQ with melena in bag  EXTREMITIES: No edema  SKIN:  No rash/erythema/ecchymoses/petechiae/wounds/abscess/warm/dry  NEURO:  Somnolent, not answering questions appropriately    Vital Signs:  Vital Signs Last 24 Hrs  T(C): 36.6 (28 Oct 2024 13:21), Max: 36.7 (28 Oct 2024 06:35)  T(F): 97.8 (28 Oct 2024 13:21), Max: 98.1 (28 Oct 2024 06:35)  HR: 75 (28 Oct 2024 13:21) (75 - 83)  BP: 117/75 (28 Oct 2024 13:21) (106/65 - 130/87)  BP(mean): --  RR: 18 (28 Oct 2024 13:21) (17 - 18)  SpO2: 100% (28 Oct 2024 13:21) (97% - 100%)    Parameters below as of 28 Oct 2024 13:21  Patient On (Oxygen Delivery Method): room air      Daily     Daily     LABS:                        9.3    9.14  )-----------( 239      ( 28 Oct 2024 08:56 )             31.3     Mean Cell Volume: 89.7 fL (10-28-24 @ 08:56)    10-28    129[L]  |  99  |  8   ----------------------------<  105[H]  4.8   |  18[L]  |  0.49[L]    Ca    8.6      28 Oct 2024 08:56  Phos  2.4     10-28  Mg     1.70     10-28          Urinalysis Basic - ( 28 Oct 2024 08:56 )    Color: x / Appearance: x / SG: x / pH: x  Gluc: 105 mg/dL / Ketone: x  / Bili: x / Urobili: x   Blood: x / Protein: x / Nitrite: x   Leuk Esterase: x / RBC: x / WBC x   Sq Epi: x / Non Sq Epi: x / Bacteria: x                              9.3    9.14  )-----------( 239      ( 28 Oct 2024 08:56 )             31.3                         8.5    6.15  )-----------( 226      ( 26 Oct 2024 09:39 )             28.0                         8.2    6.20  )-----------( 244      ( 25 Oct 2024 15:30 )             27.2       Imaging:           HPI:    History below obtained from chart review, as patient is a poor historian and declines to answer most questions- he is somnolent.      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 Hospital course was complicated by septic shock requiring SICU stay. He also was found to have newly depressed EF on Echo LVEF 35-40%, likely a component of CAD vs. sepsis induced cardiomyopathy. Ischemic evaluation pending until acute medical illness is resolved. Last dose of eliquis was 10/22.     He has remained hemodynamically normal, however started having melena from his ostomy three days ago. He has had no output from his rectum. He has denied any abdominal pain. His last dose of eliquis was 10/23. Hgb has remained stable ~8-9, normocytic MCV 89. Plts are normal. BUN 8. Cr 0.49.     Allergies:  No Known Allergies      Home Medications:    Hospital Medications:  acetaminophen     Tablet .. 1000 milliGRAM(s) Oral every 6 hours  aluminum hydroxide/magnesium hydroxide/simethicone Suspension 30 milliLiter(s) Oral every 6 hours PRN  aspirin  chewable 81 milliGRAM(s) Oral daily  brimonidine 0.2% Ophthalmic Solution 1 Drop(s) Both EYES every 12 hours  dextrose 5% + sodium chloride 0.45%. 1000 milliLiter(s) IV Continuous <Continuous>  ertapenem  IVPB 1000 milliGRAM(s) IV Intermittent every 24 hours  fluconAZOLE   Tablet 400 milliGRAM(s) Oral every 24 hours  heparin   Injectable 5000 Unit(s) SubCutaneous every 8 hours  latanoprost 0.005% Ophthalmic Solution 1 Drop(s) Both EYES at bedtime  losartan 25 milliGRAM(s) Oral daily  metoprolol succinate ER 50 milliGRAM(s) Oral daily  nicotine - 21 mG/24Hr(s) Patch 1 Patch Transdermal daily  oxyCODONE    IR 2.5 milliGRAM(s) Oral every 4 hours PRN  pantoprazole    Tablet 40 milliGRAM(s) Oral before breakfast  rosuvastatin 10 milliGRAM(s) Oral at bedtime  spironolactone 25 milliGRAM(s) Oral daily  timolol 0.5% Solution 1 Drop(s) Both EYES every 12 hours      PMHX/PSHX:  No significant past surgical history    No significant past surgical history      ROS unable to be completed due to patient's mental status.     PHYSICAL EXAM:     GENERAL:  No acute distress  HEENT:  NCAT, no scleral icterus   NECK: supple  CHEST:  no respiratory distress  HEART:  Regular rate and rhythm  ABDOMEN:  Soft, non-tender, non-distended, normoactive bowel sounds,  no masses, colostomy in place in RLQ with melena in bag  EXTREMITIES: No edema  SKIN:  No rash/erythema/ecchymoses/petechiae/wounds/abscess/warm/dry  NEURO:  Somnolent, not answering questions appropriately  PSYCH: poor insight but difficult to assess    Vital Signs:  Vital Signs Last 24 Hrs  T(C): 36.6 (28 Oct 2024 13:21), Max: 36.7 (28 Oct 2024 06:35)  T(F): 97.8 (28 Oct 2024 13:21), Max: 98.1 (28 Oct 2024 06:35)  HR: 75 (28 Oct 2024 13:21) (75 - 83)  BP: 117/75 (28 Oct 2024 13:21) (106/65 - 130/87)  BP(mean): --  RR: 18 (28 Oct 2024 13:21) (17 - 18)  SpO2: 100% (28 Oct 2024 13:21) (97% - 100%)    Parameters below as of 28 Oct 2024 13:21  Patient On (Oxygen Delivery Method): room air      Daily     Daily     LABS:                        9.3    9.14  )-----------( 239      ( 28 Oct 2024 08:56 )             31.3     Mean Cell Volume: 89.7 fL (10-28-24 @ 08:56)    10-28    129[L]  |  99  |  8   ----------------------------<  105[H]  4.8   |  18[L]  |  0.49[L]    Ca    8.6      28 Oct 2024 08:56  Phos  2.4     10-28  Mg     1.70     10-28          Urinalysis Basic - ( 28 Oct 2024 08:56 )    Color: x / Appearance: x / SG: x / pH: x  Gluc: 105 mg/dL / Ketone: x  / Bili: x / Urobili: x   Blood: x / Protein: x / Nitrite: x   Leuk Esterase: x / RBC: x / WBC x   Sq Epi: x / Non Sq Epi: x / Bacteria: x                              9.3    9.14  )-----------( 239      ( 28 Oct 2024 08:56 )             31.3                         8.5    6.15  )-----------( 226      ( 26 Oct 2024 09:39 )             28.0                         8.2    6.20  )-----------( 244      ( 25 Oct 2024 15:30 )             27.2       Imaging:

## 2024-10-28 NOTE — ADVANCED PRACTICE NURSE CONSULT - REASON FOR CONSULT
Patient seen at bedside for follow-up assessment of ostomy. Pouch clean, dry, and intact with soft brown bowel movement in pouch. Changed on Wednesday 10/23.     LLQ colostomy: Cleanse with water, pat dry. If impaired peristomal skin noted: apply stoma powder, brush off excess, and seal with liquid barrier film by using tapping motions. Apply 1 piece soft convex drainable ostomy pouch (pouch #[id number]). Change every 3 days and PRN if leaking.     Please contact Wound/Ostomy Care Service Line if we can be of further assistance (ext [phone]). 
Patient seen during skin care rounds s/p wound care MD consult referral. As per H&P, patient is a 78 yo M with history of CAD s/p stents in 2012 (ASA), locally advanced rectal cancer s/p neoadjuvant therapy with chemoRT (completed 11/2022), consolidation CAPOX (2/2023), brachytherapy (3/2024), presenting for generalized weakness, chest discomfort, rectal pain. Pt was here at [facility] in 8/30 for fatigue, weakness, and falls, found to have perforated rectal mass with perirectal collection, 2.5 x 1.5 cm. Partially imaged abscess in the perineum, extending into the root of the penis, measures at least 7.4 x 2.3 cm.  There are additional foci of fluid and gas tracking along the penile shaft. Seen by Dr. Santoro at that time, offered I&D and diverting colostomy but left and went to [facility] for care, and at [facility] s/p lap diverting end sigmoid colostomy with cystoscopy and naranjo placement across defect and perineal/scrotal drainage by [facility] 8/30.    He presented to [facility] 1 day after discharge from [facility] complaining of dizziness and malaise. In ED he was found to be in atrial fibrillation. He is not on AC, h/o a known right leg DVT. Per daughter, patient had recently been treated for multiple scrotal/perineal area abscess with IV abx and debridement at [facility]. Patient is full code at this time.
Patient seen for initial ostomy assessment. At this time, patient noted to be agitated. Attempted to reorient and redirect patient; patient continues to state "no, get out". Education provided on importance of ostomy assessment and pouch recommendations; at this time patient continues to be agitated and does not want assessment. Will attempt at later time. Please contact Wound/Ostomy Care Service Line if we can be of further assistance (ext [phone]). 
Patient seen at bedside with patient's daughter, Tiffanie, for ostomy teaching and sacrum wound care. Patient's daughter, Tiffanie, at bedside - attentive to learning. Tiffanie reports learning previously at [facility] [place] when patient was admitted there. 
Patient seen for ostomy assessment. As per H&P, patient is a 79yr M with past medical history of CAD s/p stents in 2012 (ASA), locally advanced rectal cancer s/p neoadjuvant therapy with chemoRT (completed 11/2022), consolidation CAPOX (2/2023), brachytherapy (3/2024), presenting for generalized weakness, chest discomfort, rectal pain. 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 Patient was transferred to [facility] at that the time per his request and underwent lap diverting end sigmoid colostomy with cystoscopy and naranjo placement across defect and perineal/scrotal drainage. Patient now presenting to the [facility] ED with one day of dizziness and malaise since discharge from [facility]. In ED he was found to be in atrial fibrillation (not on a/c) but hemodynamically stable, Labs significant for WBC 6.22, lactate 2.5, trops 62, BNP 31,000. Patient is now s/p scrotal incision and drainage with placement of penrose drains x3. Patient requiring vasopressors, Jovani up to 0.5 during the case, and patient transferred to SICU for further hemodynamic monitoring.
Patient seen for ostomy follow up.

## 2024-10-28 NOTE — DISCHARGE NOTE PROVIDER - NSDCCPCAREPLAN_GEN_ALL_CORE_FT
PRINCIPAL DISCHARGE DIAGNOSIS  Diagnosis: Sacral decubitus ulcer  Assessment and Plan of Treatment: .Topical recommendations:  cleanse wound with Dakins solution 1/4 strength,  rinse wound and periwound skin with NS, pat dry. Apply Liquid barrier film to periwound skin. Apply medihoney gel to wound base, pack sacral/gluteal cleft depth and tunneling at 6 o'clock with Aquacel hydrofiber ribbon, leave 2" out at end to wick and visible for fully removal with subsequent dressing changes.  Cover with silicone foam with border. Change daily and prn if soiled/compromised.  -Incontinence/moisture associated dermatitis in lower buttocks and perineum (patient reamins with rectal bleeding)  as evident by hyperpigmentation, no open wounds   .Clean lower buttocks and perineum with skin cleanser, pat dry. Apply Darleen barrier moisture cream to entire area. Apply twice a day.   -->Continue to offload pressure; low airloss support surface, turn and position per protocol with use of fluidized positioning devices, continue moisture management with use of single breathable incontinence pad, continue to offload heels with complete cair boots.  -Continue to offload pressure; low airloss support surface, turn and position per protocol with use of fluidized positioning devices, continue moisture management with use of single breathable incontinence pad, continue to offload heels with complete cair boots.        SECONDARY DISCHARGE DIAGNOSES  Diagnosis: Tara gangrene  Assessment and Plan of Treatment: Topical wound instructions:  -->Surgical Sites s/p debridement of genitalia and perineum: cleanse wounds and periwound skin with NS. Apply Liquid barrier film to periwound skin. Apply Medihoney gel to wound bases. Pack depth of anterior scrotum with Aquacel ribbon, leave at least 2" out at end to ensure full removal of packing with subsequent dressing changes. Cover with all with 4x4 gauze and abdominal pads. Can secure with disposable stretch underwear or paper tape. Change daily.     PRINCIPAL DISCHARGE DIAGNOSIS  Diagnosis: Sacral decubitus ulcer  Assessment and Plan of Treatment: .Topical recommendations:  cleanse wound with Dakins solution 1/4 strength,  rinse wound and periwound skin with NS, pat dry. Apply Liquid barrier film to periwound skin. Apply medihoney gel to wound base, pack sacral/gluteal cleft depth and tunneling at 6 o'clock with Aquacel hydrofiber ribbon, leave 2" out at end to wick and visible for fully removal with subsequent dressing changes.  Cover with silicone foam with border. Change daily and prn if soiled/compromised.  -Incontinence/moisture associated dermatitis in lower buttocks and perineum (patient reamins with rectal bleeding)  as evident by hyperpigmentation, no open wounds   .Clean lower buttocks and perineum with skin cleanser, pat dry. Apply Darleen barrier moisture cream to entire area. Apply twice a day.   -->Continue to offload pressure; low airloss support surface, turn and position per protocol with use of fluidized positioning devices, continue moisture management with use of single breathable incontinence pad, continue to offload heels with complete cair boots.  -Continue to offload pressure; low airloss support surface, turn and position per protocol with use of fluidized positioning devices, continue moisture management with use of single breathable incontinence pad, continue to offload heels with complete cair boots.        SECONDARY DISCHARGE DIAGNOSES  Diagnosis: Tara gangrene  Assessment and Plan of Treatment: Topical wound instructions:  -->Surgical Sites s/p debridement of genitalia and perineum: cleanse wounds and periwound skin with NS. Apply Liquid barrier film to periwound skin. Apply Medihoney gel to wound bases. Pack depth of a scrotum wound sites with Aquacel ribbon, leave at least 2" out at end to ensure full removal of packing with subsequent dressing changes. do same to wound lower abd (suprapubic area). Cover with all with 4x4 gauze and abdominal pads. Can secure with disposable stretch underwear or paper tape. Change every other day.

## 2024-10-28 NOTE — DISCHARGE NOTE PROVIDER - DETAILS OF MALNUTRITION DIAGNOSIS/DIAGNOSES
This patient has been assessed with a concern for Malnutrition and was treated during this hospitalization for the following Nutrition diagnosis/diagnoses:     -  10/22/2024: Severe protein-calorie malnutrition   -  10/22/2024: Underweight (BMI < 19)   -  10/14/2024: Moderate protein-calorie malnutrition

## 2024-10-28 NOTE — PROGRESS NOTE ADULT - SUBJECTIVE AND OBJECTIVE BOX
CHIEF COMPLAINT: f/u     SUBJECTIVE / OVERNIGHT EVENTS: Patient seen and examined, urology team also at bedside. RN reported melena in ostomy bag. Unable to obtain HPI from patient, mumbling to self and not participating with questions. Ostomy in garbage noted to have blood tinged feces.     MEDICATIONS  (STANDING):  acetaminophen     Tablet .. 1000 milliGRAM(s) Oral every 6 hours  aspirin  chewable 81 milliGRAM(s) Oral daily  brimonidine 0.2% Ophthalmic Solution 1 Drop(s) Both EYES every 12 hours  dextrose 5% + sodium chloride 0.45%. 1000 milliLiter(s) (75 mL/Hr) IV Continuous <Continuous>  ertapenem  IVPB 1000 milliGRAM(s) IV Intermittent every 24 hours  fluconAZOLE   Tablet 400 milliGRAM(s) Oral every 24 hours  heparin   Injectable 5000 Unit(s) SubCutaneous every 8 hours  latanoprost 0.005% Ophthalmic Solution 1 Drop(s) Both EYES at bedtime  losartan 25 milliGRAM(s) Oral daily  metoprolol succinate ER 50 milliGRAM(s) Oral daily  nicotine - 21 mG/24Hr(s) Patch 1 Patch Transdermal daily  rosuvastatin 10 milliGRAM(s) Oral at bedtime  spironolactone 25 milliGRAM(s) Oral daily  timolol 0.5% Solution 1 Drop(s) Both EYES every 12 hours    MEDICATIONS  (PRN):  aluminum hydroxide/magnesium hydroxide/simethicone Suspension 30 milliLiter(s) Oral every 6 hours PRN Dyspepsia  oxyCODONE    IR 2.5 milliGRAM(s) Oral every 4 hours PRN Moderate Pain (4 - 6)      VITALS:  T(F): 97.4 (10-28-24 @ 09:28), Max: 98.1 (10-27-24 @ 13:32)  HR: 81 (10-28-24 @ 09:28) (78 - 83)  BP: 106/65 (10-28-24 @ 09:28) (106/65 - 130/87)  RR: 18 (10-28-24 @ 09:28) (17 - 18)  SpO2: 98% (10-28-24 @ 09:28)  Wt(kg): --      PHYSICAL EXAM:  GENERAL: elderly male in NAD  EYES: conjunctiva and sclera clear  CHEST/LUNG: Clear to auscultation bilaterally; No wheeze  HEART: Regular rate and rhythm; No murmurs, rubs, or gallops  ABDOMEN: Soft, Nontender, Nondistended; ostomy in place w/ dark solid stool   EXTREMITIES:  2+ Peripheral Pulses, No clubbing, cyanosis, or edema  NEUROLOGY: mumbling to self, not participating with exam     LABS:              9.3                  129  | 18   | 8            9.14  >-----------< 239     ------------------------< 105                   31.3                 4.8  | 99   | 0.49                                         Ca 8.6   Mg 1.70  Ph 2.4                Urinalysis Basic - ( 28 Oct 2024 08:56 )    Color: x / Appearance: x / SG: x / pH: x  Gluc: 105 mg/dL / Ketone: x  / Bili: x / Urobili: x   Blood: x / Protein: x / Nitrite: x   Leuk Esterase: x / RBC: x / WBC x   Sq Epi: x / Non Sq Epi: x / Bacteria: x      [x] Care Discussed with Consultants/Other Providers: Urology PA - discussed melena and recommendations for GI eval

## 2024-10-28 NOTE — PROGRESS NOTE ADULT - NS ATTEND AMEND GEN_ALL_CORE FT
Rectal cancer locally advanced s/p prior neoadj chemo, radiation and more recent diverting colostomy at [facility] [place]  Was at [facility] for several weeks, on abx.  Discharged and then presented here the following day with SP and genital/perineum Fourniers  Also known sacral decub  S/p debridement of genitalia and perineum 10/13    Undergoing daily dressing changes - wound appears granulating well  Colorectal surgery and GI c/s re blood in ostomy  Cont abx  Wound care  F/u ID, Medicine, Surg, GI

## 2024-10-28 NOTE — ADVANCED PRACTICE NURSE CONSULT - RECOMMEDATIONS
Topical recommendations for sacrum:     Sacrum: cleanse wound and periwound skin with NS, pat dry. Apply Liquid barrier film to periwound skin. Apply medihoney gel to wound base, pack sacral/gluteal cleft with Aquacel hydrofiber ribbon. Cover with silicone foam with border. Change daily and prn if soiled/compromised. All questions and concerns answered.     Supplies recommended for colostomy:  One piece soft convex drainable pouch (#[id number])    LLQ colostomy: Cleanse with water, pat dry. If impaired peristomal skin noted: apply stoma powder, brush off excess, and seal with liquid barrier film by using tapping motions. Apply 1 piece drainable ostomy pouch with soft convex (pouch #[id number]). Change every 3 days and PRN if leaking.     Please contact Wound/Ostomy Care Service Line if we can be of further assistance (ext [phone]). 
Please reconsult if we can be of further assistance (ext [phone]). 
Continue to follow topical recommendations as ordered per wound care MD.     Continue low air loss bed therapy, continue heel elevation, continue to turn & reposition per protocol, soft pillow between bony prominences, continue moisture management with barrier creams & single breathable pad, continue measures to decrease friction/shear/pressure. Continue with nutritional support as per dietary/orders.     Please contact Wound Care Service Line if we can be of further assistance (ext [phone]). 
Supplies recommended:  One piece soft convex drainable pouch (#[id number])    LLQ colostomy: Cleanse with water, pat dry. If impaired peristomal skin noted: apply stoma powder, brush off excess, and seal with liquid barrier film by using tapping motions. Apply 1 piece drainable ostomy pouch with soft convex (pouch #[id number]). Change every 3 days and PRN if leaking.     Please contact Wound/Ostomy Care Service Line if we can be of further assistance (ext [phone]).

## 2024-10-28 NOTE — ADVANCED PRACTICE NURSE CONSULT - ASSESSMENT
Patient AxOx2, periods of confusion. Patient not able to follow steps for pouch change procedure. Patient continues to ask "is it done?". Attempted to educate patient while performing pouch change, patient unable to participate at this time. Family not present at bedside for ostomy education.     LLQ colostomy: Stoma size 1 1/8. See A&I flowsheet for assessment details.   
Patient resting in bed, confused. Ostomy pouch intact, changed with formed stool mixed with blood clots in pouch, surgical team notified.     Stoma size: 7/8" x 1 1/4" Stoma pink moist viable, no bleeding from stoma itself, output bloody. Please see A&I flow sheet for full assessment details. 
Patient's daughter Rafa ready and willing to learn. Actively participating in pouch change. Overview of surgical procedure and need of ostomy reinforced. Terms defined utilized: Ostomy, colostomy, wafer, pouch. Frequency of pouch change and emptying discussed, teach back method utilized. Daughter able to show back how to open, empty and close pouch. Removed colostomy pouch using pull and push technique, cleansed skin with water, patted dry. Taught how to properly assess stoma viability and peristomal skin. Patient's daughter actively participated in stoma measurement, creating template, cutting wafer, applying pouch. All questions and concerns answered.     LLQ colostomy: Stoma size 7/8" x 1 1/4". See A&I flowsheet for assessment details.     Patient's daughter also shown how to perform wound care for sacral area. All questions and concerns answered. Sacrum: cleanse wound and periwound skin with NS, pat dry. Apply Liquid barrier film to periwound skin. Apply medihoney gel to wound base, pack sacral/gluteal cleft with Aquacel hydrofiber ribbon. Cover with silicone foam with border. Change daily and prn if soiled/compromised. All questions and concerns answered.     Urology team at bedside to show patient how wound care for scrotal area. 
Please see A&I flowsheet for assessment of chronic stage 4 PI with measurements and tissue type - please see wound care MD consult note for detailed assessment, thank you.    Patient at high risk for skin breakdown: On assessment patient on a low air loss surface, heel offloading boots in place, Z-flow positioning pillow utilized, moisture management in place with use of one incontinence pad. Turning and positioning as tolerated.

## 2024-10-28 NOTE — CHART NOTE - NSCHARTNOTEFT_GEN_A_CORE
NUTRITION FOLLOW UP NOTE    Pt seen for severe malnutrition.     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 Patient continues on IV ABtx.  Patient noted with dark red stool via colostomy, pending further evaluation; GI consult was recommended per CRS team (10/28).    Diet Prescription: Diet, Regular:     Start Time: 20:00  Liquid Protein Supplement     Qty per Day:  3  Supplement Feeding Modality:  Oral  Ensure Plus High Protein Cans or Servings Per Day:  1       Frequency:  Two Times a day (10-14-24 @ 10:33)    Food Allergy/Intolerance: NKFA    Nutrition Course:  PO intake remains poor to fair based on review of available RN flowsheet documentation.  Patient needs assistance with feeding. Diet being supplemented with Ensure Plus High Protein Therapeutic Shake 3x daily (1050kcal, 60g protein) and LPS SF 30 mL 3x day (300kcal, 45gm protein), as patient with advanced stage pressure injury.  Patient reported by staff to have good acceptance to supplements.  No new weights available for assessment since last RD f/u note on 10/22. Patient with weight loss since admission, likely in setting of improved fluid edema / diuretic therapy (Spironolactone), but cannot r/o weight loss unrelated to current illnesses.  Estimated needs previously reassessed.    Pertinent Medications:   MEDICATIONS  (STANDING):  acetaminophen     Tablet .. 1000 milliGRAM(s) Oral every 6 hours  aspirin  chewable 81 milliGRAM(s) Oral daily  brimonidine 0.2% Ophthalmic Solution 1 Drop(s) Both EYES every 12 hours  dextrose 5% + sodium chloride 0.45%. 1000 milliLiter(s) (75 mL/Hr) IV Continuous <Continuous>  ertapenem  IVPB 1000 milliGRAM(s) IV Intermittent every 24 hours  fluconAZOLE   Tablet 400 milliGRAM(s) Oral every 24 hours  heparin   Injectable 5000 Unit(s) SubCutaneous every 8 hours  latanoprost 0.005% Ophthalmic Solution 1 Drop(s) Both EYES at bedtime  losartan 25 milliGRAM(s) Oral daily  metoprolol succinate ER 50 milliGRAM(s) Oral daily  nicotine - 21 mG/24Hr(s) Patch 1 Patch Transdermal daily  pantoprazole    Tablet 40 milliGRAM(s) Oral before breakfast  rosuvastatin 10 milliGRAM(s) Oral at bedtime  spironolactone 25 milliGRAM(s) Oral daily  timolol 0.5% Solution 1 Drop(s) Both EYES every 12 hours    MEDICATIONS  (PRN):  aluminum hydroxide/magnesium hydroxide/simethicone Suspension 30 milliLiter(s) Oral every 6 hours PRN Dyspepsia  oxyCODONE    IR 2.5 milliGRAM(s) Oral every 4 hours PRN Moderate Pain (4 - 6)    [] Relevant notes on medications:     Pertinent Labs: 10-28 Na129 mmol/L[L] Glu 105 mg/dL[H] K+ 4.8 mmol/L Cr  0.49 mg/dL[L] BUN 8 mg/dL 10-28 Phos 2.4 mg/dL[L]    Weight: 10/20 - 59.6kg      10/15 - 65.4kg      10/14 - 58.3kg      10/13 - 62.1kg      10/12 - 63kg (Adm)  Weight Assessment:  3.4kg wt loss / 5.4% weight change in ~1 week, possibly in part contributed by improved edema / diuretic therapy.  Height: 185cm  IBW: 83.6kg +/- 10%  BMI: 17.3kg/m2    Physical Assessment, per flowsheets:  Edema: 1+ edema to lt and rt ankle   Pressure Injury: Stage IV sacrum  Nutrition Focused Physical Exam: [X] previously conducted 10/22, remains severely malnourished  Muscle wasting [] temporal [] clavicle [] shoulder [] scapula [] thigh [] calf [] dorsal hand    Fat Loss [] buccal [] orbital [] triceps [] ribs     Estimated Needs:   [X] No change since previous assessment -OR - [ ] Recalculated  Est Needs based on: [X] actual weight - 59.6kg [] dosing weight [] IBW  Kcal 30-35kcal/gs=[id number]  Protein 1.4-1.6gm/kg=83-95gm protein     Previous Nutrition Diagnosis: Severe Malnutrition  Nutrition Diagnosis is [X] ongoing  [ ] resolved [ ] not applicable     Education:  [ ] Provided pt with verbal / written education on   [ ] Provided on previous assessment by RD  [X] Not applicable secondary to mental status  [ ] Pt refused     Interventions:   1) Continue diet/supplements as ordered. Assist with meals, encourage PO intake.  2) Please document % meal intake in flowsheets to assess adequacy of PO Intake.  3) Obtain and record current weight to best assess for acute changes in nutritional status.    Monitor & Evaluate:  PO intake, tolerance to diet/supplement, nutrition related lab values, weight trends, BMs/GI distress, hydration status, skin integrity.    Ernestina Underwood, MS, RD, CDN; Pager #[phone]  Also available on [employer] Teams

## 2024-10-28 NOTE — PROGRESS NOTE ADULT - ASSESSMENT
Assessment: 77 yo male with Hx diverticulosis, Afib (off elemis for prior episodes of rectal bleeding), locally advanced rectal cancer treated at [facility], know to the service for prior rectal abscess drainage currently on michael and fluconazole s/p multiple I/D for yuri's gangrene. Colorectal surgery consulted for bleeding stoma. Stoma with minimal scant sanguinous output but with nursing reports of apparent gross rectal bleeding. Patient remains hemodynamically stable with H/H 8.5/28.    Plan:  - No acute surgical intervention at this time  - f/u Hgb on AM CBC  - Recommend GI consult for possible scope given new, dark blood in ostomy bag    A Team Surgery  [id number]

## 2024-10-28 NOTE — CONSULT NOTE ADULT - ATTENDING COMMENTS
79-year-old male with a past medical history of CAD status post PCI, locally advanced rectal care concern status post neoadjuvant therapy with chemotherapy, presented to the hospital due to weakness, chest discomfort and rectal pain.     Patient with recent hospital admission 8/30/2024 found to have perforated rectal mass with perirectal collection.  Patient left hospital 1 to [facility] undergoing a lap diverting end sigmoid colostomy with cystoscopy and Zamarripa placement as well as perineal scrotal drainage.     Nevertheless LIJ a day after discharge due to dizziness and malaise.  In the ED, patient found to have atrial fibrillation, right leg DVT.  Patient is afebrile, otherwise hemodynamically stable.  Labs show no leukocytosis, CT abdomen/pelvis showed gas-forming collection of the suprapubic region, Toribio penile region and scrotal region.  Initial I&D planned however aborted due to worsening exam and patient taken emergently to the OR for debridement.  Patient was given vancomycin, piperacillin/tazobactam and clindamycin.     In the OR, perirectal abscess was noted and debrided.  Latest labs show no leukocytosis, anemia 0.1/26.8, BMP with renal function within normal limits, blood cultures from 8/30/2024 with E. coli, sensitivities reviewed.  Culture obtained from the OR also with E. coli.     #Abnormal imaging of the pelvis   #Perirectal abscess   #E. coli bacteremia     Recommendations  Discontinue vancomycin and clindamycin  Continue piperacillin/tazobactam  Follow sensitivities of Bcx  Follow fever curve and WBC count    Phillip Julian MD  Division of Infectious Diseases
new finding of reduced EF in the setting of sepsis  global hypokinesis  does not appear significantly volume overloaded on exam  no clear anginal sx  given overall clinical status would medically manage with GDMT  AC when able to  eventual ischemia eval pending clinical course
80 yo M h/o CAD s/p stents in 2012 (ASA), locally advanced rectal cancer s/p neoadjuvant therapy with chemoRT (completed 11/2022), consolidation CAPOX (2/2023), brachytherapy (3/2024), perforated rectal mass with perirectal collection, 2.5 x 1.5 cm (8/2024) s/p lap diverting [place] sigmoid [place] [facility] [place] 1 day after [place] [facility] w/ dizziness and malaise.  Found to be in atrial fibrillation.  Gi consulted for dark stool output from colostomy.  Hb stable w/o any requirement for PRBCs.  Unclear if patient has had full colonoscopy at [facility].  Monitor Hb daily and monitor output from ostomy.  If feasible, hold anticoagulation.  Continue conservative management for now.  Will discuss possible colonoscopic evaluation with family but if Hb remains stable with no significant bloody output, would favor conservative management.
I agree with the history, physical, and plan, which I have reviewed and edited where appropriate.  I agree with notes/assessment of health care providers on my service.  I have personally examined the patient.  I was physically present for the key portions of the evaluation and management (E/M) service provided.  I reviewed data and laboratory tests/x-rays and all pertinent electronic images.  The patient is a critical care patient with life threatening hemodynamic and metabolic instability in SICU.  Risk benefit analyses discussed.    The patient is in SICU with diagnosis mentioned in the note.    The plan is specified below.    Assessment: 79yr male with hx of local advanced rectal cancer now s/p chemorads c/b perforation and perineal, scrotal soft tissue infections. Patient underwent diverting end colostomy and perineal I&D at [facility] at the end of August, recently discharged now presenting with worsening scrotal wounds. He is now s/p scrotal I&D with placement of penrose x3. Case done under sedation, 250 LR, on polina throughout however weaned off at end of the case.    Plan:  Neuro: postoperative pain  - Pain control- IV tylenol, dilaudid prn    Pulm:  -  on 2L NC    Cards: Hx CAD  - off pressors   - home ASA restarted  - LR 500cc for hypovolemia on POCUS    GI:  - Diet: Clears    /Renal: perforated rectal ca, diverting colostomy   - Chronic naranjo in place, UA + in ED  - LR @50     Heme:  - DVT ppx- SQH, SCDs  - Home ASA    ID: scrotal infection   - Continue Vanco, Zosyn, Clinda  - Urology tentatively planning for RTOR for further debridement

## 2024-10-28 NOTE — DISCHARGE NOTE PROVIDER - NSDCCPGOAL_GEN_ALL_CORE_FT
To get better and follow your care plan as instructed. To get better and follow your care plan as instructed.  Sacral decub:Topical recommendations:  cleanse wound with Dakins solution 1/4 strength,  rinse wound and periwound skin with NS, pat dry. Apply Liquid barrier film to periwound skin. Apply medihoney gel to wound base, pack sacral/gluteal cleft depth and tunneling at 6 o'clock with Aquacel hydrofiber ribbon, leave 2" out at end to wick and visible for fully removal with subsequent dressing changes.  Cover with silicone foam with border. Change daily and prn if soiled/compromised.  -Incontinence/moisture associated dermatitis in lower buttocks and perineum (patient reamins with rectal bleeding)  as evident by hyperpigmentation, no open wounds   .Clean lower buttocks and perineum with skin cleanser, pat dry. Apply Darleen barrier moisture cream to entire area. Apply twice a day.   -->Continue to offload pressure; low airloss support surface, turn and position per protocol with use of fluidized positioning devices, continue moisture management with use of single breathable incontinence pad, continue to offload heels with complete cair boots.  -Continue to offload pressure; low airloss support surface, turn and position per protocol with use of fluidized positioning devices, continue moisture management with use of single breathable incontinence pad, continue to offload heels with complete cair boots.

## 2024-10-28 NOTE — CONSULT NOTE ADULT - ASSESSMENT
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 Hospital course was complicated by septic shock requiring SICU stay. He also was found to have newly depressed EF on Echo LVEF 35-40%, likely a component of CAD vs. sepsis induced cardiomyopathy. Ischemic evaluation pending until acute medical illness is resolved. Last dose of eliquis was 10/22. He continues on ertapenem and caspofungin.     He has remained hemodynamically normal, however started having melena from his ostomy 10/25 and has been persistent. His last dose of eliquis was 10/23. Hgb has remained stable ~8-9, normocytic MCV 89. Plts are normal. BUN 8. Cr 0.49. CT A/P 10/12 with diverticulosis. Etiology for bleed includes lower GI bleed, potentially an AVM, diverticular bleed, vs rectal cancer that is metastatic, less likely brisk upper GI bleed given hemodynamic stability.    Recommendations:  ***incomplete recs***  -Clear liquid diet, NPO at midnight for potential colonoscopy through stoma  -GI fellow to order prep  -Hgb >7  -Appreciate cardiology comment on risk stratification prior to procedure    Note incomplete until finalized by attending signature/attestation.    Merly Murillo MD  GI/Hepatology Fellow, PGY-4  Long Range Pager: [phone], Short Range Pager: [phone]    MONDAY-FRIDAY 8AM-5PM:  Please message via Quick2LAUNCH or email [email or web address]   On Weekends/Holidays (All day) and Weekdays after 5 PM to 8 AM  For nonurgent consults please email:  Please email [email or web address]    URGENT CONSULTS:  Please contact on call GI team. See Amion schedule ([facility]), Quandorak paging system ([facility]), or call hospital  ([facility])             He is a 79 year old male with a past medical history of obstructive CAD s/p PCI LAD 2012 (asa), AF, bilobed infrarenal abdominal aortic aneurism, locally advanced rectal cancer s/p neoadjuvant therapy with chemoRT completed 11/2022, consolidation CAPOX 2/2023, brachytherapy 3/2024, with recent admission 8/30/2024 found to have perforated rectal mass with perirectal collection s/p diverting end sigmoid colostomy,  presenting for generalized weakness, rectal pain, 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 Hospital course was complicated by septic shock requiring SICU stay. He also was found to have newly depressed EF on Echo LVEF 35-40%, likely a component of CAD vs. sepsis induced cardiomyopathy. Ischemic evaluation pending until acute medical illness is resolved. Last dose of eliquis was 10/22. He continues on ertapenem and caspofungin.     He has remained hemodynamically normal, however started having melena from his ostomy 10/25 and has been persistent. His last dose of eliquis was 10/23. Hgb has remained stable ~8-9, normocytic MCV 89. Plts are normal. BUN 8. Cr 0.49. CT A/P 10/12 with diverticulosis. Etiology for bleed includes lower GI bleed, potentially an AVM, diverticular bleed, vs rectal cancer that is metastatic, less likely brisk upper GI bleed given hemodynamic stability.    Recommendations:  -Appreciate cardiology comment on risk stratification prior to procedure, if colonoscopy is within acceptable risk and family is amenable, can consider inpatient colonoscopy. Patient cannot currently consent to procedure and was not able to reach wife via phone.   -Clear liquid diet for now pending cardiology evaluation.   -Hgb >7.    Note incomplete until finalized by attending signature/attestation.    Merly Murillo MD  GI/Hepatology Fellow, PGY-4  Long Range Pager: [phone], Short Range Pager: [phone]    MONDAY-FRIDAY 8AM-5PM:  Please message via ScaleIO or email [email or web address]   On Weekends/Holidays (All day) and Weekdays after 5 PM to 8 AM  For nonurgent consults please email:  Please email [email or web address] OR [email or web address]    URGENT CONSULTS:  Please contact on call GI team. See Amion schedule ([facility]), Spok paging system ([facility]), or call hospital  ([facility])

## 2024-10-28 NOTE — PROGRESS NOTE ADULT - PROBLEM SELECTOR PLAN 4
Patient with waxing and waning mental status. Per chart review- similar episode at OSH related to hypoactive delirium   - daughter reports mental status improved on IVF  - encourage po intake  - avoid sedatives/benzo, frequent reorientation  -  BMP reviewed, no sig electrolyte issues, kidney function at baseline   VBG pH 7.36, pCO2 48, no CO2 retention  Ammonia level 12 (wnl)  CBC stable, normal WBC 6.1, H/H stable 8.5/28  - Dc'd IV Morphine   - Delirium precautions   - Seen by Psych, daughter declined seroquel  - IVF supplementation

## 2024-10-29 LAB
ANION GAP SERPL CALC-SCNC: 10 MMOL/L — SIGNIFICANT CHANGE UP (ref 7–14)
BUN SERPL-MCNC: 8 MG/DL — SIGNIFICANT CHANGE UP (ref 7–23)
CALCIUM SERPL-MCNC: 8.9 MG/DL — SIGNIFICANT CHANGE UP (ref 8.4–10.5)
CHLORIDE SERPL-SCNC: 98 MMOL/L — SIGNIFICANT CHANGE UP (ref 98–107)
CO2 SERPL-SCNC: 26 MMOL/L — SIGNIFICANT CHANGE UP (ref 22–31)
CREAT SERPL-MCNC: 0.48 MG/DL — LOW (ref 0.5–1.3)
EGFR: 105 ML/MIN/1.73M2 — SIGNIFICANT CHANGE UP
GLUCOSE SERPL-MCNC: 111 MG/DL — HIGH (ref 70–99)
HCT VFR BLD CALC: 26.6 % — LOW (ref 39–50)
HGB BLD-MCNC: 8.2 G/DL — LOW (ref 13–17)
MCHC RBC-ENTMCNC: 26.4 PG — LOW (ref 27–34)
MCHC RBC-ENTMCNC: 30.8 GM/DL — LOW (ref 32–36)
MCV RBC AUTO: 85.5 FL — SIGNIFICANT CHANGE UP (ref 80–100)
NRBC # BLD: 0 /100 WBCS — SIGNIFICANT CHANGE UP (ref 0–0)
NRBC # FLD: 0 K/UL — SIGNIFICANT CHANGE UP (ref 0–0)
PLATELET # BLD AUTO: 203 K/UL — SIGNIFICANT CHANGE UP (ref 150–400)
POTASSIUM SERPL-MCNC: 4.1 MMOL/L — SIGNIFICANT CHANGE UP (ref 3.5–5.3)
POTASSIUM SERPL-SCNC: 4.1 MMOL/L — SIGNIFICANT CHANGE UP (ref 3.5–5.3)
RBC # BLD: 3.11 M/UL — LOW (ref 4.2–5.8)
RBC # FLD: 19.5 % — HIGH (ref 10.3–14.5)
SODIUM SERPL-SCNC: 134 MMOL/L — LOW (ref 135–145)
WBC # BLD: 5.07 K/UL — SIGNIFICANT CHANGE UP (ref 3.8–10.5)
WBC # FLD AUTO: 5.07 K/UL — SIGNIFICANT CHANGE UP (ref 3.8–10.5)

## 2024-10-29 PROCEDURE — 99232 SBSQ HOSP IP/OBS MODERATE 35: CPT

## 2024-10-29 PROCEDURE — 99223 1ST HOSP IP/OBS HIGH 75: CPT

## 2024-10-29 PROCEDURE — 99233 SBSQ HOSP IP/OBS HIGH 50: CPT

## 2024-10-29 RX ORDER — MORPHINE SULFATE 30 MG/1
2 TABLET, EXTENDED RELEASE ORAL EVERY 8 HOURS
Refills: 0 | Status: DISCONTINUED | OUTPATIENT
Start: 2024-10-29 | End: 2024-11-05

## 2024-10-29 RX ORDER — MORPHINE SULFATE 30 MG/1
4 TABLET, EXTENDED RELEASE ORAL EVERY 8 HOURS
Refills: 0 | Status: DISCONTINUED | OUTPATIENT
Start: 2024-10-29 | End: 2024-11-02

## 2024-10-29 RX ORDER — SODIUM HYPOCHLORITE 0.057 %
1 GEL (GRAM) TOPICAL DAILY
Refills: 0 | Status: DISCONTINUED | OUTPATIENT
Start: 2024-10-29 | End: 2024-11-12

## 2024-10-29 RX ADMIN — Medication 10 MILLIGRAM(S): at 22:21

## 2024-10-29 RX ADMIN — NICOTINE POLACRILEX 1 PATCH: 4 GUM, CHEWING ORAL at 14:00

## 2024-10-29 RX ADMIN — Medication 1 DROP(S): at 17:42

## 2024-10-29 RX ADMIN — Medication 1 DROP(S): at 22:21

## 2024-10-29 RX ADMIN — LOSARTAN POTASSIUM 25 MILLIGRAM(S): 25 TABLET ORAL at 06:11

## 2024-10-29 RX ADMIN — ERTAPENEM SODIUM 120 MILLIGRAM(S): 1 INJECTION, POWDER, LYOPHILIZED, FOR SOLUTION INTRAMUSCULAR; INTRAVENOUS at 14:59

## 2024-10-29 RX ADMIN — MORPHINE SULFATE 2 MILLIGRAM(S): 30 TABLET, EXTENDED RELEASE ORAL at 23:30

## 2024-10-29 RX ADMIN — Medication 50 MILLIGRAM(S): at 06:11

## 2024-10-29 RX ADMIN — HEPARIN SODIUM 5000 UNIT(S): 10000 INJECTION INTRAVENOUS; SUBCUTANEOUS at 13:41

## 2024-10-29 RX ADMIN — Medication 75 MILLILITER(S): at 13:52

## 2024-10-29 RX ADMIN — MORPHINE SULFATE 2 MILLIGRAM(S): 30 TABLET, EXTENDED RELEASE ORAL at 22:36

## 2024-10-29 RX ADMIN — NICOTINE POLACRILEX 1 PATCH: 4 GUM, CHEWING ORAL at 08:16

## 2024-10-29 RX ADMIN — Medication 1000 MILLIGRAM(S): at 14:00

## 2024-10-29 RX ADMIN — MORPHINE SULFATE 2 MILLIGRAM(S): 30 TABLET, EXTENDED RELEASE ORAL at 11:06

## 2024-10-29 RX ADMIN — HEPARIN SODIUM 5000 UNIT(S): 10000 INJECTION INTRAVENOUS; SUBCUTANEOUS at 06:11

## 2024-10-29 RX ADMIN — PANTOPRAZOLE SODIUM 40 MILLIGRAM(S): 40 TABLET, DELAYED RELEASE ORAL at 06:11

## 2024-10-29 RX ADMIN — Medication 1 DROP(S): at 06:12

## 2024-10-29 RX ADMIN — NICOTINE POLACRILEX 1 PATCH: 4 GUM, CHEWING ORAL at 13:42

## 2024-10-29 RX ADMIN — Medication 81 MILLIGRAM(S): at 13:40

## 2024-10-29 RX ADMIN — HEPARIN SODIUM 5000 UNIT(S): 10000 INJECTION INTRAVENOUS; SUBCUTANEOUS at 22:21

## 2024-10-29 RX ADMIN — Medication 1000 MILLIGRAM(S): at 06:11

## 2024-10-29 RX ADMIN — MORPHINE SULFATE 2 MILLIGRAM(S): 30 TABLET, EXTENDED RELEASE ORAL at 12:08

## 2024-10-29 RX ADMIN — Medication 1000 MILLIGRAM(S): at 13:40

## 2024-10-29 RX ADMIN — Medication 1000 MILLIGRAM(S): at 08:16

## 2024-10-29 RX ADMIN — FLUCONAZOLE, SODIUM CHLORIDE 400 MILLIGRAM(S): 2 INJECTION INTRAVENOUS at 22:21

## 2024-10-29 RX ADMIN — Medication 25 MILLIGRAM(S): at 06:11

## 2024-10-29 RX ADMIN — NICOTINE POLACRILEX 1 PATCH: 4 GUM, CHEWING ORAL at 18:00

## 2024-10-29 NOTE — PROGRESS NOTE ADULT - NS ATTEND AMEND GEN_ALL_CORE FT
Rectal cancer locally advanced s/p prior neoadj chemo, radiation and more recent diverting colostomy at [facility]  Was at [facility] for several weeks, on abx.  Discharged and then presented here the following day with SP and genital/perineum Fourniers  Also known sacral decub  S/p debridement of genitalia and perineum 10/13    Undergoing daily dressing changes - wound appears granulating well - stable  Colorectal surgery and GI c/s re blood in ostomy and per rectum.  Will rediscuss palliative/GOC, previously daughter had declined  Cont abx  Wound care  F/u ID, Medicine, Surg, GI

## 2024-10-29 NOTE — PROGRESS NOTE ADULT - SUBJECTIVE AND OBJECTIVE BOX
CHIEF COMPLAINT: f/u     SUBJECTIVE / OVERNIGHT EVENTS: Patient seen and examined. Discussed w/  team - patient w/ new bright red bleeding from ostomy and also from rectum. Patient not participating in HPI.     MEDICATIONS  (STANDING):  acetaminophen     Tablet .. 1000 milliGRAM(s) Oral every 6 hours  aspirin  chewable 81 milliGRAM(s) Oral daily  brimonidine 0.2% Ophthalmic Solution 1 Drop(s) Both EYES every 12 hours  ertapenem  IVPB 1000 milliGRAM(s) IV Intermittent every 24 hours  fluconAZOLE   Tablet 400 milliGRAM(s) Oral every 24 hours  heparin   Injectable 5000 Unit(s) SubCutaneous every 8 hours  latanoprost 0.005% Ophthalmic Solution 1 Drop(s) Both EYES at bedtime  losartan 25 milliGRAM(s) Oral daily  metoprolol succinate ER 50 milliGRAM(s) Oral daily  nicotine - 21 mG/24Hr(s) Patch 1 Patch Transdermal daily  pantoprazole    Tablet 40 milliGRAM(s) Oral before breakfast  rosuvastatin 10 milliGRAM(s) Oral at bedtime  spironolactone 25 milliGRAM(s) Oral daily  timolol 0.5% Solution 1 Drop(s) Both EYES every 12 hours    MEDICATIONS  (PRN):  aluminum hydroxide/magnesium hydroxide/simethicone Suspension 30 milliLiter(s) Oral every 6 hours PRN Dyspepsia  morphine  - Injectable 4 milliGRAM(s) IV Push every 8 hours PRN Severe Pain (7 - 10)  morphine  - Injectable 2 milliGRAM(s) IV Push every 8 hours PRN Moderate Pain (4 - 6)      VITALS:  T(F): 98.2 (10-29-24 @ 09:30), Max: 98.2 (10-29-24 @ 09:30)  HR: 71 (10-29-24 @ 09:30) (68 - 75)  BP: 118/75 (10-29-24 @ 09:30) (117/75 - 125/69)  RR: 18 (10-29-24 @ 09:30) (17 - 18)  SpO2: 100% (10-29-24 @ 09:30)  Wt(kg): --    PHYSICAL EXAM:  GENERAL: elderly male in NAD  EYES: conjunctiva and sclera clear  CHEST/LUNG: Clear to auscultation bilaterally; No wheeze  HEART: Regular rate and rhythm; No murmurs, rubs, or gallops  ABDOMEN: Soft, Nontender, Nondistended; ostomy in place w/ dark bloody stool   EXTREMITIES:  2+ Peripheral Pulses, No clubbing, cyanosis, or edema  NEUROLOGY: mumbling to self, not participating with exam     LABS:              8.2                  x    | x    | x            5.07  >-----------< 203     ------------------------< x                     26.6                 x    | x    | x                                            Ca x     Mg x     Ph x                  Urinalysis Basic - ( 28 Oct 2024 08:56 )    Color: x / Appearance: x / SG: x / pH: x  Gluc: 105 mg/dL / Ketone: x  / Bili: x / Urobili: x   Blood: x / Protein: x / Nitrite: x   Leuk Esterase: x / RBC: x / WBC x   Sq Epi: x / Non Sq Epi: x / Bacteria: x      [x] Care Discussed with Consultants/Other Providers: Urology PA - discussed BRB from ostomy

## 2024-10-29 NOTE — CONSULT NOTE ADULT - TIME-BASED BILLING (NON-CRITICAL CARE)
Contacted patient due to disconnect at 072. Patient stated he was doing fine. He stated the light flashing red. Patient will be replacing battery and reconnect again.   
Time-based billing (NON-critical care)
Time-based billing (NON-critical care)

## 2024-10-29 NOTE — PROGRESS NOTE ADULT - SUBJECTIVE AND OBJECTIVE BOX
POD #17  Afeb 118/69 68 95%RA    Pt has no c/o; allowed us to look at colostomy which is filled with maroon colored liquid, different from yesterday; the stool in the bag yesterday was sreaked with fresh blood; crit better than before              blood seen on chuk coming from rectal area  Abd- soft  Zamarripa 750  Col - filled with maroon fluid, as well as blood coming from rectal area  GI consulted yesterday; says we need to do cards w/u before they will scope him

## 2024-10-29 NOTE — PROGRESS NOTE ADULT - PROBLEM SELECTOR PLAN 2
Patient with history of locally advanced rectal cancer  - follows with [facility] oncology  - Consider Palliative care for assistance with GOC and symptom management

## 2024-10-29 NOTE — CONSULT NOTE ADULT - SUBJECTIVE AND OBJECTIVE BOX
Patient is a 79y old  Male who presents with a chief complaint of suprapubic pain (29 Oct 2024 11:32)      HPI:  HPI:  78 yo M with history of CAD s/p stents in 2012 (ASA), locally advanced rectal cancer s/p neoadjuvant therapy with chemoRT (completed 11/2022), consolidation CAPOX (2/2023), brachytherapy (3/2024), presenting for generalized weakness, chest discomfort, rectal pain. Pt was here at [facility] in 8/30 for fatigue, weakness, and falls, found to have perforated rectal mass with perirectal collection, 2.5 x 1.5 cm. Partially imaged abscess in the perineum, extending into the root of the penis, measures at least 7.4 x 2.3 cm.  There are additional foci of fluid and gas tracking along the penile shaft.     Seen by Dr. Santoro at that time, offered I&D and diverting colostomy but left and went to [facility] for care, and at [facility] s/p lap diverting end sigmoid colostomy with cystoscopy and naranjo placement across defect and perineal/scrotal drainage by Colorectal Surgery and Urology 8/30.    He presented to [facility] 1 day after discharge from [facility] complaining of dizziness and malaise. In ED he was found to be in atrial fibrillation. He is not on AC, h/o a known right leg DVT. Per daughter, patient had recently been treated for multiple scrotal/perineal area abscess with IV abx and debridement at [facility]. Patient is full code at this time.    On evaluation in the ED, patient is afebrile, -120s in Afib, SBP high 90s-low 100s. Pt AOx1. Denies pain at rest,  fever, chills, dizziness, SOB.     WBC 6.22  Hgb 9.8  Cr. 0.62  Troponins 62 and BNP 31,000  Lactate 2.5  Urine from naranjo showing LE, bacteria, WBC    In ED, received clinda/vanc/zosyn        PAST MEDICAL & SURGICAL HISTORY:  CAD s/p cardiac stent    HTN    rectal cancer, s/p chemo at [facility]       MEDICATIONS  (STANDING):  piperacillin/tazobactam IVPB.- 3.375 Gram(s) IV Intermittent once  piperacillin/tazobactam IVPB.. 3.375 Gram(s) IV Intermittent every 8 hours    MEDICATIONS  (PRN):      FAMILY HISTORY:      Allergies    No Known Allergies    Intolerances        SOCIAL HISTORY:active smoker    REVIEW OF SYSTEMS:   Otherwise negative as stated in HPI       (12 Oct 2024 12:03)      PAST MEDICAL & SURGICAL HISTORY:  No significant past surgical history          MEDICATIONS  (STANDING):  acetaminophen     Tablet .. 1000 milliGRAM(s) Oral every 6 hours  aspirin  chewable 81 milliGRAM(s) Oral daily  brimonidine 0.2% Ophthalmic Solution 1 Drop(s) Both EYES every 12 hours  dextrose 5% + sodium chloride 0.45%. 1000 milliLiter(s) (75 mL/Hr) IV Continuous <Continuous>  ertapenem  IVPB 1000 milliGRAM(s) IV Intermittent every 24 hours  fluconAZOLE   Tablet 400 milliGRAM(s) Oral every 24 hours  heparin   Injectable 5000 Unit(s) SubCutaneous every 8 hours  latanoprost 0.005% Ophthalmic Solution 1 Drop(s) Both EYES at bedtime  losartan 25 milliGRAM(s) Oral daily  metoprolol succinate ER 50 milliGRAM(s) Oral daily  nicotine - 21 mG/24Hr(s) Patch 1 Patch Transdermal daily  pantoprazole    Tablet 40 milliGRAM(s) Oral before breakfast  rosuvastatin 10 milliGRAM(s) Oral at bedtime  spironolactone 25 milliGRAM(s) Oral daily  timolol 0.5% Solution 1 Drop(s) Both EYES every 12 hours    MEDICATIONS  (PRN):  aluminum hydroxide/magnesium hydroxide/simethicone Suspension 30 milliLiter(s) Oral every 6 hours PRN Dyspepsia  morphine  - Injectable 4 milliGRAM(s) IV Push every 8 hours PRN Severe Pain (7 - 10)  morphine  - Injectable 2 milliGRAM(s) IV Push every 8 hours PRN Moderate Pain (4 - 6)      ICU Vital Signs Last 24 Hrs  T(C): 36.8 (29 Oct 2024 09:30), Max: 36.8 (29 Oct 2024 09:30)  T(F): 98.2 (29 Oct 2024 09:30), Max: 98.2 (29 Oct 2024 09:30)  HR: 71 (29 Oct 2024 09:30) (68 - 75)  BP: 118/75 (29 Oct 2024 09:30) (117/75 - 125/69)  BP(mean): --  ABP: --  ABP(mean): --  RR: 18 (29 Oct 2024 09:30) (17 - 18)  SpO2: 100% (29 Oct 2024 09:30) (95% - 100%)    O2 Parameters below as of 29 Oct 2024 09:30  Patient On (Oxygen Delivery Method): room air            Vital Signs Last 24 Hrs  T(C): 36.8 (29 Oct 2024 09:30), Max: 36.8 (29 Oct 2024 09:30)  T(F): 98.2 (29 Oct 2024 09:30), Max: 98.2 (29 Oct 2024 09:30)  HR: 71 (29 Oct 2024 09:30) (68 - 75)  BP: 118/75 (29 Oct 2024 09:30) (117/75 - 125/69)  BP(mean): --  RR: 18 (29 Oct 2024 09:30) (17 - 18)  SpO2: 100% (29 Oct 2024 09:30) (95% - 100%)    Parameters below as of 29 Oct 2024 09:30  Patient On (Oxygen Delivery Method): room air                              8.2    5.07  )-----------( 203      ( 29 Oct 2024 11:00 )             26.6                 COMMENTS/PLAN:  Patient reports he has no pain and has been relieved by the pain medications he has been receiving. Mentated A&Ox3. Called patient's daughter Tiffanie, who left a note to call her as she is the health care proxy ([phone]), who states her father cannot tolerate Oxycodone or Dilaudid as it causes hallucinations. He had been taking Morphine since his admission at [facility] but currently does not have any outpatient provider to continue. Patient's daughter deferred Palliative involvement as she was concerned by it's connotations and that it would mean a reduction in life-sustaining care despite being a Full Code. She also expressed desire to continue hospital-like care in home setting, such as having constant IV hydration and narcotics. Discussed at length with Tiffanie that care needs to be established an outpatient provider (PCP, Urologist, Oncologist, Pain Management) who will continue Morphine prescriptions and Pain Service is only inpatient and has no outpatient affiliations or prescription privileges. Recommended contacting health insurance to find available covered providers. Discussed plan with primary team.     [place]  and no medications found. Reference #[id number]    -  Recommend continuing orders for Tylenol and IV Morphine.   -  When appropriate, recommend discontinuing IV morphine and order PO Morphine 7.5mg Q6 PRN moderate - severe pain. Hold for sedation. Not to be given within 1 hour of any immediate acting opioid.  -  Recommend maintaining continuous pulse oximetry.  -  Recommend follow up with Chronic Pain doctor when discharged. If patient does not have a Chronic Pain doctor, may acquire one through patient's personal insurance carrier..   No further recommendations at this time, pain service to sign off. May call Pain Service if needed.   Thank you.

## 2024-10-29 NOTE — CONSULT NOTE ADULT - CONSULT REQUESTED DATE/TIME
12-Oct-2024 15:25
14-Oct-2024 13:34
12-Oct-2024 09:29
29-Oct-2024 12:49
17-Oct-2024 14:27
28-Oct-2024 14:48
29-Oct-2024 21:07
14-Oct-2024 12:34
14-Oct-2024 13:07

## 2024-10-29 NOTE — CHART NOTE - NSCHARTNOTEFT_GEN_A_CORE
GI Chart Note-  Notified by primary team that house cardiology clearing pt for endoscopic evaluation. Extensive discussion had with dtr Tiffanie via phone ([phone]), role of colonoscopy discussed, dtr tentatively amenable to proceed, however prior to procedure would like IVF resumed as she is quite worried about pts hydration status and would additionally like pt's OP cardiologist Dr Leiva involved in care/cardiac risk stratification pre procedure.     Recs-  - pending above, dtr amenable to proceed with colonoscopy, can tentatively plan for tomorrow   - clears today, keep NPO p MN  - check 1AM labs (cbc, bmp, t&s, coags)  - await cardiology risk stratification pre procedure  - will need serum Na >130 for procedure  - continue to hold AC if deemed acceptable risk  - if dtr ultimately amenable to proceed, please order bowel prep: MoviPrep 2L to be given this evening (okay to drink past midnight if needed in order to complete in entirety)  - rest of care per primary team     above dw primary team  randy gi attg

## 2024-10-29 NOTE — CONSULT NOTE ADULT - ASSESSMENT
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 TTE at that time was notable for newly reduced LVEF to 35-40% globally reduced thought to be due to stressed-induced CM vs hx of multivessel CAD. Now with melena in the ostomy     #AF CHADSVASC 4- off AC due to above bleed   #HFrEF EF 35-40% globally reduced   #Preop cardiac clearance for colonoscopy     -cp free, not in decompensated HF, rate controlled AF, pt is optimized from cardiac standpoint to proceed with planned low risk procedure   -cont toprol XL 50 mg QD   -cont crestor 10 mg QHS   -cont losartan 25 mg QD   -cont spironolactone 25 mg QD   -will update OP cardiologist Dr. Leiva     Thank you for allowing us to participate in the care of your patient. If you have any questions or concerns please do not hesitate to contact me 24/7.     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 TTE at that time was notable for newly reduced LVEF to 35-40% globally reduced thought to be due to stressed-induced CM vs hx of multivessel CAD. Now with melena in the ostomy     #AF CHADSVASC 4- off AC due to above bleed   #HFrEF EF 35-40% globally reduced   #Preop cardiac clearance for colonoscopy     -cp free, not in decompensated HF, rate controlled AF, pt is at elevated risk for MACE but no further intervention will mitigate his existing risk factors, pt optimized from cardiac standpoint to proceed with planned low risk procedure   -cont toprol XL 50 mg QD   -cont crestor 10 mg QHS   -cont losartan 25 mg QD   -cont spironolactone 25 mg QD   -updated OP cardiologist Dr. Leiva     Thank you for allowing us to participate in the care of your patient. If you have any questions or concerns please do not hesitate to contact me 24/7.     Nanette Herrera MD   Interventional Cardiology and General Cardiology Attending, [facility]  [employer] Team

## 2024-10-29 NOTE — CONSULT NOTE ADULT - REASON FOR ADMISSION
suprapubic pain

## 2024-10-29 NOTE — PROGRESS NOTE ADULT - ASSESSMENT
Assessment/Plan:    FULL NOTE TO FOLLOW WITH FINAL RECS       Continue low airloss support surface.  Continue to turn and position every 2 hours with z-eve fluidized positioning device.  Continue use of Complete Cair pressure offloading boots.  Continue Nutritional management as per RD recommendations.    Upon discharge follow up at outpatient [facility]. [address] [place] [address]. [phone].    Will continue to follow while inpatient. Please reconsult eralier if needed it.   Thank you.    Disussed findings and plan with primary team.  Yoli Barker, MICHELLE-BC, CWOC    pager #[phone] or available in MS teams     If after 4PM or before 7:30AM on Mon-Friday or weekend/holiday please contact general surgery for urgent matters.   Team A- [id number]   Team B- [id number]  For non-urgent matters e-mail [email or web address]    I spent 50 minutes face-to-face with this patient of which more than 50% of the time was spent counseling/coordinating care of this patient.         Assessment/Plan: 80 yo M with history of CAD s/p stents in 2012 (ASA), locally advanced rectal cancer s/p neoadjuvant therapy with chemoRT (completed 11/2022), consolidation CAPOX (2/2023), brachytherapy (3/2024), presenting for generalized weakness, chest discomfort, rectal pain. Pt was here at [facility] on 8/30 for fatigue, weakness, and falls, found to have perforated rectal mass with perirectal collection, 2.5 x 1.5 cm with spread to infection in anorectal/perineal region with fluid/gas tracking along penile shaft, however pt left for [facility] [facility] [place] sigmoid [place], cystoscopy and naranjo placement across defect and perineal/scrotal drainage 8/30. [place] [facility] perineal infections, treated with Abx as well. Discharged day prior to presentation at [facility]. 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 Now s/p debridement of genitalia and perineum in OR, scrotal I&D and debridement of perirectal abscess with Urology. Followed by ID, tissue cultures with ESBL E Coli, Yumiko albicans, on ertapenem and fluconazole with end date 10/29; abx management per ID.     Wound follow up for sacrum extending distally to sacral/gluteal cleft stage 4 pressure injury. Urology requesting topical recommendations for surgical sites.    Surgical Sites s/p debridement of genitalia and perineum in OR  -Suprapubic, anterior base and right of penile shaft, anterior scrotum; all full thickness wounds some with moist/softening  adherent eschar/slough (adherent at base), areas of pink-moist granulation in suprapubic area and shaft region. Anterior shaft, and scrotum wounds with dead space, no purulence, no crepitus.  -Dead space likely secondary to necrotic  tissue lifting from wound bed   -Recommend packing with Aquacel hydrofiber to dead space areas in anterior shaft and scrotum, leave 2" out visible to wick, continue autolytic debridement with Medihoney gel over areas of necrotic tissue   -No s/s of acute soft tissue infection  -Topical recommendations: cleanse wounds and periwound skin with NS. Apply Liquid barrier film to periwound skin. Apply Medihoney gel to wound bases. Pack depth and dead space of anterior scrotum/anterior shaft  with Aquacel ribbon, leave at least 2" out at end to ensure full removal of packing with subsequent dressing changes. Cover with all with 4x4 gauze and abdominal pads. Can secure with disposable stretch underwear or paper tape. Change daily.      Sacral/gluteal cleft stage 4 pressure injury  -s/p selective debridement of necrotic tissue that was  of wound edges, hemostasis achieved, no active bleeding from wound   -Wound bed revealing at least 40% yellow moist adherent slough and remaining gray/black non fluctuant eschar adherent to base of wound   -(+) Tunneling at 6 o'clock extends 1.5cm   -No purulence    -Does not connect to wounds of scrotal/perineum  -bone palpable in close proximity just beneath necrotic base.  - no fluctuance, no induration, no crepitus.  -WBC wnl, afebrile   -Periwound skin with blanching erythema appears reactive, no induration, no fluctuance   -CT with no mention of sacral ulcer  -urine and stool contained, (+) colostomy and indwelling naranjo catheter.  -Recommend to clean with antimicrobial Dakins solution   -Topical recommendations:  cleanse wound with Dakins solution 1/4 strength,  rinse wound and periwound skin with NS, pat dry. Apply Liquid barrier film to periwound skin. Apply medihoney gel to wound base, pack sacral/gluteal cleft depth and tunneling at 6 o'clock with Aquacel hydrofiber ribbon, leave 2" out at end to wick and visible for fully removal with subsequent dressing changes.  Cover with silicone foam with border. Change daily and prn if soiled/compromised.  -->Continue to offload pressure; low airloss support surface, turn and position per protocol with use of fluidized positioning devices, continue moisture management with use of single breathable incontinence pad, continue to offload heels with complete cair boots.  -Continue to offload pressure; low airloss support surface, turn and position per protocol with use of fluidized positioning devices, continue moisture management with use of single breathable incontinence pad, continue to offload heels with complete cair boots.  -Upon discharge patient should qualify for low airloss support surface; case management/social work to please follow up when medically appropriate.    Additional Recs   Left arm non pitting edema and ecchymosis  in setting of infiltrated IV?- Consider VA venous duplex to r/o DVT of left upper extremity   .Elevate left upper extremity with pillows   Continue low airloss support surface.  Continue to turn and position every 2 hours with z-eve fluidized positioning device.  Continue use of Complete Cair pressure offloading boots.  Continue Nutritional management as per RD recommendations.    Upon discharge follow up at outpatient [facility]. [address] [place] [address]. [phone].    Remainder of care as per primary team, will continue to follow periodically while inpatient. Please reconsult earlier if needed   Thank you.    Discussed findings and plan with primary team Urology ACP Dionne Barker, AGNP-BC, CWCN   pager #[phone] or available in MS teams     If after 4PM or before 7:30AM on Mon-Friday or weekend/holiday please contact general surgery for urgent matters.   Team A- [id number]   Team B- [id number]  For non-urgent matters e-mail [email or web address]    I spent 50 minutes face-to-face with this patient of which more than 50% of the time was spent counseling/coordinating care of this patient.

## 2024-10-29 NOTE — PROGRESS NOTE ADULT - ASSESSMENT
Assessment: 76yr male with Hx diverticulosis, Afib (off elemis for prior episodes of rectal bleeding), locally advanced rectal cancer treated at [facility] [place] [place], know to the service for prior rectal abscess drainage currently on michael and fluconazole s/p multiple I/D for yuri's gangrene. Colorectal surgery consulted for bleeding stoma. Stoma with minimal scant sanguinous output but with nursing reports of apparent gross rectal bleeding. Patient remains hemodynamically stable with H/H 8.5/28.    Plan:  - GI planning for colonoscopy tomorrow given melena in ostomy  - Rectal bleeding likely 2/2 known, locally invasive mass  - Hgb 8.2 from 9.3, daily CBC  - Remains hemodynamically stable    A Team Surgery  [id number]

## 2024-10-29 NOTE — PROGRESS NOTE ADULT - PROBLEM SELECTOR PLAN 1
Melena noted in ostomy, now bright red and with rectal bleeding as well   - Has been on ASA and HSQ but off full dose DOAC due to hx of rectal bleeding in the past  - Recommend protonix 40mg iv bid  - GI following - favor conservative management if Hgb remains stable, however may require colonoscopy   - CRS following as well   - Trend CBC, Hgb 9.3 --> 8.2  - If continued downtrending Hgb, consider holding HSQ and ASA   - Transfuse for Hgb < 8 given hx of CAD

## 2024-10-29 NOTE — PROGRESS NOTE ADULT - SUBJECTIVE AND OBJECTIVE BOX
SURGERY DAILY PROGRESS NOTE    SUBJECTIVE: No acute events overnight. Patient seen and evaluated on AM rounds. He remains hemodynamically stable. Patient continues to have small volume melena from colostomy. Some drops of blood per rectum noted overnight. He is tolerating diet, denies N/V.    OBJECTIVE:  Vital Signs Last 24 Hrs  T(C): 36.8 (29 Oct 2024 09:30), Max: 36.8 (29 Oct 2024 09:30)  T(F): 98.2 (29 Oct 2024 09:30), Max: 98.2 (29 Oct 2024 09:30)  HR: 71 (29 Oct 2024 09:30) (68 - 75)  BP: 118/75 (29 Oct 2024 09:30) (117/75 - 125/69)  BP(mean): --  RR: 18 (29 Oct 2024 09:30) (17 - 18)  SpO2: 100% (29 Oct 2024 09:30) (95% - 100%)    Parameters below as of 29 Oct 2024 09:30  Patient On (Oxygen Delivery Method): room air    I&O's Detail    28 Oct 2024 07:01  -  29 Oct 2024 07:00  --------------------------------------------------------  IN:  Total IN: 0 mL    OUT:    Colostomy (mL): 0 mL    Indwelling Catheter - Urethral (mL): 2200 mL  Total OUT: 2200 mL    Total NET: -2200 mL      29 Oct 2024 07:01  -  29 Oct 2024 13:09  --------------------------------------------------------  IN:  Total IN: 0 mL    OUT:    Indwelling Catheter - Urethral (mL): 400 mL  Total OUT: 400 mL    Total NET: -400 mL    Daily   MEDICATIONS  (STANDING):  acetaminophen     Tablet .. 1000 milliGRAM(s) Oral every 6 hours  aspirin  chewable 81 milliGRAM(s) Oral daily  brimonidine 0.2% Ophthalmic Solution 1 Drop(s) Both EYES every 12 hours  dextrose 5% + sodium chloride 0.45%. 1000 milliLiter(s) (75 mL/Hr) IV Continuous <Continuous>  ertapenem  IVPB 1000 milliGRAM(s) IV Intermittent every 24 hours  fluconAZOLE   Tablet 400 milliGRAM(s) Oral every 24 hours  heparin   Injectable 5000 Unit(s) SubCutaneous every 8 hours  latanoprost 0.005% Ophthalmic Solution 1 Drop(s) Both EYES at bedtime  losartan 25 milliGRAM(s) Oral daily  metoprolol succinate ER 50 milliGRAM(s) Oral daily  nicotine - 21 mG/24Hr(s) Patch 1 Patch Transdermal daily  pantoprazole    Tablet 40 milliGRAM(s) Oral before breakfast  rosuvastatin 10 milliGRAM(s) Oral at bedtime  spironolactone 25 milliGRAM(s) Oral daily  timolol 0.5% Solution 1 Drop(s) Both EYES every 12 hours    MEDICATIONS  (PRN):  aluminum hydroxide/magnesium hydroxide/simethicone Suspension 30 milliLiter(s) Oral every 6 hours PRN Dyspepsia  morphine  - Injectable 2 milliGRAM(s) IV Push every 8 hours PRN Moderate Pain (4 - 6)  morphine  - Injectable 4 milliGRAM(s) IV Push every 8 hours PRN Severe Pain (7 - 10)    LABS:                        8.2    5.07  )-----------( 203      ( 29 Oct 2024 11:00 )             26.6     10-29    134[L]  |  98  |  8   ----------------------------<  111[H]  4.1   |  26  |  0.48[L]    Ca    8.9      29 Oct 2024 11:00  Phos  2.4     10-28  Mg     1.70     10-28    Urinalysis Basic - ( 29 Oct 2024 11:00 )    Color: x / Appearance: x / SG: x / pH: x  Gluc: 111 mg/dL / Ketone: x  / Bili: x / Urobili: x   Blood: x / Protein: x / Nitrite: x   Leuk Esterase: x / RBC: x / WBC x   Sq Epi: x / Non Sq Epi: x / Bacteria: x    PHYSICAL EXAM:  Constitutional: NAD  Pulmonary: Symmetric chest rise bilaterally, on room air  Gastrointestinal: Abdomen soft, nontender, nondistended, colostomy with dark red bloody output  Rectal: Palpable rectal mass, minimal bright red blood in rectal vault, no active bleeding  Extremities: Warm

## 2024-10-29 NOTE — CONSULT NOTE ADULT - TIME BILLING
77  minutes were spent on this encounter for extensive review of medical record details including labs and/or imaging studies and/or adjacent care team and consultant records, as well as review and reconciliation of current medications. Time was spent on obtaining a history, performing physical examination of patient, and answering patient and/or family questions regarding plan of care. Time was also spent discussing plan of care with patient’s other care team members including primary and consulting teams. Time also was spent on documentation of this encounter into the EHR.
Preparing to see the patient including review of tests and other providers' notes, confirming history with family members, performing medical examination and evaluation, counseling and educating the patient/family, ordering medications, tests and procedures, communicating with other health care professionals, documenting clinical information in the EMR, independently interpreting results and communicating results to the patient/family, as well as care coordination.

## 2024-10-29 NOTE — PROGRESS NOTE ADULT - SUBJECTIVE AND OBJECTIVE BOX
[facility]-- WOUND TEAM -- FOLLOW UP NOTE  --------------------------------------------------------------------------------    subjective: Patient seen and examined at bedside.     Interval HPI/24 hour events:   Chart reviewed including labs and relevant images      Diet:  Diet, Clear Liquid (10-28-24 @ 14:38)      ROS: General/ SKIN/ see HPI  all other systems negative      ALLERGIES & MEDICATIONS  --------------------------------------------------------------------------------  Allergies    No Known Allergies    Intolerances          STANDING INPATIENT MEDICATIONS    acetaminophen     Tablet .. 1000 milliGRAM(s) Oral every 6 hours  aspirin  chewable 81 milliGRAM(s) Oral daily  brimonidine 0.2% Ophthalmic Solution 1 Drop(s) Both EYES every 12 hours  Dakins Solution - 1/4 Strength 1 Application(s) Topical daily  dextrose 5% + sodium chloride 0.45%. 1000 milliLiter(s) IV Continuous <Continuous>  ertapenem  IVPB 1000 milliGRAM(s) IV Intermittent every 24 hours  fluconAZOLE   Tablet 400 milliGRAM(s) Oral every 24 hours  heparin   Injectable 5000 Unit(s) SubCutaneous every 8 hours  latanoprost 0.005% Ophthalmic Solution 1 Drop(s) Both EYES at bedtime  losartan 25 milliGRAM(s) Oral daily  metoprolol succinate ER 50 milliGRAM(s) Oral daily  nicotine - 21 mG/24Hr(s) Patch 1 Patch Transdermal daily  pantoprazole    Tablet 40 milliGRAM(s) Oral before breakfast  rosuvastatin 10 milliGRAM(s) Oral at bedtime  spironolactone 25 milliGRAM(s) Oral daily  timolol 0.5% Solution 1 Drop(s) Both EYES every 12 hours      PRN INPATIENT MEDICATION  aluminum hydroxide/magnesium hydroxide/simethicone Suspension 30 milliLiter(s) Oral every 6 hours PRN  morphine  - Injectable 2 milliGRAM(s) IV Push every 8 hours PRN  morphine  - Injectable 4 milliGRAM(s) IV Push every 8 hours PRN        Vital signs:  T(C): 36.8 (10-29-24 @ 13:54), Max: 36.8 (10-29-24 @ 09:30)  HR: 70 (10-29-24 @ 13:54) (68 - 72)  BP: 106/64 (10-29-24 @ 13:54) (106/64 - 125/69)  RR: 17 (10-29-24 @ 13:54) (17 - 18)  SpO2: 100% (10-29-24 @ 13:54) (95% - 100%)  Wt(kg): --        10-28-24 @ 07:01  -  10-29-24 @ 07:00  --------------------------------------------------------  IN: 0 mL / OUT: 2200 mL / NET: -2200 mL    10-29-24 @ 07:01  -  10-29-24 @ 14:11  --------------------------------------------------------  IN: 0 mL / OUT: 400 mL / NET: -400 mL        Physical exam:  General: NAD, A & O x 3, lethargic. Cachetic, temporal wasting, prominent bony prominences. Obese. Funtional quadrapalegic  HEENT: NC/NT, mucosa moist/dry. Poor dentation.  GI: Soft, NT/ND. + BS. Fecal incontinence.  : Indwelling naranjo catheter, condom cathete, penile pouch  Vascular: No temperature changes noted. Increased coolness from midfoot to distal toes. + (  ) DP/PT pulses. Capillary refill < 3 seconds. + Edema bilaterally. Thickened toenails. (+) hemosiderin staining.  Skin: moist, adequate skin turgor. Dry, poor skin turgor. Frail.  Psych: mood and demeanor appropriate  full note to follow    LABS/ CULTURES/ RADIOLOGY:              8.2    5.07  >-----------<  203      [10-29-24 @ 11:00]              26.6     134  |  98  |  8   ----------------------------<  111      [10-29-24 @ 11:00]  4.1   |  26  |  0.48        Ca     8.9     [10-29-24 @ 11:00]      Mg     1.70     [10-28-24 @ 08:56]      Phos  2.4     [10-28-24 @ 08:56]                                           [facility]-- WOUND TEAM -- FOLLOW UP NOTE  --------------------------------------------------------------------------------    subjective: Patient seen and examined at bedside. Patient confused at times, unable to provide subjective information due to mental status. Patient reporting he is cold and requesting to keep blankets over him. Wishes respected. No family at the bedside at the time of assessment     Interval HPI/24 hour events:   -->Appreciate malnutrition follow up for severe malnutrition   -->Patient receiving clear diet since 10/28   -->Appreciate GI and colorectal consults for rectal and colostomy bleeding   -->As per notes, on 10/24- low urine  output, poor oral intake and not output from colostomy bag   -->Behavioral health consulted for sleep disturbances   -->Pain management consulted for abdominal pain   -->X-ray of the right clavicle performed as requested by primary team due to pain   -->Per nurse left arm IV infiltrated, called IV nurse for replacement   -->Patient remains under the care of urology team   -->Remains on diflucan for candida UTI-Management as per primary team   Chart reviewed including labs and relevant images      Diet:  Diet, Clear Liquid (10-28-24 @ 14:38)      ROS: limited, see above     ALLERGIES & MEDICATIONS  --------------------------------------------------------------------------------  Allergies    No Known Allergies    Intolerances      STANDING INPATIENT MEDICATIONS    acetaminophen     Tablet .. 1000 milliGRAM(s) Oral every 6 hours  aspirin  chewable 81 milliGRAM(s) Oral daily  brimonidine 0.2% Ophthalmic Solution 1 Drop(s) Both EYES every 12 hours  Dakins Solution - 1/4 Strength 1 Application(s) Topical daily  dextrose 5% + sodium chloride 0.45%. 1000 milliLiter(s) IV Continuous <Continuous>  ertapenem  IVPB 1000 milliGRAM(s) IV Intermittent every 24 hours  fluconAZOLE   Tablet 400 milliGRAM(s) Oral every 24 hours  heparin   Injectable 5000 Unit(s) SubCutaneous every 8 hours  latanoprost 0.005% Ophthalmic Solution 1 Drop(s) Both EYES at bedtime  losartan 25 milliGRAM(s) Oral daily  metoprolol succinate ER 50 milliGRAM(s) Oral daily  nicotine - 21 mG/24Hr(s) Patch 1 Patch Transdermal daily  pantoprazole    Tablet 40 milliGRAM(s) Oral before breakfast  rosuvastatin 10 milliGRAM(s) Oral at bedtime  spironolactone 25 milliGRAM(s) Oral daily  timolol 0.5% Solution 1 Drop(s) Both EYES every 12 hours    PRN INPATIENT MEDICATION  aluminum hydroxide/magnesium hydroxide/simethicone Suspension 30 milliLiter(s) Oral every 6 hours PRN  morphine  - Injectable 2 milliGRAM(s) IV Push every 8 hours PRN  morphine  - Injectable 4 milliGRAM(s) IV Push every 8 hours PRN    Vital signs:  T(C): 36.8 (10-29-24 @ 13:54), Max: 36.8 (10-29-24 @ 09:30)  HR: 70 (10-29-24 @ 13:54) (68 - 72)  BP: 106/64 (10-29-24 @ 13:54) (106/64 - 125/69)  RR: 17 (10-29-24 @ 13:54) (17 - 18)  SpO2: 100% (10-29-24 @ 13:54) (95% - 100%)    Wt(kg): --59.6 (10-20-24)      10-28-24 @ 07:01  -  10-29-24 @ 07:00  --------------------------------------------------------  IN: 0 mL / OUT: 2200 mL / NET: -2200 mL    10-29-24 @ 07:01  -  10-29-24 @ 14:11  --------------------------------------------------------  IN: 0 mL / OUT: 400 mL / NET: -400 mL      Constitutional: A&O x 1-2, confused, restless at times.    NAD. Frail. Bedound. (+) low airloss support surface, (+) fluidized positioning devices, (+) complete cair boots  HEENT:  NC/AT, nonicteric, oral mucosa dry.  Cardiovascular: rate regular  Respiratory: nonlabored, room air, equal chest expansion.  Gastrointestinal: soft NT/ND, LLQ Colostomy with intact pouch in place; (+) bright red blood in pouch.  Healed laparoscopic surgical sites.  Small-moderate bleeding per rectum  : (+) indwelling naranjo catheter, yellow urine noted   Multiple surgical wounds:  -Suprapubic- [id number] (prev 5urr37ymb0.8cm) - 60% softening adherent eschar/slough,  nonfluctuant, remaining  pink-moist granular base  -Anterior base of penile shaft- 1cmx2.3cmx0.5cm (prev 1cmx2.5cmx0.5cm), Undermining from 9-1 o'clock extends extends 2cm towards suprapubic wound, wounds do not connect underneath - 10% adherent thin tan moist slough, and remaining  pink-moist granular base  -Right of penile shaft- 0.6cmx0.5cmx0.5cm (prev 1.5cmx0.3cmx0.5cm)- 100% pink-moist granular base.  -Anterior scrotum- 2cmx1.7cmx3.7cm (prev 2.5cmx1.8cmx2.5cm)- (+) tunneling at 11 0'clock extends 3.5cm, 100% brown/yellow -moist loosening slough/eschar fixed at base.  *All with scant serous drainage. No purulent drainage, no odor. Periwound skin with dry drainage easily removed with wound cleansing, otherwise  intact; no erythema, no edema, no increased warmth, no fluctuance, no crepitus.  Musculoskeletal: no gross deformities/contractures  Vascular: Left posterior arm large ecchymosis and non pitting edema. No coolness, radial pulse (+2)  BLE equally warm, (+)2 dp pulses, capillary refill < 3 seconds.  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  -Small serofibrinous drainage; no odor.   -Periwound skin with blanching erythema and hyperpigmentation circumferentially, erythema  appears reactive, without induration, no fluctuance, no crepitus, no erythema, no edema, no increased warmth.  Procedure Note - Selective Excisional  Debridement of sacrum wound:   Using aseptic technique sacral wound was excisional debrided using a scissor and forceps through necrotic cap/ nonviable epidermis dermis . Pt tolerated procedure well.  Hemostasis was maintained throughout.    Debulking debridement of well demarcated  necrotic tissue up to the level of subcutaneous tissue  Post debridement measurements 0pue4ehu2.7cm        LABS/ CULTURES/ RADIOLOGY:              8.2    5.07  >-----------<  203      [10-29-24 @ 11:00]              26.6     134  |  98  |  8   ----------------------------<  111      [10-29-24 @ 11:00]  4.1   |  26  |  0.48        Ca     8.9     [10-29-24 @ 11:00]      Mg     1.70     [10-28-24 @ 08:56]      Phos  2.4     [10-28-24 @ 08:56]        ACC: [id number] EXAM:  CT ABDOMEN AND PELVIS   ORDERED BY: SANTIAGO BRICEÑO     PROCEDURE DATE:  10/12/2024          INTERPRETATION:  CLINICAL INFORMATION: Colorectal cancer, history of open   wound and abscess    COMPARISON: 8/30/2024    CONTRAST/COMPLICATIONS:  IV Contrast: None  Oral Contrast: None  Complications: None    PROCEDURE:  CT of the Abdomen and Pelvis was performed.  Sagittal and coronal reformats were performed.    FINDINGS:  LOWER CHEST: Coronary artery calcifications versus stents. Moderate left   and small right pleural effusions with associated atelectasis. Lower lobe   nodules measuring up to 0.8 cm in the left lower lobe. Centrilobular and   paraseptal emphysema. Atelectasis or scarring in the right middle lobe.    LIVER: Lefthepatic lobe cysts. Scattered calcified granulomas.  BILE DUCTS: Normal caliber.  GALLBLADDER: Within normal limits.  SPLEEN: Within normal limits.  PANCREAS: Within normal limits.  ADRENALS: 3.2 cm indeterminate left adrenal nodule.  KIDNEYS/URETERS: Bilateral renal cysts as well as additional   indeterminate lesions which may represent hemorrhagic or proteinaceous   cysts.    BLADDER: Naranjo catheter.  REPRODUCTIVE ORGANS: Prostate gland is enlarged. Ill-defined fluid and   gas within the scrotum.    BOWEL: Surgical clips in the perirectal space. Diverticulosis of the   colon. No evidence of acute diverticulitis. Diverting left lower quadrant   ostomy. No bowel obstruction. Appendix is normal. Fluid and gas is also   present in the perirectal space and probably perineum.  PERITONEUM/RETROPERITONEUM: Within normal limits.  VESSELS: Atherosclerotic disease of the abdominal aorta. There is bilobed   infrarenal abdominal aortic aneurysm measuring up to 5.1 cm. 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  LYMPH NODES: No lymphadenopathy.  ABDOMINAL WALL: Diffuse anasarca. Left lower quadrant diverting colostomy  BONES: Degenerative changes.    IMPRESSION:  Ill-defined fluid and gas collection in the scrotum. Fluid and gas is   also present in the perirectal space and probably perineum. 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    Moderate left and small right pleural effusions with adjacent compressive   atelectasis. Bibasilar pulmonary nodules compatible with metastatic   disease.    There is bilobed infrarenal abdominal aortic aneurysm measuring up to 5.1   cm. 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    Bilateral indeterminant renal lesions. Consider renal ultrasound or   abdominal MRI for further evaluation.    Additional findings as above.    Critical findings were discussed with Dr. Briceño  by Dr. Mcclain on   10/12/2024 6:22:30 AM.    --- End of Report ---            DESEAN MCNULTY MD; Attending Radiologist  This document has been electronically signed. Oct 12 2024 10:38AM

## 2024-10-29 NOTE — PROGRESS NOTE ADULT - ASSESSMENT
80 yo M with history of CAD s/p stents in 2012 (ASA), locally advanced rectal cancer s/p neoadjuvant therapy with chemoRT (completed 11/2022), consolidation CAPOX (2/2023), brachytherapy (3/2024), presenting for generalized weakness, chest discomfort, rectal pain. Pt was here at [facility] in 8/30 for fatigue, weakness, and falls, found to have perforated rectal mass with perirectal collection, 2.5 x 1.5 cm with spread to infection in anorectal/perineal region with fluid/gas tracking along penile shaft, however pt left for MSK and s/p diverting end sigmoid colostomy, cystoscopy and naranjo placement across defect and perineal/scrotal drainage 8/30. [place] [facility] [place] day prior to presentation for perineal infections, treated with abx. Represents here for generalized weakness and dizziness, CTAP non-con on preliminary read indicating ill-defined fluid/gas in scrotum, perirectal space, and probably perineum. Exam demonstrates skin duskiness multiple genitourinary abscesses, induration, purulent drainage. 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    10/13: S/p debridement of genitalia and perineum in OR yesterday. In SICU for post op pressor requirements. Got 500 ccs overnight for hypovolemia on POCUS. Currently off pressors. On zosyn, clinda, vanc. U and bcx pending. Tissue cx negative. Wound packing changed at bedside, incision beds with adequate granulation tissue, healing well. Transferred to floor  10/14 - wounds clean; re-packed; penroses in place; ID consult  10/15 - pt refuses blood draw, and refuses dressing change (it has been 24 hrs since last one; he refused last nite also)                 later on he did allow it - wound appears clean, no necrotic tissue; labs drawn  10/16 - pro-BNP high so echo today  10/17 - changed dressing, took out subprapubic incision penrose drain, left scrotal penrose drain. Echo with reduced EF - cards says iso sepsis, no further workup needed. Changed abx from fluconazole to caspo (2 week course) per ID. Can c/w erta for ESBL e.coli. Cards saying he will need AC eventually,   10/18 - changed dressing this am, last penrose in scrotum d/c'ed. wound care spoke with, can try changing wet to dry dressings to aquacel and change qd. Pt's daughter will hire own aid for wound care and PT, but will need homecare for erta and caspo IV at home.   10/19- changed dressing to aquacel, started Eliquis  10/20- refused exam in AM, dressing to be changed with aquacel in PM   10/21 - changed dressing ; will speak with SW/HC about dispo planning; spoke with daughter for plan; see note by   10/22 - dressing changed; R. arm has swelling, though improved from last hospitalization, as per daughter; [place] [facility] [place], but we will repeat                Pt had blood from rectum today, daughter claims that happened [place] [facility] [place] when eliquis was re-started; I d/c'd eliquis               d/w at great length his needs for home; she does not want to take him home until ABX's are done (next Tues); she wants study R. upper ext swelling even though it was done [place] [facility] [place] and was neg  10/23 - daughter supposedly will come today to learn dressing and colostomy care; we will change dressing then, and speak again to daughter about rectal Ca and GOC; apparently she has not wanted to discuss it in the past, R. arm duplex neg, xray negative, psych consult requested  10/24 - dressings changed this AM, yesterday daughter instructed as to dressing changes and colostomy care, psych to see pt again today as he did not participate in the interview (haldol for agitation), no stool in colostomy ? gas overnight, will get AXR  10/25 - dressing changed this AM. AXR shows nonobstructive distension.  10/26 - new dark red output from colostomy, dressing changed this AM,   10/27 - persistent bloody output from colostomy  10/28 - no new events; will need to change dressing, pt not letting us do it now; later on wounds improved               GI called (see note above)  10/29 - will speak to daughter today (tried calling 3X yesterday) to see if she wants us to pursue rectal and colost bleed (labs stable); gen surg at bedside; d/c erta/flucon  Plan:  - speak to daughter today  - F/u surgery for new dark red colostomy output  - cont erta and fluconazole 10/29  - dressing change qd with medi-honey aquacel per wound care   - continue naranjo; will change before d/c   - continue to hold Eliquis   - f/u medicine  - f/u psych - re-consult as needed   78 yo M with history of CAD s/p stents in 2012 (ASA), locally advanced rectal cancer s/p neoadjuvant therapy with chemoRT (completed 11/2022), consolidation CAPOX (2/2023), brachytherapy (3/2024), presenting for generalized weakness, chest discomfort, rectal pain. Pt was here at [facility] in 8/30 for fatigue, weakness, and falls, found to have perforated rectal mass with perirectal collection, 2.5 x 1.5 cm with spread to infection in anorectal/perineal region with fluid/gas tracking along penile shaft, however pt left for MSK and s/p diverting end sigmoid colostomy, cystoscopy and naranjo placement across defect and perineal/scrotal drainage 8/30. [place] [facility] [place] discharged day prior to presentation for perineal infections, treated with abx. Represents here for generalized weakness and dizziness, CTAP non-con on preliminary read indicating ill-defined fluid/gas in scrotum, perirectal space, and probably perineum. Exam demonstrates skin duskiness multiple genitourinary abscesses, induration, purulent drainage. 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    10/13: S/p debridement of genitalia and perineum in OR yesterday. In SICU for post op pressor requirements. Got 500 ccs overnight for hypovolemia on POCUS. Currently off pressors. On zosyn, clinda, vanc. U and bcx pending. Tissue cx negative. Wound packing changed at bedside, incision beds with adequate granulation tissue, healing well. Transferred to floor  10/14 - wounds clean; re-packed; penroses in place; ID consult  10/15 - pt refuses blood draw, and refuses dressing change (it has been 24 hrs since last one; he refused last nite also)                 later on he did allow it - wound appears clean, no necrotic tissue; labs drawn  10/16 - pro-BNP high so echo today  10/17 - changed dressing, took out subprapubic incision penrose drain, left scrotal penrose drain. Echo with reduced EF - cards says iso sepsis, no further workup needed. Changed abx from fluconazole to caspo (2 week course) per ID. Can c/w erta for ESBL e.coli. Cards saying he will need AC eventually,   10/18 - changed dressing this am, last penrose in scrotum d/c'ed. wound care spoke with, can try changing wet to dry dressings to aquacel and change qd. Pt's daughter will hire own aid for wound care and PT, but will need homecare for erta and caspo IV at home.   10/19- changed dressing to aquacel, started Eliquis  10/20- refused exam in AM, dressing to be changed with aquacel in PM   10/21 - changed dressing ; will speak with SW/HC about dispo planning; spoke with daughter for plan; see note by   10/22 - dressing changed; R. arm has swelling, though improved from last hospitalization, as per daughter; [place] [facility] [place], but we will repeat                Pt had blood from rectum today, daughter claims that happened at [facility] when eliquis was re-started; I d/c'd eliquis               d/w at great length his needs for home; she does not want to take him home until ABX's are done (next Tues); she wants study R. upper ext swelling even though it was done [place] [facility] [place] and was neg  10/23 - daughter supposedly will come today to learn dressing and colostomy care; we will change dressing then, and speak again to daughter about rectal Ca and GOC; apparently she has not wanted to discuss it in the past, R. arm duplex neg, xray negative, psych consult requested  10/24 - dressings changed this AM, yesterday daughter instructed as to dressing changes and colostomy care, psych to see pt again today as he did not participate in the interview (haldol for agitation), no stool in colostomy ? gas overnight, will get AXR  10/25 - dressing changed this AM. AXR shows nonobstructive distension.  10/26 - new dark red output from colostomy, dressing changed this AM,   10/27 - persistent bloody output from colostomy  10/28 - no new events; will need to change dressing, pt not letting us do it now; later on wounds improved               GI called (see note above)  10/29 - will speak to daughter today (tried calling 3X yesterday) to see if she wants us to pursue rectal and colost bleed (labs stable); gen surg at bedside; d/c erta/flucon  Plan:  - speak to daughter today  - F/u surgery and GI for new dark red colostomy output  - cont erta and fluconazole 10/29  - dressing change qd with medi-honey aquacel per wound care   - continue naranjo; will change before d/c   - continue to hold Eliquis   - f/u medicine  - f/u psych - re-consult as needed

## 2024-10-29 NOTE — CONSULT NOTE ADULT - PROVIDER SPECIALTY LIST ADULT
Pain Medicine
Cardiology
Gastroenterology
Infectious Disease
Cardiology
Colorectal Surgery
SICU
Wound Care
Hospitalist

## 2024-10-29 NOTE — PROGRESS NOTE ADULT - PROBLEM SELECTOR PLAN 4
Patient with waxing and waning mental status. Per chart review- similar episode at OSH related to hypoactive delirium   - daughter reports mental status improved on IVF  - encourage po intake  - avoid sedatives/benzo, frequent reorientation  -  BMP reviewed, no sig electrolyte issues, kidney function at baseline   VBG pH 7.36, pCO2 48, no CO2 retention  Ammonia level 12 (wnl)  CBC stable, normal WBC 6.1, H/H stable 8.5/28  - Delirium precautions   - Seen by Psych, daughter declined seroquel  - IVF supplementation

## 2024-10-29 NOTE — CONSULT NOTE ADULT - SUBJECTIVE AND OBJECTIVE BOX
Cardiology Attending Consultation Note     Patient seen and evaluated at bedside    Chief Complaint:     HPI:  78 yo M with history of CAD s/p stents in 2012 (ASA), locally advanced rectal cancer s/p neoadjuvant therapy with chemoRT (completed 11/2022), consolidation CAPOX (2/2023), brachytherapy (3/2024), presenting for generalized weakness, chest discomfort, rectal pain. Pt was here at [facility] in 8/30 for fatigue, weakness, and falls, found to have perforated rectal mass with perirectal collection, 2.5 x 1.5 cm. Partially imaged abscess in the perineum, extending into the root of the penis, measures at least 7.4 x 2.3 cm.  There are additional foci of fluid and gas tracking along the penile shaft. Seen by Dr. Santoro at that time, offered I&D and diverting colostomy but left and went to [facility] for care, and at [facility] s/p lap diverting end sigmoid colostomy with cystoscopy and naranjo placement across defect and perineal/scrotal drainage by [facility] 8/30. He presented to [facility] 1 day after discharge from [facility] complaining of dizziness and malaise. In ED he was found to be in atrial fibrillation. He is not on AC, h/o a known right leg DVT. Per daughter, patient had recently been treated for multiple scrotal/perineal area abscess with IV abx and debridement at [facility]. Patient is full code at this time.       PAST MEDICAL & SURGICAL HISTORY:  CAD s/p cardiac stent    HTN    rectal cancer, s/p chemo at [facility]       MEDICATIONS  (STANDING):  piperacillin/tazobactam IVPB.- 3.375 Gram(s) IV Intermittent once  piperacillin/tazobactam IVPB.. 3.375 Gram(s) IV Intermittent every 8 hours    MEDICATIONS  (PRN):      FAMILY HISTORY:      Allergies    No Known Allergies    Intolerances        SOCIAL HISTORY:active smoker    REVIEW OF SYSTEMS:   Otherwise negative as stated in HPI       (12 Oct 2024 12:03)      PMHx:       PSHx:   No significant past surgical history    No significant past surgical history        Allergies:  No Known Allergies      Home Meds:    Current Medications:   acetaminophen     Tablet .. 1000 milliGRAM(s) Oral every 6 hours  aluminum hydroxide/magnesium hydroxide/simethicone Suspension 30 milliLiter(s) Oral every 6 hours PRN  aspirin  chewable 81 milliGRAM(s) Oral daily  brimonidine 0.2% Ophthalmic Solution 1 Drop(s) Both EYES every 12 hours  Dakins Solution - 1/4 Strength 1 Application(s) Topical daily  dextrose 5% + sodium chloride 0.45%. 1000 milliLiter(s) IV Continuous <Continuous>  fluconAZOLE   Tablet 400 milliGRAM(s) Oral every 24 hours  heparin   Injectable 5000 Unit(s) SubCutaneous every 8 hours  latanoprost 0.005% Ophthalmic Solution 1 Drop(s) Both EYES at bedtime  losartan 25 milliGRAM(s) Oral daily  metoprolol succinate ER 50 milliGRAM(s) Oral daily  morphine  - Injectable 4 milliGRAM(s) IV Push every 8 hours PRN  morphine  - Injectable 2 milliGRAM(s) IV Push every 8 hours PRN  nicotine - 21 mG/24Hr(s) Patch 1 Patch Transdermal daily  pantoprazole    Tablet 40 milliGRAM(s) Oral before breakfast  rosuvastatin 10 milliGRAM(s) Oral at bedtime  spironolactone 25 milliGRAM(s) Oral daily  timolol 0.5% Solution 1 Drop(s) Both EYES every 12 hours      FAMILY HISTORY:      Social History: Personally reviewed   No tobacco, EtOH or IVDU     REVIEW OF SYSTEMS:  Constitutional:     [x ] negative [ ] fevers [ ] chills [ ] weight loss [ ] weight gain  HEENT:                  [x ] negative [ ] dry eyes [ ] eye irritation [ ] postnasal drip [ ] nasal congestion  CV:                         [ x] negative  [ ] chest pain [ ] orthopnea [ ] palpitations [ ] murmur  Resp:                     [x ] negative [ ] cough [ ] shortness of breath [ ] dyspnea [ ] wheezing [ ] sputum [ ]hemoptysis  GI:                          [ x] negative [ ] nausea [ ] vomiting [ ] diarrhea [ ] constipation [ ] abd pain [ ] dysphagia   :                        [ x] negative [ ] dysuria [ ] nocturia [ ] hematuria [ ] increased urinary frequency  Musculoskeletal: [x ] negative [ ] back pain [ ] myalgias [ ] arthralgias [ ] fracture  Skin:                       [ x] negative [ ] rash [ ] itch  Neurological:        [ x] negative [ ] headache [ ] dizziness [ ] syncope [ ] weakness [ ] numbness  Psychiatric:           [ x] negative [ ] anxiety [ ] depression  Endocrine:            [ x] negative [ ] diabetes [ ] thyroid problem  Heme/Lymph:      [ x] negative [ ] anemia [ ] bleeding problem  Allergic/Immune: [ x] negative [ ] itchy eyes [ ] nasal discharge [ ] hives [ ] angioedema    [ x] All other systems negative  [ ] Unable to assess ROS due to      Physical Exam:  T(F): 97.3 (10-29), Max: 98.2 (10-29)  HR: 69 (10-29) (68 - 72)  BP: 126/67 (10-29) (106/64 - 126/67)  RR: 17 (10-29)  SpO2: 100% (10-29)    Gen: Well appearing   HENNT: No JVP   CV: regular rate, regular rhtyhm, no murmur   Pulm: clear to auscultation bilaterally   Abdomen: Non-tender, non-distended   Ext: no edema b/l   Neuro: grossly non-focal     Cardiovascular Diagnostic Testing:      Labs: Personally reviewed                        8.2    5.07  )-----------( 203      ( 29 Oct 2024 11:00 )             26.6     10-29    134[L]  |  98  |  8   ----------------------------<  111[H]  4.1   |  26  |  0.48[L]    Ca    8.9      29 Oct 2024 11:00  Phos  2.4     10-28  Mg     1.70     10-28            TTE 2024   CONCLUSIONS:      1. Left ventricular cavity is normal in size. Left ventricular wall thickness is normal. Left ventricular systolic function is moderately to severely decreased with an ejection fraction visually estimated at 35 to 40 %. Global left ventricular hypokinesis.   2. Enlarged right ventricular cavity size, with normal wall thickness, and probably normal right ventricular systolic function.   3. Left atrium is moderately dilated.   4. No prior echocardiogram is available for comparison.   5. Small pericardial effusion.   6. Left pleural effusion noted.

## 2024-10-29 NOTE — CONSULT NOTE ADULT - CONSULT REASON
pre colonoscopy cardiac clearance
NSTI, intra-operative vasopressors
Sacral pressure injury present on admission
Tara's gangrene, hx of perforated rectal mass
abdominal pain
fourniere gangrene
Hematochezia
HF
Medical Comanagement

## 2024-10-29 NOTE — PROGRESS NOTE ADULT - PROBLEM SELECTOR PLAN 5
Patient with history of CAD, hx of stents years ago, report of cath in 2010 with triple vessel disease   - patient reportedly with TTE from 9/2023 with EF of 72%  - c/w aspirin 81mg qd  - c/w toprol 50mg qd  - c/w rosuvastatin 10mg qhs  - c/w losartan 25mg qd  - c/w [place] 25 QD  - TTE performed 10/16, showing global LV hypokinesis, EF 35-40%  [ ] Cardiology following, follow up recommendations re ischemic evaluation if in line with goals of care

## 2024-10-30 DIAGNOSIS — I50.20 UNSPECIFIED SYSTOLIC (CONGESTIVE) HEART FAILURE: ICD-10-CM

## 2024-10-30 LAB
ANION GAP SERPL CALC-SCNC: 10 MMOL/L — SIGNIFICANT CHANGE UP (ref 7–14)
BLD GP AB SCN SERPL QL: NEGATIVE — SIGNIFICANT CHANGE UP
BUN SERPL-MCNC: 7 MG/DL — SIGNIFICANT CHANGE UP (ref 7–23)
CALCIUM SERPL-MCNC: 8.4 MG/DL — SIGNIFICANT CHANGE UP (ref 8.4–10.5)
CHLORIDE SERPL-SCNC: 99 MMOL/L — SIGNIFICANT CHANGE UP (ref 98–107)
CO2 SERPL-SCNC: 23 MMOL/L — SIGNIFICANT CHANGE UP (ref 22–31)
CREAT SERPL-MCNC: 0.43 MG/DL — LOW (ref 0.5–1.3)
EGFR: 109 ML/MIN/1.73M2 — SIGNIFICANT CHANGE UP
GLUCOSE SERPL-MCNC: 112 MG/DL — HIGH (ref 70–99)
HCT VFR BLD CALC: 26.7 % — LOW (ref 39–50)
HGB BLD-MCNC: 8.3 G/DL — LOW (ref 13–17)
MCHC RBC-ENTMCNC: 26.8 PG — LOW (ref 27–34)
MCHC RBC-ENTMCNC: 31.1 G/DL — LOW (ref 32–36)
MCV RBC AUTO: 86.1 FL — SIGNIFICANT CHANGE UP (ref 80–100)
NRBC # BLD: 0 /100 WBCS — SIGNIFICANT CHANGE UP (ref 0–0)
NRBC # FLD: 0 K/UL — SIGNIFICANT CHANGE UP (ref 0–0)
PLATELET # BLD AUTO: 181 K/UL — SIGNIFICANT CHANGE UP (ref 150–400)
POTASSIUM SERPL-MCNC: 3.9 MMOL/L — SIGNIFICANT CHANGE UP (ref 3.5–5.3)
POTASSIUM SERPL-SCNC: 3.9 MMOL/L — SIGNIFICANT CHANGE UP (ref 3.5–5.3)
RBC # BLD: 3.1 M/UL — LOW (ref 4.2–5.8)
RBC # FLD: 19.7 % — HIGH (ref 10.3–14.5)
RH IG SCN BLD-IMP: NEGATIVE — SIGNIFICANT CHANGE UP
SODIUM SERPL-SCNC: 132 MMOL/L — LOW (ref 135–145)
WBC # BLD: 4.93 K/UL — SIGNIFICANT CHANGE UP (ref 3.8–10.5)
WBC # FLD AUTO: 4.93 K/UL — SIGNIFICANT CHANGE UP (ref 3.8–10.5)

## 2024-10-30 PROCEDURE — 99233 SBSQ HOSP IP/OBS HIGH 50: CPT | Mod: GC

## 2024-10-30 PROCEDURE — 99232 SBSQ HOSP IP/OBS MODERATE 35: CPT

## 2024-10-30 PROCEDURE — 99233 SBSQ HOSP IP/OBS HIGH 50: CPT

## 2024-10-30 RX ORDER — POLYETHYLENE GLYCOL 3350, SODIUM SULFATE ANHYDROUS, SODIUM BICARBONATE, SODIUM CHLORIDE, POTASSIUM CHLORIDE 236; 22.74; 6.74; 5.86; 2.97 G/4L; G/4L; G/4L; G/4L; G/4L
2000 POWDER, FOR SOLUTION ORAL ONCE
Refills: 0 | Status: DISCONTINUED | OUTPATIENT
Start: 2024-10-30 | End: 2024-10-30

## 2024-10-30 RX ORDER — POLYETHYLENE GLYCOL 3350, SODIUM SULFATE ANHYDROUS, SODIUM BICARBONATE, SODIUM CHLORIDE, POTASSIUM CHLORIDE 236; 22.74; 6.74; 5.86; 2.97 G/4L; G/4L; G/4L; G/4L; G/4L
2000 POWDER, FOR SOLUTION ORAL ONCE
Refills: 0 | Status: COMPLETED | OUTPATIENT
Start: 2024-10-30 | End: 2024-10-30

## 2024-10-30 RX ADMIN — Medication 1000 MILLIGRAM(S): at 00:46

## 2024-10-30 RX ADMIN — Medication 1000 MILLIGRAM(S): at 13:32

## 2024-10-30 RX ADMIN — Medication 1000 MILLIGRAM(S): at 18:45

## 2024-10-30 RX ADMIN — HEPARIN SODIUM 5000 UNIT(S): 10000 INJECTION INTRAVENOUS; SUBCUTANEOUS at 06:34

## 2024-10-30 RX ADMIN — NICOTINE POLACRILEX 1 PATCH: 4 GUM, CHEWING ORAL at 07:20

## 2024-10-30 RX ADMIN — HEPARIN SODIUM 5000 UNIT(S): 10000 INJECTION INTRAVENOUS; SUBCUTANEOUS at 14:38

## 2024-10-30 RX ADMIN — Medication 1 DROP(S): at 18:45

## 2024-10-30 RX ADMIN — POLYETHYLENE GLYCOL 3350, SODIUM SULFATE ANHYDROUS, SODIUM BICARBONATE, SODIUM CHLORIDE, POTASSIUM CHLORIDE 2000 MILLILITER(S): 236; 22.74; 6.74; 5.86; 2.97 POWDER, FOR SOLUTION ORAL at 12:30

## 2024-10-30 RX ADMIN — Medication 10 MILLIGRAM(S): at 21:10

## 2024-10-30 RX ADMIN — PANTOPRAZOLE SODIUM 40 MILLIGRAM(S): 40 TABLET, DELAYED RELEASE ORAL at 06:35

## 2024-10-30 RX ADMIN — NICOTINE POLACRILEX 1 PATCH: 4 GUM, CHEWING ORAL at 12:32

## 2024-10-30 RX ADMIN — Medication 50 MILLIGRAM(S): at 06:35

## 2024-10-30 RX ADMIN — Medication 1000 MILLIGRAM(S): at 12:32

## 2024-10-30 RX ADMIN — Medication 1 DROP(S): at 21:14

## 2024-10-30 RX ADMIN — Medication 1000 MILLIGRAM(S): at 01:30

## 2024-10-30 RX ADMIN — LOSARTAN POTASSIUM 25 MILLIGRAM(S): 25 TABLET ORAL at 06:35

## 2024-10-30 RX ADMIN — MORPHINE SULFATE 4 MILLIGRAM(S): 30 TABLET, EXTENDED RELEASE ORAL at 22:09

## 2024-10-30 RX ADMIN — Medication 1000 MILLIGRAM(S): at 06:33

## 2024-10-30 RX ADMIN — Medication 81 MILLIGRAM(S): at 12:33

## 2024-10-30 RX ADMIN — Medication 1 DROP(S): at 06:34

## 2024-10-30 RX ADMIN — Medication 25 MILLIGRAM(S): at 06:34

## 2024-10-30 RX ADMIN — HEPARIN SODIUM 5000 UNIT(S): 10000 INJECTION INTRAVENOUS; SUBCUTANEOUS at 21:14

## 2024-10-30 RX ADMIN — Medication 1 APPLICATION(S): at 12:32

## 2024-10-30 RX ADMIN — MORPHINE SULFATE 4 MILLIGRAM(S): 30 TABLET, EXTENDED RELEASE ORAL at 21:09

## 2024-10-30 RX ADMIN — Medication 75 MILLILITER(S): at 06:32

## 2024-10-30 RX ADMIN — Medication 1000 MILLIGRAM(S): at 07:20

## 2024-10-30 NOTE — PROGRESS NOTE ADULT - PROBLEM SELECTOR PLAN 1
Melena noted in ostomy, now bright red and with rectal bleeding as well   - Has been on ASA and HSQ but off full dose DOAC due to hx of rectal bleeding in the past  - Recommend Protonix 40mg iv bid  - GI following, cardiology cleared patient for colonoscopy. Tentative plan for colonoscopy on 10/31   - CRS following as well   - CBC has remained stable at this time   - If continued downtrending Hgb, consider holding HSQ and ASA   - Transfuse for Hgb < 8 given hx of CAD Melena noted in ostomy, now bright red and with rectal bleeding as well   - Has been on ASA and HSQ but off full dose DOAC due to hx of rectal bleeding in the past  - Recommend Protonix 40mg iv bid  - GI following. Patient optimized for colonoscopy from cardiology standpoint. Tentative plan for colonoscopy on 10/30 vs. 10/31   - CRS following as well   - CBC has remained stable at this time   - If continued downtrending Hgb, consider holding HSQ and ASA   - Transfuse for Hgb < 8 given hx of CAD

## 2024-10-30 NOTE — PROVIDER CONTACT NOTE (OTHER) - REASON
Family member gave patient 4 single saltine crackers despite sign being at the entrance of the room.

## 2024-10-30 NOTE — PROGRESS NOTE ADULT - PROBLEM SELECTOR PLAN 2
Patient with history of locally advanced rectal cancer.   - follows with [facility] oncology  - Rectal bleeding noted, likely from known locally invasive rectal cancer  - CRS following   - Consider Palliative care for assistance with GOC and symptom management, however during previous discussions w/ patient's daughter and other providers, patient is full code, would like all interventions performed.

## 2024-10-30 NOTE — CHART NOTE - NSCHARTNOTEFT_GEN_A_CORE
Patient was only able to drink 1L of moviprep before 2PM- therefore will plan for colonoscopy tomorrow.     - clear liquid diet today, NPO at midnight  - please give dulcolax 10mg now   - please give 2nd liter of moviprep at 6PM   - please ensure patient drinks entire prep  - please ensure morning labs are drawn at 2am, electrolytes replete as necessary, and Hg>7  - rest of care per primary team    Note incomplete until finalized by attending signature/attestation.    Merly Murillo MD  GI/Hepatology Fellow, PGY-4  Long Range Pager: [phone], Short Range Pager: [phone]    MONDAY-FRIDAY 8AM-5PM:  Please message via [employer] or email [email or web address]   On Weekends/Holidays (All day) and Weekdays after 5 PM to 8 AM  For nonurgent consults please email:  Please email [email or web address]    URGENT CONSULTS:  Please contact on call GI team. See Amion schedule ([facility]), [employer] paging system ([facility]), or call hospital  ([facility])

## 2024-10-30 NOTE — PROGRESS NOTE ADULT - SUBJECTIVE AND OBJECTIVE BOX
Cardiology Attending Follow-up Note     Patient seen and examined at bedside. Pt seen earlier today.     Overnight Events:     No significant events. plan for colonoscopy tomorrow.     REVIEW OF SYSTEMS:  Constitutional:     [x ] negative [ ] fevers [ ] chills [ ] weight loss [ ] weight gain  HEENT:                  [x ] negative [ ] dry eyes [ ] eye irritation [ ] postnasal drip [ ] nasal congestion  CV:                         [ x] negative  [ ] chest pain [ ] orthopnea [ ] palpitations [ ] murmur  Resp:                     [ x] negative [ ] cough [ ] shortness of breath [ ] dyspnea [ ] wheezing [ ] sputum [ ]hemoptysis  GI:                          [ x] negative [ ] nausea [ ] vomiting [ ] diarrhea [ ] constipation [ ] abd pain [ ] dysphagia   :                        [ x] negative [ ] dysuria [ ] nocturia [ ] hematuria [ ] increased urinary frequency  Musculoskeletal: [ x] negative [ ] back pain [ ] myalgias [ ] arthralgias [ ] fracture  Skin:                       [ x] negative [ ] rash [ ] itch  Neurological:        [x ] negative [ ] headache [ ] dizziness [ ] syncope [ ] weakness [ ] numbness  Psychiatric:           [ x] negative [ ] anxiety [ ] depression  Endocrine:            [ x] negative [ ] diabetes [ ] thyroid problem  Heme/Lymph:      [ x] negative [ ] anemia [ ] bleeding problem  Allergic/Immune: [ x] negative [ ] itchy eyes [ ] nasal discharge [ ] hives [ ] angioedema    [ x] All other systems negative  [ ] Unable to assess ROS due to    Current Meds:  acetaminophen     Tablet .. 1000 milliGRAM(s) Oral every 6 hours  aluminum hydroxide/magnesium hydroxide/simethicone Suspension 30 milliLiter(s) Oral every 6 hours PRN  aspirin  chewable 81 milliGRAM(s) Oral daily  bisacodyl 10 milliGRAM(s) Oral once  brimonidine 0.2% Ophthalmic Solution 1 Drop(s) Both EYES every 12 hours  Dakins Solution - 1/4 Strength 1 Application(s) Topical daily  dextrose 5% + sodium chloride 0.45%. 1000 milliLiter(s) IV Continuous <Continuous>  heparin   Injectable 5000 Unit(s) SubCutaneous every 8 hours  latanoprost 0.005% Ophthalmic Solution 1 Drop(s) Both EYES at bedtime  losartan 25 milliGRAM(s) Oral daily  metoprolol succinate ER 50 milliGRAM(s) Oral daily  morphine  - Injectable 2 milliGRAM(s) IV Push every 8 hours PRN  morphine  - Injectable 4 milliGRAM(s) IV Push every 8 hours PRN  nicotine - 21 mG/24Hr(s) Patch 1 Patch Transdermal daily  pantoprazole    Tablet 40 milliGRAM(s) Oral before breakfast  rosuvastatin 10 milliGRAM(s) Oral at bedtime  spironolactone 25 milliGRAM(s) Oral daily  timolol 0.5% Solution 1 Drop(s) Both EYES every 12 hours      PAST MEDICAL & SURGICAL HISTORY:  No significant past surgical history          Vitals:  T(F): 97.4 (10-30), Max: 98.6 (10-30)  HR: 67 (10-30) (67 - 72)  BP: 107/65 (10-30) (107/65 - 136/78)  RR: 18 (10-30)  SpO2: 97% (10-30)  I&O's Summary    29 Oct 2024 07:01  -  30 Oct 2024 07:00  --------------------------------------------------------  IN: 0 mL / OUT: 1270 mL / NET: -1270 mL    30 Oct 2024 07:01  -  30 Oct 2024 23:57  --------------------------------------------------------  IN: 0 mL / OUT: 2650 mL / NET: -2650 mL        Physical Exam:  Appearance: No acute distress  HENT: No JVD   Cardiovascular: RRR, S1/S2, no murmurs  Respiratory: CTABL  Gastrointestinal: soft, NT ND, +BS  Musculoskeletal: No clubbing, no edema   Neurologic: Non-focal  Skin: No rashes, ecchymoses, or cyanosis                          8.3    4.93  )-----------( 181      ( 30 Oct 2024 06:48 )             26.7     10-30    132[L]  |  99  |  7   ----------------------------<  112[H]  3.9   |  23  |  0.43[L]    Ca    8.4      30 Oct 2024 06:48                    Cardiovascular Testings:

## 2024-10-30 NOTE — PROGRESS NOTE ADULT - SUBJECTIVE AND OBJECTIVE BOX
CHIEF COMPLAINT: f/u     SUBJECTIVE / OVERNIGHT EVENTS: Patient seen and examined. Reports he is feeling cold and has pain today, would like some pain medication. Per RN, continued dark bloody output in ostomy bag.     MEDICATIONS  (STANDING):  acetaminophen     Tablet .. 1000 milliGRAM(s) Oral every 6 hours  aspirin  chewable 81 milliGRAM(s) Oral daily  brimonidine 0.2% Ophthalmic Solution 1 Drop(s) Both EYES every 12 hours  Dakins Solution - 1/4 Strength 1 Application(s) Topical daily  dextrose 5% + sodium chloride 0.45%. 1000 milliLiter(s) (75 mL/Hr) IV Continuous <Continuous>  heparin   Injectable 5000 Unit(s) SubCutaneous every 8 hours  latanoprost 0.005% Ophthalmic Solution 1 Drop(s) Both EYES at bedtime  losartan 25 milliGRAM(s) Oral daily  metoprolol succinate ER 50 milliGRAM(s) Oral daily  nicotine - 21 mG/24Hr(s) Patch 1 Patch Transdermal daily  pantoprazole    Tablet 40 milliGRAM(s) Oral before breakfast  polyethylene glycol/electrolyte Solution 2000 milliLiter(s) Oral once  rosuvastatin 10 milliGRAM(s) Oral at bedtime  spironolactone 25 milliGRAM(s) Oral daily  timolol 0.5% Solution 1 Drop(s) Both EYES every 12 hours    MEDICATIONS  (PRN):  aluminum hydroxide/magnesium hydroxide/simethicone Suspension 30 milliLiter(s) Oral every 6 hours PRN Dyspepsia  morphine  - Injectable 4 milliGRAM(s) IV Push every 8 hours PRN Severe Pain (7 - 10)  morphine  - Injectable 2 milliGRAM(s) IV Push every 8 hours PRN Moderate Pain (4 - 6)      VITALS:  T(F): 98.6 (10-30-24 @ 09:58), Max: 98.6 (10-30-24 @ 09:58)  HR: 72 (10-30-24 @ 09:58) (68 - 72)  BP: 126/86 (10-30-24 @ 09:58) (106/60 - 128/62)  RR: 18 (10-30-24 @ 09:58) (17 - 18)  SpO2: 98% (10-30-24 @ 09:58)  Wt(kg): --      PHYSICAL EXAM:  GENERAL: elderly male in NAD  EYES: conjunctiva and sclera clear  CHEST/LUNG: Clear to auscultation bilaterally; No wheeze  HEART: Regular rate and rhythm; No murmurs, rubs, or gallops  ABDOMEN: Soft, Nontender, Nondistended; ostomy in place w/ dark bloody stool   EXTREMITIES:  2+ Peripheral Pulses, No clubbing, cyanosis, or edema  NEUROLOGY: more alert today, answering questions appropriately and following exam     LABS:              8.3                  132  | 23   | 7            4.93  >-----------< 181     ------------------------< 112                   26.7                 3.9  | 99   | 0.43                                         Ca 8.4   Mg x     Ph x                  Urinalysis Basic - ( 30 Oct 2024 06:48 )    Color: x / Appearance: x / SG: x / pH: x  Gluc: 112 mg/dL / Ketone: x  / Bili: x / Urobili: x   Blood: x / Protein: x / Nitrite: x   Leuk Esterase: x / RBC: x / WBC x   Sq Epi: x / Non Sq Epi: x / Bacteria: x        [x] Care Discussed with Consultants/Other Providers: Urology PA - discussed cardiology cleared patient, GI plan pending for colonoscopy

## 2024-10-30 NOTE — PROGRESS NOTE ADULT - PROBLEM SELECTOR PLAN 7
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

## 2024-10-30 NOTE — PROCEDURE NOTE - NSPROCDETAILS_GEN_ALL_CORE
sterile technique, indwelling urinary device inserted/sterile technique, non-indwelling urinary device inserted/sterile technique, old cysto removed, new tube inserted/prior to placement, an active physician order for the placement of a urinary catheter was verified sterile technique, indwelling urinary device inserted/sterile technique, non-indwelling urinary device inserted/prior to placement, an active physician order for the placement of a urinary catheter was verified/a urinary catheter insertion kit was used for all insertion materials

## 2024-10-30 NOTE — PROGRESS NOTE ADULT - SUBJECTIVE AND OBJECTIVE BOX
SURGERY DAILY PROGRESS NOTE    SUBJECTIVE: No acute events overnight. Patient seen and evaluated on AM rounds. Patient with some melena in the ostomy bag, more brown stool overnight. Bleeding noted on the emma overnight x1. Remains hemodynamically stable.    OBJECTIVE:  Vital Signs Last 24 Hrs  T(C): 36.7 (30 Oct 2024 06:30), Max: 36.8 (29 Oct 2024 09:30)  T(F): 98 (30 Oct 2024 06:30), Max: 98.2 (29 Oct 2024 09:30)  HR: 71 (30 Oct 2024 06:30) (68 - 71)  BP: 128/62 (30 Oct 2024 06:30) (106/60 - 128/62)  BP(mean): --  RR: 17 (30 Oct 2024 06:30) (17 - 18)  SpO2: 98% (30 Oct 2024 06:30) (98% - 100%)    Parameters below as of 30 Oct 2024 06:30  Patient On (Oxygen Delivery Method): room air    I&O's Detail    29 Oct 2024 07:01  -  30 Oct 2024 07:00  --------------------------------------------------------  IN:  Total IN: 0 mL    OUT:    Colostomy (mL): 0 mL    Indwelling Catheter - Urethral (mL): 1270 mL  Total OUT: 1270 mL    Total NET: -1270 mL    Daily   MEDICATIONS  (STANDING):  acetaminophen     Tablet .. 1000 milliGRAM(s) Oral every 6 hours  aspirin  chewable 81 milliGRAM(s) Oral daily  brimonidine 0.2% Ophthalmic Solution 1 Drop(s) Both EYES every 12 hours  Dakins Solution - 1/4 Strength 1 Application(s) Topical daily  dextrose 5% + sodium chloride 0.45%. 1000 milliLiter(s) (75 mL/Hr) IV Continuous <Continuous>  heparin   Injectable 5000 Unit(s) SubCutaneous every 8 hours  latanoprost 0.005% Ophthalmic Solution 1 Drop(s) Both EYES at bedtime  losartan 25 milliGRAM(s) Oral daily  metoprolol succinate ER 50 milliGRAM(s) Oral daily  nicotine - 21 mG/24Hr(s) Patch 1 Patch Transdermal daily  pantoprazole    Tablet 40 milliGRAM(s) Oral before breakfast  rosuvastatin 10 milliGRAM(s) Oral at bedtime  spironolactone 25 milliGRAM(s) Oral daily  timolol 0.5% Solution 1 Drop(s) Both EYES every 12 hours    MEDICATIONS  (PRN):  aluminum hydroxide/magnesium hydroxide/simethicone Suspension 30 milliLiter(s) Oral every 6 hours PRN Dyspepsia  morphine  - Injectable 4 milliGRAM(s) IV Push every 8 hours PRN Severe Pain (7 - 10)  morphine  - Injectable 2 milliGRAM(s) IV Push every 8 hours PRN Moderate Pain (4 - 6)    LABS:                        8.3    4.93  )-----------( 181      ( 30 Oct 2024 06:48 )             26.7     10-30    132[L]  |  99  |  7   ----------------------------<  112[H]  3.9   |  23  |  0.43[L]    Ca    8.4      30 Oct 2024 06:48  Phos  2.4     10-28  Mg     1.70     10-28    Urinalysis Basic - ( 30 Oct 2024 06:48 )    Color: x / Appearance: x / SG: x / pH: x  Gluc: 112 mg/dL / Ketone: x  / Bili: x / Urobili: x   Blood: x / Protein: x / Nitrite: x   Leuk Esterase: x / RBC: x / WBC x   Sq Epi: x / Non Sq Epi: x / Bacteria: x    PHYSICAL EXAM:  Constitutional: NAD  Pulmonary: Symmetric chest rise bilaterally, on room air  Gastrointestinal: Abdomen soft, nontender, nondistended, colostomy with dark maroon stool mixed with lighter brown non-bloody stool in the ostomy bag  Extremities: Warm

## 2024-10-30 NOTE — PROGRESS NOTE ADULT - ASSESSMENT
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 TTE at that time was notable for newly reduced LVEF to 35-40% globally reduced thought to be due to stressed-induced CM vs hx of multivessel CAD. Now with melena in the ostomy planned for colonoscopy     #AF CHADSVASC 4- off AC due to above bleed, now in SR   #HFrEF EF 35-40% globally reduced known TVD on [facility]   #Preop cardiac clearance for colonoscopy     -cp free, not in decompensated HF, rate controlled AF, pt is at elevated risk for MACE but no further intervention will mitigate his existing risk factors, pt optimized from cardiac standpoint to proceed with planned low risk procedure   -cont toprol XL 50 mg QD   -cont crestor 10 mg QHS   -cont losartan 25 mg QD   -cont spironolactone 25 mg QD   -discussed with pt's outpt cardiologist Dr. Leiva over email.     Thank you for allowing us to participate in the care of your patient. If you have any questions or concerns please do not hesitate to contact me 24/7.     Nanette Herrera MD   Interventional Cardiology and General Cardiology Attending, [facility]DENISE  [employer] on [employer] Team

## 2024-10-30 NOTE — PROGRESS NOTE ADULT - ASSESSMENT
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 Hospital course was complicated by septic shock requiring SICU stay. He also was found to have newly depressed EF on Echo LVEF 35-40%, likely a component of CAD vs. sepsis induced cardiomyopathy. Ischemic evaluation pending until acute medical illness is resolved. Last dose of eliquis was 10/22. He continues on ertapenem and caspofungin.     He has remained hemodynamically normal, however started having melena from his ostomy 10/25 and has been persistent. His last dose of eliquis was 10/23. Hgb has remained stable ~8-9, normocytic MCV 89. Plts are normal. BUN 8. Cr 0.49. CT A/P 10/12 with diverticulosis. Etiology for bleed includes lower GI bleed, potentially an AVM, diverticular bleed, vs rectal cancer that is metastatic, less likely brisk upper GI bleed given hemodynamic stability.    Recommendations:  -Cardiology has determined patient is optimized for EGD/Colonoscopy  -NPO now.  -2L moviprep to be completed before 2pm if patient is able to complete this will plan for procedure today, if not will give 2L moviprep again this evening and plan to perform procedure tomorrow.   -Hgb >7.    Note incomplete until finalized by attending signature/attestation.    Merly Murillo MD  GI/Hepatology Fellow, PGY-4  Long Range Pager: [phone], Short Range Pager: [phone]    MONDAY-FRIDAY 8AM-5PM:  Please message via [employer] or email [email or web address] OR [email or web address]   On Weekends/Holidays (All day) and Weekdays after 5 PM to 8 AM  For nonurgent consults please email:  Please email [email or web address] OR [email or web address]    URGENT CONSULTS:  Please contact on call GI team. See Amion schedule ([facility]), [employer] paging system ([facility]), or call hospital  ([facility])             He is a 79 year old male with a past medical history of obstructive CAD s/p PCI LAD 2012 (asa), AF, bilobed infrarenal abdominal aortic aneurism, locally advanced rectal cancer s/p neoadjuvant therapy with chemoRT completed 11/2022, consolidation CAPOX 2/2023, brachytherapy 3/2024, with recent admission 8/30/2024 found to have perforated rectal mass with perirectal collection s/p diverting end sigmoid colostomy,  presenting for generalized weakness, rectal pain, 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 Hospital course was complicated by septic shock requiring SICU stay. He also was found to have newly depressed EF on Echo LVEF 35-40%, likely a component of CAD vs. sepsis induced cardiomyopathy. Ischemic evaluation pending until acute medical illness is resolved. Last dose of eliquis was 10/22. He continues on ertapenem and caspofungin.     He has remained hemodynamically normal, however started having melena from his ostomy 10/25 and has been persistent. Etiology for bleed includes lower GI bleed, potentially an AVM, diverticular bleed, vs rectal cancer that is metastatic, less likely brisk upper GI bleed given hemodynamic stability.    Recommendations:  -Cardiology has determined patient is optimized for EGD/Colonoscopy  -NPO now.  -2L moviprep to be completed before 2pm if patient is able to complete this will plan for procedure today, if not will give 2L moviprep again this evening and plan to perform procedure tomorrow.   -Hgb >7.    Note incomplete until finalized by attending signature/attestation.    Merly Murillo MD  GI/Hepatology Fellow, PGY-4  Long Range Pager: [phone], Short Range Pager: [phone]    MONDAY-FRIDAY 8AM-5PM:  Please message via [employer] or email [email or web address]   On Weekends/Holidays (All day) and Weekdays after 5 PM to 8 AM  For nonurgent consults please email:  Please email [email or web address]    URGENT CONSULTS:  Please contact on call GI team. See Amion schedule ([facility]), [employer] system ([facility]), or call hospital  ([facility])

## 2024-10-30 NOTE — PROGRESS NOTE ADULT - SUBJECTIVE AND OBJECTIVE BOX
Gastroenterology/Hepatology Progress Note      Interval Events:     Mr. Chong denies any abdominal pain, nausea, vomiting, fever, or chills. He wants to proceed with endoscopic evaluation.     Allergies:  No Known Allergies      Hospital Medications:  acetaminophen     Tablet .. 1000 milliGRAM(s) Oral every 6 hours  aluminum hydroxide/magnesium hydroxide/simethicone Suspension 30 milliLiter(s) Oral every 6 hours PRN  aspirin  chewable 81 milliGRAM(s) Oral daily  brimonidine 0.2% Ophthalmic Solution 1 Drop(s) Both EYES every 12 hours  Dakins Solution - 1/4 Strength 1 Application(s) Topical daily  dextrose 5% + sodium chloride 0.45%. 1000 milliLiter(s) IV Continuous <Continuous>  heparin   Injectable 5000 Unit(s) SubCutaneous every 8 hours  latanoprost 0.005% Ophthalmic Solution 1 Drop(s) Both EYES at bedtime  losartan 25 milliGRAM(s) Oral daily  metoprolol succinate ER 50 milliGRAM(s) Oral daily  morphine  - Injectable 2 milliGRAM(s) IV Push every 8 hours PRN  morphine  - Injectable 4 milliGRAM(s) IV Push every 8 hours PRN  nicotine - 21 mG/24Hr(s) Patch 1 Patch Transdermal daily  pantoprazole    Tablet 40 milliGRAM(s) Oral before breakfast  polyethylene glycol/electrolyte Solution 2000 milliLiter(s) Oral once  rosuvastatin 10 milliGRAM(s) Oral at bedtime  spironolactone 25 milliGRAM(s) Oral daily  timolol 0.5% Solution 1 Drop(s) Both EYES every 12 hours      ROS: 14 point ROS negative unless otherwise state in subjective    PHYSICAL EXAM:   Vital Signs:  Vital Signs Last 24 Hrs  T(C): 37 (30 Oct 2024 09:58), Max: 37 (30 Oct 2024 09:58)  T(F): 98.6 (30 Oct 2024 09:58), Max: 98.6 (30 Oct 2024 09:58)  HR: 72 (30 Oct 2024 09:58) (68 - 72)  BP: 126/86 (30 Oct 2024 09:58) (106/60 - 128/62)  BP(mean): --  RR: 18 (30 Oct 2024 09:58) (17 - 18)  SpO2: 98% (30 Oct 2024 09:58) (98% - 100%)    Parameters below as of 30 Oct 2024 09:58  Patient On (Oxygen Delivery Method): room air      Daily     Daily     GENERAL:  No acute distress  HEENT:  NCAT, no scleral icterus  CHEST: no resp distress  HEART:  RRR  ABDOMEN:  Soft, non-tender, non-distended, normoactive bowel sounds, no masses, colostomy with dark red blood in bag  EXTREMITIES:  No cyanosis, clubbing, or edema  SKIN:  No rash/erythema/ecchymoses/petechiae/wounds/abscess/warm/dry  NEURO:  Alert and oriented x 3, no asterixis, no tremor    LABS:                        8.3    4.93  )-----------( 181      ( 30 Oct 2024 06:48 )             26.7     Mean Cell Volume: 86.1 fL (10-30-24 @ 06:48)    10-30    132[L]  |  99  |  7   ----------------------------<  112[H]  3.9   |  23  |  0.43[L]    Ca    8.4      30 Oct 2024 06:48          Urinalysis Basic - ( 30 Oct 2024 06:48 )    Color: x / Appearance: x / SG: x / pH: x  Gluc: 112 mg/dL / Ketone: x  / Bili: x / Urobili: x   Blood: x / Protein: x / Nitrite: x   Leuk Esterase: x / RBC: x / WBC x   Sq Epi: x / Non Sq Epi: x / Bacteria: x            Imaging:

## 2024-10-30 NOTE — PROGRESS NOTE ADULT - NS ATTEND AMEND GEN_ALL_CORE FT
Rectal cancer locally advanced s/p prior neoadj chemo, radiation and more recent diverting colostomy at [facility]  Was at [facility] for several weeks, on abx.  Discharged and then presented here the following day with SP and genital/perineum Fourniers  Also known sacral decub  S/p debridement of genitalia and perineum 10/13    Undergoing daily dressing changes - wound appears granulating well - stable  Colorectal surgery and GI c/s re blood in ostomy and per rectum.  Gi ok to do endoscopy, awaiting pt daughter   Off abx  Wound care  F/u ID, Medicine, Surg, GI

## 2024-10-30 NOTE — PROGRESS NOTE ADULT - ASSESSMENT
Assessment: 76yr male with Hx diverticulosis, Afib (off elemis for prior episodes of rectal bleeding), locally advanced rectal cancer treated at [facility], know to the service for prior rectal abscess drainage currently on michael and fluconazole s/p multiple I/D for yuri's gangrene. Colorectal surgery consulted for bleeding stoma. Stoma with minimal scant sanguinous output but with nursing reports of apparent gross rectal bleeding. Patient remains hemodynamically stable with H/H 8.5/28.    Plan:  - Patient remains hemodynamically stable  - Colonoscopy with GI pending patient's outpatient cardiologist clearance  - Rectal bleeding likely from known locally invasive rectal cancer  - Hgb 8.3 from 8.2, will continue to trend daily CBC    A Team Surgery  [id number]

## 2024-10-30 NOTE — PROGRESS NOTE ADULT - ASSESSMENT
80 yo M with history of CAD s/p stents in 2012 (ASA), locally advanced rectal cancer s/p neoadjuvant therapy with chemoRT (completed 11/2022), consolidation CAPOX (2/2023), brachytherapy (3/2024), presenting for generalized weakness, chest discomfort, rectal pain. Pt was here at [facility] in 8/30 for fatigue, weakness, and falls, found to have perforated rectal mass with perirectal collection, 2.5 x 1.5 cm with spread to infection in anorectal/perineal region with fluid/gas tracking along penile shaft, however pt left for MSK and s/p diverting end sigmoid colostomy, cystoscopy and naranjo placement across defect and perineal/scrotal drainage 8/30. [place] [facility] [place] discharged day prior to presentation for perineal infections, treated with abx. Represents here for generalized weakness and dizziness, CTAP non-con on preliminary read indicating ill-defined fluid/gas in scrotum, perirectal space, and probably perineum. Exam demonstrates skin duskiness multiple genitourinary abscesses, induration, purulent drainage. 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    10/13: S/p debridement of genitalia and perineum in OR yesterday. In SICU for post op pressor requirements. Got 500 ccs overnight for hypovolemia on POCUS. Currently off pressors. On zosyn, clinda, vanc. U and bcx pending. Tissue cx negative. Wound packing changed at bedside, incision beds with adequate granulation tissue, healing well. Transferred to floor  10/14 - wounds clean; re-packed; penroses in place; ID consult  10/15 - pt refuses blood draw, and refuses dressing change (it has been 24 hrs since last one; he refused last nite also)                 later on he did allow it - wound appears clean, no necrotic tissue; labs drawn  10/16 - pro-BNP high so echo today  10/17 - changed dressing, took out subprapubic incision penrose drain, left scrotal penrose drain. Echo with reduced EF - cards says iso sepsis, no further workup needed. Changed abx from fluconazole to caspo (2 week course) per ID. Can c/w erta for ESBL e.coli. Cards saying he will need AC eventually,   10/18 - changed dressing this am, last penrose in scrotum d/c'ed. wound care spoke with, can try changing wet to dry dressings to aquacel and change qd. Pt's daughter will hire own aid for wound care and PT, but will need homecare for erta and caspo IV at home.   10/19- changed dressing to aquacel, started Eliquis  10/20- refused exam in AM, dressing to be changed with aquacel in PM   10/21 - changed dressing ; will speak with SW/HC about dispo planning; spoke with daughter for plan; see note by   10/22 - dressing changed; R. arm has swelling, though improved from last hospitalization, as per daughter; [place] [facility] [place], but we will repeat                Pt had blood from rectum today, daughter claims that happened at [facility] when eliquis was re-started; I d/c'd eliquis               d/w at great length his needs for home; she does not want to take him home until ABX's are done (next Tues); she wants study R. upper ext swelling even though it was done [place] [facility] and was neg  10/23 - daughter supposedly will come today to learn dressing and colostomy care; we will change dressing then, and speak again to daughter about rectal Ca and GOC; apparently she has not wanted to discuss it in the past, R. arm duplex neg, xray negative, psych consult requested  10/24 - dressings changed this AM, yesterday daughter instructed as to dressing changes and colostomy care, psych to see pt again today as he did not participate in the interview (haldol for agitation), no stool in colostomy ? gas overnight, will get AXR  10/25 - dressing changed this AM. AXR shows nonobstructive distension.  10/26 - new dark red output from colostomy, dressing changed this AM,   10/27 - persistent bloody output from colostomy  10/28 - no new events; will need to change dressing, pt not letting us do it now; later on wounds improved               GI called (see note above)  10/29 - will speak to daughter today (tried calling 3X yesterday) to see if she wants us to pursue rectal and colost bleed (labs stable); gen surg at bedside; d/c erta/flucon  10/30 - Cards and GI consulted; daughter spoken to last nite; she did not want our house cardiologist, so no scope today; her cardiologist does not come here; will speak again to her today and hopefully will consent to scope; labs stable  Plan:  - speak to daughter today  -  GI willing to do scope tomorrow  - antibiotics done  - dressing change qd with medi-honey aquacel per wound care   - continue naranjo; will change before d/c   - continue to hold Eliquis   - f/u medicine

## 2024-10-30 NOTE — PROGRESS NOTE ADULT - PROBLEM SELECTOR PLAN 5
Afib noted on EKG 10/12, reverted to NSR.   - Was not on AC PTA 2/2 hx of rectal bleeding. Started on Eliquis this admission but discontinued given rectal bleeding   - c/w toprol 50mg qd

## 2024-10-30 NOTE — PROGRESS NOTE ADULT - SUBJECTIVE AND OBJECTIVE BOX
POD #18  Afeb 119/63 70 99%RA    Pt has no c/o  Abd- soft; wounds granulating  Zamarripa 320  Col - less blood, more brown  Bleeding from rectal area noted  Cardiology and GI notes appreciated  Daughter contacted last nite and was not ready to consent to endoscopy yet

## 2024-10-30 NOTE — PROGRESS NOTE ADULT - PROBLEM SELECTOR PLAN 4
TTE performed 10/16/24, showing global LV hypokinesis, EF 35-40%  Cardiology following, no plans for ischemic evaluation at this time   - cont toprol XL 50 mg QD   - cont crestor 10 mg QHS   - cont losartan 25 mg QD   - cont spironolactone 25 mg QD  - No evidence of acute decompensated HF

## 2024-10-31 LAB
ANION GAP SERPL CALC-SCNC: 11 MMOL/L — SIGNIFICANT CHANGE UP (ref 7–14)
BUN SERPL-MCNC: 7 MG/DL — SIGNIFICANT CHANGE UP (ref 7–23)
CALCIUM SERPL-MCNC: 8.3 MG/DL — LOW (ref 8.4–10.5)
CHLORIDE SERPL-SCNC: 100 MMOL/L — SIGNIFICANT CHANGE UP (ref 98–107)
CO2 SERPL-SCNC: 19 MMOL/L — LOW (ref 22–31)
CREAT SERPL-MCNC: 0.5 MG/DL — SIGNIFICANT CHANGE UP (ref 0.5–1.3)
EGFR: 104 ML/MIN/1.73M2 — SIGNIFICANT CHANGE UP
GLUCOSE SERPL-MCNC: 99 MG/DL — SIGNIFICANT CHANGE UP (ref 70–99)
HCT VFR BLD CALC: 28.2 % — LOW (ref 39–50)
HGB BLD-MCNC: 8.5 G/DL — LOW (ref 13–17)
MAGNESIUM SERPL-MCNC: 1.7 MG/DL — SIGNIFICANT CHANGE UP (ref 1.6–2.6)
MCHC RBC-ENTMCNC: 26.7 PG — LOW (ref 27–34)
MCHC RBC-ENTMCNC: 30.1 G/DL — LOW (ref 32–36)
MCV RBC AUTO: 88.7 FL — SIGNIFICANT CHANGE UP (ref 80–100)
NRBC # BLD: 0 /100 WBCS — SIGNIFICANT CHANGE UP (ref 0–0)
NRBC # FLD: 0 K/UL — SIGNIFICANT CHANGE UP (ref 0–0)
PHOSPHATE SERPL-MCNC: 2.7 MG/DL — SIGNIFICANT CHANGE UP (ref 2.5–4.5)
PLATELET # BLD AUTO: 143 K/UL — LOW (ref 150–400)
POTASSIUM SERPL-MCNC: 4.3 MMOL/L — SIGNIFICANT CHANGE UP (ref 3.5–5.3)
POTASSIUM SERPL-SCNC: 4.3 MMOL/L — SIGNIFICANT CHANGE UP (ref 3.5–5.3)
RBC # BLD: 3.18 M/UL — LOW (ref 4.2–5.8)
RBC # FLD: 19.9 % — HIGH (ref 10.3–14.5)
SODIUM SERPL-SCNC: 130 MMOL/L — LOW (ref 135–145)
WBC # BLD: 4.3 K/UL — SIGNIFICANT CHANGE UP (ref 3.8–10.5)
WBC # FLD AUTO: 4.3 K/UL — SIGNIFICANT CHANGE UP (ref 3.8–10.5)

## 2024-10-31 PROCEDURE — 99232 SBSQ HOSP IP/OBS MODERATE 35: CPT

## 2024-10-31 PROCEDURE — 99233 SBSQ HOSP IP/OBS HIGH 50: CPT

## 2024-10-31 PROCEDURE — 45378 DIAGNOSTIC COLONOSCOPY: CPT | Mod: GC

## 2024-10-31 RX ORDER — MAGNESIUM SULFATE IN 0.9% NACL 2 G/50 ML
2 INTRAVENOUS SOLUTION, PIGGYBACK (ML) INTRAVENOUS ONCE
Refills: 0 | Status: COMPLETED | OUTPATIENT
Start: 2024-10-31 | End: 2024-10-31

## 2024-10-31 RX ADMIN — NICOTINE POLACRILEX 1 PATCH: 4 GUM, CHEWING ORAL at 15:00

## 2024-10-31 RX ADMIN — Medication 1000 MILLIGRAM(S): at 06:06

## 2024-10-31 RX ADMIN — Medication 50 MILLIGRAM(S): at 05:06

## 2024-10-31 RX ADMIN — Medication 1 DROP(S): at 17:19

## 2024-10-31 RX ADMIN — PANTOPRAZOLE SODIUM 40 MILLIGRAM(S): 40 TABLET, DELAYED RELEASE ORAL at 05:07

## 2024-10-31 RX ADMIN — NICOTINE POLACRILEX 1 PATCH: 4 GUM, CHEWING ORAL at 17:21

## 2024-10-31 RX ADMIN — HEPARIN SODIUM 5000 UNIT(S): 10000 INJECTION INTRAVENOUS; SUBCUTANEOUS at 05:06

## 2024-10-31 RX ADMIN — HEPARIN SODIUM 5000 UNIT(S): 10000 INJECTION INTRAVENOUS; SUBCUTANEOUS at 22:00

## 2024-10-31 RX ADMIN — Medication 1 DROP(S): at 17:20

## 2024-10-31 RX ADMIN — Medication 25 MILLIGRAM(S): at 05:06

## 2024-10-31 RX ADMIN — HEPARIN SODIUM 5000 UNIT(S): 10000 INJECTION INTRAVENOUS; SUBCUTANEOUS at 14:11

## 2024-10-31 RX ADMIN — Medication 1 DROP(S): at 21:56

## 2024-10-31 RX ADMIN — NICOTINE POLACRILEX 1 PATCH: 4 GUM, CHEWING ORAL at 06:28

## 2024-10-31 RX ADMIN — Medication 1000 MILLIGRAM(S): at 17:20

## 2024-10-31 RX ADMIN — Medication 25 GRAM(S): at 07:47

## 2024-10-31 RX ADMIN — Medication 1 DROP(S): at 05:06

## 2024-10-31 RX ADMIN — Medication 1 DROP(S): at 05:05

## 2024-10-31 RX ADMIN — Medication 1000 MILLIGRAM(S): at 05:06

## 2024-10-31 RX ADMIN — Medication 75 MILLILITER(S): at 07:25

## 2024-10-31 RX ADMIN — LOSARTAN POTASSIUM 25 MILLIGRAM(S): 25 TABLET ORAL at 05:06

## 2024-10-31 RX ADMIN — Medication 81 MILLIGRAM(S): at 17:20

## 2024-10-31 NOTE — PROGRESS NOTE ADULT - SUBJECTIVE AND OBJECTIVE BOX
Overnight events:  None, colostomy appliance changed overnight    Subjective:  Pt offers no complaints    Objective:    Vital signs  T(C): , Max: 37 (10-30-24 @ 09:58)  HR: 80 (10-31-24 @ 05:00)  BP: 146/91 (10-31-24 @ 05:00)  SpO2: 100% (10-31-24 @ 05:00)  Wt(kg): --    Output   Zamarripa: 500  Colostomy: 950  10-30 @ 07:01  -  10-31 @ 07:00  --------------------------------------------------------  IN: 0 mL / OUT: 3550 mL / NET: -3550 mL        Gen: NAD  Abd- soft; wounds granulating  Zamarripa stat loc replaced  Col - less blood, more brown    Labs                        8.5    4.30  )-----------( 143      ( 31 Oct 2024 01:40 )             28.2     31 Oct 2024 01:40    130    |  100    |  7      ----------------------------<  99     4.3     |  19     |  0.50     Ca    8.3        31 Oct 2024 01:40  Phos  2.7       31 Oct 2024 01:40  Mg     1.70      31 Oct 2024 01:40

## 2024-10-31 NOTE — PROGRESS NOTE ADULT - ASSESSMENT
80 yo M with history of CAD s/p stents in 2012 (ASA), locally advanced rectal cancer s/p neoadjuvant therapy with chemoRT (completed 11/2022), consolidation CAPOX (2/2023), brachytherapy (3/2024), presenting for generalized weakness, chest discomfort, rectal pain. Pt was here at [facility] in 8/30 for fatigue, weakness, and falls, found to have perforated rectal mass with perirectal collection, 2.5 x 1.5 cm with spread to infection in anorectal/perineal region with fluid/gas tracking along penile shaft, however pt left for MSK and s/p diverting end sigmoid colostomy, cystoscopy and naranjo placement across defect and perineal/scrotal drainage 8/30. Recently at [place] for several weeks, discharged day prior to presentation for perineal infections, treated with abx. Represents here for generalized weakness and dizziness, CTAP non-con on preliminary read indicating ill-defined fluid/gas in scrotum, perirectal space, and probably perineum. Exam demonstrates skin duskiness multiple genitourinary abscesses, induration, purulent drainage. 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    10/13: S/p debridement of genitalia and perineum in OR yesterday. In SICU for post op pressor requirements. Got 500 ccs overnight for hypovolemia on POCUS. Currently off pressors. On zosyn, clinda, vanc. U and bcx pending. Tissue cx negative. Wound packing changed at bedside, incision beds with adequate granulation tissue, healing well. Transferred to floor  10/14 - wounds clean; re-packed; penroses in place; ID consult  10/15 - pt refuses blood draw, and refuses dressing change (it has been 24 hrs since last one; he refused last nite also)                 later on he did allow it - wound appears clean, no necrotic tissue; labs drawn  10/16 - pro-BNP high so echo today  10/17 - changed dressing, took out subprapubic incision penrose drain, left scrotal penrose drain. Echo with reduced EF - cards says iso sepsis, no further workup needed. Changed abx from fluconazole to caspo (2 week course) per ID. Can c/w erta for ESBL e.coli. Cards saying he will need AC eventually,   10/18 - changed dressing this am, last penrose in scrotum d/c'ed. wound care spoke with, can try changing wet to dry dressings to aquacel and change qd. Pt's daughter will hire own aid for wound care and PT, but will need homecare for erta and caspo IV at home.   10/19- changed dressing to aquacel, started Eliquis  10/20- refused exam in AM, dressing to be changed with aquacel in PM   10/21 - changed dressing ; will speak with SW/HC about dispo planning; spoke with daughter for plan; see note by   10/22 - dressing changed; R. arm has swelling, though improved from last hospitalization, as per daughter; testing at [facility] did not reveal a clot, but we will repeat                Pt had blood from rectum today, daughter claims that happened at [place] when eliquis was re-started; I d/c'd eliquis               d/w at great length his needs for home; she does not want to take him home until ABX's are done (next Tues); she wants study R. upper ext swelling even though it was done at [facility] and was neg  10/23 - daughter supposedly will come today to learn dressing and colostomy care; we will change dressing then, and speak again to daughter about rectal Ca and GOC; apparently she has not wanted to discuss it in the past, R. arm duplex neg, xray negative, psych consult requested  10/24 - dressings changed this AM, yesterday daughter instructed as to dressing changes and colostomy care, psych to see pt again today as he did not participate in the interview (haldol for agitation), no stool in colostomy ? gas overnight, will get AXR  10/25 - dressing changed this AM. AXR shows nonobstructive distension.  10/26 - new dark red output from colostomy, dressing changed this AM,   10/27 - persistent bloody output from colostomy  10/28 - no new events; will need to change dressing, pt not letting us do it now; later on wounds improved               GI called (see note above)  10/29 - will speak to daughter today (tried calling 3X yesterday) to see if she wants us to pursue rectal and colost bleed (labs stable); gen surg at bedside; d/c erta/flucon  10/30 - Cards and GI consulted; daughter spoken to last nite; she did not want our house cardiologist, so no scope today; her cardiologist does not come here; will speak again to her today and hopefully will consent to scope; labs stable, naranjo exchanged over wire  10/31 - Cards spoke w/ pt's PMD cards, for coloscopy today, AM labs stable    Plan:  - NPO for colostomy today  - dressing change qd with medi-honey aquacel per wound care   - continue naranjo; will change before d/c   - continue to hold Eliquis   - f/u medicine  - f/u GI  - home care referral

## 2024-10-31 NOTE — PROGRESS NOTE ADULT - SUBJECTIVE AND OBJECTIVE BOX
CHIEF COMPLAINT: f/u     SUBJECTIVE / OVERNIGHT EVENTS: Patient seen and examined. Per discussion with  team, only finished 1/4 prep. Patient confused, thinks he is in endoscopy suite and wants to talk to family in waiting room.     MEDICATIONS  (STANDING):  acetaminophen     Tablet .. 1000 milliGRAM(s) Oral every 6 hours  aspirin  chewable 81 milliGRAM(s) Oral daily  bisacodyl 10 milliGRAM(s) Oral once  brimonidine 0.2% Ophthalmic Solution 1 Drop(s) Both EYES every 12 hours  Dakins Solution - 1/4 Strength 1 Application(s) Topical daily  dextrose 5% + sodium chloride 0.9%. 1000 milliLiter(s) (75 mL/Hr) IV Continuous <Continuous>  heparin   Injectable 5000 Unit(s) SubCutaneous every 8 hours  latanoprost 0.005% Ophthalmic Solution 1 Drop(s) Both EYES at bedtime  losartan 25 milliGRAM(s) Oral daily  metoprolol succinate ER 50 milliGRAM(s) Oral daily  nicotine - 21 mG/24Hr(s) Patch 1 Patch Transdermal daily  pantoprazole    Tablet 40 milliGRAM(s) Oral before breakfast  rosuvastatin 10 milliGRAM(s) Oral at bedtime  spironolactone 25 milliGRAM(s) Oral daily  timolol 0.5% Solution 1 Drop(s) Both EYES every 12 hours    MEDICATIONS  (PRN):  aluminum hydroxide/magnesium hydroxide/simethicone Suspension 30 milliLiter(s) Oral every 6 hours PRN Dyspepsia  morphine  - Injectable 2 milliGRAM(s) IV Push every 8 hours PRN Moderate Pain (4 - 6)  morphine  - Injectable 4 milliGRAM(s) IV Push every 8 hours PRN Severe Pain (7 - 10)      VITALS:  T(F): 98.1 (10-31-24 @ 09:41), Max: 98.1 (10-31-24 @ 09:41)  HR: 74 (10-31-24 @ 09:41) (67 - 80)  BP: 127/60 (10-31-24 @ 09:41) (107/65 - 146/91)  RR: 18 (10-31-24 @ 09:41) (17 - 18)  SpO2: 99% (10-31-24 @ 09:41)  Wt(kg): --      PHYSICAL EXAM:  GENERAL: frail, chronically ill appearing elderly male in NAD  EYES: conjunctiva and sclera clear  CHEST/LUNG: Clear to auscultation bilaterally; No wheeze  HEART: Regular rate and rhythm; No murmurs, rubs, or gallops  ABDOMEN: Soft, Nontender, Nondistended; ostomy in place w/ loose brown stool    EXTREMITIES:  2+ Peripheral Pulses, No clubbing, cyanosis, or edema  NEUROLOGY: confused      LABS:              8.5                  130  | 19   | 7            4.30  >-----------< 143     ------------------------< 99                    28.2                 4.3  | 100  | 0.50                                         Ca 8.3   Mg 1.70  Ph 2.7                Urinalysis Basic - ( 31 Oct 2024 01:40 )    Color: x / Appearance: x / SG: x / pH: x  Gluc: 99 mg/dL / Ketone: x  / Bili: x / Urobili: x   Blood: x / Protein: x / Nitrite: x   Leuk Esterase: x / RBC: x / WBC x   Sq Epi: x / Non Sq Epi: x / Bacteria: x    [x] Care Discussed with Consultants/Other Providers: Urology PA - discussed potential plan for colonoscopy today

## 2024-10-31 NOTE — PROGRESS NOTE ADULT - PROBLEM SELECTOR PLAN 1
Melena noted in ostomy, now bright red and with rectal bleeding as well   - Has been on ASA and HSQ but off full dose DOAC due to hx of rectal bleeding in the past  - Recommend Protonix 40mg iv bid  - GI following. Patient optimized for colonoscopy from cardiology standpoint. Tentative plan for colonoscopy on 10/31   - CRS following as well   - CBC has remained stable at this time   - If continued downtrending Hgb, consider holding HSQ and ASA   - Transfuse for Hgb < 8 given hx of CAD

## 2024-10-31 NOTE — PROGRESS NOTE ADULT - ASSESSMENT
Assessment: 76yr male with Hx diverticulosis, Afib (off elemis for prior episodes of rectal bleeding), locally advanced rectal cancer treated at [facility], know to the service for prior rectal abscess drainage currently on michael and fluconazole s/p multiple I/D for yuri's gangrene. Colorectal surgery consulted for bleeding stoma. Stoma with minimal scant sanguinous output but with nursing reports of apparent gross rectal bleeding. Patient remains hemodynamically stable with H/H 8.5/28.    Plan:  - GI today for colonoscopy  - Patient remains hemodynamically stable  - Rectal bleeding likely from known locally invasive rectal cancer  - Hgb 8.5 from 8.3, will continue to trend daily CBC    A Team Surgery  [id number]

## 2024-10-31 NOTE — PROGRESS NOTE ADULT - SUBJECTIVE AND OBJECTIVE BOX
SURGERY DAILY PROGRESS NOTE    SUBJECTIVE: No acute events overnight. Patient seen and evaluated on AM rounds. Reports drinking half of the prep. Denies abdominal pain.    OBJECTIVE:  Vital Signs Last 24 Hrs  T(C): 36.7 (31 Oct 2024 09:41), Max: 36.7 (30 Oct 2024 14:00)  T(F): 98.1 (31 Oct 2024 09:41), Max: 98.1 (31 Oct 2024 09:41)  HR: 74 (31 Oct 2024 09:41) (67 - 80)  BP: 127/60 (31 Oct 2024 09:41) (107/65 - 146/91)  BP(mean): --  RR: 18 (31 Oct 2024 09:41) (17 - 18)  SpO2: 99% (31 Oct 2024 09:41) (96% - 100%)    Parameters below as of 31 Oct 2024 09:41  Patient On (Oxygen Delivery Method): room air    I&O's Detail    30 Oct 2024 07:01  -  31 Oct 2024 07:00  --------------------------------------------------------  IN:  Total IN: 0 mL    OUT:    Colostomy (mL): 1600 mL    Indwelling Catheter - Urethral (mL): 1950 mL  Total OUT: 3550 mL    Total NET: -3550 mL      31 Oct 2024 07:01  -  31 Oct 2024 11:07  --------------------------------------------------------  IN:  Total IN: 0 mL    OUT:    Indwelling Catheter - Urethral (mL): 300 mL  Total OUT: 300 mL    Total NET: -300 mL    Daily   MEDICATIONS  (STANDING):  acetaminophen     Tablet .. 1000 milliGRAM(s) Oral every 6 hours  aspirin  chewable 81 milliGRAM(s) Oral daily  bisacodyl 10 milliGRAM(s) Oral once  brimonidine 0.2% Ophthalmic Solution 1 Drop(s) Both EYES every 12 hours  Dakins Solution - 1/4 Strength 1 Application(s) Topical daily  dextrose 5% + sodium chloride 0.9%. 1000 milliLiter(s) (75 mL/Hr) IV Continuous <Continuous>  heparin   Injectable 5000 Unit(s) SubCutaneous every 8 hours  latanoprost 0.005% Ophthalmic Solution 1 Drop(s) Both EYES at bedtime  losartan 25 milliGRAM(s) Oral daily  metoprolol succinate ER 50 milliGRAM(s) Oral daily  nicotine - 21 mG/24Hr(s) Patch 1 Patch Transdermal daily  pantoprazole    Tablet 40 milliGRAM(s) Oral before breakfast  rosuvastatin 10 milliGRAM(s) Oral at bedtime  spironolactone 25 milliGRAM(s) Oral daily  timolol 0.5% Solution 1 Drop(s) Both EYES every 12 hours    MEDICATIONS  (PRN):  aluminum hydroxide/magnesium hydroxide/simethicone Suspension 30 milliLiter(s) Oral every 6 hours PRN Dyspepsia  morphine  - Injectable 4 milliGRAM(s) IV Push every 8 hours PRN Severe Pain (7 - 10)  morphine  - Injectable 2 milliGRAM(s) IV Push every 8 hours PRN Moderate Pain (4 - 6)      LABS:                        8.5    4.30  )-----------( 143      ( 31 Oct 2024 01:40 )             28.2     10-31    130[L]  |  100  |  7   ----------------------------<  99  4.3   |  19[L]  |  0.50    Ca    8.3[L]      31 Oct 2024 01:40  Phos  2.7     10-31  Mg     1.70     10-31        Urinalysis Basic - ( 31 Oct 2024 01:40 )    Color: x / Appearance: x / SG: x / pH: x  Gluc: 99 mg/dL / Ketone: x  / Bili: x / Urobili: x   Blood: x / Protein: x / Nitrite: x   Leuk Esterase: x / RBC: x / WBC x   Sq Epi: x / Non Sq Epi: x / Bacteria: x    PHYSICAL EXAM:  Constitutional: NAD  Pulmonary: Symmetric chest rise bilaterally, on room air  Gastrointestinal: Abdomen soft, nontender, nondistended, colostomy stoma pink, light red liquid output in the ostomy bag  Extremities: Warm, well perfused

## 2024-10-31 NOTE — PRE-OP CHECKLIST - LAST DOSE WITHIN LAST 24HRS
Yes
General: AAOx3, NAD  Eyes: The sclera and conjunctiva normal, pupils equal in size.  ENT: The ears and nose were normal in appearance; oropharynx clear, moist mucus membranes.  Neck: The appearance of the neck was normal, with no gross masses or nodules.  Respiratory: Unlabored, no retractions.  Neurological: No focal deficit, moves all extremities.  Exercise Tolerance:  METS>4.

## 2024-11-01 LAB — PHOSPHATE SERPL-MCNC: 2.3 MG/DL — LOW (ref 2.5–4.5)

## 2024-11-01 PROCEDURE — 99232 SBSQ HOSP IP/OBS MODERATE 35: CPT

## 2024-11-01 PROCEDURE — 99233 SBSQ HOSP IP/OBS HIGH 50: CPT | Mod: FS

## 2024-11-01 RX ADMIN — NICOTINE POLACRILEX 1 PATCH: 4 GUM, CHEWING ORAL at 12:23

## 2024-11-01 RX ADMIN — HEPARIN SODIUM 5000 UNIT(S): 10000 INJECTION INTRAVENOUS; SUBCUTANEOUS at 06:52

## 2024-11-01 RX ADMIN — NICOTINE POLACRILEX 1 PATCH: 4 GUM, CHEWING ORAL at 07:02

## 2024-11-01 RX ADMIN — Medication 1000 MILLIGRAM(S): at 11:56

## 2024-11-01 RX ADMIN — Medication 1000 MILLIGRAM(S): at 12:56

## 2024-11-01 RX ADMIN — Medication 25 MILLIGRAM(S): at 06:56

## 2024-11-01 RX ADMIN — Medication 1 DROP(S): at 17:48

## 2024-11-01 RX ADMIN — MORPHINE SULFATE 2 MILLIGRAM(S): 30 TABLET, EXTENDED RELEASE ORAL at 10:13

## 2024-11-01 RX ADMIN — Medication 1 DROP(S): at 21:35

## 2024-11-01 RX ADMIN — MORPHINE SULFATE 2 MILLIGRAM(S): 30 TABLET, EXTENDED RELEASE ORAL at 10:43

## 2024-11-01 RX ADMIN — Medication 1 APPLICATION(S): at 11:57

## 2024-11-01 RX ADMIN — Medication 81 MILLIGRAM(S): at 11:56

## 2024-11-01 RX ADMIN — Medication 1 DROP(S): at 06:54

## 2024-11-01 RX ADMIN — Medication 1 DROP(S): at 06:56

## 2024-11-01 RX ADMIN — HEPARIN SODIUM 5000 UNIT(S): 10000 INJECTION INTRAVENOUS; SUBCUTANEOUS at 13:24

## 2024-11-01 RX ADMIN — Medication 1000 MILLIGRAM(S): at 17:47

## 2024-11-01 RX ADMIN — Medication 10 MILLIGRAM(S): at 21:37

## 2024-11-01 RX ADMIN — Medication 1000 MILLIGRAM(S): at 18:47

## 2024-11-01 RX ADMIN — Medication 1 DROP(S): at 17:47

## 2024-11-01 RX ADMIN — PANTOPRAZOLE SODIUM 40 MILLIGRAM(S): 40 TABLET, DELAYED RELEASE ORAL at 06:57

## 2024-11-01 RX ADMIN — Medication 50 MILLIGRAM(S): at 06:56

## 2024-11-01 RX ADMIN — HEPARIN SODIUM 5000 UNIT(S): 10000 INJECTION INTRAVENOUS; SUBCUTANEOUS at 21:33

## 2024-11-01 RX ADMIN — LOSARTAN POTASSIUM 25 MILLIGRAM(S): 25 TABLET ORAL at 06:56

## 2024-11-01 NOTE — PROGRESS NOTE ADULT - PROBLEM SELECTOR PLAN 7
Patient with waxing and waning mental status. Per chart review- similar episode at OSH related to hypoactive delirium   - daughter reports mental status improved on IVF  - encourage po intake  - avoid sedatives/benzo, frequent reorientation  - BMP reviewed, no sig electrolyte issues, kidney function at baseline   - Ammonia level 12 (wnl)  - Delirium precautions   - Seen by Psych during this admission, daughter declined seroquel

## 2024-11-01 NOTE — PROGRESS NOTE ADULT - SUBJECTIVE AND OBJECTIVE BOX
SURGERY DAILY PROGRESS NOTE    SUBJECTIVE: No acute events overnight. Patient seen and evaluated on AM rounds. Denies abdominal pain, nausea, and vomiting.    OBJECTIVE:  Vital Signs Last 24 Hrs  T(C): 36.4 (01 Nov 2024 09:18), Max: 36.6 (31 Oct 2024 14:50)  T(F): 97.6 (01 Nov 2024 09:18), Max: 97.9 (31 Oct 2024 14:50)  HR: 78 (01 Nov 2024 09:18) (63 - 83)  BP: 140/82 (01 Nov 2024 09:18) (112/67 - 165/87)  BP(mean): --  RR: 18 (01 Nov 2024 09:18) (12 - 19)  SpO2: 97% (01 Nov 2024 09:18) (95% - 100%)    Parameters below as of 01 Nov 2024 09:18  Patient On (Oxygen Delivery Method): room air    I&O's Detail    31 Oct 2024 07:01  -  01 Nov 2024 07:00  --------------------------------------------------------  IN:  Total IN: 0 mL    OUT:    Indwelling Catheter - Urethral (mL): 2200 mL  Total OUT: 2200 mL    Total NET: -2200 mL    Daily   MEDICATIONS  (STANDING):  acetaminophen     Tablet .. 1000 milliGRAM(s) Oral every 6 hours  aspirin  chewable 81 milliGRAM(s) Oral daily  brimonidine 0.2% Ophthalmic Solution 1 Drop(s) Both EYES every 12 hours  Dakins Solution - 1/4 Strength 1 Application(s) Topical daily  dextrose 5% + sodium chloride 0.9%. 1000 milliLiter(s) (75 mL/Hr) IV Continuous <Continuous>  heparin   Injectable 5000 Unit(s) SubCutaneous every 8 hours  latanoprost 0.005% Ophthalmic Solution 1 Drop(s) Both EYES at bedtime  losartan 25 milliGRAM(s) Oral daily  metoprolol succinate ER 50 milliGRAM(s) Oral daily  nicotine - 21 mG/24Hr(s) Patch 1 Patch Transdermal daily  pantoprazole    Tablet 40 milliGRAM(s) Oral before breakfast  rosuvastatin 10 milliGRAM(s) Oral at bedtime  spironolactone 25 milliGRAM(s) Oral daily  timolol 0.5% Solution 1 Drop(s) Both EYES every 12 hours    MEDICATIONS  (PRN):  aluminum hydroxide/magnesium hydroxide/simethicone Suspension 30 milliLiter(s) Oral every 6 hours PRN Dyspepsia  morphine  - Injectable 4 milliGRAM(s) IV Push every 8 hours PRN Severe Pain (7 - 10)  morphine  - Injectable 2 milliGRAM(s) IV Push every 8 hours PRN Moderate Pain (4 - 6)      LABS:                        8.5    4.30  )-----------( 143      ( 31 Oct 2024 01:40 )             28.2     10-31    130[L]  |  100  |  7   ----------------------------<  99  4.3   |  19[L]  |  0.50    Ca    8.3[L]      31 Oct 2024 01:40  Phos  2.7     10-31  Mg     1.70     10-31    Urinalysis Basic - ( 31 Oct 2024 01:40 )    Color: x / Appearance: x / SG: x / pH: x  Gluc: 99 mg/dL / Ketone: x  / Bili: x / Urobili: x   Blood: x / Protein: x / Nitrite: x   Leuk Esterase: x / RBC: x / WBC x   Sq Epi: x / Non Sq Epi: x / Bacteria: x    PHYSICAL EXAM:  Constitutional: NAD  Pulmonary: Symmetric chest rise bilaterally, on room air  Gastrointestinal: Abdomen soft, nontender, nondistended, loop colostomy stoma pink, light brown liquid output in ostomy bag  Extremities: Warm, well perfused   O-Z Flap Text: The defect edges were debeveled with a #15 scalpel blade.  Given the location of the defect, shape of the defect and the proximity to free margins an O-Z flap was deemed most appropriate.  Using a sterile surgical marker, an appropriate transposition flap was drawn incorporating the defect and placing the expected incisions within the relaxed skin tension lines where possible. The area thus outlined was incised deep to adipose tissue with a #15 scalpel blade.  The skin margins were undermined to an appropriate distance in all directions utilizing iris scissors.

## 2024-11-01 NOTE — PROGRESS NOTE ADULT - ASSESSMENT
80 yo M with history of CAD s/p stents in 2012 (ASA), locally advanced rectal cancer s/p neoadjuvant therapy with chemoRT (completed 11/2022), consolidation CAPOX (2/2023), brachytherapy (3/2024), presenting for generalized weakness, chest discomfort, rectal pain. Pt was here at [facility] in 8/30 for fatigue, weakness, and falls, found to have perforated rectal mass with perirectal collection, 2.5 x 1.5 cm with spread to infection in anorectal/perineal region with fluid/gas tracking along penile shaft, however pt left for MSK and s/p diverting end sigmoid colostomy, cystoscopy and naranjo placement across defect and perineal/scrotal drainage 8/30. Recently at [facility] [place] for several weeks, discharged day prior to presentation for perineal infections, treated with abx. Represents here for generalized weakness and dizziness, CTAP non-con on preliminary read indicating ill-defined fluid/gas in scrotum, perirectal space, and probably perineum. Exam demonstrates skin duskiness multiple genitourinary abscesses, induration, purulent drainage. 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    10/13: S/p debridement of genitalia and perineum in OR yesterday. In SICU for post op pressor requirements. Got 500 ccs overnight for hypovolemia on POCUS. Currently off pressors. On zosyn, clinda, vanc. U and bcx pending. Tissue cx negative. Wound packing changed at bedside, incision beds with adequate granulation tissue, healing well. Transferred to floor  10/14 - wounds clean; re-packed; penroses in place; ID consult  10/15 - pt refuses blood draw, and refuses dressing change (it has been 24 hrs since last one; he refused last nite also)                 later on he did allow it - wound appears clean, no necrotic tissue; labs drawn  10/16 - pro-BNP high so echo today  10/17 - changed dressing, took out subprapubic incision penrose drain, left scrotal penrose drain. Echo with reduced EF - cards says iso sepsis, no further workup needed. Changed abx from fluconazole to caspo (2 week course) per ID. Can c/w erta for ESBL e.coli. Cards saying he will need AC eventually,   10/18 - changed dressing this am, last penrose in scrotum d/c'ed. wound care spoke with, can try changing wet to dry dressings to aquacel and change qd. Pt's daughter will hire own aid for wound care and PT, but will need homecare for erta and caspo IV at home.   10/19- changed dressing to aquacel, started Eliquis  10/20- refused exam in AM, dressing to be changed with aquacel in PM   10/21 - changed dressing ; will speak with SW/HC about dispo planning; spoke with daughter for plan; see note by   10/22 - dressing changed; R. arm has swelling, though improved from last hospitalization, as per daughter; testing at [facility] did not reveal a clot, but we will repeat                Pt had blood from rectum today, daughter claims that happened at [facility] when eliquis was re-started; I d/c'd eliquis               d/w at great length his needs for home; she does not want to take him home until ABX's are done (next Tues); she wants study R. upper ext swelling even though it was done at [facility] and was neg  10/23 - daughter supposedly will come today to learn dressing and colostomy care; we will change dressing then, and speak again to daughter about rectal Ca and GOC; apparently she has not wanted to discuss it in the past, R. arm duplex neg, xray negative, psych consult requested  10/24 - dressings changed this AM, yesterday daughter instructed as to dressing changes and colostomy care, psych to see pt again today as he did not participate in the interview (haldol for agitation), no stool in colostomy ? gas overnight, will get AXR  10/25 - dressing changed this AM. AXR shows nonobstructive distension.  10/26 - new dark red output from colostomy, dressing changed this AM,   10/27 - persistent bloody output from colostomy  10/28 - no new events; will need to change dressing, pt not letting us do it now; later on wounds improved               GI called (see note above)  10/29 - will speak to daughter today (tried calling 3X yesterday) to see if she wants us to pursue rectal and colost bleed (labs stable); gen surg at bedside; d/c erta/flucon  10/30 - Cards and GI consulted; daughter spoken to last nite; she did not want our house cardiologist, so no scope today; her cardiologist does not come here; will speak again to her today and hopefully will consent to scope; labs stable, naranjo exchanged over wire  10/31 - Cards spoke w/ pt's PMD cards, for coloscopy today, AM labs stable, Findings: There was evidence of a patent loop colostomy in the transverse colon. This was characterized by healthy appearing mucosa. Two separate stomal openings were appreciated on digital exam. The transverse colon was entered via one of the two stomal openings leading towards the proximal colon. Many small and large-mouthed    diverticula were found in the entire colon. There was no evidence of bleeding in the proximal colon. The terminal ileum appeared normal. There was no evidence of bleeding in the ileum. The colonoscope was subsequently introduced into the second stomal opening leading to the distal colon. A large blood clot was found in the descending colon filling the lumen. No active bleeding was appreciated.                      Impression:    Patent loop colostomy with healthy appearing mucosa in the transverse colon. Diverticulosis in the entire examined proximal colon. There was no evidence of bleeding in the proximal colon                    or ileum. Clotted blood in the descending colon. Suspect blood in the ostomy is secondary to refluxed blood from rectum. No specimens collected.   11/1 - stable, still with blood from rectum, brown from colostomy, team with speak with daughter pending plan, ? possible transfer back to [facility]    Plan:  - F/u AM labs  - dressing change qd with medi-honey aquacel per wound care by RN  - continue naranjo  - continue to hold Eliquis   - f/u medicine  - f/u GI  - home care referral

## 2024-11-01 NOTE — PROGRESS NOTE ADULT - SUBJECTIVE AND OBJECTIVE BOX
Interval Events: sp colon yesterday. pt seen and examined. resting in bed, denies abd pain      Allergies:  No Known Allergies      Hospital Medications:  acetaminophen     Tablet .. 1000 milliGRAM(s) Oral every 6 hours  aluminum hydroxide/magnesium hydroxide/simethicone Suspension 30 milliLiter(s) Oral every 6 hours PRN  aspirin  chewable 81 milliGRAM(s) Oral daily  bisacodyl 10 milliGRAM(s) Oral once  brimonidine 0.2% Ophthalmic Solution 1 Drop(s) Both EYES every 12 hours  Dakins Solution - 1/4 Strength 1 Application(s) Topical daily  dextrose 5% + sodium chloride 0.9%. 1000 milliLiter(s) IV Continuous <Continuous>  heparin   Injectable 5000 Unit(s) SubCutaneous every 8 hours  latanoprost 0.005% Ophthalmic Solution 1 Drop(s) Both EYES at bedtime  losartan 25 milliGRAM(s) Oral daily  metoprolol succinate ER 50 milliGRAM(s) Oral daily  morphine  - Injectable 4 milliGRAM(s) IV Push every 8 hours PRN  morphine  - Injectable 2 milliGRAM(s) IV Push every 8 hours PRN  nicotine - 21 mG/24Hr(s) Patch 1 Patch Transdermal daily  pantoprazole    Tablet 40 milliGRAM(s) Oral before breakfast  rosuvastatin 10 milliGRAM(s) Oral at bedtime  spironolactone 25 milliGRAM(s) Oral daily  timolol 0.5% Solution 1 Drop(s) Both EYES every 12 hours      ROS: unable to obtain in entirety     Vital Signs:  Vital Signs Last 24 Hrs  T(C): 36.5 (01 Nov 2024 06:56), Max: 37.2 (31 Oct 2024 09:04)  T(F): 97.7 (01 Nov 2024 06:56), Max: 98.9 (31 Oct 2024 09:04)  HR: 71 (01 Nov 2024 06:56) (63 - 83)  BP: 133/73 (01 Nov 2024 06:56) (112/67 - 165/87)  BP(mean): --  RR: 16 (01 Nov 2024 06:56) (12 - 19)  SpO2: 100% (01 Nov 2024 06:56) (95% - 100%)    Parameters below as of 01 Nov 2024 06:56  Patient On (Oxygen Delivery Method): room air      Daily Height in cm: 185 (31 Oct 2024 09:04)    Daily       10-31-24 @ 07:01  -  11-01-24 @ 07:00  --------------------------------------------------------  IN: 0 mL / OUT: 2200 mL / NET: -2200 mL        LABS:                        8.5    4.30  )-----------( 143      ( 31 Oct 2024 01:40 )             28.2       10-31    130[L]  |  100  |  7   ----------------------------<  99  4.3   |  19[L]  |  0.50    Ca    8.3[L]      31 Oct 2024 01:40  Phos  2.7     10-31  Mg     1.70     10-31          Urinalysis Basic - ( 31 Oct 2024 01:40 )    Color: x / Appearance: x / SG: x / pH: x  Gluc: 99 mg/dL / Ketone: x  / Bili: x / Urobili: x   Blood: x / Protein: x / Nitrite: x   Leuk Esterase: x / RBC: x / WBC x   Sq Epi: x / Non Sq Epi: x / Bacteria: x                              8.5    4.30  )-----------( 143      ( 31 Oct 2024 01:40 )             28.2                         8.3    4.93  )-----------( 181      ( 30 Oct 2024 06:48 )             26.7                         8.2    5.07  )-----------( 203      ( 29 Oct 2024 11:00 )             26.6       PHYSICAL EXAM  GENERAL: lying in bed, chronically ill appearing, in no apparent distress  HEENT: NCAT  NECK: trachea midline  CHEST:  respirations even, non labored  ABDOMEN:  soft, non-tender, non-distended, +ostomy w/ scant loose brown stool/air in bag  EXTREMITIES: WWP  SKIN:  warm/dry  PSYCH: awake, responsive, but confused  NEURO: no tremor noted       [facility]  _______________________________________________________________________________  Patient Name: Sami Chong          Procedure Date: 10/31/2024 11:18 AM  MRN: [id number]                     Account Number: [id number]  YOB: 1945              Admit Type: Inpatient  Room: [place] [address]                         Gender: Male  Attending MD: SAYRA FERNANDEZ MD       _______________________________________________________________________________     Procedure:           Colonoscopy  Indications:         Melena via ostomy, History of rectal cancer  Providers:           SAYRA FERNANDEZ MD, Constanza Peacock MD (Fellow)  Medicines:           Monitored Anesthesia Care  Complications:       No immediate complications.  Procedure:           Pre-Anesthesia Assessment:                       - Prior to the procedure, a History and Physical was                        performed, and patient medications and allergies were                        reviewed. The patientis unable to give consent                        secondary to the patient being legally incompetent to                        consent. The risks and benefits of the procedure and the                        sedation options and risks were discussed with the                        patient's daughter. All questions were answered and                        informed consent was obtained. Patient identification                        and proposed procedure were verified by the physician in             the pre-procedure area. Prophylactic Antibiotics: The                        patient does not require prophylactic antibiotics. Prior                        Anticoagulants: The patient has taken no previous                        anticoagulant or antiplatelet agents. ASA Grade                        Assessment: III - A patient with severe systemic                        disease. After reviewing the risks and benefits, the                        patient was deemed in satisfactory condition to undergo                        the procedure. The anesthesia plan was to use monitored                        anesthesia care (MAC). Immediately prior to                        administration of medications, the patient was    re-assessed for adequacy to receive sedatives. The heart                        rate, respiratory rate, oxygen saturations, blood                        pressure, adequacy of pulmonary ventilation, and                        response to care were monitored throughout the                        procedure. The physical status of the patient was                        re-assessed after the procedure.                       After I obtained informed consent from the patient's                        daughter, the scope was passed under direct vision.                        Throughout the procedure, the patient's blood pressure,                        pulse, and oxygen saturations were monitored                        continuously. The Colonoscope was introduced through the                        transverse colostomy and advanced to the terminal ileum,                        with identification of the appendiceal orifice and IC                        valve. After the proximal colon and ileum were examined,                        the colonoscope was then introduced through a 2nd lumen                        in the colostomy and advanced to the descending colon.                        The colonoscopy was performed without difficulty. The                     patient tolerated the procedure well.                                                                                   Findings:       There was evidence of a patent loop colostomy in the transverse colon.        This was characterized by healthy appearing mucosa. Two separate stomal        openings were appreciated on digital exam.       The transverse colon was entered via one of the two stomal openings        leading towards the proximal colon. Many small and large-mouthed        diverticula were found in the entire colon. There was no evidence of        bleeding in the proximal colon.       The terminal ileum appeared normal. There was no evidence of bleeding in        the ileum.       The colonoscope was subsequently introduced into the second stomal        opening leading to the distal colon. A large blood clot was found in the        descending colon filling the lumen. No active bleeding was appreciated.                        Impression:          - Patent loop colostomy with healthy appearing mucosa in                        the transverse colon.                       - Diverticulosis in the entire examined proximal colon.                        There was no evidence of bleeding in the proximal colon                        or ileum.                       - Clotted blood in the descending colon. Suspect blood                        in the ostomy is secondary to refluxed blood from rectum.               - No specimens collected.  Recommendation:      - [facility] patient to [facility] for [facility] care.                       - Advance diet as tolerated today.                                                                                   Attending Participation:       I was present and participated during the entire procedure, including        non-key portions.

## 2024-11-01 NOTE — PROGRESS NOTE ADULT - SUBJECTIVE AND OBJECTIVE BOX
79y Male s/p EGD    T(C): 36.4 (11-01-24 @ 09:18), Max: 36.6 (10-31-24 @ 14:50)  HR: 78 (11-01-24 @ 09:18) (63 - 83)  BP: 140/82 (11-01-24 @ 09:18) (112/67 - 165/87)  RR: 18 (11-01-24 @ 09:18) (12 - 19)  SpO2: 97% (11-01-24 @ 09:18) (95% - 100%)  Wt(kg): --    Pt seen, doing well, no anesthesia complications or complaints noted or reported.   No Nausea  Pain well controlled

## 2024-11-01 NOTE — PROGRESS NOTE ADULT - ASSESSMENT
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 Colorectal surgery consulted for bleeding stoma. Stoma with minimal scant sanguinous output but with nursing reports of apparent gross rectal bleeding. Patient remains hemodynamically stable and Hgb stable since onset of bleed.    Plan:  - Colonoscopy demonstrating diverticulosis without bleed at the proximal limb of colostomy and clot from the distal limb 2/2 known rectal cancer  - No surgical intervention at this time  - Remains hemodynamically stable, trend CBCs  - Recommend continuation of treatment for rectal cancer to reduce bleeding    A Team Surgery  [id number] Assessment: 76yr male with Hx diverticulosis, Afib (off elemis for prior episodes of rectal bleeding), locally advanced [place] [facility] know to the service for prior rectal abscess drainage currently on meropenem and fluconazole s/p multiple I/D for yuri's gangrene. Colorectal surgery consulted for bleeding stoma. Stoma with minimal scant sanguinous output but with nursing reports of apparent gross rectal bleeding. Patient remains hemodynamically stable and Hgb stable since onset of bleed.    Plan:  - Colonoscopy demonstrating diverticulosis without bleed at the proximal limb of colostomy and clot from the distal limb 2/2 known rectal cancer  - No surgical intervention at this time  - Remains hemodynamically stable, trend CBCs  - Recommend continuation of treatment for rectal cancer to reduce bleeding  - Will sign off at this time, please call back with further questions or concerns    A Team Surgery  [id number]

## 2024-11-01 NOTE — PROGRESS NOTE ADULT - PROBLEM SELECTOR PLAN 2
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

## 2024-11-01 NOTE — PROGRESS NOTE ADULT - ASSESSMENT
Assessment/Plan: 80 yo M with history of CAD s/p stents in 2012 (ASA), locally advanced rectal cancer s/p neoadjuvant therapy with chemoRT (completed 11/2022), consolidation CAPOX (2/2023), brachytherapy (3/2024), presenting for generalized weakness, chest discomfort, rectal pain. Pt was here at [facility] on 8/30 for fatigue, weakness, and falls, found to have perforated rectal mass with perirectal collection, 2.5 x 1.5 cm with spread to infection in anorectal/perineal region with fluid/gas tracking along penile shaft, however pt left for MSK. At [facility] underwent laparoscopic diverting end sigmoid colostomy, cystoscopy and naranjo placement across defect and perineal/scrotal drainage 8/30. At [facility] for several weeks for perineal infections, treated with Abx as well. Discharged day prior to presentation at [facility]. 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 Now s/p debridement of genitalia and perineum in OR, scrotal I&D and debridement of perirectal abscess with Urology. Followed by ID, tissue cultures with ESBL E Coli, Yumiko albicans, on ertapenem and fluconazole with end date 10/29; abx management per ID.     Wound follow up for sacrum extending distally to sacral/gluteal cleft stage 4 pressure injury.       Multiple Surgical Sites s/p debridement of genitalia and perineum in OR- see assessment details from previous note (10/29)  -Management as per urology, dressing change this morning by urology       Sacral/gluteal cleft stage 4 pressure injury  -s/p selective debridement of necrotic tissue/necrotic cap  that was  at wound edges  -Wound bed remains with 100% necrotic tissue, softening, non fluctuant, no crepitus   -No sharp debridement warranted today as necrotic tissue is adherent, risks for bleeding outweighs benefits    -(+) Tunneling at 6 o'clock extends 1.5cm, no purulence     -Does not connect to wounds of scrotal/perineum  -bone palpable in close proximity just beneath necrotic base.  - no fluctuance, no induration, no crepitus.  -WBC wnl, afebrile   -Periwound skin with blanching erythema appears reactive?, no induration, no fluctuance. Please continue to monitor for s/s of acute soft tissue infection.  Wound care team is not in the hospital in the weekends in case concerns arise related to sacral wound overt the weekend consult general surgery   -CT with no mention of sacral ulcer  -urine and stool contained, (+) colostomy and indwelling naranjo catheter.  -Continue to clean with antimicrobial Dakins solution   .Topical recommendations:  cleanse wound with Dakins solution 1/4 strength,  rinse wound and periwound skin with NS, pat dry. Apply Liquid barrier film to periwound skin. Apply medihoney gel to wound base, pack sacral/gluteal cleft depth and tunneling at 6 o'clock with Aquacel hydrofiber ribbon, leave 2" out at end to wick and visible for fully removal with subsequent dressing changes.  Cover with silicone foam with border. Change daily and prn if soiled/compromised.  -Incontinence/moisture associated dermatitis in lower buttocks and perineum (patient reamins with rectal bleeding)  as evident by hyperpigmentation, no open wounds   .Clean lower buttocks and perineum with skin cleanser, pat dry. Apply Darleen barrier moisture cream to entire area. Apply twice a day.   -->Continue to offload pressure; low airloss support surface, turn and position per protocol with use of fluidized positioning devices, continue moisture management with use of single breathable incontinence pad, continue to offload heels with complete cair boots.  -Continue to offload pressure; low airloss support surface, turn and position per protocol with use of fluidized positioning devices, continue moisture management with use of single breathable incontinence pad, continue to offload heels with complete cair boots.  -Upon discharge patient should qualify for low airloss support surface; case management/social work to please follow up when medically appropriate.    Additional Recs   Left arm non pitting edema resolved, ecchymosis remains, no hematoma   .Elevate left upper extremity with pillows   Continue low airloss support surface.  Continue to turn and position every 2 hours with z-eve fluidized positioning device.  Continue use of Complete Cair pressure offloading boots.  Continue Nutritional management as per RD recommendations.    Upon discharge follow up at outpatient [facility] [place]. [address] [place] [address]. [phone].    Remainder of care as per primary team. Will continue to follow while inpatient. Please reconsult earlier if needed   Thank you.    Discussed findings and plan with primary team urology   Yoli Barker, MICHELLE-BC, CWCN  pager #[phone] or available in MS teams     If after 4PM or before 7:30AM on Mon-Friday or weekend/holiday please contact general surgery for urgent matters.   Team A- [id number]   Team B- [id number]  For non-urgent matters e-mail [email or web address]    I spent 35 minutes face-to-face with this patient of which more than 50% of the time was spent counseling/coordinating care of this patient.       Assessment/Plan: 80 yo M with history of CAD s/p stents in 2012 (ASA), locally advanced rectal cancer s/p neoadjuvant therapy with chemoRT (completed 11/2022), consolidation CAPOX (2/2023), brachytherapy (3/2024), presenting for generalized weakness, chest discomfort, rectal pain. Pt was here at [facility] on 8/30 for fatigue, weakness, and falls, found to have perforated rectal mass with perirectal collection, 2.5 x 1.5 cm with spread to infection in anorectal/perineal region with fluid/gas tracking along penile shaft, however pt left for MSK. [place] [facility] [place] diverting end sigmoid colostomy, cystoscopy and naranjo placement across defect and perineal/scrotal drainage 8/30. [place] [facility] [place] for perineal infections, treated with Abx as well. Discharged day prior to presentation at [facility]. 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 Now s/p debridement of genitalia and perineum in OR, scrotal I&D and debridement of perirectal abscess with Urology. Followed by ID, tissue cultures with ESBL E Coli, Yumiko albicans, on ertapenem and fluconazole with end date 10/29; abx management per ID.     Wound follow up for sacrum extending distally to sacral/gluteal cleft stage 4 pressure injury.       Multiple Surgical Sites s/p debridement of genitalia and perineum in OR- see assessment details from previous note (10/29)  -Management as per urology, dressing change this morning by urology       Sacral/gluteal cleft stage 4 pressure injury  -s/p selective debridement of necrotic tissue/necrotic cap  that was  at wound edges  -Wound bed remains with 100% necrotic tissue, softening, non fluctuant, no crepitus   -No sharp debridement warranted today as necrotic tissue is adherent, risks for bleeding outweighs benefits    -(+) Tunneling at 6 o'clock extends 1.5cm, no purulence     -Does not connect to wounds of scrotal/perineum  -bone palpable in close proximity just beneath necrotic base.  - no fluctuance, no induration, no crepitus.  -WBC wnl, afebrile   -Periwound skin with blanching erythema appears reactive?, no induration, no fluctuance. Please continue to monitor for s/s of acute soft tissue infection.  Wound care team is not in the hospital in the weekends in case concerns arise related to sacral wound overt the weekend consult general surgery   -CT with no mention of sacral ulcer  -urine and stool contained, (+) colostomy and indwelling naranjo catheter.  -Continue to clean with antimicrobial Dakins solution   .Topical recommendations:  cleanse wound with Dakins solution 1/4 strength,  rinse wound and periwound skin with NS, pat dry. Apply Liquid barrier film to periwound skin. Apply medihoney gel to wound base, pack sacral/gluteal cleft depth and tunneling at 6 o'clock with Aquacel hydrofiber ribbon, leave 2" out at end to wick and visible for fully removal with subsequent dressing changes.  Cover with silicone foam with border. Change daily and prn if soiled/compromised.  -Incontinence/moisture associated dermatitis in lower buttocks and perineum (patient reamins with rectal bleeding)  as evident by hyperpigmentation, no open wounds   .Clean lower buttocks and perineum with skin cleanser, pat dry. Apply Darleen barrier moisture cream to entire area. Apply twice a day.   -->Continue to offload pressure; low airloss support surface, turn and position per protocol with use of fluidized positioning devices, continue moisture management with use of single breathable incontinence pad, continue to offload heels with complete cair boots.  -Continue to offload pressure; low airloss support surface, turn and position per protocol with use of fluidized positioning devices, continue moisture management with use of single breathable incontinence pad, continue to offload heels with complete cair boots.  -Upon discharge patient should qualify for low airloss support surface; case management/social work to please follow up when medically appropriate.    Additional Recs   Left arm non pitting edema resolved, ecchymosis remains, no hematoma   .Elevate left upper extremity with pillows   Continue low airloss support surface.  Continue to turn and position every 2 hours with z-eve fluidized positioning device.  Continue use of Complete Cair pressure offloading boots.  Continue Nutritional management as per RD recommendations.    Upon discharge follow up at outpatient [facility] [place]. [address] [place] [address]. [phone].    Remainder of care as per primary team. Will continue to follow while inpatient. Please reconsult earlier if needed   Thank you.    Discussed findings and plan with primary team urology MARCEL Cordero and primary RN   Yoli Barker, [facility]-BC, NKECHI  pager #[phone] or available in MS teams     If after 4PM or before 7:30AM on Mon-Friday or weekend/holiday please contact general surgery for urgent matters.   Team A- [id number]   Team B- [id number]  For non-urgent matters e-mail [email or web address]    I spent 35 minutes face-to-face with this patient of which more than 50% of the time was spent counseling/coordinating care of this patient.

## 2024-11-01 NOTE — PROGRESS NOTE ADULT - SUBJECTIVE AND OBJECTIVE BOX
Overnight events:  None    Subjective:  Pt offers no complaints    Objective:    Vital signs  T(C): , Max: 37.2 (10-31-24 @ 09:04)  HR: 71 (11-01-24 @ 06:56)  BP: 133/73 (11-01-24 @ 06:56)  SpO2: 100% (11-01-24 @ 06:56)  Wt(kg): --    Output   Zamarripa: 875 yellow  Colostomy: 80 brownish yellow  10-31 @ 07:01  -  11-01 @ 07:00  --------------------------------------------------------  IN: 0 mL / OUT: 2200 mL / NET: -2200 mL        Gen: NAD  Abd- soft; wounds granulating, some eschar, no cellulitis  Col - less blood, more brown  + blood from rectum    Labs: pending this AM                        8.5    4.30  )-----------( 143      ( 31 Oct 2024 01:40 )             28.2     31 Oct 2024 01:40    130    |  100    |  7      ----------------------------<  99     4.3     |  19     |  0.50     Ca    8.3        31 Oct 2024 01:40  Phos  2.7       31 Oct 2024 01:40  Mg     1.70      31 Oct 2024 01:40

## 2024-11-01 NOTE — PROGRESS NOTE ADULT - SUBJECTIVE AND OBJECTIVE BOX
CHIEF COMPLAINT: f/u     SUBJECTIVE / OVERNIGHT EVENTS: Patient seen and examined. Per RN, no further blood noted in ostomy bag since yesterday, but patient still continues to have bleeding from rectum. This AM, patient endorses feeling cold and would like to receive pain medication.     MEDICATIONS  (STANDING):  acetaminophen     Tablet .. 1000 milliGRAM(s) Oral every 6 hours  aspirin  chewable 81 milliGRAM(s) Oral daily  bisacodyl 10 milliGRAM(s) Oral once  brimonidine 0.2% Ophthalmic Solution 1 Drop(s) Both EYES every 12 hours  Dakins Solution - 1/4 Strength 1 Application(s) Topical daily  dextrose 5% + sodium chloride 0.9%. 1000 milliLiter(s) (75 mL/Hr) IV Continuous <Continuous>  heparin   Injectable 5000 Unit(s) SubCutaneous every 8 hours  latanoprost 0.005% Ophthalmic Solution 1 Drop(s) Both EYES at bedtime  losartan 25 milliGRAM(s) Oral daily  metoprolol succinate ER 50 milliGRAM(s) Oral daily  nicotine - 21 mG/24Hr(s) Patch 1 Patch Transdermal daily  pantoprazole    Tablet 40 milliGRAM(s) Oral before breakfast  rosuvastatin 10 milliGRAM(s) Oral at bedtime  spironolactone 25 milliGRAM(s) Oral daily  timolol 0.5% Solution 1 Drop(s) Both EYES every 12 hours    MEDICATIONS  (PRN):  aluminum hydroxide/magnesium hydroxide/simethicone Suspension 30 milliLiter(s) Oral every 6 hours PRN Dyspepsia  morphine  - Injectable 4 milliGRAM(s) IV Push every 8 hours PRN Severe Pain (7 - 10)  morphine  - Injectable 2 milliGRAM(s) IV Push every 8 hours PRN Moderate Pain (4 - 6)      VITALS:  T(F): 97.6 (11-01-24 @ 09:18), Max: 98.9 (10-31-24 @ 11:53)  HR: 78 (11-01-24 @ 09:18) (63 - 83)  BP: 140/82 (11-01-24 @ 09:18) (112/67 - 165/87)  RR: 18 (11-01-24 @ 09:18) (12 - 19)  SpO2: 97% (11-01-24 @ 09:18)  Wt(kg): --  Height (cm): 185 (11:51)  Weight (kg): 63 (11:51)  BMI (kg/m2): 18.4 (11:51)    PHYSICAL EXAM:  GENERAL: frail, chronically ill appearing elderly male in NAD  EYES: conjunctiva and sclera clear  CHEST/LUNG: Clear to auscultation bilaterally; No wheeze  HEART: Regular rate and rhythm; No murmurs, rubs, or gallops  ABDOMEN: Soft, Nontender, Nondistended; ostomy in place w/ loose brown stool    EXTREMITIES:  2+ Peripheral Pulses, No clubbing, cyanosis, or edema  NEUROLOGY: confused    LABS:              8.5                  130  | 19   | 7            4.30  >-----------< 143     ------------------------< 99                    28.2                 4.3  | 100  | 0.50                                         Ca 8.3   Mg 1.70  Ph 2.7                Urinalysis Basic - ( 31 Oct 2024 01:40 )    Color: x / Appearance: x / SG: x / pH: x  Gluc: 99 mg/dL / Ketone: x  / Bili: x / Urobili: x   Blood: x / Protein: x / Nitrite: x   Leuk Esterase: x / RBC: x / WBC x   Sq Epi: x / Non Sq Epi: x / Bacteria: x      [x] Care Discussed with Consultants/Other Providers: Urology PA - discussed

## 2024-11-01 NOTE — PROGRESS NOTE ADULT - PROBLEM SELECTOR PLAN 5
Patient noted with troponin elevated to 70s-80s on current admission. Recent admission in August with Tiana elevation to 2000's thought to be 2/2 demand ischemia per Cardiology.   - Elevated TIANA again likely demand i/s/o active infection/hypotension while in SICU  - patient currently without any chest pain/SOB, no need to trend troponin further unless patient develops active symptoms.  - TTE performed 10/16, showing global LV hypokinesis, EF 35-40%  - cardiology eval appreciated, patient started on GDMT. Currently no plans for ischemic evaluation

## 2024-11-01 NOTE — PROGRESS NOTE ADULT - SUBJECTIVE AND OBJECTIVE BOX
[facility]-- WOUND TEAM -- FOLLOW UP NOTE  --------------------------------------------------------------------------------    subjective: Patient seen and examined at bedside. Patient alert, responds to questions, endorses he is cold and wants to be left alone " is too early". Patient endorses pain around sacral wound, requesting pain medicine, primary RN to administered as order.  Patient refused wound care eval initially, with some encouragement patient agreed to assessment. Requested to keep blanket in place. Wishes respected      Interval HPI/24 hour events:   -->s/p colonoscopy 10/31 per GI notes "There was no evidence of bleeding in the proximal colon or ileum. Clotted blood found in the descending colon, suspect blood in the ostomy is secondary to refluxed blood from rectum"  -->Appreciate colorectal surgery team following patient   -->Appreciate pain management consult for ABD pain   -->WBC wnl, afebrile   -->Diet advanced to regular with liquid supplement, per primary RN eating most of his meals with some assistance   Chart reviewed including labs and relevant images      Diet:  Diet, Regular:     Start Time: 20:00  Liquid Protein Supplement     Qty per Day:  3  Supplement Feeding Modality:  Oral  Ensure Plus High Protein Cans or Servings Per Day:  1       Frequency:  Two Times a day (10-31-24 @ 16:13)      ROS: General/ SKIN/ see HPI  all other systems negative    ALLERGIES & MEDICATIONS  --------------------------------------------------------------------------------  Allergies    No Known Allergies    Intolerances    STANDING INPATIENT MEDICATIONS    acetaminophen     Tablet .. 1000 milliGRAM(s) Oral every 6 hours  aspirin  chewable 81 milliGRAM(s) Oral daily  bisacodyl 10 milliGRAM(s) Oral once  brimonidine 0.2% Ophthalmic Solution 1 Drop(s) Both EYES every 12 hours  Dakins Solution - 1/4 Strength 1 Application(s) Topical daily  dextrose 5% + sodium chloride 0.9%. 1000 milliLiter(s) IV Continuous <Continuous>  heparin   Injectable 5000 Unit(s) SubCutaneous every 8 hours  latanoprost 0.005% Ophthalmic Solution 1 Drop(s) Both EYES at bedtime  losartan 25 milliGRAM(s) Oral daily  metoprolol succinate ER 50 milliGRAM(s) Oral daily  nicotine - 21 mG/24Hr(s) Patch 1 Patch Transdermal daily  pantoprazole    Tablet 40 milliGRAM(s) Oral before breakfast  rosuvastatin 10 milliGRAM(s) Oral at bedtime  spironolactone 25 milliGRAM(s) Oral daily  timolol 0.5% Solution 1 Drop(s) Both EYES every 12 hours      PRN INPATIENT MEDICATION  aluminum hydroxide/magnesium hydroxide/simethicone Suspension 30 milliLiter(s) Oral every 6 hours PRN  morphine  - Injectable 4 milliGRAM(s) IV Push every 8 hours PRN  morphine  - Injectable 2 milliGRAM(s) IV Push every 8 hours PRN    Vital signs:  T(C): 36.4 (11-01-24 @ 09:18), Max: 37.2 (10-31-24 @ 11:53)  HR: 78 (11-01-24 @ 09:18) (63 - 83)  BP: 140/82 (11-01-24 @ 09:18) (112/67 - 165/87)  RR: 18 (11-01-24 @ 09:18) (12 - 19)  SpO2: 97% (11-01-24 @ 09:18) (95% - 100%)    Weight (kg): 63 (10-31-24 @ 11:51)    10-31-24 @ 07:01  -  11-01-24 @ 07:00  --------------------------------------------------------  IN: 0 mL / OUT: 2200 mL / NET: -2200 mL      Constitutional: A&O x 1-2, confused, restless at times. Wearing a winter hat. Patient seen with primary RN who assisted with wound care   NAD. Frail. Bedound. (+) low airloss support surface, (+) fluidized positioning devices, (+) complete cair boots  HEENT:  NC/AT, nonicteric, oral mucosa dry.  Cardiovascular: rate regular  Respiratory: nonlabored, room air, equal chest expansion.  Gastrointestinal: soft NT/ND, LLQ Colostomy with intact pouch in place; loose non bloody stools. Healed laparoscopic surgical sites.  Small-moderate bleeding per rectum, perineal care provided   : (+) indwelling naranjo catheter, yellow urine noted   Suprapubic area and scrotum with intact dressings in place changed by urology earlier   Musculoskeletal: rigidity of all extremities, no gross deformities/contractures  Vascular: Left posterior arm large ecchymosis, no hematoma. Christina edema resolved.   BLE equally warm, no overt ischemia, heels intact bilaterally   Skin:  frail, good skin turgor  Sacrum extending distally within sacral/gluteal cleft/coccyx- stage 4 pressure injury (patient turned to right side)  -[id number] (prev [id number])    -Tunneling at 6 o'clock extends 1.5cm, stable, no purulence, no crepitus   -Sacrum with adherent softening nonfluctuant brown/black/gray eschar  from wound edges at 8 o'clock and 12-1 o'clock, remains adherent tan-moist adherent yellow softening slough over sacrum and within sacral/gluteal cleft, bone palpable beneath slough in sacrococcygeal , not visible.  -Small serofibrinous drainage; no odor.   -Periwound skin with blanching erythema and hyperpigmentation circumferentially, erythema appears reactive, without induration, no fluctuance, no crepitus, no erythema, no edema, no increased warmth.  Incontinence/moisture associated dermatitis in lower buttocks and perineum as evident by hyperpigmentation, no open wounds     LABS/ CULTURES/ RADIOLOGY:              8.5    4.30  >-----------<  143      [10-31-24 @ 01:40]              28.2     130  |  100  |  7   ----------------------------<  99      [10-31-24 @ 01:40]  4.3   |  19  |  0.50        Ca     8.3     [10-31-24 @ 01:40]      Mg     1.70     [10-31-24 @ 01:40]      Phos  2.7     [10-31-24 @ 01:40]

## 2024-11-01 NOTE — PROGRESS NOTE ADULT - NS ATTEND AMEND GEN_ALL_CORE FT
78 yo M h/o CAD s/p stents in 2012 (ASA), locally advanced rectal cancer s/p neoadjuvant therapy with chemo, RT, brachytherapy (3/2024), perforated rectal mass with perirectal collection, 2.5 x 1.5 cm (8/2024) s/p lap diverting end sigmoid colostomy [place] [facility] presenting 1 day after discharge from [facility] w/ dizziness and malaise.  Found to be in atrial fibrillation.  Gi consulted for dark stool output from colostomy. Status post colonoscopy via loop ostomy- blood and clot seen in the distal colon, likely from rectum (mass, ulceration, abscess cavity?).  No evidence of bleeding in proximal colon or ileum.  Monitor Hb and transfuse to maintain Hb >7-8.  Patient remains at risk for bleeding due to rectal disease.

## 2024-11-01 NOTE — PROGRESS NOTE ADULT - PROBLEM SELECTOR PLAN 1
Patient with history of locally advanced rectal cancer. Melena/bright red blood noted in ostomy, as well as rectal bleeding   - Has been on ASA and HSQ but off full dose DOAC due to hx of rectal bleeding in the past  - GI following. S/p colonoscopy on 10/31 showing diverticulosis but no active bleeding, "suspect blood in the ostomy is secondary to refluxed blood from rectum."  - CRS following as well   - CBC has remained stable at this time, continue to monitor    - If continued downtrending Hgb, consider holding HSQ and ASA   - Transfuse for Hgb < 8 given hx of CAD  - follows with [facility] oncology - consider onc involvement to see if patient is candidate for further treatment, if not would consider Palliative care for assistance with GOC and symptom management, however during previous discussions w/ patient's daughter and other providers, patient is full code, would like all interventions performed.

## 2024-11-01 NOTE — PROGRESS NOTE ADULT - ASSESSMENT
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 Also found to have reduced EF, cardiology following and being medically managed. Course further complicated by GIB, likely from known rectal CA.

## 2024-11-01 NOTE — PROGRESS NOTE ADULT - ASSESSMENT
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 Hospital course was complicated by septic shock requiring SICU stay. He also was found to have newly depressed EF on Echo LVEF 35-40%. Course further c/b dark red blood mixed with stool from his ostomy 10/25. Hgb has remained stable ~8-9 wo prbc requirement. CT A/P 10/12 with diverticulosis.     10/31 sp colonoscopy w/ findings of patent loop colostomy in the transverse colon characterized by healthy appearing mucosa. Diverticulosis in the entire examined proximal colon.  There was no evidence of bleeding in the proximal colon or ileum. Clotted blood found in the descending colon, suspect blood in the ostomy is secondary to refluxed blood from rectum.    Recommendations:  - f/u AM labs  - no further GI interventions planned  - maintain active t&s and active t&s  - trend cbc, transfuse for hgb >/=8  - diet as tolerated  - rest of care as per primary team    *all recommendations are tentative until note is attested by attending*  GI to sign off, reach out with questions/concerns    MARCEL Frazier PA-C, RD, CDN  available on TEAMS M/T/TH/F from 7am - 4pm    after hours/weekends: non urgent issues --> email [email or web address], urgent issues --> contact on-call GI fellow at [phone]

## 2024-11-02 LAB
ANION GAP SERPL CALC-SCNC: 11 MMOL/L — SIGNIFICANT CHANGE UP (ref 7–14)
BUN SERPL-MCNC: 5 MG/DL — LOW (ref 7–23)
CALCIUM SERPL-MCNC: 8.6 MG/DL — SIGNIFICANT CHANGE UP (ref 8.4–10.5)
CHLORIDE SERPL-SCNC: 101 MMOL/L — SIGNIFICANT CHANGE UP (ref 98–107)
CO2 SERPL-SCNC: 23 MMOL/L — SIGNIFICANT CHANGE UP (ref 22–31)
CREAT SERPL-MCNC: 0.48 MG/DL — LOW (ref 0.5–1.3)
EGFR: 105 ML/MIN/1.73M2 — SIGNIFICANT CHANGE UP
GLUCOSE SERPL-MCNC: 108 MG/DL — HIGH (ref 70–99)
HCT VFR BLD CALC: 27.2 % — LOW (ref 39–50)
HGB BLD-MCNC: 8.3 G/DL — LOW (ref 13–17)
MAGNESIUM SERPL-MCNC: 1.8 MG/DL — SIGNIFICANT CHANGE UP (ref 1.6–2.6)
MCHC RBC-ENTMCNC: 26.4 PG — LOW (ref 27–34)
MCHC RBC-ENTMCNC: 30.5 G/DL — LOW (ref 32–36)
MCV RBC AUTO: 86.6 FL — SIGNIFICANT CHANGE UP (ref 80–100)
NRBC # BLD: 0 /100 WBCS — SIGNIFICANT CHANGE UP (ref 0–0)
NRBC # FLD: 0 K/UL — SIGNIFICANT CHANGE UP (ref 0–0)
PHOSPHATE SERPL-MCNC: 2.4 MG/DL — LOW (ref 2.5–4.5)
PLATELET # BLD AUTO: 153 K/UL — SIGNIFICANT CHANGE UP (ref 150–400)
POTASSIUM SERPL-MCNC: 4.2 MMOL/L — SIGNIFICANT CHANGE UP (ref 3.5–5.3)
POTASSIUM SERPL-SCNC: 4.2 MMOL/L — SIGNIFICANT CHANGE UP (ref 3.5–5.3)
RBC # BLD: 3.14 M/UL — LOW (ref 4.2–5.8)
RBC # FLD: 19.8 % — HIGH (ref 10.3–14.5)
SODIUM SERPL-SCNC: 135 MMOL/L — SIGNIFICANT CHANGE UP (ref 135–145)
WBC # BLD: 5.98 K/UL — SIGNIFICANT CHANGE UP (ref 3.8–10.5)
WBC # FLD AUTO: 5.98 K/UL — SIGNIFICANT CHANGE UP (ref 3.8–10.5)

## 2024-11-02 PROCEDURE — 99232 SBSQ HOSP IP/OBS MODERATE 35: CPT

## 2024-11-02 PROCEDURE — 99231 SBSQ HOSP IP/OBS SF/LOW 25: CPT

## 2024-11-02 RX ADMIN — Medication 1000 MILLIGRAM(S): at 14:00

## 2024-11-02 RX ADMIN — Medication 1 APPLICATION(S): at 14:01

## 2024-11-02 RX ADMIN — Medication 1 DROP(S): at 06:10

## 2024-11-02 RX ADMIN — Medication 1 DROP(S): at 21:27

## 2024-11-02 RX ADMIN — MORPHINE SULFATE 2 MILLIGRAM(S): 30 TABLET, EXTENDED RELEASE ORAL at 21:27

## 2024-11-02 RX ADMIN — MORPHINE SULFATE 4 MILLIGRAM(S): 30 TABLET, EXTENDED RELEASE ORAL at 16:10

## 2024-11-02 RX ADMIN — HEPARIN SODIUM 5000 UNIT(S): 10000 INJECTION INTRAVENOUS; SUBCUTANEOUS at 14:00

## 2024-11-02 RX ADMIN — NICOTINE POLACRILEX 1 PATCH: 4 GUM, CHEWING ORAL at 12:00

## 2024-11-02 RX ADMIN — Medication 1 DROP(S): at 19:02

## 2024-11-02 RX ADMIN — MORPHINE SULFATE 2 MILLIGRAM(S): 30 TABLET, EXTENDED RELEASE ORAL at 22:27

## 2024-11-02 RX ADMIN — PANTOPRAZOLE SODIUM 40 MILLIGRAM(S): 40 TABLET, DELAYED RELEASE ORAL at 06:09

## 2024-11-02 RX ADMIN — Medication 10 MILLIGRAM(S): at 21:27

## 2024-11-02 RX ADMIN — MORPHINE SULFATE 4 MILLIGRAM(S): 30 TABLET, EXTENDED RELEASE ORAL at 15:18

## 2024-11-02 RX ADMIN — NICOTINE POLACRILEX 1 PATCH: 4 GUM, CHEWING ORAL at 19:33

## 2024-11-02 RX ADMIN — HEPARIN SODIUM 5000 UNIT(S): 10000 INJECTION INTRAVENOUS; SUBCUTANEOUS at 06:11

## 2024-11-02 RX ADMIN — Medication 81 MILLIGRAM(S): at 14:00

## 2024-11-02 RX ADMIN — Medication 1000 MILLIGRAM(S): at 06:10

## 2024-11-02 RX ADMIN — Medication 50 MILLIGRAM(S): at 06:08

## 2024-11-02 RX ADMIN — LOSARTAN POTASSIUM 25 MILLIGRAM(S): 25 TABLET ORAL at 06:09

## 2024-11-02 RX ADMIN — NICOTINE POLACRILEX 1 PATCH: 4 GUM, CHEWING ORAL at 13:59

## 2024-11-02 RX ADMIN — Medication 1000 MILLIGRAM(S): at 14:55

## 2024-11-02 RX ADMIN — HEPARIN SODIUM 5000 UNIT(S): 10000 INJECTION INTRAVENOUS; SUBCUTANEOUS at 21:27

## 2024-11-02 RX ADMIN — Medication 25 MILLIGRAM(S): at 06:09

## 2024-11-02 NOTE — CHART NOTE - NSCHARTNOTEFT_GEN_A_CORE
Tiffanie (daughter) called to update her on father's status and to check in to see if she has made any progress in deciding on transfer to [facility] [place] for further rectal cx treatment vs discharge home.    Tiffanie states that she is "in the middle of something right now" and also has not had the chance to speak with her father about the option of going to [facility] [place] for further treatment. She expresses that her father would like to continue everything he is getting here in the hospital (eg: nursing, IVF, wound care) in the home setting, but would like to father about [facility] option first. She also requested that team stop heparin because it is making her father bleed from rectum, daughter reassured that his H/H has been stable for the entire week that he has been on SQH. Patient requests that team follow up again at 3pm tomorrow afternoon when she will make a decision.

## 2024-11-02 NOTE — PROGRESS NOTE ADULT - SUBJECTIVE AND OBJECTIVE BOX
Subjective  NAD, resting in bed,  Denies fevers, chills, nausea, vomiting, SOB, CP.  Tolerating diet.    Objective    Vital signs  T(F): , Max: 98.2 (11-01-24 @ 13:33)  HR: 84 (11-02-24 @ 10:01)  BP: 96/68 (11-02-24 @ 10:01)  SpO2: 100% (11-02-24 @ 10:01)  Wt(kg): --    Output     OUT:    Indwelling Catheter - Urethral (mL): 1600 mL  Total OUT: 1600 mL    Total NET: -1600 mL          Gen: NAD  Abd- soft; wounds granulating, some eschar, no cellulitis  Back-refused examination of sacral decubitus ulcer twice.  Col - less blood, more brown  + blood from rectum    Labs      11-02 @ 12:00    WBC 5.98  / Hct 27.2  / SCr 0.48

## 2024-11-02 NOTE — PROGRESS NOTE ADULT - SUBJECTIVE AND OBJECTIVE BOX
Jazmyn Deluna        Patient is a 79y old  Male who presents with a chief complaint of suprapubic pain (01 Nov 2024 12:45)      SUBJECTIVE / OVERNIGHT EVENTS: No acute overnight events. Still w/ some bleeding from rectum per care team.     MEDICATIONS  (STANDING):  acetaminophen     Tablet .. 1000 milliGRAM(s) Oral every 6 hours  aspirin  chewable 81 milliGRAM(s) Oral daily  brimonidine 0.2% Ophthalmic Solution 1 Drop(s) Both EYES every 12 hours  Dakins Solution - 1/4 Strength 1 Application(s) Topical daily  dextrose 5% + sodium chloride 0.9%. 1000 milliLiter(s) (75 mL/Hr) IV Continuous <Continuous>  heparin   Injectable 5000 Unit(s) SubCutaneous every 8 hours  latanoprost 0.005% Ophthalmic Solution 1 Drop(s) Both EYES at bedtime  losartan 25 milliGRAM(s) Oral daily  metoprolol succinate ER 50 milliGRAM(s) Oral daily  nicotine - 21 mG/24Hr(s) Patch 1 Patch Transdermal daily  pantoprazole    Tablet 40 milliGRAM(s) Oral before breakfast  rosuvastatin 10 milliGRAM(s) Oral at bedtime  spironolactone 25 milliGRAM(s) Oral daily  timolol 0.5% Solution 1 Drop(s) Both EYES every 12 hours    MEDICATIONS  (PRN):  aluminum hydroxide/magnesium hydroxide/simethicone Suspension 30 milliLiter(s) Oral every 6 hours PRN Dyspepsia  morphine  - Injectable 4 milliGRAM(s) IV Push every 8 hours PRN Severe Pain (7 - 10)  morphine  - Injectable 2 milliGRAM(s) IV Push every 8 hours PRN Moderate Pain (4 - 6)    Allergies    No Known Allergies    Intolerances        Vital Signs Last 24 Hrs  T(C): 36.7 (02 Nov 2024 10:01), Max: 36.8 (01 Nov 2024 13:33)  T(F): 98.1 (02 Nov 2024 10:01), Max: 98.2 (01 Nov 2024 13:33)  HR: 84 (02 Nov 2024 10:01) (72 - 84)  BP: 96/68 (02 Nov 2024 10:01) (96/68 - 132/65)  BP(mean): --  RR: 18 (02 Nov 2024 10:01) (18 - 18)  SpO2: 100% (02 Nov 2024 10:01) (96% - 100%)    Parameters below as of 02 Nov 2024 10:01  Patient On (Oxygen Delivery Method): room air      Daily     Daily   CAPILLARY BLOOD GLUCOSE        I&O's Summary    01 Nov 2024 07:01  -  02 Nov 2024 07:00  --------------------------------------------------------  IN: 0 mL / OUT: 1720 mL / NET: -1720 mL        PHYSICAL EXAM:  GENERAL: frail, chronically ill appearing elderly male in NAD  EYES: conjunctiva and sclera clear  CHEST/LUNG: Clear to auscultation bilaterally, Normal respiratory effort  HEART: Regular rate and rhythm; No murmurs, rubs, or gallops  ABDOMEN: Soft, Nontender, Nondistended; ostomy in place w/  brown stool    EXTREMITIES:  2+ Peripheral Pulses, No edema  NEUROLOGY: confused, non participatory         LABS:    Hgb Trend: 8.5<--, 8.3<--, 8.2<--, 9.3<--    Phos  2.3     11-01      Creatinine Trend: 0.50<--, 0.43<--, 0.48<--, 0.49<--, 0.48<--, 0.54<--              RADIOLOGY & ADDITIONAL TESTS:    Imaging Personally Reviewed.    Consultant(s) Notes Reviewed.    Care Discussed with Consultants/Other Providers.       detailed exam

## 2024-11-02 NOTE — PROGRESS NOTE ADULT - ASSESSMENT
80 yo M with history of CAD s/p stents in 2012 (ASA), locally advanced rectal cancer s/p neoadjuvant therapy with chemoRT (completed 11/2022), consolidation CAPOX (2/2023), brachytherapy (3/2024), presenting for generalized weakness, chest discomfort, rectal pain. Pt was here at [facility] in 8/30 for fatigue, weakness, and falls, found to have perforated rectal mass with perirectal collection, 2.5 x 1.5 cm with spread to infection in anorectal/perineal region with fluid/gas tracking along penile shaft, however pt left for [facility] and s/p diverting end sigmoid colostomy, cystoscopy and naranjo placement across defect and perineal/scrotal drainage 8/30. [facility], discharged day prior to presentation for perineal infections, treated with abx. Represents here for generalized weakness and dizziness, CTAP non-con on preliminary read indicating ill-defined fluid/gas in scrotum, perirectal space, and probably perineum. Exam demonstrates skin duskiness multiple genitourinary abscesses, induration, purulent drainage. 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    10/13: S/p debridement of genitalia and perineum in OR yesterday. In SICU for post op pressor requirements. Got 500 ccs overnight for hypovolemia on POCUS. Currently off pressors. On zosyn, clinda, vanc. U and bcx pending. Tissue cx negative. Wound packing changed at bedside, incision beds with adequate granulation tissue, healing well. Transferred to floor  10/14 - wounds clean; re-packed; penroses in place; ID consult  10/15 - pt refuses blood draw, and refuses dressing change (it has been 24 hrs since last one; he refused last nite also)                 later on he did allow it - wound appears clean, no necrotic tissue; labs drawn  10/16 - pro-BNP high so echo today  10/17 - changed dressing, took out subprapubic incision penrose drain, left scrotal penrose drain. Echo with reduced EF - cards says iso sepsis, no further workup needed. Changed abx from fluconazole to caspo (2 week course) per ID. Can c/w erta for ESBL e.coli. Cards saying he will need AC eventually,   10/18 - changed dressing this am, last penrose in scrotum d/c'ed. wound care spoke with, can try changing wet to dry dressings to aquacel and change qd. Pt's daughter will hire own aid for wound care and PT, but will need homecare for erta and caspo IV at home.   10/19- changed dressing to aquacel, started Eliquis  10/20- refused exam in AM, dressing to be changed with aquacel in PM   10/21 - changed dressing ; will speak with SW/HC about dispo planning; spoke with daughter for plan; see note by   10/22 - dressing changed; R. arm has swelling, though improved from last hospitalization, as per daughter; testing at [facility] did not reveal a clot, but we will repeat                Pt had blood from rectum today, daughter claims that happened at [facility] when eliquis was re-started; I d/c'd eliquis               d/w at great length his needs for home; she does not want to take him home until ABX's are done (next Tues); she wants study R. upper ext swelling even though it was done at [facility] and was neg  10/23 - daughter supposedly will come today to learn dressing and colostomy care; we will change dressing then, and speak again to daughter about rectal Ca and GOC; apparently she has not wanted to discuss it in the past, R. arm duplex neg, xray negative, psych consult requested  10/24 - dressings changed this AM, yesterday daughter instructed as to dressing changes and colostomy care, psych to see pt again today as he did not participate in the interview (haldol for agitation), no stool in colostomy ? gas overnight, will get AXR  10/25 - dressing changed this AM. AXR shows nonobstructive distension.  10/26 - new dark red output from colostomy, dressing changed this AM,   10/27 - persistent bloody output from colostomy  10/28 - no new events; will need to change dressing, pt not letting us do it now; later on wounds improved               GI called (see note above)  10/29 - will speak to daughter today (tried calling 3X yesterday) to see if she wants us to pursue rectal and colost bleed (labs stable); gen surg at bedside; d/c erta/flucon  10/30 - Cards and GI consulted; daughter spoken to last nite; she did not want our house cardiologist, so no scope today; her cardiologist does not come here; will speak again to her today and hopefully will consent to scope; labs stable, naranjo exchanged over wire  10/31 - Cards spoke w/ pt's PMD cards, for coloscopy today, AM labs stable, Findings: There was evidence of a patent loop colostomy in the transverse colon. This was characterized by healthy appearing mucosa. Two separate stomal openings were appreciated on digital exam. The transverse colon was entered via one of the two stomal openings leading towards the proximal colon. Many small and large-mouthed    diverticula were found in the entire colon. There was no evidence of bleeding in the proximal colon. The terminal ileum appeared normal. There was no evidence of bleeding in the ileum. The colonoscope was subsequently introduced into the second stomal opening leading to the distal colon. A large blood clot was found in the descending colon filling the lumen. No active bleeding was appreciated.                      Impression:    Patent loop colostomy with healthy appearing mucosa in the transverse colon. Diverticulosis in the entire examined proximal colon. There was no evidence of bleeding in the proximal colon                    or ileum. Clotted blood in the descending colon. Suspect blood in the ostomy is secondary to refluxed blood from rectum. No specimens collected.   11/1 - stable, still with blood from rectum, brown from colostomy, team with speak with daughter pending plan, ? possible transfer back to [facility]  11/2 - stable, still with blood from rectum, colostomy less blood more brown, plan pending conversation with daughter     Plan:  - reviewed AM labs  - dressing change qd with medi-honey aquacel per wound care by RN  - continue naranjo  - continue to hold Eliquis   - f/u medicine  - f/u GI  - home care referral

## 2024-11-03 PROCEDURE — 99232 SBSQ HOSP IP/OBS MODERATE 35: CPT

## 2024-11-03 RX ADMIN — Medication 81 MILLIGRAM(S): at 11:20

## 2024-11-03 RX ADMIN — Medication 10 MILLIGRAM(S): at 22:26

## 2024-11-03 RX ADMIN — Medication 50 MILLIGRAM(S): at 06:33

## 2024-11-03 RX ADMIN — Medication 1 DROP(S): at 17:56

## 2024-11-03 RX ADMIN — Medication 1000 MILLIGRAM(S): at 17:57

## 2024-11-03 RX ADMIN — NICOTINE POLACRILEX 1 PATCH: 4 GUM, CHEWING ORAL at 12:08

## 2024-11-03 RX ADMIN — Medication 1 DROP(S): at 22:26

## 2024-11-03 RX ADMIN — MORPHINE SULFATE 2 MILLIGRAM(S): 30 TABLET, EXTENDED RELEASE ORAL at 11:30

## 2024-11-03 RX ADMIN — PANTOPRAZOLE SODIUM 40 MILLIGRAM(S): 40 TABLET, DELAYED RELEASE ORAL at 06:32

## 2024-11-03 RX ADMIN — Medication 1 APPLICATION(S): at 11:20

## 2024-11-03 RX ADMIN — Medication 1000 MILLIGRAM(S): at 11:20

## 2024-11-03 RX ADMIN — Medication 1000 MILLIGRAM(S): at 06:34

## 2024-11-03 RX ADMIN — Medication 75 MILLILITER(S): at 12:08

## 2024-11-03 RX ADMIN — Medication 1 DROP(S): at 06:34

## 2024-11-03 RX ADMIN — Medication 1000 MILLIGRAM(S): at 12:20

## 2024-11-03 RX ADMIN — LOSARTAN POTASSIUM 25 MILLIGRAM(S): 25 TABLET ORAL at 06:34

## 2024-11-03 RX ADMIN — NICOTINE POLACRILEX 1 PATCH: 4 GUM, CHEWING ORAL at 12:00

## 2024-11-03 RX ADMIN — MORPHINE SULFATE 2 MILLIGRAM(S): 30 TABLET, EXTENDED RELEASE ORAL at 12:11

## 2024-11-03 RX ADMIN — NICOTINE POLACRILEX 1 PATCH: 4 GUM, CHEWING ORAL at 08:15

## 2024-11-03 RX ADMIN — Medication 1000 MILLIGRAM(S): at 11:57

## 2024-11-03 RX ADMIN — Medication 25 MILLIGRAM(S): at 06:33

## 2024-11-03 NOTE — PROGRESS NOTE ADULT - SUBJECTIVE AND OBJECTIVE BOX
Jazmyn Deluna        Patient is a 79y old  Male who presents with a chief complaint of suprapubic pain (02 Nov 2024 13:27)      SUBJECTIVE / OVERNIGHT EVENTS: No acute overnight events. No new complaints this morning     MEDICATIONS  (STANDING):  acetaminophen     Tablet .. 1000 milliGRAM(s) Oral every 6 hours  aspirin  chewable 81 milliGRAM(s) Oral daily  brimonidine 0.2% Ophthalmic Solution 1 Drop(s) Both EYES every 12 hours  Dakins Solution - 1/4 Strength 1 Application(s) Topical daily  dextrose 5% + sodium chloride 0.9%. 1000 milliLiter(s) (75 mL/Hr) IV Continuous <Continuous>  heparin   Injectable 5000 Unit(s) SubCutaneous every 8 hours  latanoprost 0.005% Ophthalmic Solution 1 Drop(s) Both EYES at bedtime  losartan 25 milliGRAM(s) Oral daily  metoprolol succinate ER 50 milliGRAM(s) Oral daily  nicotine - 21 mG/24Hr(s) Patch 1 Patch Transdermal daily  pantoprazole    Tablet 40 milliGRAM(s) Oral before breakfast  rosuvastatin 10 milliGRAM(s) Oral at bedtime  spironolactone 25 milliGRAM(s) Oral daily  timolol 0.5% Solution 1 Drop(s) Both EYES every 12 hours    MEDICATIONS  (PRN):  aluminum hydroxide/magnesium hydroxide/simethicone Suspension 30 milliLiter(s) Oral every 6 hours PRN Dyspepsia  morphine  - Injectable 2 milliGRAM(s) IV Push every 8 hours PRN Moderate Pain (4 - 6)  morphine  - Injectable 4 milliGRAM(s) IV Push every 8 hours PRN Severe Pain (7 - 10)    Allergies    No Known Allergies    Intolerances        Vital Signs Last 24 Hrs  T(C): 36.4 (03 Nov 2024 09:34), Max: 37 (02 Nov 2024 17:55)  T(F): 97.5 (03 Nov 2024 09:34), Max: 98.6 (02 Nov 2024 17:55)  HR: 78 (03 Nov 2024 11:27) (71 - 79)  BP: 135/74 (03 Nov 2024 11:27) (112/67 - 135/74)  BP(mean): --  RR: 17 (03 Nov 2024 11:27) (16 - 18)  SpO2: 100% (03 Nov 2024 11:27) (98% - 100%)    Parameters below as of 03 Nov 2024 11:27  Patient On (Oxygen Delivery Method): room air      Daily     Daily   CAPILLARY BLOOD GLUCOSE        I&O's Summary    02 Nov 2024 08:01  -  03 Nov 2024 07:00  --------------------------------------------------------  IN: 0 mL / OUT: 2250 mL / NET: -2250 mL    03 Nov 2024 07:01  -  03 Nov 2024 13:55  --------------------------------------------------------  IN: 0 mL / OUT: 475 mL / NET: -475 mL        PHYSICAL EXAM:  GENERAL: frail, chronically ill appearing elderly male in NAD  EYES: conjunctiva and sclera clear  CHEST/LUNG: Clear to auscultation bilaterally, Normal respiratory effort  HEART: Regular rate and rhythm; No murmurs, rubs, or gallops  ABDOMEN: Soft, Nontender, Nondistended; ostomy in place w/  brown stool    EXTREMITIES:  2+ Peripheral Pulses, No edema  NEUROLOGY: confused, non participatory       LABS:                        8.3    5.98  )-----------( 153      ( 02 Nov 2024 12:00 )             27.2     Hgb Trend: 8.3<--, 8.5<--, 8.3<--, 8.2<--, 9.3<--  11-02    135  |  101  |  5[L]  ----------------------------<  108[H]  4.2   |  23  |  0.48[L]    Ca    8.6      02 Nov 2024 12:00  Phos  2.4     11-02  Mg     1.80     11-02      Creatinine Trend: 0.48<--, 0.50<--, 0.43<--, 0.48<--, 0.49<--, 0.48<--          Urinalysis Basic - ( 02 Nov 2024 12:00 )    Color: x / Appearance: x / SG: x / pH: x  Gluc: 108 mg/dL / Ketone: x  / Bili: x / Urobili: x   Blood: x / Protein: x / Nitrite: x   Leuk Esterase: x / RBC: x / WBC x   Sq Epi: x / Non Sq Epi: x / Bacteria: x        RADIOLOGY & ADDITIONAL TESTS:    Imaging Personally Reviewed.    Consultant(s) Notes Reviewed.    Care Discussed with Consultants/Other Providers.

## 2024-11-03 NOTE — PROGRESS NOTE ADULT - SUBJECTIVE AND OBJECTIVE BOX
Subjective:  Resting comfortably    Objective:    Vital signs  T(C): , Max: 36.7 (11-03-24 @ 06:32)  HR: 72 (11-03-24 @ 17:42)  BP: 128/82 (11-03-24 @ 17:42)  SpO2: 100% (11-03-24 @ 17:42)  Wt(kg): --    Output     11-02 @ 08:01  -  11-03 @ 07:00  --------------------------------------------------------  IN: 0 mL / OUT: 2250 mL / NET: -2250 mL    11-03 @ 07:01  -  11-03 @ 18:39  --------------------------------------------------------  IN: 0 mL / OUT: 1225 mL / NET: -1225 mL        Gen: NAD  Abd: soft, nontender, nondistended  : jose a secured in place, draining CYU    Labs                        8.3    5.98  )-----------( 153      ( 02 Nov 2024 12:00 )             27.2     02 Nov 2024 12:00    135    |  101    |  5      ----------------------------<  108    4.2     |  23     |  0.48     Ca    8.6        02 Nov 2024 12:00  Phos  2.4       02 Nov 2024 12:00  Mg     1.80      02 Nov 2024 12:00        Urine Cx: ?  Blood Cx: ?      Imaging

## 2024-11-03 NOTE — PROGRESS NOTE ADULT - ASSESSMENT
78 yo M with history of CAD s/p stents in 2012 (ASA), locally advanced rectal cancer s/p neoadjuvant therapy with chemoRT (completed 11/2022), consolidation CAPOX (2/2023), brachytherapy (3/2024), presenting for generalized weakness, chest discomfort, rectal pain. Pt was here at [facility] in 8/30 for fatigue, weakness, and falls, found to have perforated rectal mass with perirectal collection, 2.5 x 1.5 cm with spread to infection in anorectal/perineal region with fluid/gas tracking along penile shaft, however pt left for MSK and s/p diverting end sigmoid colostomy, cystoscopy and naranjo placement across defect and perineal/scrotal drainage 8/30. Recently at [facility] for several weeks, discharged day prior to presentation for perineal infections, treated with abx. Represents here for generalized weakness and dizziness, CTAP non-con on preliminary read indicating ill-defined fluid/gas in scrotum, perirectal space, and probably perineum. Exam demonstrates skin duskiness multiple genitourinary abscesses, induration, purulent drainage. 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    10/13: S/p debridement of genitalia and perineum in OR yesterday. In SICU for post op pressor requirements. Got 500 ccs overnight for hypovolemia on POCUS. Currently off pressors. On zosyn, clinda, vanc. U and bcx pending. Tissue cx negative. Wound packing changed at bedside, incision beds with adequate granulation tissue, healing well. Transferred to floor  10/14 - wounds clean; re-packed; penroses in place; ID consult  10/15 - pt refuses blood draw, and refuses dressing change (it has been 24 hrs since last one; he refused last nite also)                 later on he did allow it - wound appears clean, no necrotic tissue; labs drawn  10/16 - pro-BNP high so echo today  10/17 - changed dressing, took out subprapubic incision penrose drain, left scrotal penrose drain. Echo with reduced EF - cards says iso sepsis, no further workup needed. Changed abx from fluconazole to caspo (2 week course) per ID. Can c/w erta for ESBL e.coli. Cards saying he will need AC eventually,   10/18 - changed dressing this am, last penrose in scrotum d/c'ed. wound care spoke with, can try changing wet to dry dressings to aquacel and change qd. Pt's daughter will hire own aid for wound care and PT, but will need homecare for erta and caspo IV at home.   10/19- changed dressing to aquacel, started Eliquis  10/20- refused exam in AM, dressing to be changed with aquacel in PM   10/21 - changed dressing ; will speak with SW/HC about dispo planning; spoke with daughter for plan; see note by   10/22 - dressing changed; R. arm has swelling, though improved from last hospitalization, as per daughter; testing at [facility] did not reveal a clot, but we will repeat                Pt had blood from rectum today, daughter claims that happened at [facility] when eliquis was re-started; I d/c'd eliquis               d/w at great length his needs for home; she does not want to take him home until ABX's are done (next Tues); she wants study R. upper ext swelling even though it was done at [facility] and was neg  10/23 - daughter supposedly will come today to learn dressing and colostomy care; we will change dressing then, and speak again to daughter about rectal Ca and GOC; apparently she has not wanted to discuss it in the past, R. arm duplex neg, xray negative, psych consult requested  10/24 - dressings changed this AM, yesterday daughter instructed as to dressing changes and colostomy care, psych to see pt again today as he did not participate in the interview (haldol for agitation), no stool in colostomy ? gas overnight, will get AXR  10/25 - dressing changed this AM. AXR shows nonobstructive distension.  10/26 - new dark red output from colostomy, dressing changed this AM,   10/27 - persistent bloody output from colostomy  10/28 - no new events; will need to change dressing, pt not letting us do it now; later on wounds improved               GI called (see note above)  10/29 - will speak to daughter today (tried calling 3X yesterday) to see if she wants us to pursue rectal and colost bleed (labs stable); gen surg at bedside; d/c erta/flucon  10/30 - Cards and GI consulted; daughter spoken to last nite; she did not want our house cardiologist, so no scope today; her cardiologist does not come here; will speak again to her today and hopefully will consent to scope; labs stable, naranjo exchanged over wire  10/31 - Cards spoke w/ pt's PMD cards, for coloscopy today, AM labs stable, Findings: There was evidence of a patent loop colostomy in the transverse colon. This was characterized by healthy appearing mucosa. Two separate stomal openings were appreciated on digital exam. The transverse colon was entered via one of the two stomal openings leading towards the proximal colon. Many small and large-mouthed    diverticula were found in the entire colon. There was no evidence of bleeding in the proximal colon. The terminal ileum appeared normal. There was no evidence of bleeding in the ileum. The colonoscope was subsequently introduced into the second stomal opening leading to the distal colon. A large blood clot was found in the descending colon filling the lumen. No active bleeding was appreciated.                      Impression:    Patent loop colostomy with healthy appearing mucosa in the transverse colon. Diverticulosis in the entire examined proximal colon. There was no evidence of bleeding in the proximal colon                    or ileum. Clotted blood in the descending colon. Suspect blood in the ostomy is secondary to refluxed blood from rectum. No specimens collected.   11/1 - stable, still with blood from rectum, brown from colostomy, team with speak with daughter pending plan, ? possible transfer back to [facility]  11/2 - stable, still with blood from rectum, colostomy less blood more brown, plan pending conversation with daughter   11/3 - daughter and patient have decided to proceed with transfer to [facility] under Dr. Kris Vega (oncologist) and Dr. Miguel Angel Johnson (surgeon)    Plan:  - reviewed AM labs  - dressing change qd with medi-honey aquacel per wound care by RN  - continue naranjo  - continue to hold Eliquis   - f/u medicine  - f/u GI  - home care referral  - initiate transfer to [facility] with CM starting Monday

## 2024-11-04 PROCEDURE — 99232 SBSQ HOSP IP/OBS MODERATE 35: CPT

## 2024-11-04 RX ORDER — SENNA 187 MG
2 TABLET ORAL AT BEDTIME
Refills: 0 | Status: DISCONTINUED | OUTPATIENT
Start: 2024-11-04 | End: 2024-11-12

## 2024-11-04 RX ADMIN — NICOTINE POLACRILEX 1 PATCH: 4 GUM, CHEWING ORAL at 13:22

## 2024-11-04 RX ADMIN — Medication 1000 MILLIGRAM(S): at 01:00

## 2024-11-04 RX ADMIN — Medication 50 MILLIGRAM(S): at 06:02

## 2024-11-04 RX ADMIN — Medication 1 DROP(S): at 17:29

## 2024-11-04 RX ADMIN — Medication 1000 MILLIGRAM(S): at 01:45

## 2024-11-04 RX ADMIN — Medication 1000 MILLIGRAM(S): at 23:17

## 2024-11-04 RX ADMIN — Medication 1000 MILLIGRAM(S): at 06:39

## 2024-11-04 RX ADMIN — Medication 1 APPLICATION(S): at 12:08

## 2024-11-04 RX ADMIN — NICOTINE POLACRILEX 1 PATCH: 4 GUM, CHEWING ORAL at 06:39

## 2024-11-04 RX ADMIN — Medication 25 MILLIGRAM(S): at 06:03

## 2024-11-04 RX ADMIN — Medication 10 MILLIGRAM(S): at 21:28

## 2024-11-04 RX ADMIN — Medication 1 DROP(S): at 21:29

## 2024-11-04 RX ADMIN — Medication 1 DROP(S): at 06:03

## 2024-11-04 RX ADMIN — NICOTINE POLACRILEX 1 PATCH: 4 GUM, CHEWING ORAL at 18:05

## 2024-11-04 RX ADMIN — MORPHINE SULFATE 2 MILLIGRAM(S): 30 TABLET, EXTENDED RELEASE ORAL at 23:17

## 2024-11-04 RX ADMIN — LOSARTAN POTASSIUM 25 MILLIGRAM(S): 25 TABLET ORAL at 06:02

## 2024-11-04 RX ADMIN — MORPHINE SULFATE 2 MILLIGRAM(S): 30 TABLET, EXTENDED RELEASE ORAL at 13:00

## 2024-11-04 RX ADMIN — PANTOPRAZOLE SODIUM 40 MILLIGRAM(S): 40 TABLET, DELAYED RELEASE ORAL at 06:02

## 2024-11-04 RX ADMIN — Medication 81 MILLIGRAM(S): at 12:07

## 2024-11-04 RX ADMIN — NICOTINE POLACRILEX 1 PATCH: 4 GUM, CHEWING ORAL at 12:07

## 2024-11-04 RX ADMIN — MORPHINE SULFATE 2 MILLIGRAM(S): 30 TABLET, EXTENDED RELEASE ORAL at 12:17

## 2024-11-04 RX ADMIN — Medication 1000 MILLIGRAM(S): at 06:01

## 2024-11-04 RX ADMIN — Medication 1000 MILLIGRAM(S): at 17:29

## 2024-11-04 RX ADMIN — Medication 1000 MILLIGRAM(S): at 12:07

## 2024-11-04 RX ADMIN — Medication 75 MILLILITER(S): at 06:01

## 2024-11-04 NOTE — PROGRESS NOTE ADULT - ASSESSMENT
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 TTE at that time was notable for newly reduced LVEF to 35-40% globally reduced thought to be due to stressed-induced CM vs hx of multivessel CAD.      #AF CHADSVASC 4- off AC due to above bleed, now in SR   #HFrEF EF 35-40% globally reduced known TVD on [facility]     -no cp or anginal equivalent, cont medical management of CAD  -cont toprol XL 50 mg QD   -cont crestor 10 mg QHS   -cont losartan 25 mg QD   -cont spironolactone 25 mg QD   -discussed with pt's outpt cardiologist Dr. Leiva over email.     Thank you for allowing us to participate in the care of your patient. If you have any questions or concerns please do not hesitate to contact me 24/7.     Nanette Herrera MD   Interventional Cardiology and General Cardiology Attending, [facility]  [employer] Team

## 2024-11-04 NOTE — PROVIDER CONTACT NOTE (OTHER) - ASSESSMENT
pt complained of pain today but in his buttocks, very sticky output to the outer part of colostomy bag, 5 ml entire shift

## 2024-11-04 NOTE — DISCHARGE NOTE NURSING/CASE MANAGEMENT/SOCIAL WORK - CAREGIVER OBTAIN DETAILS
pt not alert and oriented at this time; pt states his daughter  pt states his daughter, and has Nursing care at home for dressing changes, wound care

## 2024-11-04 NOTE — DISCHARGE NOTE NURSING/CASE MANAGEMENT/SOCIAL WORK - NSDCPNDISPN_GEN_ALL_CORE
Activities of daily living, including home environment that might     exacerbate pain or reduce effectiveness of the pain management plan of care as well as strategies to address these issues Education provided on the pain management plan of care/Side effects of pain management treatment/Activities of daily living, including home environment that might     exacerbate pain or reduce effectiveness of the pain management plan of care as well as strategies to address these issues

## 2024-11-04 NOTE — PROVIDER CONTACT NOTE (OTHER) - BACKGROUND
debridement of genitalia and perineum on 10/12/24
rectal ca
Pt admitted for I/D of the scrotum.
Patient currently NPO except meds, on a bowel prep regime.

## 2024-11-04 NOTE — DISCHARGE NOTE NURSING/CASE MANAGEMENT/SOCIAL WORK - NSDCPNINST_GEN_ALL_CORE
Notify Dr Tamayo if you experience any increase in pain not relieved with medication, any fever>100.5, any further rectal bleeding notify You GI Doctor.  Dressing changes dailyContinue high protein Ensure plus two times a day.   Notify Dr Tamayo if you experience any increase in pain not relieved with medication, any fever>100.5, any further rectal bleeding notify You GI Doctor.  Dressing changes daily, follow instructions on enclosed wound care note. Continue high protein Ensure plus two times a day.   Notify Dr Tamayo if you experience any increase in pain not relieved with medication, any fever>100.5, any further rectal bleeding notify You GI Doctor.   Incision and sacral Wound Care instructions as per Provider instructions. Dressing changes daily, follow instructions on enclosed wound care note. Continue nutritious dietary intake, high protein Ensure Plus two times a day.

## 2024-11-04 NOTE — DISCHARGE NOTE NURSING/CASE MANAGEMENT/SOCIAL WORK - PATIENT PORTAL LINK FT
You can access the FollowMyHealth Patient Portal offered by [facility] by registering at the following website: [email or web address]. By joining [employer]’s FollowMyHealth portal, you will also be able to view your health information using other applications (apps) compatible with our system.

## 2024-11-04 NOTE — PROGRESS NOTE ADULT - TIME BILLING
36  minutes were spent on this encounter for extensive review of medical record details including labs and/or imaging studies and/or adjacent care team and consultant records, as well as review and reconciliation of current medications. Time was spent on obtaining a history, performing physical examination of patient, and answering patient and/or family questions regarding plan of care. Time was also spent discussing plan of care with patient’s other care team members including primary and consulting teams. Time also was spent on documentation of this encounter into the EHR.
reviewing laboratory data, consultants' recommendations, documentation in [place], performing medically appropriate examinations/evaluations, discussion with patient/family/RN/ACP/Residents and interdisciplinary staff (such as , social workers, etc), counseling patient/family/care giver, ordering medically appropriate medication, tests, or procedures
Reviewing the chart, interpreting lab data, discussing case with team, interview and examination of patient, and documentation. Pt is at high risk of mortality due to his disease.
36 minutes were spent on this encounter for extensive review of medical record details including labs and/or imaging studies and/or adjacent care team and consultant records, as well as review and reconciliation of current medications. Time was spent on obtaining a history, performing physical examination of patient, and answering patient and/or family questions regarding plan of care. Time was also spent discussing plan of care with patient’s other care team members including primary and consulting teams. Time also was spent on documentation of this encounter into the EHR.
reviewing laboratory data, consultants' recommendations, documentation in [place], performing medically appropriate examinations/evaluations, discussion with patient/family/RN/ACP/Residents and interdisciplinary staff (such as , social workers, etc), counseling patient/family/care giver, ordering medically appropriate medication, tests, or procedures
Preparing to see the patient including review of tests and other providers' notes, confirming history with family members, performing medical examination and evaluation, counseling and educating the patient/family, ordering medications, tests and procedures, communicating with other health care professionals, documenting clinical information in the EMR, independently interpreting results and communicating results to the patient/family, as well as care coordination.
Preparing to see the patient including review of tests and other providers' notes, confirming history with family members, performing medical examination and evaluation, counseling and educating the patient/family, ordering medications, tests and procedures, communicating with other health care professionals, documenting clinical information in the EMR, independently interpreting results and communicating results to the patient/family, as well as care coordination.

## 2024-11-04 NOTE — PROGRESS NOTE ADULT - ASSESSMENT
78 yo M with history of CAD s/p stents in 2012 (ASA), locally advanced rectal cancer s/p neoadjuvant therapy with chemoRT (completed 11/2022), consolidation CAPOX (2/2023), brachytherapy (3/2024), presenting for generalized weakness, chest discomfort, rectal pain. Pt was here at [facility] in 8/30 for fatigue, weakness, and falls, found to have perforated rectal mass with perirectal collection, 2.5 x 1.5 cm with spread to infection in anorectal/perineal region with fluid/gas tracking along penile shaft, however pt left for MSK and s/p diverting end sigmoid colostomy, cystoscopy and naranjo placement across defect and perineal/scrotal drainage 8/30. [place] at [facility] [place], discharged day prior to presentation for perineal infections, treated with abx. Represents here for generalized weakness and dizziness, CTAP non-con on preliminary read indicating ill-defined fluid/gas in scrotum, perirectal space, and probably perineum. Exam demonstrates skin duskiness multiple genitourinary abscesses, induration, purulent drainage. 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    10/13: S/p debridement of genitalia and perineum in OR yesterday. In SICU for post op pressor requirements. Got 500 ccs overnight for hypovolemia on POCUS. Currently off pressors. On zosyn, clinda, vanc. U and bcx pending. Tissue cx negative. Wound packing changed at bedside, incision beds with adequate granulation tissue, healing well. Transferred to floor  10/14 - wounds clean; re-packed; penroses in place; ID consult  10/15 - pt refuses blood draw, and refuses dressing change (it has been 24 hrs since last one; he refused last nite also)                 later on he did allow it - wound appears clean, no necrotic tissue; labs drawn  10/16 - pro-BNP high so echo today  10/17 - changed dressing, took out subprapubic incision penrose drain, left scrotal penrose drain. Echo with reduced EF - cards says iso sepsis, no further workup needed. Changed abx from fluconazole to caspo (2 week course) per ID. Can c/w erta for ESBL e.coli. Cards saying he will need AC eventually,   10/18 - changed dressing this am, last penrose in scrotum d/c'ed. wound care spoke with, can try changing wet to dry dressings to aquacel and change qd. Pt's daughter will hire own aid for wound care and PT, but will need homecare for erta and caspo IV at home.   10/19- changed dressing to aquacel, started Eliquis  10/20- refused exam in AM, dressing to be changed with aquacel in PM   10/21 - changed dressing ; will speak with SW/HC about dispo planning; spoke with daughter for plan; see note by   10/22 - dressing changed; R. arm has swelling, though improved from last hospitalization, as per daughter; testing at [facility] did not reveal a clot, but we will repeat                Pt had blood from rectum today, daughter claims that happened at [facility] when eliquis was re-started; I d/c'd eliquis               d/w at great length his needs for home; she does not want to take him home until ABX's are done (next Tues); she wants study R. upper ext swelling even though it was done at [facility] and was neg  10/23 - daughter supposedly will come today to learn dressing and colostomy care; we will change dressing then, and speak again to daughter about rectal Ca and GOC; apparently she has not wanted to discuss it in the past, R. arm duplex neg, xray negative, psych consult requested  10/24 - dressings changed this AM, yesterday daughter instructed as to dressing changes and colostomy care, psych to see pt again today as he did not participate in the interview (haldol for agitation), no stool in colostomy ? gas overnight, will get AXR  10/25 - dressing changed this AM. AXR shows nonobstructive distension.  10/26 - new dark red output from colostomy, dressing changed this AM,   10/27 - persistent bloody output from colostomy  10/28 - no new events; will need to change dressing, pt not letting us do it now; later on wounds improved               GI called (see note above)  10/29 - will speak to daughter today (tried calling 3X yesterday) to see if she wants us to pursue rectal and colost bleed (labs stable); gen surg at bedside; d/c erta/flucon  10/30 - Cards and GI consulted; daughter spoken to last nite; she did not want our house cardiologist, so no scope today; her cardiologist does not come here; will speak again to her today and hopefully will consent to scope; labs stable, naranjo exchanged over wire  10/31 - Cards spoke w/ pt's PMD cards, for coloscopy today, AM labs stable, Findings: There was evidence of a patent loop colostomy in the transverse colon. This was characterized by healthy appearing mucosa. Two separate stomal openings were appreciated on digital exam. The transverse colon was entered via one of the two stomal openings leading towards the proximal colon. Many small and large-mouthed    diverticula were found in the entire colon. There was no evidence of bleeding in the proximal colon. The terminal ileum appeared normal. There was no evidence of bleeding in the ileum. The colonoscope was subsequently introduced into the second stomal opening leading to the distal colon. A large blood clot was found in the descending colon filling the lumen. No active bleeding was appreciated.                      Impression:    Patent loop colostomy with healthy appearing mucosa in the transverse colon. Diverticulosis in the entire examined proximal colon. There was no evidence of bleeding in the proximal colon                    or ileum. Clotted blood in the descending colon. Suspect blood in the ostomy is secondary to refluxed blood from rectum. No specimens collected.   11/1 - stable, still with blood from rectum, brown from colostomy, team with speak with daughter pending plan, ? possible transfer back to [facility]  11/2 - stable, still with blood from rectum, colostomy less blood more brown, plan pending conversation with daughter   11/3 - daughter and patient have decided to proceed with transfer to [facility] under Dr. Kris Vega (oncologist) and Dr. Miguel Angel Johnson (surgeon)    Plan:  - dressing change qd with medi-honey aquacel per wound care by RN  - continue naranjo  - continue to hold Eliquis   - f/u medicine  - f/u GI  - initiate transfer to [facility]

## 2024-11-04 NOTE — PROVIDER CONTACT NOTE (OTHER) - RECOMMENDATIONS
IV fluids
Inform family members to not give anything else to eat of drink.
come see patient
Provider come assess the patient.

## 2024-11-04 NOTE — PROGRESS NOTE ADULT - SUBJECTIVE AND OBJECTIVE BOX
[facility] Department of Hospital Medicine  Pankaj Butler MD  Available on MS Teams  Pager: [phone]    Patient is a 79y old  Male who presents with a chief complaint of suprapubic pain (04 Nov 2024 07:33)    OVERNIGHT EVENTS: No acute events overnight.    SUBJECTIVE: Pt seen and examined at bedside this morning. Noted to be sleeping, but easily awakened. Denies any acute complaints this morning. Wishes to go back to sleep, declining to participate in full interview or exam.    ADDITIONAL REVIEW OF SYSTEMS: as above.    MEDICATIONS  (STANDING):  acetaminophen     Tablet .. 1000 milliGRAM(s) Oral every 6 hours  aspirin  chewable 81 milliGRAM(s) Oral daily  brimonidine 0.2% Ophthalmic Solution 1 Drop(s) Both EYES every 12 hours  Dakins Solution - 1/4 Strength 1 Application(s) Topical daily  dextrose 5% + sodium chloride 0.9%. 1000 milliLiter(s) (75 mL/Hr) IV Continuous <Continuous>  heparin   Injectable 5000 Unit(s) SubCutaneous every 8 hours  latanoprost 0.005% Ophthalmic Solution 1 Drop(s) Both EYES at bedtime  losartan 25 milliGRAM(s) Oral daily  metoprolol succinate ER 50 milliGRAM(s) Oral daily  nicotine - 21 mG/24Hr(s) Patch 1 Patch Transdermal daily  pantoprazole    Tablet 40 milliGRAM(s) Oral before breakfast  rosuvastatin 10 milliGRAM(s) Oral at bedtime  spironolactone 25 milliGRAM(s) Oral daily  timolol 0.5% Solution 1 Drop(s) Both EYES every 12 hours    MEDICATIONS  (PRN):  aluminum hydroxide/magnesium hydroxide/simethicone Suspension 30 milliLiter(s) Oral every 6 hours PRN Dyspepsia  morphine  - Injectable 2 milliGRAM(s) IV Push every 8 hours PRN Moderate Pain (4 - 6)  morphine  - Injectable 4 milliGRAM(s) IV Push every 8 hours PRN Severe Pain (7 - 10)    CAPILLARY BLOOD GLUCOSE    I&O's Summary    03 Nov 2024 07:01  -  04 Nov 2024 07:00  --------------------------------------------------------  IN: 0 mL / OUT: 2130 mL / NET: -2130 mL    04 Nov 2024 07:01  -  04 Nov 2024 11:30  --------------------------------------------------------  IN: 0 mL / OUT: 300 mL / NET: -300 mL    PHYSICAL EXAM:  Vital Signs Last 24 Hrs  T(C): 36.7 (04 Nov 2024 09:18), Max: 36.7 (04 Nov 2024 01:50)  T(F): 98.1 (04 Nov 2024 09:18), Max: 98.1 (04 Nov 2024 09:18)  HR: 70 (04 Nov 2024 09:18) (70 - 84)  BP: 124/76 (04 Nov 2024 09:18) (118/71 - 132/82)  BP(mean): --  RR: 18 (04 Nov 2024 09:18) (17 - 18)  SpO2: 100% (04 Nov 2024 09:18) (99% - 100%)    Parameters below as of 04 Nov 2024 09:18  Patient On (Oxygen Delivery Method): room air    Exam limited any patient refusing to be examined.  CONSTITUTIONAL: NAD, well-developed  HEAD: Normocephalic, atraumatic  EYES: EOMI, PERRL  ENT: no rhinorrhea, no pharyngeal erythema  RESPIRATORY: No increased work of breathing, CTAB, no wheezes or crackles appreciated  CARDIOVASCULAR: RRR, S1 and S2 present, no m/r/g  ABDOMEN: soft, NT, ND, bowel sounds present  EXTREMITIES: No LE edema  MUSCULOSKELETAL: no joint swelling, no tenderness to palpation  NEURO: A&Ox3, moving all extremities    LABS:                        8.3    5.98  )-----------( 153      ( 02 Nov 2024 12:00 )             27.2     11-02    135  |  101  |  5[L]  ----------------------------<  108[H]  4.2   |  23  |  0.48[L]    Ca    8.6      02 Nov 2024 12:00  Phos  2.4     11-02  Mg     1.80     11-02    Urinalysis Basic - ( 02 Nov 2024 12:00 )    Color: x / Appearance: x / SG: x / pH: x  Gluc: 108 mg/dL / Ketone: x  / Bili: x / Urobili: x   Blood: x / Protein: x / Nitrite: x   Leuk Esterase: x / RBC: x / WBC x   Sq Epi: x / Non Sq Epi: x / Bacteria: x    RADIOLOGY & ADDITIONAL TESTS:    Results Reviewed:   Imaging Personally Reviewed:  Electrocardiogram Personally Reviewed:    COORDINATION OF CARE:  Care Discussed with Consultants/Other Providers [Y/N]:  Prior or Outpatient Records Reviewed [Y/N]:   [facility] of Hospital Medicine  Pankaj Butler MD  Available on MS Teams  Pager: [phone]    Patient is a 79y old  Male who presents with a chief complaint of suprapubic pain (04 Nov 2024 07:33)    OVERNIGHT EVENTS: No acute events overnight.    SUBJECTIVE: Pt seen and examined at bedside this morning. Noted to be sleeping, but easily awakened. Denies any acute complaints this morning. Wishes to go back to sleep, declining to participate in full interview or exam.    ADDITIONAL REVIEW OF SYSTEMS: as above.    MEDICATIONS  (STANDING):  acetaminophen     Tablet .. 1000 milliGRAM(s) Oral every 6 hours  aspirin  chewable 81 milliGRAM(s) Oral daily  brimonidine 0.2% Ophthalmic Solution 1 Drop(s) Both EYES every 12 hours  Dakins Solution - 1/4 Strength 1 Application(s) Topical daily  dextrose 5% + sodium chloride 0.9%. 1000 milliLiter(s) (75 mL/Hr) IV Continuous <Continuous>  heparin   Injectable 5000 Unit(s) SubCutaneous every 8 hours  latanoprost 0.005% Ophthalmic Solution 1 Drop(s) Both EYES at bedtime  losartan 25 milliGRAM(s) Oral daily  metoprolol succinate ER 50 milliGRAM(s) Oral daily  nicotine - 21 mG/24Hr(s) Patch 1 Patch Transdermal daily  pantoprazole    Tablet 40 milliGRAM(s) Oral before breakfast  rosuvastatin 10 milliGRAM(s) Oral at bedtime  spironolactone 25 milliGRAM(s) Oral daily  timolol 0.5% Solution 1 Drop(s) Both EYES every 12 hours    MEDICATIONS  (PRN):  aluminum hydroxide/magnesium hydroxide/simethicone Suspension 30 milliLiter(s) Oral every 6 hours PRN Dyspepsia  morphine  - Injectable 2 milliGRAM(s) IV Push every 8 hours PRN Moderate Pain (4 - 6)  morphine  - Injectable 4 milliGRAM(s) IV Push every 8 hours PRN Severe Pain (7 - 10)    CAPILLARY BLOOD GLUCOSE    I&O's Summary    03 Nov 2024 07:01  -  04 Nov 2024 07:00  --------------------------------------------------------  IN: 0 mL / OUT: 2130 mL / NET: -2130 mL    04 Nov 2024 07:01  -  04 Nov 2024 11:30  --------------------------------------------------------  IN: 0 mL / OUT: 300 mL / NET: -300 mL    PHYSICAL EXAM:  Vital Signs Last 24 Hrs  T(C): 36.7 (04 Nov 2024 09:18), Max: 36.7 (04 Nov 2024 01:50)  T(F): 98.1 (04 Nov 2024 09:18), Max: 98.1 (04 Nov 2024 09:18)  HR: 70 (04 Nov 2024 09:18) (70 - 84)  BP: 124/76 (04 Nov 2024 09:18) (118/71 - 132/82)  BP(mean): --  RR: 18 (04 Nov 2024 09:18) (17 - 18)  SpO2: 100% (04 Nov 2024 09:18) (99% - 100%)    Parameters below as of 04 Nov 2024 09:18  Patient On (Oxygen Delivery Method): room air    Exam limited any patient refusing to be examined.  CONSTITUTIONAL: NAD, well-developed  HEAD: Normocephalic, atraumatic  EYES: eyes tracking  ENT: no rhinorrhea noted  RESPIRATORY: No increased work of breathing noted  : naranjo catheter noted, draining clear urine  NEURO: alert and conversant    LABS:                        8.3    5.98  )-----------( 153      ( 02 Nov 2024 12:00 )             27.2     11-02    135  |  101  |  5[L]  ----------------------------<  108[H]  4.2   |  23  |  0.48[L]    Ca    8.6      02 Nov 2024 12:00  Phos  2.4     11-02  Mg     1.80     11-02    Urinalysis Basic - ( 02 Nov 2024 12:00 )    Color: x / Appearance: x / SG: x / pH: x  Gluc: 108 mg/dL / Ketone: x  / Bili: x / Urobili: x   Blood: x / Protein: x / Nitrite: x   Leuk Esterase: x / RBC: x / WBC x   Sq Epi: x / Non Sq Epi: x / Bacteria: x    RADIOLOGY & ADDITIONAL TESTS:    Results Reviewed:   Imaging Personally Reviewed:  Electrocardiogram Personally Reviewed:    COORDINATION OF CARE:  Care Discussed with Consultants/Other Providers [Y/N]:  Prior or Outpatient Records Reviewed [Y/N]:

## 2024-11-04 NOTE — PROVIDER CONTACT NOTE (OTHER) - SITUATION
Pt is resting in bed with no s/s of any distress. Pt has minimal rectal bleeding and 5ml output in the colostomy bag. Gas and slight dark sticky blood stool covering the bag.

## 2024-11-04 NOTE — DISCHARGE NOTE NURSING/CASE MANAGEMENT/SOCIAL WORK - NSDCPECAREGIVERED_GEN_ALL_CORE
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

## 2024-11-04 NOTE — DISCHARGE NOTE NURSING/CASE MANAGEMENT/SOCIAL WORK - NURSING SECTION COMPLETE
Mother would like to schedule an intake for patient's sister  Please give her a call 
Please write an updated diagnosis letter for school    Will place mother's request in case management box
Patient/Caregiver provided printed discharge information.

## 2024-11-04 NOTE — PROGRESS NOTE ADULT - PROBLEM SELECTOR PLAN 1
Patient with history of locally advanced rectal cancer. Melena/bright red blood noted in ostomy, as well as rectal bleeding   - Has been on ASA and HSQ but off full dose DOAC due to hx of rectal bleeding in the past  - GI following. S/p colonoscopy on 10/31 showing diverticulosis but no active bleeding, "suspect blood in the ostomy is secondary to refluxed blood from rectum."  - CRS following as well   - CBC has remained stable at this time, continue to monitor    - If continued downtrending Hgb, consider holding HSQ and ASA   - Transfuse for Hgb < 8 given hx of CAD  - follows with [facility] oncology - consider onc involvement to see if patient is candidate for further treatment, if not would consider Palliative care for assistance with GOC and symptom management, however during previous discussions w/ patient's daughter and other providers, patient is full code, would like all interventions performed. Patient with history of locally advanced rectal cancer. Melena/bright red blood noted in ostomy, as well as rectal bleeding   - Has been on ASA and HSQ but off full dose DOAC due to hx of rectal bleeding in the past  - GI following. S/p colonoscopy on 10/31 showing diverticulosis but no active bleeding, "suspect blood in the ostomy is secondary to refluxed blood from rectum."  - CRS following as well   - CBC has remained stable at this time, continue to monitor    - If continued downtrending Hgb, consider holding HSQ and ASA   - Transfuse for Hgb < 8 given hx of CAD  - follows with [facility] oncology, plan for possible transfer to [facility] [place] for further management

## 2024-11-04 NOTE — PROGRESS NOTE ADULT - SUBJECTIVE AND OBJECTIVE BOX
POD #22  Afeb 126/71 72 100%RA    Pt has no new c/o  Abd- soft   Suprapubic are covered with Aquacel; contine with same recommendations  Scrotum - packing  Sacrum - covered; seen 2 days ago  Zamarripa - changed 10/31 900cc  Col - stool  Continues with some bloody drainage from rectum/perineum

## 2024-11-04 NOTE — PROGRESS NOTE ADULT - SUBJECTIVE AND OBJECTIVE BOX
Cardiology Attending Follow-up Note     Patient seen and examined at bedside.    Overnight Events:   no events   more alert   dc pending   no cp     REVIEW OF SYSTEMS:  Constitutional:     [x ] negative [ ] fevers [ ] chills [ ] weight loss [ ] weight gain  HEENT:                  [x ] negative [ ] dry eyes [ ] eye irritation [ ] postnasal drip [ ] nasal congestion  CV:                         [ x] negative  [ ] chest pain [ ] orthopnea [ ] palpitations [ ] murmur  Resp:                     [ x] negative [ ] cough [ ] shortness of breath [ ] dyspnea [ ] wheezing [ ] sputum [ ]hemoptysis  GI:                          [ x] negative [ ] nausea [ ] vomiting [ ] diarrhea [ ] constipation [ ] abd pain [ ] dysphagia   :                        [ x] negative [ ] dysuria [ ] nocturia [ ] hematuria [ ] increased urinary frequency  Musculoskeletal: [ x] negative [ ] back pain [ ] myalgias [ ] arthralgias [ ] fracture  Skin:                       [ x] negative [ ] rash [ ] itch  Neurological:        [x ] negative [ ] headache [ ] dizziness [ ] syncope [ ] weakness [ ] numbness  Psychiatric:           [ x] negative [ ] anxiety [ ] depression  Endocrine:            [ x] negative [ ] diabetes [ ] thyroid problem  Heme/Lymph:      [ x] negative [ ] anemia [ ] bleeding problem  Allergic/Immune: [ x] negative [ ] itchy eyes [ ] nasal discharge [ ] hives [ ] angioedema    [ x] All other systems negative  [ ] Unable to assess ROS due to    Current Meds:  acetaminophen     Tablet .. 1000 milliGRAM(s) Oral every 6 hours  aluminum hydroxide/magnesium hydroxide/simethicone Suspension 30 milliLiter(s) Oral every 6 hours PRN  aspirin  chewable 81 milliGRAM(s) Oral daily  brimonidine 0.2% Ophthalmic Solution 1 Drop(s) Both EYES every 12 hours  Dakins Solution - 1/4 Strength 1 Application(s) Topical daily  dextrose 5% + sodium chloride 0.9%. 1000 milliLiter(s) IV Continuous <Continuous>  heparin   Injectable 5000 Unit(s) SubCutaneous every 8 hours  latanoprost 0.005% Ophthalmic Solution 1 Drop(s) Both EYES at bedtime  losartan 25 milliGRAM(s) Oral daily  metoprolol succinate ER 50 milliGRAM(s) Oral daily  morphine  - Injectable 2 milliGRAM(s) IV Push every 8 hours PRN  morphine  - Injectable 4 milliGRAM(s) IV Push every 8 hours PRN  nicotine - 21 mG/24Hr(s) Patch 1 Patch Transdermal daily  pantoprazole    Tablet 40 milliGRAM(s) Oral before breakfast  rosuvastatin 10 milliGRAM(s) Oral at bedtime  spironolactone 25 milliGRAM(s) Oral daily  timolol 0.5% Solution 1 Drop(s) Both EYES every 12 hours      PAST MEDICAL & SURGICAL HISTORY:  No significant past surgical history          Vitals:  T(F): 98.4 (11-04), Max: 98.4 (11-04)  HR: 71 (11-04) (70 - 84)  BP: 129/79 (11-04) (118/71 - 135/75)  RR: 17 (11-04)  SpO2: 100% (11-04)  I&O's Summary    03 Nov 2024 07:01 - 04 Nov 2024 07:00  --------------------------------------------------------  IN: 0 mL / OUT: 2130 mL / NET: -2130 mL    04 Nov 2024 07:01  -  04 Nov 2024 18:15  --------------------------------------------------------  IN: 0 mL / OUT: 1150 mL / NET: -1150 mL        Physical Exam:  Appearance: No acute distress  HENT: No JVD   Cardiovascular: RRR, S1/S2, no murmurs  Respiratory: CTABL  Gastrointestinal: soft, NT ND, +BS  Musculoskeletal: No clubbing, no edema   Neurologic: Non-focal  Skin: No rashes, ecchymoses, or cyanosis                          Cardiovascular Testings:

## 2024-11-04 NOTE — DISCHARGE NOTE NURSING/CASE MANAGEMENT/SOCIAL WORK - FINANCIAL ASSISTANCE
[facility] provides services at a reduced cost to those who are determined to be eligible through [facility]’s financial assistance program. Information regarding [facility]’s financial assistance program can be found by going to [email or web address] or by calling [phone].

## 2024-11-04 NOTE — CHART NOTE - NSCHARTNOTEFT_GEN_A_CORE
NUTRITION FOLLOW UP NOTE          Source: Patient A&Ox2    Family [ ]     RN [x ]    Chart [x ]     Pt seen for nutrition follow up due to severe malnutrition.    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 Also found to have reduced EF, cardiology following and being medically managed. Course further complicated by GIB, likely from known rectal CA.       ACTIVE DIET ORDER  Diet, Regular:     Start Time: 20:00  Liquid Protein Supplement     Qty per Day:  3  Supplement Feeding Modality:  Oral  Ensure Plus High Protein Cans or Servings Per Day:  1       Frequency:  Two Times a day (10-31-24 @ 16:13)      NUTRITION COURSE  Patient seen in his room unarousable with name calling, appeared to be very sleepy today per chart. Noted on IV dextrose 5% + sodium chloride 0.9% for hydration. Patient is able to feed self with set-up help with occasional feeding assistance. Patient was known for sub-optimal intake per previous RD follow up, PO intake remains low </=25-50% at times per RN flowsheet. Current liberalized diet with LPS 30ml TID (300 kcal, 45gm protein) and Ensure Plus High Protein 8 oz. 2 x/day (700kcal, 40gm protein) aids to optimize nutritional support and wound healing for stage IV sacral pressure injury. No reported GI issues such as nausea/vomiting/diarrhea/constipation. Patient has LLQ colostomy bag, noted to be bloody per RN flowsheet. Per urology note dated 11/4, "daughter and patient have decided to proceed with transfer to [facility] [place] [place] under Dr. Kris Vega (oncologist) and Dr. Miguel Angel Johnson (surgeon)." RDN to remain available for follow up and further nutrition intervention as indicated.        PERTINENT MEDICATION  MEDICATIONS  (STANDING):  acetaminophen     Tablet .. 1000 milliGRAM(s) Oral every 6 hours  aspirin  chewable 81 milliGRAM(s) Oral daily  brimonidine 0.2% Ophthalmic Solution 1 Drop(s) Both EYES every 12 hours  Dakins Solution - 1/4 Strength 1 Application(s) Topical daily  dextrose 5% + sodium chloride 0.9%. 1000 milliLiter(s) (75 mL/Hr) IV Continuous <Continuous>  heparin   Injectable 5000 Unit(s) SubCutaneous every 8 hours  latanoprost 0.005% Ophthalmic Solution 1 Drop(s) Both EYES at bedtime  losartan 25 milliGRAM(s) Oral daily  metoprolol succinate ER 50 milliGRAM(s) Oral daily  nicotine - 21 mG/24Hr(s) Patch 1 Patch Transdermal daily  pantoprazole    Tablet 40 milliGRAM(s) Oral before breakfast  rosuvastatin 10 milliGRAM(s) Oral at bedtime  spironolactone 25 milliGRAM(s) Oral daily  timolol 0.5% Solution 1 Drop(s) Both EYES every 12 hours    MEDICATIONS  (PRN):  aluminum hydroxide/magnesium hydroxide/simethicone Suspension 30 milliLiter(s) Oral every 6 hours PRN Dyspepsia  morphine  - Injectable 4 milliGRAM(s) IV Push every 8 hours PRN Severe Pain (7 - 10)  morphine  - Injectable 2 milliGRAM(s) IV Push every 8 hours PRN Moderate Pain (4 - 6)      PERTINENT LABS  No new lab today    ANTHROPOMETRICS  Height (cm): 185 (10-31-24)  Weight (kg): 63 (10-31-24), 63.5 (08-30-24)  BMI (kg/m2): 18.4 (10-31-24)  IBW: 83.6kg +/- 10%  Weight Assessment: per RN flowsheet in KG  10/31 - 63kg ? accuracy  10/26 - 59.6kg  10/20 - 59.6kg        10/15 - 65.4kg        10/14 - 58.3kg        10/13 - 62.1kg        10/12 - 63kg (Adm)    % weight change : Per previous RD assessment, " Patient with weight loss since admission, likely in setting of improved fluid edema / diuretic therapy (Spironolactone), but cannot r/o weight loss unrelated to current illnesses."    EDEMA: 1+ right and left foot per RN flowsheet    SKIN: stage IV sacral pressure injury    ESTIMATED NEEDS ASSESSMENT  [ x ] No change in need assessment, weight Used: 59.6kg due to weight fluctuation  Estimated Energy: 9685-3963 Kcal/kg/day ( 30-35 kcal/kg)  Estimated Protein: 83-95 gm/kg/day ( 1.4-1.6 gm/kg)    PREVIOUS NUTRITION DIAGNOSIS  [ x ] Severe malnutrition  Nutrition Diagnosis is : [ x ] ongoing   New Nutrition Diagnosis :  [ x ] not applicable     EDUCATION  [ x ] Not warranted at present    RECOMMENDATION  [ x ] Continue present PO diet and supplement order as prescribed  [ x ] Please consider adding appetite stimulant and multivitamin  [ x ] Honor food and fluid preferences as able.  [ x ] Replete electrolytes (Na, K, Phos, Mg) PRN    MONITORING AND EVALUATION   [ x ] Monitor PO intake, skin integrity, bowel regimen, and nutrition pertinent labs.  [ x ] Tolerance to diet prescription [ x ] weights [ x ] follow up per protocol

## 2024-11-05 PROCEDURE — 99232 SBSQ HOSP IP/OBS MODERATE 35: CPT

## 2024-11-05 RX ADMIN — Medication 81 MILLIGRAM(S): at 13:13

## 2024-11-05 RX ADMIN — PANTOPRAZOLE SODIUM 40 MILLIGRAM(S): 40 TABLET, DELAYED RELEASE ORAL at 06:33

## 2024-11-05 RX ADMIN — Medication 1000 MILLIGRAM(S): at 22:06

## 2024-11-05 RX ADMIN — Medication 1000 MILLIGRAM(S): at 00:17

## 2024-11-05 RX ADMIN — Medication 1 DROP(S): at 17:07

## 2024-11-05 RX ADMIN — HEPARIN SODIUM 5000 UNIT(S): 10000 INJECTION INTRAVENOUS; SUBCUTANEOUS at 13:13

## 2024-11-05 RX ADMIN — Medication 10 MILLIGRAM(S): at 21:06

## 2024-11-05 RX ADMIN — Medication 1 DROP(S): at 06:34

## 2024-11-05 RX ADMIN — Medication 1000 MILLIGRAM(S): at 21:06

## 2024-11-05 RX ADMIN — NICOTINE POLACRILEX 1 PATCH: 4 GUM, CHEWING ORAL at 13:13

## 2024-11-05 RX ADMIN — NICOTINE POLACRILEX 1 PATCH: 4 GUM, CHEWING ORAL at 06:49

## 2024-11-05 RX ADMIN — NICOTINE POLACRILEX 1 PATCH: 4 GUM, CHEWING ORAL at 13:21

## 2024-11-05 RX ADMIN — Medication 1 APPLICATION(S): at 13:15

## 2024-11-05 RX ADMIN — MORPHINE SULFATE 2 MILLIGRAM(S): 30 TABLET, EXTENDED RELEASE ORAL at 00:17

## 2024-11-05 RX ADMIN — Medication 25 MILLIGRAM(S): at 06:33

## 2024-11-05 RX ADMIN — MORPHINE SULFATE 2 MILLIGRAM(S): 30 TABLET, EXTENDED RELEASE ORAL at 23:19

## 2024-11-05 RX ADMIN — Medication 1000 MILLIGRAM(S): at 08:30

## 2024-11-05 RX ADMIN — Medication 1000 MILLIGRAM(S): at 13:12

## 2024-11-05 RX ADMIN — LOSARTAN POTASSIUM 25 MILLIGRAM(S): 25 TABLET ORAL at 06:34

## 2024-11-05 RX ADMIN — Medication 2 TABLET(S): at 21:06

## 2024-11-05 RX ADMIN — MORPHINE SULFATE 2 MILLIGRAM(S): 30 TABLET, EXTENDED RELEASE ORAL at 22:19

## 2024-11-05 RX ADMIN — Medication 1000 MILLIGRAM(S): at 13:21

## 2024-11-05 RX ADMIN — Medication 1 DROP(S): at 21:06

## 2024-11-05 RX ADMIN — Medication 50 MILLIGRAM(S): at 06:33

## 2024-11-05 RX ADMIN — NICOTINE POLACRILEX 1 PATCH: 4 GUM, CHEWING ORAL at 19:29

## 2024-11-05 RX ADMIN — Medication 1000 MILLIGRAM(S): at 06:33

## 2024-11-05 NOTE — PROGRESS NOTE ADULT - SUBJECTIVE AND OBJECTIVE BOX
[facility] Department of Hospital Medicine  Pankaj Butler MD  Available on MS Teams  Pager: [phone]    Patient is a 79y old  Male who presents with a chief complaint of suprapubic pain (05 Nov 2024 07:18)    OVERNIGHT EVENTS: No acute events overnight.    SUBJECTIVE: Pt seen and examined at bedside this morning. Reports that he feels well. Denies any acute complaints. Denies any pain currently. Requesting assistance with adjusting his sweater. Denies any fever, chills, palpitations, CP or SOB. States he has been eating.    ADDITIONAL REVIEW OF SYSTEMS: as above.    MEDICATIONS  (STANDING):  acetaminophen     Tablet .. 1000 milliGRAM(s) Oral every 6 hours  aspirin  chewable 81 milliGRAM(s) Oral daily  brimonidine 0.2% Ophthalmic Solution 1 Drop(s) Both EYES every 12 hours  Dakins Solution - 1/4 Strength 1 Application(s) Topical daily  dextrose 5% + sodium chloride 0.9%. 1000 milliLiter(s) (75 mL/Hr) IV Continuous <Continuous>  heparin   Injectable 5000 Unit(s) SubCutaneous every 8 hours  latanoprost 0.005% Ophthalmic Solution 1 Drop(s) Both EYES at bedtime  losartan 25 milliGRAM(s) Oral daily  metoprolol succinate ER 50 milliGRAM(s) Oral daily  nicotine - 21 mG/24Hr(s) Patch 1 Patch Transdermal daily  pantoprazole    Tablet 40 milliGRAM(s) Oral before breakfast  rosuvastatin 10 milliGRAM(s) Oral at bedtime  senna 2 Tablet(s) Oral at bedtime  spironolactone 25 milliGRAM(s) Oral daily  timolol 0.5% Solution 1 Drop(s) Both EYES every 12 hours    MEDICATIONS  (PRN):  aluminum hydroxide/magnesium hydroxide/simethicone Suspension 30 milliLiter(s) Oral every 6 hours PRN Dyspepsia  morphine  - Injectable 4 milliGRAM(s) IV Push every 8 hours PRN Severe Pain (7 - 10)  morphine  - Injectable 2 milliGRAM(s) IV Push every 8 hours PRN Moderate Pain (4 - 6)    CAPILLARY BLOOD GLUCOSE    I&O's Summary    04 Nov 2024 07:01  -  05 Nov 2024 07:00  --------------------------------------------------------  IN: 0 mL / OUT: 2405 mL / NET: -2405 mL    05 Nov 2024 07:01  -  05 Nov 2024 12:58  --------------------------------------------------------  IN: 0 mL / OUT: 600 mL / NET: -600 mL    PHYSICAL EXAM:  Vital Signs Last 24 Hrs  T(C): 36.9 (05 Nov 2024 09:20), Max: 36.9 (04 Nov 2024 17:09)  T(F): 98.4 (05 Nov 2024 09:20), Max: 98.4 (04 Nov 2024 17:09)  HR: 80 (05 Nov 2024 09:20) (71 - 81)  BP: 125/74 (05 Nov 2024 09:20) (125/74 - 136/81)  BP(mean): --  RR: 18 (05 Nov 2024 09:20) (16 - 18)  SpO2: 99% (05 Nov 2024 09:20) (98% - 100%)    Parameters below as of 05 Nov 2024 09:20  Patient On (Oxygen Delivery Method): room air    CONSTITUTIONAL: NAD, well-developed  HEAD: Normocephalic, atraumatic  EYES: Eyes tracking  ENT: no rhinorrhea noted  RESPIRATORY: No increased work of breathing, grossly CTAB  CARDIOVASCULAR: RRR, S1 and S2 present, no m/r/g  ABDOMEN: soft, NT, ND, bowel sounds present  : naranjo catheter draining clear yellow urine  EXTREMITIES: RLE pitting edema noted, unchanged from prior  MUSCULOSKELETAL: no joint swelling  NEURO: moving all extremities    LABS:    RADIOLOGY & ADDITIONAL TESTS:    Results Reviewed: Y  Imaging Personally Reviewed:  Electrocardiogram Personally Reviewed:    COORDINATION OF CARE:  Care Discussed with Consultants/Other Providers [Y/N]: Yes, discussed with primary team  Prior or Outpatient Records Reviewed [Y/N]:

## 2024-11-05 NOTE — PROGRESS NOTE ADULT - SUBJECTIVE AND OBJECTIVE BOX
POD #23  Afeb 126/82 78 94%RA    Pt has no c/o  Abd- soft NT ND   Zamarripa 1L  Col - +  Still oozing from rectum/perineum  SP/scrotal wounds - healing  Spoke with daughter yesterday (see below)

## 2024-11-05 NOTE — PROGRESS NOTE ADULT - ASSESSMENT
80 yo M with history of CAD s/p stents in 2012 (ASA), locally advanced rectal cancer s/p neoadjuvant therapy with chemoRT (completed 11/2022), consolidation CAPOX (2/2023), brachytherapy (3/2024), presenting for generalized weakness, chest discomfort, rectal pain. Pt was here at [facility] in 8/30 for fatigue, weakness, and falls, found to have perforated rectal mass with perirectal collection, 2.5 x 1.5 cm with spread to infection in anorectal/perineal region with fluid/gas tracking along penile shaft, however pt left for [facility] and s/p diverting end sigmoid colostomy, cystoscopy and naranjo placement across defect and perineal/scrotal drainage 8/30. Recently at [facility] for several weeks, discharged day prior to presentation for perineal infections, treated with abx. Represents here for generalized weakness and dizziness, CTAP non-con on preliminary read indicating ill-defined fluid/gas in scrotum, perirectal space, and probably perineum. Exam demonstrates skin duskiness multiple genitourinary abscesses, induration, purulent drainage. 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    10/13: S/p debridement of genitalia and perineum in OR yesterday. In SICU for post op pressor requirements. Got 500 ccs overnight for hypovolemia on POCUS. Currently off pressors. On zosyn, clinda, vanc. U and bcx pending. Tissue cx negative. Wound packing changed at bedside, incision beds with adequate granulation tissue, healing well. Transferred to floor  10/14 - wounds clean; re-packed; penroses in place; ID consult  10/15 - pt refuses blood draw, and refuses dressing change (it has been 24 hrs since last one; he refused last nite also)                 later on he did allow it - wound appears clean, no necrotic tissue; labs drawn  10/16 - pro-BNP high so echo today  10/17 - changed dressing, took out subprapubic incision penrose drain, left scrotal penrose drain. Echo with reduced EF - cards says iso sepsis, no further workup needed. Changed abx from fluconazole to caspo (2 week course) per ID. Can c/w erta for ESBL e.coli. Cards saying he will need AC eventually,   10/18 - changed dressing this am, last penrose in scrotum d/c'ed. wound care spoke with, can try changing wet to dry dressings to aquacel and change qd. Pt's daughter will hire own aid for wound care and PT, but will need homecare for erta and caspo IV at home.   10/19- changed dressing to aquacel, started Eliquis  10/20- refused exam in AM, dressing to be changed with aquacel in PM   10/21 - changed dressing ; will speak with SW/HC about dispo planning; spoke with daughter for plan; see note by   10/22 - dressing changed; R. arm has swelling, though improved from last hospitalization, as per daughter; testing at [facility] did not reveal a clot, but we will repeat                Pt had blood from rectum today, daughter claims that happened at [facility] when eliquis was re-started; I d/c'd eliquis               d/w at great length his needs for home; she does not want to take him home until ABX's are done (next Tues); she wants study R. upper ext swelling even though it was done at [facility] and was neg  10/23 - daughter supposedly will come today to learn dressing and colostomy care; we will change dressing then, and speak again to daughter about rectal Ca and GOC; apparently she has not wanted to discuss it in the past, R. arm duplex neg, xray negative, psych consult requested  10/24 - dressings changed this AM, yesterday daughter instructed as to dressing changes and colostomy care, psych to see pt again today as he did not participate in the interview (haldol for agitation), no stool in colostomy ? gas overnight, will get AXR  10/25 - dressing changed this AM. AXR shows nonobstructive distension.  10/26 - new dark red output from colostomy, dressing changed this AM,   10/27 - persistent bloody output from colostomy  10/28 - no new events; will need to change dressing, pt not letting us do it now; later on wounds improved               GI called (see note above)  10/29 - will speak to daughter today (tried calling 3X yesterday) to see if she wants us to pursue rectal and colost bleed (labs stable); gen surg at bedside; d/c erta/flucon  10/30 - Cards and GI consulted; daughter spoken to last nite; she did not want our house cardiologist, so no scope today; her cardiologist does not come here; will speak again to her today and hopefully will consent to scope; labs stable, naranjo exchanged over wire  10/31 - Cards spoke w/ pt's PMD cards, for coloscopy today, AM labs stable, Findings: There was evidence of a patent loop colostomy in the transverse colon. This was characterized by healthy appearing mucosa. Two separate stomal openings were appreciated on digital exam. The transverse colon was entered via one of the two stomal openings leading towards the proximal colon. Many small and large-mouthed    diverticula were found in the entire colon. There was no evidence of bleeding in the proximal colon. The terminal ileum appeared normal. There was no evidence of bleeding in the ileum. The colonoscope was subsequently introduced into the second stomal opening leading to the distal colon. A large blood clot was found in the descending colon filling the lumen. No active bleeding was appreciated.                      Impression:    Patent loop colostomy with healthy appearing mucosa in the transverse colon. Diverticulosis in the entire examined proximal colon. There was no evidence of bleeding in the proximal colon                    or ileum. Clotted blood in the descending colon. Suspect blood in the ostomy is secondary to refluxed blood from rectum. No specimens collected.   11/1 - stable, still with blood from rectum, brown from colostomy, team with speak with daughter pending plan, ? possible transfer back to [facility]  11/2 - stable, still with blood from rectum, colostomy less blood more brown, plan pending conversation with daughter   11/3 - daughter and patient have decided to proceed with transfer to [facility] under Dr. Kris Vega (oncologist) and Dr. Miguel Angel Johnson (surgeon)  11/4 - called [facility] to transfer  11/5 - [facility] will not approve transfer; daughter called; after 35 min, she does not want "palliative"; she wants "curative"; we will call his oncologist today to see what he knows that we can tell her  Plan:  - dressing change qd with medi-honey aquacel per wound care by RN  - continue naranjo  - f/u medicine  - f/u GI  - call pt's onc today

## 2024-11-05 NOTE — PROGRESS NOTE ADULT - PROBLEM SELECTOR PLAN 1
Patient with history of locally advanced rectal cancer. Melena/bright red blood noted in ostomy, as well as rectal bleeding   - Has been on ASA and HSQ but off full dose DOAC due to hx of rectal bleeding in the past  - GI following. S/p colonoscopy on 10/31 showing diverticulosis but no active bleeding, "suspect blood in the ostomy is secondary to refluxed blood from rectum."  - CRS following as well   - CBC has remained stable at this time, continue to monitor    - If continued downtrending Hgb, consider holding HSQ and ASA   - Transfuse for Hgb < 8 given hx of CAD  - follows with [facility] oncology, however patient was not accepted for transfer to [facility]  - primary team to discuss with [facility] regarding patient's oncologic course/treatment options

## 2024-11-06 DIAGNOSIS — Z75.8 OTHER PROBLEMS RELATED TO MEDICAL FACILITIES AND OTHER HEALTH CARE: ICD-10-CM

## 2024-11-06 PROCEDURE — 99232 SBSQ HOSP IP/OBS MODERATE 35: CPT

## 2024-11-06 RX ORDER — TRAMADOL HYDROCHLORIDE 50 MG/1
50 TABLET, COATED ORAL EVERY 6 HOURS
Refills: 0 | Status: DISCONTINUED | OUTPATIENT
Start: 2024-11-06 | End: 2024-11-12

## 2024-11-06 RX ADMIN — NICOTINE POLACRILEX 1 PATCH: 4 GUM, CHEWING ORAL at 17:44

## 2024-11-06 RX ADMIN — Medication 2 TABLET(S): at 22:38

## 2024-11-06 RX ADMIN — PANTOPRAZOLE SODIUM 40 MILLIGRAM(S): 40 TABLET, DELAYED RELEASE ORAL at 05:30

## 2024-11-06 RX ADMIN — Medication 1000 MILLIGRAM(S): at 17:20

## 2024-11-06 RX ADMIN — Medication 25 MILLIGRAM(S): at 05:31

## 2024-11-06 RX ADMIN — Medication 75 MILLILITER(S): at 12:43

## 2024-11-06 RX ADMIN — Medication 1000 MILLIGRAM(S): at 17:43

## 2024-11-06 RX ADMIN — Medication 50 MILLIGRAM(S): at 05:30

## 2024-11-06 RX ADMIN — Medication 1 DROP(S): at 17:20

## 2024-11-06 RX ADMIN — Medication 1000 MILLIGRAM(S): at 12:44

## 2024-11-06 RX ADMIN — Medication 1000 MILLIGRAM(S): at 13:32

## 2024-11-06 RX ADMIN — Medication 1 APPLICATION(S): at 12:44

## 2024-11-06 RX ADMIN — NICOTINE POLACRILEX 1 PATCH: 4 GUM, CHEWING ORAL at 12:49

## 2024-11-06 RX ADMIN — Medication 1000 MILLIGRAM(S): at 06:31

## 2024-11-06 RX ADMIN — NICOTINE POLACRILEX 1 PATCH: 4 GUM, CHEWING ORAL at 05:43

## 2024-11-06 RX ADMIN — NICOTINE POLACRILEX 1 PATCH: 4 GUM, CHEWING ORAL at 12:45

## 2024-11-06 RX ADMIN — Medication 10 MILLIGRAM(S): at 22:38

## 2024-11-06 RX ADMIN — Medication 1 DROP(S): at 05:30

## 2024-11-06 RX ADMIN — Medication 81 MILLIGRAM(S): at 12:44

## 2024-11-06 RX ADMIN — Medication 1 DROP(S): at 05:29

## 2024-11-06 RX ADMIN — Medication 1000 MILLIGRAM(S): at 05:31

## 2024-11-06 RX ADMIN — LOSARTAN POTASSIUM 25 MILLIGRAM(S): 25 TABLET ORAL at 05:31

## 2024-11-06 RX ADMIN — Medication 1 DROP(S): at 22:37

## 2024-11-06 NOTE — PROGRESS NOTE ADULT - SUBJECTIVE AND OBJECTIVE BOX
[facility] Department of Hospital Medicine  Pankaj Butler MD  Available on MS Teams  Pager: [phone]    Patient is a 79y old  Male who presents with a chief complaint of suprapubic pain (06 Nov 2024 07:32)    OVERNIGHT EVENTS: No acute events overnight.    SUBJECTIVE: Pt seen and examined at bedside this morning. Patient noting some discomfort with turning and dressing changes. Otherwise without acute complaints.     ADDITIONAL REVIEW OF SYSTEMS: as above.    MEDICATIONS  (STANDING):  acetaminophen     Tablet .. 1000 milliGRAM(s) Oral every 6 hours  aspirin  chewable 81 milliGRAM(s) Oral daily  brimonidine 0.2% Ophthalmic Solution 1 Drop(s) Both EYES every 12 hours  Dakins Solution - 1/4 Strength 1 Application(s) Topical daily  dextrose 5% + sodium chloride 0.9%. 1000 milliLiter(s) (75 mL/Hr) IV Continuous <Continuous>  heparin   Injectable 5000 Unit(s) SubCutaneous every 8 hours  latanoprost 0.005% Ophthalmic Solution 1 Drop(s) Both EYES at bedtime  losartan 25 milliGRAM(s) Oral daily  metoprolol succinate ER 50 milliGRAM(s) Oral daily  nicotine - 21 mG/24Hr(s) Patch 1 Patch Transdermal daily  pantoprazole    Tablet 40 milliGRAM(s) Oral before breakfast  rosuvastatin 10 milliGRAM(s) Oral at bedtime  senna 2 Tablet(s) Oral at bedtime  spironolactone 25 milliGRAM(s) Oral daily  timolol 0.5% Solution 1 Drop(s) Both EYES every 12 hours    MEDICATIONS  (PRN):  aluminum hydroxide/magnesium hydroxide/simethicone Suspension 30 milliLiter(s) Oral every 6 hours PRN Dyspepsia    CAPILLARY BLOOD GLUCOSE    I&O's Summary    05 Nov 2024 07:01  -  06 Nov 2024 07:00  --------------------------------------------------------  IN: 0 mL / OUT: 2555 mL / NET: -2555 mL    06 Nov 2024 07:01  -  06 Nov 2024 11:55  --------------------------------------------------------  IN: 0 mL / OUT: 200 mL / NET: -200 mL    PHYSICAL EXAM:  Vital Signs Last 24 Hrs  T(C): 36.8 (06 Nov 2024 09:14), Max: 36.8 (06 Nov 2024 09:14)  T(F): 98.2 (06 Nov 2024 09:14), Max: 98.2 (06 Nov 2024 09:14)  HR: 78 (06 Nov 2024 09:14) (75 - 79)  BP: 117/74 (06 Nov 2024 09:14) (117/74 - 136/76)  BP(mean): --  RR: 17 (06 Nov 2024 09:14) (16 - 17)  SpO2: 99% (06 Nov 2024 09:14) (99% - 100%)    Parameters below as of 06 Nov 2024 09:14  Patient On (Oxygen Delivery Method): room air    CONSTITUTIONAL: NAD, well-developed  HEAD: Normocephalic, atraumatic  EYES: Eyes tracking  ENT: no rhinorrhea noted  RESPIRATORY: No increased work of breathing, grossly CTAB  CARDIOVASCULAR: RRR, S1 and S2 present, no m/r/g  ABDOMEN: soft, NT, colostomy with dark material noted in bag  : naranjo catheter draining clear yellow urine  EXTREMITIES: RLE pitting edema noted, unchanged from prior  MUSCULOSKELETAL: no joint swelling  NEURO: moving all extremities    LABS:    RADIOLOGY & ADDITIONAL TESTS:    Results Reviewed: Y  Imaging Personally Reviewed:  Electrocardiogram Personally Reviewed:    COORDINATION OF CARE:  Care Discussed with Consultants/Other Providers [Y/N]: Yes, discussed with primary urology team.  Prior or Outpatient Records Reviewed [Y/N]:

## 2024-11-06 NOTE — PROGRESS NOTE ADULT - PROBLEM SELECTOR PLAN 2
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

## 2024-11-06 NOTE — PROGRESS NOTE ADULT - PROBLEM SELECTOR PLAN 1
Patient with history of locally advanced rectal cancer. Melena/bright red blood noted in ostomy, as well as rectal bleeding   - Has been on ASA and HSQ but off full dose DOAC due to hx of rectal bleeding in the past  - GI following. S/p colonoscopy on 10/31 showing diverticulosis but no active bleeding, "suspect blood in the ostomy is secondary to refluxed blood from rectum."  - CRS following as well   - CBC has remained stable at this time, continue to monitor    - If continued downtrending Hgb, consider holding HSQ and ASA   - Transfuse for Hgb < 8 given hx of CAD  - follows with [facility] oncology, however patient was not accepted for transfer to [facility]  - primary team to discuss with [facility] regarding patient's oncologic course/treatment options Patient with history of locally advanced rectal cancer. Melena/bright red blood noted in ostomy, as well as rectal bleeding   - Has been on ASA and HSQ but off full dose DOAC due to hx of rectal bleeding in the past  - GI following. S/p colonoscopy on 10/31 showing diverticulosis but no active bleeding, "suspect blood in the ostomy is secondary to refluxed blood from rectum."  - CRS following as well   - CBC has remained stable at this time, continue to monitor    - If continued downtrending Hgb, consider holding HSQ and ASA   - Transfuse for Hgb < 8 given hx of CAD  - follows with [facility] oncology, however patient was not accepted for transfer to [facility]  - primary team discussed case with patient's oncologist at [facility], Dr. Paez. Patient previously given treatment with chemo however unfortunately did not respond to therapy and is not a candidate for further tumor resection. Does not appear to be a candidate for further DMT at this time. Patient's family was informed of this by [facility] team during recent hospitalization and was also recommended for palliative measures.

## 2024-11-06 NOTE — PROGRESS NOTE ADULT - ASSESSMENT
78 yo M with history of CAD s/p stents in 2012 (ASA), locally advanced rectal cancer s/p neoadjuvant therapy with chemoRT (completed 11/2022), consolidation CAPOX (2/2023), brachytherapy (3/2024), presenting for generalized weakness, chest discomfort, rectal pain. Pt was here at [facility] in 8/30 for fatigue, weakness, and falls, found to have perforated rectal mass with perirectal collection, 2.5 x 1.5 cm with spread to infection in anorectal/perineal region with fluid/gas tracking along penile shaft, however pt left for MSK and s/p diverting end sigmoid colostomy, cystoscopy and naranjo placement across defect and perineal/scrotal drainage 8/30. [place] [facility] [place] discharged day prior to presentation for perineal infections, treated with abx. Represents here for generalized weakness and dizziness, CTAP non-con on preliminary read indicating ill-defined fluid/gas in scrotum, perirectal space, and probably perineum. Exam demonstrates skin duskiness multiple genitourinary abscesses, induration, purulent drainage. 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    10/13: S/p debridement of genitalia and perineum in OR yesterday. In SICU for post op pressor requirements. Got 500 ccs overnight for hypovolemia on POCUS. Currently off pressors. On zosyn, clinda, vanc. U and bcx pending. Tissue cx negative. Wound packing changed at bedside, incision beds with adequate granulation tissue, healing well. Transferred to floor  10/14 - wounds clean; re-packed; penroses in place; ID consult  10/15 - pt refuses blood draw, and refuses dressing change (it has been 24 hrs since last one; he refused last nite also)                 later on he did allow it - wound appears clean, no necrotic tissue; labs drawn  10/16 - pro-BNP high so echo today  10/17 - changed dressing, took out subprapubic incision penrose drain, left scrotal penrose drain. Echo with reduced EF - cards says iso sepsis, no further workup needed. Changed abx from fluconazole to caspo (2 week course) per ID. Can c/w erta for ESBL e.coli. Cards saying he will need AC eventually,   10/18 - changed dressing this am, last penrose in scrotum d/c'ed. wound care spoke with, can try changing wet to dry dressings to aquacel and change qd. Pt's daughter will hire own aid for wound care and PT, but will need homecare for erta and caspo IV at home.   10/19- changed dressing to aquacel, started Eliquis  10/20- refused exam in AM, dressing to be changed with aquacel in PM   10/21 - changed dressing ; will speak with SW/HC about dispo planning; spoke with daughter for plan; see note by   10/22 - dressing changed; R. arm has swelling, though improved from last hospitalization, as per daughter; [place] [facility] [place] not reveal a clot, but we will repeat                Pt had blood from rectum today, daughter claims that happened at [facility] when eliquis was re-started; I d/c'd eliquis               d/w at great length his needs for home; she does not want to take him home until ABX's are done (next Tues); she wants study R. upper ext swelling even though it was done at [facility] and was neg  10/23 - daughter supposedly will come today to learn dressing and colostomy care; we will change dressing then, and speak again to daughter about rectal Ca and GOC; apparently she has not wanted to discuss it in the past, R. arm duplex neg, xray negative, psych consult requested  10/24 - dressings changed this AM, yesterday daughter instructed as to dressing changes and colostomy care, psych to see pt again today as he did not participate in the interview (haldol for agitation), no stool in colostomy ? gas overnight, will get AXR  10/25 - dressing changed this AM. AXR shows nonobstructive distension.  10/26 - new dark red output from colostomy, dressing changed this AM,   10/27 - persistent bloody output from colostomy  10/28 - no new events; will need to change dressing, pt not letting us do it now; later on wounds improved               GI called (see note above)  10/29 - will speak to daughter today (tried calling 3X yesterday) to see if she wants us to pursue rectal and colost bleed (labs stable); gen surg at bedside; d/c erta/flucon  10/30 - Cards and GI consulted; daughter spoken to last nite; she did not want our house cardiologist, so no scope today; her cardiologist does not come here; will speak again to her today and hopefully will consent to scope; labs stable, naranjo exchanged over wire  10/31 - Cards spoke w/ pt's PMD cards, for coloscopy today, AM labs stable, Findings: There was evidence of a patent loop colostomy in the transverse colon. This was characterized by healthy appearing mucosa. Two separate stomal openings were appreciated on digital exam. The transverse colon was entered via one of the two stomal openings leading towards the proximal colon. Many small and large-mouthed    diverticula were found in the entire colon. There was no evidence of bleeding in the proximal colon. The terminal ileum appeared normal. There was no evidence of bleeding in the ileum. The colonoscope was subsequently introduced into the second stomal opening leading to the distal colon. A large blood clot was found in the descending colon filling the lumen. No active bleeding was appreciated.                      Impression:    Patent loop colostomy with healthy appearing mucosa in the transverse colon. Diverticulosis in the entire examined proximal colon. There was no evidence of bleeding in the proximal colon                    or ileum. Clotted blood in the descending colon. Suspect blood in the ostomy is secondary to refluxed blood from rectum. No specimens collected.   11/1 - naranjo changed over wire; stable, still with blood from rectum, brown from colostomy, team with speak with daughter pending plan, ? possible transfer back [place] [facility]  11/2 - stable, still with blood from rectum, colostomy less blood more brown, plan pending conversation with daughter   11/3 - daughter and patient have decided to proceed with transfer to [facility] [place] Dr. Kris Vega (oncologist) and Dr. Miguel Angel Johnson (surgeon)  11/4 - called [facility] to transfer  11/5 - [facility] will not approve transfer; daughter called; after 35 min, she does not want "palliative"; she wants "curative"; we will call his oncologist today to see what he knows that we can tell her  11/6 - did not hear back yet from [facility]; no events  Plan:  - dressing changes by RN, but we have seen wounds, including sacrum  - continue naranjo (changed a week ago)  - f/u medicine  - call pt's onc again  today     78 yo M with history of CAD s/p stents in 2012 (ASA), locally advanced rectal cancer s/p neoadjuvant therapy with chemoRT (completed 11/2022), consolidation CAPOX (2/2023), brachytherapy (3/2024), presenting for generalized weakness, chest discomfort, rectal pain. Pt was here at [facility] in 8/30 for fatigue, weakness, and falls, found to have perforated rectal mass with perirectal collection, 2.5 x 1.5 cm with spread to infection in anorectal/perineal region with fluid/gas tracking along penile shaft, however pt left for MSK and s/p diverting end sigmoid colostomy, cystoscopy and naranjo placement across defect and perineal/scrotal drainage 8/30. Recently at [facility] for several weeks, discharged day prior to presentation for perineal infections, treated with abx. Represents here for generalized weakness and dizziness, CTAP non-con on preliminary read indicating ill-defined fluid/gas in scrotum, perirectal space, and probably perineum. Exam demonstrates skin duskiness multiple genitourinary abscesses, induration, purulent drainage. 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    10/13: S/p debridement of genitalia and perineum in OR yesterday. In SICU for post op pressor requirements. Got 500 ccs overnight for hypovolemia on POCUS. Currently off pressors. On zosyn, clinda, vanc. U and bcx pending. Tissue cx negative. Wound packing changed at bedside, incision beds with adequate granulation tissue, healing well. Transferred to floor  10/14 - wounds clean; re-packed; penroses in place; ID consult  10/15 - pt refuses blood draw, and refuses dressing change (it has been 24 hrs since last one; he refused last nite also)                 later on he did allow it - wound appears clean, no necrotic tissue; labs drawn  10/16 - pro-BNP high so echo today  10/17 - changed dressing, took out subprapubic incision penrose drain, left scrotal penrose drain. Echo with reduced EF - cards says iso sepsis, no further workup needed. Changed abx from fluconazole to caspo (2 week course) per ID. Can c/w erta for ESBL e.coli. Cards saying he will need AC eventually,   10/18 - changed dressing this am, last penrose in scrotum d/c'ed. wound care spoke with, can try changing wet to dry dressings to aquacel and change qd. Pt's daughter will hire own aid for wound care and PT, but will need homecare for erta and caspo IV at home.   10/19- changed dressing to aquacel, started Eliquis  10/20- refused exam in AM, dressing to be changed with aquacel in PM   10/21 - changed dressing ; will speak with SW/HC about dispo planning; spoke with daughter for plan; see note by   10/22 - dressing changed; R. arm has swelling, though improved from last hospitalization, as per daughter; testing at [facility] did not reveal a clot, but we will repeat                Pt had blood from rectum today, daughter claims that happened at [facility] when eliquis was re-started; I d/c'd eliquis               d/w at great length his needs for home; she does not want to take him home until ABX's are done (next Tues); she wants study R. upper ext swelling even though it was done at [facility] and was neg  10/23 - daughter supposedly will come today to learn dressing and colostomy care; we will change dressing then, and speak again to daughter about rectal Ca and GOC; apparently she has not wanted to discuss it in the past, R. arm duplex neg, xray negative, psych consult requested  10/24 - dressings changed this AM, yesterday daughter instructed as to dressing changes and colostomy care, psych to see pt again today as he did not participate in the interview (haldol for agitation), no stool in colostomy ? gas overnight, will get AXR  10/25 - dressing changed this AM. AXR shows nonobstructive distension.  10/26 - new dark red output from colostomy, dressing changed this AM,   10/27 - persistent bloody output from colostomy  10/28 - no new events; will need to change dressing, pt not letting us do it now; later on wounds improved               GI called (see note above)  10/29 - will speak to daughter today (tried calling 3X yesterday) to see if she wants us to pursue rectal and colost bleed (labs stable); gen surg at bedside; d/c erta/flucon  10/30 - Cards and GI consulted; daughter spoken to last nite; she did not want our house cardiologist, so no scope today; her cardiologist does not come here; will speak again to her today and hopefully will consent to scope; labs stable, naranjo exchanged over wire  10/31 - Cards spoke w/ pt's PMD cards, for coloscopy today, AM labs stable, Findings: There was evidence of a patent loop colostomy in the transverse colon. This was characterized by healthy appearing mucosa. Two separate stomal openings were appreciated on digital exam. The transverse colon was entered via one of the two stomal openings leading towards the proximal colon. Many small and large-mouthed    diverticula were found in the entire colon. There was no evidence of bleeding in the proximal colon. The terminal ileum appeared normal. There was no evidence of bleeding in the ileum. The colonoscope was subsequently introduced into the second stomal opening leading to the distal colon. A large blood clot was found in the descending colon filling the lumen. No active bleeding was appreciated.                      Impression:    Patent loop colostomy with healthy appearing mucosa in the transverse colon. Diverticulosis in the entire examined proximal colon. There was no evidence of bleeding in the proximal colon                    or ileum. Clotted blood in the descending colon. Suspect blood in the ostomy is secondary to refluxed blood from rectum. No specimens collected.   11/1 - naranjo changed over wire; stable, still with blood from rectum, brown from colostomy, team with speak with daughter pending plan, ? possible transfer back to [facility]  11/2 - stable, still with blood from rectum, colostomy less blood more brown, plan pending conversation with daughter   11/3 - daughter and patient have decided to proceed with transfer to [facility] under Dr. Kris Vega (oncologist) and Dr. Miguel Angel Johnson (surgeon)  11/4 - called [facility] to transfer  11/5 - [facility] will not approve transfer; daughter called; after 35 min, she does not want "palliative"; she wants "curative"; we will call his oncologist today to see what he knows that we can tell her  11/6 - spoke with Dr Paez from [facility];  he confirmed everything we thought; the daughter has been told that there is no more surgery or chemo that he can recieve; they tried in the past to shrink tumor, but it was not successful, so resection was not an option; Dr Paez believes comfort care is the best thing; he also said that when pt was at [facility] in Sept they had to get admin to practically "throw them out".  Plan:  - dressing changes by RN, but we have seen wounds, including sacrum  - continue naranjo (changed a week ago)  - f/u medicine  - call pt's onc again  today

## 2024-11-06 NOTE — PROGRESS NOTE ADULT - SUBJECTIVE AND OBJECTIVE BOX
POD #24  Afeb 123/78 79 99%RA    Pt has no c/o; appears comfortable  Abd- soft  SP wound - no changes; eschar noted  Scrotum - 3 areas packing  Zamarripa [phone]  Col - +

## 2024-11-07 PROCEDURE — 99232 SBSQ HOSP IP/OBS MODERATE 35: CPT

## 2024-11-07 RX ADMIN — Medication 1000 MILLIGRAM(S): at 17:44

## 2024-11-07 RX ADMIN — TRAMADOL HYDROCHLORIDE 50 MILLIGRAM(S): 50 TABLET, COATED ORAL at 14:11

## 2024-11-07 RX ADMIN — NICOTINE POLACRILEX 1 PATCH: 4 GUM, CHEWING ORAL at 12:00

## 2024-11-07 RX ADMIN — NICOTINE POLACRILEX 1 PATCH: 4 GUM, CHEWING ORAL at 18:13

## 2024-11-07 RX ADMIN — Medication 1 DROP(S): at 06:39

## 2024-11-07 RX ADMIN — Medication 1 DROP(S): at 17:44

## 2024-11-07 RX ADMIN — Medication 25 MILLIGRAM(S): at 06:38

## 2024-11-07 RX ADMIN — PANTOPRAZOLE SODIUM 40 MILLIGRAM(S): 40 TABLET, DELAYED RELEASE ORAL at 06:39

## 2024-11-07 RX ADMIN — Medication 1000 MILLIGRAM(S): at 07:00

## 2024-11-07 RX ADMIN — Medication 75 MILLILITER(S): at 11:51

## 2024-11-07 RX ADMIN — Medication 1000 MILLIGRAM(S): at 06:39

## 2024-11-07 RX ADMIN — Medication 1 DROP(S): at 21:57

## 2024-11-07 RX ADMIN — Medication 1000 MILLIGRAM(S): at 13:32

## 2024-11-07 RX ADMIN — TRAMADOL HYDROCHLORIDE 50 MILLIGRAM(S): 50 TABLET, COATED ORAL at 15:11

## 2024-11-07 RX ADMIN — Medication 10 MILLIGRAM(S): at 21:57

## 2024-11-07 RX ADMIN — Medication 1 APPLICATION(S): at 11:52

## 2024-11-07 RX ADMIN — Medication 50 MILLIGRAM(S): at 06:38

## 2024-11-07 RX ADMIN — LOSARTAN POTASSIUM 25 MILLIGRAM(S): 25 TABLET ORAL at 06:38

## 2024-11-07 RX ADMIN — Medication 2 TABLET(S): at 21:56

## 2024-11-07 NOTE — PROGRESS NOTE ADULT - SUBJECTIVE AND OBJECTIVE BOX
[facility]-- WOUND TEAM -- FOLLOW UP NOTE  --------------------------------------------------------------------------------    subjective: Patient seen and examined earlier today with primary RN at bedside. Reported pain and discomfort during dressing changes, requested pain medication; RN at bedside administering Tramadol per orders. Pt denies SOB, CP, abdominal pain, n/v. Reports adequate appetite.    Interval HPI/24 hour events:   -Discharge planning in progress, plan for discharge on Monday 11/11  -Per records:  [place] was [place] [facility] [place] vs discharge home. Primary team discussed case with patient's oncologist at [facility]. Paez. Patient previously given treatment with chemo however unfortunately did not respond to therapy and is not a candidate for further tumor resection. Does not appear to be a candidate for further DMT at this time. Patient's family was informed of this by [facility] team during recent hospitalization and was also recommended for palliative measures. Patient was not accepted to [facility] [place].   -Family does not want to pursue Palliative Care.      Chart reviewed including labs and relevant images      Diet:  Diet, Regular:     Start Time: 20:00  Liquid Protein Supplement     Qty per Day:  3  Supplement Feeding Modality:  Oral  Ensure Plus High Protein Cans or Servings Per Day:  1       Frequency:  Two Times a day (10-31-24 @ 16:13)      ROS: General, skin see above.  All other systems negative.    ALLERGIES & MEDICATIONS  --------------------------------------------------------------------------------  Allergies    No Known Allergies    Intolerances          STANDING INPATIENT MEDICATIONS    acetaminophen     Tablet .. 1000 milliGRAM(s) Oral every 6 hours  aspirin  chewable 81 milliGRAM(s) Oral daily  brimonidine 0.2% Ophthalmic Solution 1 Drop(s) Both EYES every 12 hours  Dakins Solution - 1/4 Strength 1 Application(s) Topical daily  dextrose 5% + sodium chloride 0.9%. 1000 milliLiter(s) IV Continuous <Continuous>  heparin   Injectable 5000 Unit(s) SubCutaneous every 8 hours  latanoprost 0.005% Ophthalmic Solution 1 Drop(s) Both EYES at bedtime  losartan 25 milliGRAM(s) Oral daily  metoprolol succinate ER 50 milliGRAM(s) Oral daily  nicotine - 21 mG/24Hr(s) Patch 1 Patch Transdermal daily  pantoprazole    Tablet 40 milliGRAM(s) Oral before breakfast  rosuvastatin 10 milliGRAM(s) Oral at bedtime  senna 2 Tablet(s) Oral at bedtime  spironolactone 25 milliGRAM(s) Oral daily  timolol 0.5% Solution 1 Drop(s) Both EYES every 12 hours      PRN INPATIENT MEDICATION  aluminum hydroxide/magnesium hydroxide/simethicone Suspension 30 milliLiter(s) Oral every 6 hours PRN  traMADol 50 milliGRAM(s) Oral every 6 hours PRN        Vital signs:  T(C): 36.4 (11-07-24 @ 17:56), Max: 36.8 (11-07-24 @ 09:29)  HR: 77 (11-07-24 @ 17:56) (69 - 83)  BP: 127/75 (11-07-24 @ 17:56) (122/82 - 142/82)  RR: 17 (11-07-24 @ 17:56) (16 - 18)  SpO2: 98% (11-07-24 @ 17:56) (97% - 100%)  Wt(kg): 63 kg (10-31-24)        11-06-24 @ 07:01  -  11-07-24 @ 07:00  --------------------------------------------------------  IN: 600 mL / OUT: 2730 mL / NET: -2130 mL    11-07-24 @ 07:01  -  11-07-24 @ 20:49  --------------------------------------------------------  IN: 0 mL / OUT: 600 mL / NET: -600 mL        Constitutional: A&O x 1-2, agitated at times. Pale, ill appearing, frail  Bedbound. (+) low airloss support surface, (+) fluidized positioning devices, (+) complete cair boots  HEENT:  NC/AT, nonicteric, oral mucosa dry.  Cardiovascular: rate regular  Respiratory: nonlabored, room air, equal chest expansion.  Gastrointestinal: soft NT/ND, LLQ Colostomy with intact pouch in place; semiformed nonbloody stool in pouch. Healed laparoscopic surgical sites.  Small-moderate serosanguineous drainage from rectum   : (+) indwelling naranjo catheter  Multiple surgical wounds:  -Suprapubic- 3cmx8.5cmx0.5cm- 50% tan-moist adherent slough, 50% pink-moist granular base  -Anterior base of penile shaft- 0.9oph8lnl6.2cm- 25% adherent tan moist slough, 75% pink-moist granular base  -Right of penile shaft- wound healed  -Anterior scrotum- [id number]- 100% tan-moist loosening slough, fixed at base.  *All with scant serous drainage. No purulent drainage, no odor. Periwound skin intact; no erythema, no edema, no increased warmth, no fluctuance, no crepitus.  Musculoskeletal: rigidity of all extremities, no gross deformities/contractures  Vascular: BL LE +2 dp pulses, capillary refill < 3 seconds, equally warm, no overt ischemia, heels intact bilaterally   Skin:  frail, good skin turgor  Sacrum extending distally within sacral/gluteal cleft/coccyx- stage 4 pressure injury (patient turned to right side)  -[id number](prev [id number])    -Tunneling at 6 o'clock extends 1.5cm (stable), no purulence, no crepitus   -Sacrum with adherent softening nonfluctuant tan minimally moist slough/eschar, bone palpable beneath slough, not visible.  -Small serofibrinous drainage; no odor.   -Periwound skin with blanching erythema and hyperpigmentation circumferentially, erythema appears reactive, without induration, no fluctuance, no crepitus, no edema, no increased warmth.  Incontinence/moisture associated dermatitis in lower buttocks and perineum as evident by hyperpigmentation, no open wounds           LABS/ CULTURES/ RADIOLOGY:                    CAPILLARY BLOOD GLUCOSE

## 2024-11-07 NOTE — PROVIDER CONTACT NOTE (MEDICATION) - SITUATION
pt requested morphine. Daughter reports she spoke with team in the morning and she was told he would be able to have morphine. family informed there is no order for morphine on the MAR
Patient refusing to drink 2nd dose of bowel prep

## 2024-11-07 NOTE — PROGRESS NOTE ADULT - SUBJECTIVE AND OBJECTIVE BOX
Overnight events:  None    Subjective:  Pt offers no complaints, pt's son on the phone at time of rounds    Objective:    Vital signs  T(C): , Max: 36.8 (11-06-24 @ 09:14)  HR: 83 (11-07-24 @ 06:23)  BP: 142/82 (11-07-24 @ 06:23)  SpO2: 99% (11-07-24 @ 06:23)  Wt(kg): --    Output   Naranjo: 1380  Colostomy: 150 + gas  11-06 @ 07:01  -  11-07 @ 07:00  --------------------------------------------------------  IN: 600 mL / OUT: 2730 mL / NET: -2130 mL        Gen: NAD  Abd: soft, nontender, scrotal and penile wounds granulating, no purulence  Sacral wound stable  : naranjo in place    Labs: none today

## 2024-11-07 NOTE — PROGRESS NOTE ADULT - ASSESSMENT
78 yo M with history of CAD s/p stents in 2012 (ASA), locally advanced rectal cancer s/p neoadjuvant therapy with chemo/RT (completed 11/2022), consolidation CAPOX (2/2023), brachytherapy (3/2024), presenting for generalized weakness, chest discomfort, rectal pain. Pt was here at [facility] in 8/30 for fatigue, weakness, and falls, found to have perforated rectal mass with perirectal collection, 2.5 x 1.5 cm with spread to infection in anorectal/perineal region with fluid/gas tracking along penile shaft, however pt left for MSK and s/p diverting end sigmoid colostomy, cystoscopy and naranjo placement across defect and perineal/scrotal drainage 8/30. Recently at [facility] for several weeks, discharged day prior to presentation for perineal infections, treated with abx. Represents here for generalized weakness and dizziness, CTAP non-con on preliminary read indicating ill-defined fluid/gas in scrotum, perirectal space, and probably perineum. Exam demonstrates skin duskiness multiple genitourinary abscesses, induration, purulent drainage. 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    10/13: S/p debridement of genitalia and perineum in OR yesterday. In SICU for post op pressor requirements. Got 500 ccs overnight for hypovolemia on POCUS. Currently off pressors. On zosyn, clinda, vanc. U and bcx pending. Tissue cx negative. Wound packing changed at bedside, incision beds with adequate granulation tissue, healing well. Transferred to floor  10/14 - wounds clean; re-packed; penroses in place; ID consult  10/15 - pt refuses blood draw, and refuses dressing change (it has been 24 hrs since last one; he refused last nite also)                 later on he did allow it - wound appears clean, no necrotic tissue; labs drawn  10/16 - pro-BNP high so echo today  10/17 - changed dressing, took out subprapubic incision penrose drain, left scrotal penrose drain. Echo with reduced EF - cards says iso sepsis, no further workup needed. Changed abx from fluconazole to caspo (2 week course) per ID. Can c/w erta for ESBL e.coli. Cards saying he will need AC eventually,   10/18 - changed dressing this am, last penrose in scrotum d/c'ed. wound care spoke with, can try changing wet to dry dressings to aquacel and change qd. Pt's daughter will hire own aid for wound care and PT, but will need homecare for erta and caspo IV at home.   10/19- changed dressing to aquacel, started Eliquis  10/20- refused exam in AM, dressing to be changed with aquacel in PM   10/21 - changed dressing ; will speak with SW/HC about dispo planning; spoke with daughter for plan; see note by   10/22 - dressing changed; R. arm has swelling, though improved from last hospitalization, as per daughter; testing at [facility] did not reveal a clot, but we will repeat                Pt had blood from rectum today, daughter claims that happened at [facility] when eliquis was re-started; I d/c'd eliquis               d/w at great length his needs for home; she does not want to take him home until ABX's are done (next Tues); she wants study R. upper ext swelling even though it was done at [facility] and was neg  10/23 - daughter supposedly will come today to learn dressing and colostomy care; we will change dressing then, and speak again to daughter about rectal Ca and GOC; apparently she has not wanted to discuss it in the past, R. arm duplex neg, xray negative, psych consult requested  10/24 - dressings changed this AM, yesterday daughter instructed as to dressing changes and colostomy care, psych to see pt again today as he did not participate in the interview (haldol for agitation), no stool in colostomy ? gas overnight, will get AXR  10/25 - dressing changed this AM. AXR shows nonobstructive distension.  10/26 - new dark red output from colostomy, dressing changed this AM,   10/27 - persistent bloody output from colostomy  10/28 - no new events; will need to change dressing, pt not letting us do it now; later on wounds improved               GI called (see note above)  10/29 - will speak to daughter today (tried calling 3X yesterday) to see if she wants us to pursue rectal and colost bleed (labs stable); gen surg at bedside; d/c erta/flucon  10/30 - Cards and GI consulted; daughter spoken to last nite; she did not want our house cardiologist, so no scope today; her cardiologist does not come here; will speak again to her today and hopefully will consent to scope; labs stable, naranjo exchanged over wire  10/31 - Cards spoke w/ pt's PMD cards, for coloscopy today, AM labs stable, Findings: There was evidence of a patent loop colostomy in the transverse colon. This was characterized by healthy appearing mucosa. Two separate stomal openings were appreciated on digital exam. The transverse colon was entered via one of the two stomal openings leading towards the proximal colon. Many small and large-mouthed    diverticula were found in the entire colon. There was no evidence of bleeding in the proximal colon. The terminal ileum appeared normal. There was no evidence of bleeding in the ileum. The colonoscope was subsequently introduced into the second stomal opening leading to the distal colon. A large blood clot was found in the descending colon filling the lumen. No active bleeding was appreciated.                      Impression:    Patent loop colostomy with healthy appearing mucosa in the transverse colon. Diverticulosis in the entire examined proximal colon. There was no evidence of bleeding in the proximal colon                    or ileum. Clotted blood in the descending colon. Suspect blood in the ostomy is secondary to refluxed blood from rectum. No specimens collected.   11/1 - naranjo changed over wire; stable, still with blood from rectum, brown from colostomy, team with speak with daughter pending plan, ? possible transfer back to [facility]  11/2 - stable, still with blood from rectum, colostomy less blood more brown, plan pending conversation with daughter   11/3 - daughter and patient have decided to proceed with transfer to [facility] under Dr. Kris Vega (oncologist) and Dr. Miguel Angel Johnson (surgeon)  11/4 - called [facility] to transfer  11/5 - [facility] will not approve transfer; daughter called; after 35 min, she does not want "palliative"; she wants "curative"; we will call his oncologist today to see what he knows that we can tell her  11/6 - spoke with Dr Paez from [facility];  he confirmed everything we thought; the daughter has been told that there is no more surgery or chemo that he can recieve; they tried in the past to shrink tumor, but it was not successful, so resection was not an option; Dr Paez believes comfort care is the best thing; he also said that when pt was at [facility] in Sept they had to get admin to practically "throw them out".  11/7 - stable, no complaints, daughter still refusing palliative consult    Plan:  - dressing changes by RN, but we have seen wounds, including sacrum, stable  - continue naranjo (changed a week ago)  - f/u medicine  - prepare for d/c on 11/11

## 2024-11-07 NOTE — PROVIDER CONTACT NOTE (MEDICATION) - ACTION/TREATMENT ORDERED:
Continue to encourage patient to drink bowel prep.
provider reports they will order the doses of morphine on a one time basis each time as needed for pain.

## 2024-11-07 NOTE — PROGRESS NOTE ADULT - ASSESSMENT
Assessment/Plan: 78 yo M with history of CAD s/p stents in 2012 (ASA), locally advanced rectal cancer s/p neoadjuvant therapy with chemoRT (completed 11/2022), consolidation CAPOX (2/2023), brachytherapy (3/2024), presenting for generalized weakness, chest discomfort, rectal pain. Pt was here at [facility] on 8/30 for fatigue, weakness, and falls, found to have perforated rectal mass with perirectal collection, 2.5 x 1.5 cm with spread to infection in anorectal/perineal region with fluid/gas tracking along penile shaft, however pt left for MSK. At [facility] underwent laparoscopic diverting end sigmoid colostomy, cystoscopy and naranjo placement across defect and perineal/scrotal drainage 8/30. At [facility] for several weeks for perineal infections, treated with Abx as well. Discharged day prior to presentation at [facility]. 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 Now s/p debridement of genitalia and perineum in OR, scrotal I&D and debridement of perirectal abscess with Urology. Followed by ID, tissue cultures with ESBL E Coli, Yumiko albicans, s/p ertapenem and fluconazole. Hospital course c/b rectal bleeding s/p colonscopy.   Family wanted to transfer back to [facility], per records [facility] did not accept transfer; per discussion between primary team and pt's [facility] Oncologist patient is not a candidate for surgical management and/or chemo. Comfort care was recommended, patient's daughter does not want Palliative care consult. Discharge planning in progress.       Wound follow up for sacrum extending distally to sacral/gluteal cleft stage 4 pressure injury.     Surgical Sites s/p debridement of genitalia and perineum in OR  -Suprapubic, anterior base of penile shaft, anterior scrotum; all full thickness wounds- tissue type stable, no major changes. Anterior scrotum with depth of 2.5cm; remaining wounds without undermining/tunneling.  -No purulent drainage, no odor.   -No s/s of acute soft tissue infection  -Topical recommendations: cleanse wounds and periwound skin with NS. Apply Liquid barrier film to periwound skin. Apply Medihoney gel to wound bases. Pack depth of anterior scrotum with Aquacel ribbon, leave at least 2" out at end to ensure full removal of packing with subsequent dressing changes. Cover all with 4x4 gauze and abdominal pads. Can secure with disposable stretch underwear or paper tape. Change daily; upon discharge can change every other day.      Sacral/gluteal cleft stage 4 pressure injury  -10/29 s/p selective sharp debridement  -Tissue type stable, wound base remains with 100% adherent, nonfluctuant necrosis, bone palpable, not visible  -No sharp debridement indicated today, risks for bleeding outweighs benefits    -Periwound skin with blanching erythema, stable; does not extend beyond 2cm from wound edge. Low suspicion of soft tissue infection; afebrile, WBC normal, no palpable skin changes.  -urine and stool contained, (+) colostomy and indwelling naranjo catheter.  -Continue to clean with antimicrobial Dakins solution   -Topical recommendations: cleanse wound with Dakins solution 1/4 strength,  rinse wound and periwound skin with NS, pat dry. Apply Liquid barrier film to periwound skin. Apply medihoney gel to wound base, pack sacral/gluteal cleft depth and tunneling at 6 o'clock with Aquacel hydrofiber ribbon, leave 2" out at end to wick and visible to ensure full removal of packing with subsequent dressing changes. Cover with silicone foam with border. Change daily and prn if soiled/compromised.  -Continue to offload pressure; low airloss support surface, turn and position per protocol with use of fluidized positioning devices, continue moisture management with use of jayde moisture barrier cream to perianal area every shift and prn and continue use of single breathable incontinence pad, continue to offload heels with complete cair boots.  -Upon discharge patient should qualify for low airloss support surface; case management/social work to please follow up when medically appropriate.  -Upon discharge follow up at outpatient [facility]. [address] [place] [address]. [phone].      Findings and plan discussed with patient, primary RN and primary team. All questions and concerns addressed to meet patient's satisfaction.   Per records Ostomy and wound management/dressing changes were reviewed and taught to patient's daughter.   Will continue to follow periodically while inpatient, please reconsult earlier as needed.  Remainder of care per primary team.  Thank you.    AMRITA Dooley, CWN   [place] TEAMS    If after 4PM or before 7:30AM on Mon-Friday or weekend/holiday please contact general surgery for urgent matters.   Team A- [id number]   Team B- [id number]  For non-urgent matters e-mail [email or web address]    I spent 35 minutes face-to-face with this patient of which more than 50% of the time was spent counseling/coordinating care of this patient.

## 2024-11-08 PROCEDURE — 99232 SBSQ HOSP IP/OBS MODERATE 35: CPT

## 2024-11-08 RX ADMIN — Medication 1 DROP(S): at 07:04

## 2024-11-08 RX ADMIN — Medication 1000 MILLIGRAM(S): at 18:51

## 2024-11-08 RX ADMIN — Medication 1 DROP(S): at 18:52

## 2024-11-08 RX ADMIN — Medication 1 DROP(S): at 21:03

## 2024-11-08 RX ADMIN — LOSARTAN POTASSIUM 25 MILLIGRAM(S): 25 TABLET ORAL at 07:05

## 2024-11-08 RX ADMIN — NICOTINE POLACRILEX 1 PATCH: 4 GUM, CHEWING ORAL at 13:43

## 2024-11-08 RX ADMIN — PANTOPRAZOLE SODIUM 40 MILLIGRAM(S): 40 TABLET, DELAYED RELEASE ORAL at 07:05

## 2024-11-08 RX ADMIN — Medication 25 MILLIGRAM(S): at 07:04

## 2024-11-08 RX ADMIN — Medication 2 TABLET(S): at 21:04

## 2024-11-08 RX ADMIN — Medication 1000 MILLIGRAM(S): at 14:43

## 2024-11-08 RX ADMIN — NICOTINE POLACRILEX 1 PATCH: 4 GUM, CHEWING ORAL at 13:40

## 2024-11-08 RX ADMIN — Medication 81 MILLIGRAM(S): at 13:43

## 2024-11-08 RX ADMIN — Medication 1 APPLICATION(S): at 13:43

## 2024-11-08 RX ADMIN — Medication 1000 MILLIGRAM(S): at 13:43

## 2024-11-08 RX ADMIN — Medication 50 MILLIGRAM(S): at 07:05

## 2024-11-08 RX ADMIN — Medication 10 MILLIGRAM(S): at 21:04

## 2024-11-08 RX ADMIN — Medication 1000 MILLIGRAM(S): at 07:04

## 2024-11-08 RX ADMIN — HEPARIN SODIUM 5000 UNIT(S): 10000 INJECTION INTRAVENOUS; SUBCUTANEOUS at 21:04

## 2024-11-08 RX ADMIN — NICOTINE POLACRILEX 1 PATCH: 4 GUM, CHEWING ORAL at 18:56

## 2024-11-08 NOTE — PROGRESS NOTE ADULT - PROBLEM SELECTOR PLAN 1
Patient with history of locally advanced rectal cancer. Melena/bright red blood noted in ostomy, as well as rectal bleeding   - Has been on ASA and HSQ but off full dose DOAC due to hx of rectal bleeding in the past  - GI following. S/p colonoscopy on 10/31 showing diverticulosis but no active bleeding, "suspect blood in the ostomy is secondary to refluxed blood from rectum."  - CRS following as well   - CBC has remained stable at this time, continue to monitor    - If continued downtrending Hgb, consider holding HSQ and ASA   - Transfuse for Hgb < 8 given hx of CAD  - follows with [facility] [place], however patient was not accepted for transfer to [facility]  - primary team discussed case with patient's oncologist at [facility], Dr. Paez. Patient previously given treatment with chemo however unfortunately did not respond to therapy and is not a candidate for further tumor resection. Does not appear to be a candidate for further DMT at this time. Patient's family was informed of this by [facility] team during recent hospitalization and was also recommended for palliative measures.  - recommend continued [facility] discussions if family is amenable

## 2024-11-08 NOTE — PROGRESS NOTE ADULT - ASSESSMENT
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 Also found to have reduced EF, cardiology following and being medically managed. Course further complicated by GIB, likely from known rectal CA. Now medically and surgically stable. Currently undergoing discharge planning.

## 2024-11-08 NOTE — PROGRESS NOTE ADULT - SUBJECTIVE AND OBJECTIVE BOX
Overnight events:  None    Subjective:  Pt offers no complaints    Objective:    Vital signs  T(C): , Max: 36.8 (11-07-24 @ 09:29)  HR: 78 (11-08-24 @ 06:15)  BP: 135/74 (11-08-24 @ 06:15)  SpO2: 99% (11-08-24 @ 06:15)  Wt(kg): --    Output   Zamarripa: 1700  Colostomy: 110  11-07 @ 07:01  -  11-08 @ 07:00  --------------------------------------------------------  IN: 750 mL / OUT: 2410 mL / NET: -1660 mL        Gen: Pt resting comfortably in bed  refusing to be examined

## 2024-11-08 NOTE — PROGRESS NOTE ADULT - PROBLEM SELECTOR PLAN 2
- CT imaging and exam on admission with findings concerning for Tara's gangrene  - patient now s/p debridement of genitalia and perineum by urology team on 10/12  - briefly requiring SICU level of care for pressor support due to post-operative hypotension  - OR cultures with E Coli ESBL and candida albicans  - Completed course of Ertapenem and Fluconazole through 10/29 per ID recs  - c/w local wound care

## 2024-11-08 NOTE — PROGRESS NOTE ADULT - SUBJECTIVE AND OBJECTIVE BOX
[facility] Department of Hospital Medicine  Pankaj Butler MD  Available on [place] Teams  Pager: [phone]    Patient is a 79y old  Male who presents with a chief complaint of suprapubic pain (08 Nov 2024 07:20)    OVERNIGHT EVENTS: No acute events overnight.    SUBJECTIVE: Pt seen and examined at bedside this morning. Resting comfortably. States that he feels well. Denies any acute complaints today. Denies any CP, SOB, or abdominal pain. Notes occasional discomfort over his lower back, especially when being turned.    ADDITIONAL REVIEW OF SYSTEMS: as above.    MEDICATIONS  (STANDING):  acetaminophen     Tablet .. 1000 milliGRAM(s) Oral every 6 hours  aspirin  chewable 81 milliGRAM(s) Oral daily  brimonidine 0.2% Ophthalmic Solution 1 Drop(s) Both EYES every 12 hours  Dakins Solution - 1/4 Strength 1 Application(s) Topical daily  dextrose 5% + sodium chloride 0.9%. 1000 milliLiter(s) (75 mL/Hr) IV Continuous <Continuous>  heparin   Injectable 5000 Unit(s) SubCutaneous every 8 hours  latanoprost 0.005% Ophthalmic Solution 1 Drop(s) Both EYES at bedtime  losartan 25 milliGRAM(s) Oral daily  metoprolol succinate ER 50 milliGRAM(s) Oral daily  nicotine - 21 mG/24Hr(s) Patch 1 Patch Transdermal daily  pantoprazole    Tablet 40 milliGRAM(s) Oral before breakfast  rosuvastatin 10 milliGRAM(s) Oral at bedtime  senna 2 Tablet(s) Oral at bedtime  spironolactone 25 milliGRAM(s) Oral daily  timolol 0.5% Solution 1 Drop(s) Both EYES every 12 hours    MEDICATIONS  (PRN):  aluminum hydroxide/magnesium hydroxide/simethicone Suspension 30 milliLiter(s) Oral every 6 hours PRN Dyspepsia  traMADol 50 milliGRAM(s) Oral every 6 hours PRN Severe Pain (7 - 10)    CAPILLARY BLOOD GLUCOSE    I&O's Summary    07 Nov 2024 07:01  -  08 Nov 2024 07:00  --------------------------------------------------------  IN: 750 mL / OUT: 2410 mL / NET: -1660 mL    08 Nov 2024 07:01  -  08 Nov 2024 11:07  --------------------------------------------------------  IN: 0 mL / OUT: 300 mL / NET: -300 mL    PHYSICAL EXAM:  Vital Signs Last 24 Hrs  T(C): 36.9 (08 Nov 2024 09:32), Max: 36.9 (08 Nov 2024 09:32)  T(F): 98.5 (08 Nov 2024 09:32), Max: 98.5 (08 Nov 2024 09:32)  HR: 81 (08 Nov 2024 09:32) (77 - 81)  BP: 130/69 (08 Nov 2024 09:32) (120/73 - 135/74)  BP(mean): --  RR: 18 (08 Nov 2024 09:32) (17 - 18)  SpO2: 100% (08 Nov 2024 09:32) (98% - 100%)    Parameters below as of 08 Nov 2024 09:32  Patient On (Oxygen Delivery Method): room air    CONSTITUTIONAL: NAD, well-developed  HEAD: Normocephalic, atraumatic  EYES: Eyes tracking  ENT: no rhinorrhea noted  RESPIRATORY: No increased work of breathing, lungs grossly clear  CARDIOVASCULAR: RRR, S1 and S2 present, no m/r/g  ABDOMEN: soft, NT, colostomy with dark material noted in bag  : naranjo catheter draining clear yellow urine  EXTREMITIES: RLE pitting edema noted, unchanged from prior  MUSCULOSKELETAL: no joint swelling  NEURO: moving all extremities    LABS:    RADIOLOGY & ADDITIONAL TESTS:    Results Reviewed: Y  Imaging Personally Reviewed:  Electrocardiogram Personally Reviewed:    COORDINATION OF CARE:  Care Discussed with Consultants/Other Providers [Y/N]: Yes, discussed with primary team  Prior or Outpatient Records Reviewed [Y/N]:

## 2024-11-08 NOTE — PROGRESS NOTE ADULT - ASSESSMENT
80 yo M with history of CAD s/p stents in 2012 (ASA), locally advanced rectal cancer s/p neoadjuvant therapy with chemo/RT (completed 11/2022), consolidation CAPOX (2/2023), brachytherapy (3/2024), presenting for generalized weakness, chest discomfort, rectal pain. Pt was here at [facility] in 8/30 for fatigue, weakness, and falls, found to have perforated rectal mass with perirectal collection, 2.5 x 1.5 cm with spread to infection in anorectal/perineal region with fluid/gas tracking along penile shaft, however pt left for MSK and s/p diverting end sigmoid colostomy, cystoscopy and naranjo placement across defect and perineal/scrotal drainage 8/30. Recently at [facility] for several weeks, discharged day prior to presentation for perineal infections, treated with abx. Represents here for generalized weakness and dizziness, CTAP non-con on preliminary read indicating ill-defined fluid/gas in scrotum, perirectal space, and probably perineum. Exam demonstrates skin duskiness multiple genitourinary abscesses, induration, purulent drainage. 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    10/13: S/p debridement of genitalia and perineum in OR yesterday. In SICU for post op pressor requirements. Got 500 ccs overnight for hypovolemia on POCUS. Currently off pressors. On zosyn, clinda, vanc. U and bcx pending. Tissue cx negative. Wound packing changed at bedside, incision beds with adequate granulation tissue, healing well. Transferred to floor  10/14 - wounds clean; re-packed; penroses in place; ID consult  10/15 - pt refuses blood draw, and refuses dressing change (it has been 24 hrs since last one; he refused last nite also)                 later on he did allow it - wound appears clean, no necrotic tissue; labs drawn  10/16 - pro-BNP high so echo today  10/17 - changed dressing, took out subprapubic incision penrose drain, left scrotal penrose drain. Echo with reduced EF - cards says iso sepsis, no further workup needed. Changed abx from fluconazole to caspo (2 week course) per ID. Can c/w erta for ESBL e.coli. Cards saying he will need AC eventually,   10/18 - changed dressing this am, last penrose in scrotum d/c'ed. wound care spoke with, can try changing wet to dry dressings to aquacel and change qd. Pt's daughter will hire own aid for wound care and PT, but will need homecare for erta and caspo IV at home.   10/19- changed dressing to aquacel, started Eliquis  10/20- refused exam in AM, dressing to be changed with aquacel in PM   10/21 - changed dressing ; will speak with SW/HC about dispo planning; spoke with daughter for plan; see note by   10/22 - dressing changed; R. arm has swelling, though improved from last hospitalization, as per daughter; testing at [facility] did not reveal a clot, but we will repeat                Pt had blood from rectum today, daughter claims that happened at [facility] when eliquis was re-started; I d/c'd eliquis               d/w at great length his needs for home; she does not want to take him home until ABX's are done (next Tues); she wants study R. upper ext swelling even though it was done at [facility] and was neg  10/23 - daughter supposedly will come today to learn dressing and colostomy care; we will change dressing then, and speak again to daughter about rectal Ca and GOC; apparently she has not wanted to discuss it in the past, R. arm duplex neg, xray negative, psych consult requested  10/24 - dressings changed this AM, yesterday daughter instructed as to dressing changes and colostomy care, psych to see pt again today as he did not participate in the interview (haldol for agitation), no stool in colostomy ? gas overnight, will get AXR  10/25 - dressing changed this AM. AXR shows nonobstructive distension.  10/26 - new dark red output from colostomy, dressing changed this AM,   10/27 - persistent bloody output from colostomy  10/28 - no new events; will need to change dressing, pt not letting us do it now; later on wounds improved               GI called (see note above)  10/29 - will speak to daughter today (tried calling 3X yesterday) to see if she wants us to pursue rectal and colost bleed (labs stable); gen surg at bedside; d/c erta/flucon  10/30 - Cards and GI consulted; daughter spoken to last nite; she did not want our house cardiologist, so no scope today; her cardiologist does not come here; will speak again to her today and hopefully will consent to scope; labs stable, naranjo exchanged over wire  10/31 - Cards spoke w/ pt's PMD cards, for coloscopy today, AM labs stable, Findings: There was evidence of a patent loop colostomy in the transverse colon. This was characterized by healthy appearing mucosa. Two separate stomal openings were appreciated on digital exam. The transverse colon was entered via one of the two stomal openings leading towards the proximal colon. Many small and large-mouthed    diverticula were found in the entire colon. There was no evidence of bleeding in the proximal colon. The terminal ileum appeared normal. There was no evidence of bleeding in the ileum. The colonoscope was subsequently introduced into the second stomal opening leading to the distal colon. A large blood clot was found in the descending colon filling the lumen. No active bleeding was appreciated.                      Impression:    Patent loop colostomy with healthy appearing mucosa in the transverse colon. Diverticulosis in the entire examined proximal colon. There was no evidence of bleeding in the proximal colon                    or ileum. Clotted blood in the descending colon. Suspect blood in the ostomy is secondary to refluxed blood from rectum. No specimens collected.   11/1 - naranjo changed over wire; stable, still with blood from rectum, brown from colostomy, team with speak with daughter pending plan, ? possible transfer back to [facility]  11/2 - stable, still with blood from rectum, colostomy less blood more brown, plan pending conversation with daughter   11/3 - daughter and patient have decided to proceed with transfer to [facility] under Dr. Kris Vega (oncologist) and Dr. Miguel Angel Johnson (surgeon)  11/4 - called [facility] to transfer  11/5 - [facility] will not approve transfer; daughter called; after 35 min, she does not want "palliative"; she wants "curative"; we will call his oncologist today to see what he knows that we can tell her  11/6 - spoke with Dr Paez from [facility];  he confirmed everything we thought; the daughter has been told that there is no more surgery or chemo that he can recieve; they tried in the past to shrink tumor, but it was not successful, so resection was not an option; Dr Paez believes comfort care is the best thing; he also said that when pt was at [facility] in Sept they had to get admin to practically "throw them out".  11/7 - stable, no complaints, daughter still refusing palliative consult  11/8 - refusing exam this AM, will attempt later today    Plan:  - dressing changes by RN, but we have seen wounds, including sacrum, stable  - continue naranjo (changed a week ago)  - f/u medicine  - home care  - prepare for d/c on 11/11

## 2024-11-09 PROCEDURE — 99232 SBSQ HOSP IP/OBS MODERATE 35: CPT

## 2024-11-09 RX ADMIN — NICOTINE POLACRILEX 1 PATCH: 4 GUM, CHEWING ORAL at 11:50

## 2024-11-09 RX ADMIN — NICOTINE POLACRILEX 1 PATCH: 4 GUM, CHEWING ORAL at 06:31

## 2024-11-09 RX ADMIN — NICOTINE POLACRILEX 1 PATCH: 4 GUM, CHEWING ORAL at 11:57

## 2024-11-09 RX ADMIN — Medication 1000 MILLIGRAM(S): at 11:50

## 2024-11-09 RX ADMIN — LOSARTAN POTASSIUM 25 MILLIGRAM(S): 25 TABLET ORAL at 06:21

## 2024-11-09 RX ADMIN — Medication 81 MILLIGRAM(S): at 11:50

## 2024-11-09 RX ADMIN — Medication 1 APPLICATION(S): at 11:50

## 2024-11-09 RX ADMIN — Medication 25 MILLIGRAM(S): at 06:21

## 2024-11-09 RX ADMIN — Medication 75 MILLILITER(S): at 22:12

## 2024-11-09 RX ADMIN — Medication 1 DROP(S): at 18:00

## 2024-11-09 RX ADMIN — Medication 50 MILLIGRAM(S): at 06:21

## 2024-11-09 RX ADMIN — HEPARIN SODIUM 5000 UNIT(S): 10000 INJECTION INTRAVENOUS; SUBCUTANEOUS at 14:22

## 2024-11-09 RX ADMIN — Medication 2 TABLET(S): at 22:13

## 2024-11-09 RX ADMIN — Medication 1000 MILLIGRAM(S): at 06:20

## 2024-11-09 RX ADMIN — TRAMADOL HYDROCHLORIDE 50 MILLIGRAM(S): 50 TABLET, COATED ORAL at 22:11

## 2024-11-09 RX ADMIN — Medication 1000 MILLIGRAM(S): at 07:20

## 2024-11-09 RX ADMIN — Medication 1000 MILLIGRAM(S): at 12:50

## 2024-11-09 RX ADMIN — PANTOPRAZOLE SODIUM 40 MILLIGRAM(S): 40 TABLET, DELAYED RELEASE ORAL at 06:21

## 2024-11-09 RX ADMIN — Medication 1 DROP(S): at 06:22

## 2024-11-09 RX ADMIN — TRAMADOL HYDROCHLORIDE 50 MILLIGRAM(S): 50 TABLET, COATED ORAL at 23:11

## 2024-11-09 RX ADMIN — HEPARIN SODIUM 5000 UNIT(S): 10000 INJECTION INTRAVENOUS; SUBCUTANEOUS at 06:20

## 2024-11-09 RX ADMIN — HEPARIN SODIUM 5000 UNIT(S): 10000 INJECTION INTRAVENOUS; SUBCUTANEOUS at 22:06

## 2024-11-09 RX ADMIN — NICOTINE POLACRILEX 1 PATCH: 4 GUM, CHEWING ORAL at 19:23

## 2024-11-09 RX ADMIN — Medication 10 MILLIGRAM(S): at 22:07

## 2024-11-09 RX ADMIN — Medication 1 DROP(S): at 22:07

## 2024-11-09 RX ADMIN — Medication 1 DROP(S): at 18:01

## 2024-11-09 RX ADMIN — Medication 1000 MILLIGRAM(S): at 18:01

## 2024-11-09 RX ADMIN — Medication 1 DROP(S): at 06:21

## 2024-11-09 NOTE — PROGRESS NOTE ADULT - SUBJECTIVE AND OBJECTIVE BOX
Overnight events:  None    Subjective:  Pt offers no complaints    Objective:    Vital signs  T(C): , Max: 36.9 (11-08-24 @ 22:00)  HR: 72 (11-09-24 @ 09:12)  BP: 127/69 (11-09-24 @ 09:12)  SpO2: 99% (11-09-24 @ 09:12)  Wt(kg): --    Output   Zamarripa: 1425  Colostomy: 150  11-08 @ 07:01  -  11-09 @ 07:00  --------------------------------------------------------  IN: 0 mL / OUT: 2600 mL / NET: -2600 mL    11-09 @ 07:01  -  11-09 @ 10:48  --------------------------------------------------------  IN: 0 mL / OUT: 300 mL / NET: -300 mL        Gen: NAD  Abd: pt refusing to be examined      Labs: none today

## 2024-11-09 NOTE — PROGRESS NOTE ADULT - SUBJECTIVE AND OBJECTIVE BOX
Dr. Dayanara Dominguez  Pager [phone]    PROGRESS NOTE:     Patient is a 79y old  Male who presents with a chief complaint of suprapubic pain (09 Nov 2024 10:47)      SUBJECTIVE / OVERNIGHT EVENTS: stable, afebrile, no specific complaints  ADDITIONAL REVIEW OF SYSTEMS: denies chest pain/sob    MEDICATIONS  (STANDING):  acetaminophen     Tablet .. 1000 milliGRAM(s) Oral every 6 hours  aspirin  chewable 81 milliGRAM(s) Oral daily  brimonidine 0.2% Ophthalmic Solution 1 Drop(s) Both EYES every 12 hours  Dakins Solution - 1/4 Strength 1 Application(s) Topical daily  dextrose 5% + sodium chloride 0.9%. 1000 milliLiter(s) (75 mL/Hr) IV Continuous <Continuous>  heparin   Injectable 5000 Unit(s) SubCutaneous every 8 hours  latanoprost 0.005% Ophthalmic Solution 1 Drop(s) Both EYES at bedtime  losartan 25 milliGRAM(s) Oral daily  metoprolol succinate ER 50 milliGRAM(s) Oral daily  nicotine - 21 mG/24Hr(s) Patch 1 Patch Transdermal daily  pantoprazole    Tablet 40 milliGRAM(s) Oral before breakfast  rosuvastatin 10 milliGRAM(s) Oral at bedtime  senna 2 Tablet(s) Oral at bedtime  spironolactone 25 milliGRAM(s) Oral daily  timolol 0.5% Solution 1 Drop(s) Both EYES every 12 hours    MEDICATIONS  (PRN):  aluminum hydroxide/magnesium hydroxide/simethicone Suspension 30 milliLiter(s) Oral every 6 hours PRN Dyspepsia  traMADol 50 milliGRAM(s) Oral every 6 hours PRN Severe Pain (7 - 10)      CAPILLARY BLOOD GLUCOSE        I&O's Summary    08 Nov 2024 07:01  -  09 Nov 2024 07:00  --------------------------------------------------------  IN: 0 mL / OUT: 2600 mL / NET: -2600 mL    09 Nov 2024 07:01  -  09 Nov 2024 13:49  --------------------------------------------------------  IN: 0 mL / OUT: 300 mL / NET: -300 mL        PHYSICAL EXAM:  Vital Signs Last 24 Hrs  T(C): 36.7 (09 Nov 2024 09:12), Max: 36.9 (08 Nov 2024 22:00)  T(F): 98.1 (09 Nov 2024 09:12), Max: 98.4 (08 Nov 2024 22:00)  HR: 72 (09 Nov 2024 09:12) (72 - 82)  BP: 127/69 (09 Nov 2024 09:12) (115/75 - 127/69)  BP(mean): --  RR: 18 (09 Nov 2024 09:12) (17 - 18)  SpO2: 99% (09 Nov 2024 09:12) (98% - 100%)    Parameters below as of 09 Nov 2024 09:12  Patient On (Oxygen Delivery Method): room air    CONSTITUTIONAL: NAD, well-developed  HEAD: Normocephalic, atraumatic  EYES: Eyes tracking  ENT: neck supple  RESPIRATORY: No increased work of breathing, lungs grossly clear  CARDIOVASCULAR: RRR, S1 and S2 present, no m/r/g  ABDOMEN: soft, NT, colostomy with stool   : naranjo catheter draining clear yellow urine  EXTREMITIES: b/l 2+ LE pitting edema noted, unchanged from prior  MUSCULOSKELETAL: no joint swelling  NEURO: moving all extremities, awake/alert x1-2      LABS:                      RADIOLOGY & ADDITIONAL TESTS:  Results Reviewed:   Imaging Personally Reviewed:  Electrocardiogram Personally Reviewed:    COORDINATION OF CARE:  Care Discussed with Consultants/Other Providers [Y/N]:  Prior or Outpatient Records Reviewed [Y/N]:

## 2024-11-09 NOTE — PROGRESS NOTE ADULT - ASSESSMENT
80 yo M with history of CAD s/p stents in 2012 (ASA), locally advanced rectal cancer s/p neoadjuvant therapy with chemo/RT (completed 11/2022), consolidation CAPOX (2/2023), brachytherapy (3/2024), presenting for generalized weakness, chest discomfort, rectal pain. Pt was here at [facility] in 8/30 for fatigue, weakness, and falls, found to have perforated rectal mass with perirectal collection, 2.5 x 1.5 cm with spread to infection in anorectal/perineal region with fluid/gas tracking along penile shaft, however pt left for MSK and s/p diverting end sigmoid colostomy, cystoscopy and naranjo placement across defect and perineal/scrotal drainage 8/30. Recently at [facility] for several weeks, discharged day prior to presentation for perineal infections, treated with abx. Represents here for generalized weakness and dizziness, CTAP non-con on preliminary read indicating ill-defined fluid/gas in scrotum, perirectal space, and probably perineum. Exam demonstrates skin duskiness multiple genitourinary abscesses, induration, purulent drainage. 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    10/13: S/p debridement of genitalia and perineum in OR yesterday. In SICU for post op pressor requirements. Got 500 ccs overnight for hypovolemia on POCUS. Currently off pressors. On zosyn, clinda, vanc. U and bcx pending. Tissue cx negative. Wound packing changed at bedside, incision beds with adequate granulation tissue, healing well. Transferred to floor  10/14 - wounds clean; re-packed; penroses in place; ID consult  10/15 - pt refuses blood draw, and refuses dressing change (it has been 24 hrs since last one; he refused last nite also)                 later on he did allow it - wound appears clean, no necrotic tissue; labs drawn  10/16 - pro-BNP high so echo today  10/17 - changed dressing, took out subprapubic incision penrose drain, left scrotal penrose drain. Echo with reduced EF - cards says iso sepsis, no further workup needed. Changed abx from fluconazole to caspo (2 week course) per ID. Can c/w erta for ESBL e.coli. Cards saying he will need AC eventually,   10/18 - changed dressing this am, last penrose in scrotum d/c'ed. wound care spoke with, can try changing wet to dry dressings to aquacel and change qd. Pt's daughter will hire own aid for wound care and PT, but will need homecare for erta and caspo IV at home.   10/19- changed dressing to aquacel, started Eliquis  10/20- refused exam in AM, dressing to be changed with aquacel in PM   10/21 - changed dressing ; will speak with SW/HC about dispo planning; spoke with daughter for plan; see note by   10/22 - dressing changed; R. arm has swelling, though improved from last hospitalization, as per daughter; testing at [facility] did not reveal a clot, but we will repeat                Pt had blood from rectum today, daughter claims that happened at [facility] when eliquis was re-started; I d/c'd eliquis               d/w at great length his needs for home; she does not want to take him home until ABX's are done (next Tues); she wants study R. upper ext swelling even though it was done at [facility] and was neg  10/23 - daughter supposedly will come today to learn dressing and colostomy care; we will change dressing then, and speak again to daughter about rectal Ca and GOC; apparently she has not wanted to discuss it in the past, R. arm duplex neg, xray negative, psych consult requested  10/24 - dressings changed this AM, yesterday daughter instructed as to dressing changes and colostomy care, psych to see pt again today as he did not participate in the interview (haldol for agitation), no stool in colostomy ? gas overnight, will get AXR  10/25 - dressing changed this AM. AXR shows nonobstructive distension.  10/26 - new dark red output from colostomy, dressing changed this AM,   10/27 - persistent bloody output from colostomy  10/28 - no new events; will need to change dressing, pt not letting us do it now; later on wounds improved               GI called (see note above)  10/29 - will speak to daughter today (tried calling 3X yesterday) to see if she wants us to pursue rectal and colost bleed (labs stable); gen surg at bedside; d/c erta/flucon  10/30 - Cards and GI consulted; daughter spoken to last nite; she did not want our house cardiologist, so no scope today; her cardiologist does not come here; will speak again to her today and hopefully will consent to scope; labs stable, naranjo exchanged over wire  10/31 - Cards spoke w/ pt's PMD cards, for coloscopy today, AM labs stable, Findings: There was evidence of a patent loop colostomy in the transverse colon. This was characterized by healthy appearing mucosa. Two separate stomal openings were appreciated on digital exam. The transverse colon was entered via one of the two stomal openings leading towards the proximal colon. Many small and large-mouthed    diverticula were found in the entire colon. There was no evidence of bleeding in the proximal colon. The terminal ileum appeared normal. There was no evidence of bleeding in the ileum. The colonoscope was subsequently introduced into the second stomal opening leading to the distal colon. A large blood clot was found in the descending colon filling the lumen. No active bleeding was appreciated.                      Impression:    Patent loop colostomy with healthy appearing mucosa in the transverse colon. Diverticulosis in the entire examined proximal colon. There was no evidence of bleeding in the proximal colon                    or ileum. Clotted blood in the descending colon. Suspect blood in the ostomy is secondary to refluxed blood from rectum. No specimens collected.   11/1 - naranjo changed over wire; stable, still with blood from rectum, brown from colostomy, team with speak with daughter pending plan, ? possible transfer back to [facility]  11/2 - stable, still with blood from rectum, colostomy less blood more brown, plan pending conversation with daughter   11/3 - daughter and patient have decided to proceed with transfer to [facility] under Dr. Kris Vega (oncologist) and Dr. Miguel Angel Johnson (surgeon)  11/4 - called [facility] to transfer  11/5 - [facility] will not approve transfer; daughter called; after 35 min, she does not want "palliative"; she wants "curative"; we will call his oncologist today to see what he knows that we can tell her  11/6 - spoke with Dr Paez from [facility];  he confirmed everything we thought; the daughter has been told that there is no more surgery or chemo that he can receive they tried in the past to shrink tumor, but it was not successful, so resection was not an option; Dr Paez believes comfort care is the best thing; he also said that when pt was at [facility] in Sept they had to get admin to practically "throw them out".  11/7 - stable, no complaints, daughter still refusing palliative consult  11/8 - refusing exam this AM, will attempt later today  11/9 - 11/8 - refusing exam this AM, will attempt later today    Plan:  - dressing changes by RN, but we have seen wounds, including sacrum, stable  - continue naranjo (changed 10/29 over wire)  - f/u medicine  - home care  - prepare for d/c on 11/11

## 2024-11-09 NOTE — PROGRESS NOTE ADULT - PROBLEM SELECTOR PLAN 1
Patient with history of locally advanced rectal cancer. Melena/bright red blood noted in ostomy, as well as rectal bleeding   - Has been on ASA and HSQ but off full dose DOAC due to hx of rectal bleeding in the past  - GI following. S/p colonoscopy on 10/31 showing diverticulosis but no active bleeding, "suspect blood in the ostomy is secondary to refluxed blood from rectum."  - CRS following as well   - CBC has remained stable at this time, continue to monitor    - If continued downtrending Hgb, consider holding HSQ and ASA   - Transfuse for Hgb < 8 given hx of CAD  - follows with [facility] oncology, however patient was not accepted for transfer to [facility]  - primary team discussed case with patient's oncologist at [facility], Dr. Paez. Patient previously given treatment with chemo however unfortunately did not respond to therapy and is not a candidate for further tumor resection. Does not appear to be a candidate for further DMT at this time. Patient's family was informed of this by [facility] team during recent hospitalization and was also recommended for palliative measures.  -daughter with unrealistic expectations, not amenable to discuss with palliative care or hospice, wants full code

## 2024-11-10 PROCEDURE — 99232 SBSQ HOSP IP/OBS MODERATE 35: CPT

## 2024-11-10 PROCEDURE — 99231 SBSQ HOSP IP/OBS SF/LOW 25: CPT | Mod: GC

## 2024-11-10 RX ADMIN — HEPARIN SODIUM 5000 UNIT(S): 10000 INJECTION INTRAVENOUS; SUBCUTANEOUS at 05:30

## 2024-11-10 RX ADMIN — Medication 1 DROP(S): at 22:58

## 2024-11-10 RX ADMIN — NICOTINE POLACRILEX 1 PATCH: 4 GUM, CHEWING ORAL at 18:11

## 2024-11-10 RX ADMIN — Medication 1000 MILLIGRAM(S): at 13:22

## 2024-11-10 RX ADMIN — Medication 1000 MILLIGRAM(S): at 17:26

## 2024-11-10 RX ADMIN — Medication 81 MILLIGRAM(S): at 12:23

## 2024-11-10 RX ADMIN — TRAMADOL HYDROCHLORIDE 50 MILLIGRAM(S): 50 TABLET, COATED ORAL at 16:21

## 2024-11-10 RX ADMIN — Medication 1 DROP(S): at 05:32

## 2024-11-10 RX ADMIN — Medication 1000 MILLIGRAM(S): at 05:30

## 2024-11-10 RX ADMIN — NICOTINE POLACRILEX 1 PATCH: 4 GUM, CHEWING ORAL at 10:00

## 2024-11-10 RX ADMIN — Medication 1000 MILLIGRAM(S): at 18:25

## 2024-11-10 RX ADMIN — Medication 50 MILLIGRAM(S): at 05:30

## 2024-11-10 RX ADMIN — NICOTINE POLACRILEX 1 PATCH: 4 GUM, CHEWING ORAL at 06:23

## 2024-11-10 RX ADMIN — Medication 1 DROP(S): at 17:27

## 2024-11-10 RX ADMIN — PANTOPRAZOLE SODIUM 40 MILLIGRAM(S): 40 TABLET, DELAYED RELEASE ORAL at 05:32

## 2024-11-10 RX ADMIN — Medication 1000 MILLIGRAM(S): at 06:30

## 2024-11-10 RX ADMIN — Medication 75 MILLILITER(S): at 17:25

## 2024-11-10 RX ADMIN — LOSARTAN POTASSIUM 25 MILLIGRAM(S): 25 TABLET ORAL at 05:30

## 2024-11-10 RX ADMIN — Medication 1000 MILLIGRAM(S): at 12:22

## 2024-11-10 RX ADMIN — TRAMADOL HYDROCHLORIDE 50 MILLIGRAM(S): 50 TABLET, COATED ORAL at 17:21

## 2024-11-10 RX ADMIN — NICOTINE POLACRILEX 1 PATCH: 4 GUM, CHEWING ORAL at 12:23

## 2024-11-10 RX ADMIN — Medication 25 MILLIGRAM(S): at 05:30

## 2024-11-10 RX ADMIN — Medication 1 APPLICATION(S): at 12:23

## 2024-11-10 NOTE — PROGRESS NOTE ADULT - PROBLEM SELECTOR PROBLEM 3
CAD (coronary artery disease)
Heart failure with reduced ejection fraction
CAD (coronary artery disease)
Tara gangrene
Tara gangrene
CAD (coronary artery disease)
CAD (coronary artery disease)
Heart failure with reduced ejection fraction
CAD (coronary artery disease)
Heart failure with reduced ejection fraction
Lethargy
CAD (coronary artery disease)
CAD (coronary artery disease)
Heart failure with reduced ejection fraction
CAD (coronary artery disease)
Heart failure with reduced ejection fraction
Lethargy
Heart failure with reduced ejection fraction
CAD (coronary artery disease)
Heart failure with reduced ejection fraction
CAD (coronary artery disease)
Lethargy
Merry
Heart failure with reduced ejection fraction
Heart failure with reduced ejection fraction
Tara gangrene

## 2024-11-10 NOTE — PROGRESS NOTE ADULT - PROBLEM SELECTOR PLAN 6
Patient noted with markedly elevated TSH up to 26 on 10/15  - FT4 noted to be WNL at 1.0  - currently alert and conversant, without gross lethargy/bradycardia or symptoms of hypothyroidism  - suspect that patient may have some degree of subclinical hypothyroidism  - would recommend outpatient follow up in 2-3 weeks for repeat thyroid function testing
Patient noted with markedly elevated TSH up to 26 on 10/15  - FT4 noted to be WNL at 1.0  - currently alert and conversant, without gross lethargy/bradycardia or symptoms of hypothyroidism  - suspect that patient may have some degree of subclinical hypothyroidism  - would recommend outpatient follow up in 2-3 weeks for repeat thyroid function testing
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
Patient noted with markedly elevated TSH up to 26 on 10/15  - FT4 noted to be WNL at 1.0  - currently alert and conversant, without gross lethargy/bradycardia or symptoms of hypothyroidism  - suspect that patient may have some degree of subclinical hypothyroidism  - would recommend outpatient follow up in 2-3 weeks for repeat thyroid function testing
Patient noted with RLE pitting edema up to shin, no erythema or calf tenderness  - pBNP 31K >24K, but otherwise appears largely euvolemic   - bilateral LE DVT study without any DVTs  - TTE performed 10/16, showing global LV hypokinesis, EF 35-40%
Patient noted with troponin elevated to 70s-80s on current admission. Recent admission in August with Tiana elevation to 2000's thought to be 2/2 demand ischemia per Cardiology.   - Elevated TIANA again likely demand i/s/o active infection/hypotension while in SICU  - patient currently without any chest pain/SOB, no need to trend troponin further unless patient develops active symptoms.  - TTE performed 10/16, showing global LV hypokinesis, EF 35-40%  - cardiology eval appreciated, patient started on GDMT. Currently no plans for ischemic evaluation
Patient noted with markedly elevated TSH up to 26 on 10/15  - FT4 noted to be WNL at 1.0  - currently alert and conversant, without gross lethargy/bradycardia or symptoms of hypothyroidism  - suspect that patient may have some degree of subclinical hypothyroidism  - would recommend outpatient follow up in 2-3 weeks for repeat thyroid function testing
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
Patient noted with markedly elevated TSH up to 26 on 10/15  - FT4 noted to be WNL at 1.0  - currently alert and conversant, without gross lethargy/bradycardia or symptoms of hypothyroidism  - suspect that patient may have some degree of subclinical hypothyroidism  - would recommend outpatient follow up in 2-3 weeks for repeat thyroid function testing
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
Patient noted with markedly elevated TSH up to 26 on 10/15  - FT4 noted to be WNL at 1.0  - currently alert and conversant, without gross lethargy/bradycardia or symptoms of hypothyroidism  - suspect that patient may have some degree of subclinical hypothyroidism  - would recommend outpatient follow up in 2-3 weeks for repeat thyroid function testing
Patient noted with markedly elevated TSH up to 26 on 10/15  - FT4 noted to be WNL at 1.0  - currently alert and conversant, without gross lethargy/bradycardia or symptoms of hypothyroidism  - suspect that patient may have some degree of subclinical hypothyroidism  - would recommend outpatient follow up in 2-3 weeks for repeat thyroid function testing
Patient noted with troponin elevated to 70s-80s on current admission. Recent admission in August with Tiana elevation to 2000's thought to be 2/2 demand ischemia per Cardiology.   - Elevated TIANA again likely demand i/s/o active infection/hypotension while in SICU  - patient currently without any chest pain/SOB, no need to trend troponin further unless patient develops active symptoms.  - TTE performed 10/16, showing global LV hypokinesis, EF 35-40%  - cardiology eval appreciated, patient started on GDMT. Currently no plans for ischemic evaluation
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
Patient noted with RLE pitting edema up to shin, no erythema or calf tenderness  - pBNP 31K >24K, but otherwise appears largely euvolemic   - bilateral LE DVT study without any DVTs  - TTE performed 10/16, showing global LV hypokinesis, EF 35-40%
Patient noted with RLE pitting edema up to shin, no erythema or calf tenderness  - pBNP 31K >24K, but otherwise appears largely euvolemic   - bilateral LE DVT study without any DVTs  - TTE performed 10/16, showing global LV hypokinesis, EF 35-40%
Patient noted with troponin elevated to 70s-80s on current admission. Recent admission in August with Tiana elevation to 2000's thought to be 2/2 demand ischemia per Cardiology.   - Elevated TIANA again likely demand i/s/o active infection/hypotension while in SICU  - patient currently without any chest pain/SOB, no need to trend troponin further unless patient develops active symptoms.  - TTE performed 10/16, showing global LV hypokinesis, EF 35-40%  - cardiology eval appreciated, patient started on Losartan 25mg qd for GDMT, also recommended for ischemic eval once stable from infection/surgical standpoint. Primary team to discuss timing.
Patient noted with markedly elevated TSH up to 26 on 10/15  - FT4 noted to be WNL at 1.0  - currently alert and conversant, without gross lethargy/bradycardia or symptoms of hypothyroidism  - suspect that patient may have some degree of subclinical hypothyroidism  - would recommend outpatient follow up in 2-3 weeks for repeat thyroid function testing
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
Patient noted with markedly elevated TSH up to 26 on 10/15  - FT4 noted to be WNL at 1.0  - currently alert and conversant, without gross lethargy/bradycardia or symptoms of hypothyroidism  - suspect that patient may have some degree of subclinical hypothyroidism  - would recommend outpatient follow up in 2-3 weeks for repeat thyroid function testing
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
Patient noted with markedly elevated TSH up to 26 on 10/15  - FT4 noted to be WNL at 1.0  - currently alert and conversant, without gross lethargy/bradycardia or symptoms of hypothyroidism  - suspect that patient may have some degree of subclinical hypothyroidism  - would recommend outpatient follow up in 2-3 weeks for repeat thyroid function testing
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
Patient noted with troponin elevated to 70s-80s on current admission. Recent admission in August with Tiana elevation to 2000's thought to be 2/2 demand ischemia per Cardiology.   - Elevated TIANA again likely demand i/s/o active infection/hypotension while in SICU  - patient currently without any chest pain/SOB, no need to trend troponin further unless patient develops active symptoms.  - TTE performed 10/16, showing global LV hypokinesis, EF 35-40%  - cardiology eval appreciated, patient started on Losartan 25mg qd for GDMT, also recommended for ischemic eval once stable from infection/surgical standpoint. Primary team to discuss timing.

## 2024-11-10 NOTE — PROGRESS NOTE ADULT - PROBLEM SELECTOR PLAN 8
Patient noted with markedly elevated TSH up to 26 on 10/15  - FT4 noted to be WNL at 1.0  - currently alert and conversant, without gross lethargy/bradycardia or symptoms of hypothyroidism  - suspect that patient may have some degree of subclinical hypothyroidism  - would recommend outpatient follow up in 2-3 weeks for repeat thyroid function testing
Pafib noted on EKG 10/12, reverted to NSR.   - Was not on AC PTA 2/2 hx of bleeding. Started on Eliquis this admission but had rectal bleeding.   - c/w toprol 50mg qd  - Would recommend risk v benefit discussion w/ family re continuing AC  - Eliquis discontinued
Patient with waxing and waning mental status. Per chart review- similar episode at OSH related to hypoactive delirium   - daughter reports mental status improved on IVF  - encourage po intake  - avoid sedatives/benzo, frequent reorientation  - BMP reviewed, no sig electrolyte issues, kidney function at baseline   - Ammonia level 12 (wnl)  - Delirium precautions   - Seen by Psych during this admission, daughter declined seroquel
Pafib noted on EKG 10/12, reverted to NSR.   - Was not on AC PTA 2/2 hx of bleeding. Started on Eliquis this admission but had rectal bleeding.   - c/w toprol 50mg qd  - Would recommend risk v benefit discussion w/ family re continuing AC  - Eliquis discontinued
Pafib noted on EKG 10/12, reverted to NSR.   - Was not on AC PTA 2/2 hx of bleeding. Started on Eliquis this admission but had rectal bleeding.   - c/w toprol 50mg qd  - Would recommend risk v benefit discussion w/ family re continuing AC  - Eliquis discontinued
Patient noted with markedly elevated TSH up to 26 on 10/15  - FT4 noted to be WNL at 1.0  - currently alert and conversant, without gross lethargy/bradycardia or symptoms of hypothyroidism  - suspect that patient may have some degree of subclinical hypothyroidism  - would recommend outpatient follow up in 2-3 weeks for repeat thyroid function testing
Patient with waxing and waning mental status. Per chart review- similar episode at OSH related to hypoactive delirium   - daughter reports mental status improved on IVF  - encourage po intake  - avoid sedatives/benzo, frequent reorientation  - BMP reviewed, no sig electrolyte issues, kidney function at baseline   - Ammonia level 12 (wnl)  - Delirium precautions   - Seen by Psych during this admission, daughter declined seroquel
Patient noted with markedly elevated TSH up to 26 on 10/15  - FT4 noted to be WNL at 1.0  - currently alert and conversant, without gross lethargy/bradycardia or symptoms of hypothyroidism  - suspect that patient may have some degree of subclinical hypothyroidism  - would recommend outpatient follow up in 2-3 weeks for repeat thyroid function testing

## 2024-11-10 NOTE — PROGRESS NOTE ADULT - PROBLEM SELECTOR PROBLEM 5
Chronic atrial fibrillation
Elevated troponin
Lower extremity edema
CAD (coronary artery disease)
Elevated troponin
Chronic atrial fibrillation
Elevated troponin
Lower extremity edema
Elevated troponin
Lower extremity edema
Elevated troponin
Lower extremity edema
Elevated troponin
CAD (coronary artery disease)
Lower extremity edema
Lower extremity edema
Elevated troponin
Lower extremity edema
Elevated troponin
Lower extremity edema
Elevated troponin

## 2024-11-10 NOTE — PROGRESS NOTE ADULT - SUBJECTIVE AND OBJECTIVE BOX
Subjective  AVSS. Eating breakfast this AM and does not want to be bothered.     Objective    Vital signs  T(F): , Max: 98.7 (11-09-24 @ 22:36)  HR: 80 (11-10-24 @ 09:19)  BP: 123/76 (11-10-24 @ 09:19)  SpO2: 100% (11-10-24 @ 09:19)  Wt(kg): --    Output     OUT:    Indwelling Catheter - Urethral (mL): 2475 mL  Total OUT: 2475 mL    Total NET: -2475 mL          Gen: NAD, refused exam    Labs            Imaging

## 2024-11-10 NOTE — PROGRESS NOTE ADULT - ATTENDING COMMENTS
I agree with the history, physical, and plan, which I have reviewed and edited where appropriate.  I agree with notes/assessment of health care providers on my service.  I have personally examined the patient.  I was physically present for the key portions of the evaluation and management (E/M) service provided.  I reviewed data and laboratory tests/x-rays and all pertinent electronic images.  The patient is a critical care patient with life threatening hemodynamic and metabolic instability in SICU.  Risk benefit analyses discussed.    The patient is in SICU with diagnosis mentioned in the note.    The plan is specified below.    Assessment: 79yr male with hx of local advanced rectal cancer now s/p chemorads c/b perforation and perineal, scrotal soft tissue infections. Patient underwent diverting end colostomy and perineal I&D at [facility] at the end of August, recently discharged now presenting with worsening scrotal wounds. He is now s/p scrotal I&D with placement of penrose x3. Case done under sedation, 250 LR, on polina throughout however weaned off at end of the case. Pt remained stable overnight. Stable for transfer to floor.     Plan:  Neuro: postoperative pain  - Pain control- IV tylenol, dilaudid prn    Pulm:  -  on RA    Cards: Hx CAD  - off pressors   - on home ASA     GI: perforated rectal ca, diverting colostomy   - Diet: Reg diet, npo p mn     /Renal:   - Chronic naranjo in place, UA + in ED  - IV lock     Heme:  - DVT ppx- SQH, SCDs  - Home ASA    ID: scrotal infection   - Continue Vanco, Zosyn, Clinda (keep for 72h)  - Urology tentatively planning for RTOR for further debridement
Patient seen and examined with team  Agree with above  Dressing changes
Pt seen and examined  No events overnight  Continue wound care
Rectal cancer locally advanced s/p prior neoadj chemo, radiation and more recent diverting colostomy at [facility] [place]  Was at [facility] [place] for several weeks, on abx.  Discharged and then presented here the following day with SP and genital/perineum Fourniers  Also known sacral decub  S/p debridement of genitalia and perineum 10/13    Undergoing daily dressing changes - wound appears granulating well  Colorectal surgery c/s re blood in ostomy  Cont abx  Wound care  F/u ID, Medicine
Wound dressing changes per wound care  Pending dispo back to [facility] for management of primary rectal CA
started eliquis; dressing change, wound care, dispo planning
80 yo M h/o CAD s/p stents in 2012 (ASA), locally advanced rectal cancer s/p neoadjuvant therapy with chemo, RT, brachytherapy (3/2024), perforated rectal mass with perirectal collection, 2.5 x 1.5 cm (8/2024) s/p lap diverting end sigmoid colostomy at [facility] presenting 1 day after discharge from [facility] w/ dizziness and malaise.  Found to be in atrial fibrillation.  Gi consulted for dark stool output from colostomy.  Patient and daughter agreeable to endoscopic evaluation for further evaluation and treatment.  Monitor CBC and maintain Hb >7.  Will order prep and plan for colonoscopy when prep is completed.
Agree w/ above assessment and plan  case to be signed out to Dr Graff
Patient seen and examined with team  Agree with above  Dressing changes
bleeding less  abd soft, + melanotic stool in bag    awaiting scope by GI
new finding of reduced EF in the setting of sepsis  global hypokinesis  does not appear significantly volume overloaded on exam  no clear anginal sx  given overall clinical status would medically manage with GDMT  AC when able to  eventual ischemia eval pending clinical course
pt resting  nad naranjo in place wounds dressed  (juan jose) rectal bleed recurrence,  likely related to ac on for afib may need to consider alternative a fib stroke risk mgmt given recurrent bleed.  crs recommendation for bleed mgmt
pt resting NAD NCAT naranjo in place yellow urine.    s/p I and D  wound care, abx cont naranjo change (likely over wire prior to d/c)
scope by GI reveals blood from distal limb of loop colostomy from known rectal cancer  no intervention indicated at this time  would recommend ongoing treatment of his rectal cancer to reduce the bleeding.
Rectal cancer locally advanced s/p prior neoadj chemo, radiation and more recent [place] [facility] [place]  [place] [facility] [place] weeks, on abx.  Discharged and then presented here the following day with SP and genital/perineum Fourniers  Also known sacral decub  S/p debridement of genitalia and perineum 10/13    Undergoing daily dressing changes - wound appears granulating well  Colorectal surgery c/s re blood in ostomy  Cont abx  Wound care  F/u ID, Medicine
new consult for gi bleed with bloody colostomy output  h/h stable  hd stable  GI eval for scope

## 2024-11-10 NOTE — PROGRESS NOTE ADULT - ASSESSMENT
78 yo M with history of CAD s/p stents in 2012 (ASA), locally advanced rectal cancer s/p neoadjuvant therapy with chemo/RT (completed 11/2022), consolidation CAPOX (2/2023), brachytherapy (3/2024), presenting for generalized weakness, chest discomfort, rectal pain. Pt was here at [facility] in 8/30 for fatigue, weakness, and falls, found to have perforated rectal mass with perirectal collection, 2.5 x 1.5 cm with spread to infection in anorectal/perineal region with fluid/gas tracking along penile shaft, however pt left for MSK and s/p diverting end sigmoid colostomy, cystoscopy and naranjo placement across defect and perineal/scrotal drainage 8/30. [place] [facility] [place], discharged day prior to presentation for perineal infections, treated with abx. Represents here for generalized weakness and dizziness, CTAP non-con on preliminary read indicating ill-defined fluid/gas in scrotum, perirectal space, and probably perineum. Exam demonstrates skin duskiness multiple genitourinary abscesses, induration, purulent drainage. 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    10/13: S/p debridement of genitalia and perineum in OR yesterday. In SICU for post op pressor requirements. Got 500 ccs overnight for hypovolemia on POCUS. Currently off pressors. On zosyn, clinda, vanc. U and bcx pending. Tissue cx negative. Wound packing changed at bedside, incision beds with adequate granulation tissue, healing well. Transferred to floor  10/14 - wounds clean; re-packed; penroses in place; ID consult  10/15 - pt refuses blood draw, and refuses dressing change (it has been 24 hrs since last one; he refused last nite also)                 later on he did allow it - wound appears clean, no necrotic tissue; labs drawn  10/16 - pro-BNP high so echo today  10/17 - changed dressing, took out subprapubic incision penrose drain, left scrotal penrose drain. Echo with reduced EF - cards says iso sepsis, no further workup needed. Changed abx from fluconazole to caspo (2 week course) per ID. Can c/w erta for ESBL e.coli. Cards saying he will need AC eventually,   10/18 - changed dressing this am, last penrose in scrotum d/c'ed. wound care spoke with, can try changing wet to dry dressings to aquacel and change qd. Pt's daughter will hire own aid for wound care and PT, but will need homecare for erta and caspo IV at home.   10/19- changed dressing to aquacel, started Eliquis  10/20- refused exam in AM, dressing to be changed with aquacel in PM   10/21 - changed dressing ; will speak with SW/HC about dispo planning; spoke with daughter for plan; see note by   10/22 - dressing changed; R. arm has swelling, though improved from last hospitalization, as per daughter; testing at [facility] [place] not reveal a clot, but we will repeat                Pt had blood from rectum today, daughter claims that happened at [facility] when eliquis was re-started; I d/c'd eliquis               d/w at great length his needs for home; she does not want to take him home until ABX's are done (next Tues); she wants study R. upper ext swelling even though it was done at [facility] and was neg  10/23 - daughter supposedly will come today to learn dressing and colostomy care; we will change dressing then, and speak again to daughter about rectal Ca and GOC; apparently she has not wanted to discuss it in the past, R. arm duplex neg, xray negative, psych consult requested  10/24 - dressings changed this AM, yesterday daughter instructed as to dressing changes and colostomy care, psych to see pt again today as he did not participate in the interview (haldol for agitation), no stool in colostomy ? gas overnight, will get AXR  10/25 - dressing changed this AM. AXR shows nonobstructive distension.  10/26 - new dark red output from colostomy, dressing changed this AM,   10/27 - persistent bloody output from colostomy  10/28 - no new events; will need to change dressing, pt not letting us do it now; later on wounds improved               GI called (see note above)  10/29 - will speak to daughter today (tried calling 3X yesterday) to see if she wants us to pursue rectal and colost bleed (labs stable); gen surg at bedside; d/c erta/flucon  10/30 - Cards and GI consulted; daughter spoken to last nite; she did not want our house cardiologist, so no scope today; her cardiologist does not come here; will speak again to her today and hopefully will consent to scope; labs stable, naranjo exchanged over wire  10/31 - Cards spoke w/ pt's PMD cards, for coloscopy today, AM labs stable, Findings: There was evidence of a patent loop colostomy in the transverse colon. This was characterized by healthy appearing mucosa. Two separate stomal openings were appreciated on digital exam. The transverse colon was entered via one of the two stomal openings leading towards the proximal colon. Many small and large-mouthed    diverticula were found in the entire colon. There was no evidence of bleeding in the proximal colon. The terminal ileum appeared normal. There was no evidence of bleeding in the ileum. The colonoscope was subsequently introduced into the second stomal opening leading to the distal colon. A large blood clot was found in the descending colon filling the lumen. No active bleeding was appreciated.                      Impression:    Patent loop colostomy with healthy appearing mucosa in the transverse colon. Diverticulosis in the entire examined proximal colon. There was no evidence of bleeding in the proximal colon                    or ileum. Clotted blood in the descending colon. Suspect blood in the ostomy is secondary to refluxed blood from rectum. No specimens collected.   11/1 - naranjo changed over wire; stable, still with blood from rectum, brown from colostomy, team with speak with daughter pending plan, ? possible transfer back to [facility]  11/2 - stable, still with blood from rectum, colostomy less blood more brown, plan pending conversation with daughter   11/3 - daughter and patient have decided to proceed with transfer to [facility] [place]. Kris Vega (oncologist) and Dr. Miguel Angel Johnson (surgeon)  11/4 - called [facility] to transfer  11/5 - [facility] will not approve transfer; daughter called; after 35 min, she does not want "palliative"; she wants "curative"; we will call his oncologist today to see what he knows that we can tell her  11/6 - spoke with Dr Paez from [facility];  he confirmed everything we thought; the daughter has been told that there is no more surgery or chemo that he can receive they tried in the past to shrink tumor, but it was not successful, so resection was not an option; Dr Paez believes comfort care is the best thing; he also said that when pt was at [facility] [place] they had to get admin to practically "throw them out".  11/7 - stable, no complaints, daughter still refusing palliative consult  11/8 - refusing exam this AM, will attempt later today  11/9 - refusing exam this AM, will attempt later today  11/10: TEMITOPE.     Plan:  - dressing changes by RN, but we have seen wounds, including sacrum, stable  - continue naranjo (changed 10/29 over wire)  - f/u medicine  - home care  - prepare for d/c on 11/11   80 yo M with history of CAD s/p stents in 2012 (ASA), locally advanced rectal cancer s/p neoadjuvant therapy with chemo/RT (completed 11/2022), consolidation CAPOX (2/2023), brachytherapy (3/2024), presenting for generalized weakness, chest discomfort, rectal pain. Pt was here at [facility] in 8/30 for fatigue, weakness, and falls, found to have perforated rectal mass with perirectal collection, 2.5 x 1.5 cm with spread to infection in anorectal/perineal region with fluid/gas tracking along penile shaft, however pt left for MSK and s/p diverting end sigmoid colostomy, cystoscopy and naranjo placement across defect and perineal/scrotal drainage 8/30. [place] [facility] [place], discharged day prior to presentation for perineal infections, treated with abx. Represents here for generalized weakness and dizziness, CTAP non-con on preliminary read indicating ill-defined fluid/gas in scrotum, perirectal space, and probably perineum. Exam demonstrates skin duskiness multiple genitourinary abscesses, induration, purulent drainage. 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    10/13: S/p debridement of genitalia and perineum in OR yesterday. In SICU for post op pressor requirements. Got 500 ccs overnight for hypovolemia on POCUS. Currently off pressors. On zosyn, clinda, vanc. U and bcx pending. Tissue cx negative. Wound packing changed at bedside, incision beds with adequate granulation tissue, healing well. Transferred to floor  10/14 - wounds clean; re-packed; penroses in place; ID consult  10/15 - pt refuses blood draw, and refuses dressing change (it has been 24 hrs since last one; he refused last nite also)                 later on he did allow it - wound appears clean, no necrotic tissue; labs drawn  10/16 - pro-BNP high so echo today  10/17 - changed dressing, took out subprapubic incision penrose drain, left scrotal penrose drain. Echo with reduced EF - cards says iso sepsis, no further workup needed. Changed abx from fluconazole to caspo (2 week course) per ID. Can c/w erta for ESBL e.coli. Cards saying he will need AC eventually,   10/18 - changed dressing this am, last penrose in scrotum d/c'ed. wound care spoke with, can try changing wet to dry dressings to aquacel and change qd. Pt's daughter will hire own aid for wound care and PT, but will need homecare for erta and caspo IV at home.   10/19- changed dressing to aquacel, started Eliquis  10/20- refused exam in AM, dressing to be changed with aquacel in PM   10/21 - changed dressing ; will speak with SW/HC about dispo planning; spoke with daughter for plan; see note by   10/22 - dressing changed; R. arm has swelling, though improved from last hospitalization, as per daughter; [place] [facility] [place] a [place], but we will repeat                Pt had blood from rectum today, daughter claims that happened at [facility] when eliquis was re-started; I d/c'd eliquis               d/w at great length his needs for home; she does not want to take him home until ABX's are done (next Tues); she wants study R. upper ext swelling even though it was done [place] [facility] and was neg  10/23 - daughter supposedly will come today to learn dressing and colostomy care; we will change dressing then, and speak again to daughter about rectal Ca and GOC; apparently she has not wanted to discuss it in the past, R. arm duplex neg, xray negative, psych consult requested  10/24 - dressings changed this AM, yesterday daughter instructed as to dressing changes and colostomy care, psych to see pt again today as he did not participate in the interview (haldol for agitation), no stool in colostomy ? gas overnight, will get AXR  10/25 - dressing changed this AM. AXR shows nonobstructive distension.  10/26 - new dark red output from colostomy, dressing changed this AM,   10/27 - persistent bloody output from colostomy  10/28 - no new events; will need to change dressing, pt not letting us do it now; later on wounds improved               GI called (see note above)  10/29 - will speak to daughter today (tried calling 3X yesterday) to see if she wants us to pursue rectal and colost bleed (labs stable); gen surg at bedside; d/c erta/flucon  10/30 - Cards and GI consulted; daughter spoken to last nite; she did not want our house cardiologist, so no scope today; her cardiologist does not come here; will speak again to her today and hopefully will consent to scope; labs stable, naranjo exchanged over wire  10/31 - Cards spoke w/ pt's PMD cards, for coloscopy today, AM labs stable, Findings: There was evidence of a patent loop colostomy in the transverse colon. This was characterized by healthy appearing mucosa. Two separate stomal openings were appreciated on digital exam. The transverse colon was entered via one of the two stomal openings leading towards the proximal colon. Many small and large-mouthed    diverticula were found in the entire colon. There was no evidence of bleeding in the proximal colon. The terminal ileum appeared normal. There was no evidence of bleeding in the ileum. The colonoscope was subsequently introduced into the second stomal opening leading to the distal colon. A large blood clot was found in the descending colon filling the lumen. No active bleeding was appreciated.                      Impression:    Patent loop colostomy with healthy appearing mucosa in the transverse colon. Diverticulosis in the entire examined proximal colon. There was no evidence of bleeding in the proximal colon                    or ileum. Clotted blood in the descending colon. Suspect blood in the ostomy is secondary to refluxed blood from rectum. No specimens collected.   11/1 - naranjo changed over wire; stable, still with blood from rectum, brown from colostomy, team with speak with daughter pending plan, ? possible [place] back [place] [facility]  11/2 - stable, still with blood from rectum, colostomy less blood more brown, plan pending conversation with daughter   11/3 - daughter and patient have decided to proceed with [place] [facility] [place]. Kris Vega (oncologist) and Dr. Miguel Angel Johnson (surgeon)  11/4 - called [facility] [place]  11/5 - [facility] [place] not [place]; daughter called; after 35 min, she does not want "palliative"; she wants "curative"; we will call his oncologist today to see what he knows that we can tell her  11/6 - spoke with Dr Paez from [facility];  he confirmed everything we thought; the daughter has been told that there is no more surgery or chemo that he can receive they tried in the past to shrink tumor, but it was not successful, so resection was not an option; Dr Paez believes comfort care is the best thing; he also said that when [place] was [place] [facility] [place] they had to get admin to practically "throw them out".  11/7 - stable, no complaints, daughter still refusing palliative consult  11/8 - refusing exam this AM, will attempt later today  11/9 - refusing exam this AM, will attempt later today  11/10:     Plan:  - dressing changes by RN, but we have seen wounds, including sacrum, stable  - continue naranjo (changed 10/29 over wire)  - f/u medicine  - home care  - prepare for d/c on 11/11

## 2024-11-10 NOTE — PROGRESS NOTE ADULT - PROBLEM SELECTOR PROBLEM 8
Chronic atrial fibrillation
Delirium
Subclinical hypothyroidism
Chronic atrial fibrillation
Delirium
Subclinical hypothyroidism
Chronic atrial fibrillation
Subclinical hypothyroidism

## 2024-11-10 NOTE — PROGRESS NOTE ADULT - PROBLEM SELECTOR PROBLEM 7
Delirium
Delirium
Chronic atrial fibrillation
Lower extremity edema
Chronic atrial fibrillation
Lower extremity edema
Delirium
CAD (coronary artery disease)
Chronic atrial fibrillation
Delirium
Chronic atrial fibrillation
Chronic atrial fibrillation
Subclinical hypothyroidism
Chronic atrial fibrillation
CAD (coronary artery disease)
Chronic atrial fibrillation
Subclinical hypothyroidism
Chronic atrial fibrillation
Delirium
Subclinical hypothyroidism
Delirium
Delirium

## 2024-11-10 NOTE — PROGRESS NOTE ADULT - ATTENDING SUPERVISION STATEMENT
Fellow
Resident
Fellow
Resident

## 2024-11-10 NOTE — PROGRESS NOTE ADULT - SUBJECTIVE AND OBJECTIVE BOX
Dr. Dayanara Dominguez  Pager [phone]    PROGRESS NOTE:     Patient is a 79y old  Male who presents with a chief complaint of suprapubic pain (10 Nov 2024 09:37)      SUBJECTIVE / OVERNIGHT EVENTS: denies chest pain or sob   ADDITIONAL REVIEW OF SYSTEMS: afebrile     MEDICATIONS  (STANDING):  acetaminophen     Tablet .. 1000 milliGRAM(s) Oral every 6 hours  aspirin  chewable 81 milliGRAM(s) Oral daily  brimonidine 0.2% Ophthalmic Solution 1 Drop(s) Both EYES every 12 hours  Dakins Solution - 1/4 Strength 1 Application(s) Topical daily  dextrose 5% + sodium chloride 0.9%. 1000 milliLiter(s) (75 mL/Hr) IV Continuous <Continuous>  heparin   Injectable 5000 Unit(s) SubCutaneous every 8 hours  latanoprost 0.005% Ophthalmic Solution 1 Drop(s) Both EYES at bedtime  losartan 25 milliGRAM(s) Oral daily  metoprolol succinate ER 50 milliGRAM(s) Oral daily  nicotine - 21 mG/24Hr(s) Patch 1 Patch Transdermal daily  pantoprazole    Tablet 40 milliGRAM(s) Oral before breakfast  rosuvastatin 10 milliGRAM(s) Oral at bedtime  senna 2 Tablet(s) Oral at bedtime  spironolactone 25 milliGRAM(s) Oral daily  timolol 0.5% Solution 1 Drop(s) Both EYES every 12 hours    MEDICATIONS  (PRN):  aluminum hydroxide/magnesium hydroxide/simethicone Suspension 30 milliLiter(s) Oral every 6 hours PRN Dyspepsia  traMADol 50 milliGRAM(s) Oral every 6 hours PRN Severe Pain (7 - 10)      CAPILLARY BLOOD GLUCOSE        I&O's Summary    09 Nov 2024 07:01  -  10 Nov 2024 07:00  --------------------------------------------------------  IN: 0 mL / OUT: 2550 mL / NET: -2550 mL    10 Nov 2024 07:01  -  10 Nov 2024 13:56  --------------------------------------------------------  IN: 0 mL / OUT: 600 mL / NET: -600 mL        PHYSICAL EXAM:  Vital Signs Last 24 Hrs  T(C): 36.4 (10 Nov 2024 13:44), Max: 37.1 (09 Nov 2024 22:36)  T(F): 97.6 (10 Nov 2024 13:44), Max: 98.7 (09 Nov 2024 22:36)  HR: 78 (10 Nov 2024 13:44) (78 - 89)  BP: 129/82 (10 Nov 2024 13:44) (107/74 - 129/82)  BP(mean): --  RR: 17 (10 Nov 2024 13:44) (17 - 18)  SpO2: 100% (10 Nov 2024 13:44) (97% - 100%)    Parameters below as of 10 Nov 2024 13:44  Patient On (Oxygen Delivery Method): room air      CONSTITUTIONAL: NAD, well-developed  HEAD: Normocephalic, atraumatic  EYES: Eyes tracking  ENT: neck supple  RESPIRATORY: No increased work of breathing, lungs grossly clear  CARDIOVASCULAR: RRR, S1 and S2 present, no m/r/g  ABDOMEN: soft, NT, colostomy with stool   : naranjo catheter draining clear yellow urine  EXTREMITIES: b/l 2+ LE pitting edema noted, unchanged from prior  MUSCULOSKELETAL: no joint swelling  NEURO: moving all extremities, awake/alert x1-2    LABS:    no new lab    RADIOLOGY & ADDITIONAL TESTS:  Results Reviewed:   Imaging Personally Reviewed:  Electrocardiogram Personally Reviewed:    COORDINATION OF CARE:  Care Discussed with Consultants/Other Providers [Y/N]:  Prior or Outpatient Records Reviewed [Y/N]:

## 2024-11-10 NOTE — PROGRESS NOTE ADULT - PROBLEM SELECTOR PLAN 3
Patient progressive more lethargic over the last two days. Per chart review- similar episode at OSH related to hypoactive delirium   - daughter reports mental status improved on IVF  - encourage po intake  - avoid sedatives/benzo, frequent reorientation  -  BMP reviewed, no sig electrolyte issues, kidney function at baseline   VBG pH 7.36, pCO2 48, no CO2 retention  Ammonia level 12 (wnl)  CBC stable, normal WBC 6.1, H/H stable 8.5/28  - Dc'd IV Morphine   - Delirium precautions   - Seen by Psych, daughter declined seroquel  - IVF supplementation
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
TTE performed 10/16/24, showing global LV hypokinesis, EF 35-40%  Cardiology following, no plans for ischemic evaluation at this time   - cont toprol XL 50 mg QD   - cont crestor 10 mg QHS   - cont losartan 25 mg QD   - cont spironolactone 25 mg QD  - No evidence of acute decompensated HF
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
Patient progressive more lethargic over the last two days. Per chart review- similar episode at OSH related to hypoactive delirium   - BMP reviewed, no sig electrolyte issues, kidney function at baseline   [ ] Check VBG, ammonia and CBC w/ differential   - D/C IV Morphine for now   - Delirium precautions   - Seen by Psych, started Seroquel nightly   - IVF supplementation
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
Melena noted in ostomy   - Has been on ASA and HSQ but off full dose DOAC due to hx of rectal bleeding in the past  - Recommend start protonix 40mg iv bid  - Recommend GI eval   - CRS following as well   - Trend CBC (currently stable)
TTE performed 10/16/24, showing global LV hypokinesis, EF 35-40%  Cardiology following, no plans for ischemic evaluation at this time   - cont Toprol XL 50 mg QD   - cont Crestor 10 mg QHS   - cont losartan 25 mg QD   - cont spironolactone 25 mg QD  - No evidence of acute decompensated HF
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
TTE performed 10/16/24, showing global LV hypokinesis, EF 35-40%  Cardiology following, no plans for ischemic evaluation at this time   - cont Toprol XL 50 mg QD   - cont Crestor 10 mg QHS   - cont losartan 25 mg QD   - cont spironolactone 25 mg QD  - No evidence of acute decompensated HF
TTE performed 10/16/24, showing global LV hypokinesis, EF 35-40%  Cardiology following, no plans for ischemic evaluation at this time   - cont Toprol XL 50 mg QD   - cont Crestor 10 mg QHS   - cont losartan 25 mg QD   - cont spironolactone 25 mg QD  - No evidence of acute decompensated HF
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
TTE performed 10/16/24, showing global LV hypokinesis, EF 35-40%  Cardiology following, no plans for ischemic evaluation at this time   - cont Toprol XL 50 mg QD   - cont Crestor 10 mg QHS   - cont losartan 25 mg QD   - cont spironolactone 25 mg QD  - No evidence of acute decompensated HF
TTE performed 10/16/24, showing global LV hypokinesis, EF 35-40%  Cardiology following, no plans for ischemic evaluation at this time   - cont Toprol XL 50 mg QD   - cont Crestor 10 mg QHS   - cont losartan 25 mg QD   - cont spironolactone 25 mg QD  - No evidence of acute decompensated HF
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
Patient progressive more lethargic over the last two days. Per chart review- similar episode at OSH related to hypoactive delirium   - daughter reports mental status improved on IVF  - encourage po intake  - avoid sedatives/benzo, frequent reorientation  -  BMP reviewed, no sig electrolyte issues, kidney function at baseline   VBG pH 7.36, pCO2 48, no CO2 retention  Ammonia level 12 (wnl)  CBC stable, normal WBC 6.1, H/H stable 8.5/28  - Dc'd IV Morphine   - Delirium precautions   - Seen by Psych, daughter declined seroquel  - IVF supplementation
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
TTE performed 10/16/24, showing global LV hypokinesis, EF 35-40%  Cardiology following, no plans for ischemic evaluation at this time   - cont Toprol XL 50 mg QD   - cont Crestor 10 mg QHS   - cont losartan 25 mg QD   - cont spironolactone 25 mg QD  - No evidence of acute decompensated HF
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

## 2024-11-10 NOTE — PROGRESS NOTE ADULT - PROBLEM SELECTOR PROBLEM 1
Tara gangrene
Rectal cancer
Tara gangrene
Tara gangrene
Rectal cancer
Tara gangrene
Tara gangrene
[place]
Tara gangrene
Rectal cancer
Tara gangrene
[place]
Tara gangrene
Tara gangrene
Rectal cancer
Rectal cancer
[place]
Rectal cancer

## 2024-11-10 NOTE — PROGRESS NOTE ADULT - PROBLEM SELECTOR PLAN 9
- Discussed patient's case extensively with primary urology team. Patient at this time appears to medically and surgically stable. Patient has multiple chronic issues which are stable on current medication regimen. Patient is hemodynamically stable and no further surgical intervention is planned from urology or colorectal surgery teams. Patient has stable sacral wound as well as surgical wound which is being managed with local wound care.  - Further discussions ongoing between primary team and patient's family. Patient's oncologist recommending discussion of palliative options including comfort care with patient's family however patient's family refusing to discuss with palliative care team. Would recommend continued discharge planning with patient/family at this time
- Discussed patient's case extensively with primary urology team. Patient at this time appears to medically and surgically stable. Patient has multiple chronic issues which are stable on current medication regimen. Patient is hemodynamically stable and no further surgical intervention is planned from urology or colorectal surgery teams. Patient has stable sacral wound as well as surgical wound which is being managed with local wound care.  - Further discussions ongoing between primary team and patient's family. Patient's oncologist recommending discussion of palliative options including comfort care with patient's family however patient's family refusing to discuss with palliative care team. Would recommend continued discharge planning with patient/family at this time
Patient noted with markedly elevated TSH up to 26 on 10/15  - FT4 noted to be WNL at 1.0  - currently alert and conversant, without gross lethargy/bradycardia or symptoms of hypothyroidism  - suspect that patient may have some degree of subclinical hypothyroidism  - would recommend outpatient follow up in 2-3 weeks for repeat thyroid function testing
Patient noted with markedly elevated TSH up to 26 on 10/15  - FT4 noted to be WNL at 1.0  - currently alert and conversant, without gross lethargy/bradycardia or symptoms of hypothyroidism  - suspect that patient may have some degree of subclinical hypothyroidism  - would recommend outpatient follow up in 2-3 weeks for repeat thyroid function testing
Afib noted on EKG 10/12, reverted to NSR.   - Was not on AC PTA 2/2 hx of rectal bleeding. Started on Eliquis this admission but had rectal bleeding, so stopped   - c/w toprol 50mg qd  - Would recommend risk v benefit discussion w/ family re continuing AC  - Eliquis discontinued
- Discussed patient's case extensively with primary urology team. Patient at this time appears to medically and surgically stable. Patient has multiple chronic issues which are stable on current medication regimen. Patient is hemodynamically stable and no further surgical intervention is planned from urology or colorectal surgery teams. Patient has stable sacral wound as well as surgical wound which is being managed with local wound care.  - Further discussions ongoing between primary team and patient's family. Patient's oncologist recommending discussion of palliative options including comfort care with patient's family however patient's family refusing to discuss with palliative care team. Would recommend continued discharge planning with patient/family at this time
Afib noted on EKG 10/12, reverted to NSR.   - Was not on AC PTA 2/2 hx of rectal bleeding. Started on Eliquis this admission but discontinued given rectal bleeding   - c/w toprol 50mg qd
- Discussed patient's case extensively with primary urology team. Patient at this time appears to medically and surgically stable. Patient has multiple chronic issues which are stable on current medication regimen. Patient is hemodynamically stable and no further surgical intervention is planned from urology or colorectal surgery teams. Patient has stable sacral wound as well as surgical wound which is being managed with local wound care.  - Further discussions ongoing between primary team and patient's family. Patient's oncologist recommending discussion of palliative options including comfort care with patient's family however patient's family refusing to discuss with palliative care team. Would recommend continued discharge planning with patient/family at this time

## 2024-11-10 NOTE — PROGRESS NOTE ADULT - PROBLEM SELECTOR PLAN 1
Patient with history of locally advanced rectal cancer. Melena/bright red blood noted in ostomy, as well as rectal bleeding   - Has been on ASA and HSQ but off full dose DOAC due to hx of rectal bleeding in the past  - GI following. S/p colonoscopy on 10/31 showing diverticulosis but no active bleeding, "suspect blood in the ostomy is secondary to refluxed blood from rectum."  - CRS following as well   - CBC has remained stable at this time, continue to monitor    - If continued downtrending Hgb, consider holding HSQ and ASA   - Transfuse for Hgb < 8 given hx of CAD  - follows with [facility] oncology, however patient was not accepted for transfer to [facility]  - primary team discussed case with patient's oncologist at [facility], Dr. Paez. Patient previously given treatment with chemo however unfortunately did not respond to therapy and is not a candidate for further tumor resection. Does not appear to be a candidate for further DMT at this time. Patient's family was informed of this by [facility] team during recent hospitalization and was also recommended for palliative measures.  -daughter with unrealistic expectations, not amenable to discuss with palliative care or hospice, wants full code

## 2024-11-10 NOTE — PROGRESS NOTE ADULT - PROBLEM SELECTOR PROBLEM 9
Subclinical hypothyroidism
Subclinical hypothyroidism
Chronic atrial fibrillation
Discharge planning issues
Chronic atrial fibrillation

## 2024-11-10 NOTE — PROGRESS NOTE ADULT - PROBLEM SELECTOR PROBLEM 6
Subclinical hypothyroidism
Subclinical hypothyroidism
Lower extremity edema
Subclinical hypothyroidism
CAD (coronary artery disease)
Lower extremity edema
Subclinical hypothyroidism
CAD (coronary artery disease)
Subclinical hypothyroidism
Subclinical hypothyroidism
Elevated troponin
CAD (coronary artery disease)
Elevated troponin
Subclinical hypothyroidism
Subclinical hypothyroidism
CAD (coronary artery disease)
Elevated troponin
Elevated troponin
CAD (coronary artery disease)
CAD (coronary artery disease)
Lower extremity edema
CAD (coronary artery disease)

## 2024-11-10 NOTE — PROGRESS NOTE ADULT - PROBLEM SELECTOR PROBLEM 2
Rectal cancer
Tara gangrene
Tara gangrene
Rectal cancer
Tara gangrene
Rectal cancer
Tara gangrene
Rectal cancer
Rectal cancer
Tara gangrene
Tara gangrene
Rectal cancer
Tara gangrene
Tara gangrene
Rectal cancer
Tara gangrene

## 2024-11-10 NOTE — PROGRESS NOTE ADULT - PROBLEM SELECTOR PROBLEM 4
Chronic atrial fibrillation
Elevated troponin
Chronic atrial fibrillation
Elevated troponin
Elevated troponin
CAD (coronary artery disease)
Chronic atrial fibrillation
Elevated troponin
Elevated troponin
Delirium
Elevated troponin
Heart failure with reduced ejection fraction
Elevated troponin
Elevated troponin
Chronic atrial fibrillation
Delirium
CAD (coronary artery disease)
Chronic atrial fibrillation
CAD (coronary artery disease)
Chronic atrial fibrillation
Chronic atrial fibrillation
Elevated troponin
Heart failure with reduced ejection fraction
Elevated troponin

## 2024-11-11 PROCEDURE — 99232 SBSQ HOSP IP/OBS MODERATE 35: CPT

## 2024-11-11 RX ORDER — ACETAMINOPHEN 500 MG
2 TABLET ORAL
Qty: 0 | Refills: 0 | DISCHARGE
Start: 2024-11-11

## 2024-11-11 RX ORDER — TRAMADOL HYDROCHLORIDE 50 MG/1
1 TABLET, COATED ORAL
Qty: 20 | Refills: 0
Start: 2024-11-11 | End: 2024-11-15

## 2024-11-11 RX ADMIN — LOSARTAN POTASSIUM 25 MILLIGRAM(S): 25 TABLET ORAL at 06:19

## 2024-11-11 RX ADMIN — PANTOPRAZOLE SODIUM 40 MILLIGRAM(S): 40 TABLET, DELAYED RELEASE ORAL at 06:19

## 2024-11-11 RX ADMIN — Medication 75 MILLILITER(S): at 15:24

## 2024-11-11 RX ADMIN — TRAMADOL HYDROCHLORIDE 50 MILLIGRAM(S): 50 TABLET, COATED ORAL at 18:25

## 2024-11-11 RX ADMIN — NICOTINE POLACRILEX 1 PATCH: 4 GUM, CHEWING ORAL at 07:01

## 2024-11-11 RX ADMIN — NICOTINE POLACRILEX 1 PATCH: 4 GUM, CHEWING ORAL at 12:22

## 2024-11-11 RX ADMIN — Medication 2 TABLET(S): at 21:27

## 2024-11-11 RX ADMIN — NICOTINE POLACRILEX 1 PATCH: 4 GUM, CHEWING ORAL at 12:27

## 2024-11-11 RX ADMIN — Medication 1 DROP(S): at 18:23

## 2024-11-11 RX ADMIN — Medication 1 DROP(S): at 06:18

## 2024-11-11 RX ADMIN — Medication 25 MILLIGRAM(S): at 06:20

## 2024-11-11 RX ADMIN — Medication 1000 MILLIGRAM(S): at 06:19

## 2024-11-11 RX ADMIN — HEPARIN SODIUM 5000 UNIT(S): 10000 INJECTION INTRAVENOUS; SUBCUTANEOUS at 15:18

## 2024-11-11 RX ADMIN — HEPARIN SODIUM 5000 UNIT(S): 10000 INJECTION INTRAVENOUS; SUBCUTANEOUS at 21:27

## 2024-11-11 RX ADMIN — Medication 81 MILLIGRAM(S): at 12:26

## 2024-11-11 RX ADMIN — Medication 10 MILLIGRAM(S): at 21:27

## 2024-11-11 RX ADMIN — Medication 1000 MILLIGRAM(S): at 12:26

## 2024-11-11 RX ADMIN — Medication 1 DROP(S): at 18:24

## 2024-11-11 RX ADMIN — TRAMADOL HYDROCHLORIDE 50 MILLIGRAM(S): 50 TABLET, COATED ORAL at 19:20

## 2024-11-11 RX ADMIN — Medication 1 DROP(S): at 06:19

## 2024-11-11 RX ADMIN — Medication 1000 MILLIGRAM(S): at 07:19

## 2024-11-11 RX ADMIN — Medication 1000 MILLIGRAM(S): at 19:20

## 2024-11-11 RX ADMIN — Medication 1000 MILLIGRAM(S): at 13:25

## 2024-11-11 RX ADMIN — Medication 1 DROP(S): at 21:28

## 2024-11-11 RX ADMIN — Medication 50 MILLIGRAM(S): at 06:20

## 2024-11-11 RX ADMIN — Medication 1000 MILLIGRAM(S): at 18:23

## 2024-11-11 RX ADMIN — Medication 1 APPLICATION(S): at 12:27

## 2024-11-11 RX ADMIN — NICOTINE POLACRILEX 1 PATCH: 4 GUM, CHEWING ORAL at 18:46

## 2024-11-11 NOTE — CHART NOTE - NSCHARTNOTEFT_GEN_A_CORE
NUTRITION FOLLOW UP NOTE    Pt seen for severe malnutrition    SOURCE: [X] Patient [X] Medical record [X] RN/PCA [] Family/Caregiver []Patient unavailable []Patient inappropriate (disoriented, nonverbal, intubated/sedated) [] Other:    Medical Course: 78 yo M w/ CAD s/p stents, chronic afib, rectal cancer s/p neoadjuvant therapy, chemoRT, consolidation CAPOZ, brachytherapy, p/w yuri's gangrene s/p debridement and course of antibiotics, also noted to have HFrEF (being medically managed), severe PCM course c/b GIB (s/p colonoscopy) likely 2/2 known rectal cancer, now planned for discharge home w/ services per chart    Diet Prescription: Diet, Regular:     Start Time: 20:00  Liquid Protein Supplement     Qty per Day:  3  Supplement Feeding Modality:  Oral  Ensure Plus High Protein Cans or Servings Per Day:  1       Frequency:  Two Times a day (10-31-24 @ 16:13)      Nutrition Course: Pt only briefly communicative answering in yes and no. Unable to obtain significant information regarding intake. Appetite and intake discussed with RN. Pt is reported to be total assist. Stated at breakfast pt able to consume 51-75%, Lunch meal refused. PEr review of RN flowsheet intake range 26-50% overall, occasionally 51-75% Multiple Ensures observed accumulated at bedside, pt stated "I will drink it later". No new food preferences reported. Per RN, not taking LPS. Continues to meet <75% of EER. Pt reports last BM yesterday. (L abdominal colostomy, output noted 0mL ? not updated) Pt denies chewing/swallowing difficulties at encounter.     Pertinent Medications: MEDICATIONS  (STANDING):  acetaminophen     Tablet .. 1000 milliGRAM(s) Oral every 6 hours  aspirin  chewable 81 milliGRAM(s) Oral daily  brimonidine 0.2% Ophthalmic Solution 1 Drop(s) Both EYES every 12 hours  Dakins Solution - 1/4 Strength 1 Application(s) Topical daily  dextrose 5% + sodium chloride 0.9%. 1000 milliLiter(s) (75 mL/Hr) IV Continuous <Continuous>  heparin   Injectable 5000 Unit(s) SubCutaneous every 8 hours  latanoprost 0.005% Ophthalmic Solution 1 Drop(s) Both EYES at bedtime  losartan 25 milliGRAM(s) Oral daily  metoprolol succinate ER 50 milliGRAM(s) Oral daily  nicotine - 21 mG/24Hr(s) Patch 1 Patch Transdermal daily  pantoprazole    Tablet 40 milliGRAM(s) Oral before breakfast  rosuvastatin 10 milliGRAM(s) Oral at bedtime  senna 2 Tablet(s) Oral at bedtime  spironolactone 25 milliGRAM(s) Oral daily  timolol 0.5% Solution 1 Drop(s) Both EYES every 12 hours    MEDICATIONS  (PRN):  aluminum hydroxide/magnesium hydroxide/simethicone Suspension 30 milliLiter(s) Oral every 6 hours PRN Dyspepsia  traMADol 50 milliGRAM(s) Oral every 6 hours PRN Severe Pain (7 - 10)      Weight:   11/11 obtained value 57.8 kg, used in assessment)   11/11- 54 kg  10/31 - 63 kg ? accuracy  10/26 - 59.6 kg  10/20 - 59.6 kg        10/15 - 65.4 kg        10/14 - 58.3 kg        10/13 - 62.1 kg        10/12 - 63 kg (Adm)       Weight Assessment:-3% over 2 weeks time frame (as of 10/26) (some weight loss may be related to fluid losses pt on diuretic)  Height: 72 in / 185 cm  IBW: 178 lbs / 80.9 kg +/-10%  BMI: 16.9 kg/m^2    Physical Assessment, per flowsheets:  Edema: right ankle; left ankle edema 1+  Pressure Injury: Sacral/gluteal cleft stage 4 pressure injury per wound care 11/07    Estimated Needs:   [ x ] No change in need assessment, weight Used: 59.6kg due to weight fluctuation  Estimated Energy: 5219-9688 Kcal/kg/day ( 30-35 kcal/kg)  Estimated Protein: 83-95 gm/kg/day ( 1.4-1.6 gm/kg)    Previous Nutrition Diagnosis:   [ x ] Severe malnutrition  Nutrition Diagnosis is : [ x ] ongoing   New Nutrition Diagnosis :  [ x ] not applicable     EDUCATION  [ x ] Not warranted at present    RECOMMENDATION  [ x ] Continue present PO diet and supplement order as prescribed  [ x ] Please consider adding appetite stimulant and multivitamin  [ x ] Honor food and fluid preferences as able.  [ x ] Follow-up goals of care regarding alternate means of nutrition    Monitor & Evaluate:  PO intake, tolerance to diet/supplement, nutrition related lab values, weight trends, BMs/GI distress, hydration status, skin integrity.    Jackelin Ramos MS, RD, CDN, CNSC pg [phone]  Also available on [employer] Teams

## 2024-11-11 NOTE — PROGRESS NOTE ADULT - SUBJECTIVE AND OBJECTIVE BOX
[facility] Medicine  Pager [phone]    Patient is a 79y old  Male who presents with a chief complaint of suprapubic pain (11 Nov 2024 08:10)      INTERVAL HPI/OVERNIGHT EVENTS:    MEDICATIONS  (STANDING):  acetaminophen     Tablet .. 1000 milliGRAM(s) Oral every 6 hours  aspirin  chewable 81 milliGRAM(s) Oral daily  brimonidine 0.2% Ophthalmic Solution 1 Drop(s) Both EYES every 12 hours  Dakins Solution - 1/4 Strength 1 Application(s) Topical daily  dextrose 5% + sodium chloride 0.9%. 1000 milliLiter(s) (75 mL/Hr) IV Continuous <Continuous>  heparin   Injectable 5000 Unit(s) SubCutaneous every 8 hours  latanoprost 0.005% Ophthalmic Solution 1 Drop(s) Both EYES at bedtime  losartan 25 milliGRAM(s) Oral daily  metoprolol succinate ER 50 milliGRAM(s) Oral daily  nicotine - 21 mG/24Hr(s) Patch 1 Patch Transdermal daily  pantoprazole    Tablet 40 milliGRAM(s) Oral before breakfast  rosuvastatin 10 milliGRAM(s) Oral at bedtime  senna 2 Tablet(s) Oral at bedtime  spironolactone 25 milliGRAM(s) Oral daily  timolol 0.5% Solution 1 Drop(s) Both EYES every 12 hours    MEDICATIONS  (PRN):  aluminum hydroxide/magnesium hydroxide/simethicone Suspension 30 milliLiter(s) Oral every 6 hours PRN Dyspepsia  traMADol 50 milliGRAM(s) Oral every 6 hours PRN Severe Pain (7 - 10)      Allergies    No Known Allergies    Intolerances        REVIEW OF SYSTEMS:  Please see interval HPI:    Vital Signs Last 24 Hrs  T(C): 36.5 (11 Nov 2024 10:23), Max: 36.7 (10 Nov 2024 17:57)  T(F): 97.7 (11 Nov 2024 10:23), Max: 98 (10 Nov 2024 17:57)  HR: 78 (11 Nov 2024 10:23) (71 - 79)  BP: 105/65 (11 Nov 2024 10:23) (105/65 - 140/75)  BP(mean): --  RR: 19 (11 Nov 2024 10:23) (17 - 19)  SpO2: 100% (11 Nov 2024 10:23) (98% - 100%)    Parameters below as of 11 Nov 2024 10:23  Patient On (Oxygen Delivery Method): room air      I&O's Detail    10 Nov 2024 07:01  -  11 Nov 2024 07:00  --------------------------------------------------------  IN:  Total IN: 0 mL    OUT:    Colostomy (mL): 0 mL    Indwelling Catheter - Urethral (mL): 2600 mL  Total OUT: 2600 mL    Total NET: -2600 mL            PHYSICAL EXAM:  GENERAL:   HEAD:    EYES:   ENMT:   NECK:   NERVOUS SYSTEM:    CHEST/LUNG:   HEART:   ABDOMEN:   EXTREMITIES:    LYMPH:   SKIN:     LABS:            CAPILLARY BLOOD GLUCOSE        BLOOD CULTURE    RADIOLOGY & ADDITIONAL TESTS:    Imaging Personally Reviewed:  [ ] YES     Consultant(s) Notes Reviewed:      Care Discussed with Consultants/Other Providers: [facility] Medicine  Pager [phone]    Patient is a 79y old  Male who presents with a chief complaint of suprapubic pain (11 Nov 2024 08:10)      INTERVAL HPI/OVERNIGHT EVENTS:  Patient heard from hallway, repeatedly asking for water.   Provider got him water, assisted him with drinking with straw, head of bed elevated.   No other complaints/requests at this time.   Plan to coordinate return home, patient verbalized understanding. Requests table be moved closer.     MEDICATIONS  (STANDING):  acetaminophen     Tablet .. 1000 milliGRAM(s) Oral every 6 hours  aspirin  chewable 81 milliGRAM(s) Oral daily  brimonidine 0.2% Ophthalmic Solution 1 Drop(s) Both EYES every 12 hours  Dakins Solution - 1/4 Strength 1 Application(s) Topical daily  dextrose 5% + sodium chloride 0.9%. 1000 milliLiter(s) (75 mL/Hr) IV Continuous <Continuous>  heparin   Injectable 5000 Unit(s) SubCutaneous every 8 hours  latanoprost 0.005% Ophthalmic Solution 1 Drop(s) Both EYES at bedtime  losartan 25 milliGRAM(s) Oral daily  metoprolol succinate ER 50 milliGRAM(s) Oral daily  nicotine - 21 mG/24Hr(s) Patch 1 Patch Transdermal daily  pantoprazole    Tablet 40 milliGRAM(s) Oral before breakfast  rosuvastatin 10 milliGRAM(s) Oral at bedtime  senna 2 Tablet(s) Oral at bedtime  spironolactone 25 milliGRAM(s) Oral daily  timolol 0.5% Solution 1 Drop(s) Both EYES every 12 hours    MEDICATIONS  (PRN):  aluminum hydroxide/magnesium hydroxide/simethicone Suspension 30 milliLiter(s) Oral every 6 hours PRN Dyspepsia  traMADol 50 milliGRAM(s) Oral every 6 hours PRN Severe Pain (7 - 10)    Allergies  No Known Allergies    Intolerances    REVIEW OF SYSTEMS:  Please see interval HPI:    Vital Signs Last 24 Hrs  T(C): 36.5 (11 Nov 2024 10:23), Max: 36.7 (10 Nov 2024 17:57)  T(F): 97.7 (11 Nov 2024 10:23), Max: 98 (10 Nov 2024 17:57)  HR: 78 (11 Nov 2024 10:23) (71 - 79)  BP: 105/65 (11 Nov 2024 10:23) (105/65 - 140/75)  RR: 19 (11 Nov 2024 10:23) (17 - 19)  SpO2: 100% (11 Nov 2024 10:23) (98% - 100%)    Parameters below as of 11 Nov 2024 10:23  Patient On (Oxygen Delivery Method): room air      I&O's Detail    10 Nov 2024 07:01  -  11 Nov 2024 07:00  --------------------------------------------------------  IN:  Total IN: 0 mL    OUT:    Colostomy (mL): 0 mL    Indwelling Catheter - Urethral (mL): 2600 mL  Total OUT: 2600 mL    Total NET: -2600 mL      PHYSICAL EXAM:  GENERAL: NAD, lying in bed, frail, thin  HEAD:  NC/AT   EYES: clear sclera/conjunctiva  ENMT: hearing intact to voice  NERVOUS SYSTEM:  moving extremities, able to put cup back on table  CHEST/LUNG: comfortable on RA, no respiratory distress noted  ABDOMEN: +colostomy  : +naranjo  EXTREMITIES: no c/c, +le edema    SKIN: frail skin    LABS:    CAPILLARY BLOOD GLUCOSE    BLOOD CULTURE    RADIOLOGY & ADDITIONAL TESTS:    Imaging Personally Reviewed:  [ ] YES   < from: TTE W or WO Ultrasound Enhancing Agent (10.16.24 @ 07:13) >  CONCLUSIONS:      1. Left ventricular cavity is normal in size. Left ventricular wall thickness is normal. Left ventricular systolic function is moderately to severely decreased with an ejection fraction visually estimated at 35to 40 %. Global left ventricular hypokinesis.   2. Enlarged right ventricular cavity size, with normal wall thickness, and probably normal right ventricular systolic function.   3. Left atrium is moderately dilated.   4. No prior echocardiogram is available for comparison.   5. Small pericardial effusion.   6. Left pleural effusion noted.    < end of copied text >      Consultant(s) Notes Reviewed:  Urology, prior cards note    Care Discussed with Consultants/Other Providers: Urology Dionne re: plan to coordinate return home, family not interested in palliative/hospice, CM/CARRINGTON Emmanuel re: plan for home tomorrow afternoon

## 2024-11-11 NOTE — PROGRESS NOTE ADULT - SUBJECTIVE AND OBJECTIVE BOX
No new events  Afeb 140/75 79 99%RA    Pt has no c/o  Abd- soft NT ND   Wounds - dressings changed as per protocol  Marilou Deras

## 2024-11-11 NOTE — PROGRESS NOTE ADULT - ASSESSMENT
80 yo M with history of CAD s/p stents in 2012 (ASA), locally advanced rectal cancer s/p neoadjuvant therapy with chemo/RT (completed 11/2022), consolidation CAPOX (2/2023), brachytherapy (3/2024), presenting for generalized weakness, chest discomfort, rectal pain. Pt was here at [facility] in 8/30 for fatigue, weakness, and falls, found to have perforated rectal mass with perirectal collection, 2.5 x 1.5 cm with spread to infection in anorectal/perineal region with fluid/gas tracking along penile shaft, however pt left for [facility] and s/p diverting end sigmoid colostomy, cystoscopy and naranjo placement across defect and perineal/scrotal drainage 8/30. Recently at [facility] [place], discharged day prior to presentation for perineal infections, treated with abx. Represents here for generalized weakness and dizziness, CTAP non-con on preliminary read indicating ill-defined fluid/gas in scrotum, perirectal space, and probably perineum. Exam demonstrates skin duskiness multiple genitourinary abscesses, induration, purulent drainage. 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    10/13: S/p debridement of genitalia and perineum in OR yesterday. In SICU for post op pressor requirements. Got 500 ccs overnight for hypovolemia on POCUS. Currently off pressors. On zosyn, clinda, vanc. U and bcx pending. Tissue cx negative. Wound packing changed at bedside, incision beds with adequate granulation tissue, healing well. Transferred to floor  10/14 - wounds clean; re-packed; penroses in place; ID consult  10/15 - pt refuses blood draw, and refuses dressing change (it has been 24 hrs since last one; he refused last nite also)                 later on he did allow it - wound appears clean, no necrotic tissue; labs drawn  10/16 - pro-BNP high so echo today  10/17 - changed dressing, took out subprapubic incision penrose drain, left scrotal penrose drain. Echo with reduced EF - cards says iso sepsis, no further workup needed. Changed abx from fluconazole to caspo (2 week course) per ID. Can c/w erta for ESBL e.coli. Cards saying he will need AC eventually,   10/18 - changed dressing this am, last penrose in scrotum d/c'ed. wound care spoke with, can try changing wet to dry dressings to aquacel and change qd. Pt's daughter will hire own aid for wound care and PT, but will need homecare for erta and caspo IV at home.   10/19- changed dressing to aquacel, started Eliquis  10/20- refused exam in AM, dressing to be changed with aquacel in PM   10/21 - changed dressing ; will speak with SW/HC about dispo planning; spoke with daughter for plan; see note by   10/22 - dressing changed; R. arm has swelling, though improved from last hospitalization, as per daughter; testing at [facility] did not reveal a clot, but we will repeat                Pt had blood from rectum today, daughter claims that happened at [facility] when eliquis was re-started; I d/c'd eliquis               d/w at great length his needs for home; she does not want to take him home until ABX's are done (next Tues); she wants study R. upper ext swelling even though it was done at [facility] and was neg  10/23 - daughter supposedly will come today to learn dressing and colostomy care; we will change dressing then, and speak again to daughter about rectal Ca and GOC; apparently she has not wanted to discuss it in the past, R. arm duplex neg, xray negative, psych consult requested  10/24 - dressings changed this AM, yesterday daughter instructed as to dressing changes and colostomy care, psych to see pt again today as he did not participate in the interview (haldol for agitation), no stool in colostomy ? gas overnight, will get AXR  10/25 - dressing changed this AM. AXR shows nonobstructive distension.  10/26 - new dark red output from colostomy, dressing changed this AM,   10/27 - persistent bloody output from colostomy  10/28 - no new events; will need to change dressing, pt not letting us do it now; later on wounds improved               GI called (see note above)  10/29 - will speak to daughter today (tried calling 3X yesterday) to see if she wants us to pursue rectal and colost bleed (labs stable); gen surg at bedside; d/c erta/flucon  10/30 - Cards and GI consulted; daughter spoken to last nite; she did not want our house cardiologist, so no scope today; her cardiologist does not come here; will speak again to her today and hopefully will consent to scope; labs stable, naranjo exchanged over wire  10/31 - Cards spoke w/ pt's PMD cards, for coloscopy today, AM labs stable, Findings: There was evidence of a patent loop colostomy in the transverse colon. This was characterized by healthy appearing mucosa. Two separate stomal openings were appreciated on digital exam. The transverse colon was entered via one of the two stomal openings leading towards the proximal colon. Many small and large-mouthed    diverticula were found in the entire colon. There was no evidence of bleeding in the proximal colon. The terminal ileum appeared normal. There was no evidence of bleeding in the ileum. The colonoscope was subsequently introduced into the second stomal opening leading to the distal colon. A large blood clot was found in the descending colon filling the lumen. No active bleeding was appreciated.                      Impression:    Patent loop colostomy with healthy appearing mucosa in the transverse colon. Diverticulosis in the entire examined proximal colon. There was no evidence of bleeding in the proximal colon                    or ileum. Clotted blood in the descending colon. Suspect blood in the ostomy is secondary to refluxed blood from rectum. No specimens collected.   11/1 - naranjo changed over wire; stable, still with blood from rectum, brown from colostomy, team with speak with daughter pending plan, ? possible transfer back to [facility]  11/2 - stable, still with blood from rectum, colostomy less blood more brown, plan pending conversation with daughter   11/3 - daughter and patient have decided to proceed with transfer to [facility] under Dr. Kris Vega (oncologist) and Dr. Miguel Angel Johnson (surgeon)  11/4 - called [facility] to transfer  11/5 - [facility] will not approve transfer; daughter called; after 35 min, she does not want "palliative"; she wants "curative"; we will call his oncologist today to see what he knows that we can tell her  11/6 - spoke with Dr Paez from [facility];  he confirmed everything we thought; the daughter has been told that there is no more surgery or chemo that he can receive they tried in the past to shrink tumor, but it was not successful, so resection was not an option; Dr Paez believes comfort care is the best thing; he also said that when pt was at [facility] in Sept they had to get admin to practically "throw them out".  11/7 - stable, no complaints, daughter still refusing palliative consult  11/8 - refusing exam this AM, will attempt later today  11/9 - refusing exam this AM, will attempt later today  11/10: no events  11/11 - no new events; d/c to home today    Plan:  - dressing changes by RN, but we have seen wounds, including sacrum, stable  - home today

## 2024-11-11 NOTE — PROGRESS NOTE ADULT - ASSESSMENT
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     # Rectal cancer complicated by GIB  - off DOAC given hx of rectal bleeding  - s/p colonoscopy showing diverticulosis but no active bleeding  - hgb was stable on last check 11/2  - per [facility] likely not candidate for further DMT, patient's family was informed of this by [facility] during recent hospitalization and recommended for palliative measures, though daughter does not appear amenable to discussion with palliative care or hospice   - continue GOC discussions as able, even if not amenable currently may be beneficial to have information if change mind in future  - plan for home w/ services as coordinated by CM/SW    # Yuri's gangrene  - s/p debridement, briefly in SICU for pressor support due to post-operative hypotension  - OR cultures w/ ESBL E. coli and candida, completed course of ertapenem and fluconazole as per ID  - c/w local wound care as per recs    # Chronic HFrEF, chronic afib, CAD s/p stent  - appreciate cardiology input  - c/w asa  - c/w statin, toprol, losartan, spironolactone  - with secondary hypercoagulable state 2/2 afib with elevated [id number] score, not on DOAC given hx of bleeding  - echo w/ global LV hypokinesis, EF 35-40%, known hx of TVD  - does not appear to be in acute decopensation  - not currently reporting chest pain, no plans for ischemic evaluation    # Severe PCM  - BMI 18.4  - c/w supplements as per nutrition    # Need for prophylactic measure  VTE ppx: HSQ as per urology

## 2024-11-11 NOTE — CHART NOTE - NSCHARTNOTESELECT_GEN_ALL_CORE
Follow-Up/Nutrition Services
GI/Event Note
air loss mattress
follow-up/Nutrition Services
Event Note
Event Note
Follow-Up/Nutrition Services
GI/Event Note
Nutrition Services
POCUS/Event Note
Palliative Care/Event Note

## 2024-11-12 VITALS
RESPIRATION RATE: 17 BRPM | HEART RATE: 76 BPM | DIASTOLIC BLOOD PRESSURE: 75 MMHG | SYSTOLIC BLOOD PRESSURE: 131 MMHG | OXYGEN SATURATION: 99 % | TEMPERATURE: 97 F

## 2024-11-12 PROCEDURE — 99232 SBSQ HOSP IP/OBS MODERATE 35: CPT

## 2024-11-12 RX ORDER — SPIRONOLACTONE 100 MG
1 TABLET ORAL
Qty: 30 | Refills: 0
Start: 2024-11-12 | End: 2024-12-11

## 2024-11-12 RX ORDER — LOSARTAN POTASSIUM 25 MG/1
1 TABLET ORAL
Qty: 30 | Refills: 0
Start: 2024-11-12 | End: 2024-12-11

## 2024-11-12 RX ADMIN — Medication 1000 MILLIGRAM(S): at 17:42

## 2024-11-12 RX ADMIN — NICOTINE POLACRILEX 1 PATCH: 4 GUM, CHEWING ORAL at 12:38

## 2024-11-12 RX ADMIN — Medication 1 DROP(S): at 17:43

## 2024-11-12 RX ADMIN — NICOTINE POLACRILEX 1 PATCH: 4 GUM, CHEWING ORAL at 12:27

## 2024-11-12 RX ADMIN — Medication 1000 MILLIGRAM(S): at 07:49

## 2024-11-12 RX ADMIN — PANTOPRAZOLE SODIUM 40 MILLIGRAM(S): 40 TABLET, DELAYED RELEASE ORAL at 06:49

## 2024-11-12 RX ADMIN — Medication 1 DROP(S): at 06:50

## 2024-11-12 RX ADMIN — Medication 75 MILLILITER(S): at 14:01

## 2024-11-12 RX ADMIN — Medication 1000 MILLIGRAM(S): at 06:49

## 2024-11-12 RX ADMIN — NICOTINE POLACRILEX 1 PATCH: 4 GUM, CHEWING ORAL at 06:55

## 2024-11-12 RX ADMIN — Medication 1 DROP(S): at 06:49

## 2024-11-12 RX ADMIN — Medication 1 APPLICATION(S): at 14:01

## 2024-11-12 RX ADMIN — Medication 1000 MILLIGRAM(S): at 12:38

## 2024-11-12 RX ADMIN — LOSARTAN POTASSIUM 25 MILLIGRAM(S): 25 TABLET ORAL at 06:49

## 2024-11-12 RX ADMIN — Medication 50 MILLIGRAM(S): at 06:49

## 2024-11-12 RX ADMIN — Medication 25 MILLIGRAM(S): at 06:49

## 2024-11-12 RX ADMIN — Medication 1000 MILLIGRAM(S): at 12:27

## 2024-11-12 RX ADMIN — Medication 81 MILLIGRAM(S): at 12:27

## 2024-11-12 RX ADMIN — TRAMADOL HYDROCHLORIDE 50 MILLIGRAM(S): 50 TABLET, COATED ORAL at 14:01

## 2024-11-12 NOTE — PROGRESS NOTE ADULT - NUTRITIONAL ASSESSMENT
This patient has been assessed with a concern for Malnutrition and has been determined to have a diagnosis/diagnoses of Moderate protein-calorie malnutrition.    This patient is being managed with:   Diet Regular-    Start Time: 20:00  Liquid Protein Supplement     Qty per Day:  3  Supplement Feeding Modality:  Oral  Ensure Plus High Protein Cans or Servings Per Day:  1       Frequency:  Two Times a day  Entered: Oct 14 2024 10:33AM  
This patient has been assessed with a concern for Malnutrition and has been determined to have a diagnosis/diagnoses of Moderate protein-calorie malnutrition.    This patient is being managed with:   Diet Regular-    Start Time: 20:00  Liquid Protein Supplement     Qty per Day:  3  Supplement Feeding Modality:  Oral  Ensure Plus High Protein Cans or Servings Per Day:  1       Frequency:  Two Times a day  Entered: Oct 14 2024 10:33AM  
This patient has been assessed with a concern for Malnutrition and has been determined to have a diagnosis/diagnoses of Severe protein-calorie malnutrition and Underweight (BMI < 19).    This patient is being managed with:   Diet Clear Liquid-  Entered: Oct 28 2024  2:38PM  
This patient has been assessed with a concern for Malnutrition and has been determined to have a diagnosis/diagnoses of Severe protein-calorie malnutrition and Underweight (BMI < 19).    This patient is being managed with:   Diet Regular-    Start Time: 20:00  Liquid Protein Supplement     Qty per Day:  3  Supplement Feeding Modality:  Oral  Ensure Plus High Protein Cans or Servings Per Day:  1       Frequency:  Two Times a day  Entered: Oct 14 2024 10:33AM  
This patient has been assessed with a concern for Malnutrition and has been determined to have a diagnosis/diagnoses of Severe protein-calorie malnutrition and Underweight (BMI < 19).    This patient is being managed with:   Diet Regular-    Start Time: 20:00  Liquid Protein Supplement     Qty per Day:  3  Supplement Feeding Modality:  Oral  Ensure Plus High Protein Cans or Servings Per Day:  1       Frequency:  Two Times a day  Entered: Oct 14 2024 10:33AM  
This patient has been assessed with a concern for Malnutrition and has been determined to have a diagnosis/diagnoses of Severe protein-calorie malnutrition and Underweight (BMI < 19).    This patient is being managed with:   Diet Regular-    Start Time: 20:00  Liquid Protein Supplement     Qty per Day:  3  Supplement Feeding Modality:  Oral  Ensure Plus High Protein Cans or Servings Per Day:  1       Frequency:  Two Times a day  Entered: Oct 31 2024  4:13PM  
This patient has been assessed with a concern for Malnutrition and has been determined to have a diagnosis/diagnoses of Severe protein-calorie malnutrition and Underweight (BMI < 19).    This patient is being managed with:   Diet Regular-    Start Time: 20:00  Liquid Protein Supplement     Qty per Day:  3  Supplement Feeding Modality:  Oral  Ensure Plus High Protein Cans or Servings Per Day:  1       Frequency:  Two Times a day  Entered: Oct 31 2024  4:13PM  
This patient has been assessed with a concern for Malnutrition and has been determined to have a diagnosis/diagnoses of Underweight (BMI < 19) and Severe protein-calorie malnutrition.    This patient is being managed with:   Diet Regular-    Start Time: 20:00  Liquid Protein Supplement     Qty per Day:  3  Supplement Feeding Modality:  Oral  Ensure Plus High Protein Cans or Servings Per Day:  1       Frequency:  Two Times a day  Entered: Oct 31 2024  4:13PM  
This patient has been assessed with a concern for Malnutrition and has been determined to have a diagnosis/diagnoses of Moderate protein-calorie malnutrition.    This patient is being managed with:   Diet Regular-    Start Time: 20:00  Liquid Protein Supplement     Qty per Day:  3  Supplement Feeding Modality:  Oral  Ensure Plus High Protein Cans or Servings Per Day:  1       Frequency:  Two Times a day  Entered: Oct 14 2024 10:33AM  
This patient has been assessed with a concern for Malnutrition and has been determined to have a diagnosis/diagnoses of Moderate protein-calorie malnutrition.    This patient is being managed with:   Diet Regular-    Start Time: 20:00  Liquid Protein Supplement     Qty per Day:  3  Supplement Feeding Modality:  Oral  Ensure Plus High Protein Cans or Servings Per Day:  1       Frequency:  Two Times a day  Entered: Oct 14 2024 10:33AM  
This patient has been assessed with a concern for Malnutrition and has been determined to have a diagnosis/diagnoses of Severe protein-calorie malnutrition and Underweight (BMI < 19).    This patient is being managed with:   Diet Regular-    Start Time: 20:00  Liquid Protein Supplement     Qty per Day:  3  Supplement Feeding Modality:  Oral  Ensure Plus High Protein Cans or Servings Per Day:  1       Frequency:  Two Times a day  Entered: Oct 14 2024 10:33AM  
This patient has been assessed with a concern for Malnutrition and has been determined to have a diagnosis/diagnoses of Severe protein-calorie malnutrition and Underweight (BMI < 19).    This patient is being managed with:   Diet Regular-    Start Time: 20:00  Liquid Protein Supplement     Qty per Day:  3  Supplement Feeding Modality:  Oral  Ensure Plus High Protein Cans or Servings Per Day:  1       Frequency:  Two Times a day  Entered: Oct 31 2024  4:13PM  
This patient has been assessed with a concern for Malnutrition and has been determined to have a diagnosis/diagnoses of Severe protein-calorie malnutrition and Underweight (BMI < 19).    This patient is being managed with:   Diet Regular-    Start Time: 20:00  Liquid Protein Supplement     Qty per Day:  3  Supplement Feeding Modality:  Oral  Ensure Plus High Protein Cans or Servings Per Day:  1       Frequency:  Two Times a day  Entered: Oct 31 2024  4:13PM  
This patient has been assessed with a concern for Malnutrition and has been determined to have a diagnosis/diagnoses of Moderate protein-calorie malnutrition.    This patient is being managed with:   Diet Regular-    Start Time: 20:00  Liquid Protein Supplement     Qty per Day:  3  Supplement Feeding Modality:  Oral  Ensure Plus High Protein Cans or Servings Per Day:  1       Frequency:  Two Times a day  Entered: Oct 14 2024 10:33AM  
This patient has been assessed with a concern for Malnutrition and has been determined to have a diagnosis/diagnoses of Severe protein-calorie malnutrition and Underweight (BMI < 19).    This patient is being managed with:   Diet Regular-    Start Time: 20:00  Liquid Protein Supplement     Qty per Day:  3  Supplement Feeding Modality:  Oral  Ensure Plus High Protein Cans or Servings Per Day:  1       Frequency:  Two Times a day  Entered: Oct 14 2024 10:33AM  
This patient has been assessed with a concern for Malnutrition and has been determined to have a diagnosis/diagnoses of Severe protein-calorie malnutrition and Underweight (BMI < 19).    This patient is being managed with:   Diet Regular-    Start Time: 20:00  Liquid Protein Supplement     Qty per Day:  3  Supplement Feeding Modality:  Oral  Ensure Plus High Protein Cans or Servings Per Day:  1       Frequency:  Two Times a day  Entered: Oct 14 2024 10:33AM  
This patient has been assessed with a concern for Malnutrition and has been determined to have a diagnosis/diagnoses of Severe protein-calorie malnutrition and Underweight (BMI < 19).    This patient is being managed with:   Diet Regular-    Start Time: 20:00  Liquid Protein Supplement     Qty per Day:  3  Supplement Feeding Modality:  Oral  Ensure Plus High Protein Cans or Servings Per Day:  1       Frequency:  Two Times a day  Entered: Oct 31 2024  4:13PM  
This patient has been assessed with a concern for Malnutrition and has been determined to have a diagnosis/diagnoses of Severe protein-calorie malnutrition and Underweight (BMI < 19).    This patient is being managed with:   Diet Regular-    Start Time: 20:00  Liquid Protein Supplement     Qty per Day:  3  Supplement Feeding Modality:  Oral  Ensure Plus High Protein Cans or Servings Per Day:  1       Frequency:  Two Times a day  Entered: Oct 31 2024  4:13PM  
This patient has been assessed with a concern for Malnutrition and has been determined to have a diagnosis/diagnoses of Severe protein-calorie malnutrition and Underweight (BMI < 19).    This patient is being managed with:   Diet Regular-    Start Time: 20:00  Liquid Protein Supplement     Qty per Day:  3  Supplement Feeding Modality:  Oral  Ensure Plus High Protein Cans or Servings Per Day:  1       Frequency:  Two Times a day  Entered: Oct 14 2024 10:33AM  
This patient has been assessed with a concern for Malnutrition and has been determined to have a diagnosis/diagnoses of Severe protein-calorie malnutrition and Underweight (BMI < 19).    This patient is being managed with:   Diet Regular-    Start Time: 20:00  Liquid Protein Supplement     Qty per Day:  3  Supplement Feeding Modality:  Oral  Ensure Plus High Protein Cans or Servings Per Day:  1       Frequency:  Two Times a day  Entered: Oct 14 2024 10:33AM  
This patient has been assessed with a concern for Malnutrition and has been determined to have a diagnosis/diagnoses of Moderate protein-calorie malnutrition.    This patient is being managed with:   Diet Regular-    Start Time: 20:00  Liquid Protein Supplement     Qty per Day:  3  Supplement Feeding Modality:  Oral  Ensure Plus High Protein Cans or Servings Per Day:  1       Frequency:  Two Times a day  Entered: Oct 14 2024 10:33AM  
This patient has been assessed with a concern for Malnutrition and has been determined to have a diagnosis/diagnoses of Moderate protein-calorie malnutrition.    This patient is being managed with:   Diet Regular-    Start Time: 20:00  Liquid Protein Supplement     Qty per Day:  3  Supplement Feeding Modality:  Oral  Ensure Plus High Protein Cans or Servings Per Day:  1       Frequency:  Two Times a day  Entered: Oct 14 2024 10:33AM  
This patient has been assessed with a concern for Malnutrition and has been determined to have a diagnosis/diagnoses of Severe protein-calorie malnutrition and Underweight (BMI < 19).    This patient is being managed with:   Diet Clear Liquid-  Entered: Oct 28 2024  2:38PM  
This patient has been assessed with a concern for Malnutrition and has been determined to have a diagnosis/diagnoses of Severe protein-calorie malnutrition and Underweight (BMI < 19).    This patient is being managed with:   Diet Regular-    Start Time: 20:00  Liquid Protein Supplement     Qty per Day:  3  Supplement Feeding Modality:  Oral  Ensure Plus High Protein Cans or Servings Per Day:  1       Frequency:  Two Times a day  Entered: Oct 31 2024  4:13PM  
This patient has been assessed with a concern for Malnutrition and has been determined to have a diagnosis/diagnoses of Severe protein-calorie malnutrition and Underweight (BMI < 19).    This patient is being managed with:   Diet Regular-    Start Time: 20:00  Liquid Protein Supplement     Qty per Day:  3  Supplement Feeding Modality:  Oral  Ensure Plus High Protein Cans or Servings Per Day:  1       Frequency:  Two Times a day  Entered: Oct 31 2024  4:13PM  
This patient has been assessed with a concern for Malnutrition and has been determined to have a diagnosis/diagnoses of Moderate protein-calorie malnutrition.    This patient is being managed with:   Diet Regular-    Start Time: 20:00  Liquid Protein Supplement     Qty per Day:  3  Supplement Feeding Modality:  Oral  Ensure Plus High Protein Cans or Servings Per Day:  1       Frequency:  Two Times a day  Entered: Oct 14 2024 10:33AM  
This patient has been assessed with a concern for Malnutrition and has been determined to have a diagnosis/diagnoses of Moderate protein-calorie malnutrition.    This patient is being managed with:   Diet Regular-    Start Time: 20:00  Liquid Protein Supplement     Qty per Day:  3  Supplement Feeding Modality:  Oral  Ensure Plus High Protein Cans or Servings Per Day:  1       Frequency:  Two Times a day  Entered: Oct 14 2024 10:33AM  
This patient has been assessed with a concern for Malnutrition and has been determined to have a diagnosis/diagnoses of Severe protein-calorie malnutrition and Underweight (BMI < 19).    This patient is being managed with:   Diet Regular-    Start Time: 20:00  Liquid Protein Supplement     Qty per Day:  3  Supplement Feeding Modality:  Oral  Ensure Plus High Protein Cans or Servings Per Day:  1       Frequency:  Two Times a day  Entered: Oct 31 2024  4:13PM  
This patient has been assessed with a concern for Malnutrition and has been determined to have a diagnosis/diagnoses of Moderate protein-calorie malnutrition.    This patient is being managed with:   Diet Regular-    Start Time: 20:00  Liquid Protein Supplement     Qty per Day:  3  Supplement Feeding Modality:  Oral  Ensure Plus High Protein Cans or Servings Per Day:  1       Frequency:  Two Times a day  Entered: Oct 14 2024 10:33AM  
This patient has been assessed with a concern for Malnutrition and has been determined to have a diagnosis/diagnoses of Severe protein-calorie malnutrition and Underweight (BMI < 19).    This patient is being managed with:   Diet Regular-    Start Time: 20:00  Liquid Protein Supplement     Qty per Day:  3  Supplement Feeding Modality:  Oral  Ensure Plus High Protein Cans or Servings Per Day:  1       Frequency:  Two Times a day  Entered: Oct 31 2024  4:13PM  
This patient has been assessed with a concern for Malnutrition and has been determined to have a diagnosis/diagnoses of Severe protein-calorie malnutrition and Underweight (BMI < 19).    This patient is being managed with:   Diet Regular-    Start Time: 20:00  Liquid Protein Supplement     Qty per Day:  3  Supplement Feeding Modality:  Oral  Ensure Plus High Protein Cans or Servings Per Day:  1       Frequency:  Two Times a day  Entered: Oct 31 2024  4:13PM  
This patient has been assessed with a concern for Malnutrition and has been determined to have a diagnosis/diagnoses of Moderate protein-calorie malnutrition.    This patient is being managed with:   Diet Regular-    Start Time: 20:00  Liquid Protein Supplement     Qty per Day:  3  Supplement Feeding Modality:  Oral  Ensure Plus High Protein Cans or Servings Per Day:  1       Frequency:  Two Times a day  Entered: Oct 14 2024 10:33AM  
This patient has been assessed with a concern for Malnutrition and has been determined to have a diagnosis/diagnoses of Severe protein-calorie malnutrition and Underweight (BMI < 19).    This patient is being managed with:   Diet Regular-    Start Time: 20:00  Liquid Protein Supplement     Qty per Day:  3  Supplement Feeding Modality:  Oral  Ensure Plus High Protein Cans or Servings Per Day:  1       Frequency:  Two Times a day  Entered: Oct 31 2024  4:13PM  
This patient has been assessed with a concern for Malnutrition and has been determined to have a diagnosis/diagnoses of Moderate protein-calorie malnutrition.    This patient is being managed with:   Diet Regular-    Start Time: 20:00  Liquid Protein Supplement     Qty per Day:  3  Supplement Feeding Modality:  Oral  Ensure Plus High Protein Cans or Servings Per Day:  1       Frequency:  Two Times a day  Entered: Oct 14 2024 10:33AM  

## 2024-11-12 NOTE — PROGRESS NOTE ADULT - REASON FOR ADMISSION
Suprapubic pain

## 2024-11-12 NOTE — PROGRESS NOTE ADULT - ASSESSMENT
80 yo M w/ CAD s/p stents, chronic afib, rectal cancer s/p neoadjuvant therapy, chemoRT, consolidation CAPOZ, brachytherapy, p/w yuri's gangrene s/p debridement and course of antibiotics, also noted to have HFrEF (being medically managed), severe PCM course c/b GIB (s/p colonoscopy) likely 2/2 known rectal cancer, now planned for discharge home w/ services.     # Rectal cancer complicated by GIB  - off DOAC given hx of rectal bleeding  - s/p colonoscopy showing diverticulosis but no active bleeding  - hgb was stable on last check 11/2  - per [facility] likely not candidate for further DMT, patient's family was informed of this by [facility] during recent hospitalization and recommended for palliative measures, though daughter does not appear amenable to discussion with palliative care or hospice   - continue GOC discussions as able, even if not amenable currently may be beneficial to have information if change mind in future  - plan for home w/ services as coordinated by CM/SW    # Yuri's gangrene  - s/p debridement, briefly in SICU for pressor support due to post-operative hypotension  - OR cultures w/ ESBL E. coli and candida, completed course of ertapenem and fluconazole as per ID  - c/w local wound care as per recs    # Chronic HFrEF, chronic afib, CAD s/p stent  - appreciate cardiology input  - c/w asa  - c/w statin, toprol, losartan, spironolactone  - with secondary hypercoagulable state 2/2 afib with elevated [id number] score, not on DOAC given hx of bleeding  - echo w/ global LV hypokinesis, EF 35-40%, known hx of TVD  - does not appear to be in acute decopensation  - not currently reporting chest pain, no plans for ischemic evaluation    # Severe PCM  - BMI 18.4  - c/w supplements as per nutrition    # Need for prophylactic measure  VTE ppx: HSQ as per urology      Dispo: home w/ services today per urology, appreciate CM/SW assistance

## 2024-11-12 NOTE — PROGRESS NOTE ADULT - SUBJECTIVE AND OBJECTIVE BOX
No new events  Afeb VSS  Pt has no c/o  Abd- soft NT; wounds changed daily, aqucel applied  [phone]  Colost - +

## 2024-11-12 NOTE — PROGRESS NOTE ADULT - SUBJECTIVE AND OBJECTIVE BOX
[facility] Medicine  Pager [phone]    Patient is a 79y old  Male who presents with a chief complaint of suprapubic pain (12 Nov 2024 07:11)      INTERVAL HPI/OVERNIGHT EVENTS:    MEDICATIONS  (STANDING):  acetaminophen     Tablet .. 1000 milliGRAM(s) Oral every 6 hours  aspirin  chewable 81 milliGRAM(s) Oral daily  brimonidine 0.2% Ophthalmic Solution 1 Drop(s) Both EYES every 12 hours  Dakins Solution - 1/4 Strength 1 Application(s) Topical daily  dextrose 5% + sodium chloride 0.9%. 1000 milliLiter(s) (75 mL/Hr) IV Continuous <Continuous>  heparin   Injectable 5000 Unit(s) SubCutaneous every 8 hours  latanoprost 0.005% Ophthalmic Solution 1 Drop(s) Both EYES at bedtime  losartan 25 milliGRAM(s) Oral daily  metoprolol succinate ER 50 milliGRAM(s) Oral daily  nicotine - 21 mG/24Hr(s) Patch 1 Patch Transdermal daily  pantoprazole    Tablet 40 milliGRAM(s) Oral before breakfast  rosuvastatin 10 milliGRAM(s) Oral at bedtime  senna 2 Tablet(s) Oral at bedtime  spironolactone 25 milliGRAM(s) Oral daily  timolol 0.5% Solution 1 Drop(s) Both EYES every 12 hours    MEDICATIONS  (PRN):  aluminum hydroxide/magnesium hydroxide/simethicone Suspension 30 milliLiter(s) Oral every 6 hours PRN Dyspepsia  traMADol 50 milliGRAM(s) Oral every 6 hours PRN Severe Pain (7 - 10)      Allergies    No Known Allergies    Intolerances        REVIEW OF SYSTEMS:  Please see interval HPI:    Vital Signs Last 24 Hrs  T(C): 36.7 (12 Nov 2024 10:01), Max: 36.7 (12 Nov 2024 10:01)  T(F): 98.1 (12 Nov 2024 10:01), Max: 98.1 (12 Nov 2024 10:01)  HR: 72 (12 Nov 2024 10:01) (71 - 80)  BP: 138/80 (12 Nov 2024 10:01) (121/79 - 140/83)  BP(mean): --  RR: 18 (12 Nov 2024 10:01) (18 - 18)  SpO2: 100% (12 Nov 2024 10:01) (100% - 100%)    Parameters below as of 12 Nov 2024 10:01  Patient On (Oxygen Delivery Method): room air      I&O's Detail    11 Nov 2024 07:01  -  12 Nov 2024 07:00  --------------------------------------------------------  IN:  Total IN: 0 mL    OUT:    Colostomy (mL): 0 mL    Indwelling Catheter - Urethral (mL): 1500 mL  Total OUT: 1500 mL    Total NET: -1500 mL      12 Nov 2024 07:01  -  12 Nov 2024 10:27  --------------------------------------------------------  IN:  Total IN: 0 mL    OUT:    Indwelling Catheter - Urethral (mL): 300 mL  Total OUT: 300 mL    Total NET: -300 mL            PHYSICAL EXAM:  GENERAL:   HEAD:    EYES:   ENMT:   NECK:   NERVOUS SYSTEM:    CHEST/LUNG:   HEART:   ABDOMEN:   EXTREMITIES:    LYMPH:   SKIN:     LABS:            CAPILLARY BLOOD GLUCOSE        BLOOD CULTURE    RADIOLOGY & ADDITIONAL TESTS:    Imaging Personally Reviewed:  [ ] YES     Consultant(s) Notes Reviewed:      Care Discussed with Consultants/Other Providers: [facility] Medicine  Pager [phone]    Patient is a 79y old  Male who presents with a chief complaint of suprapubic pain (12 Nov 2024 07:11)      INTERVAL HPI/OVERNIGHT EVENTS:  Asks again for assistance with getting water, obtained cup with straw for patient, elevated HOB and assisted patient with drink. Returned cup to tray table afterwards. Discussed plan for d/c home (anticipate for today), patient without objection.     MEDICATIONS  (STANDING):  acetaminophen     Tablet .. 1000 milliGRAM(s) Oral every 6 hours  aspirin  chewable 81 milliGRAM(s) Oral daily  brimonidine 0.2% Ophthalmic Solution 1 Drop(s) Both EYES every 12 hours  Dakins Solution - 1/4 Strength 1 Application(s) Topical daily  dextrose 5% + sodium chloride 0.9%. 1000 milliLiter(s) (75 mL/Hr) IV Continuous <Continuous>  heparin   Injectable 5000 Unit(s) SubCutaneous every 8 hours  latanoprost 0.005% Ophthalmic Solution 1 Drop(s) Both EYES at bedtime  losartan 25 milliGRAM(s) Oral daily  metoprolol succinate ER 50 milliGRAM(s) Oral daily  nicotine - 21 mG/24Hr(s) Patch 1 Patch Transdermal daily  pantoprazole    Tablet 40 milliGRAM(s) Oral before breakfast  rosuvastatin 10 milliGRAM(s) Oral at bedtime  senna 2 Tablet(s) Oral at bedtime  spironolactone 25 milliGRAM(s) Oral daily  timolol 0.5% Solution 1 Drop(s) Both EYES every 12 hours    MEDICATIONS  (PRN):  aluminum hydroxide/magnesium hydroxide/simethicone Suspension 30 milliLiter(s) Oral every 6 hours PRN Dyspepsia  traMADol 50 milliGRAM(s) Oral every 6 hours PRN Severe Pain (7 - 10)      Allergies    No Known Allergies    Intolerances        REVIEW OF SYSTEMS:  Please see interval HPI:    Vital Signs Last 24 Hrs  T(C): 36.7 (12 Nov 2024 10:01), Max: 36.7 (12 Nov 2024 10:01)  T(F): 98.1 (12 Nov 2024 10:01), Max: 98.1 (12 Nov 2024 10:01)  HR: 72 (12 Nov 2024 10:01) (71 - 80)  BP: 138/80 (12 Nov 2024 10:01) (121/79 - 140/83)  RR: 18 (12 Nov 2024 10:01) (18 - 18)  SpO2: 100% (12 Nov 2024 10:01) (100% - 100%)    Parameters below as of 12 Nov 2024 10:01  Patient On (Oxygen Delivery Method): room air      I&O's Detail    11 Nov 2024 07:01  -  12 Nov 2024 07:00  --------------------------------------------------------  IN:  Total IN: 0 mL    OUT:    Colostomy (mL): 0 mL    Indwelling Catheter - Urethral (mL): 1500 mL  Total OUT: 1500 mL    Total NET: -1500 mL      12 Nov 2024 07:01  -  12 Nov 2024 10:27  --------------------------------------------------------  IN:  Total IN: 0 mL    OUT:    Indwelling Catheter - Urethral (mL): 300 mL  Total OUT: 300 mL    Total NET: -300 mL      PHYSICAL EXAM:  GENERAL: NAD, lying in bed, frail, thin  HEAD:  NC/AT   EYES: clear sclera/conjunctiva  ENMT: hearing intact to voice  NERVOUS SYSTEM:  moving extremities, holds cup and uses straw  CHEST/LUNG: comfortable on RA, no respiratory distress noted  ABDOMEN: +colostomy  : +naranjo  EXTREMITIES: no c/c, +le edema    SKIN: frail skin    LABS:    CAPILLARY BLOOD GLUCOSE    BLOOD CULTURE    RADIOLOGY & ADDITIONAL TESTS:    Imaging Personally Reviewed:  [ ] YES     Consultant(s) Notes Reviewed:  Urology    Care Discussed with Consultants/Other Providers: Urology Dionne re: plan to d/c home today

## 2024-11-12 NOTE — PROGRESS NOTE ADULT - ASSESSMENT
80 yo M with history of CAD s/p stents in 2012 (ASA), locally advanced rectal cancer s/p neoadjuvant therapy with chemo/RT (completed 11/2022), consolidation CAPOX (2/2023), brachytherapy (3/2024), presenting for generalized weakness, chest discomfort, rectal pain. Pt was here at [facility] in 8/30 for fatigue, weakness, and falls, found to have perforated rectal mass with perirectal collection, 2.5 x 1.5 cm with spread to infection in anorectal/perineal region with fluid/gas tracking along penile shaft, however pt left for [facility] and s/p diverting end sigmoid colostomy, cystoscopy and naranjo placement across defect and perineal/scrotal drainage 8/30. Recently at [facility] for several weeks, discharged day prior to presentation for perineal infections, treated with abx. Represents here for generalized weakness and dizziness, CTAP non-con on preliminary read indicating ill-defined fluid/gas in scrotum, perirectal space, and probably perineum. Exam demonstrates skin duskiness multiple genitourinary abscesses, induration, purulent drainage. 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    10/13: S/p debridement of genitalia and perineum in OR yesterday. In SICU for post op pressor requirements. Got 500 ccs overnight for hypovolemia on POCUS. Currently off pressors. On zosyn, clinda, vanc. U and bcx pending. Tissue cx negative. Wound packing changed at bedside, incision beds with adequate granulation tissue, healing well. Transferred to floor  10/14 - wounds clean; re-packed; penroses in place; ID consult  10/15 - pt refuses blood draw, and refuses dressing change (it has been 24 hrs since last one; he refused last nite also)                 later on he did allow it - wound appears clean, no necrotic tissue; labs drawn  10/16 - pro-BNP high so echo today  10/17 - changed dressing, took out subprapubic incision penrose drain, left scrotal penrose drain. Echo with reduced EF - cards says iso sepsis, no further workup needed. Changed abx from fluconazole to caspo (2 week course) per ID. Can c/w erta for ESBL e.coli. Cards saying he will need AC eventually,   10/18 - changed dressing this am, last penrose in scrotum d/c'ed. wound care spoke with, can try changing wet to dry dressings to aquacel and change qd. Pt's daughter will hire own aid for wound care and PT, but will need homecare for erta and caspo IV at home.   10/19- changed dressing to aquacel, started Eliquis  10/20- refused exam in AM, dressing to be changed with aquacel in PM   10/21 - changed dressing ; will speak with SW/HC about dispo planning; spoke with daughter for plan; see note by   10/22 - dressing changed; R. arm has swelling, though improved from last hospitalization, as per daughter; testing at [facility] did not reveal a clot, but we will repeat                Pt had blood from rectum today, daughter claims that happened at [facility] when eliquis was re-started; I d/c'd eliquis               d/w at great length his needs for home; she does not want to take him home until ABX's are done (next Tues); she wants study R. upper ext swelling even though it was done at [facility] and was neg  10/23 - daughter supposedly will come today to learn dressing and colostomy care; we will change dressing then, and speak again to daughter about rectal Ca and GOC; apparently she has not wanted to discuss it in the past, R. arm duplex neg, xray negative, psych consult requested  10/24 - dressings changed this AM, yesterday daughter instructed as to dressing changes and colostomy care, psych to see pt again today as he did not participate in the interview (haldol for agitation), no stool in colostomy ? gas overnight, will get AXR  10/25 - dressing changed this AM. AXR shows nonobstructive distension.  10/26 - new dark red output from colostomy, dressing changed this AM,   10/27 - persistent bloody output from colostomy  10/28 - no new events; will need to change dressing, pt not letting us do it now; later on wounds improved               GI called (see note above)  10/29 - will speak to daughter today (tried calling 3X yesterday) to see if she wants us to pursue rectal and colost bleed (labs stable); gen surg at bedside; d/c erta/flucon  10/30 - Cards and GI consulted; daughter spoken to last nite; she did not want our house cardiologist, so no scope today; her cardiologist does not come here; will speak again to her today and hopefully will consent to scope; labs stable, naranjo exchanged over wire  10/31 - Cards spoke w/ pt's PMD cards, for coloscopy today, AM labs stable, Findings: There was evidence of a patent loop colostomy in the transverse colon. This was characterized by healthy appearing mucosa. Two separate stomal openings were appreciated on digital exam. The transverse colon was entered via one of the two stomal openings leading towards the proximal colon. Many small and large-mouthed    diverticula were found in the entire colon. There was no evidence of bleeding in the proximal colon. The terminal ileum appeared normal. There was no evidence of bleeding in the ileum. The colonoscope was subsequently introduced into the second stomal opening leading to the distal colon. A large blood clot was found in the descending colon filling the lumen. No active bleeding was appreciated.                      Impression:    Patent loop colostomy with healthy appearing mucosa in the transverse colon. Diverticulosis in the entire examined proximal colon. There was no evidence of bleeding in the proximal colon                    or ileum. Clotted blood in the descending colon. Suspect blood in the ostomy is secondary to refluxed blood from rectum. No specimens collected.   11/1 - naranjo changed over wire; stable, still with blood from rectum, brown from colostomy, team with speak with daughter pending plan, ? possible transfer back to [facility]  11/2 - stable, still with blood from rectum, colostomy less blood more brown, plan pending conversation with daughter   11/3 - daughter and patient have decided to proceed with transfer to [facility] under Dr. Kris Vega (oncologist) and Dr. Miguel Angel Johnson (surgeon)  11/4 - called [facility] to transfer  11/5 - [facility] will not approve transfer; daughter called; after 35 min, she does not want "palliative"; she wants "curative"; we will call his oncologist today to see what he knows that we can tell her  11/6 - spoke with Dr Paez from [facility];  he confirmed everything we thought; the daughter has been told that there is no more surgery or chemo that he can receive they tried in the past to shrink tumor, but it was not successful, so resection was not an option; Dr Paez believes comfort care is the best thing; he also said that when pt was at [facility] in Sept they had to get admin to practically "throw them out".  11/7 - stable, no complaints, daughter still refusing palliative consult  11/8 - refusing exam this AM, will attempt later today  11/9 - refusing exam this AM, will attempt later today  11/10: no events  11/11 - no new events  11/12 - home today    Plan:  - dressing changes by RN, but we have seen wounds, including sacrum, stable  - home today

## 2024-11-12 NOTE — PROGRESS NOTE ADULT - PROVIDER SPECIALTY LIST ADULT
Anesthesia
Cardiology
Colorectal Surgery
Hospitalist
Surgery
Urology
Wound Care
Cardiology
Gastroenterology
Hospitalist
Hospitalist
Infectious Disease
Infectious Disease
Internal Medicine
Urology
Cardiology
Colorectal Surgery
Hospitalist
Infectious Disease
SICU
Urology
Wound Care
Wound Care
Colorectal Surgery
Colorectal Surgery
Gastroenterology
Infectious Disease
Urology
Wound Care
Hospitalist
Hospitalist
Urology
Urology
Hospitalist
Hospitalist
Infectious Disease
Hospitalist
Internal Medicine
Hospitalist

## 2024-11-13 LAB
CULTURE RESULTS: ABNORMAL
ORGANISM # SPEC MICROSCOPIC CNT: ABNORMAL
ORGANISM # SPEC MICROSCOPIC CNT: ABNORMAL
SPECIMEN SOURCE: SIGNIFICANT CHANGE UP

## 2024-11-27 LAB
CULTURE RESULTS: SIGNIFICANT CHANGE UP
SPECIMEN SOURCE: SIGNIFICANT CHANGE UP

## 2024-12-07 ENCOUNTER — INPATIENT (INPATIENT)
Facility: HOSPITAL | Age: 79
LOS: 16 days | Discharge: HOME HEALTH | End: 2024-12-24
Attending: HOSPITALIST | Admitting: HOSPITALIST
Payer: MEDICARE

## 2024-12-07 VITALS
OXYGEN SATURATION: 98 % | DIASTOLIC BLOOD PRESSURE: 76 MMHG | SYSTOLIC BLOOD PRESSURE: 123 MMHG | HEART RATE: 98 BPM | RESPIRATION RATE: 17 BRPM | HEIGHT: 73 IN

## 2024-12-07 PROCEDURE — 99285 EMERGENCY DEPT VISIT HI MDM: CPT | Mod: FS

## 2024-12-07 RX ORDER — FENTANYL 75 UG/H
25 PATCH, EXTENDED RELEASE TRANSDERMAL ONCE
Refills: 0 | Status: DISCONTINUED | OUTPATIENT
Start: 2024-12-07 | End: 2024-12-08

## 2024-12-07 NOTE — ED ADULT TRIAGE NOTE - CHIEF COMPLAINT QUOTE
awake oriented x 2 from home c/o rectal bleeding started last night hx rectal cancer not on chemo   sacral pressure wound

## 2024-12-07 NOTE — ED ADULT TRIAGE NOTE - PRO INTERPRETER NEED 2
Patient requesting covering provider address refill today, please see below.   
Unable to fill per protocol as MD authorization required.    Last office visit - 3/28/19  Last refill - 5/29/19    Please advise.  Script set to send with approval.      
English
Melolabial Transposition Flap Text: The defect edges were debeveled with a #15 scalpel blade.  Given the location of the defect and the proximity to free margins a melolabial flap was deemed most appropriate.  Using a sterile surgical marker, an appropriate melolabial transposition flap was drawn incorporating the defect.    The area thus outlined was incised deep to adipose tissue with a #15 scalpel blade.  The skin margins were undermined to an appropriate distance in all directions utilizing iris scissors.

## 2024-12-07 NOTE — ED PROVIDER NOTE - CLINICAL SUMMARY MEDICAL DECISION MAKING FREE TEXT BOX
79-year male, history of A-fib, previous on blood thinners complicated by GI bleed no longer on anticoagulation, heart failure, rectal cancer previously treated with chemo and radiation therapy now progressed to urinary and prostate, colostomy, sacral decub, prior history of Tara's gangrene s/p surgical debridement, here for rectal bleeding noted in diaper this morning by aide and pain to sacrum.  No fevers, chills, nausea, vomiting.  Vital signs stable.  Patient well-appearing, no acute distress, heart regular rate and rhythm, lungs clear, abdomen soft and nontender, colostomy in place with brown stool noted in bag, no gross motor or sensory deficits,  exam chaperoned by Dr. SIMPSON with minimal 3 cm area of erythema under bandage, 6 cm sacral decub stage III that is malodorous with minimal surrounding erythema, rectal exam with exophytic heterogenous mass protruding from rectum without active bleeding.  Will assess for anemia, deeper space infection, metabolic infectious process.  Plan for basic labs, blood cultures, CT abdomen pelvis, analgesics.  Dispo pending workup.

## 2024-12-07 NOTE — ED PROVIDER NOTE - ATTENDING CONTRIBUTION TO CARE
Jasbir Medina DO:  patient seen and evaluated with the resident.  I was present for key portions of the History & Physical, and I agree with the Impression & Plan. 80 yo m pmh CAD s/p stents in 2012 (ASA), locally advanced rectal cancer s/p neoadjuvant therapy with chemoRT (completed 11/2022), consolidation CAPOX (2/2023), brachytherapy (3/2024),, pw rectal bleeding/pain. PW son bedside provides collateral.  Reports 1 day of malaise, noticed blood when changing patient, patient reports mild pain to area.  Denies N/B,/C.  Denies trauma.  Patient arrives HDS, PE as noted.  Eval for infection.  Labs, imaging, reassess. Jasbir Medina DO:  patient seen and evaluated with the resident.  I was present for key portions of the History & Physical, and I agree with the Impression & Plan. 80 yo m pmh CAD s/p stents in 2012 (ASA), locally advanced rectal cancer s/p neoadjuvant therapy with chemoRT (completed 11/2022), consolidation CAPOX (2/2023), brachytherapy (3/2024),, pw rectal bleeding/pain. PW son bedside provides collateral.  Reports 1 day of malaise, noticed blood when changing patient, patient reports mild pain to area.  Denies N/B,/C.  Denies trauma.  Patient arrives HDS, PE as noted.  Eval for infection.  Labs, imaging, reassess. low suspicion for acute necrotizing infection including fourniers however given recent infection will eval and likely surg consult.

## 2024-12-07 NOTE — ED PROVIDER NOTE - PROGRESS NOTE DETAILS
Harry PGY2: fentanyl initially ordered for pain give mildly hypotensive 90s/70s.  daughter at bedside now stating he does not tolerate fentanyl, dilaudid, or oxycodone well.  but tolerates tramadol, will order for pain control. Hong PGY2: CT ap with few foci of air in superficial soft tissues near ulcer, cannot exclude nec fasc.  Surgery consulted. Yancy Bahena PA: pt signed out to me pending surgery recs for sacral decub cellulitis vs nec fasc. no surgical intervention needed at this time. will admit for IV abx. case d/w hospitalist. Yancy Bahena PA: as per RN anthony adams pt ? hypothermic, however pt with mouth open breathing during my entire interaction with him and is declining rectal temp to be done given his hx of rectal ca. pt warm, moving all extremities, do not suspect hypothermia at this time.  of note, I attempted to discuss GOC with patient, whom at this time is alert/oriented to person/place/year, however mildly lethargic/soft spoken, but when asked in the event his heart would stop/would need breathing tube/resuscitative measures to be done he would want everything to be done. he lives with his daughter sandra whom he would like to make medical decisions for him in the event that situation arises.

## 2024-12-08 DIAGNOSIS — D64.9 ANEMIA, UNSPECIFIED: ICD-10-CM

## 2024-12-08 DIAGNOSIS — I50.22 CHRONIC SYSTOLIC (CONGESTIVE) HEART FAILURE: ICD-10-CM

## 2024-12-08 DIAGNOSIS — C20 MALIGNANT NEOPLASM OF RECTUM: ICD-10-CM

## 2024-12-08 DIAGNOSIS — I48.20 CHRONIC ATRIAL FIBRILLATION, UNSPECIFIED: ICD-10-CM

## 2024-12-08 DIAGNOSIS — R65.10 SYSTEMIC INFLAMMATORY RESPONSE SYNDROME (SIRS) OF NON-INFECTIOUS ORIGIN WITHOUT ACUTE ORGAN DYSFUNCTION: ICD-10-CM

## 2024-12-08 DIAGNOSIS — I25.10 ATHEROSCLEROTIC HEART DISEASE OF NATIVE CORONARY ARTERY WITHOUT ANGINA PECTORIS: ICD-10-CM

## 2024-12-08 DIAGNOSIS — Z29.9 ENCOUNTER FOR PROPHYLACTIC MEASURES, UNSPECIFIED: ICD-10-CM

## 2024-12-08 DIAGNOSIS — K62.5 HEMORRHAGE OF ANUS AND RECTUM: ICD-10-CM

## 2024-12-08 DIAGNOSIS — L89.159 PRESSURE ULCER OF SACRAL REGION, UNSPECIFIED STAGE: ICD-10-CM

## 2024-12-08 LAB
A1C WITH ESTIMATED AVERAGE GLUCOSE RESULT: 4.8 % — SIGNIFICANT CHANGE UP (ref 4–5.6)
ADD ON TEST-SPECIMEN IN LAB: SIGNIFICANT CHANGE UP
ADD ON TEST-SPECIMEN IN LAB: SIGNIFICANT CHANGE UP
ALBUMIN SERPL ELPH-MCNC: 2.4 G/DL — LOW (ref 3.3–5)
ALP SERPL-CCNC: 96 U/L — SIGNIFICANT CHANGE UP (ref 40–120)
ALT FLD-CCNC: 7 U/L — SIGNIFICANT CHANGE UP (ref 4–41)
ANION GAP SERPL CALC-SCNC: 16 MMOL/L — HIGH (ref 7–14)
APTT BLD: 29.9 SEC — SIGNIFICANT CHANGE UP (ref 24.5–35.6)
AST SERPL-CCNC: 21 U/L — SIGNIFICANT CHANGE UP (ref 4–40)
BASOPHILS # BLD AUTO: 0.05 K/UL — SIGNIFICANT CHANGE UP (ref 0–0.2)
BASOPHILS NFR BLD AUTO: 0.5 % — SIGNIFICANT CHANGE UP (ref 0–2)
BILIRUB SERPL-MCNC: 0.4 MG/DL — SIGNIFICANT CHANGE UP (ref 0.2–1.2)
BLD GP AB SCN SERPL QL: NEGATIVE — SIGNIFICANT CHANGE UP
BUN SERPL-MCNC: 14 MG/DL — SIGNIFICANT CHANGE UP (ref 7–23)
CALCIUM SERPL-MCNC: 9.1 MG/DL — SIGNIFICANT CHANGE UP (ref 8.4–10.5)
CHLORIDE SERPL-SCNC: 93 MMOL/L — LOW (ref 98–107)
CO2 SERPL-SCNC: 21 MMOL/L — LOW (ref 22–31)
CREAT SERPL-MCNC: 0.63 MG/DL — SIGNIFICANT CHANGE UP (ref 0.5–1.3)
EGFR: 97 ML/MIN/1.73M2 — SIGNIFICANT CHANGE UP
EOSINOPHIL # BLD AUTO: 0.01 K/UL — SIGNIFICANT CHANGE UP (ref 0–0.5)
EOSINOPHIL NFR BLD AUTO: 0.1 % — SIGNIFICANT CHANGE UP (ref 0–6)
ESTIMATED AVERAGE GLUCOSE: 91 — SIGNIFICANT CHANGE UP
GLUCOSE SERPL-MCNC: 105 MG/DL — HIGH (ref 70–99)
HCT VFR BLD CALC: 30.7 % — LOW (ref 39–50)
HGB BLD-MCNC: 9.4 G/DL — LOW (ref 13–17)
IANC: 7.15 K/UL — SIGNIFICANT CHANGE UP (ref 1.8–7.4)
IMM GRANULOCYTES NFR BLD AUTO: 0.6 % — SIGNIFICANT CHANGE UP (ref 0–0.9)
INR BLD: 1.21 RATIO — HIGH (ref 0.85–1.16)
LYMPHOCYTES # BLD AUTO: 1.58 K/UL — SIGNIFICANT CHANGE UP (ref 1–3.3)
LYMPHOCYTES # BLD AUTO: 15.7 % — SIGNIFICANT CHANGE UP (ref 13–44)
MCHC RBC-ENTMCNC: 25.3 PG — LOW (ref 27–34)
MCHC RBC-ENTMCNC: 30.6 G/DL — LOW (ref 32–36)
MCV RBC AUTO: 82.5 FL — SIGNIFICANT CHANGE UP (ref 80–100)
MONOCYTES # BLD AUTO: 1.22 K/UL — HIGH (ref 0–0.9)
MONOCYTES NFR BLD AUTO: 12.1 % — SIGNIFICANT CHANGE UP (ref 2–14)
NEUTROPHILS # BLD AUTO: 7.15 K/UL — SIGNIFICANT CHANGE UP (ref 1.8–7.4)
NEUTROPHILS NFR BLD AUTO: 71 % — SIGNIFICANT CHANGE UP (ref 43–77)
NRBC # BLD: 0 /100 WBCS — SIGNIFICANT CHANGE UP (ref 0–0)
NRBC # FLD: 0 K/UL — SIGNIFICANT CHANGE UP (ref 0–0)
OSMOLALITY UR: 331 MOSM/KG — SIGNIFICANT CHANGE UP (ref 50–1200)
PLATELET # BLD AUTO: 248 K/UL — SIGNIFICANT CHANGE UP (ref 150–400)
POTASSIUM SERPL-MCNC: 3.9 MMOL/L — SIGNIFICANT CHANGE UP (ref 3.5–5.3)
POTASSIUM SERPL-SCNC: 3.9 MMOL/L — SIGNIFICANT CHANGE UP (ref 3.5–5.3)
PROT SERPL-MCNC: 6.2 G/DL — SIGNIFICANT CHANGE UP (ref 6–8.3)
PROTHROM AB SERPL-ACNC: 14 SEC — HIGH (ref 9.9–13.4)
RBC # BLD: 3.72 M/UL — LOW (ref 4.2–5.8)
RBC # FLD: 16.1 % — HIGH (ref 10.3–14.5)
RH IG SCN BLD-IMP: NEGATIVE — SIGNIFICANT CHANGE UP
SODIUM SERPL-SCNC: 130 MMOL/L — LOW (ref 135–145)
SODIUM UR-SCNC: 68 MMOL/L — SIGNIFICANT CHANGE UP
WBC # BLD: 10.07 K/UL — SIGNIFICANT CHANGE UP (ref 3.8–10.5)
WBC # FLD AUTO: 10.07 K/UL — SIGNIFICANT CHANGE UP (ref 3.8–10.5)

## 2024-12-08 PROCEDURE — 99222 1ST HOSP IP/OBS MODERATE 55: CPT | Mod: GC

## 2024-12-08 PROCEDURE — 99223 1ST HOSP IP/OBS HIGH 75: CPT

## 2024-12-08 PROCEDURE — 74176 CT ABD & PELVIS W/O CONTRAST: CPT | Mod: 26,MC

## 2024-12-08 RX ORDER — MORPHINE SULFATE 15 MG
2 TABLET, EXTENDED RELEASE ORAL EVERY 4 HOURS
Refills: 0 | Status: DISCONTINUED | OUTPATIENT
Start: 2024-12-08 | End: 2024-12-09

## 2024-12-08 RX ORDER — TRAMADOL HYDROCHLORIDE 50 MG/1
50 TABLET ORAL EVERY 6 HOURS
Refills: 0 | Status: DISCONTINUED | OUTPATIENT
Start: 2024-12-08 | End: 2024-12-13

## 2024-12-08 RX ORDER — SODIUM CHLORIDE 9 MG/ML
1000 INJECTION, SOLUTION INTRAVENOUS
Refills: 0 | Status: COMPLETED | OUTPATIENT
Start: 2024-12-08 | End: 2024-12-09

## 2024-12-08 RX ORDER — LATANOPROST 50 UG/ML
1 SOLUTION OPHTHALMIC AT BEDTIME
Refills: 0 | Status: DISCONTINUED | OUTPATIENT
Start: 2024-12-08 | End: 2024-12-24

## 2024-12-08 RX ORDER — ROSUVASTATIN 40 MG/1
10 TABLET, FILM COATED ORAL AT BEDTIME
Refills: 0 | Status: DISCONTINUED | OUTPATIENT
Start: 2024-12-08 | End: 2024-12-24

## 2024-12-08 RX ORDER — VANCOMYCIN HYDROCHLORIDE 5 G/100ML
1000 INJECTION, POWDER, LYOPHILIZED, FOR SOLUTION INTRAVENOUS ONCE
Refills: 0 | Status: COMPLETED | OUTPATIENT
Start: 2024-12-08 | End: 2024-12-08

## 2024-12-08 RX ORDER — PIPERACILLIN AND TAZOBACTAM 3; .375 G/15ML; G/15ML
3.38 INJECTION, POWDER, LYOPHILIZED, FOR SOLUTION INTRAVENOUS ONCE
Refills: 0 | Status: COMPLETED | OUTPATIENT
Start: 2024-12-08 | End: 2024-12-08

## 2024-12-08 RX ORDER — PIPERACILLIN AND TAZOBACTAM 3; .375 G/15ML; G/15ML
3.38 INJECTION, POWDER, LYOPHILIZED, FOR SOLUTION INTRAVENOUS EVERY 8 HOURS
Refills: 0 | Status: COMPLETED | OUTPATIENT
Start: 2024-12-08 | End: 2024-12-12

## 2024-12-08 RX ORDER — METOPROLOL TARTRATE 50 MG
1 TABLET ORAL
Refills: 0 | DISCHARGE

## 2024-12-08 RX ORDER — LATANOPROST 0.005 %
0 DROPS OPHTHALMIC (EYE)
Refills: 0 | DISCHARGE

## 2024-12-08 RX ORDER — SPIRONOLACTONE 50 MG/1
25 TABLET ORAL DAILY
Refills: 0 | Status: DISCONTINUED | OUTPATIENT
Start: 2024-12-08 | End: 2024-12-24

## 2024-12-08 RX ORDER — LOSARTAN POTASSIUM 100 MG/1
25 TABLET, FILM COATED ORAL DAILY
Refills: 0 | Status: DISCONTINUED | OUTPATIENT
Start: 2024-12-08 | End: 2024-12-24

## 2024-12-08 RX ORDER — TIMOLOL MALEATE 0.5 %
1 DROPS OPHTHALMIC (EYE) DAILY
Refills: 0 | Status: DISCONTINUED | OUTPATIENT
Start: 2024-12-08 | End: 2024-12-24

## 2024-12-08 RX ORDER — MORPHINE SULFATE 15 MG
4 TABLET, EXTENDED RELEASE ORAL ONCE
Refills: 0 | Status: DISCONTINUED | OUTPATIENT
Start: 2024-12-08 | End: 2024-12-08

## 2024-12-08 RX ORDER — NICOTINE POLACRILEX 4 MG/1
1 LOZENGE ORAL ONCE
Refills: 0 | Status: COMPLETED | OUTPATIENT
Start: 2024-12-08 | End: 2024-12-08

## 2024-12-08 RX ORDER — MORPHINE SULFATE 15 MG
2 TABLET, EXTENDED RELEASE ORAL ONCE
Refills: 0 | Status: DISCONTINUED | OUTPATIENT
Start: 2024-12-08 | End: 2024-12-08

## 2024-12-08 RX ORDER — SODIUM CHLORIDE 9 MG/ML
500 INJECTION, SOLUTION INTRAMUSCULAR; INTRAVENOUS; SUBCUTANEOUS ONCE
Refills: 0 | Status: COMPLETED | OUTPATIENT
Start: 2024-12-08 | End: 2024-12-08

## 2024-12-08 RX ORDER — OXYCODONE HCL 15 MG
2.5 TABLET ORAL EVERY 6 HOURS
Refills: 0 | Status: DISCONTINUED | OUTPATIENT
Start: 2024-12-08 | End: 2024-12-09

## 2024-12-08 RX ORDER — TRAMADOL HYDROCHLORIDE 50 MG/1
50 TABLET ORAL ONCE
Refills: 0 | Status: DISCONTINUED | OUTPATIENT
Start: 2024-12-08 | End: 2024-12-08

## 2024-12-08 RX ORDER — METOPROLOL TARTRATE 50 MG
50 TABLET ORAL DAILY
Refills: 0 | Status: DISCONTINUED | OUTPATIENT
Start: 2024-12-08 | End: 2024-12-24

## 2024-12-08 RX ORDER — ROSUVASTATIN CALCIUM 10 MG
1 TABLET ORAL
Refills: 0 | DISCHARGE

## 2024-12-08 RX ORDER — CHLORHEXIDINE GLUCONATE 1.2 MG/ML
1 RINSE ORAL DAILY
Refills: 0 | Status: DISCONTINUED | OUTPATIENT
Start: 2024-12-08 | End: 2024-12-10

## 2024-12-08 RX ORDER — LOSARTAN POTASSIUM 100 MG/1
25 TABLET, FILM COATED ORAL DAILY
Refills: 0 | Status: DISCONTINUED | OUTPATIENT
Start: 2024-12-08 | End: 2024-12-08

## 2024-12-08 RX ORDER — AZITHROMYCIN MONOHYDRATE 200 MG/5ML
500 POWDER, FOR SUSPENSION ORAL ONCE
Refills: 0 | Status: COMPLETED | OUTPATIENT
Start: 2024-12-08 | End: 2024-12-08

## 2024-12-08 RX ORDER — HEPARIN SODIUM 1000 [USP'U]/ML
5000 INJECTION, SOLUTION INTRAVENOUS; SUBCUTANEOUS EVERY 12 HOURS
Refills: 0 | Status: DISCONTINUED | OUTPATIENT
Start: 2024-12-08 | End: 2024-12-09

## 2024-12-08 RX ORDER — OXYCODONE HCL 15 MG
5 TABLET ORAL EVERY 4 HOURS
Refills: 0 | Status: DISCONTINUED | OUTPATIENT
Start: 2024-12-08 | End: 2024-12-09

## 2024-12-08 RX ORDER — BRIMONIDINE TARTRATE AND TIMOLOL MALEATE 2; 5 MG/ML; MG/ML
1 SOLUTION/ DROPS OPHTHALMIC
Refills: 0 | DISCHARGE

## 2024-12-08 RX ORDER — METOPROLOL TARTRATE 50 MG
50 TABLET ORAL DAILY
Refills: 0 | Status: DISCONTINUED | OUTPATIENT
Start: 2024-12-08 | End: 2024-12-08

## 2024-12-08 RX ADMIN — TRAMADOL HYDROCHLORIDE 50 MILLIGRAM(S): 50 TABLET ORAL at 01:48

## 2024-12-08 RX ADMIN — Medication 2 MILLIGRAM(S): at 17:34

## 2024-12-08 RX ADMIN — SODIUM CHLORIDE 100 MILLILITER(S): 9 INJECTION, SOLUTION INTRAVENOUS at 17:23

## 2024-12-08 RX ADMIN — AZITHROMYCIN MONOHYDRATE 255 MILLIGRAM(S): 200 POWDER, FOR SUSPENSION ORAL at 09:32

## 2024-12-08 RX ADMIN — SODIUM CHLORIDE 500 MILLILITER(S): 9 INJECTION, SOLUTION INTRAMUSCULAR; INTRAVENOUS; SUBCUTANEOUS at 03:24

## 2024-12-08 RX ADMIN — PIPERACILLIN AND TAZOBACTAM 200 GRAM(S): 3; .375 INJECTION, POWDER, LYOPHILIZED, FOR SOLUTION INTRAVENOUS at 07:15

## 2024-12-08 RX ADMIN — CHLORHEXIDINE GLUCONATE 1 APPLICATION(S): 1.2 RINSE ORAL at 12:40

## 2024-12-08 RX ADMIN — NICOTINE POLACRILEX 1 PATCH: 4 LOZENGE ORAL at 03:24

## 2024-12-08 RX ADMIN — PIPERACILLIN AND TAZOBACTAM 25 GRAM(S): 3; .375 INJECTION, POWDER, LYOPHILIZED, FOR SOLUTION INTRAVENOUS at 12:40

## 2024-12-08 RX ADMIN — VANCOMYCIN HYDROCHLORIDE 250 MILLIGRAM(S): 5 INJECTION, POWDER, LYOPHILIZED, FOR SOLUTION INTRAVENOUS at 08:29

## 2024-12-08 RX ADMIN — ROSUVASTATIN 10 MILLIGRAM(S): 40 TABLET, FILM COATED ORAL at 22:31

## 2024-12-08 RX ADMIN — HEPARIN SODIUM 5000 UNIT(S): 1000 INJECTION, SOLUTION INTRAVENOUS; SUBCUTANEOUS at 17:23

## 2024-12-08 RX ADMIN — SODIUM CHLORIDE 100 MILLILITER(S): 9 INJECTION, SOLUTION INTRAVENOUS at 22:22

## 2024-12-08 RX ADMIN — Medication 2 MILLIGRAM(S): at 18:20

## 2024-12-08 RX ADMIN — Medication 4 MILLIGRAM(S): at 03:24

## 2024-12-08 RX ADMIN — SODIUM CHLORIDE 100 MILLILITER(S): 9 INJECTION, SOLUTION INTRAVENOUS at 09:41

## 2024-12-08 RX ADMIN — PIPERACILLIN AND TAZOBACTAM 25 GRAM(S): 3; .375 INJECTION, POWDER, LYOPHILIZED, FOR SOLUTION INTRAVENOUS at 20:50

## 2024-12-08 RX ADMIN — LATANOPROST 1 DROP(S): 50 SOLUTION OPHTHALMIC at 22:34

## 2024-12-08 NOTE — H&P ADULT - PROBLEM SELECTOR PLAN 3
-CT A/P: Small amount of bilateral pleural effusions, Small volume pericardial effusion. Pulmonary nodules suspicious of metastasis Midline decubitus ulcer with local subcutaneous inflammation. A few foci of gas in the inflamed superficial soft tissues, nec fas not excluded, no abscess.  - Surg consulted: low concern for nec fascitis, foci of air secondary to open wound,    Plan  - Wound care eval

## 2024-12-08 NOTE — H&P ADULT - PROBLEM SELECTOR PLAN 8
- Fluids: None  - Electrolytes: Will replete to maintain K>4, Phos>3, and Mag>2  - Nutrition: Regular diet, Consistent Carb, DASH  - Activity: As tolerated, pending PT eval  - DVT Prophylaxis: Not indicated, Lovenox, heparin Sub q, Heparin ggt, Warfarin, Eliquis  - Stress Ulcer/GI Prophylaxis: N/A, Protonix 20mg before breakfest, Protonix 40mg before breakfest  - Disposition: Admit to medicine - Hx of Rectal Cancer, follows with [facility] oncology, - Hx of Rectal Cancer, follows with [facility] oncology,    Plan  Oxy 2.5/5 mg PRN 2mg for breakthrough Morphine

## 2024-12-08 NOTE — H&P ADULT - NSHPPHYSICALEXAM_GEN_ALL_CORE
T(C): 35.4 (12-08-24 @ 07:20), Max: 36.6 (12-07-24 @ 22:12)  HR: 105 (12-08-24 @ 07:20) (76 - 105)  BP: 112/82 (12-08-24 @ 07:20) (89/61 - 123/76)  RR: 18 (12-08-24 @ 07:20) (17 - 20)  SpO2: 100% (12-08-24 @ 07:20) (98% - 100%)    CONSTITUTIONAL: Well groomed, no apparent distress  EYES: PERRLA and symmetric, EOMI, No conjunctival or scleral injection, non-icteric  ENMT: Oral mucosa with moist membranes. Normal dentition; no pharyngeal injection or exudates             NECK: Supple, symmetric and without tracheal deviation   RESP: No respiratory distress, no use of accessory muscles; CTA b/l, no WRR  CV: RRR, +S1S2, no MRG; no JVD; no peripheral edema  GI: Soft, NT, ND, no rebound, no guarding; no palpable masses; no hepatosplenomegaly; no hernia palpated  LYMPH: No cervical LAD or tenderness; no axillary LAD or tenderness; no inguinal LAD or tenderness  MSK: Normal gait; No digital clubbing or cyanosis; examination of the (head/neck/spine/ribs/pelvis, RUE, LUE, RLE, LLE) without misalignment,            Normal ROM without pain, no spinal tenderness, normal muscle strength/tone  SKIN: No rashes or ulcers noted; no subcutaneous nodules or induration palpable  NEURO: CN II-XII intact; normal reflexes in upper and lower extremities, sensation intact in upper and lower extremities b/l to light touch   PSYCH: Appropriate insight/judgment; A+O x 3, mood and affect appropriate, recent/remote memory intact T(C): 35.4 (12-08-24 @ 07:20), Max: 36.6 (12-07-24 @ 22:12)  HR: 105 (12-08-24 @ 07:20) (76 - 105)  BP: 112/82 (12-08-24 @ 07:20) (89/61 - 123/76)  RR: 18 (12-08-24 @ 07:20) (17 - 20)  SpO2: 100% (12-08-24 @ 07:20) (98% - 100%)    CONSTITUTIONAL: AAOx1-2, cachetic, temporal wasting  EYES: PERRLA and symmetric, EOMI,   ENMT: Oral mucosa with moist membranes. Normal dentition; no pharyngeal injection or exudates  RESP: No respiratory distress, no use of accessory muscles; CTA b/l, no WRR  CV: RRR, +S1S2, no MRG; no JVD; no peripheral edema  GI: Soft, NT, ND, no rebound, no guarding; ostomy bag noted, 10cc bloody output alongside normal ostomy content  SKIN: No rashes or ulcers noted; no subcutaneous nodules or induration palpable  NEURO:, sensation intact in upper and lower extremities b/l to light touch

## 2024-12-08 NOTE — H&P ADULT - NSHPREVIEWOFSYSTEMS_GEN_ALL_CORE
REVIEW OF SYSTEMS:  CONSTITUTIONAL: No weakness, fevers, or chills  EYES/ENT: No visual changes; No vertigo, throat pain, or dysphagia  NECK: No pain or stiffness  RESPIRATORY: No cough, wheezing, hemoptysis, or shortness of breath  CARDIOVASCULAR: No chest pain or palpitations  GASTROINTESTINAL: No abdominal or epigastric pain. No nausea, vomiting, or hematemesis; No diarrhea or constipation. No melena or hematochezia.  GENITOURINARY: No dysuria, frequency, or hematuria  MUSCULOSKELETAL: No joint or muscle pain or aches  NEUROLOGICAL: No numbness or weakness  SKIN: No itching or rashes

## 2024-12-08 NOTE — H&P ADULT - PROBLEM SELECTOR PLAN 9
- Fluids: LR 100cc/hour  - Electrolytes: Will replete to maintain K>4, Phos>3, and Mag>2  - Nutrition: Regular diet,   - Activity: As tolerated, pending PT eval  - DVT Prophylaxis: , heparin Sub q  - Stress Ulcer/GI Prophylaxis: N/A,  - Disposition: Admit to medicine - Fluids: LR 100cc/hour  - Electrolytes: Will replete to maintain K>4, Phos>3, and Mag>2  - Nutrition: Regular diet,   - Activity: As tolerated, pending PT eval  - DVT Prophylaxis: Heparin Sub q  - Stress Ulcer/GI Prophylaxis: N/A,  - Disposition: Admit to medicine, will reach out to daughter for collateral

## 2024-12-08 NOTE — ED ADULT NURSE REASSESSMENT NOTE - NS ED NURSE REASSESS COMMENT FT1
Break RN: received report from RN. pt stable resting in bed in non acute distress. Respirations even and unlabored. Pt awaiting nicotine patch. Safety maintained.

## 2024-12-08 NOTE — H&P ADULT - ATTENDING COMMENTS
Seen by me this afternoon.    79M with a PMHx of CAD s/p stent placement in 2012 (ASA), A Fib (not on AC due to GI bleed), known h/o DVT in RLE, HFrEF,  locally advanced rectal cancer s/p chemo/radation (11/22, consolidation CAPOX (2/2023), brachytherapy (3/2024), hx of  s/p diverting end sigmoid colostomy, cystoscopy and naranjo placement across defect  and recent admission (10/12 at [facility]) for Tara gangrene s/p surgical debridement who is brought in for concern for rectal bleeding noted in diaper by aide and pain to sacrum where pt has a sacral wound ulcer. Previously admitted for rectal bleed where colonoscopy (10/24) showed diverticulosis (at that time was ruled as a complication of rectal cancer). CT abd/pelvis shows: Sacral decubitus ulcer with a few droplets of subcutaneous gas. No associated collection. Marked wall thickening of the rectum, difficult to compare an suspicious for an underlying mass in this patient with history of rectal cancer.    Bibasilar pulmonary nodules concerning for metastases, unchanged.    A 3.1 cm indeterminate left adrenal lesion without change.    A 5.1 cm infrarenal abdominal aortic aneurysm. Seen by me this afternoon.    79M with a PMHx of CAD s/p stent placement in 2012 (ASA), A Fib (not on AC due to h/o GIB), known h/o DVT in RLE, HFrEF,  locally advanced rectal cancer s/p chemo/radation (11/22, consolidation CAPOX (2/2023), brachytherapy (3/2024), hx of  s/p diverting end sigmoid colostomy, cystoscopy and naranjo placement across defect  and recent admission (10/12 at [facility]) for Tara gangrene s/p surgical debridement who is brought in for concern for rectal bleeding noted in diaper by aide and pain to sacrum where pt has a sacral wound ulcer. Previously admitted for rectal bleed where colonoscopy (10/24) showed diverticulosis (at that time was ruled as a complication of rectal cancer). CT abd/pelvis shows: Sacral decubitus ulcer with a few droplets of subcutaneous gas. No associated collection. Marked wall thickening of the rectum, difficult to compare an suspicious for an underlying mass in this patient with history of rectal cancer.    Surgery consulted given h/o necrotizing fasciitis and CT results, apprec recs, low concern for necrotizing fascitis, foci of air likely secondary to open wound, no indication for operative debridement at this time  F/up wound care recs  SIRS/Sepsis-POA likely related to above, c/w IV zosyn for now and f/up all cultures/MRSA nasal swab  Monitor anemia, seems to be stable/at baseline so far, hold GI consult for now unless rectal bleeding persists/Hg drops  Pain control w/ tylenol/minimize opioid use (pt seemed a bit confused at time of my exam, oriented to self and partially to place/date)  Obtain collateral regarding baseline MS and GOC to be addressed with pt's son/daughter  [facility] (imaging reveals bibasilar pulmonary nodules concerning for metastases, unchanged)  CT also shows-5.1 cm infrarenal abdominal aortic aneurysm, discuss GOC w/ pt's son/daughter, Vascular to see pt if in alignment w/ GOC  Chronic systolic CHF-euvolemic at present, c/w home meds  C/w HTN/Chronic atrial fibrillation meds  Dietitian evaluation, seems to have underlying severe protein calorie malnutrition  PT evaluation  Rest as above Seen by me this afternoon.    79M with a PMHx of CAD s/p stent placement in 2012 (ASA), A Fib (not on AC due to h/o GIB), known h/o DVT in RLE, HFrEF,  locally advanced rectal cancer s/p chemo/radation (11/22, consolidation CAPOX (2/2023), brachytherapy (3/2024), hx of  s/p diverting end sigmoid colostomy, cystoscopy and naranjo placement across defect  and recent admission (10/12 at [facility] for Tara gangrene s/p surgical debridement who is brought in for concern for rectal bleeding noted in diaper by aide and pain to sacrum where pt has a sacral wound ulcer. Previously admitted for rectal bleed where colonoscopy (10/24) showed diverticulosis (at that time was ruled as a complication of rectal cancer). CT abd/pelvis shows: Sacral decubitus ulcer with a few droplets of subcutaneous gas. No associated collection. Marked wall thickening of the rectum, difficult to compare an suspicious for an underlying mass in this patient with history of rectal cancer.    Surgery consulted given h/o necrotizing fasciitis and CT results, apprec recs, low concern for necrotizing fascitis, foci of air likely secondary to open wound, no indication for operative debridement at this time  F/up wound care recs  SIRS/Sepsis-POA likely related to above, c/w IV zosyn for now and f/up all cultures/MRSA nasal swab  Monitor anemia, seems to be stable/at baseline so far, hold GI consult for now unless rectal bleeding persists/Hg drops  Pain control w/ tylenol/minimize opioid use (pt seemed a bit confused at time of my exam, oriented to self and partially to place/date)  Obtain collateral regarding baseline MS and GOC to be addressed with pt's son/daughter  [facility] [place] (imaging reveals bibasilar pulmonary nodules concerning for metastases, unchanged)  CT also shows-5.1 cm infrarenal abdominal aortic aneurysm, discuss GOC w/ pt's son/daughter, Vascular to see pt if in alignment w/ GOC  Chronic systolic CHF-euvolemic at present, c/w home meds  C/w HTN/Chronic atrial fibrillation meds  Monitor hyponatremia that seems to be chronic, f/up TSH  Dietitian evaluation, seems to have underlying severe protein calorie malnutrition  PT evaluation  Rest as above

## 2024-12-08 NOTE — H&P ADULT - PROBLEM SELECTOR PLAN 2
-Hx of previous rectal bleeding, off DOAC  - Colonoscopy (10/24): Patent loop colostomy with healthy appearing mucosa in  the transverse colon. Diverticulosis in the entire examined proximal colon.   - Per [facility], not a candidate for further DMT,  - H/H: 9.4/30.7 -Hx of previous rectal bleeding, off DOAC  - Colonoscopy (10/24): Patent loop colostomy with healthy appearing mucosa in  the transverse colon. Diverticulosis in the entire examined proximal colon.   - Per [facility], not a candidate for further DMT,  - DDx; infectious vs rectal cancer mass vs diverticulosis/itis  - likely complication of cancer mass, previous admissions for similar concerns    Plan  - 2 large bore IV's   - CMT, if bleeding continues or increases in volume, low threshold for GI consult -Hx of previous rectal bleeding, off DOAC  - Colonoscopy (10/24): Patent loop colostomy with healthy appearing mucosa in  the transverse colon. Diverticulosis in the entire examined proximal colon.   - Per [facility], not a candidate for further DMT,  - Ddx; infectious vs rectal cancer mass vs diverticulosis/itis  - likely complication of cancer mass, previous admissions for similar concerns    Plan  - 2 large bore IV's   - CMT, if bleeding continues or increases in volume, low threshold for GI consult

## 2024-12-08 NOTE — ED ADULT NURSE REASSESSMENT NOTE - NS ED NURSE REASSESS COMMENT FT1
patient A&O x3 , in no acute distress, resting comfortably, colostomy LLQ + fecal output, naranjo with + urine output, sacral wound redressed with Mepilex, mepilex noted to the left hip and anterior pelvic region under colostomy bag. patient reposition in bed patient A&O x3 , in no acute distress, resting comfortably, colostomy LLQ + fecal output, naranjo with + urine output, sacral wound redressed with Mepilex, mepilex noted to the left hip and anterior pelvic region under colostomy bag. patient reposition in bed. oral temp noted in flow sheet MD made aware , patient refused rectal

## 2024-12-08 NOTE — H&P ADULT - HISTORY OF PRESENT ILLNESS
Mr. Sami Chong is a 79 year old male with a PMH of CAD s/p stent placement in  ( ASA), A Fib ( not on AC due to GI bleed), known h/o DVT in right leg, HFrEF,  locally advanced rectal cancer s/p chemo/radation (,consolidation CAPOX (2023), brachytherapy (3/2024), hx of  s/p diverting end sigmoid colostomy, cystoscopy and naranjo placement across defect , and recent admission () for for  Tara gangrene s/p surgical debridement coming in for concern for infection of sacral wound.         In the ED: VS: T: 95.7, HR: 105, BP: 112/82, RR: 18/100%. Labs: H/H: 9.4/30.7, Na: 130, A, CRP: 101.9. CT A/P: 1Small amount of bilateral pleural effusions, Small volume pericardial effusion. Pulmonary nodules suspicious of metastasis Midline decubitus ulcer with local subcutaneous inflammation. A few foci of gas in the inflamed superficial soft tissues, nec fas not excluded, no abscess. Indeterminate mildly heterogeneous 3.5 cm solid left adrenal mass. Patient was given 1x NS 1L bolus, 1x Tramadol 50 mg, 1x morphine 4mg, Fentanyl 25mcg. BxCx sent.  Started on LR 100cc/hr, Zosyn for broad coverage. Surgery consulted for r/o nec fascitis. Admitted to medicine for sacral wound ulcer.  Mr. Sami Chong is a 79 year old male with a PMH of CAD s/p stent placement in  ( ASA), A Fib ( not on AC due to GI bleed), known h/o DVT in right leg, HFrEF,  locally advanced rectal cancer s/p chemo/radation (,consolidation CAPOX (2023), brachytherapy (3/2024), hx of  s/p diverting end sigmoid colostomy, cystoscopy and naranjo placement across defect , and recent admission () for for  Tara gangrene s/p surgical debridement coming in for concern for rectal bleeding and sacral wound ulcer. Patient has been previous admitted for rectal bleed with a colonoscopy (10/24) showing diverticulosis, at that time was ruled as a complication of rectal cancer. Per primary oncologist, no role for DMT at that time.       In the ED: VS: T: 95.7, HR: 105, BP: 112/82, RR: 18/100%. Labs: H/H: 9.4/30.7, Na: 130, A, CRP: 101.9. CT A/P: Small amount of bilateral pleural effusions, Small volume pericardial effusion. Pulmonary nodules suspicious of metastasis Midline decubitus ulcer with local subcutaneous inflammation. A few foci of gas in the inflamed superficial soft tissues, nec fas not excluded, no abscess. Indeterminate mildly heterogeneous 3.5 cm solid left adrenal mass. Patient was given 1x NS 1L bolus, 1x Tramadol 50 mg, 1x morphine 4mg, Fentanyl 25mcg. BxCx sent.  Started on LR 100cc/hr, Zosyn for broad coverage. Surgery consulted for r/o nec fascitis. Admitted to medicine for sacral wound ulcer.  Mr. Sami Chong is a 79 year old male with a PMH of CAD s/p stent placement in  ( ASA), A Fib ( not on AC due to GI bleed), known h/o DVT in right leg, HFrEF,  locally advanced rectal cancer s/p chemo/radation (,consolidation CAPOX (2023), brachytherapy (3/2024), hx of  s/p diverting end sigmoid colostomy, cystoscopy and naranjo placement across defect , and recent admission () for for  Tara gangrene s/p surgical debridement coming in for concern for rectal bleeding and sacral wound ulcer. Patient has been previous admitted for rectal bleed with a colonoscopy (10/24) showing diverticulosis, at that time was ruled as a complication of rectal cancer. Per primary oncologist, Dr. Paez, no role for DMT at that time.       In the ED: VS: T: 95.7, HR: 105, BP: 112/82, RR: 18/100%. Labs: H/H: 9.4/30.7, Na: 130, A, CRP: 101.9. CT A/P: Small amount of bilateral pleural effusions, Small volume pericardial effusion. Pulmonary nodules suspicious of metastasis Midline decubitus ulcer with local subcutaneous inflammation. A few foci of gas in the inflamed superficial soft tissues, nec fas not excluded, no abscess. Indeterminate mildly heterogeneous 3.5 cm solid left adrenal mass. Patient was given 1x NS 1L bolus, 1x Tramadol 50 mg, 1x morphine 4mg, Fentanyl 25mcg. BxCx sent.  Started on LR 100cc/hr, Zosyn for broad coverage. Surgery consulted for r/o nec fascitis. Admitted to medicine for sacral wound ulcer and rectal bleeding.

## 2024-12-08 NOTE — H&P ADULT - TIME BILLING
Bedside exam and interview   Reviewed vitals, labs, consultant notes  Discussed patient's plan of care with housestaff, patient  Documentation of encounter

## 2024-12-08 NOTE — H&P ADULT - PROBLEM SELECTOR PLAN 4
- C/w statin, toprol, losartan, spironolactone  - TTE (10/24): Left ventricular systolic function is moderately to severely decreased with an ejection fraction visually estimated at 35 to 40 %. Global left ventricular hypokinesis. Enlarged right ventricular cavity size, with normal wall thickness    Plan  - C/w home meds - EKG:  -Maintained on statin, toprol, losartan, spironolactone    Plan  - C/w home meds -Maintained on statin, Toprol, losartan, spironolactone    Plan  - C/w home meds

## 2024-12-08 NOTE — ED ADULT NURSE NOTE - OBJECTIVE STATEMENT
Pt received to room 19 Pt is a 79 year old M with Hx of rectal cancer Pt presented to ED c/o rectal bleeding with pain. patient son at bedside and states patient just return home from [facility] and was having wound care around 5pm for his sacral wound when the wound care nurse notice his emma has blood and his wound was draining with foul odor.  denies chest pain, SOB, headache, dizziness, abdominal pain, n/v/d, urinary symptoms, numbness/tingling.   Pt is A&Ox3,bed bound. neuro/sensory intact. airway patent, speaking clearly in full sentences. breathing is even and unlabored. patient requesting for further exams to be postpone until pain medication are given.

## 2024-12-08 NOTE — ED ADULT NURSE REASSESSMENT NOTE - NS ED NURSE REASSESS COMMENT FT1
Per MD Mcdonald; ok to give Morphine. VS noted. Respirations even and unlabored, chest rise symmetrical b/l. Patient states Patient refusing cleaning and turning/repositioning, states "I want to wait 30 minutes, wait for the meds to work". 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 DALILA Berger at bedside assisting in cleaning, but patient refused. Family wanted patient to be changed and repositioned, but patient started pushing our hands away from him. Comfort measures maintained. Bed in lowest position. Safety maintained. Per MD Mcdonald; ok to give Morphine. VS noted. Respirations even and unlabored, chest rise symmetrical b/l. Patient states Patient refusing cleaning and turning/repositioning, states "I want to wait 30 minutes, wait for the meds to work". Patient has a stage 4 ([id number]); green/yellow discharge coming from wound; patient refused dressing change and repositioning. DALILA Berger at bedside assisting in cleaning, but patient refused. Family wanted patient to be changed and repositioned, but patient started pushing our hands away from him. Comfort measures maintained. Bed in lowest position. Safety maintained.

## 2024-12-08 NOTE — ED ADULT NURSE REASSESSMENT NOTE - NS ED NURSE REASSESS COMMENT FT1
report given floor pt to be transferred  / pts colostomy draining brown  lq/ naranjo to bsd qs / pt to be transferred

## 2024-12-08 NOTE — PATIENT PROFILE ADULT - FALL HARM RISK - HARM RISK INTERVENTIONS

## 2024-12-08 NOTE — CONSULT NOTE ADULT - ATTENDING COMMENTS
Pt seen and examined.  Agree with resident eval and plan.  Imp: Sacral decubitus chronic.  No evidence of necrotizing soft tissue infection.  Enzymatic debridement and Wound care team.

## 2024-12-08 NOTE — H&P ADULT - PROBLEM SELECTOR PLAN 1
- ED: T: 95.7, HR: 105, BP:139/98, patient meets SIRS criteria  - BxCx pending ED: VS: T: 95.7, HR: 105, BP: 112/82,. Labs:Na: 130, A, CRP: 101.9.    T: 95.7, HR: 105, BP:139/98, patient meets SIRS criteria  - . Patient was given 1x NS 1L bolus, 1x Tramadol 50 mg, 1x morphine 4mg, Fentanyl 25mcg.  - BxCx pending, maintained on empiric Zosyn in interim    Plan  - LR 100cc/hr  - C/w Zosyn pending BxCx ED: VS: T: 95.7, HR: 105, BP: 112/82,. Labs:Na: 130, A, CRP: 101.9.    T: 95.7, HR: 105, BP:139/98, patient meets SIRS criteria  - . Patient was given 1x NS 1L bolus, 1x Tramadol 50 mg, 1x morphine 4mg, Fentanyl 25mcg.  - BxCx pending, maintained on empiric Zosyn in interim    Plan  - Legionella, Strep Ag  - LR 100cc/hr  - C/w Zosyn pending BxCx ED: VS: T: 95.7, HR: 105, BP: 112/82,. Labs: Na: 130, A, CRP: 101.9.    T: 95.7, HR: 105, BP:139/98, patient meets SIRS criteria  - . Patient was given 1x NS 1L bolus, 1x Tramadol 50 mg, 1x morphine 4mg, Fentanyl 25mcg.  - BxCx pending, maintained on empiric Zosyn in interim    Plan  - Legionella, Strep Ag  - LR 100cc/hr  - C/w Zosyn pending BxCx

## 2024-12-08 NOTE — CONSULT NOTE ADULT - SUBJECTIVE AND OBJECTIVE BOX
GENERAL SURGERY CONSULT NOTE  --------------------------------------------------------------------------------------------  HPI:  78 yo M with history of CAD s/p stents in 2012 (ASA), locally advanced rectal cancer s/p neoadjuvant therapy with chemo/RT (completed 11/2022), consolidation CAPOX (2/2023), brachytherapy (3/2024), here at [facility] in 8/30 for fatigue, weakness, and falls, found to have perforated rectal mass with perirectal collection, 2.5 x 1.5 cm with spread to infection in anorectal/perineal region with fluid/gas tracking along penile shaft, however pt left for [facility] and s/p diverting end sigmoid colostomy, cystoscopy and narnajo placement across defect and perineal/scrotal drainage 8/30. 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 Presents again today after home care nurse was concerned for infection at known sacral wound.    In ED, patient is afebrile, BP soft in systolic 90s, heart normal. Calculated LRINEC score is 8.      PAST MEDICAL & SURGICAL HISTORY:  No significant past surgical history        FAMILY HISTORY:  [] Family history not pertinent as reviewed with the patient and family    SOCIAL HISTORY:  ***    ALLERGIES: No Known Allergies      HOME MEDICATIONS: ***    CURRENT MEDICATIONS  MEDICATIONS (STANDING):   MEDICATIONS (PRN):  --------------------------------------------------------------------------------------------    Vitals:   T(C): 35.4 (12-08-24 @ 07:20), Max: 36.6 (12-07-24 @ 22:12)  HR: 105 (12-08-24 @ 07:20) (76 - 105)  BP: 112/82 (12-08-24 @ 07:20) (89/61 - 123/76)  RR: 18 (12-08-24 @ 07:20) (17 - 20)  SpO2: 100% (12-08-24 @ 07:20) (98% - 100%)  CAPILLARY BLOOD GLUCOSE          Height (cm): 185.4 (12-07 @ 20:38)      PHYSICAL EXAM:  General: NAD, cachectic, temporal wasting  Neuro: Alert  Cardio: No peripheral edema  Resp: Good effort, no signs of respiratory distress  GI/Abd: Soft, nontender, nondistended  Sacrum: Pt adamantly refused examination  --------------------------------------------------------------------------------------------    LABS  CBC (12-08 @ 00:55)                              9.4[L]                         10.07   )----------------(  248        71.0  % Neutrophils, 15.7  % Lymphocytes, ANC: 7.15                                30.7[L]    BMP (12-08 @ 00:55)             130[L]  |  93[L]   |  14    		Ca++ --      Ca 9.1                ---------------------------------( 105[H]		Mg --                 3.9     |  21[L]   |  0.63  			Ph --        LFTs (12-08 @ 00:55)      TPro 6.2 / Alb 2.4[L] / TBili 0.4 / DBili -- / AST 21 / ALT 7 / AlkPhos 96    Coags (12-08 @ 00:55)  aPTT 29.9 / INR 1.21[H] / PT 14.0[H]          --------------------------------------------------------------------------------------------    MICROBIOLOGY  Urinalysis (12-08 @ 00:55):     Color:  / Appearance:  / SG:  / pH:  / Gluc: 105[H] / Ketones:  / Bili:  / Urobili:  / Protein : / Nitrites:  / Leuk.Est:  / RBC:  / WBC:  / Sq Epi:  / Non Sq Epi:  / Bacteria          --------------------------------------------------------------------------------------------    IMAGING  < from: CT Abdomen and Pelvis No Cont (12.08.24 @ 02:21) >  COMPARISON:  CT ABDOMEN AND PELVIS 10/12/2024 1:49 AM    FINDINGS:  Lungs: There are multiple small indeterminate pulmonary nodules,   suspicious for  metastases. Atypical pulmonary infection not excluded.  Pleural spaces: Small amount of bilateral pleural effusions.  Heart: Small volume pericardial effusion.    Liver: Normal. No mass.  Gallbladder and biliary ducts: Normal. No calcified stones. No ductal   dilation.  Pancreas: Unremarkable.  Spleen: Normal.  Adrenal glands: Indeterminate mildly heterogeneous 3.5 cm solid left   adrenal  mass.  Kidneys and ureters: Left renal cysts, largest of which measures 7.9 cm in  size. Chronic medical renal disease. No obstructive uropathy.  Stomach and bowel: Mild gaseous distention of the nondependent portions   of the  bowel may contribute to patient discomfort but is not pathologic. No   bowel wall  thickening or intestinal obstruction.  Appendix: Normal appendix.    Intraperitoneal space: Trace-small volume ascites.  Vasculature: Multifocal abdominal aortic aneurysm, measuring up to 4.7 cm   in  caliber. No rupture.  Lymph nodes: Unremarkable.  Urinary bladder: Unremarkable as visualized.  Reproductive: Open packed surgical wound of the anterior scrotum. No   abscess.  Bones/joints: See &quot;Soft tissues&quot; finding.  Soft tissues: Midline decubitus ulcer with local subcutaneous   inflammation. A  few foci of gas in the inflamed superficial soft tissues immediately   subjacent  to the ulcer at the superior margin of the intergluteal cleft are   probably a  result of the ulceration; however, necrotizing fasciitis not excluded   (less  likely). No abscess. No osseous erosion of the underlying sacral bone to  indicate osteomyelitis. Diffuse subcutaneous edema.    IMPRESSION:  1.   Small amount of bilateral pleural effusions.  2.   Small volume pericardial effusion.  3.   There are multiple small indeterminate pulmonary nodules, suspicious   for  metastases. Atypical pulmonary infection not excluded.  4.   Midline decubitus ulcer with local subcutaneous inflammation. A few   foci  of gas in the inflamed superficial soft tissues immediately subjacent to   the  ulcer at the superior margin of the intergluteal cleft are probably a   result of  the ulceration; however, necrotizing fasciitis not excluded (less   likely). No  abscess. No osseous erosion of the underlying sacral bone to indicate  osteomyelitis.  5.   Open packed surgical wound of the anterior scrotum. No abscess.  6.   Indeterminate mildly heterogeneous 3.5 cm solid left adrenal mass.  7.   Diffuse subcutaneous edema.  8.   Trace-small volume ascites.        ******PRELIMINARY REPORT******      < end of copied text >      --------------------------------------------------------------------------------------------

## 2024-12-08 NOTE — CONSULT NOTE ADULT - ASSESSMENT
78 yo M with history of CAD s/p stents in 2012 (ASA), locally advanced rectal cancer s/p neoadjuvant therapy with chemo/RT (completed 11/2022), consolidation CAPOX (2/2023), brachytherapy (3/2024), here at [facility] in 8/30 for fatigue, weakness, and falls, found to have perforated rectal mass with perirectal collection, 2.5 x 1.5 cm with spread to infection in anorectal/perineal region with fluid/gas tracking along penile shaft, however pt left for [facility] and s/p diverting end sigmoid colostomy, cystoscopy and naranjo placement across defect and perineal/scrotal drainage 8/30. 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 Presents again for concern for infection at sacral wound. Surgery consulted to rule out necrotizing fascitis as CTAP shows decubitus ulcer with subQ inflammation with few foci of gas immediately adjacent to ulcer.      RECS:  - Low concern for necrotizing fascitis at this time  - Foci of air likely secondary to open wound  - Recommend wound care evaluation    Discussed with Dr. Ivania MURILLO Team [id number] 78 yo M with history of CAD s/p stents in 2012 (ASA), locally advanced rectal cancer s/p neoadjuvant therapy with chemo/RT (completed 11/2022), consolidation CAPOX (2/2023), brachytherapy (3/2024), here at [facility] in 8/30 for fatigue, weakness, and falls, found to have perforated rectal mass with perirectal collection, 2.5 x 1.5 cm with spread to infection in anorectal/perineal region with fluid/gas tracking along penile shaft, however pt left for [facility] and s/p diverting end sigmoid colostomy, cystoscopy and naranjo placement across defect and perineal/scrotal drainage 8/30. 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 Presents again for concern for infection at sacral wound. Surgery consulted to rule out necrotizing fascitis as CTAP shows decubitus ulcer with subQ inflammation with few foci of gas immediately adjacent to ulcer.      RECS:  - Low concern for necrotizing fascitis at this time  - Foci of air likely secondary to open wound  - Recommend wound care evaluation  - No indication for operative debridement at this time  - Surgery to sign off    Discussed with Dr. Ivania MURILLO Team [id number]

## 2024-12-08 NOTE — H&P ADULT - ASSESSMENT
Mr. Sami Chong is a 79 year old male with a PMH of CAD s/p stent placement in 2012 ( ASA), A Fib ( not on AC due to GI bleed), known h/o DVT in right leg, HFrEF,  locally advanced rectal cancer s/p chemo/radation (11/22,consolidation CAPOX (2/2023), brachytherapy (3/2024), hx of  s/p diverting end sigmoid colostomy, cystoscopy and naranjo placement across defect , and recent admission (11/24) for for  Tara gangrene s/p surgical debridement coming in for concern for rectal bleeding and sacral wound ulcer. Patient has been previous admitted for rectal bleed with a colonoscopy (10/24) showing diverticulosis, at that time was ruled as a complication of rectal cancer. Per primary oncologist, no role for DMT at that time.  Mr. Sami Chong is a 79 year old male with a PMH of CAD s/p stent placement in 2012 ( ASA), A Fib ( not on AC due to GI bleed), known h/o DVT in right leg, HFrEF,  locally advanced rectal cancer s/p chemo/radation (11/22,consolidation CAPOX (2/2023), brachytherapy (3/2024), hx of  s/p diverting end sigmoid colostomy, cystoscopy and naranjo placement across defect , and recent admission (11/24) for for  Tara gangrene s/p surgical debridement coming in for concern for rectal bleeding and sacral wound ulcer. Patient has been previous admitted for rectal bleed with a colonoscopy (10/24) showing diverticulosis, at that time was ruled as a complication of rectal cancer. Per primary oncologist, Dr. Paez, [facility], no role for DMT at that time.

## 2024-12-08 NOTE — H&P ADULT - PROBLEM SELECTOR PLAN 7
- Maintained on Statin, Toprol XL, Losartan, Spironolactone, - S/p stent placement in 2012, not on AC due to multiple episodes for GI bleeds

## 2024-12-08 NOTE — H&P ADULT - PROBLEM SELECTOR PLAN 5
- S/p stent placement in 2012 - Maintained on Crestor 10mg QHS, Toprol XL 50mg QD, Losartan 25mg QD,, Spironolactone 25mg QD  - TTE (10/24): Left ventricular systolic function is moderately to severely decreased with an ejection fraction visually estimated at 35 to 40 %. Global left ventricular hypokinesis. Enlarged right ventricular cavity size, with normal wall thickness    Plan  - C/w home meds - Maintained on Crestor 10mg QHS, Toprol XL 50mg QD, Losartan 25mg QD,, Spironolactone 25mg QD  - TTE (10/24): Left ventricular systolic function is moderately to severely decreased with an ejection fraction visually estimated at 35 to 40 %. Global left ventricular hypokinesis. Enlarged right ventricular cavity size, with normal wall thickness  - stable pericardial effusion seem     Plan  - C/w home meds - Maintained on Crestor 10mg QHS, Toprol XL 50mg QD, Losartan 25mg QD,, Spironolactone 25mg QD  - TTE (10/24): Left ventricular systolic function is moderately to severely decreased with an ejection fraction visually estimated at 35 to 40 %. Global left ventricular hypokinesis. Enlarged right ventricular cavity size, with normal wall thickness  - stable pericardial effusion seem     Plan  - No need for repeat TTE at time time  - C/w home meds - Maintained on Crestor 10mg QHS, Toprol XL 50mg QD, Losartan 25mg QD, Spironolactone 25mg QD  - TTE (10/24): Left ventricular systolic function is moderately to severely decreased with an ejection fraction visually estimated at 35 to 40 %. Global left ventricular hypokinesis. Enlarged right ventricular cavity size, with normal wall thickness  - stable pericardial effusion seem     Plan  - No need for repeat TTE at this time  - C/w home meds

## 2024-12-08 NOTE — H&P ADULT - NSICDXPASTMEDICALHX_GEN_ALL_CORE_FT
PAST MEDICAL HISTORY:  Chronic atrial fibrillation     Chronic HFrEF (heart failure with reduced ejection fraction)     History of rectal cancer

## 2024-12-08 NOTE — H&P ADULT - PROBLEM SELECTOR PLAN 6
- Fluids: None  - Electrolytes: Will replete to maintain K>4, Phos>3, and Mag>2  - Nutrition: Regular diet, Consistent Carb, DASH  - Activity: As tolerated, pending PT eval  - DVT Prophylaxis: Not indicated, Lovenox, heparin Sub q, Heparin ggt, Warfarin, Eliquis  - Stress Ulcer/GI Prophylaxis: N/A, Protonix 20mg before breakfest, Protonix 40mg before breakfest  - Disposition: Admit to medicine H/H: 9.4/30.7on admission   - Hemoglobin on last admission (8.3/27.2)  - Stable   - Monitor for signs and symptoms of bleeding    Plan  - Active T&S  - Transfuse prn (Hgb>8, given Hx of CAD)   - F/u AM CBC

## 2024-12-09 LAB
ANION GAP SERPL CALC-SCNC: 13 MMOL/L — SIGNIFICANT CHANGE UP (ref 7–14)
APPEARANCE UR: ABNORMAL
BILIRUB UR-MCNC: NEGATIVE — SIGNIFICANT CHANGE UP
BUN SERPL-MCNC: 15 MG/DL — SIGNIFICANT CHANGE UP (ref 7–23)
CALCIUM SERPL-MCNC: 9.3 MG/DL — SIGNIFICANT CHANGE UP (ref 8.4–10.5)
CHLORIDE SERPL-SCNC: 93 MMOL/L — LOW (ref 98–107)
CO2 SERPL-SCNC: 23 MMOL/L — SIGNIFICANT CHANGE UP (ref 22–31)
COLOR SPEC: YELLOW — SIGNIFICANT CHANGE UP
CREAT SERPL-MCNC: 0.63 MG/DL — SIGNIFICANT CHANGE UP (ref 0.5–1.3)
DIFF PNL FLD: ABNORMAL
EGFR: 97 ML/MIN/1.73M2 — SIGNIFICANT CHANGE UP
GLUCOSE SERPL-MCNC: 105 MG/DL — HIGH (ref 70–99)
GLUCOSE UR QL: NEGATIVE MG/DL — SIGNIFICANT CHANGE UP
HCT VFR BLD CALC: 29.5 % — LOW (ref 39–50)
HGB BLD-MCNC: 9 G/DL — LOW (ref 13–17)
KETONES UR-MCNC: NEGATIVE MG/DL — SIGNIFICANT CHANGE UP
LEGIONELLA AG UR QL: NEGATIVE — SIGNIFICANT CHANGE UP
LEUKOCYTE ESTERASE UR-ACNC: ABNORMAL
MCHC RBC-ENTMCNC: 25.3 PG — LOW (ref 27–34)
MCHC RBC-ENTMCNC: 30.5 G/DL — LOW (ref 32–36)
MCV RBC AUTO: 82.9 FL — SIGNIFICANT CHANGE UP (ref 80–100)
MRSA PCR RESULT.: SIGNIFICANT CHANGE UP
NITRITE UR-MCNC: NEGATIVE — SIGNIFICANT CHANGE UP
NRBC # BLD: 0 /100 WBCS — SIGNIFICANT CHANGE UP (ref 0–0)
NRBC # FLD: 0 K/UL — SIGNIFICANT CHANGE UP (ref 0–0)
PH UR: 5 — SIGNIFICANT CHANGE UP (ref 5–8)
PLATELET # BLD AUTO: 211 K/UL — SIGNIFICANT CHANGE UP (ref 150–400)
POTASSIUM SERPL-MCNC: 4 MMOL/L — SIGNIFICANT CHANGE UP (ref 3.5–5.3)
POTASSIUM SERPL-SCNC: 4 MMOL/L — SIGNIFICANT CHANGE UP (ref 3.5–5.3)
PROT UR-MCNC: SIGNIFICANT CHANGE UP MG/DL
RBC # BLD: 3.56 M/UL — LOW (ref 4.2–5.8)
RBC # FLD: 16.2 % — HIGH (ref 10.3–14.5)
S AUREUS DNA NOSE QL NAA+PROBE: SIGNIFICANT CHANGE UP
S PNEUM AG UR QL: NEGATIVE — SIGNIFICANT CHANGE UP
SODIUM SERPL-SCNC: 129 MMOL/L — LOW (ref 135–145)
SP GR SPEC: 1.02 — SIGNIFICANT CHANGE UP (ref 1–1.03)
TSH SERPL-MCNC: 24.52 UIU/ML — HIGH (ref 0.27–4.2)
UROBILINOGEN FLD QL: 0.2 MG/DL — SIGNIFICANT CHANGE UP (ref 0.2–1)
WBC # BLD: 6.6 K/UL — SIGNIFICANT CHANGE UP (ref 3.8–10.5)
WBC # FLD AUTO: 6.6 K/UL — SIGNIFICANT CHANGE UP (ref 3.8–10.5)

## 2024-12-09 PROCEDURE — 99232 SBSQ HOSP IP/OBS MODERATE 35: CPT | Mod: GC

## 2024-12-09 PROCEDURE — 99223 1ST HOSP IP/OBS HIGH 75: CPT

## 2024-12-09 PROCEDURE — 71045 X-RAY EXAM CHEST 1 VIEW: CPT | Mod: 26

## 2024-12-09 RX ORDER — MUPIROCIN 2 %
1 OINTMENT (GRAM) TOPICAL
Refills: 0 | Status: COMPLETED | OUTPATIENT
Start: 2024-12-09 | End: 2024-12-14

## 2024-12-09 RX ORDER — ACETAMINOPHEN 80 MG/.8ML
650 SOLUTION/ DROPS ORAL EVERY 6 HOURS
Refills: 0 | Status: DISCONTINUED | OUTPATIENT
Start: 2024-12-09 | End: 2024-12-24

## 2024-12-09 RX ORDER — NICOTINE POLACRILEX 4 MG/1
1 LOZENGE ORAL DAILY
Refills: 0 | Status: DISCONTINUED | OUTPATIENT
Start: 2024-12-09 | End: 2024-12-24

## 2024-12-09 RX ORDER — MORPHINE SULFATE 15 MG
1 TABLET, EXTENDED RELEASE ORAL EVERY 4 HOURS
Refills: 0 | Status: DISCONTINUED | OUTPATIENT
Start: 2024-12-09 | End: 2024-12-16

## 2024-12-09 RX ORDER — SODIUM CHLORIDE 9 MG/ML
1000 INJECTION, SOLUTION INTRAVENOUS
Refills: 0 | Status: DISCONTINUED | OUTPATIENT
Start: 2024-12-09 | End: 2024-12-12

## 2024-12-09 RX ORDER — MORPHINE SULFATE 15 MG
2 TABLET, EXTENDED RELEASE ORAL EVERY 4 HOURS
Refills: 0 | Status: DISCONTINUED | OUTPATIENT
Start: 2024-12-09 | End: 2024-12-16

## 2024-12-09 RX ORDER — CHLORHEXIDINE GLUCONATE 1.2 MG/ML
1 RINSE ORAL
Refills: 0 | Status: DISCONTINUED | OUTPATIENT
Start: 2024-12-09 | End: 2024-12-10

## 2024-12-09 RX ORDER — SODIUM HYPOCHLORITE 0.057 %
1 SOLUTION, IRRIGATION IRRIGATION DAILY
Refills: 0 | Status: DISCONTINUED | OUTPATIENT
Start: 2024-12-09 | End: 2024-12-24

## 2024-12-09 RX ADMIN — SODIUM CHLORIDE 100 MILLILITER(S): 9 INJECTION, SOLUTION INTRAVENOUS at 21:20

## 2024-12-09 RX ADMIN — SPIRONOLACTONE 25 MILLIGRAM(S): 50 TABLET ORAL at 07:12

## 2024-12-09 RX ADMIN — PIPERACILLIN AND TAZOBACTAM 25 GRAM(S): 3; .375 INJECTION, POWDER, LYOPHILIZED, FOR SOLUTION INTRAVENOUS at 05:56

## 2024-12-09 RX ADMIN — NICOTINE POLACRILEX 1 PATCH: 4 LOZENGE ORAL at 04:00

## 2024-12-09 RX ADMIN — Medication 2 MILLIGRAM(S): at 07:21

## 2024-12-09 RX ADMIN — LATANOPROST 1 DROP(S): 50 SOLUTION OPHTHALMIC at 21:21

## 2024-12-09 RX ADMIN — PIPERACILLIN AND TAZOBACTAM 25 GRAM(S): 3; .375 INJECTION, POWDER, LYOPHILIZED, FOR SOLUTION INTRAVENOUS at 19:05

## 2024-12-09 RX ADMIN — Medication 2 MILLIGRAM(S): at 08:10

## 2024-12-09 RX ADMIN — PIPERACILLIN AND TAZOBACTAM 25 GRAM(S): 3; .375 INJECTION, POWDER, LYOPHILIZED, FOR SOLUTION INTRAVENOUS at 13:09

## 2024-12-09 RX ADMIN — Medication 50 MILLIGRAM(S): at 07:12

## 2024-12-09 RX ADMIN — ROSUVASTATIN 10 MILLIGRAM(S): 40 TABLET, FILM COATED ORAL at 21:21

## 2024-12-09 RX ADMIN — LOSARTAN POTASSIUM 25 MILLIGRAM(S): 100 TABLET, FILM COATED ORAL at 07:13

## 2024-12-09 RX ADMIN — SODIUM CHLORIDE 100 MILLILITER(S): 9 INJECTION, SOLUTION INTRAVENOUS at 07:16

## 2024-12-09 RX ADMIN — Medication 1 APPLICATION(S): at 18:12

## 2024-12-09 RX ADMIN — NICOTINE POLACRILEX 1 PATCH: 4 LOZENGE ORAL at 13:09

## 2024-12-09 RX ADMIN — CHLORHEXIDINE GLUCONATE 1 APPLICATION(S): 1.2 RINSE ORAL at 13:11

## 2024-12-09 RX ADMIN — Medication 2 MILLIGRAM(S): at 23:54

## 2024-12-09 RX ADMIN — NICOTINE POLACRILEX 1 PATCH: 4 LOZENGE ORAL at 18:31

## 2024-12-09 RX ADMIN — SODIUM CHLORIDE 100 MILLILITER(S): 9 INJECTION, SOLUTION INTRAVENOUS at 19:05

## 2024-12-09 RX ADMIN — Medication 1 DROP(S): at 19:05

## 2024-12-09 NOTE — PROGRESS NOTE ADULT - ASSESSMENT
Mr. Sami Chong is a 79 year old male with a PMH of CAD s/p stent placement in 2012 ( ASA), A Fib ( not on AC due to GI bleed), known h/o DVT in right leg, HFrEF,  locally advanced rectal cancer s/p chemo/radation (11/22,consolidation CAPOX (2/2023), brachytherapy (3/2024), hx of  s/p diverting end sigmoid colostomy, cystoscopy and naranjo placement across defect , and recent admission (11/24) for for  Taar gangrene s/p surgical debridement coming in for concern for rectal bleeding and sacral wound ulcer. Patient has been previous admitted for rectal bleed with a colonoscopy (10/24) showing diverticulosis, at that time was ruled as a complication of rectal cancer. Per primary oncologist, Dr. Paez, [facility], no role for DMT at that time.  Mr. Sami Chong is a 79 year old male with a PMH of CAD s/p stent placement in 2012 ( ASA), A Fib ( not on AC due to GI bleed), known h/o DVT in right leg, HFrEF,  locally advanced rectal cancer s/p chemo/radation (11/22,consolidation CAPOX (2/2023), brachytherapy (3/2024), hx of  s/p diverting end sigmoid colostomy, cystoscopy and naranjo placement across defect , and recent admission (11/24) for for  Tara gangrene s/p surgical debridement coming in for concern for rectal bleeding and sacral wound ulcer. Patient has been previous admitted for rectal bleed with a colonoscopy (10/24) showing diverticulosis, at that time was ruled as a complication of rectal cancer. Per primary oncologist, Dr. Paez, [facility], no role for DMT at that time.  Mr. Sami Chong is a 79 year old male with a PMH of CAD s/p stent placement in 2012 ( ASA), A Fib ( not on AC due to GI bleed), known h/o DVT in right leg, HFrEF,  locally advanced rectal cancer s/p chemo/radation (11/22,consolidation CAPOX (2/2023), brachytherapy (3/2024), hx of  s/p diverting end sigmoid colostomy, cystoscopy and naranjo placement across defect , and recent admission (11/24) for for  Tara gangrene s/p surgical debridement coming in for concern for rectal bleeding and sacral wound ulcer. Patient has been previous admitted for rectal bleed with a colonoscopy (10/24) showing diverticulosis, at that time was ruled as a complication of rectal cancer.

## 2024-12-09 NOTE — PROGRESS NOTE ADULT - PROBLEM SELECTOR PLAN 9
- Fluids: LR 100cc/hour  - Electrolytes: Will replete to maintain K>4, Phos>3, and Mag>2  - Nutrition: Regular diet,   - Activity: As tolerated, pending PT eval  - DVT Prophylaxis: Heparin Sub q  - Stress Ulcer/GI Prophylaxis: N/A,  - Disposition: Admit to medicine, will reach out to daughter for collateral - Fluids: LR 100cc/hour  - Electrolytes: Will replete to maintain K>4, Phos>3, and Mag>2  - Nutrition: Regular diet,   - Activity: As tolerated, pending PT eval  - DVT Prophylaxis: N/A  - Stress Ulcer/GI Prophylaxis: N/A,  - Disposition: Admit to medicine, will return home with PT when medically cleared

## 2024-12-09 NOTE — PROGRESS NOTE ADULT - CONVERSATION DETAILS
Team had GOC conversation with daughterTiffanie over the telephone. Spoke at length regarding diagnosis, prognosis and MOLST. Daughter was made aware of current treatment regarding sepsis workup, ostomy bleed and ulcer management. Daughter had concerns with medications that altered patient's judgement which were reorganized per the patient/family preference. 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 Patient was quoted to say, "I rather go out fighting than to lay in the bed and die". Patient had reaffirmed these wishes with each subsequent hospitalization, with the most recent being in October of 2024. At this time, per patient and family, patient will remain full code with full measures for all life sustaining treatment.

## 2024-12-09 NOTE — PROGRESS NOTE ADULT - PROBLEM SELECTOR PLAN 8
[Takes medication as prescribed] : takes [None] : Patient does not have any barriers to medication adherence - Hx of Rectal Cancer, follows with [facility] oncology,    Plan  Oxy 2.5/5 mg PRN 2mg for breakthrough Morphine - Hx of Rectal Cancer, follows with [facility] oncology  - Per daughter, Dr. Miguel Angel Johnson has been more proactive with patient's case, was to be reevaluted by [facility] for a possible 2nd opinion, expressed interest in outpt follow up with [facility] which will be reevalauted on discharge.  - Preference for morphine for pain management  Plan  - Tylenol 650mg mild/ moderate/severe: IV Morphine 1mg/2mg  - F/u with [facility] on discharge

## 2024-12-09 NOTE — PROGRESS NOTE ADULT - PROBLEM SELECTOR PLAN 6
H/H: 9.4/30.7on admission   - Hemoglobin on last admission (8.3/27.2)  - Stable   - Monitor for signs and symptoms of bleeding    Plan  - Active T&S  - Transfuse prn (Hgb>8, given Hx of CAD)   - F/u AM CBC

## 2024-12-09 NOTE — PROGRESS NOTE ADULT - PROBLEM SELECTOR PLAN 1
ED: VS: T: 95.7, HR: 105, BP: 112/82,. Labs: Na: 130, A, CRP: 101.9.    T: 95.7, HR: 105, BP:139/98, patient meets SIRS criteria  - . Patient was given 1x NS 1L bolus, 1x Tramadol 50 mg, 1x morphine 4mg, Fentanyl 25mcg.  - BxCx pending, maintained on empiric Zosyn in interim    Plan  - Legionella, Strep Ag  - LR 100cc/hr  - C/w Zosyn pending BxCx ED: VS: T: 95.7, HR: 105, BP: 112/82,. Labs: Na: 130, A, CRP: 101.9.    T: 95.7, HR: 105, BP:139/98, patient meets SIRS criteria  - . Patient was given 1x NS 1L bolus, 1x Tramadol 50 mg, 1x morphine 4mg, Fentanyl 25mcg.  - BxCx pending,   - Zosyn (-) empiric treatment  Plan  - CXR, UA for contaminant sources  - Pending  Legionella, Strep Ag  - C/w LR 100cc/hr  - C/w Zosyn for empiric treatment  pending BxCx

## 2024-12-09 NOTE — PROGRESS NOTE ADULT - ATTENDING COMMENTS
Patient seen and examined  Agree with above A/P by Dr Vuong  79 year old male with a PMH of CAD s/p stent placement in 2012 ( ASA), A Fib ( not on AC due to GI bleed), known h/o DVT in right leg, HFrEF,  locally advanced rectal cancer s/p chemo/radation (11/22,consolidation CAPOX (2/2023), brachytherapy (3/2024), hx of  s/p diverting end sigmoid colostomy, cystoscopy and naranjo placement across defect , and recent admission (11/24) for for  Tara gangrene s/p surgical debridement coming in for concern for rectal bleeding and sacral wound ulcer. Patient has been previous admitted for rectal bleed with a colonoscopy (10/24) showing diverticulosis, at that time was ruled as a complication of rectal cancer. Per primary oncologist, Dr. Paez, [facility], no role for DMT at that time.     PE NAD, Ox3, patient notes we are to update his daughter Sarai, states he goes OOB to chair at home and has wheel chair.  Vital Signs Last 24 Hrs T(F): 97.5 HR: 81 , BP: 111/83, RR: 17 , SpO2: 99%  room air  Lungs CTA,, cor Irreg, irreg, Abd soft n/t, POSITIVE colostomy bag LQ--> brown stool.  Groin, positive naranjo  Ext neg e/c/c                        9.0    6.60  )-----------( 211      ( 09 Dec 2024 10:58 )             29.5   UA--> not yet done  Hgb 9.4---> 9  A/p  # ?? UTI, send urine and culture. c/w IV Zosyn  # Sacral decube wound care MD to see  # Rectal bleed h/o diverticular dz and rectal mass s/p rt. followed by [facility] beds at the [facility] and Blood. no longer a candidate for chemo  #DVT proph Seq teds  #GOC. Will d/w daughter who is HCP.    Plan d/w team and patient. Patient seen and examined  Agree with above A/P by Dr Vuong  79 year old male with a PMH of CAD s/p stent placement in 2012 ( ASA), A Fib ( not on AC due to GI bleed), known h/o DVT in right leg, HFrEF,  locally advanced rectal cancer s/p chemo/radation (11/22,consolidation CAPOX (2/2023), brachytherapy (3/2024), hx of  s/p diverting end sigmoid colostomy, cystoscopy and naranjo placement across defect , and recent admission (11/24) for for  Tara gangrene s/p surgical debridement coming in for concern for rectal bleeding and sacral wound ulcer. Patient has been previous admitted for rectal bleed with a colonoscopy (10/24) showing diverticulosis, at that time was ruled as a complication of rectal cancer. Per primary oncologist, Dr. Paez, [facility], no role for DMT at that time.     PE NAD, Ox3, patient notes we are to update his daughter Sarai, states he goes OOB to chair at home and has wheel chair.  Vital Signs Last 24 Hrs T(F): 97.5 HR: 81 , BP: 111/83, RR: 17 , SpO2: 99%  room air  Lungs CTA,, cor Irreg, irreg, Abd soft n/t, POSITIVE colostomy bag LQ--> brown stool.  Groin, positive naranjo  Ext neg e/c/c                        9.0    6.60  )-----------( 211      ( 09 Dec 2024 10:58 )             29.5   UA--> not yet done  Hgb 9.4---> 9  A/p  # ?? UTI, send urine and culture. c/w IV Zosyn  # Sacral decube wound care MD to see  # Rectal bleed h/o diverticular dz and rectal mass s/p rt. followed by [facility] and Blood. no longer a candidate for chemo  #DVT proph Seq teds  #GOC. Will d/w daughter who is HCP.  GOC done by Dr Vuong---> patient is a nephrologist and wants FULL CODE.  Plan d/w team and patient.

## 2024-12-09 NOTE — PROGRESS NOTE ADULT - WHAT MATTERS MOST
Patient had expressed to daughter a desire to be with family and continuously fight against any diagnoses.

## 2024-12-09 NOTE — PROGRESS NOTE ADULT - SUBJECTIVE AND OBJECTIVE BOX
Varun Vuong MD  Available on TEAMS    Patient is a 79y old  Male who presents with a chief complaint of Rectal Bleeding (08 Dec 2024 09:43)      SUBJECTIVE / OVERNIGHT EVENTS: No acute events overnight. Patient was assessed at bedside.       REVIEW OF SYSTEMS:  CONSTITUTIONAL: No weakness, fevers, or chills  EYES/ENT: No visual changes; No vertigo, throat pain, or dysphagia  NECK: No pain or stiffness  RESPIRATORY: No cough, wheezing, hemoptysis, or shortness of breath  CARDIOVASCULAR: No chest pain or palpitations  GASTROINTESTINAL: No abdominal or epigastric pain. No nausea, vomiting, or hematemesis; No diarrhea or constipation. No melena or hematochezia.  GENITOURINARY: No dysuria, frequency, or hematuria  MUSCULOSKELETAL: No joint or muscle pain or aches  NEUROLOGICAL: No numbness or weakness  SKIN: No itching or rashes      MEDICATIONS  (STANDING):  chlorhexidine 2% Cloths 1 Application(s) Topical daily  heparin   Injectable 5000 Unit(s) SubCutaneous every 12 hours  lactated ringers. 1000 milliLiter(s) (100 mL/Hr) IV Continuous <Continuous>  latanoprost 0.005% Ophthalmic Solution 1 Drop(s) Both EYES at bedtime  losartan 25 milliGRAM(s) Oral daily  metoprolol succinate ER 50 milliGRAM(s) Oral daily  piperacillin/tazobactam IVPB.. 3.375 Gram(s) IV Intermittent every 8 hours  rosuvastatin 10 milliGRAM(s) Oral at bedtime  spironolactone 25 milliGRAM(s) Oral daily  timolol 0.5% Solution 1 Drop(s) Both EYES daily    MEDICATIONS  (PRN):  morphine  - Injectable 2 milliGRAM(s) IV Push every 4 hours PRN Severe Pain (7 - 10)  oxyCODONE    IR 2.5 milliGRAM(s) Oral every 6 hours PRN Moderate Pain (4 - 6)  oxyCODONE    IR 5 milliGRAM(s) Oral every 4 hours PRN Severe Pain (7 - 10)  traMADol 50 milliGRAM(s) Oral every 6 hours PRN for severe pain      CAPILLARY BLOOD GLUCOSE        I&O's Summary    08 Dec 2024 07:01  -  09 Dec 2024 07:00  --------------------------------------------------------  IN: 1310 mL / OUT: 1100 mL / NET: 210 mL        Vital Signs Last 24 Hrs  T(C): 36.4 (09 Dec 2024 07:10), Max: 36.4 (08 Dec 2024 17:20)  T(F): 97.5 (09 Dec 2024 07:10), Max: 97.6 (08 Dec 2024 17:20)  HR: 81 (09 Dec 2024 07:10) (81 - 100)  BP: 111/83 (09 Dec 2024 07:10) (100/71 - 139/98)  BP(mean): 110 (08 Dec 2024 10:43) (110 - 110)  RR: 17 (09 Dec 2024 07:10) (16 - 19)  SpO2: 99% (09 Dec 2024 07:10) (96% - 100%)    Parameters below as of 09 Dec 2024 07:10  Patient On (Oxygen Delivery Method): room air        PHYSICAL EXAM:  GENERAL: NAD, well-developed, well-nourished  HEAD: Atraumatic, Normocephalic  EYES: EOMI, PERRLA, conjunctiva and sclera clear  NECK: Supple, No JVD  CHEST/LUNG: Clear to auscultation bilaterally; No wheezes or crackles  HEART: Normal S1/S2; Regular rate and rhythm; No murmurs, rubs, or gallops  ABDOMEN: Soft, Nontender, Nondistended; Bowel sounds present  EXTREMITIES: 2+ Peripheral Pulses; No clubbing, cyanosis, or edema  PSYCH: A&Ox3  NEUROLOGY: no focal neurologic deficit  SKIN: No rashes or lesions    LABS:                        9.4    10.07 )-----------( 248      ( 08 Dec 2024 00:55 )             30.7      12-08    130[L]  |  93[L]  |  14  ----------------------------<  105[H]  3.9   |  21[L]  |  0.63    Ca    9.1      08 Dec 2024 00:55    TPro  6.2  /  Alb  2.4[L]  /  TBili  0.4  /  DBili  x   /  AST  21  /  ALT  7   /  AlkPhos  96  12-08    PT/INR - ( 08 Dec 2024 00:55 )   PT: 14.0 sec;   INR: 1.21 ratio         PTT - ( 08 Dec 2024 00:55 )  PTT:29.9 sec      Urinalysis Basic - ( 08 Dec 2024 00:55 )    Color: x / Appearance: x / SG: x / pH: x  Gluc: 105 mg/dL / Ketone: x  / Bili: x / Urobili: x   Blood: x / Protein: x / Nitrite: x   Leuk Esterase: x / RBC: x / WBC x   Sq Epi: x / Non Sq Epi: x / Bacteria: x        RADIOLOGY & ADDITIONAL TESTS:    Imaging Personally Reviewed:    Consultant(s) Notes Reviewed:      Care Discussed with Consultants/Other Providers:   Varun Vuong MD  Available on TEAMS    Patient is a 79y old  Male who presents with a chief complaint of Rectal Bleeding (08 Dec 2024 09:43)      SUBJECTIVE / OVERNIGHT EVENTS: No acute events overnight. Patient was assessed at bedside.  Patient has continuous concerns for rectal pain, back pain, and weakness. Denies fevers, chills.       REVIEW OF SYSTEMS:  EYES/ENT: No visual changes; No vertigo, throat pain, or dysphagia  NECK: No pain or stiffness  RESPIRATORY: No cough, wheezing, hemoptysis, or shortness of breath  CARDIOVASCULAR: No chest pain or palpitations  GASTROINTESTINAL: No abdominal or epigastric pain. No nausea, vomiting, or hematemesis; No diarrhea or constipation. No melena or hematochezia.  GENITOURINARY: No dysuria, frequency, or hematuria  MUSCULOSKELETAL: No joint or muscle pain or aches  NEUROLOGICAL: No numbness or weakness  SKIN: No itching or rashes      MEDICATIONS  (STANDING):  chlorhexidine 2% Cloths 1 Application(s) Topical daily  heparin   Injectable 5000 Unit(s) SubCutaneous every 12 hours  lactated ringers. 1000 milliLiter(s) (100 mL/Hr) IV Continuous <Continuous>  latanoprost 0.005% Ophthalmic Solution 1 Drop(s) Both EYES at bedtime  losartan 25 milliGRAM(s) Oral daily  metoprolol succinate ER 50 milliGRAM(s) Oral daily  piperacillin/tazobactam IVPB.. 3.375 Gram(s) IV Intermittent every 8 hours  rosuvastatin 10 milliGRAM(s) Oral at bedtime  spironolactone 25 milliGRAM(s) Oral daily  timolol 0.5% Solution 1 Drop(s) Both EYES daily    MEDICATIONS  (PRN):  morphine  - Injectable 2 milliGRAM(s) IV Push every 4 hours PRN Severe Pain (7 - 10)  oxyCODONE    IR 2.5 milliGRAM(s) Oral every 6 hours PRN Moderate Pain (4 - 6)  oxyCODONE    IR 5 milliGRAM(s) Oral every 4 hours PRN Severe Pain (7 - 10)  traMADol 50 milliGRAM(s) Oral every 6 hours PRN for severe pain      CAPILLARY BLOOD GLUCOSE        I&O's Summary    08 Dec 2024 07:01  -  09 Dec 2024 07:00  --------------------------------------------------------  IN: 1310 mL / OUT: 1100 mL / NET: 210 mL        Vital Signs Last 24 Hrs  T(C): 36.4 (09 Dec 2024 07:10), Max: 36.4 (08 Dec 2024 17:20)  T(F): 97.5 (09 Dec 2024 07:10), Max: 97.6 (08 Dec 2024 17:20)  HR: 81 (09 Dec 2024 07:10) (81 - 100)  BP: 111/83 (09 Dec 2024 07:10) (100/71 - 139/98)  BP(mean): 110 (08 Dec 2024 10:43) (110 - 110)  RR: 17 (09 Dec 2024 07:10) (16 - 19)  SpO2: 99% (09 Dec 2024 07:10) (96% - 100%)    Parameters below as of 09 Dec 2024 07:10  Patient On (Oxygen Delivery Method): room air        PHYSICAL EXAM:  GENERAL: NAD, well-developed, well-nourished  HEAD: Atraumatic, Normocephalic  EYES: EOMI, PERRLA, conjunctiva and sclera clear  NECK: Supple, No JVD  CHEST/LUNG: Clear to auscultation bilaterally; No wheezes or crackles  HEART: Normal S1/S2; Regular rate and rhythm; No murmurs, rubs, or gallops  ABDOMEN: Soft, Nontender, Nondistended; Bowel sounds present  EXTREMITIES: 2+ Peripheral Pulses; No clubbing, cyanosis, or edema  PSYCH: A&Ox3  NEUROLOGY: no focal neurologic deficit  SKIN: No rashes or lesions    LABS:                        9.4    10.07 )-----------( 248      ( 08 Dec 2024 00:55 )             30.7      12-08    130[L]  |  93[L]  |  14  ----------------------------<  105[H]  3.9   |  21[L]  |  0.63    Ca    9.1      08 Dec 2024 00:55    TPro  6.2  /  Alb  2.4[L]  /  TBili  0.4  /  DBili  x   /  AST  21  /  ALT  7   /  AlkPhos  96  12-08    PT/INR - ( 08 Dec 2024 00:55 )   PT: 14.0 sec;   INR: 1.21 ratio         PTT - ( 08 Dec 2024 00:55 )  PTT:29.9 sec      Urinalysis Basic - ( 08 Dec 2024 00:55 )    Color: x / Appearance: x / SG: x / pH: x  Gluc: 105 mg/dL / Ketone: x  / Bili: x / Urobili: x   Blood: x / Protein: x / Nitrite: x   Leuk Esterase: x / RBC: x / WBC x   Sq Epi: x / Non Sq Epi: x / Bacteria: x        RADIOLOGY & ADDITIONAL TESTS:    Imaging Personally Reviewed:    Consultant(s) Notes Reviewed:      Care Discussed with Consultants/Other Providers:

## 2024-12-09 NOTE — PROGRESS NOTE ADULT - PROBLEM SELECTOR PLAN 5
- Maintained on Crestor 10mg QHS, Toprol XL 50mg QD, Losartan 25mg QD, Spironolactone 25mg QD  - TTE (10/24): Left ventricular systolic function is moderately to severely decreased with an ejection fraction visually estimated at 35 to 40 %. Global left ventricular hypokinesis. Enlarged right ventricular cavity size, with normal wall thickness  - stable pericardial effusion seem     Plan  - No need for repeat TTE at this time  - C/w home meds - Maintained on Crestor 10mg QHS, Toprol XL 50mg QD, Losartan 25mg QD, Spironolactone 25mg QD  - TTE (10/24): Left ventricular systolic function is moderately to severely decreased with an ejection fraction visually estimated at 35 to 40 %. Global left ventricular hypokinesis. Enlarged right ventricular cavity size, with normal wall thickness, stable pericardial effusion see, consistent CT findings (12/8)  - Per conversation with daughter (12/9), there is concern for decreasing EF from outpt provider Dr. Sol,    Plan  - Repeat TTE for reevaluation of EF, unlikely change given short interim from previous echo evaluation, will R/o any compression that may be exacerbating underlying presentation

## 2024-12-09 NOTE — PROGRESS NOTE ADULT - PROBLEM SELECTOR PLAN 3
-CT A/P: Small amount of bilateral pleural effusions, Small volume pericardial effusion. Pulmonary nodules suspicious of metastasis Midline decubitus ulcer with local subcutaneous inflammation. A few foci of gas in the inflamed superficial soft tissues, nec fas not excluded, no abscess.  - Surg consulted: low concern for nec fascitis, foci of air secondary to open wound,    Plan  - Wound care eval -CT A/P: Small amount of bilateral pleural effusions, Small volume pericardial effusion. Pulmonary nodules suspicious of metastasis Midline decubitus ulcer with local subcutaneous inflammation. A few foci of gas in the inflamed superficial soft tissues, nec fas not excluded, no abscess.  - Surg consulted: low concern for nec fascitis, foci of air secondary to open wound,  -  MD Wound care eval    Plan  - MD wound care eval for possible debridement

## 2024-12-09 NOTE — PROGRESS NOTE ADULT - PROBLEM SELECTOR PLAN 2
-Hx of previous rectal bleeding, off DOAC  - Colonoscopy (10/24): Patent loop colostomy with healthy appearing mucosa in  the transverse colon. Diverticulosis in the entire examined proximal colon.   - Per [facility], not a candidate for further DMT,  - Ddx; infectious vs rectal cancer mass vs diverticulosis/itis  - likely complication of cancer mass, previous admissions for similar concerns    Plan  - 2 large bore IV's   - CMT, if bleeding continues or increases in volume, low threshold for GI consult -Hx of previous rectal bleeding, off DOAC  - Colonoscopy (10/24): Patent loop colostomy with healthy appearing mucosa in  the transverse colon. Diverticulosis in the entire examined proximal colon.   - Per [facility], not a candidate for further DMT,  - Ddx:  infectious vs rectal cancer mass vs diverticulosis/itis  - likely complication of cancer mass, previous admissions for similar concerns  - CMT, if bleeding continues or increases in volume, low threshold for GI consult

## 2024-12-09 NOTE — CONSULT NOTE ADULT - ASSESSMENT
Assessment/Plan:    Wound Consult requested to assist w/ management of          -Continue to offload pressure; low airloss support surface, turn and position per protocol with use of fluidized positioning devices, continue incontinence care per protocol and use of single breathable incontinence pads, continue to offload heels with complete cair boots.  -Continue Nutritional management as per RD recommendations.    Upon discharge follow up at outpatient [facility] [place]. [address] [place] [address]. [phone].      Findings and plan discussed with patient, family and primary team. All questions and concerns addressed to meet patient and family's satisfaction.  Will continue to follow periodically while inpatient, please reconsult earlier as needed.  Remainder of care per primary team.  Thank you.    AMRITA Dooley, CWN (MS Teams)    If after 4PM or before 7:30AM on Mon-Friday or weekend/holiday please contact general surgery for urgent matters.   Team A- [id number]   Team B- [id number]  For non-urgent matters e-mail [email or web address]    I/We spent (50) minutes face to face with this patient of which more than 50% of the time was spent counseling & coordinating care of this pt       Assessment/Plan: Mr. 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 Admitted with concern for rectal bleeding and stage 4 sacral pressure injury. Patient has been previous admitted for rectal bleed with a colonoscopy (10/24) showing diverticulosis, at that time was ruled as a complication of rectal cancer.     Wound Consult requested to assist w/ management of sacrum stage 4 pressure injury    Sacrum stage 4 pressure injury  -abundant adherent, nonfluctuant necrosis remains; bone palpable in close proximity.   -Periwound skin with blanching erythema, stable; does not extend beyond 2cm from wound edge.   -Agree with gen surgery; Low suspicion of necrotizing fascitis, subQ gas on CT likely from exposure to atmospheric air.    -No s/s of acute soft tissue infection; afebrile, WBC normal, no palpable skin changes.  -urine and stool contained, (+) colostomy and indwelling naranjo catheter.  -No sharp debridement indicated, risk of bleeding outweighs benefit.  -Will re-order to clean with antimicrobial Dakins solution   -Topical recommendations: cleanse wound with Dakins solution 1/4 strength,  rinse wound and periwound skin with NS, pat dry. Apply Liquid barrier film to periwound skin. Apply medihoney gel to wound base, pack undermining with Aquacel hydrofiber ribbon, leave 2" out at end to wick and visible to ensure full removal of packing with subsequent dressing changes. Cover with silicone foam with border. Change daily and prn if soiled/compromised.  -Continue to offload pressure; low airloss support surface, turn and position per protocol with use of fluidized positioning devices, continue moisture management with use of jayde moisture barrier cream to perianal area every shift and prn and continue use of single breathable incontinence pad, continue to offload heels with complete cair boots.  -Recommend nutrition consult for patient previously followed for severe malnutrition with full thickness surgical wounds and stage 4 pressure injury.   -Agree with ongoing goals of care discussion  -Upon discharge follow up at outpatient [facility]. [address] [facility]. [phone].    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   -No s/s of acute soft tissue infection  -Topical recommendations: cleanse wounds and periwound skin with NS. Apply Liquid barrier film to periwound skin. Pack dead space of both wounds with Aquacel ribbon, leave at least 2" out at end to ensure full removal of packing with subsequent dressing changes. Cover all with silicone foam with border, change daily. Upon discharge can change every other day.      Patient seen with Dr. Marcum today.  Findings and plan discussed with patient and primary team. All questions and concerns addressed.  Will continue to follow periodically while inpatient, please reconsult earlier as needed.  Remainder of care per primary team.  Thank you.    AMRITA Dooley, CWBAILEY (MS Teams)    If after 4PM or before 7:30AM on Mon-Friday or weekend/holiday please contact general surgery for urgent matters.   Team A- [id number]   Team B- [id number]  For non-urgent matters e-mail [email or web address]    I spent 75 minutes face to face with this patient of which more than 50% of the time was spent counseling & coordinating care of this pt       Assessment/Plan: Mr. 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 Admitted with concern for rectal bleeding and stage 4 sacral pressure injury. Patient has been previous admitted for rectal bleed with a colonoscopy (10/24) showing diverticulosis, at that time was ruled as a complication of rectal cancer.     Wound Consult requested to assist w/ management of sacrum stage 4 pressure injury    Sacrum stage 4 pressure injury  -abundant adherent, nonfluctuant necrosis remains; bone palpable in close proximity.   -Periwound skin with blanching erythema, stable; does not extend beyond 2cm from wound edge.   -Agree with gen surgery; Low suspicion of necrotizing fascitis, subQ gas on CT likely from exposure to atmospheric air.    -No s/s of acute soft tissue infection; afebrile, WBC normal, no palpable skin changes.  -urine and stool contained, (+) colostomy and indwelling naranjo catheter.  -No sharp debridement indicated, risk of bleeding outweighs benefit.  -Will re-order to clean with antimicrobial Dakins solution   -Topical recommendations: cleanse wound with Dakins solution 1/4 strength,  rinse wound and periwound skin with NS, pat dry. Apply Liquid barrier film to periwound skin. Apply medihoney gel to wound base, pack undermining with Aquacel hydrofiber ribbon, leave 2" out at end to wick and visible to ensure full removal of packing with subsequent dressing changes. Cover with silicone foam with border. Change daily and prn if soiled/compromised.  -Continue to offload pressure; low airloss support surface, turn and position per protocol with use of fluidized positioning devices, continue moisture management with use of jayde moisture barrier cream to perianal area every shift and prn and continue use of single breathable incontinence pad, continue to offload heels with complete cair boots.  -Recommend nutrition consult for patient previously followed for severe malnutrition with full thickness surgical wounds and stage 4 pressure injury.   -Agree with ongoing goals of care discussion  -Upon discharge follow up at outpatient [facility]. [address] [facility]. [phone].    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   -No s/s of acute soft tissue infection  -Topical recommendations: cleanse wounds and periwound skin with NS. Apply Liquid barrier film to periwound skin. Pack dead space of both wounds with Aquacel ribbon, leave at least 2" out at end to ensure full removal of packing with subsequent dressing changes. Cover all with silicone foam with border, change daily. Upon discharge can change every other day.      Patient seen with Dr. Marcum today.  Findings and plan discussed with patient and primary team. All questions and concerns addressed.  Will continue to follow periodically while inpatient, please reconsult earlier as needed.  Remainder of care per primary team.  Thank you.    AMRITA Dooley, ERIC (MS Teams)    If after 4PM or before 7:30AM on Mon-Friday or weekend/holiday please contact general surgery for urgent matters.   Team A- [id number]   Team B- [id number]  For non-urgent matters e-mail [email or web address]

## 2024-12-09 NOTE — CONSULT NOTE ADULT - SUBJECTIVE AND OBJECTIVE BOX
**Incomplete full note to follow**    Wound SURGERY CONSULT NOTE    HPI:  Mr. Sami Chong is a 79 year old male with a PMH of CAD s/p stent placement in  ( ASA), A Fib ( not on AC due to GI bleed), known h/o DVT in right leg, HFrEF,  locally advanced rectal cancer s/p chemo/radation (,consolidation CAPOX (2023), brachytherapy (3/2024), hx of  s/p diverting end sigmoid colostomy, cystoscopy and naranjo placement across defect , and recent admission () for for  Tara gangrene s/p surgical debridement coming in for concern for rectal bleeding and sacral wound ulcer. Patient has been previous admitted for rectal bleed with a colonoscopy (10/24) showing diverticulosis, at that time was ruled as a complication of rectal cancer. Per primary oncologist, Dr. Paez, no role for DMT at that time.       In the ED: VS: T: 95.7, HR: 105, BP: 112/82, RR: 18/100%. Labs: H/H: 9.4/30.7, Na: 130, A, CRP: 101.9. CT A/P: Small amount of bilateral pleural effusions, Small volume pericardial effusion. Pulmonary nodules suspicious of metastasis Midline decubitus ulcer with local subcutaneous inflammation. A few foci of gas in the inflamed superficial soft tissues, nec fas not excluded, no abscess. Indeterminate mildly heterogeneous 3.5 cm solid left adrenal mass. Patient was given 1x NS 1L bolus, 1x Tramadol 50 mg, 1x morphine 4mg, Fentanyl 25mcg. BxCx sent.  Started on LR 100cc/hr, Zosyn for broad coverage. Surgery consulted for r/o nec fascitis. Admitted to medicine for sacral wound ulcer and rectal bleeding. (08 Dec 2024 09:43)        N/V/D,  BM/ Flatus,   NGT,     palp/ sob/dyspnea/ cp,       Fever/Chills/Sweats    Wound consult requested to assist w/ management of      wound/ pressure injury.    c/o pain, drainage, odor, color change,  worsening swelling . Increasingly sedentary.  Incontinent of urine & stool.  H/o falls, trauma. Aquacell/ Allevyn/ medihoney/ dakins/ Adaptic/ DSD used at home/ while awaiting consult.  Appetite good/ decreased.  weight loss.   All questions asked and answered to pt's and family's satisfaction.    Current Diet: Diet, Regular:   DASH/TLC Sodium & Cholesterol Restricted (DASH) (24 @ 13:40)      PAST MEDICAL & SURGICAL HISTORY:  Chronic atrial fibrillation      History of rectal cancer      Chronic HFrEF (heart failure with reduced ejection fraction)      No significant past surgical history          REVIEW OF SYSTEMS: Pt unable to offer  General/ Breast/ Skin/ Neuro/ [facility]: see HPI  All other systems negative    MEDICATIONS  (STANDING):  chlorhexidine 2% Cloths 1 Application(s) Topical daily  lactated ringers. 1000 milliLiter(s) (100 mL/Hr) IV Continuous <Continuous>  latanoprost 0.005% Ophthalmic Solution 1 Drop(s) Both EYES at bedtime  losartan 25 milliGRAM(s) Oral daily  metoprolol succinate ER 50 milliGRAM(s) Oral daily  nicotine -   7 mG/24Hr(s) Patch 1 Patch Transdermal daily  piperacillin/tazobactam IVPB.. 3.375 Gram(s) IV Intermittent every 8 hours  rosuvastatin 10 milliGRAM(s) Oral at bedtime  spironolactone 25 milliGRAM(s) Oral daily  timolol 0.5% Solution 1 Drop(s) Both EYES daily    MEDICATIONS  (PRN):  acetaminophen     Tablet .. 650 milliGRAM(s) Oral every 6 hours PRN Mild Pain (1 - 3)  morphine  - Injectable 2 milliGRAM(s) IV Push every 4 hours PRN Severe Pain (7 - 10)  morphine  - Injectable 1 milliGRAM(s) IV Push every 4 hours PRN Moderate Pain (4 - 6)  traMADol 50 milliGRAM(s) Oral every 6 hours PRN for severe pain      Allergies    No Known Allergies    Intolerances        SOCIAL HISTORY:  / /single/ ; (+)HHA/ lives in SNF; Former smoker, Denies smoking, ETOH, drugs    FAMILY HISTORY:      PHYSICAL EXAM:  Vital Signs Last 24 Hrs  T(C): 36.4 (09 Dec 2024 07:10), Max: 36.4 (08 Dec 2024 17:20)  T(F): 97.5 (09 Dec 2024 07:10), Max: 97.6 (08 Dec 2024 17:20)  HR: 81 (09 Dec 2024 07:10) (81 - 100)  BP: 111/83 (09 Dec 2024 07:10) (100/71 - 111/83)  BP(mean): --  RR: 17 (09 Dec 2024 07:10) (17 - 19)  SpO2: 99% (09 Dec 2024 07:10) (96% - 99%)    Parameters below as of 09 Dec 2024 07:10  Patient On (Oxygen Delivery Method): room air        Constitutional: NAD / gaurded but stable,  A&Ox3/ Alert/ Confused  cachectic/ MO/ Obese/ frail  WD/ WN/ WG/ Disheveled  (+) low airloss support surface, (+) fluidized postioining devices, (+) complete cair boots     HEENT:  NC/AT, PERRL, EOMI, mucosa moist, throat clear, trachea midline, neck supple    Cardiovascular: RRR   Respiratory: CTA    Gastrointestinal soft NT/ND (+)BS  (+)PEG (+)ostomy    Neurology  weakened strength & sensation grossly intact/ paraesthesia  nonverbal, no follow commands/ paraplegic    Musculoskeletal:  limited/ FROM, no deformities/ contractures    Vascular: BLE equally warm/ cool,  no cyanosis, clubbing, edema  >LE //BLE edema equal  DP/PT pulses palpable  no acute ischemia noted  hemosiderin staining    Skin:  moist w/ good turgor  frail,  ecchymosis w/o hematoma  serosanguinous drainage  No odor, erythema, increased warmth, tenderness, induration, fluctuance    LABS/ CULTURES/ RADIOLOGY:                        9.0    6.60  )-----------( 211      ( 09 Dec 2024 10:58 )             29.5       129  |  93  |  15  ----------------------------<  105      [24 @ 10:58]  4.0   |  23  |  0.63        Ca     9.3     [24 @ 10:58]    TPro  6.2  /  Alb  2.4  /  TBili  0.4  /  DBili  x   /  AST  21  /  ALT  7   /  AlkPhos  96  [24 @ 00:55]    PT/INR: PT 14.0 , INR 1.21       [24 @ 00:55]  PTT: 29.9       [12-08-24 @ 00:55]          Culture - Blood (collected 24 @ 01:30)  Source: .Blood BLOOD  Preliminary Report (24 @ 11:01):    No growth at 24 hours    Culture - Blood (collected 24 @ 01:20)  Source: .Blood BLOOD  Preliminary Report (24 @ 11:01):    No growth at 24 hours         Wound SURGERY CONSULT NOTE    HPI:  Mr. Sami Chogn is a 79 year old male with a PMH of CAD s/p stent placement in  ( ASA), A Fib ( not on AC due to GI bleed), known h/o DVT in right leg, HFrEF, locally advanced rectal cancer s/p chemo/radiation (,consolidation CAPOX (2023), brachytherapy (3/2024), hx of  s/p diverting end sigmoid colostomy, cystoscopy and naranjo placement across defect. Recent admission () for Tara gangrene s/p surgical debridement coming in for concern for rectal bleeding and sacral wound ulcer. Patient has been previous admitted for rectal bleed with a colonoscopy (10/24) showing diverticulosis, at that time was ruled as a complication of rectal cancer. Per primary oncologist, Dr. Paez, no role for DMT at that time.     In the ED: VS: T: 95.7, HR: 105, BP: 112/82, RR: 18/100%. Labs: H/H: 9.4/30.7, Na: 130, A, CRP: 101.9. CT A/P: Small amount of bilateral pleural effusions, Small volume pericardial effusion. Pulmonary nodules suspicious of metastasis Midline decubitus ulcer with local subcutaneous inflammation. A few foci of gas in the inflamed superficial soft tissues, nec fas not excluded, no abscess. Indeterminate mildly heterogeneous 3.5 cm solid left adrenal mass. Patient was given 1x NS 1L bolus, 1x Tramadol 50 mg, 1x morphine 4mg, Fentanyl 25mcg. BxCx sent.  Started on LR 100cc/hr, Zosyn for broad coverage. Surgery consulted for r/o nec fascitis. Admitted to medicine for sacral wound ulcer and rectal bleeding. (08 Dec 2024 09:43)    Wound consult requested to assist w/ management of sacral stage 4 pressure injury. Seen by general surgery for sacral wound with c/f necrotizing fascitis per documentation low concern for necrotizing fascitis; no surgical intervention recommended. Patient known to wound surgery service line from previous admission, last seen 2024 for sacral stage 4 pressure injury, had selective sharp debridement of necrotic tissue on 10/29. Topically treated with Dakins cleanse, with recommendations to apply medihoney gel, pack tunneling at 6 o'clock with Aquacel hydrofiber ribbon and cover with silicone foam with border, with daily dressing changes. On previous admission comfort care was recommended, per records [facility] doctor, Dr. Paez felt comfort care was best as well. Patient's daughter Tiffanie expressed wanting to continue all medical management and maintain full code status. Pt was discharged home with HHA and visiting nurse services, has low airloss support surface. Patient unable to provide subjective data due to lethargy. Initially resistant to exam, after providing education on importance of wound assessment, along with emotional encouragement, patient agreeable to assessment. Reports discomfort with wound cleansing. Denies CP, SOB, abdominal pain, n/v. Reports fluctuant appetite.       Current Diet: Diet, Regular:   DASH/TLC Sodium & Cholesterol Restricted (DASH) (24 @ 13:40)      PAST MEDICAL & SURGICAL HISTORY:  Chronic atrial fibrillation      History of rectal cancer      Chronic HFrEF (heart failure with reduced ejection fraction)      No significant past surgical history          REVIEW OF SYSTEMS: Minimally interactive, ROS limited.   See skin above.    MEDICATIONS  (STANDING):  chlorhexidine 2% Cloths 1 Application(s) Topical daily  lactated ringers. 1000 milliLiter(s) (100 mL/Hr) IV Continuous <Continuous>  latanoprost 0.005% Ophthalmic Solution 1 Drop(s) Both EYES at bedtime  losartan 25 milliGRAM(s) Oral daily  metoprolol succinate ER 50 milliGRAM(s) Oral daily  nicotine -   7 mG/24Hr(s) Patch 1 Patch Transdermal daily  piperacillin/tazobactam IVPB.. 3.375 Gram(s) IV Intermittent every 8 hours  rosuvastatin 10 milliGRAM(s) Oral at bedtime  spironolactone 25 milliGRAM(s) Oral daily  timolol 0.5% Solution 1 Drop(s) Both EYES daily    MEDICATIONS  (PRN):  acetaminophen     Tablet .. 650 milliGRAM(s) Oral every 6 hours PRN Mild Pain (1 - 3)  morphine  - Injectable 2 milliGRAM(s) IV Push every 4 hours PRN Severe Pain (7 - 10)  morphine  - Injectable 1 milliGRAM(s) IV Push every 4 hours PRN Moderate Pain (4 - 6)  traMADol 50 milliGRAM(s) Oral every 6 hours PRN for severe pain      Allergies    No Known Allergies    Intolerances        SOCIAL HISTORY:  Recent . Lives with daughter, has HHA. (+) Bedbound, has low airloss support surface at home.    Per H&P (+) active smoker.    FAMILY HISTORY:      PHYSICAL EXAM:  Vital Signs Last 24 Hrs  T(C): 36.4 (09 Dec 2024 07:10), Max: 36.4 (08 Dec 2024 17:20)  T(F): 97.5 (09 Dec 2024 07:10), Max: 97.6 (08 Dec 2024 17:20)  HR: 81 (09 Dec 2024 07:10) (81 - 100)  BP: 111/83 (09 Dec 2024 07:10) (100/71 - 111/83)  BP(mean): --  RR: 17 (09 Dec 2024 07:10) (17 - 19)  SpO2: 99% (09 Dec 2024 07:10) (96% - 99%)    Parameters below as of 09 Dec 2024 07:10  Patient On (Oxygen Delivery Method): room air    Wt: 52 kg (2024)      Constitutional: A&O x 1-2, agitated at times. Pale, ill appearing, frail. Bedbound.   (+) low airloss support surface, (+) fluidized positioning devices, (+) complete cair boots  HEENT:  NC/AT, nonicteric, oral mucosa moist.  Cardiovascular: rate regular  Respiratory: nonlabored, room air, equal chest expansion.  Gastrointestinal: soft NT/ND, LLQ Colostomy with intact pouch in place; scant blood and pasty stool in pouch. Scant bright red blood per rectum.  : (+) indwelling naranjo catheter  Multiple surgical wounds: Measured in cluster- [id number]  -Anterior base of penile shaft and right of penile shaft- previous wound now healed.  -Suprapubic- 2cmx5.5cmx0.3cm- undermining that connects to anterior scrotal wound; 60% pink-moist granular base, 40% tan-moist adherent slough  -Anterior scrotum- [id number]- with undermining at 12 o'clock, connects to suprapubic wound. 100% tan-moist loosening slough, fixed at base. (+) epibole along wound edges.  *All with scant serous drainage. No purulent drainage, no odor. Periwound skin intact; no erythema, no edema, no increased warmth, no fluctuance, no crepitus.  Musculoskeletal: rigidity of all extremities, no gross deformities/contractures  Vascular: BL LE +2 dp pulses, capillary refill < 3 seconds, equally warm, no overt ischemia, heels intact bilaterally   Skin:  frail, good skin turgor  Sacrum- stage 4 pressure injury (patient turned to left side)  -[id number] (prev [id number])    -undermining from 12- 4 o'clock, deepest at 3 o'clock extending 1.5cm.  -abundant adherent softening nonfluctuant tan minimally moist slough/eschar, bone palpable in close proximity, not visible.  -Small serofibrinous drainage; no odor.   -Periwound skin with blanching erythema and hyperpigmentation circumferentially, erythema appears reactive- extends <2cm from wound edge, without induration, no fluctuance, no crepitus, no edema, no increased warmth.  Incontinence/moisture associated dermatitis in lower buttocks and perineum as evident by hyperpigmentation, no open wounds               LABS/ CULTURES/ RADIOLOGY:                        9.0    6.60  )-----------( 211      ( 09 Dec 2024 10:58 )             29.5       129  |  93  |  15  ----------------------------<  105      [24 @ 10:58]  4.0   |  23  |  0.63        Ca     9.3     [24 @ 10:58]    TPro  6.2  /  Alb  2.4  /  TBili  0.4  /  DBili  x   /  AST  21  /  ALT  7   /  AlkPhos  96  [24 @ 00:55]    PT/INR: PT 14.0 , INR 1.21       [24 @ 00:55]  PTT: 29.9       [24 @ 00:55]          Culture - Blood (collected 24 @ 01:30)  Source: .Blood BLOOD  Preliminary Report (24 @ 11:01):    No growth at 24 hours    Culture - Blood (collected 24 @ 01:20)  Source: .Blood BLOOD  Preliminary Report (24 @ 11:01):    No growth at 24 hours        ACC: [id number] EXAM:  CT ABDOMEN AND PELVIS   ORDERED BY: RILEY VIVI     PROCEDURE DATE:  2024          INTERPRETATION:  CLINICAL INFORMATION: Sacral decubitus with pain.   History of Tara's. History of rectal cancer.    COMPARISON: CT abdomen and pelvis 10/12/2024, CT chest 2024    CONTRAST/COMPLICATIONS:  IV Contrast: NONE  Oral Contrast: NONE  .    PROCEDURE:  CT of the Abdomen and Pelvis was performed.  Sagittal and coronal reformats were performed.    FINDINGS:  LOWER CHEST: Bibasilar pulmonary nodules without significant change.   Small bilateral pleural effusions, unchanged.    LIVER: Within normal limits.  BILE DUCTS: Normal caliber.  GALLBLADDER: Within normal limits.  SPLEEN: Within normal limits.  PANCREAS: Within normal limits.  ADRENALS: A 3.1 cm indeterminate left adrenal lesion without change.  KIDNEYS/URETERS: Bilateral renal cysts including a hyperdense likely   hemorrhagic cyst in the lower pole.    BLADDER: Minimally distended with a Naranjo catheter inplace. Right   lateral bladder diverticulum. Several dependent calculi.  REPRODUCTIVE ORGANS: Prostate is enlarged.    BOWEL: Left lower quadrant loop colostomy. No bowel obstruction. Marked   wall thickening of the rectum, difficult to compare an suspicious for an   underlying mass in this patient with history of rectal cancer.  PERITONEUM/RETROPERITONEUM: Within normal limits.  VESSELS: Atherosclerotic changes. A 5.1 cm infrarenal abdominal aortic   aneurysm. Left common iliac artery measures up to 2.1 cm. Findings are   unchanged.  LYMPH NODES: No lymphadenopathy.  ABDOMINAL WALL: Sacral decubitus ulcer with a few droplets of gas   overlying the ulcer. No associated drainable collection. Packing material   involving a scrotal wound. No associated collection.  BONES: Degenerative changes.    IMPRESSION:  Sacral decubitus ulcer with a few droplets of subcutaneous gas. No   associated collection.    Marked wall thickening of the rectum, difficult to compare an suspicious   for an underlying mass in this patient with history of rectal cancer.    Bibasilar pulmonary nodules concerning for metastases, unchanged.    A 3.1 cm indeterminate left adrenal lesion without change.    A 5.1 cm infrarenal abdominal aortic aneurysm.      --- End ofReport ---            JUAN HECTOR MD; Attending Radiologist  This document has been electronically signed. Dec  8 2024  2:25PM

## 2024-12-10 DIAGNOSIS — E87.1 HYPO-OSMOLALITY AND HYPONATREMIA: ICD-10-CM

## 2024-12-10 LAB
CREAT ?TM UR-MCNC: 38 MG/DL — SIGNIFICANT CHANGE UP
OSMOLALITY UR: 310 MOSM/KG — SIGNIFICANT CHANGE UP (ref 50–1200)
POTASSIUM UR-SCNC: 25.3 MMOL/L — SIGNIFICANT CHANGE UP
PROT ?TM UR-MCNC: 24 MG/DL — SIGNIFICANT CHANGE UP
PROT/CREAT UR-RTO: 0.6 RATIO — HIGH (ref 0–0.2)
SODIUM UR-SCNC: 28 MMOL/L — SIGNIFICANT CHANGE UP
UUN UR-MCNC: 429.5 MG/DL — SIGNIFICANT CHANGE UP

## 2024-12-10 PROCEDURE — 93306 TTE W/DOPPLER COMPLETE: CPT | Mod: 26

## 2024-12-10 PROCEDURE — 99233 SBSQ HOSP IP/OBS HIGH 50: CPT | Mod: GC

## 2024-12-10 RX ORDER — CHLORHEXIDINE GLUCONATE 1.2 MG/ML
1 RINSE ORAL DAILY
Refills: 0 | Status: DISCONTINUED | OUTPATIENT
Start: 2024-12-10 | End: 2024-12-24

## 2024-12-10 RX ORDER — SODIUM CHLORIDE 9 MG/ML
1000 INJECTION, SOLUTION INTRAVENOUS
Refills: 0 | Status: DISCONTINUED | OUTPATIENT
Start: 2024-12-10 | End: 2024-12-12

## 2024-12-10 RX ORDER — LEVOTHYROXINE SODIUM 175 UG/1
25 TABLET ORAL DAILY
Refills: 0 | Status: DISCONTINUED | OUTPATIENT
Start: 2024-12-10 | End: 2024-12-24

## 2024-12-10 RX ADMIN — SODIUM CHLORIDE 100 MILLILITER(S): 9 INJECTION, SOLUTION INTRAVENOUS at 11:13

## 2024-12-10 RX ADMIN — SPIRONOLACTONE 25 MILLIGRAM(S): 50 TABLET ORAL at 07:10

## 2024-12-10 RX ADMIN — Medication 1 APPLICATION(S): at 17:06

## 2024-12-10 RX ADMIN — Medication 2 MILLIGRAM(S): at 07:10

## 2024-12-10 RX ADMIN — NICOTINE POLACRILEX 1 PATCH: 4 LOZENGE ORAL at 11:38

## 2024-12-10 RX ADMIN — Medication 50 MILLIGRAM(S): at 07:10

## 2024-12-10 RX ADMIN — CHLORHEXIDINE GLUCONATE 1 APPLICATION(S): 1.2 RINSE ORAL at 11:17

## 2024-12-10 RX ADMIN — Medication 1 APPLICATION(S): at 11:14

## 2024-12-10 RX ADMIN — Medication 2 MILLIGRAM(S): at 15:09

## 2024-12-10 RX ADMIN — Medication 2 MILLIGRAM(S): at 14:09

## 2024-12-10 RX ADMIN — ROSUVASTATIN 10 MILLIGRAM(S): 40 TABLET, FILM COATED ORAL at 22:31

## 2024-12-10 RX ADMIN — Medication 2 MILLIGRAM(S): at 00:50

## 2024-12-10 RX ADMIN — NICOTINE POLACRILEX 1 PATCH: 4 LOZENGE ORAL at 11:14

## 2024-12-10 RX ADMIN — NICOTINE POLACRILEX 1 PATCH: 4 LOZENGE ORAL at 19:36

## 2024-12-10 RX ADMIN — NICOTINE POLACRILEX 1 PATCH: 4 LOZENGE ORAL at 07:30

## 2024-12-10 RX ADMIN — LATANOPROST 1 DROP(S): 50 SOLUTION OPHTHALMIC at 22:31

## 2024-12-10 RX ADMIN — Medication 1 APPLICATION(S): at 07:09

## 2024-12-10 RX ADMIN — LOSARTAN POTASSIUM 25 MILLIGRAM(S): 100 TABLET, FILM COATED ORAL at 07:10

## 2024-12-10 RX ADMIN — PIPERACILLIN AND TAZOBACTAM 25 GRAM(S): 3; .375 INJECTION, POWDER, LYOPHILIZED, FOR SOLUTION INTRAVENOUS at 04:24

## 2024-12-10 RX ADMIN — Medication 1 DROP(S): at 11:16

## 2024-12-10 RX ADMIN — PIPERACILLIN AND TAZOBACTAM 25 GRAM(S): 3; .375 INJECTION, POWDER, LYOPHILIZED, FOR SOLUTION INTRAVENOUS at 19:39

## 2024-12-10 RX ADMIN — Medication 2 MILLIGRAM(S): at 08:10

## 2024-12-10 RX ADMIN — PIPERACILLIN AND TAZOBACTAM 25 GRAM(S): 3; .375 INJECTION, POWDER, LYOPHILIZED, FOR SOLUTION INTRAVENOUS at 11:18

## 2024-12-10 NOTE — DIETITIAN INITIAL EVALUATION ADULT - OTHER CALCULATIONS
Apparent ~24 lb (18%) weight loss x4 months, per history obtained via chart: (12/9 dosing) 114.6 lbs / 52 kg, (10/31) 138 lbs, (8/30) 140 lbs.   Ideal Body Weight: 184 lbs / 83.6 kg +/-10%  Apparent clinically significant ~24 lb (18%) weight loss x4 months, per history obtained via chart: (12/9 dosing) 114.6 lbs / 52 kg, (10/31) 138 lbs, (8/30) 140 lbs.   Ideal Body Weight: 184 lbs / 83.6 kg +/-10%

## 2024-12-10 NOTE — PROGRESS NOTE ADULT - PROBLEM SELECTOR PLAN 1
ED: VS: T: 95.7, HR: 105, BP: 112/82,. Labs: Na: 130, A, CRP: 101.9.    T: 95.7, HR: 105, BP:139/98, patient meets SIRS criteria  - . Patient was given 1x NS 1L bolus, 1x Tramadol 50 mg, 1x morphine 4mg, Fentanyl 25mcg.  - BxCx NGTD 48 hours, pending UCx  - Zosyn (-) empiric treatment  - CXR: bilateral pleural effusions  - U/A: Moderate LE/ WBC: 76  - Legionella, Strep Ag: negative  Plan  - C/w LR 100cc/hr  - C/w Zosyn for empiric treatment  pending UCx

## 2024-12-10 NOTE — PROGRESS NOTE ADULT - PROBLEM SELECTOR PLAN 2
- ED: Na: 130  - S/p 2L LR   - DDx: SIADH 2/2 cancer/idiopathic/pain vs. FFT vs. decreased PO intake    Plan  - Urine Lytes for FeNA work up  - C/w LR 100cc/hr

## 2024-12-10 NOTE — PROGRESS NOTE ADULT - PROBLEM SELECTOR PLAN 3
-Hx of previous rectal bleeding, off DOAC  - Colonoscopy (10/24): Patent loop colostomy with healthy appearing mucosa in  the transverse colon. Diverticulosis in the entire examined proximal colon.   - Per [facility], not a candidate for further DMT,  - Ddx:  infectious vs rectal cancer mass vs diverticulosis/itis  - likely complication of cancer mass, previous admissions for similar concerns  - CMT, if bleeding continues or increases in volume, low threshold for GI consult

## 2024-12-10 NOTE — DIETITIAN NUTRITION RISK NOTIFICATION - ADDITIONAL COMMENTS/DIETITIAN RECOMMENDATIONS
Please see Dietitian Initial Assessment for complete recommendations.  Zulma Parr, TEENA, CDN #[id number]  Also available on [employer] Teams

## 2024-12-10 NOTE — PROGRESS NOTE ADULT - PROBLEM SELECTOR PLAN 6
- Maintained on Crestor 10mg QHS, Toprol XL 50mg QD, Losartan 25mg QD, Spironolactone 25mg QD  - TTE (10/24): Left ventricular systolic function is moderately to severely decreased with an ejection fraction visually estimated at 35 to 40 %. Global left ventricular hypokinesis. Enlarged right ventricular cavity size, with normal wall thickness, stable pericardial effusion see, consistent CT findings (12/8)  - Per conversation with daughter (12/9), there is concern for decreasing EF from outpt provider Dr. Sol,    Plan  - Pending TTE for reevaluation of EF

## 2024-12-10 NOTE — DIETITIAN INITIAL EVALUATION ADULT - OTHER INFO
Per chart, pt is 79 year old male PMH CAD s/p stent (2012), AFib, R leg DVT, HFrEF,  locally advanced rectal cancer s/p chemo/radiation/brachytherapy, s/p diverting end sigmoid colostomy/cystoscopy/naranjo placement, recent admission (11/2024) for Tara gangrene s/p surgical debridement presenting for rectal bleeding, sacral ulcer. Surgery was consulted, no plan for operative debridement. Wound Care on board. Full code per GOC.  Per chart, pt is 79 year old male PMH CAD s/p stent (2012), AFib, R leg DVT, HFrEF,  locally advanced rectal cancer s/p chemo/radiation/brachytherapy, s/p diverting end sigmoid colostomy/cystoscopy/naranjo placement, recent admission (11/2024) for Tara gangrene s/p surgical debridement presenting for rectal bleeding, sacral ulcer. Surgery was consulted, no plan for operative debridement. Wound Care on board. Full code per GOC.     Pt with limited contribution to discussion, A&Ox1-2; comprehensive chart review completed. NKFA, no noted chewing/swallowing difficulties. Pt noted with history of poor PO intake, had met criteria for malnutrition during previous admissions per RDN assessments (10/14, 10/22/24). Pt has historically been receptive to Ensure shakes.     Pt noted with poor PO intake thus far in house, refusing PO intake despite total assistance and sleeping through meals. Colostomy output per flowsheets: (12/8) 100 mL.

## 2024-12-10 NOTE — DIETITIAN INITIAL EVALUATION ADULT - ADD RECOMMEND
1) Recommend low sodium diet + Ensure Plus High Protein 1 PO 3x daily (provides 350 kcal, 20 gm protein per 8 oz serving).  2) Provide total assistance, encouragement at meal times.  3) Recommend multivitamin with mineral to provide micronutrient supplementation.  4) Obtain weekly weights.   5) Consider alternate means of nutrition if within GOC.

## 2024-12-10 NOTE — PROGRESS NOTE ADULT - SUBJECTIVE AND OBJECTIVE BOX
Varun Vuong MD  Available on TEAMS    Patient is a 79y old  Male who presents with a chief complaint of Rectal Bleeding (09 Dec 2024 12:41)      SUBJECTIVE / OVERNIGHT EVENTS: No acute events overnight. Patient was assessed at bedside.       REVIEW OF SYSTEMS:  CONSTITUTIONAL: No weakness, fevers, or chills  EYES/ENT: No visual changes; No vertigo, throat pain, or dysphagia  NECK: No pain or stiffness  RESPIRATORY: No cough, wheezing, hemoptysis, or shortness of breath  CARDIOVASCULAR: No chest pain or palpitations  GASTROINTESTINAL: No abdominal or epigastric pain. No nausea, vomiting, or hematemesis; No diarrhea or constipation. No melena or hematochezia.  GENITOURINARY: No dysuria, frequency, or hematuria  MUSCULOSKELETAL: No joint or muscle pain or aches  NEUROLOGICAL: No numbness or weakness  SKIN: No itching or rashes      MEDICATIONS  (STANDING):  chlorhexidine 2% Cloths 1 Application(s) Topical daily  chlorhexidine 2% Cloths 1 Application(s) Topical <User Schedule>  Dakins Solution - 1/4 Strength 1 Application(s) Topical daily  lactated ringers. 1000 milliLiter(s) (100 mL/Hr) IV Continuous <Continuous>  latanoprost 0.005% Ophthalmic Solution 1 Drop(s) Both EYES at bedtime  losartan 25 milliGRAM(s) Oral daily  metoprolol succinate ER 50 milliGRAM(s) Oral daily  mupirocin 2% Ointment 1 Application(s) Both Nostrils two times a day  nicotine -   7 mG/24Hr(s) Patch 1 Patch Transdermal daily  piperacillin/tazobactam IVPB.. 3.375 Gram(s) IV Intermittent every 8 hours  rosuvastatin 10 milliGRAM(s) Oral at bedtime  spironolactone 25 milliGRAM(s) Oral daily  timolol 0.5% Solution 1 Drop(s) Both EYES daily    MEDICATIONS  (PRN):  acetaminophen     Tablet .. 650 milliGRAM(s) Oral every 6 hours PRN Mild Pain (1 - 3)  morphine  - Injectable 2 milliGRAM(s) IV Push every 4 hours PRN Severe Pain (7 - 10)  morphine  - Injectable 1 milliGRAM(s) IV Push every 4 hours PRN Moderate Pain (4 - 6)  traMADol 50 milliGRAM(s) Oral every 6 hours PRN for severe pain      CAPILLARY BLOOD GLUCOSE        I&O's Summary    09 Dec 2024 07:01  -  10 Dec 2024 07:00  --------------------------------------------------------  IN: 1020 mL / OUT: 800 mL / NET: 220 mL        Vital Signs Last 24 Hrs  T(C): 36.4 (10 Dec 2024 07:04), Max: 36.5 (09 Dec 2024 21:18)  T(F): 97.6 (10 Dec 2024 07:04), Max: 97.7 (09 Dec 2024 21:18)  HR: 78 (10 Dec 2024 07:04) (70 - 78)  BP: 121/69 (10 Dec 2024 07:04) (105/63 - 121/69)  BP(mean): --  RR: 17 (10 Dec 2024 07:04) (17 - 18)  SpO2: 100% (10 Dec 2024 07:04) (99% - 100%)    Parameters below as of 10 Dec 2024 07:04  Patient On (Oxygen Delivery Method): room air        PHYSICAL EXAM:  GENERAL: NAD, well-developed, well-nourished  HEAD: Atraumatic, Normocephalic  EYES: EOMI, PERRLA, conjunctiva and sclera clear  NECK: Supple, No JVD  CHEST/LUNG: Clear to auscultation bilaterally; No wheezes or crackles  HEART: Normal S1/S2; Regular rate and rhythm; No murmurs, rubs, or gallops  ABDOMEN: Soft, Nontender, Nondistended; Bowel sounds present  EXTREMITIES: 2+ Peripheral Pulses; No clubbing, cyanosis, or edema  PSYCH: A&Ox3  NEUROLOGY: no focal neurologic deficit  SKIN: No rashes or lesions    LABS:                        9.0    6.60  )-----------( 211      ( 09 Dec 2024 10:58 )             29.5      12-    129[L]  |  93[L]  |  15  ----------------------------<  105[H]  4.0   |  23  |  0.63    Ca    9.3      09 Dec 2024 10:58            Urinalysis Basic - ( 09 Dec 2024 11:04 )    Color: Yellow / Appearance: Cloudy / S.019 / pH: x  Gluc: x / Ketone: Negative mg/dL  / Bili: Negative / Urobili: 0.2 mg/dL   Blood: x / Protein: Trace mg/dL / Nitrite: Negative   Leuk Esterase: Moderate / RBC: 27 /HPF / WBC 76 /HPF   Sq Epi: x / Non Sq Epi: 1 /HPF / Bacteria: Negative /HPF        RADIOLOGY & ADDITIONAL TESTS:    Imaging Personally Reviewed:    Consultant(s) Notes Reviewed:      Care Discussed with Consultants/Other Providers:   Varun Vuong MD  Available on TEAMS    Patient is a 79y old  Male who presents with a chief complaint of Rectal Bleeding (09 Dec 2024 12:41)      SUBJECTIVE / OVERNIGHT EVENTS: No acute events overnight. Patient was assessed at bedside. Continued pain in the rectum and back. No weakness, fevers, or chills      REVIEW OF SYSTEMS:  EYES/ENT: No visual changes; No vertigo, throat pain, or dysphagia  NECK: No pain or stiffness  RESPIRATORY: No cough, wheezing, hemoptysis, or shortness of breath  CARDIOVASCULAR: No chest pain or palpitations  GASTROINTESTINAL: No abdominal or epigastric pain. No nausea, vomiting, or hematemesis; No diarrhea or constipation. No melena or hematochezia.  GENITOURINARY: No dysuria, frequency, or hematuria  MUSCULOSKELETAL: No joint or muscle pain or aches  NEUROLOGICAL: No numbness or weakness  SKIN: No itching or rashes      MEDICATIONS  (STANDING):  chlorhexidine 2% Cloths 1 Application(s) Topical daily  chlorhexidine 2% Cloths 1 Application(s) Topical <User Schedule>  Dakins Solution - 1/4 Strength 1 Application(s) Topical daily  lactated ringers. 1000 milliLiter(s) (100 mL/Hr) IV Continuous <Continuous>  latanoprost 0.005% Ophthalmic Solution 1 Drop(s) Both EYES at bedtime  losartan 25 milliGRAM(s) Oral daily  metoprolol succinate ER 50 milliGRAM(s) Oral daily  mupirocin 2% Ointment 1 Application(s) Both Nostrils two times a day  nicotine -   7 mG/24Hr(s) Patch 1 Patch Transdermal daily  piperacillin/tazobactam IVPB.. 3.375 Gram(s) IV Intermittent every 8 hours  rosuvastatin 10 milliGRAM(s) Oral at bedtime  spironolactone 25 milliGRAM(s) Oral daily  timolol 0.5% Solution 1 Drop(s) Both EYES daily    MEDICATIONS  (PRN):  acetaminophen     Tablet .. 650 milliGRAM(s) Oral every 6 hours PRN Mild Pain (1 - 3)  morphine  - Injectable 2 milliGRAM(s) IV Push every 4 hours PRN Severe Pain (7 - 10)  morphine  - Injectable 1 milliGRAM(s) IV Push every 4 hours PRN Moderate Pain (4 - 6)  traMADol 50 milliGRAM(s) Oral every 6 hours PRN for severe pain      CAPILLARY BLOOD GLUCOSE        I&O's Summary    09 Dec 2024 07:01  -  10 Dec 2024 07:00  --------------------------------------------------------  IN: 1020 mL / OUT: 800 mL / NET: 220 mL        Vital Signs Last 24 Hrs  T(C): 36.4 (10 Dec 2024 07:04), Max: 36.5 (09 Dec 2024 21:18)  T(F): 97.6 (10 Dec 2024 07:04), Max: 97.7 (09 Dec 2024 21:18)  HR: 78 (10 Dec 2024 07:04) (70 - 78)  BP: 121/69 (10 Dec 2024 07:04) (105/63 - 121/69)  BP(mean): --  RR: 17 (10 Dec 2024 07:04) (17 - 18)  SpO2: 100% (10 Dec 2024 07:04) (99% - 100%)    Parameters below as of 10 Dec 2024 07:04  Patient On (Oxygen Delivery Method): room air        PHYSICAL EXAM:  GENERAL: NAD, well-developed, well-nourished  HEAD: Atraumatic, Normocephalic  EYES: EOMI, PERRLA, conjunctiva and sclera clear  NECK: Supple, No JVD  CHEST/LUNG: Clear to auscultation bilaterally; No wheezes or crackles  HEART: Normal S1/S2; Regular rate and rhythm; No murmurs, rubs, or gallops  ABDOMEN: Soft, Nontender, Nondistended; Bowel sounds present  EXTREMITIES: 2+ Peripheral Pulses; No clubbing, cyanosis, or edema  PSYCH: A&Ox3  NEUROLOGY: no focal neurologic deficit  SKIN: No rashes or lesions    LABS:                        9.0    6.60  )-----------( 211      ( 09 Dec 2024 10:58 )             29.5      12-    129[L]  |  93[L]  |  15  ----------------------------<  105[H]  4.0   |  23  |  0.63    Ca    9.3      09 Dec 2024 10:58            Urinalysis Basic - ( 09 Dec 2024 11:04 )    Color: Yellow / Appearance: Cloudy / S.019 / pH: x  Gluc: x / Ketone: Negative mg/dL  / Bili: Negative / Urobili: 0.2 mg/dL   Blood: x / Protein: Trace mg/dL / Nitrite: Negative   Leuk Esterase: Moderate / RBC: 27 /HPF / WBC 76 /HPF   Sq Epi: x / Non Sq Epi: 1 /HPF / Bacteria: Negative /HPF        RADIOLOGY & ADDITIONAL TESTS:    Imaging Personally Reviewed:    Consultant(s) Notes Reviewed:      Care Discussed with Consultants/Other Providers:

## 2024-12-10 NOTE — DIETITIAN INITIAL EVALUATION ADULT - PROBLEM SELECTOR PLAN 2
-Hx of previous rectal bleeding, off DOAC  - Colonoscopy (10/24): Patent loop colostomy with healthy appearing mucosa in  the transverse colon. Diverticulosis in the entire examined proximal colon.   - Per [facility], not a candidate for further DMT,  - Ddx; infectious vs rectal cancer mass vs diverticulosis/itis  - likely complication of cancer mass, previous admissions for similar concerns    Plan  - 2 large bore IV's   - CMT, if bleeding continues or increases in volume, low threshold for GI consult

## 2024-12-10 NOTE — DIETITIAN INITIAL EVALUATION ADULT - PROBLEM SELECTOR PLAN 8
- Hx of Rectal Cancer, follows with [facility] oncology,    Plan  Oxy 2.5/5 mg PRN 2mg for breakthrough Morphine

## 2024-12-10 NOTE — DIETITIAN INITIAL EVALUATION ADULT - PROBLEM SELECTOR PLAN 1
ED: VS: T: 95.7, HR: 105, BP: 112/82,. Labs: Na: 130, A, CRP: 101.9.    T: 95.7, HR: 105, BP:139/98, patient meets SIRS criteria  - . Patient was given 1x NS 1L bolus, 1x Tramadol 50 mg, 1x morphine 4mg, Fentanyl 25mcg.  - BxCx pending, maintained on empiric Zosyn in interim    Plan  - Legionella, Strep Ag  - LR 100cc/hr  - C/w Zosyn pending BxCx

## 2024-12-10 NOTE — PROGRESS NOTE ADULT - PROBLEM SELECTOR PLAN 4
-CT A/P: Small amount of bilateral pleural effusions, Small volume pericardial effusion. Pulmonary nodules suspicious of metastasis Midline decubitus ulcer with local subcutaneous inflammation. A few foci of gas in the inflamed superficial soft tissues, nec fas not excluded, no abscess.  - Surg consulted: low concern for nec fascitis, foci of air secondary to open wound,  -  MD Wound care eval    Plan  - Per MD: continue with daily dressing changes

## 2024-12-10 NOTE — PROGRESS NOTE ADULT - PROBLEM SELECTOR PLAN 9
- Hx of Rectal Cancer, follows with [facility] oncology  - Per daughter, Dr. Miguel Angel Johnson has been more proactive with patient's case, was to be reevaluted by [facility] for a possible 2nd opinion, expressed interest in outpt follow up with [facility] which will be reevalauted on discharge.  - Preference for morphine for pain management  - Tylenol 650mg mild/ moderate/severe: IV Morphine 1mg/2mg    Plan  - F/u with [facility] on discharge

## 2024-12-10 NOTE — DIETITIAN INITIAL EVALUATION ADULT - PERTINENT LABORATORY DATA
12-09    129[L]  |  93[L]  |  15  ----------------------------<  105[H]  4.0   |  23  |  0.63    Ca    9.3      09 Dec 2024 10:58    (12/8) HbA1c 4.8%   no new labs

## 2024-12-10 NOTE — PROGRESS NOTE ADULT - ATTENDING COMMENTS
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 Patient has been previous admitted for rectal bleed with a colonoscopy (10/24) showing diverticulosis, at that time was ruled as a complication of rectal cancer. Per primary oncologist, Dr. Paez, [facility], no role for DMT at that time.     PE NAD, Ox3, patient notes we are to update his daughter Sarai,   has  wheel chair at home.  T(F): 97.6 HR: 78 , BP: 121/69 , RR: 17 , SpO2: 100%  room air  Lungs CTA,, cor Irreg, irreg, Abd soft n/t,   POSITIVE colostomy bag LQ--> brown stool.  Groin, positive naranjo  Ext neg e/c/c   12/8 blood cultures neg x 48 hrs  12/9 ua---> mod blood and leukocytosis              A/p  # ?? UTI, f/u urine culture. c/w IV Zosyn day 3/5 to 7.  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   -No s/s of acute soft tissue infection  -Topical recommendations: cleanse wounds and periwound skin with NS. Apply Liquid barrier film to periwound skin. Pack dead space of both wounds with Aquacel ribbon, leave at least 2" out at end to ensure full removal of packing with subsequent dressing changes. Cover all with silicone foam with border, change daily. Upon discharge can change every other day.  "  # Rectal bleed h/o diverticular dz and rectal mass s/p rt. followed by [facility] and Blood. no longer a candidate for chemo  #DVT proph Seq teds  #GOC. Will d/w daughter who is HCP.  GOC done by Dr Vuong---> patient is a nephrologist and wants FULL CODE.  Plan d/w team and patient. 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 Patient has been previous admitted for rectal bleed with a colonoscopy (10/24) showing diverticulosis, at that time was ruled as a complication of rectal cancer. Per primary oncologist, Dr. Paez, [facility], no role for DMT at that time.     PE NAD, Ox3, patient notes we are to update his daughter Sarai,   has  wheel chair at home.  T(F): 97.6 HR: 78 , BP: 121/69 , RR: 17 , SpO2: 100%  room air  Lungs CTA,, cor Irreg, irreg, Abd soft n/t,   POSITIVE colostomy bag LQ--> brown stool.  Groin, positive naranjo  Ext neg e/c/c   12/8 blood cultures neg x 48 hrs  12/9 ua---> mod blood and leukocytosis       12-09 Na 129  12/9 tsh 24.5          A/p  # ?? UTI, f/u urine culture. c/w IV Zosyn day 3/5 to 7.  # Hypothyroidism ,TSH 24, start synthroid 25 mcg/day and monitor tsh in 6 weeks.  #Hyponatremia Na 129. F/u after IVF today. Send serum and urine osm and lytes. ??? SIADH??  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   -No s/s of acute soft tissue infection  -Topical recommendations: cleanse wounds and periwound skin with NS. Apply Liquid barrier film to periwound skin. Pack dead space of both wounds with Aquacel ribbon, leave at least 2" out at end to ensure full removal of packing with subsequent dressing changes. Cover all with silicone foam with border, change daily. Upon discharge can change every other day.  "  # Rectal bleed h/o diverticular dz and rectal mass s/p rt. followed by [place] and [facility]. no longer a candidate for chemo  #DVT proph Seq teds  #GOC. Will d/w daughter who is HCP.  GOC done by Dr Vuong---> patient is a nephrologist and wants FULL CODE.  Plan d/w team and patient.

## 2024-12-10 NOTE — DIETITIAN INITIAL EVALUATION ADULT - PROBLEM SELECTOR PLAN 9
- Fluids: LR 100cc/hour  - Electrolytes: Will replete to maintain K>4, Phos>3, and Mag>2  - Nutrition: Regular diet,   - Activity: As tolerated, pending PT eval  - DVT Prophylaxis: Heparin Sub q  - Stress Ulcer/GI Prophylaxis: N/A,  - Disposition: Admit to medicine, will reach out to daughter for collateral

## 2024-12-10 NOTE — PROGRESS NOTE ADULT - ASSESSMENT
Mr. Sami Chong is a 79 year old male with a PMH of CAD s/p stent placement in 2012 ( ASA), A Fib ( not on AC due to GI bleed), known h/o DVT in right leg, HFrEF,  locally advanced rectal cancer s/p chemo/radation (11/22,consolidation CAPOX (2/2023), brachytherapy (3/2024), hx of  s/p diverting end sigmoid colostomy, cystoscopy and naranjo placement across defect , and recent admission (11/24) for for  Tara gangrene s/p surgical debridement coming in for concern for rectal bleeding and sacral wound ulcer. Patient has been previous admitted for rectal bleed with a colonoscopy (10/24) showing diverticulosis, at that time was ruled as a complication of rectal cancer.

## 2024-12-10 NOTE — PROGRESS NOTE ADULT - PROBLEM SELECTOR PLAN 10
- Fluids: LR 100cc/hour  - Electrolytes: Will replete to maintain K>4, Phos>3, and Mag>2  - Nutrition: Regular diet,   - Activity: As tolerated, pending PT eval  - DVT Prophylaxis: N/A  - Stress Ulcer/GI Prophylaxis: N/A,  - Disposition: Admit to medicine, will return home with PT when medically cleared

## 2024-12-10 NOTE — DIETITIAN INITIAL EVALUATION ADULT - PROBLEM SELECTOR PLAN 5
- Maintained on Crestor 10mg QHS, Toprol XL 50mg QD, Losartan 25mg QD, Spironolactone 25mg QD  - TTE (10/24): Left ventricular systolic function is moderately to severely decreased with an ejection fraction visually estimated at 35 to 40 %. Global left ventricular hypokinesis. Enlarged right ventricular cavity size, with normal wall thickness  - stable pericardial effusion seem     Plan  - No need for repeat TTE at this time  - C/w home meds

## 2024-12-11 LAB
ADD ON TEST-SPECIMEN IN LAB: SIGNIFICANT CHANGE UP
ALBUMIN SERPL ELPH-MCNC: 2.3 G/DL — LOW (ref 3.3–5)
ALP SERPL-CCNC: 98 U/L — SIGNIFICANT CHANGE UP (ref 40–120)
ALT FLD-CCNC: <5 U/L — SIGNIFICANT CHANGE UP (ref 4–41)
ANION GAP SERPL CALC-SCNC: 9 MMOL/L — SIGNIFICANT CHANGE UP (ref 7–14)
AST SERPL-CCNC: 16 U/L — SIGNIFICANT CHANGE UP (ref 4–40)
BASOPHILS # BLD AUTO: 0.03 K/UL — SIGNIFICANT CHANGE UP (ref 0–0.2)
BASOPHILS NFR BLD AUTO: 0.5 % — SIGNIFICANT CHANGE UP (ref 0–2)
BILIRUB SERPL-MCNC: 0.3 MG/DL — SIGNIFICANT CHANGE UP (ref 0.2–1.2)
BUN SERPL-MCNC: 7 MG/DL — SIGNIFICANT CHANGE UP (ref 7–23)
CALCIUM SERPL-MCNC: 8.8 MG/DL — SIGNIFICANT CHANGE UP (ref 8.4–10.5)
CHLORIDE SERPL-SCNC: 96 MMOL/L — LOW (ref 98–107)
CO2 SERPL-SCNC: 25 MMOL/L — SIGNIFICANT CHANGE UP (ref 22–31)
CREAT SERPL-MCNC: 0.39 MG/DL — LOW (ref 0.5–1.3)
EGFR: 112 ML/MIN/1.73M2 — SIGNIFICANT CHANGE UP
EOSINOPHIL # BLD AUTO: 0.04 K/UL — SIGNIFICANT CHANGE UP (ref 0–0.5)
EOSINOPHIL NFR BLD AUTO: 0.7 % — SIGNIFICANT CHANGE UP (ref 0–6)
GLUCOSE SERPL-MCNC: 101 MG/DL — HIGH (ref 70–99)
HCT VFR BLD CALC: 26.9 % — LOW (ref 39–50)
HGB BLD-MCNC: 8.3 G/DL — LOW (ref 13–17)
IANC: 3.61 K/UL — SIGNIFICANT CHANGE UP (ref 1.8–7.4)
IMM GRANULOCYTES NFR BLD AUTO: 0.5 % — SIGNIFICANT CHANGE UP (ref 0–0.9)
LYMPHOCYTES # BLD AUTO: 1.28 K/UL — SIGNIFICANT CHANGE UP (ref 1–3.3)
LYMPHOCYTES # BLD AUTO: 21.7 % — SIGNIFICANT CHANGE UP (ref 13–44)
MCHC RBC-ENTMCNC: 25.1 PG — LOW (ref 27–34)
MCHC RBC-ENTMCNC: 30.9 G/DL — LOW (ref 32–36)
MCV RBC AUTO: 81.3 FL — SIGNIFICANT CHANGE UP (ref 80–100)
MONOCYTES # BLD AUTO: 0.91 K/UL — HIGH (ref 0–0.9)
MONOCYTES NFR BLD AUTO: 15.4 % — HIGH (ref 2–14)
NEUTROPHILS # BLD AUTO: 3.61 K/UL — SIGNIFICANT CHANGE UP (ref 1.8–7.4)
NEUTROPHILS NFR BLD AUTO: 61.2 % — SIGNIFICANT CHANGE UP (ref 43–77)
NRBC # BLD: 0 /100 WBCS — SIGNIFICANT CHANGE UP (ref 0–0)
NRBC # FLD: 0 K/UL — SIGNIFICANT CHANGE UP (ref 0–0)
PLATELET # BLD AUTO: 197 K/UL — SIGNIFICANT CHANGE UP (ref 150–400)
POTASSIUM SERPL-MCNC: 3.3 MMOL/L — LOW (ref 3.5–5.3)
POTASSIUM SERPL-SCNC: 3.3 MMOL/L — LOW (ref 3.5–5.3)
PROT SERPL-MCNC: 5.5 G/DL — LOW (ref 6–8.3)
RBC # BLD: 3.31 M/UL — LOW (ref 4.2–5.8)
RBC # FLD: 16 % — HIGH (ref 10.3–14.5)
SODIUM SERPL-SCNC: 130 MMOL/L — LOW (ref 135–145)
WBC # BLD: 5.75 K/UL — SIGNIFICANT CHANGE UP (ref 3.8–10.5)
WBC # FLD AUTO: 5.75 K/UL — SIGNIFICANT CHANGE UP (ref 3.8–10.5)

## 2024-12-11 PROCEDURE — 99233 SBSQ HOSP IP/OBS HIGH 50: CPT | Mod: GC

## 2024-12-11 RX ORDER — POTASSIUM CHLORIDE 600 MG/1
10 TABLET, FILM COATED, EXTENDED RELEASE ORAL
Refills: 0 | Status: COMPLETED | OUTPATIENT
Start: 2024-12-11 | End: 2024-12-11

## 2024-12-11 RX ORDER — SODIUM CHLORIDE 9 MG/ML
1000 INJECTION, SOLUTION INTRAMUSCULAR; INTRAVENOUS; SUBCUTANEOUS
Refills: 0 | Status: COMPLETED | OUTPATIENT
Start: 2024-12-11 | End: 2024-12-11

## 2024-12-11 RX ORDER — POLYETHYLENE GLYCOL 3350 17 G/DOSE
17 POWDER (GRAM) ORAL DAILY
Refills: 0 | Status: DISCONTINUED | OUTPATIENT
Start: 2024-12-11 | End: 2024-12-24

## 2024-12-11 RX ORDER — FLUCONAZOLE 200 MG/1
50 TABLET ORAL DAILY
Refills: 0 | Status: DISCONTINUED | OUTPATIENT
Start: 2024-12-11 | End: 2024-12-11

## 2024-12-11 RX ORDER — SENNOSIDES 8.6 MG/1
2 TABLET, FILM COATED ORAL AT BEDTIME
Refills: 0 | Status: DISCONTINUED | OUTPATIENT
Start: 2024-12-11 | End: 2024-12-24

## 2024-12-11 RX ORDER — SODIUM CHLORIDE 9 MG/ML
2 INJECTION, SOLUTION INTRAMUSCULAR; INTRAVENOUS; SUBCUTANEOUS THREE TIMES A DAY
Refills: 0 | Status: DISCONTINUED | OUTPATIENT
Start: 2024-12-11 | End: 2024-12-24

## 2024-12-11 RX ORDER — SODIUM CHLORIDE 9 MG/ML
1000 INJECTION, SOLUTION INTRAVENOUS
Refills: 0 | Status: DISCONTINUED | OUTPATIENT
Start: 2024-12-11 | End: 2024-12-11

## 2024-12-11 RX ORDER — OXYCODONE HCL 15 MG
15 TABLET ORAL EVERY 12 HOURS
Refills: 0 | Status: DISCONTINUED | OUTPATIENT
Start: 2024-12-11 | End: 2024-12-16

## 2024-12-11 RX ADMIN — SODIUM CHLORIDE 2 GRAM(S): 9 INJECTION, SOLUTION INTRAMUSCULAR; INTRAVENOUS; SUBCUTANEOUS at 13:22

## 2024-12-11 RX ADMIN — NICOTINE POLACRILEX 1 PATCH: 4 LOZENGE ORAL at 11:45

## 2024-12-11 RX ADMIN — POTASSIUM CHLORIDE 100 MILLIEQUIVALENT(S): 600 TABLET, FILM COATED, EXTENDED RELEASE ORAL at 11:44

## 2024-12-11 RX ADMIN — PIPERACILLIN AND TAZOBACTAM 25 GRAM(S): 3; .375 INJECTION, POWDER, LYOPHILIZED, FOR SOLUTION INTRAVENOUS at 03:38

## 2024-12-11 RX ADMIN — POTASSIUM CHLORIDE 100 MILLIEQUIVALENT(S): 600 TABLET, FILM COATED, EXTENDED RELEASE ORAL at 11:57

## 2024-12-11 RX ADMIN — SENNOSIDES 2 TABLET(S): 8.6 TABLET, FILM COATED ORAL at 22:19

## 2024-12-11 RX ADMIN — SODIUM CHLORIDE 100 MILLILITER(S): 9 INJECTION, SOLUTION INTRAMUSCULAR; INTRAVENOUS; SUBCUTANEOUS at 13:09

## 2024-12-11 RX ADMIN — ROSUVASTATIN 10 MILLIGRAM(S): 40 TABLET, FILM COATED ORAL at 22:19

## 2024-12-11 RX ADMIN — Medication 15 MILLIGRAM(S): at 17:04

## 2024-12-11 RX ADMIN — NICOTINE POLACRILEX 1 PATCH: 4 LOZENGE ORAL at 12:02

## 2024-12-11 RX ADMIN — Medication 15 MILLIGRAM(S): at 17:14

## 2024-12-11 RX ADMIN — CHLORHEXIDINE GLUCONATE 1 APPLICATION(S): 1.2 RINSE ORAL at 12:00

## 2024-12-11 RX ADMIN — PIPERACILLIN AND TAZOBACTAM 25 GRAM(S): 3; .375 INJECTION, POWDER, LYOPHILIZED, FOR SOLUTION INTRAVENOUS at 19:09

## 2024-12-11 RX ADMIN — NICOTINE POLACRILEX 1 PATCH: 4 LOZENGE ORAL at 17:15

## 2024-12-11 RX ADMIN — SODIUM CHLORIDE 2 GRAM(S): 9 INJECTION, SOLUTION INTRAMUSCULAR; INTRAVENOUS; SUBCUTANEOUS at 22:19

## 2024-12-11 RX ADMIN — POTASSIUM CHLORIDE 100 MILLIEQUIVALENT(S): 600 TABLET, FILM COATED, EXTENDED RELEASE ORAL at 13:21

## 2024-12-11 RX ADMIN — Medication 1 DROP(S): at 11:44

## 2024-12-11 RX ADMIN — LATANOPROST 1 DROP(S): 50 SOLUTION OPHTHALMIC at 22:19

## 2024-12-11 RX ADMIN — Medication 1 APPLICATION(S): at 17:04

## 2024-12-11 RX ADMIN — PIPERACILLIN AND TAZOBACTAM 25 GRAM(S): 3; .375 INJECTION, POWDER, LYOPHILIZED, FOR SOLUTION INTRAVENOUS at 11:44

## 2024-12-11 NOTE — PROGRESS NOTE ADULT - PROBLEM SELECTOR PLAN 2
- ED: Na: 130  - S/p 2L LR   - DDx: SIADH 2/2 cancer/idiopathic/pain vs. FFT vs. decreased PO intake    Plan  - Urine Lytes for FeNA work up  - C/w LR 100cc/hr - ED: Na: 130/ 12/11: 130  - DDx: SIADH 2/2 cancer/idiopathic/pain vs. FFT vs. decreased PO intake  - Urine Lytes: FeNA: 0.4%: prerenal etiology, likely mixed iso of SIADH vs. chronic disease vs. FTT    Plan  - C/w LR 100cc/hr  - encourage PO intake

## 2024-12-11 NOTE — PROGRESS NOTE ADULT - PROBLEM SELECTOR PLAN 3
-Hx of previous rectal bleeding, off DOAC  - Colonoscopy (10/24): Patent loop colostomy with healthy appearing mucosa in  the transverse colon. Diverticulosis in the entire examined proximal colon.   - Per [facility], not a candidate for further DMT,  - Ddx:  infectious vs rectal cancer mass vs diverticulosis/itis  - likely complication of cancer mass, previous admissions for similar concerns  - CMT, if bleeding continues or increases in volume, low threshold for GI consult -Hx of previous rectal bleeding, off DOAC  - Colonoscopy (10/24): Patent loop colostomy with healthy appearing mucosa in  the transverse colon. Diverticulosis in the entire examined proximal colon.   - Per [facility], not a candidate for further DMT,  - Ddx: Infectious vs rectal cancer mass vs diverticulosis/itis  - likely complication of cancer mass, previous admissions for similar concerns  - CMT, if bleeding continues or increases in volume, low threshold for GI consult

## 2024-12-11 NOTE — PROGRESS NOTE ADULT - NSPROGADDITIONALINFOA_GEN_ALL_CORE
Urine culture 50  to 99 cfu/ml candida albicans  IV zosyn until 12/12 for 5 days.  Diflucan 250 mg po x 3 days.  h/h stable  f/u Na  ?/ d/c to home 12/12 ??

## 2024-12-11 NOTE — DISCHARGE NOTE PROVIDER - NSDCCPCAREPLAN_GEN_ALL_CORE_FT
PRINCIPAL DISCHARGE DIAGNOSIS  Diagnosis: Rectal bleeding  Assessment and Plan of Treatment: You've noticed some bleeding from your ostomy bag, and I understand this can be concerning. Given your history of rectal cancer, this bleeding is likely related to the tumor. While some bleeding can be expected, it's important to monitor it. We've consulted the oncology team, and they will be in touch with you to schedule a follow-up appointment to discuss this further and determine the best course of action. In the meantime, it's crucial to watch for any signs of significant bleeding, such as a large amount of blood in the bag, or if the bleeding becomes persistent and doesn't stop. You should also return to the emergency department if you experience any dizziness, lightheadedness, weakness, a rapid heart rate, or severe abdominal pain. These could be signs of excessive blood loss and require immediate attention.  To Do:  1: Please follow up with your PCP, Dr. Chatterjee, [phone], within 2 weeks of discharge  2: Please follow up with [facility], [phone], within 2 weeks of discharge        SECONDARY DISCHARGE DIAGNOSES  Diagnosis: Sacral decubitus ulcer  Assessment and Plan of Treatment: You have what we call a sacral decubitus ulcer, which is also known as a pressure sore or bedsore. It's an area of damaged skin on your sacrum, the bony area at the base of your spine, that develops from prolonged pressure. This can happen from sitting or lying in one position for too long. We've consulted the wound care team, and they've recommended daily dressing changes to help the ulcer heal properly. Because these ulcers can be painful, we've also consulted the palliative care team to help manage your pain and they've recommended a new pain management plan. It's important to keep the area clean and dry and to change positions frequently to prevent further pressure. You should return to the emergency department if you notice any signs of infection, such as increased pain, redness, swelling, pus or a foul odor coming from the wound, or if you develop a fever. Also, return if the ulcer suddenly gets bigger or deeper, or if you have any other concerns.       PRINCIPAL DISCHARGE DIAGNOSIS  Diagnosis: Rectal bleeding  Assessment and Plan of Treatment: You were admitted to the hospital for bleeding from your ostomy bag. Given your history of rectal cancer, this bleeding is likely related to the tumor. Your hemoglobin levels were monitored while in the hospital, and you received 1 blood transfusion. While some bleeding can be expected, it's important to monitor it. We've consulted the oncology team, and they will be in touch with you to schedule a follow-up appointment to discuss this further and determine the best course of action. In the meantime, it's crucial to watch for any signs of significant bleeding, such as a large amount of blood in the bag, or if the bleeding becomes persistent and doesn't stop. You should also return to the emergency department if you experience any dizziness, lightheadedness, weakness, a rapid heart rate, or severe abdominal pain. These could be signs of excessive blood loss and require immediate attention.  You were also seen by our palliative care team, who recommended a pain regimen, as well as our psychiatry team due to concern for depression.  New medications in the hospital:  - Salt tabs for low sodium levels  - Mirtazapine for appetite stimulation/depression  - Oral morphine as needed for pain  To Do:  1: Please follow up with your PCP, Dr. Chatterjee, [phone], within 2 weeks of discharge  2: Please follow up with [facility], [phone], within 2 weeks of discharge  3: For psychiatry, can follow up with Dr. Velazquez if you wish to transfer care to [facility]  4: Routine follow up with your cardiologist        SECONDARY DISCHARGE DIAGNOSES  Diagnosis: Sacral decubitus ulcer  Assessment and Plan of Treatment: You have what we call a sacral decubitus ulcer, which is also known as a pressure sore or bedsore. It's an area of damaged skin on your sacrum, the bony area at the base of your spine, that develops from prolonged pressure. This can happen from sitting or lying in one position for too long. We've consulted the wound care team, and they've recommended daily dressing changes to help the ulcer heal properly. Because these ulcers can be painful, we've also consulted the palliative care team to help manage your pain and they've recommended a new pain management plan. It's important to keep the area clean and dry and to change positions frequently to prevent further pressure. You should return to the emergency department if you notice any signs of infection, such as increased pain, redness, swelling, pus or a foul odor coming from the wound, or if you develop a fever. Also, return if the ulcer suddenly gets bigger or deeper, or if you have any other concerns.       PRINCIPAL DISCHARGE DIAGNOSIS  Diagnosis: Rectal bleeding  Assessment and Plan of Treatment: You were admitted to the hospital for bleeding from your ostomy bag. Given your history of rectal cancer, this bleeding is likely related to the tumor. Your hemoglobin levels were monitored while in the hospital, and you received 1 blood transfusion. While some bleeding can be expected, it's important to monitor it. We've consulted the oncology team, and they will be in touch with you to schedule a follow-up appointment to discuss this further and determine the best course of action. In the meantime, it's crucial to watch for any signs of significant bleeding, such as a large amount of blood in the bag, or if the bleeding becomes persistent and doesn't stop. You should also return to the emergency department if you experience any dizziness, lightheadedness, weakness, a rapid heart rate, or severe abdominal pain. These could be signs of excessive blood loss and require immediate attention.  You were also seen by our palliative care team, who recommended a pain regimen, as well as our psychiatry team due to concern for depression.  Additionally, you tested positive for RSV during admission. This is a respiratory virus that typically resolves with supportive care.  New medications in the hospital:  - Salt tabs for low sodium levels  - Mirtazapine for appetite stimulation/depression  - Oral morphine as needed for pain  To Do:  1: Please follow up with your PCP, Dr. Chatterjee, [phone], within 2 weeks of discharge  2: Please follow up with [facility], [phone], within 2 weeks of discharge  3: For psychiatry, can follow up with Dr. Velazquez if you wish to transfer care to [facility]  4: Routine follow up with your cardiologist        SECONDARY DISCHARGE DIAGNOSES  Diagnosis: Sacral decubitus ulcer  Assessment and Plan of Treatment: You have what we call a sacral decubitus ulcer, which is also known as a pressure sore or bedsore. It's an area of damaged skin on your sacrum, the bony area at the base of your spine, that develops from prolonged pressure. This can happen from sitting or lying in one position for too long. We've consulted the wound care team, and they've recommended daily dressing changes to help the ulcer heal properly. Because these ulcers can be painful, we've also consulted the palliative care team to help manage your pain and they've recommended a new pain management plan. It's important to keep the area clean and dry and to change positions frequently to prevent further pressure. You should return to the emergency department if you notice any signs of infection, such as increased pain, redness, swelling, pus or a foul odor coming from the wound, or if you develop a fever. Also, return if the ulcer suddenly gets bigger or deeper, or if you have any other concerns.

## 2024-12-11 NOTE — PROGRESS NOTE ADULT - PROBLEM SELECTOR PLAN 7
H/H: 9.4/30.7on admission   - Hemoglobin on last admission (8.3/27.2)  - Stable   - Monitor for signs and symptoms of bleeding    Plan  - Active T&S  - Transfuse prn (Hgb>8, given Hx of CAD)   - F/u AM CBC - H/H: 9.4/30.7 on admission   - Hemoglobin on last admission (8.3/27.2)  - Stable   - Monitor for signs and symptoms of bleeding    Plan  - Active T&S  - Transfuse prn (Hgb>8, given Hx of CAD)

## 2024-12-11 NOTE — DISCHARGE NOTE PROVIDER - CARE PROVIDERS DIRECT ADDRESSES
[email or web address]

## 2024-12-11 NOTE — PROGRESS NOTE ADULT - PROBLEM SELECTOR PLAN 6
- Maintained on Crestor 10mg QHS, Toprol XL 50mg QD, Losartan 25mg QD, Spironolactone 25mg QD  - TTE (10/24): Left ventricular systolic function is moderately to severely decreased with an ejection fraction visually estimated at 35 to 40 %. Global left ventricular hypokinesis. Enlarged right ventricular cavity size, with normal wall thickness, stable pericardial effusion see, consistent CT findings (12/8)  - Per conversation with daughter (12/9), there is concern for decreasing EF from outpt provider Dr. Sol,    Plan  - Pending TTE for reevaluation of EF - Maintained on Crestor 10mg QHS, Toprol XL 50mg QD, Losartan 25mg QD, Spironolactone 25mg QD  - TTE (10/24): Left ventricular systolic function is moderately to severely decreased with an ejection fraction visually estimated at 35 to 40 %. Global left ventricular hypokinesis. Enlarged right ventricular cavity size, with normal wall thickness, stable pericardial effusion see, consistent CT findings (12/8)  - Per conversation with daughter (12/9), there is concern for decreasing EF from outpt provider Dr. Sol,  - TTE: LV not well visualized. Unable to evaluate the EF. LV normal thickness and size.

## 2024-12-11 NOTE — DISCHARGE NOTE PROVIDER - NSDCFUADDAPPT_GEN_ALL_CORE_FT
APPTS ARE READY TO BE MADE: [ ] YES    Best Family or Patient Contact (if needed):    Tiffanie, daughter: [phone]  Self, Gonzalor: [phone]    Additional Information about above appointments (if needed):    1: Please follow up with your PCP, Dr. Chatterjee, [phone], within 2 weeks of discharge  2: Please follow up with your cardiologist, Dr. Leiva, [phone], within 2 weeks f discharge  3: Please follow up with [facility], [phone], within 2 weeks of discharge    Other comments or requests:    APPTS ARE READY TO BE MADE: [X] YES    Best Family or Patient Contact (if needed):    Tiffanie, daughter: [phone]  Self, Gonzalor: [phone]    Additional Information about above appointments (if needed):    1: Please follow up with your PCP, Dr. Chatterjee, [phone], within 2 weeks of discharge  2: Please follow up with your cardiologist, Dr. Leiva, [phone], within 2 weeks f discharge  3: Please follow up with [facility], [phone], within 2 weeks of discharge  4: If establishing care at [facility] can follow up with Dr. Velazquez    Other comments or requests:    APPTS ARE READY TO BE MADE: [X] YES    Best Family or Patient Contact (if needed):    Tiffanie, daughter: [phone]  Self, Gonzalor: [phone]    Additional Information about above appointments (if needed):    1: Please follow up with your PCP, Dr. Chatterjee, [phone], within 2 weeks of discharge  2: Please follow up with your cardiologist, Dr. Leiva, [phone], within 2 weeks f discharge  3: Please follow up with [facility], [phone], within 2 weeks of discharge  4: If establishing care at [facility] [place] can follow up with Dr. Velazquez    Other comments or requests:     Patient was outreached but did not answer nor could a voicemail be left 1/10 and 1/14

## 2024-12-11 NOTE — DISCHARGE NOTE PROVIDER - NSDCCPTREATMENT_GEN_ALL_CORE_FT
PRINCIPAL PROCEDURE  Procedure: CT abdomen pelvis wo/w con  Findings and Treatment: Sacral decubitus ulcer with a few droplets of subcutaneous gas. No   associated collection.  Marked wall thickening of the rectum, difficult to compare an suspicious   for an underlying mass in this patient with history of rectal cancer.  Bibasilar pulmonary nodules concerning for metastases, unchanged.  A 3.1 cm indeterminate left adrenal lesion without change.  A 5.1 cm infrarenal abdominal aortic aneurysm.        SECONDARY PROCEDURE  Procedure: CT head  Findings and Treatment: No CT evidence of acute intracranial pathology.

## 2024-12-11 NOTE — PROGRESS NOTE ADULT - ATTENDING COMMENTS
Urine culture 50  to 99 cfu/ml candida albicans  IV zosyn until 12/12 for 5 days.  Diflucan 250 mg po x 3 days.  h/h stable  f/u Na  ?/ d/c to home 12/12 ?? 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 Patient has been previous admitted for rectal bleed with a colonoscopy (10/24) showing diverticulosis, at that time was ruled as a complication of rectal cancer. Per primary oncologist, Dr. Paez, [facility], no role for DMT at that time.     PE NAD, Ox3,   has  wheel chair at home.  vss  Lungs CTA,, cor Irreg, irreg, Abd soft n/t,   POSITIVE colostomy bag LQ--> brown stool.  Groin, positive naranjo  Ext neg e/c/c   12/8 blood cultures neg x 48 hrs  12/9 ua---> mod blood and leukocytosis,  12/9   Urine culture 50  to 99 cfu/ml candida albicans  12-09 Na 129---> 130  12/9 tsh 24.5          A/p  # UTI, f/u urine culture. c/w IV Zosyn day 4/5 . Diflucan 250 mg po x 3 days for candida in urine.  # Hypothyroidism ,TSH 24, start synthroid 25 mcg/day and monitor tsh in 6 weeks.  #Hyponatremia Na 129.--> 130  , c/w nacl 1 tab bid , can do once daily on d/c.  # pain mng, patient has iv morphine q4 prn, notes" Pain is always there to buttock' start MS contin 15 mg q12 and /w ultram prn severe pain and tylenol for mild to mod pain prn. Patient agress to Ms contin q12.  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   -No s/s of acute soft tissue infection  -Topical recommendations: cleanse wounds and periwound skin with NS. Apply Liquid barrier film to periwound skin. Pack dead space of both wounds with Aquacel ribbon, leave at least 2" out at end to ensure full removal of packing with subsequent dressing changes. Cover all with silicone foam with border, change daily. Upon discharge can change every other day.  "  # Rectal bleed h/o diverticular dz and rectal mass s/p rt. [facility]. no longer a candidate for chemo  #DVT proph Seq teds  #GOC. Will d/w daughter who is HCP.  GOC done by Dr Vuong---> patient is a nephrologist and wants FULL CODE.  called daughter Tfifanie Chong and updated 12/11/24. she wants cardiology Dr Morelia Sol and her team to see patient Ree his EF.  Does not want father to go to rehab, but also wants to know about PT at home, also notes she only has limited help at home and can only have father home if wound ar is 1x/day.-----> CM will reach out to daughter Tiffanie who is asking for help at home for all of his wound care.  Patient lives with brother.  patient has  a second opinion at Health system in the [facility] and they are offering " something" to help "slow the cancer" ---> he will f/u with Health system in [facility] for this cancer care.  Plan d/w team and patient.

## 2024-12-11 NOTE — DISCHARGE NOTE PROVIDER - HOSPITAL COURSE
HPI:  Mr. Sami Chong is a 79 year old male with a PMH of CAD s/p stent placement in  ( ASA), A Fib ( not on AC due to GI bleed), known h/o DVT in right leg, HFrEF,  locally advanced rectal cancer s/p chemo/radation (,consolidation CAPOX (2023), brachytherapy (3/2024), hx of  s/p diverting end sigmoid colostomy, cystoscopy and naranjo placement across defect , and recent admission () for for  Tara gangrene s/p surgical debridement coming in for concern for rectal bleeding and sacral wound ulcer. Patient has been previous admitted for rectal bleed with a colonoscopy (10/24) showing diverticulosis, at that time was ruled as a complication of rectal cancer. Per primary oncologist, Dr. Paez, no role for DMT at that time.       In the ED: VS: T: 95.7, HR: 105, BP: 112/82, RR: 18/100%. Labs: H/H: 9.4/30.7, Na: 130, A, CRP: 101.9. CT A/P: Small amount of bilateral pleural effusions, Small volume pericardial effusion. Pulmonary nodules suspicious of metastasis Midline decubitus ulcer with local subcutaneous inflammation. A few foci of gas in the inflamed superficial soft tissues, nec fas not excluded, no abscess. Indeterminate mildly heterogeneous 3.5 cm solid left adrenal mass. Patient was given 1x NS 1L bolus, 1x Tramadol 50 mg, 1x morphine 4mg, Fentanyl 25mcg. BxCx sent.  Started on LR 100cc/hr, Zosyn for broad coverage. Surgery consulted for r/o nec fascitis. Admitted to medicine for sacral wound ulcer and rectal bleeding. (08 Dec 2024 09:43)    Hospital Course:      On day of discharge, patient is clinically stable with no new exam findings or acute symptoms compared to prior. The patient was seen by the attending physician on the date of discharge and deemed stable and acceptable for discharge. The patient's chronic medical conditions were treated accordingly per the patient's home medication regimen. The patient's medication reconciliation (with changes made to chronic medications), follow up appointments, discharge orders, instructions, and significant lab and diagnostic studies are as noted.    Important Medication Changes and Reason:    Active or Pending Issues Requiring Follow-up:    Advanced Directives:   [ ] Full code  [ ] DNR  [ ] Hospice    Discharge Diagnoses:         HPI:  Mr. Sami Chong is a 79 year old male with a PMH of CAD s/p stent placement in  ( ASA), A Fib ( not on AC due to GI bleed), known h/o DVT in right leg, HFrEF,  locally advanced rectal cancer s/p chemo/radation (,consolidation CAPOX (2023), brachytherapy (3/2024), hx of  s/p diverting end sigmoid colostomy, cystoscopy and naranjo placement across defect , and recent admission () for for  Tara gangrene s/p surgical debridement coming in for concern for rectal bleeding and sacral wound ulcer. Patient has been previous admitted for rectal bleed with a colonoscopy (10/24) showing diverticulosis, at that time was ruled as a complication of rectal cancer. Per primary oncologist, Dr. Paez, no role for DMT at that time.       In the ED: VS: T: 95.7, HR: 105, BP: 112/82, RR: 18/100%. Labs: H/H: 9.4/30.7, Na: 130, A, CRP: 101.9. CT A/P: Small amount of bilateral pleural effusions, Small volume pericardial effusion. Pulmonary nodules suspicious of metastasis Midline decubitus ulcer with local subcutaneous inflammation. A few foci of gas in the inflamed superficial soft tissues, nec fas not excluded, no abscess. Indeterminate mildly heterogeneous 3.5 cm solid left adrenal mass. Patient was given 1x NS 1L bolus, 1x Tramadol 50 mg, 1x morphine 4mg, Fentanyl 25mcg. BxCx sent.  Started on LR 100cc/hr, Zosyn for broad coverage. Surgery consulted for r/o nec fascitis. Admitted to medicine for sacral wound ulcer and rectal bleeding. (08 Dec 2024 09:43)    Hospital Course:  Admitted for bleeding through Ostomy bag and Sacral Ulcer Management. Surgery consulted, no surgical management. Wound care MD consulted for Stage 3 ulcer, daily dressing changes. Pain regimen adjusted per daughter preference and over sedation. 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 Na: 131, likely decreased PO intake + FTT, fluids started. Cardiology consulted for concern of declining EF mentioned to daughter by outpatient cardiologist, echo requested, no change in EF. Oncology consulted for third opinion of current rectal cancer treatment course, per onc, [place] [facility] [place] Palliative consulted for pain management.    On day of discharge, patient is clinically stable with no new exam findings or acute symptoms compared to prior. The patient was seen by the attending physician on the date of discharge and deemed stable and acceptable for discharge. The patient's chronic medical conditions were treated accordingly per the patient's home medication regimen. The patient's medication reconciliation (with changes made to chronic medications), follow up appointments, discharge orders, instructions, and significant lab and diagnostic studies are as noted.    Important Medication Changes and Reason:    Active or Pending Issues Requiring Follow-up:    1: Please follow up with your PCP, Dr. Chatterjee, [phone], within 2 weeks of discharge  2: Please follow up with your cardiologist, Dr. Leiva, [phone], within 2 weeks f discharge  3: Please follow up with [facility], [phone], within 2 weeks of discharge    Advanced Directives:   [ X] Full code  [ ] DNR  [ ] Hospice    Discharge Diagnoses:  Rectal Cancer  FTT  Sacral Decubitus Ulcer       HPI:  Mr. Sami Chong is a 79 year old male with a PMH of CAD s/p stent placement in  ( ASA), A Fib ( not on AC due to GI bleed), known h/o DVT in right leg, HFrEF,  locally advanced rectal cancer s/p chemo/radation (,consolidation CAPOX (2023), brachytherapy (3/2024), hx of  s/p diverting end sigmoid colostomy, cystoscopy and naranjo placement across defect , and recent admission () for for  Tara gangrene s/p surgical debridement coming in for concern for rectal bleeding and sacral wound ulcer. Patient has been previous admitted for rectal bleed with a colonoscopy (10/24) showing diverticulosis, at that time was ruled as a complication of rectal cancer. Per primary oncologist, Dr. Paez, no role for DMT at that time.       In the ED: VS: T: 95.7, HR: 105, BP: 112/82, RR: 18/100%. Labs: H/H: 9.4/30.7, Na: 130, A, CRP: 101.9. CT A/P: Small amount of bilateral pleural effusions, Small volume pericardial effusion. Pulmonary nodules suspicious of metastasis Midline decubitus ulcer with local subcutaneous inflammation. A few foci of gas in the inflamed superficial soft tissues, nec fas not excluded, no abscess. Indeterminate mildly heterogeneous 3.5 cm solid left adrenal mass. Patient was given 1x NS 1L bolus, 1x Tramadol 50 mg, 1x morphine 4mg, Fentanyl 25mcg. BxCx sent.  Started on LR 100cc/hr, Zosyn for broad coverage. Surgery consulted for r/o nec fascitis. Admitted to medicine for sacral wound ulcer and rectal bleeding. (08 Dec 2024 09:43)    Hospital Course:  Admitted for bleeding through Ostomy bag and Sacral Ulcer Management. Surgery consulted, no surgical management. Wound care MD consulted for Stage 3 ulcer, daily dressing changes. Pain regimen adjusted per daughter preference and over sedation. 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 Course notable for hyponatremia, started on salt tabs. Cardiology consulted for concern of declining EF mentioned to daughter by outpatient cardiologist, echo requested, no change in EF. Oncology consulted for third opinion of current rectal cancer treatment course, per onc, requested paperwork of previous treatment, which was sent to heme/onc on . Palliative consulted for pain management, who recommended oral morphine for pain control. Pt was also evaluated by psychiatry due to concern for depression in setting of critical illness. Mirtazapine was started for appetite stimulation/depression. Received 1u of pRBCs on  for hemoglobin of 7.9.    On day of discharge, patient is clinically stable with no new exam findings or acute symptoms compared to prior. The patient was seen by the attending physician on the date of discharge and deemed stable and acceptable for discharge. The patient's chronic medical conditions were treated accordingly per the patient's home medication regimen. The patient's medication reconciliation (with changes made to chronic medications), follow up appointments, discharge orders, instructions, and significant lab and diagnostic studies are as noted.    Important Medication Changes and Reason:  Mirtazapine for depression/appetite stimulation  Oral morphine PRN for pain  Salt tabs    Active or Pending Issues Requiring Follow-up:    1: Please follow up with your PCP, Dr. Chatterjee, [phone], within 2 weeks of discharge  2: Please follow up with your cardiologist, Dr. Leiva, [phone], within 2 weeks f discharge  3: Please follow up with [facility], [phone], within 2 weeks of discharge  4: For psychiatry, can follow up with Dr. Velazquez with [facility] if establishing care at [facility]    Advanced Directives:   [X] Full code  [ ] DNR  [ ] Hospice    Discharge Diagnoses:  Rectal Cancer  FTT  Sacral Decubitus Ulcer       HPI:  Mr. Sami Chong is a 79 year old male with a PMH of CAD s/p stent placement in  ( ASA), A Fib ( not on AC due to GI bleed), known h/o DVT in right leg, HFrEF,  locally advanced rectal cancer s/p chemo/radation (,consolidation CAPOX (2023), brachytherapy (3/2024), hx of  s/p diverting end sigmoid colostomy, cystoscopy and naranjo placement across defect , and recent admission () for for  Tara gangrene s/p surgical debridement coming in for concern for rectal bleeding and sacral wound ulcer. Patient has been previous admitted for rectal bleed with a colonoscopy (10/24) showing diverticulosis, at that time was ruled as a complication of rectal cancer. Per primary oncologist, Dr. Paez, no role for DMT at that time.       In the ED: VS: T: 95.7, HR: 105, BP: 112/82, RR: 18/100%. Labs: H/H: 9.4/30.7, Na: 130, A, CRP: 101.9. CT A/P: Small amount of bilateral pleural effusions, Small volume pericardial effusion. Pulmonary nodules suspicious of metastasis Midline decubitus ulcer with local subcutaneous inflammation. A few foci of gas in the inflamed superficial soft tissues, nec fas not excluded, no abscess. Indeterminate mildly heterogeneous 3.5 cm solid left adrenal mass. Patient was given 1x NS 1L bolus, 1x Tramadol 50 mg, 1x morphine 4mg, Fentanyl 25mcg. BxCx sent.  Started on LR 100cc/hr, Zosyn for broad coverage. Surgery consulted for r/o nec fascitis. Admitted to medicine for sacral wound ulcer and rectal bleeding. (08 Dec 2024 09:43)    Hospital Course:  Admitted for bleeding through Ostomy bag and Sacral Ulcer Management. Surgery consulted, no surgical management. Wound care MD consulted for Stage 3 ulcer, daily dressing changes. Pain regimen adjusted per daughter preference and over sedation. 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 Course notable for hyponatremia, started on salt tabs. Cardiology consulted for concern of declining EF mentioned to daughter by outpatient cardiologist, echo requested, no change in EF. Oncology consulted for third opinion of current rectal cancer treatment course, per onc, requested paperwork of previous treatment, which was sent to heme/onc on . Palliative consulted for pain management, who recommended oral morphine for pain control. Pt was also evaluated by psychiatry due to concern for depression in setting of critical illness. Mirtazapine was started for appetite stimulation/depression. Received 1u of pRBCs on  for hemoglobin of 7.9, no clinical signs of bleeding, appropriate post-transfusion response.     On day of discharge, patient is clinically stable with no new exam findings or acute symptoms compared to prior. The patient was seen by the attending physician on the date of discharge and deemed stable and acceptable for discharge. The patient's chronic medical conditions were treated accordingly per the patient's home medication regimen. The patient's medication reconciliation (with changes made to chronic medications), follow up appointments, discharge orders, instructions, and significant lab and diagnostic studies are as noted.    Important Medication Changes and Reason:  Mirtazapine for depression/appetite stimulation  Oral morphine PRN for pain  Salt tabs    Active or Pending Issues Requiring Follow-up:    1: Please follow up with your PCP, Dr. Chatterjee, [phone], within 2 weeks of discharge  2: Please follow up with your cardiologist, Dr. Leiva, [phone], within 2 weeks f discharge  3: Please follow up with [facility], [phone], within 2 weeks of discharge  4: For psychiatry, can follow up with Dr. Velazquez with [facility] if establishing care at [facility]    Advanced Directives:   [X] Full code  [ ] DNR  [ ] Hospice    Discharge Diagnoses:  Rectal Cancer  FTT  Sacral Decubitus Ulcer       HPI:  Mr. Sami Chong is a 79 year old male with a PMH of CAD s/p stent placement in  ( ASA), A Fib ( not on AC due to GI bleed), known h/o DVT in right leg, HFrEF,  locally advanced rectal cancer s/p chemo/radiation (,consolidation CAPOX (2023), brachytherapy (3/2024), hx of  s/p diverting end sigmoid colostomy, cystoscopy and naranjo placement across defect , and recent admission () for for  Tara gangrene s/p surgical debridement coming in for concern for rectal bleeding and sacral wound ulcer. Patient has been previous admitted for rectal bleed with a colonoscopy (10/24) showing diverticulosis, at that time was ruled as a complication of rectal cancer. Per primary oncologist, Dr. Paez, no role for DMT at that time.       In the ED: VS: T: 95.7, HR: 105, BP: 112/82, RR: 18/100%. Labs: H/H: 9.4/30.7, Na: 130, A, CRP: 101.9. CT A/P: Small amount of bilateral pleural effusions, Small volume pericardial effusion. Pulmonary nodules suspicious of metastasis Midline decubitus ulcer with local subcutaneous inflammation. A few foci of gas in the inflamed superficial soft tissues, nec fas not excluded, no abscess. Indeterminate mildly heterogeneous 3.5 cm solid left adrenal mass. Patient was given 1x NS 1L bolus, 1x Tramadol 50 mg, 1x morphine 4mg, Fentanyl 25mcg. BxCx sent.  Started on LR 100cc/hr, Zosyn for broad coverage. Surgery consulted for r/o nec fascitis. Admitted to medicine for sacral wound ulcer and rectal bleeding. (08 Dec 2024 09:43)    Hospital Course:  Admitted for bleeding through Ostomy bag and Sacral Ulcer Management. Surgery consulted, no surgical management. No evidence of infection, empiric antibiotics discontinued. Wound care MD consulted for Stage 3 ulcer, daily dressing changes. Pain regimen adjusted per daughter preference and over sedation. 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 Course notable for hyponatremia d/t SIADH, started on salt tabs. Cardiology consulted for concern of declining EF mentioned to daughter by outpatient cardiologist, echo requested, no change in EF. Oncology consulted for third opinion of current rectal cancer treatment course, per onc, requested paperwork of previous treatment, which was sent to heme/onc on . Palliative consulted for pain management, who recommended oral morphine for pain control. Pt was also evaluated by psychiatry due to concern for depression in setting of critical illness. Mirtazapine was started for appetite stimulation/depression. Chronic anemia d/t chronic disease and chronic blood loss. Received 1u of pRBCs on  for hemoglobin of 7.9, no clinical signs of overt bleeding, appropriate post-transfusion response.     On day of discharge, patient is clinically stable with no new exam findings or acute symptoms compared to prior. The patient was seen by the attending physician on the date of discharge and deemed stable and acceptable for discharge. The patient's chronic medical conditions were treated accordingly per the patient's home medication regimen. The patient's medication reconciliation (with changes made to chronic medications), follow up appointments, discharge orders, instructions, and significant lab and diagnostic studies are as noted.    Important Medication Changes and Reason:  Mirtazapine for depression/appetite stimulation  Oral morphine PRN for pain  Salt tabs    Active or Pending Issues Requiring Follow-up:    1: Please follow up with your PCP, Dr. Chatterjee, [phone], within 2 weeks of discharge  2: Please follow up with your cardiologist, Dr. Leiva, [phone], within 2 weeks f discharge  3: Please follow up with [facility], [phone], within 2 weeks of discharge  4: For psychiatry, can follow up with Dr. Velazquez with [facility] if establishing care at [facility]    Advanced Directives:   [X] Full code  [ ] DNR  [ ] Hospice    Discharge Diagnoses:  Rectal Cancer  FTT  Sacral Decubitus Ulcer

## 2024-12-11 NOTE — CHART NOTE - NSCHARTNOTEFT_GEN_A_CORE
Patient received TTE this admission, cardiology asked to clarify findings.    Read of TTE from 12/10/24:   1. Technically very difficult study with views limited to the subcostal window.   2. Technically difficult image quality.   3. The left ventricle was not well visualized. Unable to evaluate the ejection fraction. The left ventricular cavity is normal in size. Left ventricular wall thickness is normal. Left ventricular systolic function grossly appears abnormal.   4. Normal right ventricular cavity size and probably normal right ventricular systolic function.   5. Structurally normal mitral valve with normal leaflet excursion. There is calcification of the mitral valve annulus. There is trace mitral regurgitation.   6. Left pleural effusion noted.    Read of TTE from 10/16/24:   1. Left ventricular cavity is normal in size. Left ventricular wall thickness is normal. Left ventricular systolic function is moderately to severely decreased with an ejection fraction visually estimated at 35to 40 %. Global left ventricular hypokinesis.   2. Enlarged right ventricular cavity size, with normal wall thickness, and probably normal right ventricular systolic function.   3. Left atrium is moderately dilated.   4. No prior echocardiogram is available for comparison.   5. Small pericardial effusion.   6. Left pleural effusion noted.    Images from both studies reviewed  Fewer images were obtained from most recent echo, perhaps from difficult echo windows, however LV function appears reduced but overall similar to prior echo from October if this year.  Patient should follow up with his outpatient cardiologist- Dr Leiva- following discharge

## 2024-12-11 NOTE — DISCHARGE NOTE PROVIDER - NSDCMRMEDTOKEN_GEN_ALL_CORE_FT
acetaminophen 500 mg oral tablet: 2 tab(s) orally every 6 hours  Combigan 0.2%-0.5% ophthalmic solution: 1 drop(s) in each affected eye 2 times a day  Crestor 10 mg oral tablet: 1 tab(s) orally once a day HL  Latanoprost Ophthalmic: instillation once a day (at bedtime)  losartan 25 mg oral tablet: 1 tab(s) orally once a day  spironolactone 25 mg oral tablet: 1 tab(s) orally once a day  Toprol-XL 50 mg oral tablet, extended release: 1 tab(s) orally once a day   acetaminophen 500 mg oral tablet: 2 tab(s) orally every 6 hours  Combigan 0.2%-0.5% ophthalmic solution: 1 drop(s) in each affected eye 2 times a day  Crestor 10 mg oral tablet: 1 tab(s) orally once a day HL  Latanoprost Ophthalmic: instillation once a day (at bedtime)  levothyroxine 25 mcg (0.025 mg) oral tablet: 1 tab(s) orally once a day  losartan 25 mg oral tablet: 1 tab(s) orally once a day  mirtazapine 7.5 mg oral tablet: 1 tab(s) orally once a day (at bedtime)  morphine 10 mg/5 mL oral solution: 1.25 milliliter(s) orally every 4 hours as needed for Moderate Pain (4 - 6) MDD: 6.25ml  Narcan 4 mg/0.1 mL nasal spray: 1 spray(s) intranasally twice as needed for  unresponsiveness following opioid administration  polyethylene glycol 3350 oral powder for reconstitution: 17 gram(s) orally once a day  senna leaf extract oral tablet: 2 tab(s) orally once a day (at bedtime)  sodium chloride 1 g oral tablet: 2 tab(s) orally 3 times a day  spironolactone 25 mg oral tablet: 1 tab(s) orally once a day  Toprol-XL 50 mg oral tablet, extended release: 1 tab(s) orally once a day

## 2024-12-11 NOTE — DISCHARGE NOTE PROVIDER - PROVIDER TOKENS
PROVIDER:[TOKEN:[[id number]],FOLLOWUP:[2 weeks],ESTABLISHEDPATIENT:[T]],PROVIDER:[TOKEN:[[id number]],FOLLOWUP:[2 weeks],ESTABLISHEDPATIENT:[T]]

## 2024-12-11 NOTE — DISCHARGE NOTE PROVIDER - DETAILS OF MALNUTRITION DIAGNOSIS/DIAGNOSES
This patient has been assessed with a concern for Malnutrition and was treated during this hospitalization for the following Nutrition diagnosis/diagnoses:     -  12/10/2024: Severe protein-calorie malnutrition   -  12/10/2024: Underweight (BMI < 19)

## 2024-12-11 NOTE — PROGRESS NOTE ADULT - PROBLEM SELECTOR PLAN 1
ED: VS: T: 95.7, HR: 105, BP: 112/82,. Labs: Na: 130, A, CRP: 101.9.    T: 95.7, HR: 105, BP:139/98, patient meets SIRS criteria  - . Patient was given 1x NS 1L bolus, 1x Tramadol 50 mg, 1x morphine 4mg, Fentanyl 25mcg.  - BxCx NGTD 48 hours, pending UCx  - Zosyn (-) empiric treatment  - CXR: bilateral pleural effusions  - U/A: Moderate LE/ WBC: 76  - Legionella, Strep Ag: negative  Plan  - C/w LR 100cc/hr  - C/w Zosyn for empiric treatment  pending UCx ED: VS: T: 95.7, HR: 105, BP: 112/82,. Labs: Na: 130, A, CRP: 101.9.    T: 95.7, HR: 105, BP:139/98, patient meets SIRS criteria  - . Patient was given 1x NS 1L bolus, 1x Tramadol 50 mg, 1x morphine 4mg, Fentanyl 25mcg.  - BxCx NGTD 48 hours,   - Zosyn (-) empiric treatment  - CXR: bilateral pleural effusions  - U/A: Moderate LE/ WBC: 76  - Legionella, Strep Ag: negative  - UCx: Candida    Plan  - C/w LR 100cc/hr  - C/w Zosyn will DC tomorrow () for completed dose for empiric treatment   - C/w Fluconazole 50mg (-) ED: VS: T: 95.7, HR: 105, BP: 112/82,. Labs: Na: 130, A, CRP: 101.9.    T: 95.7, HR: 105, BP:139/98, patient meets SIRS criteria  - Patient was given 1x NS 1L bolus, 1x Tramadol 50 mg, 1x morphine 4mg, Fentanyl 25mcg.  - BxCx NGTD 72 hours,   - Zosyn (-) empiric treatment  - CXR: bilateral pleural effusions  - U/A: Moderate LE/ WBC: 76  - Legionella, Strep Ag: negative  - UCx: Candida    Plan  - C/w LR 100cc/hr  - C/w Zosyn will DC tomorrow () for completed dose for empiric treatment   - C/w Fluconazole 50mg (-)

## 2024-12-11 NOTE — PROGRESS NOTE ADULT - SUBJECTIVE AND OBJECTIVE BOX
Varun Vuong MD  Available on TEAMS    Patient is a 79y old  Male who presents with a chief complaint of Hemorrhage of rectum and anus     (10 Dec 2024 08:23)      SUBJECTIVE / OVERNIGHT EVENTS: No acute events overnight. Patient was assessed at bedside.       REVIEW OF SYSTEMS:  CONSTITUTIONAL: No weakness, fevers, or chills  EYES/ENT: No visual changes; No vertigo, throat pain, or dysphagia  NECK: No pain or stiffness  RESPIRATORY: No cough, wheezing, hemoptysis, or shortness of breath  CARDIOVASCULAR: No chest pain or palpitations  GASTROINTESTINAL: No abdominal or epigastric pain. No nausea, vomiting, or hematemesis; No diarrhea or constipation. No melena or hematochezia.  GENITOURINARY: No dysuria, frequency, or hematuria  MUSCULOSKELETAL: No joint or muscle pain or aches  NEUROLOGICAL: No numbness or weakness  SKIN: No itching or rashes      MEDICATIONS  (STANDING):  chlorhexidine 2% Cloths 1 Application(s) Topical daily  Dakins Solution - 1/4 Strength 1 Application(s) Topical daily  lactated ringers. 1000 milliLiter(s) (100 mL/Hr) IV Continuous <Continuous>  lactated ringers. 1000 milliLiter(s) (100 mL/Hr) IV Continuous <Continuous>  latanoprost 0.005% Ophthalmic Solution 1 Drop(s) Both EYES at bedtime  levothyroxine 25 MICROGram(s) Oral daily  losartan 25 milliGRAM(s) Oral daily  metoprolol succinate ER 50 milliGRAM(s) Oral daily  mupirocin 2% Ointment 1 Application(s) Both Nostrils two times a day  nicotine -   7 mG/24Hr(s) Patch 1 Patch Transdermal daily  piperacillin/tazobactam IVPB.. 3.375 Gram(s) IV Intermittent every 8 hours  rosuvastatin 10 milliGRAM(s) Oral at bedtime  spironolactone 25 milliGRAM(s) Oral daily  timolol 0.5% Solution 1 Drop(s) Both EYES daily    MEDICATIONS  (PRN):  acetaminophen     Tablet .. 650 milliGRAM(s) Oral every 6 hours PRN Mild Pain (1 - 3)  morphine  - Injectable 2 milliGRAM(s) IV Push every 4 hours PRN Severe Pain (7 - 10)  morphine  - Injectable 1 milliGRAM(s) IV Push every 4 hours PRN Moderate Pain (4 - 6)  traMADol 50 milliGRAM(s) Oral every 6 hours PRN for severe pain      CAPILLARY BLOOD GLUCOSE        I&O's Summary    10 Dec 2024 07:01  -  11 Dec 2024 07:00  --------------------------------------------------------  IN: 1860 mL / OUT: 600 mL / NET: 1260 mL        Vital Signs Last 24 Hrs  T(C): 36.7 (11 Dec 2024 05:50), Max: 36.7 (10 Dec 2024 23:44)  T(F): 98.1 (11 Dec 2024 05:50), Max: 98.1 (10 Dec 2024 23:44)  HR: 60 (11 Dec 2024 05:50) (60 - 74)  BP: 113/78 (11 Dec 2024 05:50) (113/78 - 136/93)  BP(mean): --  RR: 18 (11 Dec 2024 05:50) (17 - 18)  SpO2: 95% (11 Dec 2024 05:50) (95% - 100%)    Parameters below as of 11 Dec 2024 05:50  Patient On (Oxygen Delivery Method): room air        PHYSICAL EXAM:  GENERAL: NAD, well-developed, well-nourished  HEAD: Atraumatic, Normocephalic  EYES: EOMI, PERRLA, conjunctiva and sclera clear  NECK: Supple, No JVD  CHEST/LUNG: Clear to auscultation bilaterally; No wheezes or crackles  HEART: Normal S1/S2; Regular rate and rhythm; No murmurs, rubs, or gallops  ABDOMEN: Soft, Nontender, Nondistended; Bowel sounds present  EXTREMITIES: 2+ Peripheral Pulses; No clubbing, cyanosis, or edema  PSYCH: A&Ox3  NEUROLOGY: no focal neurologic deficit  SKIN: No rashes or lesions    LABS:                        9.0    6.60  )-----------( 211      ( 09 Dec 2024 10:58 )             29.5      12-09    129[L]  |  93[L]  |  15  ----------------------------<  105[H]  4.0   |  23  |  0.63    Ca    9.3      09 Dec 2024 10:58            Urinalysis Basic - ( 09 Dec 2024 11:04 )    Color: Yellow / Appearance: Cloudy / S.019 / pH: x  Gluc: x / Ketone: Negative mg/dL  / Bili: Negative / Urobili: 0.2 mg/dL   Blood: x / Protein: Trace mg/dL / Nitrite: Negative   Leuk Esterase: Moderate / RBC: 27 /HPF / WBC 76 /HPF   Sq Epi: x / Non Sq Epi: 1 /HPF / Bacteria: Negative /HPF        RADIOLOGY & ADDITIONAL TESTS:    Imaging Personally Reviewed:    Consultant(s) Notes Reviewed:      Care Discussed with Consultants/Other Providers:   Varun Vuong MD  Available on TEAMS    Patient is a 79y old  Male who presents with a chief complaint of Hemorrhage of rectum and anus     (10 Dec 2024 08:23)      SUBJECTIVE / OVERNIGHT EVENTS: No acute events overnight. Patient was assessed at bedside. Continues to have rectal pain and back pain. No weakness, fevers, or chills.      REVIEW OF SYSTEMS:  EYES/ENT: No visual changes; No vertigo, throat pain, or dysphagia  NECK: No pain or stiffness  RESPIRATORY: No cough, wheezing, hemoptysis, or shortness of breath  CARDIOVASCULAR: No chest pain or palpitations  GASTROINTESTINAL: No abdominal or epigastric pain. No nausea, vomiting, or hematemesis; No diarrhea or constipation. No melena or hematochezia.  GENITOURINARY: No dysuria, frequency, or hematuria  MUSCULOSKELETAL: No joint or muscle pain or aches  NEUROLOGICAL: No numbness or weakness  SKIN: No itching or rashes      MEDICATIONS  (STANDING):  chlorhexidine 2% Cloths 1 Application(s) Topical daily  Dakins Solution - 1/4 Strength 1 Application(s) Topical daily  lactated ringers. 1000 milliLiter(s) (100 mL/Hr) IV Continuous <Continuous>  lactated ringers. 1000 milliLiter(s) (100 mL/Hr) IV Continuous <Continuous>  latanoprost 0.005% Ophthalmic Solution 1 Drop(s) Both EYES at bedtime  levothyroxine 25 MICROGram(s) Oral daily  losartan 25 milliGRAM(s) Oral daily  metoprolol succinate ER 50 milliGRAM(s) Oral daily  mupirocin 2% Ointment 1 Application(s) Both Nostrils two times a day  nicotine -   7 mG/24Hr(s) Patch 1 Patch Transdermal daily  piperacillin/tazobactam IVPB.. 3.375 Gram(s) IV Intermittent every 8 hours  rosuvastatin 10 milliGRAM(s) Oral at bedtime  spironolactone 25 milliGRAM(s) Oral daily  timolol 0.5% Solution 1 Drop(s) Both EYES daily    MEDICATIONS  (PRN):  acetaminophen     Tablet .. 650 milliGRAM(s) Oral every 6 hours PRN Mild Pain (1 - 3)  morphine  - Injectable 2 milliGRAM(s) IV Push every 4 hours PRN Severe Pain (7 - 10)  morphine  - Injectable 1 milliGRAM(s) IV Push every 4 hours PRN Moderate Pain (4 - 6)  traMADol 50 milliGRAM(s) Oral every 6 hours PRN for severe pain      CAPILLARY BLOOD GLUCOSE        I&O's Summary    10 Dec 2024 07:01  -  11 Dec 2024 07:00  --------------------------------------------------------  IN: 1860 mL / OUT: 600 mL / NET: 1260 mL        Vital Signs Last 24 Hrs  T(C): 36.7 (11 Dec 2024 05:50), Max: 36.7 (10 Dec 2024 23:44)  T(F): 98.1 (11 Dec 2024 05:50), Max: 98.1 (10 Dec 2024 23:44)  HR: 60 (11 Dec 2024 05:50) (60 - 74)  BP: 113/78 (11 Dec 2024 05:50) (113/78 - 136/93)  BP(mean): --  RR: 18 (11 Dec 2024 05:50) (17 - 18)  SpO2: 95% (11 Dec 2024 05:50) (95% - 100%)    Parameters below as of 11 Dec 2024 05:50  Patient On (Oxygen Delivery Method): room air        PHYSICAL EXAM:  GENERAL: NAD, well-developed, well-nourished  HEAD: Atraumatic, Normocephalic  EYES: EOMI, PERRLA, conjunctiva and sclera clear  NECK: Supple, No JVD  CHEST/LUNG: Clear to auscultation bilaterally; No wheezes or crackles  HEART: Normal S1/S2; Regular rate and rhythm; No murmurs, rubs, or gallops  ABDOMEN: Soft, Nontender, Nondistended; Bowel sounds present  EXTREMITIES: 2+ Peripheral Pulses; No clubbing, cyanosis, or edema  PSYCH: A&Ox3  NEUROLOGY: no focal neurologic deficit  SKIN: No rashes or lesions    LABS:                        9.0    6.60  )-----------( 211      ( 09 Dec 2024 10:58 )             29.5      12-09    129[L]  |  93[L]  |  15  ----------------------------<  105[H]  4.0   |  23  |  0.63    Ca    9.3      09 Dec 2024 10:58            Urinalysis Basic - ( 09 Dec 2024 11:04 )    Color: Yellow / Appearance: Cloudy / S.019 / pH: x  Gluc: x / Ketone: Negative mg/dL  / Bili: Negative / Urobili: 0.2 mg/dL   Blood: x / Protein: Trace mg/dL / Nitrite: Negative   Leuk Esterase: Moderate / RBC: 27 /HPF / WBC 76 /HPF   Sq Epi: x / Non Sq Epi: 1 /HPF / Bacteria: Negative /HPF        RADIOLOGY & ADDITIONAL TESTS:    Imaging Personally Reviewed:    Consultant(s) Notes Reviewed:      Care Discussed with Consultants/Other Providers:

## 2024-12-11 NOTE — DISCHARGE NOTE PROVIDER - CARE PROVIDER_API CALL
Morelia Leiva  Cardiovascular Disease  [address] [place] [address]  Phone: [phone]  Fax: [phone]  Established Patient  Follow Up Time: 2 weeks    Rickey Chatterjee  Internal Medicine  [address] [address]  Phone: [phone]  Fax: [phone]  Established Patient  Follow Up Time: 2 weeks

## 2024-12-11 NOTE — PROGRESS NOTE ADULT - PROBLEM SELECTOR PLAN 9
- Hx of Rectal Cancer, follows with [facility] oncology  - Per daughter, Dr. Miguel Angel Johnson has been more proactive with patient's case, was to be reevaluted by [facility] for a possible 2nd opinion, expressed interest in outpt follow up with [facility] which will be reevalauted on discharge.  - Preference for morphine for pain management  - Tylenol 650mg mild/ moderate/severe: IV Morphine 1mg/2mg    Plan  - F/u with [facility] on discharge - Hx of Rectal Cancer, follows with [facility] oncology  - Per daughter, Dr. Miguel Angel Johnson has been more proactive with patient's case, was to be reevaluted by [facility] for a possible 2nd opinion, expressed interest in outpt follow up with [facility] which will be re-evalauted on discharge.  - Preference for morphine for pain management  - Tylenol 650mg mild/ moderate/severe: IV Morphine 1mg/2mg    Plan  - F/u with [facility] on discharge

## 2024-12-11 NOTE — PHYSICAL THERAPY INITIAL EVALUATION ADULT - ACTIVE RANGE OF MOTION EXAMINATION, REHAB EVAL
Patient very stiff, gradual assessment./bilateral upper extremity Active ROM was WFL (within functional limits)/bilateral  lower extremity Active ROM was WFL (within functional limits)

## 2024-12-12 DIAGNOSIS — R60.0 LOCALIZED EDEMA: ICD-10-CM

## 2024-12-12 LAB
ALBUMIN SERPL ELPH-MCNC: 2.2 G/DL — LOW (ref 3.3–5)
ALP SERPL-CCNC: 97 U/L — SIGNIFICANT CHANGE UP (ref 40–120)
ALT FLD-CCNC: <5 U/L — LOW (ref 4–41)
ANION GAP SERPL CALC-SCNC: 10 MMOL/L — SIGNIFICANT CHANGE UP (ref 7–14)
AST SERPL-CCNC: 15 U/L — SIGNIFICANT CHANGE UP (ref 4–40)
BILIRUB SERPL-MCNC: 0.3 MG/DL — SIGNIFICANT CHANGE UP (ref 0.2–1.2)
BUN SERPL-MCNC: 7 MG/DL — SIGNIFICANT CHANGE UP (ref 7–23)
CALCIUM SERPL-MCNC: 8.6 MG/DL — SIGNIFICANT CHANGE UP (ref 8.4–10.5)
CHLORIDE SERPL-SCNC: 97 MMOL/L — LOW (ref 98–107)
CO2 SERPL-SCNC: 24 MMOL/L — SIGNIFICANT CHANGE UP (ref 22–31)
CREAT SERPL-MCNC: 0.42 MG/DL — LOW (ref 0.5–1.3)
EGFR: 109 ML/MIN/1.73M2 — SIGNIFICANT CHANGE UP
GLUCOSE SERPL-MCNC: 96 MG/DL — SIGNIFICANT CHANGE UP (ref 70–99)
HCT VFR BLD CALC: 26.9 % — LOW (ref 39–50)
HGB BLD-MCNC: 8.4 G/DL — LOW (ref 13–17)
MCHC RBC-ENTMCNC: 25.1 PG — LOW (ref 27–34)
MCHC RBC-ENTMCNC: 31.2 G/DL — LOW (ref 32–36)
MCV RBC AUTO: 80.5 FL — SIGNIFICANT CHANGE UP (ref 80–100)
NRBC # BLD: 0 /100 WBCS — SIGNIFICANT CHANGE UP (ref 0–0)
NRBC # FLD: 0 K/UL — SIGNIFICANT CHANGE UP (ref 0–0)
PLATELET # BLD AUTO: 200 K/UL — SIGNIFICANT CHANGE UP (ref 150–400)
POTASSIUM SERPL-MCNC: 3.6 MMOL/L — SIGNIFICANT CHANGE UP (ref 3.5–5.3)
POTASSIUM SERPL-SCNC: 3.6 MMOL/L — SIGNIFICANT CHANGE UP (ref 3.5–5.3)
PROT SERPL-MCNC: 5.6 G/DL — LOW (ref 6–8.3)
RBC # BLD: 3.34 M/UL — LOW (ref 4.2–5.8)
RBC # FLD: 16 % — HIGH (ref 10.3–14.5)
SODIUM SERPL-SCNC: 131 MMOL/L — LOW (ref 135–145)
WBC # BLD: 5.35 K/UL — SIGNIFICANT CHANGE UP (ref 3.8–10.5)
WBC # FLD AUTO: 5.35 K/UL — SIGNIFICANT CHANGE UP (ref 3.8–10.5)

## 2024-12-12 PROCEDURE — 93970 EXTREMITY STUDY: CPT | Mod: 26

## 2024-12-12 PROCEDURE — 99232 SBSQ HOSP IP/OBS MODERATE 35: CPT | Mod: GC

## 2024-12-12 PROCEDURE — 70450 CT HEAD/BRAIN W/O DYE: CPT | Mod: 26

## 2024-12-12 RX ORDER — MORPHINE SULFATE 15 MG
2 TABLET, EXTENDED RELEASE ORAL ONCE
Refills: 0 | Status: DISCONTINUED | OUTPATIENT
Start: 2024-12-12 | End: 2024-12-12

## 2024-12-12 RX ORDER — POTASSIUM CHLORIDE 600 MG/1
10 TABLET, FILM COATED, EXTENDED RELEASE ORAL
Refills: 0 | Status: COMPLETED | OUTPATIENT
Start: 2024-12-12 | End: 2024-12-12

## 2024-12-12 RX ORDER — SODIUM CHLORIDE 9 MG/ML
1000 INJECTION, SOLUTION INTRAMUSCULAR; INTRAVENOUS; SUBCUTANEOUS
Refills: 0 | Status: COMPLETED | OUTPATIENT
Start: 2024-12-12 | End: 2024-12-12

## 2024-12-12 RX ADMIN — Medication 1 APPLICATION(S): at 10:03

## 2024-12-12 RX ADMIN — SODIUM CHLORIDE 2 GRAM(S): 9 INJECTION, SOLUTION INTRAMUSCULAR; INTRAVENOUS; SUBCUTANEOUS at 13:25

## 2024-12-12 RX ADMIN — LATANOPROST 1 DROP(S): 50 SOLUTION OPHTHALMIC at 22:18

## 2024-12-12 RX ADMIN — POTASSIUM CHLORIDE 100 MILLIEQUIVALENT(S): 600 TABLET, FILM COATED, EXTENDED RELEASE ORAL at 11:44

## 2024-12-12 RX ADMIN — Medication 15 MILLIGRAM(S): at 18:33

## 2024-12-12 RX ADMIN — Medication 50 MILLIGRAM(S): at 10:03

## 2024-12-12 RX ADMIN — NICOTINE POLACRILEX 1 PATCH: 4 LOZENGE ORAL at 20:04

## 2024-12-12 RX ADMIN — LEVOTHYROXINE SODIUM 25 MICROGRAM(S): 175 TABLET ORAL at 10:03

## 2024-12-12 RX ADMIN — Medication 15 MILLIGRAM(S): at 10:02

## 2024-12-12 RX ADMIN — CHLORHEXIDINE GLUCONATE 1 APPLICATION(S): 1.2 RINSE ORAL at 11:50

## 2024-12-12 RX ADMIN — SENNOSIDES 2 TABLET(S): 8.6 TABLET, FILM COATED ORAL at 21:59

## 2024-12-12 RX ADMIN — PIPERACILLIN AND TAZOBACTAM 25 GRAM(S): 3; .375 INJECTION, POWDER, LYOPHILIZED, FOR SOLUTION INTRAVENOUS at 04:04

## 2024-12-12 RX ADMIN — Medication 2 MILLIGRAM(S): at 18:46

## 2024-12-12 RX ADMIN — Medication 1 APPLICATION(S): at 17:33

## 2024-12-12 RX ADMIN — POTASSIUM CHLORIDE 100 MILLIEQUIVALENT(S): 600 TABLET, FILM COATED, EXTENDED RELEASE ORAL at 13:50

## 2024-12-12 RX ADMIN — NICOTINE POLACRILEX 1 PATCH: 4 LOZENGE ORAL at 11:20

## 2024-12-12 RX ADMIN — POTASSIUM CHLORIDE 100 MILLIEQUIVALENT(S): 600 TABLET, FILM COATED, EXTENDED RELEASE ORAL at 10:03

## 2024-12-12 RX ADMIN — Medication 15 MILLIGRAM(S): at 17:33

## 2024-12-12 RX ADMIN — NICOTINE POLACRILEX 1 PATCH: 4 LOZENGE ORAL at 07:19

## 2024-12-12 RX ADMIN — PIPERACILLIN AND TAZOBACTAM 25 GRAM(S): 3; .375 INJECTION, POWDER, LYOPHILIZED, FOR SOLUTION INTRAVENOUS at 11:44

## 2024-12-12 RX ADMIN — Medication 15 MILLIGRAM(S): at 11:02

## 2024-12-12 RX ADMIN — SODIUM CHLORIDE 100 MILLILITER(S): 9 INJECTION, SOLUTION INTRAMUSCULAR; INTRAVENOUS; SUBCUTANEOUS at 13:23

## 2024-12-12 RX ADMIN — LOSARTAN POTASSIUM 25 MILLIGRAM(S): 100 TABLET, FILM COATED ORAL at 10:03

## 2024-12-12 RX ADMIN — SODIUM CHLORIDE 2 GRAM(S): 9 INJECTION, SOLUTION INTRAMUSCULAR; INTRAVENOUS; SUBCUTANEOUS at 10:03

## 2024-12-12 RX ADMIN — SODIUM CHLORIDE 2 GRAM(S): 9 INJECTION, SOLUTION INTRAMUSCULAR; INTRAVENOUS; SUBCUTANEOUS at 22:00

## 2024-12-12 RX ADMIN — NICOTINE POLACRILEX 1 PATCH: 4 LOZENGE ORAL at 11:45

## 2024-12-12 RX ADMIN — SPIRONOLACTONE 25 MILLIGRAM(S): 50 TABLET ORAL at 10:03

## 2024-12-12 RX ADMIN — PIPERACILLIN AND TAZOBACTAM 25 GRAM(S): 3; .375 INJECTION, POWDER, LYOPHILIZED, FOR SOLUTION INTRAVENOUS at 19:43

## 2024-12-12 RX ADMIN — ROSUVASTATIN 10 MILLIGRAM(S): 40 TABLET, FILM COATED ORAL at 21:59

## 2024-12-12 NOTE — PROGRESS NOTE ADULT - PROBLEM SELECTOR PLAN 5
-Maintained on statin, Toprol, losartan, spironolactone    Plan  - C/w home meds -CT A/P: Small amount of bilateral pleural effusions, Small volume pericardial effusion. Pulmonary nodules suspicious of metastasis Midline decubitus ulcer with local subcutaneous inflammation. A few foci of gas in the inflamed superficial soft tissues, nec fas not excluded, no abscess.  - Surg consulted: low concern for nec fascitis, foci of air secondary to open wound,  -  MD Wound care eval    Plan  - Per MD: continue with daily dressing changes

## 2024-12-12 NOTE — PROGRESS NOTE ADULT - PROBLEM SELECTOR PLAN 10
- Fluids: LR 100cc/hour  - Electrolytes: Will replete to maintain K>4, Phos>3, and Mag>2  - Nutrition: Regular diet,   - Activity: As tolerated, pending PT eval  - DVT Prophylaxis: N/A  - Stress Ulcer/GI Prophylaxis: N/A,  - Disposition: Admit to medicine, will return home with PT when medically cleared - Fluids: NS 100cc/hour  - Electrolytes: Will replete to maintain K>4, Phos>3, and Mag>2  - Nutrition: Regular diet,   - Activity: As tolerated, pending PT eval  - DVT Prophylaxis: N/A  - Stress Ulcer/GI Prophylaxis: N/A,  - Disposition: Admit to medicine, will return home with PT when medically cleared - Hx of Rectal Cancer, follows with [facility] oncology  - Per daughter, Dr. Miguel Angel Johnson has been more proactive with patient's case, was to be reevaluted by [facility] for a possible 2nd opinion, expressed interest in outpt follow up with [facility] which will be re-evalauted on discharge.  - Preference for morphine for pain management  - Tylenol 650mg mild/ moderate/severe: IV Morphine 1mg/2mg    Plan  - Heme/Onc consult for outpatient [facility] follow up

## 2024-12-12 NOTE — PROGRESS NOTE ADULT - PROBLEM SELECTOR PLAN 8
- S/p stent placement in 2012, not on AC due to multiple episodes for GI bleeds - H/H: 9.4/30.7 on admission   - Hemoglobin on last admission (8.3/27.2)  - Stable   - Monitor for signs and symptoms of bleeding    Plan  - Active T&S  - Transfuse prn (Hgb>8, given Hx of CAD)

## 2024-12-12 NOTE — PROGRESS NOTE ADULT - PROBLEM SELECTOR PLAN 7
- H/H: 9.4/30.7 on admission   - Hemoglobin on last admission (8.3/27.2)  - Stable   - Monitor for signs and symptoms of bleeding    Plan  - Active T&S  - Transfuse prn (Hgb>8, given Hx of CAD) - Maintained on Crestor 10mg QHS, Toprol XL 50mg QD, Losartan 25mg QD, Spironolactone 25mg QD  - TTE (10/24): Left ventricular systolic function is moderately to severely decreased with an ejection fraction visually estimated at 35 to 40 %. Global left ventricular hypokinesis. Enlarged right ventricular cavity size, with normal wall thickness, stable pericardial effusion see, consistent CT findings (12/8)  - Per conversation with daughter (12/9), there is concern for decreasing EF from outpt provider Dr. Sol,  - TTE: LV not well visualized. Unable to evaluate the EF. LV normal thickness and size.  - Per cardiology chart note:  moderately to severely decreased with an ejection fraction visually estimated at 35to 40 %. Global left ventricular hypokinesis.     Plan  - should follow up with his outpatient cardiologist- Dr Leiva- following discharge.

## 2024-12-12 NOTE — PROGRESS NOTE ADULT - PROBLEM SELECTOR PLAN 6
- Maintained on Crestor 10mg QHS, Toprol XL 50mg QD, Losartan 25mg QD, Spironolactone 25mg QD  - TTE (10/24): Left ventricular systolic function is moderately to severely decreased with an ejection fraction visually estimated at 35 to 40 %. Global left ventricular hypokinesis. Enlarged right ventricular cavity size, with normal wall thickness, stable pericardial effusion see, consistent CT findings (12/8)  - Per conversation with daughter (12/9), there is concern for decreasing EF from outpt provider Dr. Sol,  - TTE: LV not well visualized. Unable to evaluate the EF. LV normal thickness and size. - Maintained on Crestor 10mg QHS, Toprol XL 50mg QD, Losartan 25mg QD, Spironolactone 25mg QD  - TTE (10/24): Left ventricular systolic function is moderately to severely decreased with an ejection fraction visually estimated at 35 to 40 %. Global left ventricular hypokinesis. Enlarged right ventricular cavity size, with normal wall thickness, stable pericardial effusion see, consistent CT findings (12/8)  - Per conversation with daughter (12/9), there is concern for decreasing EF from outpt provider Dr. Sol,  - TTE: LV not well visualized. Unable to evaluate the EF. LV normal thickness and size.  - Per cardiology chart note:  moderately to severely decreased with an ejection fraction visually estimated at 35to 40 %. Global left ventricular hypokinesis.     Plan  - should follow up with his outpatient cardiologist- Dr Leiva- following discharge. -Maintained on statin, Toprol, losartan, spironolactone    Plan  - C/w home meds

## 2024-12-12 NOTE — PROGRESS NOTE ADULT - PROBLEM SELECTOR PLAN 9
- Hx of Rectal Cancer, follows with [facility] oncology  - Per daughter, Dr. Miguel Angel Johnson has been more proactive with patient's case, was to be reevaluted by [facility] for a possible 2nd opinion, expressed interest in outpt follow up with [facility] which will be re-evalauted on discharge.  - Preference for morphine for pain management  - Tylenol 650mg mild/ moderate/severe: IV Morphine 1mg/2mg    Plan  - F/u with [facility] on discharge - Hx of Rectal Cancer, follows with [facility] oncology  - Per daughter, Dr. Miguel Angel Johnson has been more proactive with patient's case, was to be reevaluted by [facility] for a possible 2nd opinion, expressed interest in outpt follow up with [facility] which will be re-evalauted on discharge.  - Preference for morphine for pain management  - Tylenol 650mg mild/ moderate/severe: IV Morphine 1mg/2mg    Plan  - Heme/Onc consult for outpatient [facility] follow up - S/p stent placement in 2012, not on AC due to multiple episodes for GI bleeds

## 2024-12-12 NOTE — PROGRESS NOTE ADULT - ATTENDING COMMENTS
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 Patient has been previous admitted for rectal bleed with a colonoscopy (10/24) showing diverticulosis, at that time was ruled as a complication of rectal cancer. Per primary oncologist, Dr. Paez, [facility], no role for DMT at that time.     PE NAD, Ox2 today. He keeps saying he is outside and "wants to be taken in to his room". " doctor wants im to go to sleep and go to heaven"   has  wheel chair at home. gets hha for his wound are.  Vital Signs Last 24 Hrs  T(F): 97.8 HR: 90 , BP: 108/72, RR: 18 , SpO2: 96% room air  Lungs CTA,, cor Irreg, irreg, Abd soft n/t,   POSITIVE colostomy bag LQ--> brown stool.  Groin, positive naranjo  A/p  # UTI, f/u urine culture. c/w IV Zosyn day 5/5 . ID notes no need to treat candida in urine,  # Hypothyroidism ,TSH 24, start synthroid 25 mcg/day and monitor tsh in 6 weeks.  #Hyponatremia Na 129.--> 130  --> 131, , c/w nacl 1 tab bid , can do once daily on d/c.  # pain mng, patient has iv morphine q4 prn, notes" Pain is always there to buttock' start MS contin 15 mg q12 and c/w tylenol prn Patient agrees to Ms contin q12. started 12/11  # Ch in mS Ox2--> Ct of head w/o contrast /o mets to brain. If disorientation continues, may need to STOP MS contin.  # CVS h/o afib and CHF with HFrEF. Echo recently done-- Reviewed by Cardiology---> no ch, out patient f/u Dr Morelia Duggan., cardiology  # Onc--> patient was silva PATIÑO--> no role for further rx, had  a second opinion at [facility] in the city and they are offering " something" to help "slow the cancer" ---> he will f/u with [facility] in [facility] for this cancer care. Daughter on 12/11 asked for Onc at [facility] to see--> [facility] onc consult requested. 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   -No s/s of acute soft tissue infection  -Topical recommendations: cleanse wounds and periwound skin with NS. Apply Liquid barrier film to periwound skin. Pack dead space of both wounds with Aquacel ribbon, leave at least 2" out at end to ensure full removal of packing with subsequent dressing changes. Cover all with silicone foam with border, change daily. Upon discharge can change every other day.  "  # Rectal bleed h/o diverticular dz and rectal mass s/p rt. followed by [facility] and Blood. no longer a candidate for chemo  #DVT proph Seq teds  GOC done by Dr Vuong---> patient is a nephrologist and wants FULL CODE.  GOC again 12/11/24--> called daughter Tiffanie Chong and updated 12/11/24. she wants Onc atlij to see and cardiology Dr Morelia Sol and her team to see patient Ree his EF., She Does not want father to go to rehab, but also wants to know about PT at home, also notes she only has limited help at home and can only have father home if wound ar is 1x/day.-----> CM will reach out to daughter Tiffanie who is asking for help at home for all of his wound care.  Patient lives with brother. FULL Code.

## 2024-12-12 NOTE — PROGRESS NOTE ADULT - SUBJECTIVE AND OBJECTIVE BOX
Varun Vuong MD  Available on TEAMS    Patient is a 79y old  Male who presents with a chief complaint of Rectal Bleeding (11 Dec 2024 18:53)      SUBJECTIVE / OVERNIGHT EVENTS: No acute events overnight. Patient was assessed at bedside.       REVIEW OF SYSTEMS:  CONSTITUTIONAL: No weakness, fevers, or chills  EYES/ENT: No visual changes; No vertigo, throat pain, or dysphagia  NECK: No pain or stiffness  RESPIRATORY: No cough, wheezing, hemoptysis, or shortness of breath  CARDIOVASCULAR: No chest pain or palpitations  GASTROINTESTINAL: No abdominal or epigastric pain. No nausea, vomiting, or hematemesis; No diarrhea or constipation. No melena or hematochezia.  GENITOURINARY: No dysuria, frequency, or hematuria  MUSCULOSKELETAL: No joint or muscle pain or aches  NEUROLOGICAL: No numbness or weakness  SKIN: No itching or rashes      MEDICATIONS  (STANDING):  chlorhexidine 2% Cloths 1 Application(s) Topical daily  Dakins Solution - 1/4 Strength 1 Application(s) Topical daily  lactated ringers. 1000 milliLiter(s) (100 mL/Hr) IV Continuous <Continuous>  lactated ringers. 1000 milliLiter(s) (100 mL/Hr) IV Continuous <Continuous>  latanoprost 0.005% Ophthalmic Solution 1 Drop(s) Both EYES at bedtime  levothyroxine 25 MICROGram(s) Oral daily  losartan 25 milliGRAM(s) Oral daily  metoprolol succinate ER 50 milliGRAM(s) Oral daily  mupirocin 2% Ointment 1 Application(s) Both Nostrils two times a day  nicotine -   7 mG/24Hr(s) Patch 1 Patch Transdermal daily  oxyCODONE  ER Tablet 15 milliGRAM(s) Oral every 12 hours  piperacillin/tazobactam IVPB.. 3.375 Gram(s) IV Intermittent every 8 hours  polyethylene glycol 3350 17 Gram(s) Oral daily  rosuvastatin 10 milliGRAM(s) Oral at bedtime  senna 2 Tablet(s) Oral at bedtime  sodium chloride 2 Gram(s) Oral three times a day  spironolactone 25 milliGRAM(s) Oral daily  timolol 0.5% Solution 1 Drop(s) Both EYES daily    MEDICATIONS  (PRN):  acetaminophen     Tablet .. 650 milliGRAM(s) Oral every 6 hours PRN Mild Pain (1 - 3)  morphine  - Injectable 2 milliGRAM(s) IV Push every 4 hours PRN Severe Pain (7 - 10)  morphine  - Injectable 1 milliGRAM(s) IV Push every 4 hours PRN Moderate Pain (4 - 6)  traMADol 50 milliGRAM(s) Oral every 6 hours PRN for severe pain      CAPILLARY BLOOD GLUCOSE        I&O's Summary    11 Dec 2024 07:01  -  12 Dec 2024 07:00  --------------------------------------------------------  IN: 700 mL / OUT: 1150 mL / NET: -450 mL        Vital Signs Last 24 Hrs  T(C): 36.9 (11 Dec 2024 22:00), Max: 36.9 (11 Dec 2024 22:00)  T(F): 98.4 (11 Dec 2024 22:00), Max: 98.4 (11 Dec 2024 22:00)  HR: 86 (11 Dec 2024 22:00) (68 - 86)  BP: 123/84 (11 Dec 2024 22:00) (112/81 - 123/84)  BP(mean): --  RR: 16 (11 Dec 2024 22:00) (15 - 16)  SpO2: 96% (11 Dec 2024 22:00) (96% - 96%)    Parameters below as of 11 Dec 2024 22:00  Patient On (Oxygen Delivery Method): room air        PHYSICAL EXAM:  GENERAL: NAD, well-developed, well-nourished  HEAD: Atraumatic, Normocephalic  EYES: EOMI, PERRLA, conjunctiva and sclera clear  NECK: Supple, No JVD  CHEST/LUNG: Clear to auscultation bilaterally; No wheezes or crackles  HEART: Normal S1/S2; Regular rate and rhythm; No murmurs, rubs, or gallops  ABDOMEN: Soft, Nontender, Nondistended; Bowel sounds present  EXTREMITIES: 2+ Peripheral Pulses; No clubbing, cyanosis, or edema  PSYCH: A&Ox3  NEUROLOGY: no focal neurologic deficit  SKIN: No rashes or lesions    LABS:                        8.3    5.75  )-----------( 197      ( 11 Dec 2024 09:30 )             26.9      12-11    130[L]  |  96[L]  |  7   ----------------------------<  101[H]  3.3[L]   |  25  |  0.39[L]    Ca    8.8      11 Dec 2024 09:30    TPro  5.5[L]  /  Alb  2.3[L]  /  TBili  0.3  /  DBili  x   /  AST  16  /  ALT  <5  /  AlkPhos  98  12-11          Urinalysis Basic - ( 11 Dec 2024 09:30 )    Color: x / Appearance: x / SG: x / pH: x  Gluc: 101 mg/dL / Ketone: x  / Bili: x / Urobili: x   Blood: x / Protein: x / Nitrite: x   Leuk Esterase: x / RBC: x / WBC x   Sq Epi: x / Non Sq Epi: x / Bacteria: x        RADIOLOGY & ADDITIONAL TESTS:    Imaging Personally Reviewed:    Consultant(s) Notes Reviewed:      Care Discussed with Consultants/Other Providers:

## 2024-12-12 NOTE — PROGRESS NOTE ADULT - PROBLEM SELECTOR PLAN 2
- ED: Na: 130/ 12/11: 130  - DDx: SIADH 2/2 cancer/idiopathic/pain vs. FFT vs. decreased PO intake  - Urine Lytes: FeNA: 0.4%: prerenal etiology, likely mixed iso of SIADH vs. chronic disease vs. FTT    Plan  - C/w LR 100cc/hr  - encourage PO intake - ED: Na: 130/ 12/11: 130  - DDx: SIADH 2/2 cancer/idiopathic/pain vs. FFT vs. decreased PO intake  - Urine Lytes: FeNA: 0.4%: prerenal etiology, likely mixed iso of SIADH vs. chronic disease vs. FTT  - Pending mornign labs for resolution    Plan  - C/w LR 100cc/hr  - encourage PO intake - nonerythematous, edema of the upper extremity, not on AC,   - Ddx: DVT vs. fluid shifts from daily fluids    Plan  - DVT studies of upper extremities

## 2024-12-12 NOTE — PROGRESS NOTE ADULT - PROBLEM SELECTOR PLAN 4
-CT A/P: Small amount of bilateral pleural effusions, Small volume pericardial effusion. Pulmonary nodules suspicious of metastasis Midline decubitus ulcer with local subcutaneous inflammation. A few foci of gas in the inflamed superficial soft tissues, nec fas not excluded, no abscess.  - Surg consulted: low concern for nec fascitis, foci of air secondary to open wound,  -  MD Wound care eval    Plan  - Per MD: continue with daily dressing changes -Hx of previous rectal bleeding, off DOAC  - Colonoscopy (10/24): Patent loop colostomy with healthy appearing mucosa in  the transverse colon. Diverticulosis in the entire examined proximal colon.   - Per [facility], not a candidate for further DMT,  - Ddx: Infectious vs rectal cancer mass vs diverticulosis/itis  - likely complication of cancer mass, previous admissions for similar concerns  - CMT, if bleeding continues or increases in volume, low threshold for GI consult

## 2024-12-12 NOTE — PROGRESS NOTE ADULT - PROBLEM SELECTOR PLAN 3
-Hx of previous rectal bleeding, off DOAC  - Colonoscopy (10/24): Patent loop colostomy with healthy appearing mucosa in  the transverse colon. Diverticulosis in the entire examined proximal colon.   - Per [facility], not a candidate for further DMT,  - Ddx: Infectious vs rectal cancer mass vs diverticulosis/itis  - likely complication of cancer mass, previous admissions for similar concerns  - CMT, if bleeding continues or increases in volume, low threshold for GI consult - ED: Na: 130/ 12/11: 130  - DDx: SIADH 2/2 cancer/idiopathic/pain vs. FFT vs. decreased PO intake  - Urine Lytes: FeNA: 0.4%: prerenal etiology, likely mixed iso of SIADH vs. chronic disease vs. FTT  - Pending mornign labs for resolution    Plan  - C/w LR 100cc/hr  - encourage PO intake

## 2024-12-12 NOTE — PROGRESS NOTE ADULT - PROBLEM SELECTOR PLAN 1
ED: VS: T: 95.7, HR: 105, BP: 112/82,. Labs: Na: 130, A, CRP: 101.9.    T: 95.7, HR: 105, BP:139/98, patient meets SIRS criteria  - Patient was given 1x NS 1L bolus, 1x Tramadol 50 mg, 1x morphine 4mg, Fentanyl 25mcg.  - BxCx NGTD 72 hours,   - Zosyn (-) empiric treatment  - CXR: bilateral pleural effusions  - U/A: Moderate LE/ WBC: 76  - Legionella, Strep Ag: negative  - UCx: Candida    Plan  - C/w LR 100cc/hr  - C/w Zosyn will DC tomorrow () for completed dose for empiric treatment   - C/w Fluconazole 50mg (-) ED: VS: T: 95.7, HR: 105, BP: 112/82,. Labs: Na: 130, A, CRP: 101.9.    T: 95.7, HR: 105, BP:139/98, patient meets SIRS criteria  - Patient was given 1x NS 1L bolus, 1x Tramadol 50 mg, 1x morphine 4mg, Fentanyl 25mcg.  - BxCx NGTD 72 hours,   - Zosyn (-) empiric treatment  - CXR: bilateral pleural effusions  - U/A: Moderate LE/ WBC: 76  - Legionella, Strep Ag: negative  - UCx: Candida, per ID pharmacy: no need for treatment, unless patient is neutropenic or symptomatic  - S/p Zosyn (-)    Plan  - C/w NS 100cc/hr ED: VS: T: 95.7, HR: 105, BP: 112/82,. Labs: Na: 130, A, CRP: 101.9.    T: 95.7, HR: 105, BP:139/98, patient meets SIRS criteria  - Patient was given 1x NS 1L bolus, 1x Tramadol 50 mg, 1x morphine 4mg, Fentanyl 25mcg.  - BxCx NGTD 72 hours,  - CXR: bilateral pleural effusions  - U/A: Moderate LE/ WBC: 76  - Legionella, Strep Ag: negative  - UCx: Candida, per ID pharmacy: no need for treatment, unless patient is neutropenic or symptomatic  - S/p Zosyn (-)    Plan  - C/w NS 100cc/hr

## 2024-12-13 DIAGNOSIS — R41.82 ALTERED MENTAL STATUS, UNSPECIFIED: ICD-10-CM

## 2024-12-13 DIAGNOSIS — E43 UNSPECIFIED SEVERE PROTEIN-CALORIE MALNUTRITION: ICD-10-CM

## 2024-12-13 LAB
CULTURE RESULTS: SIGNIFICANT CHANGE UP
CULTURE RESULTS: SIGNIFICANT CHANGE UP
SPECIMEN SOURCE: SIGNIFICANT CHANGE UP
SPECIMEN SOURCE: SIGNIFICANT CHANGE UP

## 2024-12-13 PROCEDURE — 99232 SBSQ HOSP IP/OBS MODERATE 35: CPT | Mod: GC

## 2024-12-13 RX ORDER — ACETAMINOPHEN 80 MG/.8ML
1000 SOLUTION/ DROPS ORAL ONCE
Refills: 0 | Status: COMPLETED | OUTPATIENT
Start: 2024-12-13 | End: 2024-12-13

## 2024-12-13 RX ORDER — SODIUM CHLORIDE 9 MG/ML
1000 INJECTION, SOLUTION INTRAMUSCULAR; INTRAVENOUS; SUBCUTANEOUS
Refills: 0 | Status: COMPLETED | OUTPATIENT
Start: 2024-12-13 | End: 2024-12-13

## 2024-12-13 RX ADMIN — SODIUM CHLORIDE 2 GRAM(S): 9 INJECTION, SOLUTION INTRAMUSCULAR; INTRAVENOUS; SUBCUTANEOUS at 13:41

## 2024-12-13 RX ADMIN — SODIUM CHLORIDE 100 MILLILITER(S): 9 INJECTION, SOLUTION INTRAMUSCULAR; INTRAVENOUS; SUBCUTANEOUS at 11:42

## 2024-12-13 RX ADMIN — ACETAMINOPHEN 1000 MILLIGRAM(S): 80 SOLUTION/ DROPS ORAL at 14:41

## 2024-12-13 RX ADMIN — NICOTINE POLACRILEX 1 PATCH: 4 LOZENGE ORAL at 07:01

## 2024-12-13 RX ADMIN — Medication 15 MILLIGRAM(S): at 05:46

## 2024-12-13 RX ADMIN — SODIUM CHLORIDE 2 GRAM(S): 9 INJECTION, SOLUTION INTRAMUSCULAR; INTRAVENOUS; SUBCUTANEOUS at 21:41

## 2024-12-13 RX ADMIN — ROSUVASTATIN 10 MILLIGRAM(S): 40 TABLET, FILM COATED ORAL at 21:41

## 2024-12-13 RX ADMIN — Medication 1 DROP(S): at 11:41

## 2024-12-13 RX ADMIN — Medication 1 APPLICATION(S): at 05:43

## 2024-12-13 RX ADMIN — Medication 15 MILLIGRAM(S): at 18:42

## 2024-12-13 RX ADMIN — NICOTINE POLACRILEX 1 PATCH: 4 LOZENGE ORAL at 18:34

## 2024-12-13 RX ADMIN — NICOTINE POLACRILEX 1 PATCH: 4 LOZENGE ORAL at 11:41

## 2024-12-13 RX ADMIN — Medication 1 APPLICATION(S): at 17:42

## 2024-12-13 RX ADMIN — Medication 50 MILLIGRAM(S): at 05:41

## 2024-12-13 RX ADMIN — LATANOPROST 1 DROP(S): 50 SOLUTION OPHTHALMIC at 21:42

## 2024-12-13 RX ADMIN — ACETAMINOPHEN 400 MILLIGRAM(S): 80 SOLUTION/ DROPS ORAL at 13:41

## 2024-12-13 RX ADMIN — LEVOTHYROXINE SODIUM 25 MICROGRAM(S): 175 TABLET ORAL at 05:41

## 2024-12-13 RX ADMIN — LOSARTAN POTASSIUM 25 MILLIGRAM(S): 100 TABLET, FILM COATED ORAL at 05:41

## 2024-12-13 RX ADMIN — SODIUM CHLORIDE 2 GRAM(S): 9 INJECTION, SOLUTION INTRAMUSCULAR; INTRAVENOUS; SUBCUTANEOUS at 05:42

## 2024-12-13 RX ADMIN — Medication 15 MILLIGRAM(S): at 17:42

## 2024-12-13 RX ADMIN — Medication 15 MILLIGRAM(S): at 06:45

## 2024-12-13 RX ADMIN — SPIRONOLACTONE 25 MILLIGRAM(S): 50 TABLET ORAL at 05:41

## 2024-12-13 RX ADMIN — NICOTINE POLACRILEX 1 PATCH: 4 LOZENGE ORAL at 11:30

## 2024-12-13 NOTE — PROGRESS NOTE ADULT - PROBLEM SELECTOR PLAN 1
ED: VS: T: 95.7, HR: 105, BP: 112/82,. Labs: Na: 130, A, CRP: 101.9.    T: 95.7, HR: 105, BP:139/98, patient meets SIRS criteria  - Patient was given 1x NS 1L bolus, 1x Tramadol 50 mg, 1x morphine 4mg, Fentanyl 25mcg.  - BxCx NGTD 72 hours,  - CXR: bilateral pleural effusions  - U/A: Moderate LE/ WBC: 76  - Legionella, Strep Ag: negative  - UCx: Candida, per ID pharmacy: no need for treatment, unless patient is neutropenic or symptomatic  - S/p Zosyn (-)    Plan  - C/w NS 100cc/hr - Hx of Rectal Cancer, follows with [facility] oncology  - Per daughter, Dr. Miguel Angel Johnson has been more proactive with patient's case, was to be reevaluated by [facility] for a possible 2nd opinion, expressed interest in outpt follow up with [facility] which will be re-evalauted on discharge.  - Tylenol 650mg mild/ moderate/severe: IV Morphine 1mg/2mg, MS oxycotin 15mg BID  - Heme/Onc consulted for possible outpt follow up  - Current pain regimen causing excessive drowsiness and possible AMS, will get palliative consult for new pain regimen, given current constraints regarding an inability to use oxycodone, and currently on IV morphine    Plan    - Palliative consult for pain management  - C/w NS 100cc/hr for decreased PO intake - Hx of Rectal Cancer, follows with [facility] oncology  - Per daughter, Dr. Miguel Angel Johnson has been more proactive with patient's case, was to be reevaluated by [facility] for a possible 2nd opinion, expressed interest in outpt follow up with [facility] which will be re-evaluated on discharge.  - Tylenol 650mg mild/ moderate/severe: IV Morphine 1mg/2mg, MS oxycotin 15mg BID  - Heme/Onc consulted for possible outpt follow up  - Current pain regimen causing excessive drowsiness and possible AMS, will get palliative consult for new pain regimen, given current constraints regarding an inability to use oxycodone, and currently on IV morphine    Plan  - Palliative consult for pain management  - C/w NS 100cc/hr for decreased PO intake

## 2024-12-13 NOTE — PROGRESS NOTE ADULT - PROBLEM SELECTOR PLAN 3
- ED: Na: 130/ 12/11: 130  - DDx: SIADH 2/2 cancer/idiopathic/pain vs. FFT vs. decreased PO intake  - Urine Lytes: FeNA: 0.4%: prerenal etiology, likely mixed iso of SIADH vs. chronic disease vs. FTT  - Pending mornign labs for resolution    Plan  - C/w LR 100cc/hr  - encourage PO intake -Hx of previous rectal bleeding, off DOAC  - Colonoscopy (10/24): Patent loop colostomy with healthy appearing mucosa in  the transverse colon. Diverticulosis in the entire examined proximal colon.   - Per [facility], not a candidate for further DMT,  - Ddx: Infectious vs rectal cancer mass vs diverticulosis/itis  - likely complication of cancer mass, previous admissions for similar concerns  - CMT, if bleeding continues or increases in volume, low threshold for GI consult  Resolving - ED: Na: 130/ 12/11: 130  - DDx: SIADH 2/2 cancer/idiopathic/pain vs. FFT vs. decreased PO intake  - Urine Lytes: FeNA: 0.4%: prerenal etiology, likely mixed iso of SIADH vs. chronic disease vs. FTT    Plan  - C/w LR 100cc/hr  - encourage PO intake

## 2024-12-13 NOTE — PROGRESS NOTE ADULT - PROBLEM SELECTOR PLAN 7
- Maintained on Crestor 10mg QHS, Toprol XL 50mg QD, Losartan 25mg QD, Spironolactone 25mg QD  - TTE (10/24): Left ventricular systolic function is moderately to severely decreased with an ejection fraction visually estimated at 35 to 40 %. Global left ventricular hypokinesis. Enlarged right ventricular cavity size, with normal wall thickness, stable pericardial effusion see, consistent CT findings (12/8)  - Per conversation with daughter (12/9), there is concern for decreasing EF from outpt provider Dr. Sol,  - TTE: LV not well visualized. Unable to evaluate the EF. LV normal thickness and size.  - Per cardiology chart note:  moderately to severely decreased with an ejection fraction visually estimated at 35to 40 %. Global left ventricular hypokinesis.     Plan  - should follow up with his outpatient cardiologist- Dr Leiva- following discharge. - H/H: 9.4/30.7 on admission   - Hemoglobin on last admission (8.3/27.2)  - Stable   - Monitor for signs and symptoms of bleeding    Plan  - Active T&S  - Transfuse prn (Hgb>8, given Hx of CAD) - Maintained on Crestor 10mg QHS, Toprol XL 50mg QD, Losartan 25mg QD, Spironolactone 25mg QD  - TTE (10/24): Left ventricular systolic function is moderately to severely decreased with an ejection fraction visually estimated at 35 to 40 %. Global left ventricular hypokinesis. Enlarged right ventricular cavity size, with normal wall thickness, stable pericardial effusion see, consistent CT findings (12/8)  - Per conversation with daughter (12/9), there is concern for decreasing EF from outpt provider Dr. Sol,  - TTE: LV not well visualized. Unable to evaluate the EF. LV normal thickness and size.  - Per cardiology chart note:  moderately to severely decreased with an ejection fraction visually estimated at 35to 40 %. Global left ventricular hypokinesis.   - should follow up with his outpatient cardiologist- Dr Leiva- following discharge.

## 2024-12-13 NOTE — PROGRESS NOTE ADULT - PROBLEM SELECTOR PLAN 9
- S/p stent placement in 2012, not on AC due to multiple episodes for GI bleeds - Fluids: NS 100cc/hour  - Electrolytes: Will replete to maintain K>4, Phos>3, and Mag>2  - Nutrition: Regular diet,   - Activity: As tolerated, pending PT eval  - DVT Prophylaxis: N/A  - Stress Ulcer/GI Prophylaxis: N/A,  - Disposition: Admit to medicine, will return home with PT when medically cleared - H/H: 9.4/30.7 on admission   - Hemoglobin on last admission (8.3/27.2)  - Stable   - Monitor for signs and symptoms of bleeding    Plan  - Active T&S  - Transfuse prn (Hgb>8, given Hx of CAD)

## 2024-12-13 NOTE — PROGRESS NOTE ADULT - PROBLEM SELECTOR PLAN 10
- Hx of Rectal Cancer, follows with [facility] oncology  - Per daughter, Dr. Miguel Angel Johnson has been more proactive with patient's case, was to be reevaluted by [facility] for a possible 2nd opinion, expressed interest in outpt follow up with [facility] which will be re-evalauted on discharge.  - Preference for morphine for pain management  - Tylenol 650mg mild/ moderate/severe: IV Morphine 1mg/2mg    Plan  - Heme/Onc consult for outpatient [facility] follow up - Fluids: NS 100cc/hour  - Electrolytes: Will replete to maintain K>4, Phos>3, and Mag>2  - Nutrition: Regular diet,   - Activity: As tolerated, pending PT eval  - DVT Prophylaxis: N/A  - Stress Ulcer/GI Prophylaxis: N/A,  - Disposition: Admit to medicine, will return home with PT when medically cleared - S/p stent placement in 2012, not on AC due to multiple episodes for GI bleeds

## 2024-12-13 NOTE — PROGRESS NOTE ADULT - SUBJECTIVE AND OBJECTIVE BOX
Varun Vuong MD  Available on TEAMS    Patient is a 79y old  Male who presents with a chief complaint of Rectal Bleeding (12 Dec 2024 07:42)      SUBJECTIVE / OVERNIGHT EVENTS: No acute events overnight. Patient was assessed at bedside.       REVIEW OF SYSTEMS:  CONSTITUTIONAL: No weakness, fevers, or chills  EYES/ENT: No visual changes; No vertigo, throat pain, or dysphagia  NECK: No pain or stiffness  RESPIRATORY: No cough, wheezing, hemoptysis, or shortness of breath  CARDIOVASCULAR: No chest pain or palpitations  GASTROINTESTINAL: No abdominal or epigastric pain. No nausea, vomiting, or hematemesis; No diarrhea or constipation. No melena or hematochezia.  GENITOURINARY: No dysuria, frequency, or hematuria  MUSCULOSKELETAL: No joint or muscle pain or aches  NEUROLOGICAL: No numbness or weakness  SKIN: No itching or rashes      MEDICATIONS  (STANDING):  chlorhexidine 2% Cloths 1 Application(s) Topical daily  Dakins Solution - 1/4 Strength 1 Application(s) Topical daily  latanoprost 0.005% Ophthalmic Solution 1 Drop(s) Both EYES at bedtime  levothyroxine 25 MICROGram(s) Oral daily  losartan 25 milliGRAM(s) Oral daily  metoprolol succinate ER 50 milliGRAM(s) Oral daily  mupirocin 2% Ointment 1 Application(s) Both Nostrils two times a day  nicotine -   7 mG/24Hr(s) Patch 1 Patch Transdermal daily  oxyCODONE  ER Tablet 15 milliGRAM(s) Oral every 12 hours  polyethylene glycol 3350 17 Gram(s) Oral daily  rosuvastatin 10 milliGRAM(s) Oral at bedtime  senna 2 Tablet(s) Oral at bedtime  sodium chloride 2 Gram(s) Oral three times a day  spironolactone 25 milliGRAM(s) Oral daily  timolol 0.5% Solution 1 Drop(s) Both EYES daily    MEDICATIONS  (PRN):  acetaminophen     Tablet .. 650 milliGRAM(s) Oral every 6 hours PRN Mild Pain (1 - 3)  morphine  - Injectable 2 milliGRAM(s) IV Push every 4 hours PRN Severe Pain (7 - 10)  morphine  - Injectable 1 milliGRAM(s) IV Push every 4 hours PRN Moderate Pain (4 - 6)  traMADol 50 milliGRAM(s) Oral every 6 hours PRN for severe pain      CAPILLARY BLOOD GLUCOSE        I&O's Summary    12 Dec 2024 07:01  -  13 Dec 2024 07:00  --------------------------------------------------------  IN: 880 mL / OUT: 1150 mL / NET: -270 mL        Vital Signs Last 24 Hrs  T(C): 36.8 (13 Dec 2024 05:55), Max: 36.8 (13 Dec 2024 05:55)  T(F): 98.2 (13 Dec 2024 05:55), Max: 98.2 (13 Dec 2024 05:55)  HR: 80 (13 Dec 2024 05:55) (76 - 90)  BP: 109/64 (13 Dec 2024 05:55) (108/72 - 114/71)  BP(mean): --  RR: 17 (13 Dec 2024 05:55) (17 - 18)  SpO2: 98% (13 Dec 2024 05:55) (96% - 100%)    Parameters below as of 13 Dec 2024 05:55  Patient On (Oxygen Delivery Method): room air        PHYSICAL EXAM:  GENERAL: NAD, well-developed, well-nourished  HEAD: Atraumatic, Normocephalic  EYES: EOMI, PERRLA, conjunctiva and sclera clear  NECK: Supple, No JVD  CHEST/LUNG: Clear to auscultation bilaterally; No wheezes or crackles  HEART: Normal S1/S2; Regular rate and rhythm; No murmurs, rubs, or gallops  ABDOMEN: Soft, Nontender, Nondistended; Bowel sounds present  EXTREMITIES: 2+ Peripheral Pulses; No clubbing, cyanosis, or edema  PSYCH: A&Ox3  NEUROLOGY: no focal neurologic deficit  SKIN: No rashes or lesions    LABS:                        8.4    5.35  )-----------( 200      ( 12 Dec 2024 11:15 )             26.9      12-12    131[L]  |  97[L]  |  7   ----------------------------<  96  3.6   |  24  |  0.42[L]    Ca    8.6      12 Dec 2024 11:15    TPro  5.6[L]  /  Alb  2.2[L]  /  TBili  0.3  /  DBili  x   /  AST  15  /  ALT  <5[L]  /  AlkPhos  97  12-12          Urinalysis Basic - ( 12 Dec 2024 11:15 )    Color: x / Appearance: x / SG: x / pH: x  Gluc: 96 mg/dL / Ketone: x  / Bili: x / Urobili: x   Blood: x / Protein: x / Nitrite: x   Leuk Esterase: x / RBC: x / WBC x   Sq Epi: x / Non Sq Epi: x / Bacteria: x        RADIOLOGY & ADDITIONAL TESTS:    Imaging Personally Reviewed:    Consultant(s) Notes Reviewed:      Care Discussed with Consultants/Other Providers:

## 2024-12-13 NOTE — PROGRESS NOTE ADULT - PROBLEM SELECTOR PLAN 6
-Maintained on statin, Toprol, losartan, spironolactone    Plan  - C/w home meds - Maintained on Crestor 10mg QHS, Toprol XL 50mg QD, Losartan 25mg QD, Spironolactone 25mg QD  - TTE (10/24): Left ventricular systolic function is moderately to severely decreased with an ejection fraction visually estimated at 35 to 40 %. Global left ventricular hypokinesis. Enlarged right ventricular cavity size, with normal wall thickness, stable pericardial effusion see, consistent CT findings (12/8)  - Per conversation with daughter (12/9), there is concern for decreasing EF from outpt provider Dr. Sol,  - TTE: LV not well visualized. Unable to evaluate the EF. LV normal thickness and size.  - Per cardiology chart note:  moderately to severely decreased with an ejection fraction visually estimated at 35to 40 %. Global left ventricular hypokinesis.   - should follow up with his outpatient cardiologist- Dr Leiva- following discharge.

## 2024-12-13 NOTE — PROGRESS NOTE ADULT - ATTENDING COMMENTS
Agree with above A/P by Dr Vuong  79 year old male with a PMH of CAD s/p stent placement in 2012 ( [facility]), A Fib ( not on AC due to GI bleed), known h/o DVT in right leg, HFrEF,  locally advanced rectal cancer s/p chemo/radation (11/22,consolidation CAPOX (2/2023), brachytherapy (3/2024), hx of  s/p diverting end sigmoid colostomy, cystoscopy and naranjo placement across defect , and recent admission (11/24) for for  Yuri gangrene s/p surgical debridement coming in for concern for rectal bleeding and sacral wound ulcer. Patient has been previous admitted for rectal bleed with a colonoscopy (10/24) showing diverticulosis, at that time was ruled as a complication of rectal cancer. Per primary oncologist, Dr. Paez, [facility], no role for DMT at that time.     PE NAD, more sleepy today but answers questions---> MS contin stopped 12/12. patient has been declining his wound care.  has  wheel chair at home. gets hha for his wound are.      A/p  # Ch in mS Ox2--> Ct of head 12/12--> neg--> Stopped MS contin--> f/u Palliative for PAIN [facility]. Iv tylenol for now.  # UTI, f/u urine culture. c/w IV Zosyn day 5/5 . ID notes no need to treat candida in urine,  # Hypothyroidism ,TSH 24, start synthroid 25 mcg/day and monitor tsh in 6 weeks.  #Hyponatremia Na 129.--> 130  --> 131, , c/w nacl 1 tab bid , can do once daily on d/c.  # CVS h/o afib and CHF with HFrEF. Echo recently done-- Reviewed by Cardiology---> no ch, out patient f/u Dr Morelia Duggan., cardiology  # Onc--> patient was silva [facility]--> no role for further rx, had  a second opinion at [facility] in the [facility] and they are offering " something" to help "slow the cancer" ---> he will f/u with [facility] in [facility] for this cancer care. Daughter on 12/11 asked for Onc at [facility] to see--> [facility] onc consult requested. 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   -No s/s of acute soft tissue infection  -Topical recommendations: cleanse wounds and periwound skin with NS. Apply Liquid barrier film to periwound # Rectal bleed h/o diverticular dz and rectal mass s/p rt. followed by [facility] and Blood. no longer a candidate for chemo  #DVT proph Seq teds  GOC done by Dr Vuong---> patient is a nephrologist and wants FULL CODE.  GOC again 12/11/24--> called daughter Tiffanie Chong and updated 12/11/24. she wants Onc atlij to see and cardiology Dr Morelia Sol and her team to see patient Ree his EF., She Does not want father to go to rehab, but also wants to know about PT at home, also notes she only has limited help at home and can only have father home if wound ar is 1x/day.-----> CM will reach out to daughter Tiffanie who is asking for help at home for all of his wound care.  Patient lives with brother. FULL Code.

## 2024-12-13 NOTE — PROGRESS NOTE ADULT - PROBLEM SELECTOR PLAN 4
-Hx of previous rectal bleeding, off DOAC  - Colonoscopy (10/24): Patent loop colostomy with healthy appearing mucosa in  the transverse colon. Diverticulosis in the entire examined proximal colon.   - Per [facility], not a candidate for further DMT,  - Ddx: Infectious vs rectal cancer mass vs diverticulosis/itis  - likely complication of cancer mass, previous admissions for similar concerns  - CMT, if bleeding continues or increases in volume, low threshold for GI consult -CT A/P: Small amount of bilateral pleural effusions, Small volume pericardial effusion. Pulmonary nodules suspicious of metastasis Midline decubitus ulcer with local subcutaneous inflammation. A few foci of gas in the inflamed superficial soft tissues, nec fas not excluded, no abscess.  - Surg consulted: low concern for nec fascitis, foci of air secondary to open wound,  - Per MD: continue with daily dressing changes -Hx of previous rectal bleeding, off DOAC  - Colonoscopy (10/24): Patent loop colostomy with healthy appearing mucosa in  the transverse colon. Diverticulosis in the entire examined proximal colon.   - Per [facility], not a candidate for further DMT,  - Ddx: Infectious vs rectal cancer mass vs diverticulosis/itis  - likely complication of cancer mass, previous admissions for similar concerns  - CMT, if bleeding continues or increases in volume, low threshold for GI consult  Resolving

## 2024-12-13 NOTE — PROGRESS NOTE ADULT - PROBLEM SELECTOR PLAN 2
- nonerythematous, edema of the upper extremity, not on AC,   - Ddx: DVT vs. fluid shifts from daily fluids    Plan  - DVT studies of upper extremities - ED: Na: 130/ 12/11: 130  - DDx: SIADH 2/2 cancer/idiopathic/pain vs. FFT vs. decreased PO intake  - Urine Lytes: FeNA: 0.4%: prerenal etiology, likely mixed iso of SIADH vs. chronic disease vs. FTT  - Pending mornign labs for resolution    Plan  - C/w LR 100cc/hr  - encourage PO intake - ED: Na: 130/ 12/11: 130  - DDx: SIADH 2/2 cancer/idiopathic/pain vs. FFT vs. decreased PO intake  - Urine Lytes: FeNA: 0.4%: prerenal etiology, likely mixed iso of SIADH vs. chronic disease vs. FTT    Plan  - C/w LR 100cc/hr  - encourage PO intake - Per daughter, baseline mentation AAOx 2-3  - Increasing lethargy since 12/11; likely suspicion of increasing judith medications  - CT head (11/12): no acute abnormal processes    Plan  - Palliative consult for pain management - Per daughter, baseline mentation AAOx 2-3  - Increasing lethargy since 12/11; likely suspicion of increasing judith medications  - BxCx: NGTD 5 days, S/p Zosyn 3 day course   - CT head (11/12): no acute abnormal processes    Plan  - Palliative consult for pain management

## 2024-12-13 NOTE — PROGRESS NOTE ADULT - PROBLEM SELECTOR PLAN 8
- H/H: 9.4/30.7 on admission   - Hemoglobin on last admission (8.3/27.2)  - Stable   - Monitor for signs and symptoms of bleeding    Plan  - Active T&S  - Transfuse prn (Hgb>8, given Hx of CAD) - S/p stent placement in 2012, not on AC due to multiple episodes for GI bleeds regular diet and ensure puddings

## 2024-12-13 NOTE — PROGRESS NOTE ADULT - PROBLEM SELECTOR PLAN 5
-CT A/P: Small amount of bilateral pleural effusions, Small volume pericardial effusion. Pulmonary nodules suspicious of metastasis Midline decubitus ulcer with local subcutaneous inflammation. A few foci of gas in the inflamed superficial soft tissues, nec fas not excluded, no abscess.  - Surg consulted: low concern for nec fascitis, foci of air secondary to open wound,  -  MD Wound care eval    Plan  - Per MD: continue with daily dressing changes -Maintained on statin, Toprol, losartan, spironolactone    Plan  - C/w home meds -CT A/P: Small amount of bilateral pleural effusions, Small volume pericardial effusion. Pulmonary nodules suspicious of metastasis Midline decubitus ulcer with local subcutaneous inflammation. A few foci of gas in the inflamed superficial soft tissues, nec fas not excluded, no abscess.  - Surg consulted: low concern for nec fascitis, foci of air secondary to open wound,  - Per MD: continue with daily dressing changes

## 2024-12-14 PROCEDURE — 99232 SBSQ HOSP IP/OBS MODERATE 35: CPT | Mod: GC

## 2024-12-14 RX ADMIN — SPIRONOLACTONE 25 MILLIGRAM(S): 50 TABLET ORAL at 06:51

## 2024-12-14 RX ADMIN — LATANOPROST 1 DROP(S): 50 SOLUTION OPHTHALMIC at 22:11

## 2024-12-14 RX ADMIN — LEVOTHYROXINE SODIUM 25 MICROGRAM(S): 175 TABLET ORAL at 06:51

## 2024-12-14 RX ADMIN — Medication 50 MILLIGRAM(S): at 06:51

## 2024-12-14 RX ADMIN — Medication 1 DROP(S): at 11:51

## 2024-12-14 RX ADMIN — Medication 1 MILLIGRAM(S): at 18:59

## 2024-12-14 RX ADMIN — NICOTINE POLACRILEX 1 PATCH: 4 LOZENGE ORAL at 11:52

## 2024-12-14 RX ADMIN — SENNOSIDES 2 TABLET(S): 8.6 TABLET, FILM COATED ORAL at 22:09

## 2024-12-14 RX ADMIN — Medication 1 APPLICATION(S): at 06:52

## 2024-12-14 RX ADMIN — ACETAMINOPHEN 650 MILLIGRAM(S): 80 SOLUTION/ DROPS ORAL at 23:09

## 2024-12-14 RX ADMIN — Medication 15 MILLIGRAM(S): at 18:16

## 2024-12-14 RX ADMIN — ROSUVASTATIN 10 MILLIGRAM(S): 40 TABLET, FILM COATED ORAL at 22:09

## 2024-12-14 RX ADMIN — NICOTINE POLACRILEX 1 PATCH: 4 LOZENGE ORAL at 11:50

## 2024-12-14 RX ADMIN — SODIUM CHLORIDE 2 GRAM(S): 9 INJECTION, SOLUTION INTRAMUSCULAR; INTRAVENOUS; SUBCUTANEOUS at 06:51

## 2024-12-14 RX ADMIN — ACETAMINOPHEN 650 MILLIGRAM(S): 80 SOLUTION/ DROPS ORAL at 22:09

## 2024-12-14 RX ADMIN — LOSARTAN POTASSIUM 25 MILLIGRAM(S): 100 TABLET, FILM COATED ORAL at 06:51

## 2024-12-14 RX ADMIN — NICOTINE POLACRILEX 1 PATCH: 4 LOZENGE ORAL at 18:04

## 2024-12-14 RX ADMIN — SODIUM CHLORIDE 2 GRAM(S): 9 INJECTION, SOLUTION INTRAMUSCULAR; INTRAVENOUS; SUBCUTANEOUS at 13:00

## 2024-12-14 RX ADMIN — Medication 15 MILLIGRAM(S): at 06:51

## 2024-12-14 RX ADMIN — Medication 1 APPLICATION(S): at 11:52

## 2024-12-14 RX ADMIN — CHLORHEXIDINE GLUCONATE 1 APPLICATION(S): 1.2 RINSE ORAL at 11:52

## 2024-12-14 RX ADMIN — SODIUM CHLORIDE 2 GRAM(S): 9 INJECTION, SOLUTION INTRAMUSCULAR; INTRAVENOUS; SUBCUTANEOUS at 22:09

## 2024-12-14 RX ADMIN — Medication 1 MILLIGRAM(S): at 17:59

## 2024-12-14 RX ADMIN — Medication 15 MILLIGRAM(S): at 17:16

## 2024-12-14 NOTE — PROGRESS NOTE ADULT - ATTENDING COMMENTS
Patient seen and examined with team   Agree with above A/P by Dr Salamanca  79 year old male with a PMH of CAD s/p stent placement in 2012 ( ASA), A Fib ( not on AC due to GI bleed), known h/o DVT in right leg, HFrEF,  locally advanced rectal cancer s/p chemo/radation (11/22,consolidation CAPOX (2/2023), brachytherapy (3/2024), hx of  s/p diverting end sigmoid colostomy, cystoscopy and naranjo placement across defect , and recent admission (11/24) for for  Tara gangrene s/p surgical debridement coming in for concern for rectal bleeding and sacral wound ulcer. Patient has been previous admitted for rectal bleed with a colonoscopy (10/24) showing diverticulosis, at that time was ruled as a complication of rectal cancer. Per primary oncologist, Dr. Paez, [facility] [place], no role for DMT at that time.   Patient and daughter has asked for multiple consults---> all requested.    Patient has been non cooperative with labs draws and Wound care for past 2 days ?? due to use of MS contin--> now stopped.    Pe   Vital Signs Last 24 Hrs  T(F): 97.4 HR: 77 , BP: 124/76 , RR: 18 , SpO2: 100% room air  Lungs  Cor  Abd     A/P  # Pain  Mng , f/u Palliative. MS contin stopped due to Ch in mS  #Wound care Onc/day, if patient will allow  #Onc--> [place] f[place] --> they want to treat him to " control " cancer not cure it. Can also f/u out patient at [facility]  # Hyponatremia f/u Na on bmp    d/c planning to HOME with home care as per  for his wounds. Patient seen and examined with team   Agree with above A/P by Dr Salamanca  79 year old male with a PMH of CAD s/p stent placement in 2012 ( ASA), A Fib ( not on AC due to GI bleed), known h/o DVT in right leg, HFrEF,  locally advanced rectal cancer s/p chemo/radation (11/22,consolidation CAPOX (2/2023), brachytherapy (3/2024), hx of  s/p diverting end sigmoid colostomy, cystoscopy and naranjo placement across defect , and recent admission (11/24) for for  Tara gangrene s/p surgical debridement coming in for concern for rectal bleeding and sacral wound ulcer. Patient has been previous admitted for rectal bleed with a colonoscopy (10/24) showing diverticulosis, at that time was ruled as a complication of rectal cancer. Per primary oncologist, Dr. Paez, [facility], no role for DMT at that time.   Patient and daughter has asked for multiple consults---> all requested.    Patient has been non cooperative with labs draws and Wound care for past 2 days ?? due to use of MS contin--> now stopped.    Pe , NAD  Vital Signs Last 24 Hrs  T(F): 97.4 HR: 77 , BP: 124/76 , RR: 18 , SpO2: 100% room air  Lungs  Cor  Abd soft n/t with colostomy bag, Ext neg e/c/ naranjo    A/P  # Pain  Mng , f/u Palliative CONSULT FOR PAIN. MS contin stopped due to Ch in mS. OBSERVE>  #Wound care Onc/day, if patient will allow  #Onc--> [place] f/[place] in [facility] --> they want to treat him to " control " cancer not cure it. Can also f/u out patient at [facility]  # Hyponatremia f/u Na on bmp  plan d/w team   d/c planning to HOME with home care as per CM for his wounds.

## 2024-12-14 NOTE — PROGRESS NOTE ADULT - PROBLEM SELECTOR PLAN 4
-Hx of previous rectal bleeding, off DOAC  - Colonoscopy (10/24): Patent loop colostomy with healthy appearing mucosa in  the transverse colon. Diverticulosis in the entire examined proximal colon.   - Per [facility], not a candidate for further DMT,  - Ddx: Infectious vs rectal cancer mass vs diverticulosis/itis  - likely complication of cancer mass, previous admissions for similar concerns  - CMT, if bleeding continues or increases in volume, low threshold for GI consult  Resolving

## 2024-12-14 NOTE — PROGRESS NOTE ADULT - PROBLEM SELECTOR PLAN 3
- ED: Na: 130/ 12/11: 130  - DDx: SIADH 2/2 cancer/idiopathic/pain vs. FFT vs. decreased PO intake  - Urine Lytes: FeNA: 0.4%: prerenal etiology, likely mixed iso of SIADH vs. chronic disease vs. FTT    Plan  - C/w LR 100cc/hr  - encourage PO intake

## 2024-12-14 NOTE — PROGRESS NOTE ADULT - PROBLEM SELECTOR PLAN 1
- Hx of Rectal Cancer, follows with [facility] oncology  - Per daughter, Dr. Miguel Angel Johnson has been more proactive with patient's case, was to be reevaluated by [facility] for a possible 2nd opinion, expressed interest in outpt follow up with [facility] which will be re-evaluated on discharge.  - Tylenol 650mg mild/ moderate/severe: IV Morphine 1mg/2mg, MS oxycotin 15mg BID  - Heme/Onc consulted for possible outpt follow up  - Current pain regimen causing excessive drowsiness and possible AMS, will get palliative consult for new pain regimen, given current constraints regarding an inability to use oxycodone, and currently on IV morphine    Plan  - Palliative consult for pain management  - C/w NS 100cc/hr for decreased PO intake

## 2024-12-14 NOTE — PROGRESS NOTE ADULT - PROBLEM SELECTOR PLAN 7
- Maintained on Crestor 10mg QHS, Toprol XL 50mg QD, Losartan 25mg QD, Spironolactone 25mg QD  - TTE (10/24): Left ventricular systolic function is moderately to severely decreased with an ejection fraction visually estimated at 35 to 40 %. Global left ventricular hypokinesis. Enlarged right ventricular cavity size, with normal wall thickness, stable pericardial effusion see, consistent CT findings (12/8)  - Per conversation with daughter (12/9), there is concern for decreasing EF from outpt provider Dr. Sol,  - TTE: LV not well visualized. Unable to evaluate the EF. LV normal thickness and size.  - Per cardiology chart note:  moderately to severely decreased with an ejection fraction visually estimated at 35to 40 %. Global left ventricular hypokinesis.   - should follow up with his outpatient cardiologist- Dr Leiva- following discharge.

## 2024-12-14 NOTE — PROGRESS NOTE ADULT - PROBLEM SELECTOR PLAN 2
- Per daughter, baseline mentation AAOx 2-3  - Increasing lethargy since 12/11; likely suspicion of increasing judith medications  - BxCx: NGTD 5 days, S/p Zosyn 3 day course   - CT head (11/12): no acute abnormal processes    Plan  - Palliative consult for pain management

## 2024-12-14 NOTE — PROGRESS NOTE ADULT - PROBLEM SELECTOR PLAN 5
-CT A/P: Small amount of bilateral pleural effusions, Small volume pericardial effusion. Pulmonary nodules suspicious of metastasis Midline decubitus ulcer with local subcutaneous inflammation. A few foci of gas in the inflamed superficial soft tissues, nec fas not excluded, no abscess.  - Surg consulted: low concern for nec fascitis, foci of air secondary to open wound,  - Per MD: continue with daily dressing changes

## 2024-12-14 NOTE — PROGRESS NOTE ADULT - SUBJECTIVE AND OBJECTIVE BOX
Kris Salamanca  PGY-2 Resident Physician     Patient is a 79y old  Male who presents with a chief complaint of Rectal Bleeding (13 Dec 2024 07:29)    SUBJECTIVE / OVERNIGHT EVENTS:  -NAEON    MEDICATIONS  (STANDING):  chlorhexidine 2% Cloths 1 Application(s) Topical daily  Dakins Solution - 1/4 Strength 1 Application(s) Topical daily  latanoprost 0.005% Ophthalmic Solution 1 Drop(s) Both EYES at bedtime  levothyroxine 25 MICROGram(s) Oral daily  losartan 25 milliGRAM(s) Oral daily  metoprolol succinate ER 50 milliGRAM(s) Oral daily  nicotine -   7 mG/24Hr(s) Patch 1 Patch Transdermal daily  oxyCODONE  ER Tablet 15 milliGRAM(s) Oral every 12 hours  polyethylene glycol 3350 17 Gram(s) Oral daily  rosuvastatin 10 milliGRAM(s) Oral at bedtime  senna 2 Tablet(s) Oral at bedtime  sodium chloride 2 Gram(s) Oral three times a day  spironolactone 25 milliGRAM(s) Oral daily  timolol 0.5% Solution 1 Drop(s) Both EYES daily    MEDICATIONS  (PRN):  acetaminophen     Tablet .. 650 milliGRAM(s) Oral every 6 hours PRN Mild Pain (1 - 3)  morphine  - Injectable 2 milliGRAM(s) IV Push every 4 hours PRN Severe Pain (7 - 10)  morphine  - Injectable 1 milliGRAM(s) IV Push every 4 hours PRN Moderate Pain (4 - 6)    Allergies    No Known Allergies    Intolerances        Vital Signs Last 24 Hrs  T(C): 36.3 (14 Dec 2024 05:51), Max: 36.5 (13 Dec 2024 21:13)  T(F): 97.4 (14 Dec 2024 05:51), Max: 97.7 (13 Dec 2024 21:13)  HR: 77 (14 Dec 2024 05:51) (72 - 77)  BP: 124/76 (14 Dec 2024 05:51) (112/64 - 124/76)  BP(mean): --  RR: 18 (14 Dec 2024 05:51) (17 - 18)  SpO2: 100% (14 Dec 2024 05:51) (97% - 100%)    Parameters below as of 14 Dec 2024 05:51  Patient On (Oxygen Delivery Method): room air      Daily     Daily   I&O's Summary    13 Dec 2024 07:01  -  14 Dec 2024 07:00  --------------------------------------------------------  IN: 300 mL / OUT: 1300 mL / NET: -1000 mL        Physical Exam  GENERAL: NAD, well-developed, well-nourished  CHEST/LUNG: Clear to auscultation bilaterally; No wheezes or crackles  HEART: Normal S1/S2; Regular rate and rhythm; No murmurs, rubs, or gallops  ABDOMEN: Soft, Nontender, Nondistended; Bowel sounds present  EXTREMITIES: 2+ Peripheral Pulses; No clubbing, cyanosis, or edema  PSYCH: A&Ox 1-2   NEUROLOGY: no focal neurologic deficit  SKIN: Noted sacral wound     DIAGNOSTICS:                         8.4    5.35  )-----------( 200      ( 12 Dec 2024 11:15 )             26.9     Hgb Trend: 8.4<--, 8.3<--, 9.0<--, 9.4<--  12-12    131[L]  |  97[L]  |  7   ----------------------------<  96  3.6   |  24  |  0.42[L]    Ca    8.6      12 Dec 2024 11:15    TPro  5.6[L]  /  Alb  2.2[L]  /  TBili  0.3  /  DBili  x   /  AST  15  /  ALT  <5[L]  /  AlkPhos  97  12-12    CAPILLARY BLOOD GLUCOSE        Creatinine Trend: 0.42<--, 0.39<--, 0.63<--, 0.63<--  LIVER FUNCTIONS - ( 12 Dec 2024 11:15 )  Alb: 2.2 g/dL / Pro: 5.6 g/dL / ALK PHOS: 97 U/L / ALT: <5 U/L / AST: 15 U/L / GGT: x                 Urinalysis Basic - ( 12 Dec 2024 11:15 )    Color: x / Appearance: x / SG: x / pH: x  Gluc: 96 mg/dL / Ketone: x  / Bili: x / Urobili: x   Blood: x / Protein: x / Nitrite: x   Leuk Esterase: x / RBC: x / WBC x   Sq Epi: x / Non Sq Epi: x / Bacteria: x

## 2024-12-14 NOTE — PROGRESS NOTE ADULT - PROBLEM SELECTOR PLAN 9
- H/H: 9.4/30.7 on admission   - Hemoglobin on last admission (8.3/27.2)  - Stable   - Monitor for signs and symptoms of bleeding    Plan  - Active T&S  - Transfuse prn (Hgb>8, given Hx of CAD)

## 2024-12-14 NOTE — PROGRESS NOTE ADULT - PROBLEM SELECTOR PLAN 11
- Fluids: NS 100cc/hour  - Electrolytes: Will replete to maintain K>4, Phos>3, and Mag>2  - Nutrition: Regular diet,   - Activity: As tolerated, pending PT eval  - DVT Prophylaxis: N/A  - Stress Ulcer/GI Prophylaxis: N/A,  - Disposition: Admit to medicine, will return home with PT when medically cleared

## 2024-12-15 LAB
ALBUMIN SERPL ELPH-MCNC: 2.2 G/DL — LOW (ref 3.3–5)
ALP SERPL-CCNC: 94 U/L — SIGNIFICANT CHANGE UP (ref 40–120)
ALT FLD-CCNC: 6 U/L — SIGNIFICANT CHANGE UP (ref 4–41)
ANION GAP SERPL CALC-SCNC: 11 MMOL/L — SIGNIFICANT CHANGE UP (ref 7–14)
AST SERPL-CCNC: 19 U/L — SIGNIFICANT CHANGE UP (ref 4–40)
BASOPHILS # BLD AUTO: 0.04 K/UL — SIGNIFICANT CHANGE UP (ref 0–0.2)
BASOPHILS NFR BLD AUTO: 0.7 % — SIGNIFICANT CHANGE UP (ref 0–2)
BILIRUB SERPL-MCNC: 0.3 MG/DL — SIGNIFICANT CHANGE UP (ref 0.2–1.2)
BUN SERPL-MCNC: 5 MG/DL — LOW (ref 7–23)
CALCIUM SERPL-MCNC: 8.6 MG/DL — SIGNIFICANT CHANGE UP (ref 8.4–10.5)
CHLORIDE SERPL-SCNC: 99 MMOL/L — SIGNIFICANT CHANGE UP (ref 98–107)
CO2 SERPL-SCNC: 23 MMOL/L — SIGNIFICANT CHANGE UP (ref 22–31)
CREAT SERPL-MCNC: 0.41 MG/DL — LOW (ref 0.5–1.3)
EGFR: 110 ML/MIN/1.73M2 — SIGNIFICANT CHANGE UP
EOSINOPHIL # BLD AUTO: 0.12 K/UL — SIGNIFICANT CHANGE UP (ref 0–0.5)
EOSINOPHIL NFR BLD AUTO: 2.2 % — SIGNIFICANT CHANGE UP (ref 0–6)
GLUCOSE SERPL-MCNC: 86 MG/DL — SIGNIFICANT CHANGE UP (ref 70–99)
HCT VFR BLD CALC: 27.1 % — LOW (ref 39–50)
HGB BLD-MCNC: 8.4 G/DL — LOW (ref 13–17)
IANC: 2.74 K/UL — SIGNIFICANT CHANGE UP (ref 1.8–7.4)
IMM GRANULOCYTES NFR BLD AUTO: 0.4 % — SIGNIFICANT CHANGE UP (ref 0–0.9)
LYMPHOCYTES # BLD AUTO: 1.66 K/UL — SIGNIFICANT CHANGE UP (ref 1–3.3)
LYMPHOCYTES # BLD AUTO: 30.2 % — SIGNIFICANT CHANGE UP (ref 13–44)
MAGNESIUM SERPL-MCNC: 1.6 MG/DL — SIGNIFICANT CHANGE UP (ref 1.6–2.6)
MCHC RBC-ENTMCNC: 25.2 PG — LOW (ref 27–34)
MCHC RBC-ENTMCNC: 31 G/DL — LOW (ref 32–36)
MCV RBC AUTO: 81.4 FL — SIGNIFICANT CHANGE UP (ref 80–100)
MONOCYTES # BLD AUTO: 0.92 K/UL — HIGH (ref 0–0.9)
MONOCYTES NFR BLD AUTO: 16.7 % — HIGH (ref 2–14)
NEUTROPHILS # BLD AUTO: 2.74 K/UL — SIGNIFICANT CHANGE UP (ref 1.8–7.4)
NEUTROPHILS NFR BLD AUTO: 49.8 % — SIGNIFICANT CHANGE UP (ref 43–77)
NRBC # BLD: 0 /100 WBCS — SIGNIFICANT CHANGE UP (ref 0–0)
NRBC # FLD: 0 K/UL — SIGNIFICANT CHANGE UP (ref 0–0)
PHOSPHATE SERPL-MCNC: 2.1 MG/DL — LOW (ref 2.5–4.5)
PLATELET # BLD AUTO: 232 K/UL — SIGNIFICANT CHANGE UP (ref 150–400)
POTASSIUM SERPL-MCNC: 3.7 MMOL/L — SIGNIFICANT CHANGE UP (ref 3.5–5.3)
POTASSIUM SERPL-SCNC: 3.7 MMOL/L — SIGNIFICANT CHANGE UP (ref 3.5–5.3)
PROT SERPL-MCNC: 5.3 G/DL — LOW (ref 6–8.3)
RBC # BLD: 3.33 M/UL — LOW (ref 4.2–5.8)
RBC # FLD: 16.6 % — HIGH (ref 10.3–14.5)
SODIUM SERPL-SCNC: 133 MMOL/L — LOW (ref 135–145)
WBC # BLD: 5.5 K/UL — SIGNIFICANT CHANGE UP (ref 3.8–10.5)
WBC # FLD AUTO: 5.5 K/UL — SIGNIFICANT CHANGE UP (ref 3.8–10.5)

## 2024-12-15 PROCEDURE — 99232 SBSQ HOSP IP/OBS MODERATE 35: CPT | Mod: GC

## 2024-12-15 RX ORDER — SODIUM CHLORIDE 9 MG/ML
1000 INJECTION, SOLUTION INTRAMUSCULAR; INTRAVENOUS; SUBCUTANEOUS
Refills: 0 | Status: COMPLETED | OUTPATIENT
Start: 2024-12-15 | End: 2024-12-15

## 2024-12-15 RX ORDER — SOD PHOS DI, MONO/K PHOS MONO 250 MG
1 TABLET ORAL ONCE
Refills: 0 | Status: COMPLETED | OUTPATIENT
Start: 2024-12-15 | End: 2024-12-15

## 2024-12-15 RX ADMIN — Medication 2 MILLIGRAM(S): at 19:38

## 2024-12-15 RX ADMIN — SENNOSIDES 2 TABLET(S): 8.6 TABLET, FILM COATED ORAL at 21:16

## 2024-12-15 RX ADMIN — Medication 1 DROP(S): at 12:35

## 2024-12-15 RX ADMIN — LOSARTAN POTASSIUM 25 MILLIGRAM(S): 100 TABLET, FILM COATED ORAL at 06:59

## 2024-12-15 RX ADMIN — LEVOTHYROXINE SODIUM 25 MICROGRAM(S): 175 TABLET ORAL at 06:59

## 2024-12-15 RX ADMIN — LATANOPROST 1 DROP(S): 50 SOLUTION OPHTHALMIC at 21:16

## 2024-12-15 RX ADMIN — SODIUM CHLORIDE 100 MILLILITER(S): 9 INJECTION, SOLUTION INTRAMUSCULAR; INTRAVENOUS; SUBCUTANEOUS at 12:35

## 2024-12-15 RX ADMIN — Medication 15 MILLIGRAM(S): at 08:00

## 2024-12-15 RX ADMIN — Medication 50 MILLIGRAM(S): at 06:59

## 2024-12-15 RX ADMIN — SODIUM CHLORIDE 2 GRAM(S): 9 INJECTION, SOLUTION INTRAMUSCULAR; INTRAVENOUS; SUBCUTANEOUS at 06:59

## 2024-12-15 RX ADMIN — Medication 1 APPLICATION(S): at 12:36

## 2024-12-15 RX ADMIN — Medication 17 GRAM(S): at 12:36

## 2024-12-15 RX ADMIN — NICOTINE POLACRILEX 1 PATCH: 4 LOZENGE ORAL at 11:31

## 2024-12-15 RX ADMIN — Medication 2 MILLIGRAM(S): at 18:38

## 2024-12-15 RX ADMIN — Medication 15 MILLIGRAM(S): at 07:00

## 2024-12-15 RX ADMIN — CHLORHEXIDINE GLUCONATE 1 APPLICATION(S): 1.2 RINSE ORAL at 12:36

## 2024-12-15 RX ADMIN — NICOTINE POLACRILEX 1 PATCH: 4 LOZENGE ORAL at 12:35

## 2024-12-15 RX ADMIN — ROSUVASTATIN 10 MILLIGRAM(S): 40 TABLET, FILM COATED ORAL at 21:16

## 2024-12-15 RX ADMIN — SODIUM CHLORIDE 2 GRAM(S): 9 INJECTION, SOLUTION INTRAMUSCULAR; INTRAVENOUS; SUBCUTANEOUS at 21:16

## 2024-12-15 RX ADMIN — Medication 1 PACKET(S): at 22:54

## 2024-12-15 RX ADMIN — SPIRONOLACTONE 25 MILLIGRAM(S): 50 TABLET ORAL at 06:59

## 2024-12-15 RX ADMIN — NICOTINE POLACRILEX 1 PATCH: 4 LOZENGE ORAL at 19:20

## 2024-12-15 NOTE — PROGRESS NOTE ADULT - SUBJECTIVE AND OBJECTIVE BOX
Varun Vuong MD  Available on TEAMS    Patient is a 79y old  Male who presents with a chief complaint of Rectal Bleeding (14 Dec 2024 08:04)      SUBJECTIVE / OVERNIGHT EVENTS: No acute events overnight. Patient was assessed at bedside.       REVIEW OF SYSTEMS:  CONSTITUTIONAL: No weakness, fevers, or chills  EYES/ENT: No visual changes; No vertigo, throat pain, or dysphagia  NECK: No pain or stiffness  RESPIRATORY: No cough, wheezing, hemoptysis, or shortness of breath  CARDIOVASCULAR: No chest pain or palpitations  GASTROINTESTINAL: No abdominal or epigastric pain. No nausea, vomiting, or hematemesis; No diarrhea or constipation. No melena or hematochezia.  GENITOURINARY: No dysuria, frequency, or hematuria  MUSCULOSKELETAL: No joint or muscle pain or aches  NEUROLOGICAL: No numbness or weakness  SKIN: No itching or rashes      MEDICATIONS  (STANDING):  chlorhexidine 2% Cloths 1 Application(s) Topical daily  Dakins Solution - 1/4 Strength 1 Application(s) Topical daily  latanoprost 0.005% Ophthalmic Solution 1 Drop(s) Both EYES at bedtime  levothyroxine 25 MICROGram(s) Oral daily  losartan 25 milliGRAM(s) Oral daily  metoprolol succinate ER 50 milliGRAM(s) Oral daily  nicotine -   7 mG/24Hr(s) Patch 1 Patch Transdermal daily  oxyCODONE  ER Tablet 15 milliGRAM(s) Oral every 12 hours  polyethylene glycol 3350 17 Gram(s) Oral daily  rosuvastatin 10 milliGRAM(s) Oral at bedtime  senna 2 Tablet(s) Oral at bedtime  sodium chloride 2 Gram(s) Oral three times a day  spironolactone 25 milliGRAM(s) Oral daily  timolol 0.5% Solution 1 Drop(s) Both EYES daily    MEDICATIONS  (PRN):  acetaminophen     Tablet .. 650 milliGRAM(s) Oral every 6 hours PRN Mild Pain (1 - 3)  morphine  - Injectable 2 milliGRAM(s) IV Push every 4 hours PRN Severe Pain (7 - 10)  morphine  - Injectable 1 milliGRAM(s) IV Push every 4 hours PRN Moderate Pain (4 - 6)      CAPILLARY BLOOD GLUCOSE        I&O's Summary    14 Dec 2024 07:01  -  15 Dec 2024 07:00  --------------------------------------------------------  IN: 475 mL / OUT: 1850 mL / NET: -1375 mL        Vital Signs Last 24 Hrs  T(C): 36.3 (15 Dec 2024 06:55), Max: 36.6 (14 Dec 2024 14:20)  T(F): 97.3 (15 Dec 2024 06:55), Max: 97.8 (14 Dec 2024 14:20)  HR: 77 (15 Dec 2024 06:55) (73 - 82)  BP: 120/83 (15 Dec 2024 06:55) (108/64 - 120/83)  BP(mean): --  RR: 16 (15 Dec 2024 06:55) (16 - 18)  SpO2: 96% (15 Dec 2024 06:55) (96% - 99%)    Parameters below as of 15 Dec 2024 06:55  Patient On (Oxygen Delivery Method): room air        PHYSICAL EXAM:  GENERAL: NAD, well-developed, well-nourished  HEAD: Atraumatic, Normocephalic  EYES: EOMI, PERRLA, conjunctiva and sclera clear  NECK: Supple, No JVD  CHEST/LUNG: Clear to auscultation bilaterally; No wheezes or crackles  HEART: Normal S1/S2; Regular rate and rhythm; No murmurs, rubs, or gallops  ABDOMEN: Soft, Nontender, Nondistended; Bowel sounds present  EXTREMITIES: 2+ Peripheral Pulses; No clubbing, cyanosis, or edema  PSYCH: A&Ox3  NEUROLOGY: no focal neurologic deficit  SKIN: No rashes or lesions    LABS:                        8.4    5.50  )-----------( 232      ( 15 Dec 2024 05:02 )             27.1      12-15    133[L]  |  99  |  5[L]  ----------------------------<  86  3.7   |  23  |  0.41[L]    Ca    8.6      15 Dec 2024 05:02  Phos  2.1     12-15  Mg     1.60     12-15    TPro  5.3[L]  /  Alb  2.2[L]  /  TBili  0.3  /  DBili  x   /  AST  19  /  ALT  6   /  AlkPhos  94  12-15          Urinalysis Basic - ( 15 Dec 2024 05:02 )    Color: x / Appearance: x / SG: x / pH: x  Gluc: 86 mg/dL / Ketone: x  / Bili: x / Urobili: x   Blood: x / Protein: x / Nitrite: x   Leuk Esterase: x / RBC: x / WBC x   Sq Epi: x / Non Sq Epi: x / Bacteria: x        RADIOLOGY & ADDITIONAL TESTS:    Imaging Personally Reviewed:    Consultant(s) Notes Reviewed:      Care Discussed with Consultants/Other Providers:

## 2024-12-15 NOTE — PROGRESS NOTE ADULT - PROBLEM SELECTOR PLAN 1
Impression: Hx of Rectal Cancer, originally followed by Dr. Paez [facility], when options limited, reeval with Dr. Miguel Angel Johnson surg/onc who placed end diverting colostomy. Was to be reevaluated by [facility] for a possible 2nd opinion, however deferred due to admission. Expressed interest in outpt follow up with [facility] which will be re-evaluated on discharge. Per Onc, will need outpt records to be brought for outpt follow up. Per daughter,   - Current pain regimen causing excessive drowsiness and possible AMS, will get palliative consult for new pain regimen, given current constraints regarding an inability to use oxycodone, per daughter, and currently on IV morphine which needs to be tapered off for discharge  - Tylenol 650mg mild/ moderate/severe: IV Morphine 1mg/2mg, MS oxycotin 15mg BID      Plan  - Palliative consult for pain management  - C/w NS 100cc/hr for decreased PO intake

## 2024-12-15 NOTE — PROGRESS NOTE ADULT - ATTENDING COMMENTS
Patient seen and examined with team   Agree with above A/P by Dr Vuong  79 year old male with a PMH of CAD s/p stent placement in 2012 ( ASA), A Fib ( not on AC due to GI bleed), known h/o DVT in right leg, HFrEF,  locally advanced rectal cancer s/p chemo/radation (11/22,consolidation CAPOX (2/2023), brachytherapy (3/2024), hx of  s/p diverting end sigmoid colostomy, cystoscopy and naranjo placement across defect , and recent admission (11/24) for for  Tara gangrene s/p surgical debridement coming in for concern for rectal bleeding and sacral wound ulcer. Patient has been previous admitted for rectal bleed with a colonoscopy (10/24) showing diverticulosis, at that time was ruled as a complication of rectal cancer. Per primary oncologist, Dr. Paez, [facility] [place], no role for DMT at that time.   Patient and daughter has asked for multiple consults---> all requested.    Patient has been non cooperative with labs draws and Wound care for past 2 days ?? due to use of MS contin--> now stopped.    Pe , NAD  Vital Signs Last 24 Hrs  T(F): 97.4 HR: 77 , BP: 124/76 , RR: 18 , SpO2: 100% room air  Lungs  Cor  Abd soft n/t with colostomy bag, Ext neg e/c/c, Naranjo in place    A/P  # Pain  Mng , f/u Palliative CONSULT FOR PAIN. MS contin stopped due to Ch in MS. OBSERVe. Patient keeps asking for IV  morphine--> need to transition OFF for HOME--> F/u Palliative pain recs.  #Wound care Onc/day,   #Onc--> [place] patient f/u [place] in [facility] --> they want to treat him to " control " cancer not cure it. Can also f/u out patient at [facility]  # Hyponatremia , Na 133 improved  plan d/w team   #discharge planning to HOME with home care as per CM for his wounds and Pain regimen addressed by Palliative pain.

## 2024-12-16 DIAGNOSIS — G89.3 NEOPLASM RELATED PAIN (ACUTE) (CHRONIC): ICD-10-CM

## 2024-12-16 DIAGNOSIS — R53.1 WEAKNESS: ICD-10-CM

## 2024-12-16 DIAGNOSIS — Z51.5 ENCOUNTER FOR PALLIATIVE CARE: ICD-10-CM

## 2024-12-16 LAB
ANION GAP SERPL CALC-SCNC: 11 MMOL/L — SIGNIFICANT CHANGE UP (ref 7–14)
BLD GP AB SCN SERPL QL: NEGATIVE — SIGNIFICANT CHANGE UP
BUN SERPL-MCNC: 5 MG/DL — LOW (ref 7–23)
CALCIUM SERPL-MCNC: 8.6 MG/DL — SIGNIFICANT CHANGE UP (ref 8.4–10.5)
CHLORIDE SERPL-SCNC: 99 MMOL/L — SIGNIFICANT CHANGE UP (ref 98–107)
CO2 SERPL-SCNC: 21 MMOL/L — LOW (ref 22–31)
CREAT SERPL-MCNC: 0.4 MG/DL — LOW (ref 0.5–1.3)
EGFR: 111 ML/MIN/1.73M2 — SIGNIFICANT CHANGE UP
GLUCOSE SERPL-MCNC: 90 MG/DL — SIGNIFICANT CHANGE UP (ref 70–99)
HCT VFR BLD CALC: 28.7 % — LOW (ref 39–50)
HGB BLD-MCNC: 8.7 G/DL — LOW (ref 13–17)
MCHC RBC-ENTMCNC: 25.1 PG — LOW (ref 27–34)
MCHC RBC-ENTMCNC: 30.3 G/DL — LOW (ref 32–36)
MCV RBC AUTO: 82.7 FL — SIGNIFICANT CHANGE UP (ref 80–100)
NRBC # BLD: 0 /100 WBCS — SIGNIFICANT CHANGE UP (ref 0–0)
NRBC # FLD: 0 K/UL — SIGNIFICANT CHANGE UP (ref 0–0)
PLATELET # BLD AUTO: 226 K/UL — SIGNIFICANT CHANGE UP (ref 150–400)
POTASSIUM SERPL-MCNC: 4 MMOL/L — SIGNIFICANT CHANGE UP (ref 3.5–5.3)
POTASSIUM SERPL-SCNC: 4 MMOL/L — SIGNIFICANT CHANGE UP (ref 3.5–5.3)
RBC # BLD: 3.47 M/UL — LOW (ref 4.2–5.8)
RBC # FLD: 16.8 % — HIGH (ref 10.3–14.5)
RH IG SCN BLD-IMP: NEGATIVE — SIGNIFICANT CHANGE UP
SODIUM SERPL-SCNC: 131 MMOL/L — LOW (ref 135–145)
WBC # BLD: 5.69 K/UL — SIGNIFICANT CHANGE UP (ref 3.8–10.5)
WBC # FLD AUTO: 5.69 K/UL — SIGNIFICANT CHANGE UP (ref 3.8–10.5)

## 2024-12-16 PROCEDURE — 99223 1ST HOSP IP/OBS HIGH 75: CPT

## 2024-12-16 PROCEDURE — 99233 SBSQ HOSP IP/OBS HIGH 50: CPT | Mod: GC

## 2024-12-16 RX ORDER — MORPHINE SULFATE 15 MG
1 TABLET, EXTENDED RELEASE ORAL EVERY 4 HOURS
Refills: 0 | Status: DISCONTINUED | OUTPATIENT
Start: 2024-12-16 | End: 2024-12-23

## 2024-12-16 RX ORDER — MORPHINE SULFATE 15 MG
2.5 TABLET, EXTENDED RELEASE ORAL EVERY 4 HOURS
Refills: 0 | Status: DISCONTINUED | OUTPATIENT
Start: 2024-12-16 | End: 2024-12-23

## 2024-12-16 RX ORDER — MORPHINE SULFATE 15 MG
2 TABLET, EXTENDED RELEASE ORAL EVERY 4 HOURS
Refills: 0 | Status: DISCONTINUED | OUTPATIENT
Start: 2024-12-16 | End: 2024-12-16

## 2024-12-16 RX ORDER — MORPHINE SULFATE 15 MG
1 TABLET, EXTENDED RELEASE ORAL EVERY 4 HOURS
Refills: 0 | Status: DISCONTINUED | OUTPATIENT
Start: 2024-12-16 | End: 2024-12-16

## 2024-12-16 RX ADMIN — SENNOSIDES 2 TABLET(S): 8.6 TABLET, FILM COATED ORAL at 22:11

## 2024-12-16 RX ADMIN — Medication 2 MILLIGRAM(S): at 07:04

## 2024-12-16 RX ADMIN — NICOTINE POLACRILEX 1 PATCH: 4 LOZENGE ORAL at 12:36

## 2024-12-16 RX ADMIN — ROSUVASTATIN 10 MILLIGRAM(S): 40 TABLET, FILM COATED ORAL at 22:10

## 2024-12-16 RX ADMIN — Medication 1 DROP(S): at 12:36

## 2024-12-16 RX ADMIN — Medication 50 MILLIGRAM(S): at 06:13

## 2024-12-16 RX ADMIN — SODIUM CHLORIDE 2 GRAM(S): 9 INJECTION, SOLUTION INTRAMUSCULAR; INTRAVENOUS; SUBCUTANEOUS at 06:13

## 2024-12-16 RX ADMIN — SODIUM CHLORIDE 2 GRAM(S): 9 INJECTION, SOLUTION INTRAMUSCULAR; INTRAVENOUS; SUBCUTANEOUS at 22:10

## 2024-12-16 RX ADMIN — Medication 17 GRAM(S): at 12:35

## 2024-12-16 RX ADMIN — NICOTINE POLACRILEX 1 PATCH: 4 LOZENGE ORAL at 12:37

## 2024-12-16 RX ADMIN — LATANOPROST 1 DROP(S): 50 SOLUTION OPHTHALMIC at 22:09

## 2024-12-16 RX ADMIN — LOSARTAN POTASSIUM 25 MILLIGRAM(S): 100 TABLET, FILM COATED ORAL at 06:12

## 2024-12-16 RX ADMIN — SODIUM CHLORIDE 2 GRAM(S): 9 INJECTION, SOLUTION INTRAMUSCULAR; INTRAVENOUS; SUBCUTANEOUS at 14:08

## 2024-12-16 RX ADMIN — SPIRONOLACTONE 25 MILLIGRAM(S): 50 TABLET ORAL at 06:13

## 2024-12-16 RX ADMIN — Medication 1 APPLICATION(S): at 12:34

## 2024-12-16 RX ADMIN — LEVOTHYROXINE SODIUM 25 MICROGRAM(S): 175 TABLET ORAL at 06:13

## 2024-12-16 RX ADMIN — CHLORHEXIDINE GLUCONATE 1 APPLICATION(S): 1.2 RINSE ORAL at 12:35

## 2024-12-16 RX ADMIN — NICOTINE POLACRILEX 1 PATCH: 4 LOZENGE ORAL at 18:11

## 2024-12-16 NOTE — CONSULT NOTE ADULT - PROBLEM SELECTOR RECOMMENDATION 2
Unclear etiology, could be multifactorial  Per discussion with primary team, considering lethargy 2/2 opioids  Would advise:   -Frequent reassurance and verbal orientation   -Family members or other familiar persons by his bedside.   -Delusions and hallucinations should be neither endorsed nor challenged.   -Physical restraints should be avoided. Alternatives to restraint use, such as constant observation (preferably by someone familiar to the patient such as a family member), may be more effective.  -PT eval and early ambulation if possible.  - opioid adjustments as below

## 2024-12-16 NOTE — PROGRESS NOTE ADULT - PROBLEM SELECTOR PLAN 6
- H/H: 9.4/30.7 on admission   - Hemoglobin on last admission (8.3/27.2)  - Stable   - Monitor for signs and symptoms of bleeding    Plan  - Active T&S  - Transfuse prn (Hgb>8, given Hx of CAD) regular diet and ensure puddings - soft/bite size diet  - Ensure TID

## 2024-12-16 NOTE — CONSULT NOTE ADULT - CONSULT REASON
Complex symptom management in the setting of advanced illness.
Rule out necrotizing fascitis
Sacrum stage 4 pressure injury

## 2024-12-16 NOTE — PROGRESS NOTE ADULT - PROBLEM SELECTOR PLAN 9
DVT ppx: hold chemical AC i/s/o ostomy bleed  Diet: DASH/TLC  Dispo: pending, PT eval re-ordered  GOC: Full Code DVT ppx: hold chemical AC i/s/o ostomy bleed  Diet: soft/bite sized, DASH/TLC  Dispo: pending, PT eval re-ordered  GOC: Full Code

## 2024-12-16 NOTE — PROGRESS NOTE ADULT - PROBLEM SELECTOR PLAN 8
- Fluids: NS 100cc/hour  - Electrolytes: Will replete to maintain K>4, Phos>3, and Mag>2  - Nutrition: Regular diet,   - Activity: As tolerated, pending PT eval  - DVT Prophylaxis: N/A  - Stress Ulcer/GI Prophylaxis: N/A,  - Disposition: Admit to medicine, will return home with PT when medically cleared - S/p stent placement in 2012, not on AC due to multiple episodes for GI bleeds

## 2024-12-16 NOTE — CONSULT NOTE ADULT - PROBLEM SELECTOR RECOMMENDATION 9
Rectal cancer s/p chemo/radation (11/22,consolidation CAPOX (2/2023), brachytherapy (3/2024), s/p diverting end sigmoid colostomy, cystoscopy and naranjo placement across defect, and recent admission (11/24) for Tara gangrene s/p surgical debridement coming in for concern for rectal bleeding and sacral wound ulcer.  [place] [facility] 2nd opinion from [facility]

## 2024-12-16 NOTE — CONSULT NOTE ADULT - PROBLEM SELECTOR RECOMMENDATION 3
Would recommend to:  - d/c oxycontin, given AMS  - Continue with PRN morphine   - D/c 2mg of IV Morphine, as he gets very sleepy with it  - Continue with Morphine 1mg IV q3hrs PRN for severe pain  - Start Morphine solution 2.5mg PO q4hrs PRn for moderate pain  - Bowel regimen wile on opioids to prevent opioid induced constipation

## 2024-12-16 NOTE — PROGRESS NOTE ADULT - PROBLEM SELECTOR PLAN 5
regular diet and ensure puddings - Maintained on Crestor 10mg QHS, Toprol XL 50mg QD, Losartan 25mg QD, Spironolactone 25mg QD  - TTE (10/24): Left ventricular systolic function is moderately to severely decreased with an ejection fraction visually estimated at 35 to 40 %. Global left ventricular hypokinesis. Enlarged right ventricular cavity size, with normal wall thickness, stable pericardial effusion see, consistent CT findings (12/8)  - Per conversation with daughter (12/9), there is concern for decreasing EF from outpt provider Dr. Sol,  - TTE: LV not well visualized. Unable to evaluate the EF. LV normal thickness and size.  - Per cardiology chart note:  moderately to severely decreased with an ejection fraction visually estimated at 35to 40 %. Global left ventricular hypokinesis.   - should follow up with his outpatient cardiologist- Dr Leiva- following discharge.

## 2024-12-16 NOTE — PROGRESS NOTE ADULT - ASSESSMENT
Mr. Sami Chong is a 79 year old male with a PMH of CAD s/p stent placement in 2012 ( ASA), A Fib ( not on AC due to GI bleed), known h/o DVT in right leg, HFrEF,  locally advanced rectal cancer s/p chemo/radation (11/22,consolidation CAPOX (2/2023), brachytherapy (3/2024), hx of  s/p diverting end sigmoid colostomy, cystoscopy and naranjo placement across defect , and recent admission (11/24) for for  Tara gangrene s/p surgical debridement coming in for concern for rectal bleeding and sacral wound ulcer. Patient has been previous admitted for rectal bleed with a colonoscopy (10/24) showing diverticulosis, at that time was ruled as a complication of rectal cancer.  79M with PMH of CAD s/p stent placement in 2012 ( ASA), A Fib (not on AC due to GI bleed), known h/o DVT in right leg, HFrEF,  locally advanced rectal cancer s/p chemo/radation (11/22,consolidation CAPOX (2/2023), brachytherapy (3/2024), hx of  s/p diverting end sigmoid colostomy, cystoscopy and naranjo placement across defect , and recent admission (11/24) for for  Tara gangrene s/p surgical debridement admitted for bleeding in ostomy bag and sacral ulcer

## 2024-12-16 NOTE — CONSULT NOTE ADULT - SUBJECTIVE AND OBJECTIVE BOX
Date of [id number] @ 13:31    HPI:  Mr. Sami Chong is a 79 year old male with a PMH of CAD s/p stent placement in  ( ASA), A Fib ( not on AC due to GI bleed), known h/o DVT in right leg, HFrEF,  locally advanced rectal cancer s/p chemo/radation (,consolidation CAPOX (2023), brachytherapy (3/2024), hx of  s/p diverting end sigmoid colostomy, cystoscopy and naranjo placement across defect , and recent admission () for for  Tara gangrene s/p surgical debridement coming in for concern for rectal bleeding and sacral wound ulcer. Patient has been previous admitted for rectal bleed with a colonoscopy (10/24) showing diverticulosis, at that time was ruled as a complication of rectal cancer. Per primary oncologist, Dr. Paez, no role for DMT at that time.     In the ED: VS: T: 95.7, HR: 105, BP: 112/82, RR: 18/100%. Labs: H/H: 9.4/30.7, Na: 130, A, CRP: 101.9. CT A/P: Small amount of bilateral pleural effusions, Small volume pericardial effusion. Pulmonary nodules suspicious of metastasis Midline decubitus ulcer with local subcutaneous inflammation. A few foci of gas in the inflamed superficial soft tissues, nec fas not excluded, no abscess. Indeterminate mildly heterogeneous 3.5 cm solid left adrenal mass. Patient was given 1x NS 1L bolus, 1x Tramadol 50 mg, 1x morphine 4mg, Fentanyl 25mcg. BxCx sent.  Started on LR 100cc/hr, Zosyn for broad coverage. Surgery consulted for r/o nec fascitis. Admitted to medicine for sacral wound ulcer and rectal bleeding. (08 Dec 2024 09:43)    PERTINENT PM/SXH:   Chronic atrial fibrillation    History of rectal cancer    Chronic HFrEF (heart failure with reduced ejection fraction)    No significant past surgical history    No significant past surgical history    FAMILY HISTORY:    Family Hx substance abuse [ ]yes [ ]no    ITEMS NOT CHECKED ARE NOT PRESENT    SOCIAL HISTORY:   Significant other/partner[ ]  Children[ ]  Gnosticist/Spirituality:  Substance hx:  [ ]   Tobacco hx:  [ ]   Alcohol hx: [ ]   Home Opioid hx:  [ ] I-Stop Reference No: [id number]  Living Situation: [ ]Home  [ ]Long term care  [ ]Rehab [ ]Other    Practitioner Count: 4  Pharmacy Count: 3  Current Opioid Prescriptions: 0  Current Benzodiazepine Prescriptions: 0  Current Stimulant Prescriptions: 0  PDI	My Rx	Current Rx	Drug Type	Rx Written	Rx Dispensed	Drug	Quantity	Days Supply	Prescriber Name	Prescriber MIK #	Payment Method	Dispenser  A	N	N	O	10/10/2024	10/11/2024	morphine sulfate ir 15 mg tab	45	15	Sara Mcdonnell	[id number]	Medicare	[facility]  A	N	N	O	10/10/2024	10/11/2024	morphine sulf er 15 mg tablet	60	30	Sara Mcdonnell	[id number]	Medicare	[facility]  A	N	N	B	10/11/2024	10/11/2024	lorazepam 0.5 mg tablet	14	14	Sara Mcdonnell	[id number]	Medicare	[facility]  A	N	N	O	2024	tramadol hcl 50 mg tablet	42	7	Trang Villalba	[id number]	Medicare	[facility]  B	N	N	O	2024	morphine sulfate ir 15 mg tab	10	4	Elbert Sparrow	[id number]	Insurance	[facility]	N	N	O	2024	tramadol hcl 50 mg tablet	20	5	Dionne Jones	[id number]	Medicare	[id number]    ADVANCE DIRECTIVES:    DNR/MOLST  [ ]  Living Will  [ ]   DECISION MAKER(s):  [ ] Health Care Proxy(s)  [x ] Surrogate(s)  [ ] Guardian           Name(s): Phone Number(s):  daughter Tiffanie chong #[phone]    BASELINE (I)ADL(s) (prior to admission):  [place]: [ ]Total  [ ] Moderate [ ]Dependent    Allergies    No Known Allergies    Intolerances    MEDICATIONS  (STANDING):  chlorhexidine 2% Cloths 1 Application(s) Topical daily  Dakins Solution - 1/4 Strength 1 Application(s) Topical daily  latanoprost 0.005% Ophthalmic Solution 1 Drop(s) Both EYES at bedtime  levothyroxine 25 MICROGram(s) Oral daily  losartan 25 milliGRAM(s) Oral daily  metoprolol succinate ER 50 milliGRAM(s) Oral daily  nicotine -   7 mG/24Hr(s) Patch 1 Patch Transdermal daily  oxyCODONE  ER Tablet 15 milliGRAM(s) Oral every 12 hours  polyethylene glycol 3350 17 Gram(s) Oral daily  rosuvastatin 10 milliGRAM(s) Oral at bedtime  senna 2 Tablet(s) Oral at bedtime  sodium chloride 2 Gram(s) Oral three times a day  spironolactone 25 milliGRAM(s) Oral daily  timolol 0.5% Solution 1 Drop(s) Both EYES daily    MEDICATIONS  (PRN):  acetaminophen     Tablet .. 650 milliGRAM(s) Oral every 6 hours PRN Mild Pain (1 - 3)  morphine  - Injectable 2 milliGRAM(s) IV Push every 4 hours PRN Severe Pain (7 - 10)  morphine  - Injectable 1 milliGRAM(s) IV Push every 4 hours PRN Moderate Pain (4 - 6)    PRESENT SYMPTOMS: [ ]Unable to self-report  [ ] CPOT [ ] PAINADs [ ] RDOS  Source if other than patient:  [ ]Family   [ ]Team     Pain: [ ]yes [ ]no  QOL impact -   Location -                    Aggravating factors -  Quality -  Radiation -  Timing-  Severity (0-10 scale):  Minimal acceptable level/pain goal (0-10 scale):     CPOT:    https://www.sccm.org/getattachment/bft35w76-1z7m-1u8i-2i6f-7113y9185u3j/Critical-Care-Pain-Observation-Tool-(CPOT)    PAIN AD Score:   http://geriatrictoolkit.[place].[facility]/cog/painad.pdf (press ctrl +  left click to view)    Dyspnea:                           [ ]Mild [ ]Moderate [ ]Severe    RDOS:  0 to 2  minimal or no respiratory distress   3  mild distress  4 to 6 moderate distress  >7 severe distress  https://[email or web address].net/handouts/hen/Respiratory_Distress_Observation_Scale.pdf (Ctrl +  left click to view)     Anxiety:                             [ ]Mild [ ]Moderate [ ]Severe  Fatigue:                             [ ]Mild [ ]Moderate [ ]Severe  Nausea:                             [ ]Mild [ ]Moderate [ ]Severe  Loss of appetite:              [ ]Mild [ ]Moderate [ ]Severe  Constipation:                    [ ]Mild [ ]Moderate [ ]Severe    PCSSQ[Palliative Care Spiritual Screening Question]   Severity (0-10): deferred  Score of 4 or > indicate consideration of Chaplaincy referral.  Chaplaincy Referral: [ ] yes [ ] refused [ ] following [ ] Deferred     Caregiver [place]? : [ ] yes [ ] no [ ] Deferred [ ] Declined             Social work referral [ ] Patient & Family Centered Care Referral [ ]     Anticipatory Grief present?:  [ ] yes [ ] no  [ ] Deferred                  Social work referral [ ] Chaplaincy Referral[ ]    Other Symptoms:  [ ]All other review of systems negative     Palliative Performance Status Version 2:         %    http://npcrc.org/files/news/palliative_performance_scale_ppsv2.pdf    PHYSICAL EXAM:  Vital Signs Last 24 Hrs  T(C): 36.6 (16 Dec 2024 05:39), Max: 36.6 (15 Dec 2024 21:46)  T(F): 97.8 (16 Dec 2024 05:39), Max: 97.9 (15 Dec 2024 21:46)  HR: 77 (16 Dec 2024 07:00) (77 - 84)  BP: 137/85 (16 Dec 2024 07:00) (120/83 - 139/78)  BP(mean): --  RR: 18 (16 Dec 2024 07:00) (18 - 18)  SpO2: 99% (16 Dec 2024 07:00) (98% - 100%)    Parameters below as of 16 Dec 2024 07:00  Patient On (Oxygen Delivery Method): room air     I&O's Summary    15 Dec 2024 07:01  -  16 Dec 2024 07:00  --------------------------------------------------------  IN: 200 mL / OUT: 1855 mL / NET: -1655 mL      GENERAL: [ ]Cachexia    [ ]Alert  [ ]Oriented x   [ ]Lethargic  [ ]Unarousable  [ ]Verbal  [ ]Non-Verbal  Behavioral:   [ ] Anxiety  [ ] Delirium [ ] Agitation [ ] Other  HEENT:  [ ]Normal   [ ]Dry mouth   [ ]ET Tube/Trach  [ ]Oral lesions  PULMONARY:   [ ]Clear [ ]Tachypnea  [ ]Audible excessive secretions   [ ]Rhonchi        [ ]Right [ ]Left [ ]Bilateral  [ ]Crackles        [ ]Right [ ]Left [ ]Bilateral  [ ]Wheezing     [ ]Right [ ]Left [ ]Bilateral  [ ]Diminished breath sounds [ ]right [ ]left [ ]bilateral  CARDIOVASCULAR:    [ ]Regular [ ]Irregular [ ]Tachy  [ ]Saroj [ ]Murmur [ ]Other  GASTROINTESTINAL:  [ ]Soft  [ ]Distended   [ ]+BS  [ ]Non tender [ ]Tender  [ ]Other [ ]PEG [ ]OGT/ NGT  Last BM:  GENITOURINARY:  [ ]Normal [ ] Incontinent   [ ]Oliguria/Anuria   [ ]Naranjo  MUSCULOSKELETAL:   [ ]Normal   [ ]Weakness  [ ]Bed/Wheelchair bound [ ]Edema  NEUROLOGIC:   [ ]No focal deficits  [ ]Cognitive impairment  [ ]Dysphagia [ ]Dysarthria [ ]Paresis [ ]Other   SKIN:   [ ]Normal  [ ]Rash  [ ]Other  [ ]Pressure ulcer(s)       Present on admission [ ]y [ ]n    CRITICAL CARE:  [ ] Shock Present  [ ]Septic [ ]Cardiogenic [ ]Neurologic [ ]Hypovolemic  [ ]  Vasopressors [ ]  Inotropes   [ ]Respiratory failure present [ ]Mechanical ventilation [ ]Non-invasive ventilatory support [ ]High flow    [ ]Acute  [ ]Chronic [ ]Hypoxic  [ ]Hypercarbic [ ]Other  [ ]Other organ failure     LABS:                        8.7    5.69  )-----------( 226      ( 16 Dec 2024 07:30 )             28.7   12-16    131[L]  |  99  |  5[L]  ----------------------------<  90  4.0   |  21[L]  |  0.40[L]    Ca    8.6      16 Dec 2024 07:30  Phos  2.1     12-15  Mg     1.60     12-15    TPro  5.3[L]  /  Alb  2.2[L]  /  TBili  0.3  /  DBili  x   /  AST  19  /  ALT  6   /  AlkPhos  94  12-15      Urinalysis Basic - ( 16 Dec 2024 07:30 )    Color: x / Appearance: x / SG: x / pH: x  Gluc: 90 mg/dL / Ketone: x  / Bili: x / Urobili: x   Blood: x / Protein: x / Nitrite: x   Leuk Esterase: x / RBC: x / WBC x   Sq Epi: x / Non Sq Epi: x / Bacteria: x      RADIOLOGY & ADDITIONAL STUDIES:    PROTEIN CALORIE MALNUTRITION PRESENT: [ ]mild [ ]moderate [ ]severe [ ]underweight [ ]morbid obesity  https://www.andeal.org/vault/2440/web/files/ONC/Table_Clinical%20Characteristics%20to%20Document%20Malnutrition-White%20JV%20et%20al%957451.pdf    Height (cm): 185.4 (24 @ 20:38), 185 (10-31-24 @ 11:51)  Weight (kg): 52 (24 @ 09:39), 63 (10-31-24 @ :51), 63.5 (24 @ 19:17)  BMI (kg/m2): 15.1 (24 @ 09:39), 18.3 (24 @ 20:38), 18.4 (10-31-24 @ 11:51)    [ ]PPSV2 < or = to 30% [ ]significant weight loss  [ ]poor nutritional intake  [ ]anasarca[ ]Artificial Nutrition      Other REFERRALS:  [ ]Hospice  [ ]Child Life  [ ]Social Work  [ ]Case management [ ]Holistic Therapy     Goals of Care Document:  Date of [id number] @ 13:31    HPI:  Mr. Sami Chong is a 79 year old male with a PMH of CAD s/p stent placement in  ( ASA), A Fib ( not on AC due to GI bleed), known h/o DVT in right leg, HFrEF,  locally advanced rectal cancer s/p chemo/radation (,consolidation CAPOX (2023), brachytherapy (3/2024), hx of  s/p diverting end sigmoid colostomy, cystoscopy and naranjo placement across defect , and recent admission () for for  Tara gangrene s/p surgical debridement coming in for concern for rectal bleeding and sacral wound ulcer. Patient has been previous admitted for rectal bleed with a colonoscopy (10/24) showing diverticulosis, at that time was ruled as a complication of rectal cancer. Per primary oncologist, Dr. Paez, no role for DMT at that time.     In the ED: VS: T: 95.7, HR: 105, BP: 112/82, RR: 18/100%. Labs: H/H: 9.4/30.7, Na: 130, A, CRP: 101.9. CT A/P: Small amount of bilateral pleural effusions, Small volume pericardial effusion. Pulmonary nodules suspicious of metastasis Midline decubitus ulcer with local subcutaneous inflammation. A few foci of gas in the inflamed superficial soft tissues, nec fas not excluded, no abscess. Indeterminate mildly heterogeneous 3.5 cm solid left adrenal mass. Patient was given 1x NS 1L bolus, 1x Tramadol 50 mg, 1x morphine 4mg, Fentanyl 25mcg. BxCx sent.  Started on LR 100cc/hr, Zosyn for broad coverage. Surgery consulted for r/o nec fascitis. Admitted to medicine for sacral wound ulcer and rectal bleeding. (08 Dec 2024 09:43)    PERTINENT PM/SXH:   Chronic atrial fibrillation    History of rectal cancer    Chronic HFrEF (heart failure with reduced ejection fraction)    No significant past surgical history    No significant past surgical history    FAMILY HISTORY:    Family Hx substance abuse [ ]yes [ ]no    ITEMS NOT CHECKED ARE NOT PRESENT    SOCIAL HISTORY:   Significant other/partner[ ]  Children[ ]  Hinduism/Spirituality:  Substance hx:  [ ]   Tobacco hx:  [ ]   Alcohol hx: [ ]   Home Opioid hx:  [ ] I-Stop Reference No: [id number]  Living Situation: [ ]Home  [ ]Long term care  [ ]Rehab [ ]Other    Practitioner Count: 4  Pharmacy Count: 3  Current Opioid Prescriptions: 0  Current Benzodiazepine Prescriptions: 0  Current Stimulant Prescriptions: 0  PDI	My Rx	Current Rx	Drug Type	Rx Written	Rx Dispensed	Drug	Quantity	Days Supply	Prescriber Name	Prescriber MIK #	Payment Method	Dispenser  A	N	N	O	10/10/2024	10/11/2024	morphine sulfate ir 15 mg tab	45	15	Sara Mcdonnell	[id number]	Medicare	[facility]  A	N	N	O	10/10/2024	10/11/2024	morphine sulf er 15 mg tablet	60	30	Sara Mcdonnell	[id number]	Medicare	[facility]  A	N	N	B	10/11/2024	10/11/2024	lorazepam 0.5 mg tablet	14	14	Sara Mcdonnell	[id number]	Medicare	[facility]  A	N	N	O	2024	tramadol hcl 50 mg tablet	42	7	Trang Villalba	[id number]	Medicare	[facility]  B	N	N	O	2024	morphine sulfate ir 15 mg tab	10	4	Elbert Sparrow	[id number]	Insurance	[facility]	N	N	O	2024	tramadol hcl 50 mg tablet	20	5	Dionne Jones	[id number]	Medicare	[facility] #[id number]    ADVANCE DIRECTIVES:    DNR/MOLST  [ ]  Living Will  [ ]   DECISION MAKER(s):  [ ] Health Care Proxy(s)  [x ] Surrogate(s)  [ ] Guardian           Name(s): Phone Number(s):  daughter Tiffanie Chong #[phone]    BASELINE (I)ADL(s) (prior to admission):  [place]: [ ]Total  [ ] Moderate [x ]Dependent    Allergies    No Known Allergies    Intolerances    MEDICATIONS  (STANDING):  chlorhexidine 2% Cloths 1 Application(s) Topical daily  Dakins Solution - 1/4 Strength 1 Application(s) Topical daily  latanoprost 0.005% Ophthalmic Solution 1 Drop(s) Both EYES at bedtime  levothyroxine 25 MICROGram(s) Oral daily  losartan 25 milliGRAM(s) Oral daily  metoprolol succinate ER 50 milliGRAM(s) Oral daily  nicotine -   7 mG/24Hr(s) Patch 1 Patch Transdermal daily  oxyCODONE  ER Tablet 15 milliGRAM(s) Oral every 12 hours  polyethylene glycol 3350 17 Gram(s) Oral daily  rosuvastatin 10 milliGRAM(s) Oral at bedtime  senna 2 Tablet(s) Oral at bedtime  sodium chloride 2 Gram(s) Oral three times a day  spironolactone 25 milliGRAM(s) Oral daily  timolol 0.5% Solution 1 Drop(s) Both EYES daily    MEDICATIONS  (PRN):  acetaminophen     Tablet .. 650 milliGRAM(s) Oral every 6 hours PRN Mild Pain (1 - 3)  morphine  - Injectable 2 milliGRAM(s) IV Push every 4 hours PRN Severe Pain (7 - 10)  morphine  - Injectable 1 milliGRAM(s) IV Push every 4 hours PRN Moderate Pain (4 - 6)    PRESENT SYMPTOMS: [ x]Unable to self-report  [ ] CPOT [x ] PAINADs [ x] RDOS  Source if other than patient:  [ ]Family   [ ]Team     Pain: [ ]yes [ ]no  QOL impact -   Location -                    Aggravating factors -  Quality -  Radiation -  Timing-  Severity (0-10 scale):  Minimal acceptable level/pain goal (0-10 scale):     CPOT:    https://www.sccm.org/getattachment/pgf19v82-6n5t-[email or web address]-7291j8307j4l/Critical-Care-Pain-Observation-Tool-(CPOT)    PAIN AD Score: 0  http://geriatrictoolkit.[facility]/cog/painad.pdf (press ctrl +  left click to view)    Dyspnea:                           [ ]Mild [ ]Moderate [ ]Severe    RDOS: 0  0 to 2  minimal or no respiratory distress   3  mild distress  4 to 6 moderate distress  >7 severe distress  https://homecareinformation.net/handouts/hen/Respiratory_Distress_Observation_Scale.pdf (Ctrl +  left click to view)     Anxiety:                             [ ]Mild [ ]Moderate [ ]Severe  Fatigue:                             [ ]Mild [ ]Moderate [ ]Severe  Nausea:                             [ ]Mild [ ]Moderate [ ]Severe  Loss of appetite:              [ ]Mild [ ]Moderate [ ]Severe  Constipation:                    [ ]Mild [ ]Moderate [ ]Severe    PCSSQ[Palliative Care Spiritual Screening Question]   Severity (0-10): deferred  Score of 4 or > indicate consideration of Chaplaincy referral.  Chaplaincy Referral: [ ] yes [ ] refused [ ] following [x ] Deferred     Caregiver [place]? : [ ] yes [ ] no [ x] Deferred [ ] Declined             Social work referral [ ] Patient & Family Centered Care Referral [ ]     Anticipatory Grief present?:  [ ] yes [ ] no  [x ] Deferred                  Social work referral [ ] Chaplaincy Referral[ ]    Other Symptoms:  [ ]All other review of systems negative     Palliative Performance Status Version 2: 30-40 %    http://npcrc.org/files/news/palliative_performance_scale_ppsv2.pdf    PHYSICAL EXAM:  Vital Signs Last 24 Hrs  T(C): 36.6 (16 Dec 2024 05:39), Max: 36.6 (15 Dec 2024 21:46)  T(F): 97.8 (16 Dec 2024 05:39), Max: 97.9 (15 Dec 2024 21:46)  HR: 77 (16 Dec 2024 07:00) (77 - 84)  BP: 137/85 (16 Dec 2024 07:00) (120/83 - 139/78)  BP(mean): --  RR: 18 (16 Dec 2024 07:00) (18 - 18)  SpO2: 99% (16 Dec 2024 07:00) (98% - 100%)    Parameters below as of 16 Dec 2024 07:00  Patient On (Oxygen Delivery Method): room air     I&O's Summary    15 Dec 2024 07:01  -  16 Dec 2024 07:00  --------------------------------------------------------  IN: 200 mL / OUT: 1855 mL / NET: -1655 mL    GENERAL: [ ]Cachexia    [ ]Alert  [ ]Oriented x   [ x]Lethargic  [ ]Unarousable  [ x]Verbal: minimal, answers yes/no questions  [ ]Non-Verbal  Behavioral:   [ ] Anxiety  [ ] Delirium [ ] Agitation [ ] Other  HEENT:  [ ]Normal   [ x]Dry mouth   [ ]ET Tube/Trach  [ ]Oral lesions  PULMONARY:   [ ]Clear [ ]Tachypnea  [ ]Audible excessive secretions   [ x]Rhonchi        [ ]Right [ ]Left [ ]Bilateral  [ ]Crackles        [ ]Right [ ]Left [ ]Bilateral  [ ]Wheezing     [ ]Right [ ]Left [ ]Bilateral  [ ]Diminished breath sounds [ ]right [ ]left [ ]bilateral  CARDIOVASCULAR:    [ ]Regular [x ]Irregular [ ]Tachy  [ ]Saroj [ ]Murmur [ ]Other  GASTROINTESTINAL:  [ x]Soft  [ ]Distended   [ x]+BS  [ ]Non tender [ ]Tender  [ ]Other [ ]PEG [ ]OGT/ NGT  Last BM: ostomy  GENITOURINARY:  [ ]Normal [ ] Incontinent   [ ]Oliguria/Anuria   [x ]Naranjo  MUSCULOSKELETAL:   [ ]Normal   [ ]Weakness  [ x]Bed/Wheelchair bound [ ]Edema  NEUROLOGIC:   [ ]No focal deficits  [ ]Cognitive impairment  [ ]Dysphagia [ ]Dysarthria [ ]Paresis [ ]Other   SKIN:   [ ]Normal  [ ]Rash  [ ]Other  [x ]Pressure ulcer(s) sacrum      Present on admission [ x]y [ ]n    CRITICAL CARE:  [ ] Shock Present  [ ]Septic [ ]Cardiogenic [ ]Neurologic [ ]Hypovolemic  [ ]  Vasopressors [ ]  Inotropes   [ ]Respiratory failure present [ ]Mechanical ventilation [ ]Non-invasive ventilatory support [ ]High flow    [ ]Acute  [ ]Chronic [ ]Hypoxic  [ ]Hypercarbic [ ]Other  [ ]Other organ failure     LABS:                        8.7    5.69  )-----------( 226      ( 16 Dec 2024 07:30 )             28.7   12-16    131[L]  |  99  |  5[L]  ----------------------------<  90  4.0   |  21[L]  |  0.40[L]    Ca    8.6      16 Dec 2024 07:30  Phos  2.1     12-15  Mg     1.60     12-15    TPro  5.3[L]  /  Alb  2.2[L]  /  TBili  0.3  /  DBili  x   /  AST  19  /  ALT  6   /  AlkPhos  94  12-15    Urinalysis Basic - ( 16 Dec 2024 07:30 )    Color: x / Appearance: x / SG: x / pH: x  Gluc: 90 mg/dL / Ketone: x  / Bili: x / Urobili: x   Blood: x / Protein: x / Nitrite: x   Leuk Esterase: x / RBC: x / WBC x   Sq Epi: x / Non Sq Epi: x / Bacteria: x    RADIOLOGY & ADDITIONAL STUDIES:  < from: CT Abdomen and Pelvis No Cont (24 @ 02:21) >  IMPRESSION:  Sacral decubitus ulcer with a few droplets of subcutaneous gas. No   associated collection.  Marked wall thickening of the rectum, difficult to compare an suspicious   for an underlying mass in this patient with history of rectal cancer.  Bibasilar pulmonary nodules concerning for metastases, unchanged.  A 3.1 cm indeterminate left adrenal lesion without change.  A 5.1 cm infrarenal abdominal aortic aneurysm.    < from: CT Head No Cont (24 @ 19:00) >  IMPRESSION:  No CT evidence of acute intracranial pathology.    PROTEIN CALORIE MALNUTRITION PRESENT: [ ]mild [ ]moderate [ ]severe [ ]underweight [ ]morbid obesity  https://www.andeal.org/vault/2440/web/files/ONC/Table_Clinical%20Characteristics%20to%20Document%20Malnutrition-White%20JV%20et%20al%786971.pdf    Height (cm): 185.4 (24 @ 20:38), 185 (10-31-24 @ 11:51)  Weight (kg): 52 (24 @ 09:39), 63 (10-31-24 @ 11:51), 63.5 (24 @ 19:17)  BMI (kg/m2): 15.1 (24 @ 09:39), 18.3 (24 @ 20:38), 18.4 (10-31-24 @ 11:51)    [ ]PPSV2 < or = to 30% [ ]significant weight loss  [ ]poor nutritional intake  [ ]anasarca[ ]Artificial Nutrition      Other REFERRALS:  [ ]Hospice  [ ]Child Life  [ ]Social Work  [ ]Case management [ ]Holistic Therapy     Goals of Care Document:

## 2024-12-16 NOTE — PROGRESS NOTE ADULT - PROBLEM SELECTOR PLAN 3
-Maintained on statin, Toprol, losartan, spironolactone    Plan  - C/w home meds Na low 130s likely 2/2 decreased PO intake    - C/w NaCl tabs 2g TID   - Trend Na daily

## 2024-12-16 NOTE — PROGRESS NOTE ADULT - SUBJECTIVE AND OBJECTIVE BOX
Patient is a 79y old  Male who presents with a chief complaint of Rectal Bleeding (15 Dec 2024 11:37)      INTERVAL HX:  No acute overnight events. Pt seen and examined at bedside. ROS otherwise negative   Allergies:  No Known Allergies    Medications:  acetaminophen     Tablet .. 650 milliGRAM(s) Oral every 6 hours PRN  chlorhexidine 2% Cloths 1 Application(s) Topical daily  Dakins Solution - 1/4 Strength 1 Application(s) Topical daily  latanoprost 0.005% Ophthalmic Solution 1 Drop(s) Both EYES at bedtime  levothyroxine 25 MICROGram(s) Oral daily  losartan 25 milliGRAM(s) Oral daily  metoprolol succinate ER 50 milliGRAM(s) Oral daily  morphine  - Injectable 1 milliGRAM(s) IV Push every 4 hours PRN  morphine  - Injectable 2 milliGRAM(s) IV Push every 4 hours PRN  nicotine -   7 mG/24Hr(s) Patch 1 Patch Transdermal daily  oxyCODONE  ER Tablet 15 milliGRAM(s) Oral every 12 hours  polyethylene glycol 3350 17 Gram(s) Oral daily  rosuvastatin 10 milliGRAM(s) Oral at bedtime  senna 2 Tablet(s) Oral at bedtime  sodium chloride 2 Gram(s) Oral three times a day  spironolactone 25 milliGRAM(s) Oral daily  timolol 0.5% Solution 1 Drop(s) Both EYES daily    Vitals:  T(C): 36.6 (12-16-24 @ 05:39), Max: 36.6 (12-15-24 @ 21:46)  HR: 77 (12-16-24 @ 07:00) (77 - 84)  BP: 137/85 (12-16-24 @ 07:00) (120/83 - 139/78)  RR: 18 (12-16-24 @ 07:00) (18 - 18)  SpO2: 99% (12-16-24 @ 07:00) (98% - 100%)  I/O's:    12-15-24 @ 07:01  -  12-16-24 @ 07:00  --------------------------------------------------------  IN: 200 mL / OUT: 450 mL / NET: -250 mL      Physical Exam:    Labs:                        8.4    5.50  )-----------( 232      ( 15 Dec 2024 05:02 )             27.1     12-15    133[L]  |  99  |  5[L]  ----------------------------<  86  3.7   |  23  |  0.41[L]    Ca    8.6      15 Dec 2024 05:02  Phos  2.1     12-15  Mg     1.60     12-15    TPro  5.3[L]  /  Alb  2.2[L]  /  TBili  0.3  /  DBili  x   /  AST  19  /  ALT  6   /  AlkPhos  94  12-15        Radiology/Procedures: Reviewed.   Patient is a 79y old  Male who presents with a chief complaint of Rectal Bleeding (15 Dec 2024 11:37)      INTERVAL HX:  No acute overnight events. Pt seen and examined at bedside. States that he is having back pain and did not receive PRN medications overnight, RN notified for PRN administration.     Allergies:  No Known Allergies    Medications:  acetaminophen     Tablet .. 650 milliGRAM(s) Oral every 6 hours PRN  chlorhexidine 2% Cloths 1 Application(s) Topical daily  Dakins Solution - 1/4 Strength 1 Application(s) Topical daily  latanoprost 0.005% Ophthalmic Solution 1 Drop(s) Both EYES at bedtime  levothyroxine 25 MICROGram(s) Oral daily  losartan 25 milliGRAM(s) Oral daily  metoprolol succinate ER 50 milliGRAM(s) Oral daily  morphine  - Injectable 1 milliGRAM(s) IV Push every 4 hours PRN  morphine  - Injectable 2 milliGRAM(s) IV Push every 4 hours PRN  nicotine -   7 mG/24Hr(s) Patch 1 Patch Transdermal daily  oxyCODONE  ER Tablet 15 milliGRAM(s) Oral every 12 hours  polyethylene glycol 3350 17 Gram(s) Oral daily  rosuvastatin 10 milliGRAM(s) Oral at bedtime  senna 2 Tablet(s) Oral at bedtime  sodium chloride 2 Gram(s) Oral three times a day  spironolactone 25 milliGRAM(s) Oral daily  timolol 0.5% Solution 1 Drop(s) Both EYES daily    Vitals:  T(C): 36.6 (12-16-24 @ 05:39), Max: 36.6 (12-15-24 @ 21:46)  HR: 77 (12-16-24 @ 07:00) (77 - 84)  BP: 137/85 (12-16-24 @ 07:00) (120/83 - 139/78)  RR: 18 (12-16-24 @ 07:00) (18 - 18)  SpO2: 99% (12-16-24 @ 07:00) (98% - 100%)  I/O's:    12-15-24 @ 07:01  -  12-16-24 @ 07:00  --------------------------------------------------------  IN: 200 mL / OUT: 450 mL / NET: -250 mL      Physical Exam:  GENERAL: NAD, resting in bed  HEAD: normocephalic, atraumatic  HEENT: normal conjunctiva, oral mucosa moist  CARDIAC: regular rate and rhythm, normal S1S2, no appreciable murmurs  PULM: normal breath sounds, clear to ascultation bilaterally, no rales, rhonchi, wheezing  GI: +ostomy. Abd soft, nondistended, mildly tender surrounding ostomy, no rebound tenderness, no guarding, no rigidity  : +naranjo  NEURO: no focal motor or sensory deficits, AAOx3  MSK: 1+ b/l LE edema  SKIN: well-perfused, extremities warm, no visible rashes        Labs:                        8.4    5.50  )-----------( 232      ( 15 Dec 2024 05:02 )             27.1     12-15    133[L]  |  99  |  5[L]  ----------------------------<  86  3.7   |  23  |  0.41[L]    Ca    8.6      15 Dec 2024 05:02  Phos  2.1     12-15  Mg     1.60     12-15    TPro  5.3[L]  /  Alb  2.2[L]  /  TBili  0.3  /  DBili  x   /  AST  19  /  ALT  6   /  AlkPhos  94  12-15        Radiology/Procedures: Reviewed.

## 2024-12-16 NOTE — PROGRESS NOTE ADULT - PROBLEM SELECTOR PLAN 1
Impression: Hx of Rectal Cancer, [place] [facility] [place] reeval with Dr. Miguel Angel Johnson surg/onc who placed end diverting colostomy. Was to be reevaluated by [facility] for a possible 2nd opinion, however deferred due to admission. Expressed interest in outpt follow up with [facility] which will be re-evaluated on discharge. Per Onc, will need outpt records to be brought for outpt follow up. Per daughter,   - Current pain regimen causing excessive drowsiness and possible AMS, will get palliative consult for new pain regimen, given current constraints regarding an inability to use oxycodone, per daughter, and currently on IV morphine which needs to be tapered off for discharge  - Tylenol 650mg mild/ moderate/severe: IV Morphine 1mg/2mg, MS oxycotin 15mg BID      Plan  - Palliative consult for pain management  - C/w NS 100cc/hr for decreased PO intake Hx of Rectal Cancer, originally followed by Dr. Paez [facility] [place], when options limited, reeval with Dr. Miguel Angel Johnson surg/onc who placed end diverting colostomy. Was to be reevaluated by [facility] for a possible 2nd opinion, however deferred due to admission. Expressed interest in outpt follow up with [facility] which will be re-evaluated on discharge. Per Onc, will need outpt records to be brought for outpt follow up.     - Current pain regimen causing excessive drowsiness and possible AMS, palliative consult placed for new pain regimen, given current constraints regarding an inability to use oxycodone, per daughter, and currently on IV morphine which needs to be tapered off for discharge  - Tylenol 650mg mild/ moderate/severe: IV Morphine 1mg/2mg, MS oxycotin 15mg BID Hx of Rectal Cancer, [place] [facility] [place], reeval with Dr. Miguel Angel Johnson surg/onc who placed end diverting colostomy. Was to be reevaluated by [facility] for a possible 2nd opinion, however deferred due to admission. Expressed interest in outpt follow up with [facility] which will be re-evaluated on discharge. Per Onc, will need outpt records to be brought for outpt follow up.     - Current pain regimen causing excessive drowsiness and possible AMS, palliative consult placed for new pain regimen, given current constraints regarding an inability to use oxycodone, per daughter, and currently on IV morphine which needs to be tapered off for discharge  - Tylenol 650mg mild/ moderate/severe: IV Morphine 1mg/2mg

## 2024-12-16 NOTE — CHART NOTE - NSCHARTNOTEFT_GEN_A_CORE
NUTRITION FOLLOW UP NOTE    Pt seen for Severe malnutrition follow-up.     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 Pt reported poor PO intake in house. food and fluid preferences explored and noted. Per RN/PCA at bedside patient with poor PO intake. Reported ~25% intake of breakfast today. Per RN flowsheet, patient with 0-50% noted.  Pt ordered for Ensure Plus 3x daily with fair PO intake per RN. At time of visit patient ordered for Regular consistency. Now, downgraded to Soft and Bite Sized consistency. Colostomy bag per RN flowsheet. 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 RD remains available upon request and will follow up per protocol.       Pertinent Medications: MEDICATIONS  (STANDING):  chlorhexidine 2% Cloths 1 Application(s) Topical daily  Dakins Solution - 1/4 Strength 1 Application(s) Topical daily  latanoprost 0.005% Ophthalmic Solution 1 Drop(s) Both EYES at bedtime  levothyroxine 25 MICROGram(s) Oral daily  losartan 25 milliGRAM(s) Oral daily  metoprolol succinate ER 50 milliGRAM(s) Oral daily  nicotine -   7 mG/24Hr(s) Patch 1 Patch Transdermal daily  oxyCODONE  ER Tablet 15 milliGRAM(s) Oral every 12 hours  polyethylene glycol 3350 17 Gram(s) Oral daily  rosuvastatin 10 milliGRAM(s) Oral at bedtime  senna 2 Tablet(s) Oral at bedtime  sodium chloride 2 Gram(s) Oral three times a day  spironolactone 25 milliGRAM(s) Oral daily  timolol 0.5% Solution 1 Drop(s) Both EYES daily    MEDICATIONS  (PRN):  acetaminophen     Tablet .. 650 milliGRAM(s) Oral every 6 hours PRN Mild Pain (1 - 3)  morphine  - Injectable 2 milliGRAM(s) IV Push every 4 hours PRN Severe Pain (7 - 10)  morphine  - Injectable 1 milliGRAM(s) IV Push every 4 hours PRN Moderate Pain (4 - 6)    [] Relevant notes on medications:     Pertinent Labs: 12-16 Na131 mmol/L[L] Glu 90 mg/dL K+ 4.0 mmol/L Cr  0.40 mg/dL[L] BUN 5 mg/dL[L] 12-15 Phos 2.1 mg/dL[L] 12-15 Alb 2.2 g/dL[L]     A1C with Estimated Average Glucose Result: 4.8 % (12-08-24 @ 00:55)      Weight: 52.0kg (dosing 12/9), 51.8kg (12/11).   Weight Assessment: Wt appears relatively stable   Height: 73in / 185.4cm  IBW: 184lbs / 83.6kg +/-10%  BMI: 15.1kg/m^2    Physical Assessment, per flowsheets:  Edema: Left hand/wrist edema 2+, Left leg/arm edema 3+ noted.  Pressure Injury: Sacrum stage 4      Estimated Needs:   [X] No change since previous assessment, based on dosing weight  114.6lbs /52 kg  1560 - 1820 kcal daily @ 30 -35 kcal/kg, 73 - 94 gm protein daily @ 1.4 - 1.8 gm/kg       Previous Nutrition Diagnosis:   [X] Severe Malnutrition   Nutrition Diagnosis is [X] ongoing  [ ] resolved [ ] not applicable   New Nutrition Diagnosis: [X] not applicable     Education:  [ ] Provided pt with verbal / written education on   [ ] Provided on previous assessment by RD  [X] Not applicable   [ ] Pt refused     Interventions:   1) - Continue current diet order, which remains appropriate at this time.  2) - Continue Ensure plus 3x daily (provides 350 kcal, 20 gm protein per 8oz serving).   3) - Recommend multivitamin and Vitamin C to aid with wound healing.    4) - Encourage and assist Pt with meals, Monitor PO diet tolerance.   5) - Consider alternate means of nutrition if within GOC.     Monitor & Evaluate:  PO intake, tolerance to diet/supplement, nutrition related lab values, weight trends, BMs/GI distress, hydration status, skin integrity.    Jordon Hamilton RD, CDN. Available on MS teams,  Pager #[phone]

## 2024-12-16 NOTE — PROGRESS NOTE ADULT - PROBLEM SELECTOR PLAN 4
- Maintained on Crestor 10mg QHS, Toprol XL 50mg QD, Losartan 25mg QD, Spironolactone 25mg QD  - TTE (10/24): Left ventricular systolic function is moderately to severely decreased with an ejection fraction visually estimated at 35 to 40 %. Global left ventricular hypokinesis. Enlarged right ventricular cavity size, with normal wall thickness, stable pericardial effusion see, consistent CT findings (12/8)  - Per conversation with daughter (12/9), there is concern for decreasing EF from outpt provider Dr. Sol,  - TTE: LV not well visualized. Unable to evaluate the EF. LV normal thickness and size.  - Per cardiology chart note:  moderately to severely decreased with an ejection fraction visually estimated at 35to 40 %. Global left ventricular hypokinesis.   - should follow up with his outpatient cardiologist- Dr Leiva- following discharge. -Maintained on statin, Toprol, losartan, spironolactone    Plan  - C/w home meds

## 2024-12-16 NOTE — PROGRESS NOTE ADULT - ATTENDING COMMENTS
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   - c/w salt tabs   - monitor Na     Chronic Afib  - c/w Metoprolol  - not on AC d/t rectal bleeding     Chronic HFrEF   - Continue Toprol, Losartan, Aldactone     DC planning to HOME with home care 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

## 2024-12-16 NOTE — CONSULT NOTE ADULT - PROBLEM SELECTOR RECOMMENDATION 5
Pt is full code - pending to address advanced directives  Surrogate is daughter as above  Will continue to follow, page for uncontrolled symptoms

## 2024-12-16 NOTE — CONSULT NOTE ADULT - ASSESSMENT
Mr. Sami Chong is a 79 year old male with a PMH of CAD s/p stent placement in 2012 ( ASA), A Fib ( not on AC due to GI bleed), known h/o DVT in right leg, HFrEF,  locally advanced rectal cancer s/p chemo/radation (11/22,consolidation CAPOX (2/2023), brachytherapy (3/2024), hx of  s/p diverting end sigmoid colostomy, cystoscopy and naranjo placement across defect , and recent admission (11/24) for for  Tara gangrene s/p surgical debridement coming in for concern for rectal bleeding and sacral wound ulcer. Patient has been previous admitted for rectal bleed with a colonoscopy (10/24) showing diverticulosis, at that time was ruled as a complication of rectal cancer. Per primary oncologist, Dr. Paez, no role for DMT at that time. Palliative Care consulted for complex symptom management in the setting of

## 2024-12-16 NOTE — CONSULT NOTE ADULT - PROBLEM SELECTOR RECOMMENDATION 4
PPSV 30-40 %  Dependent on all/most ADLs  Skin care  Frequent repositioning  High risk of further decline given comorbidities

## 2024-12-17 LAB
ANION GAP SERPL CALC-SCNC: 8 MMOL/L — SIGNIFICANT CHANGE UP (ref 7–14)
BUN SERPL-MCNC: 7 MG/DL — SIGNIFICANT CHANGE UP (ref 7–23)
CALCIUM SERPL-MCNC: 8.5 MG/DL — SIGNIFICANT CHANGE UP (ref 8.4–10.5)
CHLORIDE SERPL-SCNC: 100 MMOL/L — SIGNIFICANT CHANGE UP (ref 98–107)
CO2 SERPL-SCNC: 24 MMOL/L — SIGNIFICANT CHANGE UP (ref 22–31)
CREAT SERPL-MCNC: 0.59 MG/DL — SIGNIFICANT CHANGE UP (ref 0.5–1.3)
EGFR: 99 ML/MIN/1.73M2 — SIGNIFICANT CHANGE UP
GLUCOSE SERPL-MCNC: 118 MG/DL — HIGH (ref 70–99)
HCT VFR BLD CALC: 29.5 % — LOW (ref 39–50)
HGB BLD-MCNC: 8.9 G/DL — LOW (ref 13–17)
MAGNESIUM SERPL-MCNC: 1.7 MG/DL — SIGNIFICANT CHANGE UP (ref 1.6–2.6)
MCHC RBC-ENTMCNC: 25.1 PG — LOW (ref 27–34)
MCHC RBC-ENTMCNC: 30.2 G/DL — LOW (ref 32–36)
MCV RBC AUTO: 83.3 FL — SIGNIFICANT CHANGE UP (ref 80–100)
NRBC # BLD: 0 /100 WBCS — SIGNIFICANT CHANGE UP (ref 0–0)
NRBC # FLD: 0 K/UL — SIGNIFICANT CHANGE UP (ref 0–0)
PHOSPHATE SERPL-MCNC: 2.8 MG/DL — SIGNIFICANT CHANGE UP (ref 2.5–4.5)
PLATELET # BLD AUTO: 211 K/UL — SIGNIFICANT CHANGE UP (ref 150–400)
POTASSIUM SERPL-MCNC: 3.8 MMOL/L — SIGNIFICANT CHANGE UP (ref 3.5–5.3)
POTASSIUM SERPL-SCNC: 3.8 MMOL/L — SIGNIFICANT CHANGE UP (ref 3.5–5.3)
RBC # BLD: 3.54 M/UL — LOW (ref 4.2–5.8)
RBC # FLD: 17.2 % — HIGH (ref 10.3–14.5)
SODIUM SERPL-SCNC: 132 MMOL/L — LOW (ref 135–145)
WBC # BLD: 6.77 K/UL — SIGNIFICANT CHANGE UP (ref 3.8–10.5)
WBC # FLD AUTO: 6.77 K/UL — SIGNIFICANT CHANGE UP (ref 3.8–10.5)

## 2024-12-17 PROCEDURE — 99232 SBSQ HOSP IP/OBS MODERATE 35: CPT | Mod: GC

## 2024-12-17 PROCEDURE — 99232 SBSQ HOSP IP/OBS MODERATE 35: CPT

## 2024-12-17 RX ADMIN — Medication 2.5 MILLIGRAM(S): at 14:28

## 2024-12-17 RX ADMIN — SODIUM CHLORIDE 2 GRAM(S): 9 INJECTION, SOLUTION INTRAMUSCULAR; INTRAVENOUS; SUBCUTANEOUS at 05:41

## 2024-12-17 RX ADMIN — NICOTINE POLACRILEX 1 PATCH: 4 LOZENGE ORAL at 07:00

## 2024-12-17 RX ADMIN — NICOTINE POLACRILEX 1 PATCH: 4 LOZENGE ORAL at 12:41

## 2024-12-17 RX ADMIN — CHLORHEXIDINE GLUCONATE 1 APPLICATION(S): 1.2 RINSE ORAL at 12:46

## 2024-12-17 RX ADMIN — Medication 50 MILLIGRAM(S): at 05:41

## 2024-12-17 RX ADMIN — Medication 2.5 MILLIGRAM(S): at 13:28

## 2024-12-17 RX ADMIN — Medication 1 APPLICATION(S): at 12:45

## 2024-12-17 RX ADMIN — ROSUVASTATIN 10 MILLIGRAM(S): 40 TABLET, FILM COATED ORAL at 22:27

## 2024-12-17 RX ADMIN — NICOTINE POLACRILEX 1 PATCH: 4 LOZENGE ORAL at 12:46

## 2024-12-17 RX ADMIN — NICOTINE POLACRILEX 1 PATCH: 4 LOZENGE ORAL at 17:06

## 2024-12-17 RX ADMIN — Medication 2.5 MILLIGRAM(S): at 06:52

## 2024-12-17 RX ADMIN — LATANOPROST 1 DROP(S): 50 SOLUTION OPHTHALMIC at 22:26

## 2024-12-17 RX ADMIN — LEVOTHYROXINE SODIUM 25 MICROGRAM(S): 175 TABLET ORAL at 05:41

## 2024-12-17 RX ADMIN — SODIUM CHLORIDE 2 GRAM(S): 9 INJECTION, SOLUTION INTRAMUSCULAR; INTRAVENOUS; SUBCUTANEOUS at 13:28

## 2024-12-17 RX ADMIN — SODIUM CHLORIDE 2 GRAM(S): 9 INJECTION, SOLUTION INTRAMUSCULAR; INTRAVENOUS; SUBCUTANEOUS at 22:26

## 2024-12-17 RX ADMIN — Medication 2.5 MILLIGRAM(S): at 06:07

## 2024-12-17 RX ADMIN — Medication 1 DROP(S): at 12:45

## 2024-12-17 RX ADMIN — LOSARTAN POTASSIUM 25 MILLIGRAM(S): 100 TABLET, FILM COATED ORAL at 05:41

## 2024-12-17 RX ADMIN — SPIRONOLACTONE 25 MILLIGRAM(S): 50 TABLET ORAL at 05:41

## 2024-12-17 NOTE — PROGRESS NOTE ADULT - ASSESSMENT
Assessment/Plan: Mr. 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 Admitted with concern for rectal bleeding and stage 4 sacral pressure injury. Patient has been previous admitted for rectal bleed with a colonoscopy (10/24) showing diverticulosis, at that time was ruled as a complication of rectal cancer.     Wound follow up for sacrum stage 4 pressure injury and Suprapubic and anterior scrotum chronic surgical wounds    Sacrum stage 4 pressure injury  -overall wound stable; tissue type with no major changes, dimensions slightly larger.  -abundant adherent, nonfluctuant necrosis remains; bone palpable in close proximity.   -Periwound skin with blanching erythema, stable; does not extend beyond 2cm from wound edge.   -Evaluated by gen surgery on admission- Low suspicion of necrotizing fascitis, subQ gas on CT likely from exposure to atmospheric air. Findings remain consistent with clinical exam   -No s/s of acute soft tissue infection; afebrile, WBC normal, no palpable skin changes.  -urine and stool contained, (+) colostomy and indwelling naranjo catheter.  -No sharp debridement indicated, risk of bleeding outweighs benefit.  -Continue current recommendations  -Topical recommendations: cleanse wound with Dakins solution 1/4 strength,  rinse wound and periwound skin with NS, pat dry. Apply Liquid barrier film to periwound skin. Apply medihoney gel to wound base, pack undermining with Aquacel hydrofiber ribbon, leave 2" out at end to wick and visible to ensure full removal of packing with subsequent dressing changes. Cover with silicone foam with border. Change daily and prn if soiled/compromised.  -Continue to offload pressure and provide education regarding importance of offloading; low airloss support surface, turn and position per protocol with use of fluidized positioning devices, continue moisture management with use of jayde moisture barrier cream to perianal area every shift and prn and continue use of single breathable incontinence pad, continue to offload heels with complete cair boots.  -Appreciated nutrition recommendations in patient with severe protein calorie malnutrition with full thickness surgical wounds and stage 4 pressure injury.   -Agree with ongoing goals of care discussion  -Upon discharge follow up at outpatient [facility]. [address] [facility]. [phone].    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 No s/s of acute soft tissue infection  -Topical recommendations: cleanse wounds and periwound skin with NS. Apply Liquid barrier film to periwound skin. Pack dead space of both wounds with Aquacel ribbon, leave at least 2" out at end to ensure full removal of packing with subsequent dressing changes. Cover all with silicone foam with border, change daily. Upon discharge can change every other day.      Of note: patient intermittently refusing care, has poor appetite- consider calorie count?, dependent on ADLs, rectal cancer per records no longer a candidate for treatment; daughter expressing interest in pursuing second opinion. Patient at high risk for further decline given comorbidities; recommend continued ongoing goals of care discussion. Complete wound healing of full thickness wounds is guarded.     Findings and plan discussed with patient, primary RN, and primary team. All questions and concerns addressed.  Will continue to follow periodically while inpatient, please reconsult earlier as needed.  Remainder of care per primary team.  Thank you.    AMRITA Dooley, CWBAILEY (MS Teams)    If after 4PM or before 7:30AM on Mon-Friday or weekend/holiday please contact general surgery for urgent matters.   Team A- [id number]   Team B- [id number]  For non-urgent matters e-mail [email or web address]    I spent 35 minutes face-to-face with this patient of which more than 50% of the time was spent counseling/coordinating care of this patient.

## 2024-12-17 NOTE — PROGRESS NOTE ADULT - PROBLEM SELECTOR PLAN 9
DVT ppx: hold chemical AC i/s/o ostomy bleed  Diet: soft/bite sized, DASH/TLC  Dispo: pending, PT eval re-ordered  GOC: Full Code

## 2024-12-17 NOTE — PROGRESS NOTE ADULT - SUBJECTIVE AND OBJECTIVE BOX
Patient is a 79y old  Male who presents with a chief complaint of Rectal Bleeding (16 Dec 2024 13:30)      INTERVAL HX:  No acute overnight events. Pt seen and examined at bedside. ROS otherwise negative   Allergies:  No Known Allergies    Medications:  acetaminophen     Tablet .. 650 milliGRAM(s) Oral every 6 hours PRN  chlorhexidine 2% Cloths 1 Application(s) Topical daily  Dakins Solution - 1/4 Strength 1 Application(s) Topical daily  latanoprost 0.005% Ophthalmic Solution 1 Drop(s) Both EYES at bedtime  levothyroxine 25 MICROGram(s) Oral daily  losartan 25 milliGRAM(s) Oral daily  metoprolol succinate ER 50 milliGRAM(s) Oral daily  morphine   Solution 2.5 milliGRAM(s) Oral every 4 hours PRN  morphine  - Injectable 1 milliGRAM(s) IV Push every 4 hours PRN  nicotine -   7 mG/24Hr(s) Patch 1 Patch Transdermal daily  polyethylene glycol 3350 17 Gram(s) Oral daily  rosuvastatin 10 milliGRAM(s) Oral at bedtime  senna 2 Tablet(s) Oral at bedtime  sodium chloride 2 Gram(s) Oral three times a day  spironolactone 25 milliGRAM(s) Oral daily  timolol 0.5% Solution 1 Drop(s) Both EYES daily    Vitals:  T(C): 36.4 (12-17-24 @ 05:30), Max: 36.7 (12-16-24 @ 21:24)  HR: 83 (12-17-24 @ 05:30) (76 - 83)  BP: 117/79 (12-17-24 @ 05:30) (110/68 - 128/85)  RR: 17 (12-17-24 @ 05:30) (17 - 18)  SpO2: 98% (12-17-24 @ 05:30) (98% - 100%)  I/O's:    12-16-24 @ 07:01  -  12-17-24 @ 07:00  --------------------------------------------------------  IN: 440 mL / OUT: 1800 mL / NET: -1360 mL      Physical Exam:  GENERAL: NAD, resting in bed  HEAD: normocephalic, atraumatic  HEENT: normal conjunctiva, oral mucosa moist  CARDIAC: regular rate and rhythm, normal S1S2, no appreciable murmurs  PULM: normal breath sounds, clear to ascultation bilaterally, no rales, rhonchi, wheezing  GI: +ostomy. Abd soft, nondistended, mildly tender surrounding ostomy, no rebound tenderness, no guarding, no rigidity  : +naranjo  NEURO: no focal motor or sensory deficits, AAOx3  MSK: 1+ b/l LE edema  SKIN: well-perfused, extremities warm, no visible rashes    Labs:                        8.7    5.69  )-----------( 226      ( 16 Dec 2024 07:30 )             28.7     12-16    131[L]  |  99  |  5[L]  ----------------------------<  90  4.0   |  21[L]  |  0.40[L]    Ca    8.6      16 Dec 2024 07:30          Radiology/Procedures: Reviewed.   Patient is a 79y old  Male who presents with a chief complaint of Rectal Bleeding (16 Dec 2024 13:30)      INTERVAL HX:  Refused SCDs overnight. Pt seen and examined at bedside. States he is in pain, does not remember receiving oral morphine solution this morning. Answering limited questions, somnolent, states "no" when asked if he knows date/location. ROS limited    Allergies:  No Known Allergies    Medications:  acetaminophen     Tablet .. 650 milliGRAM(s) Oral every 6 hours PRN  chlorhexidine 2% Cloths 1 Application(s) Topical daily  Dakins Solution - 1/4 Strength 1 Application(s) Topical daily  latanoprost 0.005% Ophthalmic Solution 1 Drop(s) Both EYES at bedtime  levothyroxine 25 MICROGram(s) Oral daily  losartan 25 milliGRAM(s) Oral daily  metoprolol succinate ER 50 milliGRAM(s) Oral daily  morphine   Solution 2.5 milliGRAM(s) Oral every 4 hours PRN  morphine  - Injectable 1 milliGRAM(s) IV Push every 4 hours PRN  nicotine -   7 mG/24Hr(s) Patch 1 Patch Transdermal daily  polyethylene glycol 3350 17 Gram(s) Oral daily  rosuvastatin 10 milliGRAM(s) Oral at bedtime  senna 2 Tablet(s) Oral at bedtime  sodium chloride 2 Gram(s) Oral three times a day  spironolactone 25 milliGRAM(s) Oral daily  timolol 0.5% Solution 1 Drop(s) Both EYES daily    Vitals:  T(C): 36.4 (12-17-24 @ 05:30), Max: 36.7 (12-16-24 @ 21:24)  HR: 83 (12-17-24 @ 05:30) (76 - 83)  BP: 117/79 (12-17-24 @ 05:30) (110/68 - 128/85)  RR: 17 (12-17-24 @ 05:30) (17 - 18)  SpO2: 98% (12-17-24 @ 05:30) (98% - 100%)  I/O's:    12-16-24 @ 07:01  -  12-17-24 @ 07:00  --------------------------------------------------------  IN: 440 mL / OUT: 1800 mL / NET: -1360 mL      Physical Exam: refused full physical exam  GENERAL: lethargic, resting with eyes closed  HEAD: normocephalic, atraumatic  HEENT: normal conjunctiva, oral mucosa moist  PULM: no visible increased WOB  : +naranjo  NEURO: no focal motor or sensory deficits, AAO to person only  MSK: 1+ b/l LE edema  SKIN: well-perfused, extremities warm, no visible rashes    Labs:                        8.7    5.69  )-----------( 226      ( 16 Dec 2024 07:30 )             28.7     12-16    131[L]  |  99  |  5[L]  ----------------------------<  90  4.0   |  21[L]  |  0.40[L]    Ca    8.6      16 Dec 2024 07:30          Radiology/Procedures: Reviewed.

## 2024-12-17 NOTE — PROGRESS NOTE ADULT - PROBLEM SELECTOR PLAN 1
Hx of Rectal Cancer, originally followed by Dr. Paez [facility], when options limited, reeval with Dr. Miguel Angel Johnson surg/onc who placed end diverting colostomy. Was to be reevaluated by [facility] for a possible 2nd opinion, however deferred due to admission. Expressed interest in outpt follow up with [facility] which will be re-evaluated on discharge. Per Onc, will need outpt records to be brought for outpt follow up.     - Current pain regimen causing excessive drowsiness and possible AMS, palliative consult placed for new pain regimen, given current constraints regarding an inability to use oxycodone, per daughter, and currently on IV morphine which needs to be tapered off for discharge  - Tylenol 650mg mild/ moderate/severe: IV Morphine 1mg/2mg Hx of Rectal Cancer, originally followed by Dr. Paez [facility] [place], when options limited, reeval with Dr. Miguel Angel Johnson surg/onc who placed end diverting colostomy. Was to be reevaluated by [facility] for a possible 2nd opinion, however deferred due to admission. Expressed interest in outpt follow up with [facility] which will be re-evaluated on discharge. Per Onc, will need outpt records to be brought for outpt follow up - 12/16 daughter states she will email records    - Previous pain regimens causing excessive drowsiness and possible AMS, palliative consult placed for new pain regimen  - Appreciate palliative recs: liquid morphine 2.5mg q4 PRN for moderate pain, morphine 1mg IV q4 PRN for severe

## 2024-12-17 NOTE — PROGRESS NOTE ADULT - ATTENDING COMMENTS
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

## 2024-12-17 NOTE — PROGRESS NOTE ADULT - PROBLEM SELECTOR PLAN 5
- Maintained on Crestor 10mg QHS, Toprol XL 50mg QD, Losartan 25mg QD, Spironolactone 25mg QD  - TTE (10/24): Left ventricular systolic function is moderately to severely decreased with an ejection fraction visually estimated at 35 to 40 %. Global left ventricular hypokinesis. Enlarged right ventricular cavity size, with normal wall thickness, stable pericardial effusion see, consistent CT findings (12/8)  - Per conversation with daughter (12/9), there is concern for decreasing EF from outpt provider Dr. Sol,  - TTE: LV not well visualized. Unable to evaluate the EF. LV normal thickness and size.  - Per cardiology chart note:  moderately to severely decreased with an ejection fraction visually estimated at 35to 40 %. Global left ventricular hypokinesis.   - should follow up with his outpatient cardiologist- Dr Leiva- following discharge. 89

## 2024-12-17 NOTE — PROGRESS NOTE ADULT - ASSESSMENT
79M with PMH of CAD s/p stent placement in 2012 ( ASA), A Fib (not on AC due to GI bleed), known h/o DVT in right leg, HFrEF,  locally advanced rectal cancer s/p chemo/radation (11/22,consolidation CAPOX (2/2023), brachytherapy (3/2024), hx of  s/p diverting end sigmoid colostomy, cystoscopy and naranjo placement across defect , and recent admission (11/24) for for  Tara gangrene s/p surgical debridement admitted for bleeding in ostomy bag and sacral ulcer

## 2024-12-17 NOTE — PROGRESS NOTE ADULT - SUBJECTIVE AND OBJECTIVE BOX
[facility]-- WOUND TEAM -- FOLLOW UP NOTE  --------------------------------------------------------------------------------    subjective: Patient seen and examined with primary RN at bedside. Patient intermittently confused, required reorientation. Was agreeable to wound assessment and dressing change today, verbalized that even though he is agreeing he prefers to be left alone. Reports poor appetite. Denies CP, SOB, abdominal pain, n/v, fever, chills. Per primary RN Colostomy pouch changed early this AM, continues to have scant bloody effluent in pouch. Per staff patient frequently refusing care including turning and positioning.    Interval HPI/24 hour events:   -12/16 Seen by Palliative care for complex symptom management  -Nutrition following for severe protein-calorie malnutrition    Chart reviewed including labs and relevant images      Diet:  Diet, Soft and Bite Sized:   DASH/TLC Sodium & Cholesterol Restricted (DASH)  Supplement Feeding Modality:  Oral  Ensure Plus High Protein Cans or Servings Per Day:  2       Frequency:  Three Times a day (12-16-24 @ 13:19) [Active]      ROS: General, skin. GI see above  All other systems negative.    ALLERGIES & MEDICATIONS  --------------------------------------------------------------------------------  Allergies    No Known Allergies    Intolerances          STANDING INPATIENT MEDICATIONS    chlorhexidine 2% Cloths 1 Application(s) Topical daily  Dakins Solution - 1/4 Strength 1 Application(s) Topical daily  latanoprost 0.005% Ophthalmic Solution 1 Drop(s) Both EYES at bedtime  levothyroxine 25 MICROGram(s) Oral daily  losartan 25 milliGRAM(s) Oral daily  metoprolol succinate ER 50 milliGRAM(s) Oral daily  nicotine -   7 mG/24Hr(s) Patch 1 Patch Transdermal daily  polyethylene glycol 3350 17 Gram(s) Oral daily  rosuvastatin 10 milliGRAM(s) Oral at bedtime  senna 2 Tablet(s) Oral at bedtime  sodium chloride 2 Gram(s) Oral three times a day  spironolactone 25 milliGRAM(s) Oral daily  timolol 0.5% Solution 1 Drop(s) Both EYES daily      PRN INPATIENT MEDICATION  acetaminophen     Tablet .. 650 milliGRAM(s) Oral every 6 hours PRN  morphine   Solution 2.5 milliGRAM(s) Oral every 4 hours PRN  morphine  - Injectable 1 milliGRAM(s) IV Push every 4 hours PRN        Vital signs:  T(C): 36.7 (12-17-24 @ 14:53), Max: 36.7 (12-16-24 @ 21:24)  HR: 78 (12-17-24 @ 14:53) (76 - 83)  BP: 124/83 (12-17-24 @ 14:53) (117/79 - 128/85)  RR: 18 (12-17-24 @ 14:53) (17 - 18)  SpO2: 99% (12-17-24 @ 14:53) (98% - 99%)  Wt(kg): 51.8 kg (12-11-24)  BMI: 15.1 kg/m2        12-16-24 @ 07:01  -  12-17-24 @ 07:00  --------------------------------------------------------  IN: 440 mL / OUT: 1800 mL / NET: -1360 mL        Constitutional: A&O x 1-2, calm, cooperative. Pale, ill appearing, frail. Bedbound.   (+) low airloss support surface, (+) fluidized positioning devices, heels offloaded on fluidized positioning device  HEENT:  NC/AT, nonicteric, oral mucosa moist.  Cardiovascular: rate regular  Respiratory: nonlabored, room air, equal chest expansion.  Gastrointestinal: soft NT/ND, LLQ Colostomy with intact pouch in place; scant blood in pouch.  Scant bright red blood per rectum, protruding rectal mass visible at anus?.  : (+) indwelling naranjo catheter  Multiple surgical wounds:   -Suprapubic- 2cmx5.5cmx0.3cm (stable)- undermining that connects to anterior scrotal wound; 60% pink-moist granular base, 40% tan-moist adherent slough  -Anterior scrotum- [id number]- with undermining at 12 o'clock, connects to suprapubic wound. 100% tan-moist loosening slough, fixed at base. (+) epibole along wound edges.  *All with scant serous drainage. No purulent drainage, no odor. Periwound skin intact; no erythema, no edema, no increased warmth, no fluctuance, no crepitus.  Musculoskeletal: rigidity of all extremities, no gross deformities/contractures  Vascular: BL LE +2 dp pulses, capillary refill < 3 seconds, equally warm, no overt ischemia, heels intact bilaterally   Skin:  frail, good skin turgor, bilateral upper extremities with ecchymosis without hematoma  Sacrum- stage 4 pressure injury (patient turned to left side)  -10.2tac7dwn0.5cm (prev 10lrt3imj0.7cm)    -undermining from 12- 4 o'clock, deepest from 3-4 o'clock extending 2.5cm.  -abundant adherent softening nonfluctuant tan minimally moist slough/eschar, bone palpable in close proximity, not visible.  -Small serofibrinous drainage; no odor.   -Periwound skin with fading blanching erythema and hyperpigmentation circumferentially, erythema appears reactive- extends <2cm from wound edge, no induration, no fluctuance, no crepitus, no edema, no increased warmth.  Moisture associated dermatitis to lower buttocks and perineum as evident by hyperpigmentation, no open wounds         LABS/ CULTURES/ RADIOLOGY:              8.9    6.77  >-----------<  211      [12-17-24 @ 15:35]              29.5     132  |  100  |  7   ----------------------------<  118      [12-17-24 @ 15:35]  3.8   |  24  |  0.59        Ca     8.5     [12-17-24 @ 15:35]      Mg     1.70     [12-17-24 @ 15:35]      Phos  2.8     [12-17-24 @ 15:35]                CAPILLARY BLOOD GLUCOSE        A1C with Estimated Average Glucose Result: 4.8 % (12-08-24 @ 00:55)        ACC: [id number] EXAM:  CT ABDOMEN AND PELVIS   ORDERED BY: RILEY TRINIDAD     PROCEDURE DATE:  12/08/2024          INTERPRETATION:  CLINICAL INFORMATION: Sacral decubitus with pain.   History of Tara's. History of rectal cancer.    COMPARISON: CT abdomen and pelvis 10/12/2024, CT chest 8/30/2024    CONTRAST/COMPLICATIONS:  IV Contrast: NONE  Oral Contrast: NONE  .    PROCEDURE:  CT of the Abdomen and Pelvis was performed.  Sagittal and coronal reformats were performed.    FINDINGS:  LOWER CHEST: Bibasilar pulmonary nodules without significant change.   Small bilateral pleural effusions, unchanged.    LIVER: Within normal limits.  BILE DUCTS: Normal caliber.  GALLBLADDER: Within normal limits.  SPLEEN: Within normal limits.  PANCREAS: Within normal limits.  ADRENALS: A 3.1 cm indeterminate left adrenal lesion without change.  KIDNEYS/URETERS: Bilateral renal cysts including a hyperdense likely   hemorrhagic cyst in the lower pole.    BLADDER: Minimally distended with a Naranjo catheter inplace. Right   lateral bladder diverticulum. Several dependent calculi.  REPRODUCTIVE ORGANS: Prostate is enlarged.    BOWEL: Left lower quadrant loop colostomy. No bowel obstruction. Marked   wall thickening of the rectum, difficult to compare an suspicious for an   underlying mass in this patient with history of rectal cancer.  PERITONEUM/RETROPERITONEUM: Within normal limits.  VESSELS: Atherosclerotic changes. A 5.1 cm infrarenal abdominal aortic   aneurysm. Left common iliac artery measures up to 2.1 cm. Findings are   unchanged.  LYMPH NODES: No lymphadenopathy.  ABDOMINAL WALL: Sacral decubitus ulcer with a few droplets of gas   overlying the ulcer. No associated drainable collection. Packing material   involving a scrotal wound. No associated collection.  BONES: Degenerative changes.    IMPRESSION:  Sacral decubitus ulcer with a few droplets of subcutaneous gas. No   associated collection.    Marked wall thickening of the rectum, difficult to compare an suspicious   for an underlying mass in this patient with history of rectal cancer.    Bibasilar pulmonary nodules concerning for metastases, unchanged.    A 3.1 cm indeterminate left adrenal lesion without change.    A 5.1 cm infrarenal abdominal aortic aneurysm.      --- End ofReport ---            JUAN HECTOR MD; Attending Radiologist  This document has been electronically signed. Dec  8 2024  2:25PM

## 2024-12-17 NOTE — PROGRESS NOTE ADULT - PROBLEM SELECTOR PLAN 2
-CT A/P: Small amount of bilateral pleural effusions, Small volume pericardial effusion. Pulmonary nodules suspicious of metastasis Midline decubitus ulcer with local subcutaneous inflammation. A few foci of gas in the inflamed superficial soft tissues, nec fas not excluded, no abscess.  - Surg consulted: low concern for nec fascitis, foci of air secondary to open wound,  - Per MD: continue with daily dressing changes -CT A/P: Small amount of bilateral pleural effusions, Small volume pericardial effusion. Pulmonary nodules suspicious of metastasis Midline decubitus ulcer with local subcutaneous inflammation. A few foci of gas in the inflamed superficial soft tissues, nec fas not excluded, no abscess.  - Surg consulted: low concern for nec fascitis, foci of air secondary to open wound  - Per MD: continue with daily dressing changes

## 2024-12-18 PROCEDURE — 99232 SBSQ HOSP IP/OBS MODERATE 35: CPT | Mod: GC

## 2024-12-18 RX ORDER — B COMPLEX, C NO.20/FOLIC ACID 1 MG
1 CAPSULE ORAL DAILY
Refills: 0 | Status: DISCONTINUED | OUTPATIENT
Start: 2024-12-18 | End: 2024-12-20

## 2024-12-18 RX ORDER — LIDOCAINE 50 MG/G
1 OINTMENT TOPICAL DAILY
Refills: 0 | Status: DISCONTINUED | OUTPATIENT
Start: 2024-12-18 | End: 2024-12-24

## 2024-12-18 RX ORDER — ASCORBIC ACID 1000 MG
500 TABLET ORAL DAILY
Refills: 0 | Status: DISCONTINUED | OUTPATIENT
Start: 2024-12-18 | End: 2024-12-24

## 2024-12-18 RX ADMIN — Medication 2.5 MILLIGRAM(S): at 11:19

## 2024-12-18 RX ADMIN — Medication 1 APPLICATION(S): at 13:22

## 2024-12-18 RX ADMIN — NICOTINE POLACRILEX 1 PATCH: 4 LOZENGE ORAL at 07:34

## 2024-12-18 RX ADMIN — NICOTINE POLACRILEX 1 PATCH: 4 LOZENGE ORAL at 12:43

## 2024-12-18 RX ADMIN — CHLORHEXIDINE GLUCONATE 1 APPLICATION(S): 1.2 RINSE ORAL at 13:22

## 2024-12-18 RX ADMIN — LEVOTHYROXINE SODIUM 25 MICROGRAM(S): 175 TABLET ORAL at 06:11

## 2024-12-18 RX ADMIN — SODIUM CHLORIDE 2 GRAM(S): 9 INJECTION, SOLUTION INTRAMUSCULAR; INTRAVENOUS; SUBCUTANEOUS at 13:23

## 2024-12-18 RX ADMIN — SPIRONOLACTONE 25 MILLIGRAM(S): 50 TABLET ORAL at 06:11

## 2024-12-18 RX ADMIN — SODIUM CHLORIDE 2 GRAM(S): 9 INJECTION, SOLUTION INTRAMUSCULAR; INTRAVENOUS; SUBCUTANEOUS at 06:11

## 2024-12-18 RX ADMIN — Medication 50 MILLIGRAM(S): at 06:11

## 2024-12-18 RX ADMIN — Medication 1 DROP(S): at 13:22

## 2024-12-18 RX ADMIN — Medication 2.5 MILLIGRAM(S): at 10:19

## 2024-12-18 RX ADMIN — LOSARTAN POTASSIUM 25 MILLIGRAM(S): 100 TABLET, FILM COATED ORAL at 06:10

## 2024-12-18 NOTE — PROGRESS NOTE ADULT - PROBLEM SELECTOR PLAN 1
Hx of Rectal Cancer, originally followed by Dr. Paez [facility] [place], when options limited, reeval with Dr. Miguel Angel Johnson surg/onc who placed end diverting colostomy. Was to be reevaluated by [facility] for a possible 2nd opinion, however deferred due to admission. Expressed interest in outpt follow up with [facility] which will be re-evaluated on discharge. Per Onc, will need outpt records to be brought for outpt follow up - 12/16 daughter states she will email records    - Previous pain regimens causing excessive drowsiness and possible AMS, palliative consult placed for new pain regimen  - Appreciate palliative recs: liquid morphine 2.5mg q4 PRN for moderate pain, morphine 1mg IV q4 PRN for severe

## 2024-12-18 NOTE — PROGRESS NOTE ADULT - PROBLEM SELECTOR PLAN 2
-CT A/P: Small amount of bilateral pleural effusions, Small volume pericardial effusion. Pulmonary nodules suspicious of metastasis Midline decubitus ulcer with local subcutaneous inflammation. A few foci of gas in the inflamed superficial soft tissues, nec fas not excluded, no abscess.  - Surg consulted: low concern for nec fascitis, foci of air secondary to open wound  - Per MD: continue with daily dressing changes -CT A/P: Small amount of bilateral pleural effusions, Small volume pericardial effusion. Pulmonary nodules suspicious of metastasis Midline decubitus ulcer with local subcutaneous inflammation. A few foci of gas in the inflamed superficial soft tissues, nec fas not excluded, no abscess.    - Surg consulted: low concern for nec fascitis, foci of air secondary to open wound  - Wound care following for stage 4 pressure injury and Suprapubic and anterior scrotum chronic surgical wounds; per latest evaluation wound stable; wound care recs appreciated, nutrition recs appreciated  - Upon discharge follow up at outpatient [facility]. [address] [facility]. [phone].

## 2024-12-18 NOTE — PROGRESS NOTE ADULT - PROBLEM SELECTOR PLAN 3
Na low 130s likely 2/2 decreased PO intake    - C/w NaCl tabs 2g TID   - Trend Na daily Na persistently low 130s    - C/w NaCl tabs 2g TID   - Trend Na daily

## 2024-12-18 NOTE — PROGRESS NOTE ADULT - ASSESSMENT
79M with PMH of CAD s/p stent placement in 2012 ( ASA), A Fib (not on AC due to GI bleed), known h/o DVT in right leg, HFrEF,  locally advanced rectal cancer s/p chemo/radation (11/22,consolidation CAPOX (2/2023), brachytherapy (3/2024), hx of  s/p diverting end sigmoid colostomy, cystoscopy and naranjo placement across defect , and recent admission (11/24) for for  Tara gangrene s/p surgical debridement admitted for bleeding in ostomy bag and sacral ulcer 79M with PMH of CAD s/p stent placement in 2012 ( ASA), A Fib (not on AC due to multiple GI bleeds), known h/o DVT in right leg, HFrEF,  locally advanced rectal cancer s/p chemo/radiation (11/22,consolidation CAPOX (2/2023), brachytherapy (3/2024), hx of  s/p diverting end sigmoid colostomy, cystoscopy and naranjo placement across defect , and recent admission (11/24) for for  Tara gangrene s/p surgical debridement admitted due to c/f bleeding in ostomy bag and sacral ulcer

## 2024-12-18 NOTE — PROGRESS NOTE ADULT - PROBLEM SELECTOR PLAN 9
DVT ppx: hold chemical AC i/s/o ostomy bleed  Diet: soft/bite sized, DASH/TLC  Dispo: pending, PT eval re-ordered  GOC: Full Code DVT ppx: SCDs, hold chemical AC i/s/o recurrent GI bleeds  Diet: soft/bite sized, DASH/TLC  Dispo: pending, PT eval re-ordered  GOC: Full Code

## 2024-12-18 NOTE — PROGRESS NOTE ADULT - PROBLEM SELECTOR PLAN 8
- S/p stent placement in 2012, not on AC due to multiple episodes for GI bleeds S/p stent placement in 2012, not on AC due to multiple GI bleeds

## 2024-12-18 NOTE — CHART NOTE - NSCHARTNOTEFT_GEN_A_CORE
Mr. Chong will require a transport wheelchair due severe debility related to locally advanced Rectal Cancer. Patient is unable to ambulate and his ability to participate in all mobility-related activities of daily living in customary locations within the home is significantly impaired. This mobility limitation cannot be sufficiently resolved with the use of an appropriately fitted cane or walker. Pt is non-ambulatory at baseline.  Use of a transport wheelchair will significantly improve his ability to participate in MRADLs and he will use it on a regular basis within the home.  He is not  able to self-propel a standard or lightweight wheelchair due to upper extremity weakness from prolonged bed rest and is cared for by a caregiver who is willing and able  to assist with the wheelchair.

## 2024-12-18 NOTE — PROGRESS NOTE ADULT - ATTENDING COMMENTS
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

## 2024-12-18 NOTE — PROGRESS NOTE ADULT - SUBJECTIVE AND OBJECTIVE BOX
Patient is a 79y old  Male who presents with a chief complaint of Rectal Bleeding (17 Dec 2024 17:03)      INTERVAL HX:  No acute overnight events. Pt seen and examined at bedside. ROS otherwise negative   Allergies:  No Known Allergies    Medications:  acetaminophen     Tablet .. 650 milliGRAM(s) Oral every 6 hours PRN  chlorhexidine 2% Cloths 1 Application(s) Topical daily  Dakins Solution - 1/4 Strength 1 Application(s) Topical daily  latanoprost 0.005% Ophthalmic Solution 1 Drop(s) Both EYES at bedtime  levothyroxine 25 MICROGram(s) Oral daily  losartan 25 milliGRAM(s) Oral daily  metoprolol succinate ER 50 milliGRAM(s) Oral daily  morphine   Solution 2.5 milliGRAM(s) Oral every 4 hours PRN  morphine  - Injectable 1 milliGRAM(s) IV Push every 4 hours PRN  nicotine -   7 mG/24Hr(s) Patch 1 Patch Transdermal daily  polyethylene glycol 3350 17 Gram(s) Oral daily  rosuvastatin 10 milliGRAM(s) Oral at bedtime  senna 2 Tablet(s) Oral at bedtime  sodium chloride 2 Gram(s) Oral three times a day  spironolactone 25 milliGRAM(s) Oral daily  timolol 0.5% Solution 1 Drop(s) Both EYES daily    Vitals:  T(C): 37.3 (12-18-24 @ 06:00), Max: 37.3 (12-18-24 @ 06:00)  HR: 80 (12-18-24 @ 06:00) (78 - 83)  BP: 120/73 (12-18-24 @ 06:00) (119/77 - 124/83)  RR: 18 (12-18-24 @ 06:00) (18 - 18)  SpO2: 98% (12-18-24 @ 06:00) (98% - 100%)  I/O's:    12-17-24 @ 07:01  -  12-18-24 @ 07:00  --------------------------------------------------------  IN: 0 mL / OUT: 1400 mL / NET: -1400 mL      Physical Exam:    Labs:                        8.9    6.77  )-----------( 211      ( 17 Dec 2024 15:35 )             29.5     12-17    132[L]  |  100  |  7   ----------------------------<  118[H]  3.8   |  24  |  0.59    Ca    8.5      17 Dec 2024 15:35  Phos  2.8     12-17  Mg     1.70     12-17          Radiology/Procedures: Reviewed.   Patient is a 79y old  Male who presents with a chief complaint of Rectal Bleeding (17 Dec 2024 17:03)      INTERVAL HX:  No acute overnight events. Declined PT yesterday, AM labs. Per RN no blood in ostomy bag. Pt seen and examined at bedside this morning, states that he is in pain and asked for legs to be repositioned. Discussed PRN pain medication available with pt and encouraged pt to ask for pain medicine as needed. Pt declined full physical exam in AM, returned to bedside in afternoon and pt again declined exam.     Allergies:  No Known Allergies    Medications:  acetaminophen     Tablet .. 650 milliGRAM(s) Oral every 6 hours PRN  chlorhexidine 2% Cloths 1 Application(s) Topical daily  Dakins Solution - 1/4 Strength 1 Application(s) Topical daily  latanoprost 0.005% Ophthalmic Solution 1 Drop(s) Both EYES at bedtime  levothyroxine 25 MICROGram(s) Oral daily  losartan 25 milliGRAM(s) Oral daily  metoprolol succinate ER 50 milliGRAM(s) Oral daily  morphine   Solution 2.5 milliGRAM(s) Oral every 4 hours PRN  morphine  - Injectable 1 milliGRAM(s) IV Push every 4 hours PRN  nicotine -   7 mG/24Hr(s) Patch 1 Patch Transdermal daily  polyethylene glycol 3350 17 Gram(s) Oral daily  rosuvastatin 10 milliGRAM(s) Oral at bedtime  senna 2 Tablet(s) Oral at bedtime  sodium chloride 2 Gram(s) Oral three times a day  spironolactone 25 milliGRAM(s) Oral daily  timolol 0.5% Solution 1 Drop(s) Both EYES daily    Vitals:  T(C): 37.3 (12-18-24 @ 06:00), Max: 37.3 (12-18-24 @ 06:00)  HR: 80 (12-18-24 @ 06:00) (78 - 83)  BP: 120/73 (12-18-24 @ 06:00) (119/77 - 124/83)  RR: 18 (12-18-24 @ 06:00) (18 - 18)  SpO2: 98% (12-18-24 @ 06:00) (98% - 100%)  I/O's:    12-17-24 @ 07:01  -  12-18-24 @ 07:00  --------------------------------------------------------  IN: 0 mL / OUT: 1400 mL / NET: -1400 mL      Physical Exam: declined full physical exam x2,  GENERAL: lethargic, resting with eyes closed  HEAD: normocephalic, atraumatic  HEENT: normal conjunctiva, oral mucosa moist  PULM: no visible increased WOB  : +naranjo  NEURO: no focal motor or sensory deficits, AAO to person only, answers some questions  MSK: 1+ b/l LE edema  SKIN: well-perfused, extremities warm, no visible rashes    Labs:                        8.9    6.77  )-----------( 211      ( 17 Dec 2024 15:35 )             29.5     12-17    132[L]  |  100  |  7   ----------------------------<  118[H]  3.8   |  24  |  0.59    Ca    8.5      17 Dec 2024 15:35  Phos  2.8     12-17  Mg     1.70     12-17          Radiology/Procedures: Reviewed.

## 2024-12-18 NOTE — PROGRESS NOTE ADULT - PROBLEM SELECTOR PLAN 7
- H/H: 9.4/30.7 on admission   - Hemoglobin on last admission (8.3/27.2)  - Stable   - Monitor for signs and symptoms of bleeding    Plan  - Active T&S  - Transfuse prn (Hgb>8, given Hx of CAD) H/H: 9.4/30.7 on admission. Hemoglobin on last admission (8.3/27.2). Has remained stable throughout admission.    - Trend CBC daily  - Monitor for s/s bleeding  - Active T&S  - Trend Hg  - Transfuse prn (Hgb>8, given Hx of CAD)

## 2024-12-18 NOTE — PROGRESS NOTE ADULT - PROBLEM SELECTOR PLAN 5
- Maintained on Crestor 10mg QHS, Toprol XL 50mg QD, Losartan 25mg QD, Spironolactone 25mg QD  - TTE (10/24): Left ventricular systolic function is moderately to severely decreased with an ejection fraction visually estimated at 35 to 40 %. Global left ventricular hypokinesis. Enlarged right ventricular cavity size, with normal wall thickness, stable pericardial effusion see, consistent CT findings (12/8)  - Per conversation with daughter (12/9), there is concern for decreasing EF from outpt provider Dr. Sol,  - TTE: LV not well visualized. Unable to evaluate the EF. LV normal thickness and size.  - Per cardiology chart note:  moderately to severely decreased with an ejection fraction visually estimated at 35to 40 %. Global left ventricular hypokinesis.   - should follow up with his outpatient cardiologist- Dr Leiva- following discharge. Maintained on Crestor 10mg QHS, Toprol XL 50mg QD, Losartan 25mg QD, Spironolactone 25mg QD  - TTE (10/24): Left ventricular systolic function is moderately to severely decreased with an ejection fraction visually estimated at 35 to 40 %. Global left ventricular hypokinesis. Enlarged right ventricular cavity size, with normal wall thickness, stable pericardial effusion see, consistent CT findings (12/8)  - Per conversation with daughter (12/9), there is concern for decreasing EF from outpt provider Dr. oSl,  - TTE this admission: LV not well visualized. Unable to evaluate the EF. LV normal thickness and size.  - Per cardiology chart note:  moderately to severely decreased with an ejection fraction visually estimated at 35to 40 %. Global left ventricular hypokinesis.   - should follow up with his outpatient cardiologist- Dr Leiva- following discharge.

## 2024-12-18 NOTE — CHART NOTE - NSCHARTNOTEFT_GEN_A_CORE
Mr. Chong is bed confined without the use of a lift and will require a patient lift for transfers between bed and chair or wheelchair or comode.

## 2024-12-19 PROCEDURE — 99232 SBSQ HOSP IP/OBS MODERATE 35: CPT | Mod: GC

## 2024-12-19 NOTE — PROGRESS NOTE ADULT - ASSESSMENT
79M with PMH of CAD s/p stent placement in 2012 ( ASA), A Fib (not on AC due to multiple GI bleeds), known h/o DVT in right leg, HFrEF,  locally advanced rectal cancer s/p chemo/radiation (11/22,consolidation CAPOX (2/2023), brachytherapy (3/2024), hx of  s/p diverting end sigmoid colostomy, cystoscopy and naranjo placement across defect , and recent admission (11/24) for for  Tara gangrene s/p surgical debridement admitted due to c/f bleeding in ostomy bag and sacral ulcer 79M with PMH of CAD s/p stent placement in 2012 ( ASA), A Fib (not on AC due to multiple GI bleeds), known h/o DVT in right leg, HFrEF,  locally advanced rectal cancer s/p chemo/radiation (11/22, consolidation CAPOX (2/2023), brachytherapy (3/2024), hx of  s/p diverting end sigmoid colostomy, cystoscopy and naranjo placement across defect , and recent admission (11/24) for for  Tara gangrene s/p surgical debridement admitted due to c/f bleeding in ostomy bag and sacral ulcer

## 2024-12-19 NOTE — PHYSICAL THERAPY INITIAL EVALUATION ADULT - NSPTDISCHREC_GEN_A_CORE
Pending functional assessment and patient participation in following PT sessions.
Pt is unwilling to participate in PT with multiple attempts multiple days in a row, pt will be taken off PT program. Re consult only if the pt is willing to participate

## 2024-12-19 NOTE — PHYSICAL THERAPY INITIAL EVALUATION ADULT - LEVEL OF INDEPENDENCE: SUPINE/SIT, REHAB EVAL
pt deferred all mobility despite positive encouragement and education on importance of mobility/unable to perform
Patient deferred, despite maximal positive encouragement, reporting he would like to return to sleep. Per documentation, patient has been refusing care for this reason as well. PT to follow.

## 2024-12-19 NOTE — PHYSICAL THERAPY INITIAL EVALUATION ADULT - PERTINENT HX OF CURRENT PROBLEM, REHAB EVAL
Pt is a 79 year old male with a past medical history of CAD s/p stent placement in 2012 ( ASA), A Fib (not on AC due to multiple GI bleeds), known h/o DVT in right leg, HFrEF,  locally advanced rectal cancer s/p chemo/radiation (11/22, consolidation CAPOX (2/2023), brachytherapy (3/2024), hx of  s/p diverting end sigmoid colostomy, cystoscopy and naranjo placement across defect , and recent admission (11/24) for for  Tara gangrene s/p surgical debridement admitted due to c/f bleeding in ostomy bag and sacral ulcer
79 year old male with a PMH of CAD s/p stent placement,  A Fib (not on AC due to GI bleed), known h/o DVT in right leg, HFrEF, locally advanced rectal cancer s/p chemo/radiation, hx of  s/p diverting end sigmoid colostomy, cystoscopy and naranjo placement across defect , and recent admission (11/24) for Tara gangrene s/p surgical debridement coming in for concern for rectal bleeding and sacral wound ulcer. Patient has been previous admitted for rectal bleed with a colonoscopy (10/24) showing diverticulosis, at that time was ruled as a complication of rectal cancer.

## 2024-12-19 NOTE — PROGRESS NOTE ADULT - PROBLEM SELECTOR PLAN 2
-CT A/P: Small amount of bilateral pleural effusions, Small volume pericardial effusion. Pulmonary nodules suspicious of metastasis Midline decubitus ulcer with local subcutaneous inflammation. A few foci of gas in the inflamed superficial soft tissues, nec fas not excluded, no abscess.    - Surg consulted: low concern for nec fascitis, foci of air secondary to open wound  - Wound care following for stage 4 pressure injury and Suprapubic and anterior scrotum chronic surgical wounds; per latest evaluation wound stable; wound care recs appreciated, nutrition recs appreciated  - Upon discharge follow up at outpatient [facility]. [address] [facility]. [phone]. -CT A/P: Small amount of bilateral pleural effusions, Small volume pericardial effusion. Pulmonary nodules suspicious of metastasis Midline decubitus ulcer with local subcutaneous inflammation. A few foci of gas in the inflamed superficial soft tissues, nec fas not excluded, no abscess.    - Surg consulted: low concern for nec fascitis, foci of air secondary to open wound  - Wound care following for stage 4 pressure injury and Suprapubic and anterior scrotum chronic surgical wounds; per latest evaluation wound stable; wound care recs appreciated, nutrition recs appreciated  - Upon discharge follow up at outpatient [facility]. [address] [facility]. [phone]

## 2024-12-19 NOTE — PROGRESS NOTE ADULT - PROBLEM SELECTOR PLAN 7
H/H: 9.4/30.7 on admission. Hemoglobin on last admission (8.3/27.2). Has remained stable throughout admission.    - Trend CBC daily  - Monitor for s/s bleeding  - Active T&S  - Trend Hg  - Transfuse prn (Hgb>8, given Hx of CAD) H/H: 9.4/30.7 on admission. Hemoglobin on last admission (8.3/27.2). Has remained stable throughout admission.    - Trend CBC daily, pt intermittently refuses blood draws  - Monitor for s/s bleeding  - Active T&S  - Transfuse prn (Hgb>8, given Hx of CAD)

## 2024-12-19 NOTE — PHYSICAL THERAPY INITIAL EVALUATION ADULT - MANUAL MUSCLE TESTING RESULTS, REHAB EVAL
Bilateral LE and UE grossly 2-/5
bilateral upper extremities 3+/5, bilateral lower extremities grossly 2+/5

## 2024-12-19 NOTE — PROGRESS NOTE ADULT - SUBJECTIVE AND OBJECTIVE BOX
Patient is a 79y old  Male who presents with a chief complaint of Rectal Bleeding (18 Dec 2024 07:52)      INTERVAL HX:  Pt refused medications in PM, refused vitals and medications in AM. Pt seen and examined at bedside. ROS otherwise negative     Allergies:  No Known Allergies    Medications:  acetaminophen     Tablet .. 650 milliGRAM(s) Oral every 6 hours PRN  ascorbic acid 500 milliGRAM(s) Oral daily  chlorhexidine 2% Cloths 1 Application(s) Topical daily  Dakins Solution - 1/4 Strength 1 Application(s) Topical daily  latanoprost 0.005% Ophthalmic Solution 1 Drop(s) Both EYES at bedtime  levothyroxine 25 MICROGram(s) Oral daily  lidocaine   4% Patch 1 Patch Transdermal daily  losartan 25 milliGRAM(s) Oral daily  metoprolol succinate ER 50 milliGRAM(s) Oral daily  morphine   Solution 2.5 milliGRAM(s) Oral every 4 hours PRN  morphine  - Injectable 1 milliGRAM(s) IV Push every 4 hours PRN  multivitamin  Chewable 1 Tablet(s) Oral daily  nicotine -   7 mG/24Hr(s) Patch 1 Patch Transdermal daily  polyethylene glycol 3350 17 Gram(s) Oral daily  rosuvastatin 10 milliGRAM(s) Oral at bedtime  senna 2 Tablet(s) Oral at bedtime  sodium chloride 2 Gram(s) Oral three times a day  spironolactone 25 milliGRAM(s) Oral daily  timolol 0.5% Solution 1 Drop(s) Both EYES daily    Vitals:  T(C): 36.4 (12-18-24 @ 21:34), Max: 36.6 (12-18-24 @ 15:15)  HR: 79 (12-18-24 @ 21:34) (79 - 84)  BP: 131/81 (12-18-24 @ 21:34) (123/79 - 131/81)  RR: 17 (12-18-24 @ 21:34) (17 - 17)  SpO2: 98% (12-18-24 @ 21:34) (96% - 98%)  I/O's:    Physical Exam:    Labs:                        8.9    6.77  )-----------( 211      ( 17 Dec 2024 15:35 )             29.5     12-17    132[L]  |  100  |  7   ----------------------------<  118[H]  3.8   |  24  |  0.59    Ca    8.5      17 Dec 2024 15:35  Phos  2.8     12-17  Mg     1.70     12-17          Radiology/Procedures: Reviewed.   Patient is a 79y old  Male who presents with a chief complaint of Rectal Bleeding (18 Dec 2024 07:52)      INTERVAL HX:  Pt refused medications in PM, refused vitals and medications in AM, refused AM labs. Pt seen and examined at bedside, eyes closed, answers limited questions, on repeat examination stated that he did not have any pain and denied additional acute concerns.    Allergies:  No Known Allergies    Medications:  acetaminophen     Tablet .. 650 milliGRAM(s) Oral every 6 hours PRN  ascorbic acid 500 milliGRAM(s) Oral daily  chlorhexidine 2% Cloths 1 Application(s) Topical daily  Dakins Solution - 1/4 Strength 1 Application(s) Topical daily  latanoprost 0.005% Ophthalmic Solution 1 Drop(s) Both EYES at bedtime  levothyroxine 25 MICROGram(s) Oral daily  lidocaine   4% Patch 1 Patch Transdermal daily  losartan 25 milliGRAM(s) Oral daily  metoprolol succinate ER 50 milliGRAM(s) Oral daily  morphine   Solution 2.5 milliGRAM(s) Oral every 4 hours PRN  morphine  - Injectable 1 milliGRAM(s) IV Push every 4 hours PRN  multivitamin  Chewable 1 Tablet(s) Oral daily  nicotine -   7 mG/24Hr(s) Patch 1 Patch Transdermal daily  polyethylene glycol 3350 17 Gram(s) Oral daily  rosuvastatin 10 milliGRAM(s) Oral at bedtime  senna 2 Tablet(s) Oral at bedtime  sodium chloride 2 Gram(s) Oral three times a day  spironolactone 25 milliGRAM(s) Oral daily  timolol 0.5% Solution 1 Drop(s) Both EYES daily    Vitals:  T(C): 36.4 (12-18-24 @ 21:34), Max: 36.6 (12-18-24 @ 15:15)  HR: 79 (12-18-24 @ 21:34) (79 - 84)  BP: 131/81 (12-18-24 @ 21:34) (123/79 - 131/81)  RR: 17 (12-18-24 @ 21:34) (17 - 17)  SpO2: 98% (12-18-24 @ 21:34) (96% - 98%)  I/O's:    Physical Exam:  GENERAL: resting in bed, eyes closed  HEAD: normocephalic, atraumatic  HEENT: normal conjunctiva, oral mucosa moist  CARDIAC: regular rate and rhythm, normal S1S2, no appreciable murmurs  PULM: normal breath sounds, clear to ascultation bilaterally, no rales, rhonchi, wheezing  GI: +ostomy, no blood observed in bag. Abd soft, nondistended, nontender, no rebound tenderness, no guarding, no rigidity  : +naranjo  NEURO: no focal motor or sensory deficits, AAO to person  MSK: 1+ b/l LE edema, edematous upper extremities r>l  SKIN: well-perfused, extremities warm, no visible rashes    Labs:                        8.9    6.77  )-----------( 211      ( 17 Dec 2024 15:35 )             29.5     12-17    132[L]  |  100  |  7   ----------------------------<  118[H]  3.8   |  24  |  0.59    Ca    8.5      17 Dec 2024 15:35  Phos  2.8     12-17  Mg     1.70     12-17          Radiology/Procedures: Reviewed.

## 2024-12-19 NOTE — PROGRESS NOTE ADULT - ATTENDING COMMENTS
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

## 2024-12-19 NOTE — PROGRESS NOTE ADULT - PROBLEM SELECTOR PLAN 5
Maintained on Crestor 10mg QHS, Toprol XL 50mg QD, Losartan 25mg QD, Spironolactone 25mg QD  - TTE (10/24): Left ventricular systolic function is moderately to severely decreased with an ejection fraction visually estimated at 35 to 40 %. Global left ventricular hypokinesis. Enlarged right ventricular cavity size, with normal wall thickness, stable pericardial effusion see, consistent CT findings (12/8)  - Per conversation with daughter (12/9), there is concern for decreasing EF from outpt provider Dr. Sol,  - TTE this admission: LV not well visualized. Unable to evaluate the EF. LV normal thickness and size.  - Per cardiology chart note:  moderately to severely decreased with an ejection fraction visually estimated at 35to 40 %. Global left ventricular hypokinesis.   - should follow up with his outpatient cardiologist- Dr Leiva- following discharge.

## 2024-12-19 NOTE — PHYSICAL THERAPY INITIAL EVALUATION ADULT - ADDITIONAL COMMENTS
Patient is limited historian (returning himself to sleep between questions on exam). Per patient, he lives with his son and family, does not use an assistive device to ambulate, and receives assistance with ADLs. Per documentation, patient has private hire Home PT and RN services. Daughter is interested in wheelchair and greg lift.     Patient left semi-supine in NAD, +lines/tubes intact, +bed alarm, +call bell.
Pt is a poor historian. Per EMR pt lives with family in a house with steps to enter. Pt did not use an assistive device and requires assistance with ADLs prior. Pt has private hire Home PT and RN services.  Pt left semi supine in bed in NAD, call bell in reach, bed alarm on and RN Claudia made aware.

## 2024-12-19 NOTE — PROGRESS NOTE ADULT - PROBLEM SELECTOR PLAN 1
Hx of Rectal Cancer, originally followed by Dr. Pae[facility], when options limited, reeval with Dr. Miguel Angel Johnson surg/onc who placed end diverting colostomy. Was to be reevaluated by [facility] for a possible 2nd opinion, however deferred due to admission. Expressed interest in outpt follow up with [facility] which will be re-evaluated on discharge. Per Onc, will need outpt records to be brought for outpt follow up - 12/16 daughter states she will email records    - Previous pain regimens causing excessive drowsiness and possible AMS, palliative consult placed for new pain regimen  - Appreciate palliative recs: liquid morphine 2.5mg q4 PRN for moderate pain, morphine 1mg IV q4 PRN for severe

## 2024-12-19 NOTE — PROGRESS NOTE ADULT - PROBLEM SELECTOR PLAN 9
DVT ppx: SCDs, hold chemical AC i/s/o recurrent GI bleeds  Diet: soft/bite sized, DASH/TLC  Dispo: pending, PT eval re-ordered  GOC: Full Code

## 2024-12-19 NOTE — PROGRESS NOTE ADULT - PROBLEM SELECTOR PLAN 3
Na persistently low 130s    - C/w NaCl tabs 2g TID   - Trend Na daily Na persistently low 130s    - C/w NaCl tabs 2g TID  - Trend Na daily

## 2024-12-19 NOTE — PHYSICAL THERAPY INITIAL EVALUATION ADULT - GENERAL OBSERVATIONS, REHAB EVAL
Patient received semi-supine in NAD, +IV, +bed alarm, +naranjo, agreeable to participate. 99% oxygen saturation on room air. Cleared by DALILA Bonilla.
Chart reviewed and cleared for PT by DALILA Taylor. Pt received semi supine in bed in NAD, HR 70s.

## 2024-12-20 DIAGNOSIS — F43.20 ADJUSTMENT DISORDER, UNSPECIFIED: ICD-10-CM

## 2024-12-20 LAB
ALBUMIN SERPL ELPH-MCNC: 2.5 G/DL — LOW (ref 3.3–5)
ALP SERPL-CCNC: 98 U/L — SIGNIFICANT CHANGE UP (ref 40–120)
ALT FLD-CCNC: <5 U/L — SIGNIFICANT CHANGE UP (ref 4–41)
ANION GAP SERPL CALC-SCNC: 11 MMOL/L — SIGNIFICANT CHANGE UP (ref 7–14)
AST SERPL-CCNC: 19 U/L — SIGNIFICANT CHANGE UP (ref 4–40)
BILIRUB SERPL-MCNC: 0.4 MG/DL — SIGNIFICANT CHANGE UP (ref 0.2–1.2)
BUN SERPL-MCNC: 9 MG/DL — SIGNIFICANT CHANGE UP (ref 7–23)
CALCIUM SERPL-MCNC: 8.9 MG/DL — SIGNIFICANT CHANGE UP (ref 8.4–10.5)
CHLORIDE SERPL-SCNC: 98 MMOL/L — SIGNIFICANT CHANGE UP (ref 98–107)
CO2 SERPL-SCNC: 25 MMOL/L — SIGNIFICANT CHANGE UP (ref 22–31)
CREAT SERPL-MCNC: 0.52 MG/DL — SIGNIFICANT CHANGE UP (ref 0.5–1.3)
EGFR: 103 ML/MIN/1.73M2 — SIGNIFICANT CHANGE UP
FOLATE SERPL-MCNC: 6 NG/ML — SIGNIFICANT CHANGE UP (ref 3.1–17.5)
GLUCOSE SERPL-MCNC: 114 MG/DL — HIGH (ref 70–99)
HCT VFR BLD CALC: 29.5 % — LOW (ref 39–50)
HGB BLD-MCNC: 9 G/DL — LOW (ref 13–17)
MAGNESIUM SERPL-MCNC: 1.8 MG/DL — SIGNIFICANT CHANGE UP (ref 1.6–2.6)
MCHC RBC-ENTMCNC: 25 PG — LOW (ref 27–34)
MCHC RBC-ENTMCNC: 30.5 G/DL — LOW (ref 32–36)
MCV RBC AUTO: 81.9 FL — SIGNIFICANT CHANGE UP (ref 80–100)
NRBC # BLD: 0 /100 WBCS — SIGNIFICANT CHANGE UP (ref 0–0)
NRBC # FLD: 0 K/UL — SIGNIFICANT CHANGE UP (ref 0–0)
PHOSPHATE SERPL-MCNC: 3.1 MG/DL — SIGNIFICANT CHANGE UP (ref 2.5–4.5)
PLATELET # BLD AUTO: 217 K/UL — SIGNIFICANT CHANGE UP (ref 150–400)
POTASSIUM SERPL-MCNC: 4.2 MMOL/L — SIGNIFICANT CHANGE UP (ref 3.5–5.3)
POTASSIUM SERPL-SCNC: 4.2 MMOL/L — SIGNIFICANT CHANGE UP (ref 3.5–5.3)
PROT SERPL-MCNC: 6 G/DL — SIGNIFICANT CHANGE UP (ref 6–8.3)
RBC # BLD: 3.6 M/UL — LOW (ref 4.2–5.8)
RBC # FLD: 17 % — HIGH (ref 10.3–14.5)
SODIUM SERPL-SCNC: 134 MMOL/L — LOW (ref 135–145)
VIT B12 SERPL-MCNC: 645 PG/ML — SIGNIFICANT CHANGE UP (ref 200–900)
WBC # BLD: 6.39 K/UL — SIGNIFICANT CHANGE UP (ref 3.8–10.5)
WBC # FLD AUTO: 6.39 K/UL — SIGNIFICANT CHANGE UP (ref 3.8–10.5)

## 2024-12-20 PROCEDURE — 99232 SBSQ HOSP IP/OBS MODERATE 35: CPT | Mod: GC

## 2024-12-20 PROCEDURE — 90792 PSYCH DIAG EVAL W/MED SRVCS: CPT

## 2024-12-20 RX ORDER — B COMPLEX, C NO.20/FOLIC ACID 1 MG
1 CAPSULE ORAL DAILY
Refills: 0 | Status: DISCONTINUED | OUTPATIENT
Start: 2024-12-20 | End: 2024-12-24

## 2024-12-20 RX ADMIN — Medication 1 MILLIGRAM(S): at 01:40

## 2024-12-20 RX ADMIN — SODIUM CHLORIDE 2 GRAM(S): 9 INJECTION, SOLUTION INTRAMUSCULAR; INTRAVENOUS; SUBCUTANEOUS at 00:48

## 2024-12-20 RX ADMIN — SODIUM CHLORIDE 2 GRAM(S): 9 INJECTION, SOLUTION INTRAMUSCULAR; INTRAVENOUS; SUBCUTANEOUS at 22:07

## 2024-12-20 RX ADMIN — CHLORHEXIDINE GLUCONATE 1 APPLICATION(S): 1.2 RINSE ORAL at 13:36

## 2024-12-20 RX ADMIN — NICOTINE POLACRILEX 1 PATCH: 4 LOZENGE ORAL at 13:25

## 2024-12-20 RX ADMIN — Medication 1 TABLET(S): at 17:16

## 2024-12-20 RX ADMIN — Medication 50 MILLIGRAM(S): at 06:59

## 2024-12-20 RX ADMIN — LATANOPROST 1 DROP(S): 50 SOLUTION OPHTHALMIC at 00:48

## 2024-12-20 RX ADMIN — Medication 500 MILLIGRAM(S): at 13:29

## 2024-12-20 RX ADMIN — LIDOCAINE 1 PATCH: 50 OINTMENT TOPICAL at 13:28

## 2024-12-20 RX ADMIN — LIDOCAINE 1 PATCH: 50 OINTMENT TOPICAL at 18:22

## 2024-12-20 RX ADMIN — Medication 17 GRAM(S): at 13:26

## 2024-12-20 RX ADMIN — LOSARTAN POTASSIUM 25 MILLIGRAM(S): 100 TABLET, FILM COATED ORAL at 07:12

## 2024-12-20 RX ADMIN — SENNOSIDES 2 TABLET(S): 8.6 TABLET, FILM COATED ORAL at 22:08

## 2024-12-20 RX ADMIN — SENNOSIDES 2 TABLET(S): 8.6 TABLET, FILM COATED ORAL at 00:48

## 2024-12-20 RX ADMIN — Medication 1 DROP(S): at 13:24

## 2024-12-20 RX ADMIN — ROSUVASTATIN 10 MILLIGRAM(S): 40 TABLET, FILM COATED ORAL at 22:08

## 2024-12-20 RX ADMIN — SPIRONOLACTONE 25 MILLIGRAM(S): 50 TABLET ORAL at 06:59

## 2024-12-20 RX ADMIN — NICOTINE POLACRILEX 1 PATCH: 4 LOZENGE ORAL at 18:23

## 2024-12-20 RX ADMIN — ROSUVASTATIN 10 MILLIGRAM(S): 40 TABLET, FILM COATED ORAL at 00:48

## 2024-12-20 RX ADMIN — SODIUM CHLORIDE 2 GRAM(S): 9 INJECTION, SOLUTION INTRAMUSCULAR; INTRAVENOUS; SUBCUTANEOUS at 06:59

## 2024-12-20 RX ADMIN — SODIUM CHLORIDE 2 GRAM(S): 9 INJECTION, SOLUTION INTRAMUSCULAR; INTRAVENOUS; SUBCUTANEOUS at 13:29

## 2024-12-20 RX ADMIN — Medication 1 APPLICATION(S): at 13:26

## 2024-12-20 RX ADMIN — Medication 1 MILLIGRAM(S): at 00:49

## 2024-12-20 RX ADMIN — LEVOTHYROXINE SODIUM 25 MICROGRAM(S): 175 TABLET ORAL at 06:59

## 2024-12-20 RX ADMIN — LATANOPROST 1 DROP(S): 50 SOLUTION OPHTHALMIC at 22:07

## 2024-12-20 NOTE — BH CONSULTATION LIAISON ASSESSMENT NOTE - OTHER
coherent mostly kept eyes closed during eval, occasionally opened eyes on request superficially cooperative cachectic

## 2024-12-20 NOTE — BH CONSULTATION LIAISON ASSESSMENT NOTE - NSBHCONSULTFOLLOWAFTERCARE_PSY_A_CORE FT
can connect to out care as follows    [facility] walk-in/crisis center ([phone])  [facility] n[facility]en [facility] ([phone])

## 2024-12-20 NOTE — BH CONSULTATION LIAISON ASSESSMENT NOTE - CURRENT MEDICATION
MEDICATIONS  (STANDING):  ascorbic acid 500 milliGRAM(s) Oral daily  chlorhexidine 2% Cloths 1 Application(s) Topical daily  Dakins Solution - 1/4 Strength 1 Application(s) Topical daily  latanoprost 0.005% Ophthalmic Solution 1 Drop(s) Both EYES at bedtime  levothyroxine 25 MICROGram(s) Oral daily  lidocaine   4% Patch 1 Patch Transdermal daily  losartan 25 milliGRAM(s) Oral daily  metoprolol succinate ER 50 milliGRAM(s) Oral daily  multivitamin  Chewable 1 Tablet(s) Oral daily  nicotine -   7 mG/24Hr(s) Patch 1 Patch Transdermal daily  polyethylene glycol 3350 17 Gram(s) Oral daily  rosuvastatin 10 milliGRAM(s) Oral at bedtime  senna 2 Tablet(s) Oral at bedtime  sodium chloride 2 Gram(s) Oral three times a day  spironolactone 25 milliGRAM(s) Oral daily  timolol 0.5% Solution 1 Drop(s) Both EYES daily    MEDICATIONS  (PRN):  acetaminophen     Tablet .. 650 milliGRAM(s) Oral every 6 hours PRN Mild Pain (1 - 3)  morphine   Solution 2.5 milliGRAM(s) Oral every 4 hours PRN Moderate Pain (4 - 6)  morphine  - Injectable 1 milliGRAM(s) IV Push every 4 hours PRN Severe Pain (7 - 10)

## 2024-12-20 NOTE — PROGRESS NOTE ADULT - PROBLEM SELECTOR PLAN 7
H/H: 9.4/30.7 on admission. Hemoglobin on last admission (8.3/27.2). Has remained stable throughout admission.    - Trend CBC daily, pt intermittently refuses blood draws  - Monitor for s/s bleeding  - Active T&S  - Transfuse prn (Hgb>8, given Hx of CAD)

## 2024-12-20 NOTE — BH CONSULTATION LIAISON ASSESSMENT NOTE - NSBHATTESTCOMMENTATTENDFT_PSY_A_CORE
Met with the patient along with fellow md impression and plan discussed and agreed upon  Rather lethargic today, and did not appear to be intentional in this. Cachectic, hypophonic, and tired. Concern for delirium  Agree with plan as above  Will f/u on monday

## 2024-12-20 NOTE — BH CONSULTATION LIAISON ASSESSMENT NOTE - HPI (INCLUDE ILLNESS QUALITY, SEVERITY, DURATION, TIMING, CONTEXT, MODIFYING FACTORS, ASSOCIATED SIGNS AND SYMPTOMS)
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 Psych consulted for insomnia and anxiety.    On initial exam pt was extremely drowsy and was unable to fully participate in conversation. Oriented to person, but fell asleep during interview and unable to arouse so could not fully assess patient.   Per chart review, pt was confused this morning and did not allow for abd exam, has history of refusing labs/wound care occasionally.        Collateral obtained from daughter Tiffanie. 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 Per primary team not candidate for DMT. Pt being followed by palliative, full code. Psychiatry consulted for depression per daughter's request.    Limited eval d/t mentation. Pt found lying in bed appearing to be sleeping, very difficult to rouse and intermittently falling asleep during evaluation, responding largely w/ single-word answers. Pt initially stated he didn't want to speak and requested team to return tomorrow, but then agreed to eval when informed his daughter had requested it. Per primary team pt more interactive and agreeable when daughter is on the phone but pt refused to let writer call his daughter. States 'yes' when asked if he's depressed and if it could be related to his medical condition. Notably when asked what depression means, pt states 'you would know that better than me'. States his appetite and sleep are poor. Denies experiencing ESTEFANY/I/P. States it's 'possible' that medication could help him. Oriented to self, 'hospital', and year only (states month is March). Appears to trail off and fall asleep while providing the year. 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    Collateral obtained from daughter Tiffanie. She states that pt's culture and personality are such that he does not believe in mental illness/mental health. He has never seen a psychiatrist/therapist, has no psychiatric dx, no h/o suicidality/violence, and has never been psychiatrically admitted or on medication other than antipsychotics when hospitalized recently. When informed that pt appeared very lethargic/sedated, she states pt's personality is such that he will often 'play dead' and 'pretend to be asleep and ignore you', or if particularly provoked, will refuse to engage or lash out. She notes pt was ambulating w/ a cane as of August and now is bedbound. Despite his physical illness and recent decompensation she feels pt has remained largely cognitively intact. 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 He retired early to care for his wife when she was dx w/ breast ca; she eventually  5yr ago after developing a ?neurotoxic AE to chemo. Pt's sister/best friend also  last year, and since then pt himself has been going through cancer, so she feels pt has been very depressed hence periodically refusing his care. She states pt 'has a desire to live and get better, but his actions are opposite to that' and she feels this is d/t depression. She notes pt's cancer experience including this hospitalization has been very 'traumatic' for him, on top of the trauma of losing his wife and sister. Further pt has felt 'ashamed' of having cancer, needing help and being hospitalized, and losing his independence. Also notes pt loves his family especially his grandchildren. She advocated for antidepressant tx and does not want pt to receive neuroleptics/antipsychotics, stating pt has h/o becoming very agitated and more delusional w/ seroquel, which resolved w/ haldol. Pt was only ever a social drinker but remains an active smoker 1ppd; she states pt was smoking right up to when the ambulance came PTA.

## 2024-12-20 NOTE — BH CONSULTATION LIAISON ASSESSMENT NOTE - RISK ASSESSMENT
Unable to fully assess  Risk factors include age, sex, medical illness, poor coping, recent losses. Protective factors include lack of h/o suicidality/violence, denial of ESTEFANY/I/P, stable housing, supportive family, supervised clinical setting.

## 2024-12-20 NOTE — BH CONSULTATION LIAISON ASSESSMENT NOTE - SUMMARY
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 Psych consulted for insomnia and anxiety.    10/23: Pt was lethargic and fell asleep at beginning of interview. Was not given any PRN medication and does not have past psychiatric history. VS stable. Per chart, pt was confused this morning and refused abdominal exam, although has history of refusing labs/wound care/physical exams during current admission to hospital.     Plan:   - Routine observation  - No standing psychiatric medications needed  - Will f/u tomorrow AM (10/24) to reassess pt  78yo  Turkmen male domiciled w/ son and his family (also has adult daughter who is HCP), no PPHx, and PMHx of CAD s/p stents in 2012, AF not on AC d/t GIB, known h/o DVT in right leg, HFrEF, locally advanced rectal cancer s/p neoadjuvant therapy with chemoRT (completed 11/2022), consolidation CAPOX (2/2023), brachytherapy (3/2024), s/p diverting end sigmoid colostomy, cystoscopy and naranjo placement across defect, and recent admission (11/24) for Tara gangrene s/p surgical debridement, p/w bleeding in ostomy bag and sacral wound ulcer. Per primary team not candidate for DMT. Pt being followed by palliative, full code. Psychiatry consulted for depression per daughter's request.    Limited eval d/t mentation. Pt found lying in bed appearing to be sleeping, very difficult to rouse and intermittently falling asleep during evaluation, responding largely w/ single-word answers. Pt admits to being depressed w/ poor appetite/sleep. Denies ESTEFANY/I/P. Otherwise oriented to self, location, year. Per chart pt only received morphine last night, unclear why pt appeared so lethargic today, though pt's daughter thinks pt was pretending to be asleep and trying not to engage much. Daughter expressed concern that pt is depressed and advocated for antidepressant tx. She is against pt receiving any antipsychotics/neuroleptics.    Per collateral pt w/ persistent grief not only related to losses of his wife and sister but also loss of his autonomy and his identity. Pt may benefit from SSRI trial for adjustment reaction vs depression, however given his current apparent mentation would not start anything immediately. Will reassess Monday.    Remainder of recs as below.  Discussed w/ Dr. Siegel.      PLAN  -defer obs to primary team, no indication for psych 1:1  -defer standing psych meds  -please AVOID antipsychotics in this pt per daughter/hcp request  -no indication for psych admission 78yo  Yak[place] male domiciled w/ son and his family (also has adult daughter who is HCP), no PPHx, and PMHx of CAD s/p stents in 2012, AF not on AC d/t GIB, known h/o DVT in right leg, HFrEF, locally advanced rectal cancer s/p neoadjuvant therapy with chemoRT (completed 11/2022), consolidation CAPOX (2/2023), brachytherapy (3/2024), s/p diverting end sigmoid colostomy, cystoscopy and naranjo placement across defect, and recent admission (11/24) for Tara gangrene s/p surgical debridement, p/w bleeding in ostomy bag and sacral wound ulcer. Per primary team not candidate for DMT. Pt being followed by palliative, full code. Psychiatry consulted for depression per daughter's request.    Limited eval d/t mentation. Pt found lying in bed appearing to be sleeping, very difficult to rouse and intermittently falling asleep during evaluation, responding largely w/ single-word answers. Pt admits to being depressed w/ poor appetite/sleep. Denies ESTEFANY/I/P. Otherwise oriented to self, location, year. Per chart pt only received morphine last night, unclear why pt appeared so lethargic today, though pt's daughter thinks pt was pretending to be asleep and trying not to engage much. Daughter expressed concern that pt is depressed and advocated for antidepressant tx. She is against pt receiving any antipsychotics/neuroleptics.    Per collateral pt w/ persistent grief not only related to losses of his wife and sister but also loss of his autonomy and his identity. Pt may benefit from SSRI trial for adjustment reaction vs depression, however given his current apparent mentation (suspicion for delirium) would not start anything immediately. Will reassess Monday.    Remainder of recs as below.  Discussed w/ Dr. Siegel.      PLAN  -defer obs to primary team, no indication for psych 1:1  -defer standing psych meds  -please AVOID antipsychotics in this pt per daughter/hcp request  -no indication for psych admission

## 2024-12-20 NOTE — PROGRESS NOTE ADULT - SUBJECTIVE AND OBJECTIVE BOX
Patient is a 79y old  Male who presents with a chief complaint of Rectal Bleeding (19 Dec 2024 07:34)      INTERVAL HX:  No acute overnight events. Pt seen and examined at bedside. ROS otherwise negative   Allergies:  No Known Allergies    Medications:  acetaminophen     Tablet .. 650 milliGRAM(s) Oral every 6 hours PRN  ascorbic acid 500 milliGRAM(s) Oral daily  chlorhexidine 2% Cloths 1 Application(s) Topical daily  Dakins Solution - 1/4 Strength 1 Application(s) Topical daily  latanoprost 0.005% Ophthalmic Solution 1 Drop(s) Both EYES at bedtime  levothyroxine 25 MICROGram(s) Oral daily  lidocaine   4% Patch 1 Patch Transdermal daily  losartan 25 milliGRAM(s) Oral daily  metoprolol succinate ER 50 milliGRAM(s) Oral daily  morphine   Solution 2.5 milliGRAM(s) Oral every 4 hours PRN  morphine  - Injectable 1 milliGRAM(s) IV Push every 4 hours PRN  multivitamin  Chewable 1 Tablet(s) Oral daily  nicotine -   7 mG/24Hr(s) Patch 1 Patch Transdermal daily  polyethylene glycol 3350 17 Gram(s) Oral daily  rosuvastatin 10 milliGRAM(s) Oral at bedtime  senna 2 Tablet(s) Oral at bedtime  sodium chloride 2 Gram(s) Oral three times a day  spironolactone 25 milliGRAM(s) Oral daily  timolol 0.5% Solution 1 Drop(s) Both EYES daily    Vitals:  T(C): 36.5 (12-19-24 @ 21:30), Max: 36.7 (12-19-24 @ 14:56)  HR: 93 (12-19-24 @ 21:30) (78 - 93)  BP: 116/83 (12-20-24 @ 00:45) (111/78 - 130/85)  RR: 17 (12-20-24 @ 00:45) (17 - 18)  SpO2: 100% (12-20-24 @ 00:45) (99% - 100%)  I/O's:    12-19-24 @ 07:01  -  12-20-24 @ 07:00  --------------------------------------------------------  IN: 0 mL / OUT: 1600 mL / NET: -1600 mL      Physical Exam:  GENERAL: resting in bed, eyes closed  HEAD: normocephalic, atraumatic  HEENT: normal conjunctiva, oral mucosa moist  CARDIAC: regular rate and rhythm, normal S1S2, no appreciable murmurs  PULM: normal breath sounds, clear to ascultation bilaterally, no rales, rhonchi, wheezing  GI: +ostomy, no blood observed in bag. Abd soft, nondistended, nontender, no rebound tenderness, no guarding, no rigidity  : +naranjo  NEURO: no focal motor or sensory deficits, AAO to person  MSK: 1+ b/l LE edema, edematous upper extremities r>l  SKIN: well-perfused, extremities warm, no visible rashes    Labs:              Radiology/Procedures: Reviewed.   Patient is a 79y old  Male who presents with a chief complaint of Rectal Bleeding (19 Dec 2024 07:34)      INTERVAL HX:  Refused PM wound care yesterday. Pt seen and examined at bedside with daughter Tiffanie on phone, discussed plan of care, pt stated that he is willing to take medications, wound care today. No acute medical complaints elicited    Allergies:  No Known Allergies    Medications:  acetaminophen     Tablet .. 650 milliGRAM(s) Oral every 6 hours PRN  ascorbic acid 500 milliGRAM(s) Oral daily  chlorhexidine 2% Cloths 1 Application(s) Topical daily  Dakins Solution - 1/4 Strength 1 Application(s) Topical daily  latanoprost 0.005% Ophthalmic Solution 1 Drop(s) Both EYES at bedtime  levothyroxine 25 MICROGram(s) Oral daily  lidocaine   4% Patch 1 Patch Transdermal daily  losartan 25 milliGRAM(s) Oral daily  metoprolol succinate ER 50 milliGRAM(s) Oral daily  morphine   Solution 2.5 milliGRAM(s) Oral every 4 hours PRN  morphine  - Injectable 1 milliGRAM(s) IV Push every 4 hours PRN  multivitamin  Chewable 1 Tablet(s) Oral daily  nicotine -   7 mG/24Hr(s) Patch 1 Patch Transdermal daily  polyethylene glycol 3350 17 Gram(s) Oral daily  rosuvastatin 10 milliGRAM(s) Oral at bedtime  senna 2 Tablet(s) Oral at bedtime  sodium chloride 2 Gram(s) Oral three times a day  spironolactone 25 milliGRAM(s) Oral daily  timolol 0.5% Solution 1 Drop(s) Both EYES daily    Vitals:  T(C): 36.5 (12-19-24 @ 21:30), Max: 36.7 (12-19-24 @ 14:56)  HR: 93 (12-19-24 @ 21:30) (78 - 93)  BP: 116/83 (12-20-24 @ 00:45) (111/78 - 130/85)  RR: 17 (12-20-24 @ 00:45) (17 - 18)  SpO2: 100% (12-20-24 @ 00:45) (99% - 100%)  I/O's:    12-19-24 @ 07:01  -  12-20-24 @ 07:00  --------------------------------------------------------  IN: 0 mL / OUT: 1600 mL / NET: -1600 mL      Physical Exam:  GENERAL: lying in bed, NAD  HEAD: normocephalic, atraumatic  HEENT: normal conjunctiva, oral mucosa moist  CARDIAC: regular rate and rhythm, normal S1S2, no appreciable murmurs  PULM: normal breath sounds, clear to ascultation bilaterally, no rales, rhonchi, wheezing  GI: +ostomy, no blood observed in bag. Abd soft, nondistended, nontender, no rebound tenderness, no guarding, no rigidity  : +naranjo  NEURO: no focal motor or sensory deficits, AAO to person, place, event  MSK: 1+ b/l LE edema, edematous upper extremities r>l  SKIN: well-perfused, extremities warm, no visible rashes    Labs: no interval labs      Radiology/Procedures: Reviewed.

## 2024-12-20 NOTE — PROGRESS NOTE ADULT - ASSESSMENT
79M with PMH of CAD s/p stent placement in 2012 ( ASA), A Fib (not on AC due to multiple GI bleeds), known h/o DVT in right leg, HFrEF,  locally advanced rectal cancer s/p chemo/radiation (11/22, consolidation CAPOX (2/2023), brachytherapy (3/2024), hx of  s/p diverting end sigmoid colostomy, cystoscopy and naranjo placement across defect , and recent admission (11/24) for for  Tara gangrene s/p surgical debridement admitted due to c/f bleeding in ostomy bag and sacral ulcer

## 2024-12-20 NOTE — PROGRESS NOTE ADULT - PROBLEM SELECTOR PLAN 5
Maintained on Crestor 10mg QHS, Toprol XL 50mg QD, Losartan 25mg QD, Spironolactone 25mg QD  - TTE (10/24): Left ventricular systolic function is moderately to severely decreased with an ejection fraction visually estimated at 35 to 40 %. Global left ventricular hypokinesis. Enlarged right ventricular cavity size, with normal wall thickness, stable pericardial effusion see, consistent CT findings (12/8)  - Per conversation with daughter (12/9), there is concern for decreasing EF from outpt provider Dr. Sol,  - TTE this admission: LV not well visualized. Unable to evaluate the EF. LV normal thickness and size.  - Per cardiology chart note:  moderately to severely decreased with an ejection fraction visually estimated at 35to 40 %. Global left ventricular hypokinesis.   - should follow up with his outpatient cardiologist- Dr Leiva- following discharge. Maintained on Crestor 10mg QHS, Toprol XL 50mg QD, Losartan 25mg QD, Spironolactone 25mg QD    - TTE (10/24): Left ventricular systolic function is moderately to severely decreased with an ejection fraction visually estimated at 35 to 40 %. Global left ventricular hypokinesis. Enlarged right ventricular cavity size, with normal wall thickness, stable pericardial effusion see, consistent CT findings (12/8)  - Per conversation with daughter (12/9), there is concern for decreasing EF from outpt provider Dr. Sol,  - TTE this admission: LV not well visualized. Unable to evaluate the EF. LV normal thickness and size.  - Per cardiology chart note:  moderately to severely decreased with an ejection fraction visually estimated at 35to 40 %. Global left ventricular hypokinesis.   - should follow up with his outpatient cardiologist- Dr Leiva- following discharge.

## 2024-12-20 NOTE — PROGRESS NOTE ADULT - PROBLEM SELECTOR PLAN 1
Hx of Rectal Cancer, originally followed by Dr. Paez [facility], when options limited, reeval with Dr. Miguel Angel Johnson surg/onc who placed end diverting colostomy. Was to be reevaluated by [facility] for a possible 2nd opinion, however deferred due to admission. Expressed interest in outpt follow up with [facility] which will be re-evaluated on discharge. Per Onc, will need outpt records to be brought for outpt follow up - 12/16 daughter states she will email records    - Previous pain regimens causing excessive drowsiness and possible AMS, palliative consult placed for new pain regimen  - Appreciate palliative recs: liquid morphine 2.5mg q4 PRN for moderate pain, morphine 1mg IV q4 PRN for severe Hx of Rectal Cancer, originally followed by Dr. Paez [facility], when options limited, reeval with Dr. Miguel Angel Johnson surg/onc who placed end diverting colostomy. Was to be reevaluated by [facility] for a possible 2nd opinion, however deferred due to admission. Expressed interest in outpt follow up with [facility] which will be re-evaluated on discharge. Per Onc, will need outpt records to be brought for outpt follow up - daughter states she will email records    - Previous pain regimens causing excessive drowsiness and possible AMS, palliative consult placed for new pain regimen  - Appreciate palliative recs: liquid morphine 2.5mg q4 PRN for moderate pain, morphine 1mg IV q4 PRN for severe  - Pt intermittently refusing medications, lab draws, wound care, PT, vitals; family concerned for depression, psychiatry consulted for depression i/s/o advanced illness, will f/u recommendations

## 2024-12-20 NOTE — BH CONSULTATION LIAISON ASSESSMENT NOTE - NSBHCHARTREVIEWLAB_PSY_A_CORE FT
9.0    6.39  )-----------( 217      ( 20 Dec 2024 14:15 )             29.5       12-20    134[L]  |  98  |  9   ----------------------------<  114[H]  4.2   |  25  |  0.52    Ca    8.9      20 Dec 2024 14:15  Phos  3.1     12-20  Mg     1.80     12-20    TPro  6.0  /  Alb  2.5[L]  /  TBili  0.4  /  DBili  x   /  AST  19  /  ALT  <5  /  AlkPhos  98  12-20          Urinalysis Basic - ( 20 Dec 2024 14:15 )    Color: x / Appearance: x / SG: x / pH: x  Gluc: 114 mg/dL / Ketone: x  / Bili: x / Urobili: x   Blood: x / Protein: x / Nitrite: x   Leuk Esterase: x / RBC: x / WBC x   Sq Epi: x / Non Sq Epi: x / Bacteria: x        CAPILLARY BLOOD GLUCOSE

## 2024-12-20 NOTE — BH CONSULTATION LIAISON ASSESSMENT NOTE - NSACTIVEVENT_PSY_ALL_CORE
Yoana Hodgson RN Triggering events leading to humiliation, shame, and/or despair (e.g., Loss of relationship, financial or health status) (real or anticipated)/Chronic pain or other acute medical condition/Perceived burden on family or others

## 2024-12-20 NOTE — PROGRESS NOTE ADULT - ATTENDING COMMENTS
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 vs NH 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

## 2024-12-20 NOTE — PROGRESS NOTE ADULT - PROBLEM SELECTOR PLAN 2
-CT A/P: Small amount of bilateral pleural effusions, Small volume pericardial effusion. Pulmonary nodules suspicious of metastasis Midline decubitus ulcer with local subcutaneous inflammation. A few foci of gas in the inflamed superficial soft tissues, nec fas not excluded, no abscess.    - Surg consulted: low concern for nec fascitis, foci of air secondary to open wound  - Wound care following for stage 4 pressure injury and Suprapubic and anterior scrotum chronic surgical wounds; per latest evaluation wound stable; wound care recs appreciated, nutrition recs appreciated  - Upon discharge follow up at outpatient [facility]. [address] [address]. [phone]

## 2024-12-20 NOTE — BH CONSULTATION LIAISON ASSESSMENT NOTE - NSBHCHARTREVIEWVS_PSY_A_CORE FT
Vital Signs Last 24 Hrs  T(C): 36.5 (19 Dec 2024 21:30), Max: 36.7 (19 Dec 2024 14:56)  T(F): 97.7 (19 Dec 2024 21:30), Max: 98 (19 Dec 2024 14:56)  HR: 93 (19 Dec 2024 21:30) (78 - 93)  BP: 116/83 (20 Dec 2024 00:45) (111/78 - 128/82)  BP(mean): --  RR: 17 (20 Dec 2024 00:45) (17 - 18)  SpO2: 100% (20 Dec 2024 00:45) (99% - 100%)    Parameters below as of 20 Dec 2024 00:45  Patient On (Oxygen Delivery Method): room air

## 2024-12-21 PROCEDURE — 99232 SBSQ HOSP IP/OBS MODERATE 35: CPT

## 2024-12-21 RX ADMIN — Medication 50 MILLIGRAM(S): at 05:07

## 2024-12-21 RX ADMIN — Medication 1 MILLIGRAM(S): at 22:41

## 2024-12-21 RX ADMIN — SENNOSIDES 2 TABLET(S): 8.6 TABLET, FILM COATED ORAL at 22:29

## 2024-12-21 RX ADMIN — SODIUM CHLORIDE 2 GRAM(S): 9 INJECTION, SOLUTION INTRAMUSCULAR; INTRAVENOUS; SUBCUTANEOUS at 05:06

## 2024-12-21 RX ADMIN — SODIUM CHLORIDE 2 GRAM(S): 9 INJECTION, SOLUTION INTRAMUSCULAR; INTRAVENOUS; SUBCUTANEOUS at 22:29

## 2024-12-21 RX ADMIN — Medication 1 DROP(S): at 11:54

## 2024-12-21 RX ADMIN — LEVOTHYROXINE SODIUM 25 MICROGRAM(S): 175 TABLET ORAL at 05:07

## 2024-12-21 RX ADMIN — Medication 1 MILLIGRAM(S): at 23:41

## 2024-12-21 RX ADMIN — LIDOCAINE 1 PATCH: 50 OINTMENT TOPICAL at 23:27

## 2024-12-21 RX ADMIN — NICOTINE POLACRILEX 1 PATCH: 4 LOZENGE ORAL at 07:07

## 2024-12-21 RX ADMIN — ROSUVASTATIN 10 MILLIGRAM(S): 40 TABLET, FILM COATED ORAL at 22:29

## 2024-12-21 RX ADMIN — Medication 500 MILLIGRAM(S): at 11:53

## 2024-12-21 RX ADMIN — SPIRONOLACTONE 25 MILLIGRAM(S): 50 TABLET ORAL at 05:07

## 2024-12-21 RX ADMIN — LATANOPROST 1 DROP(S): 50 SOLUTION OPHTHALMIC at 22:28

## 2024-12-21 RX ADMIN — Medication 1 MILLIGRAM(S): at 13:16

## 2024-12-21 RX ADMIN — Medication 1 TABLET(S): at 11:53

## 2024-12-21 RX ADMIN — LIDOCAINE 1 PATCH: 50 OINTMENT TOPICAL at 01:34

## 2024-12-21 RX ADMIN — Medication 1 APPLICATION(S): at 11:55

## 2024-12-21 RX ADMIN — NICOTINE POLACRILEX 1 PATCH: 4 LOZENGE ORAL at 11:54

## 2024-12-21 RX ADMIN — LOSARTAN POTASSIUM 25 MILLIGRAM(S): 100 TABLET, FILM COATED ORAL at 05:06

## 2024-12-21 RX ADMIN — Medication 1 MILLIGRAM(S): at 12:16

## 2024-12-21 RX ADMIN — LIDOCAINE 1 PATCH: 50 OINTMENT TOPICAL at 20:00

## 2024-12-21 RX ADMIN — LIDOCAINE 1 PATCH: 50 OINTMENT TOPICAL at 11:54

## 2024-12-21 RX ADMIN — CHLORHEXIDINE GLUCONATE 1 APPLICATION(S): 1.2 RINSE ORAL at 11:55

## 2024-12-21 RX ADMIN — SODIUM CHLORIDE 2 GRAM(S): 9 INJECTION, SOLUTION INTRAMUSCULAR; INTRAVENOUS; SUBCUTANEOUS at 14:56

## 2024-12-21 RX ADMIN — Medication 17 GRAM(S): at 11:53

## 2024-12-21 NOTE — PROGRESS NOTE ADULT - PROBLEM SELECTOR PLAN 1
Hx of Rectal Cancer, originally followed by Dr. Paez [facility] [place], when options limited, reeval with Dr. Miguel Angel Johnson surg/onc who placed end diverting colostomy. Was to be reevaluated by [facility] for a possible 2nd opinion, however deferred due to admission. Expressed interest in outpt follow up with [facility] which will be re-evaluated on discharge. Per Onc, will need outpt records to be brought for outpt follow up - daughter states she will email records    - Previous pain regimens causing excessive drowsiness and possible AMS, palliative consult placed for new pain regimen  - Appreciate palliative recs: liquid morphine 2.5mg q4 PRN for moderate pain, morphine 1mg IV q4 PRN for severe  - Pt intermittently refusing medications, lab draws, wound care, PT, vitals; family concerned for depression, psychiatry consulted for depression i/s/o advanced illness, will f/u recommendations Hx of Rectal Cancer, originally followed by Dr. Paez [facility] [place], when options limited, reeval with Dr. Miguel Angel Johnson surg/onc who placed end diverting colostomy. Was to be reevaluated by [facility] for a possible 2nd opinion, however deferred due to admission. Expressed interest in outpt follow up with [facility] which will be re-evaluated on discharge. Per Onc, will need outpt records to be brought for outpt follow up - daughter states she will email records    - Previous pain regimens causing excessive drowsiness and possible AMS, palliative consult placed for new pain regimen  - Appreciate palliative recs: liquid morphine 2.5mg q4 PRN for moderate pain, morphine 1mg IV q4 PRN for severe  - Pt intermittently refusing medications, lab draws, wound care, PT, vitals; family concerned for depression, psychiatry consulted for depression i/s/o advanced illness -- appreciate psychiatry ramón, psych to f/u on Monday

## 2024-12-21 NOTE — PROGRESS NOTE ADULT - SUBJECTIVE AND OBJECTIVE BOX
Patient is a 79y old  Male who presents with a chief complaint of Rectal Bleeding (20 Dec 2024 07:08)      INTERVAL HX:  No acute overnight events. Pt seen and examined at bedside. ROS otherwise negative     Allergies:  No Known Allergies    Medications:  acetaminophen     Tablet .. 650 milliGRAM(s) Oral every 6 hours PRN  ascorbic acid 500 milliGRAM(s) Oral daily  chlorhexidine 2% Cloths 1 Application(s) Topical daily  Dakins Solution - 1/4 Strength 1 Application(s) Topical daily  latanoprost 0.005% Ophthalmic Solution 1 Drop(s) Both EYES at bedtime  levothyroxine 25 MICROGram(s) Oral daily  lidocaine   4% Patch 1 Patch Transdermal daily  losartan 25 milliGRAM(s) Oral daily  metoprolol succinate ER 50 milliGRAM(s) Oral daily  morphine   Solution 2.5 milliGRAM(s) Oral every 4 hours PRN  morphine  - Injectable 1 milliGRAM(s) IV Push every 4 hours PRN  multivitamin 1 Tablet(s) Oral daily  nicotine -   7 mG/24Hr(s) Patch 1 Patch Transdermal daily  polyethylene glycol 3350 17 Gram(s) Oral daily  rosuvastatin 10 milliGRAM(s) Oral at bedtime  senna 2 Tablet(s) Oral at bedtime  sodium chloride 2 Gram(s) Oral three times a day  spironolactone 25 milliGRAM(s) Oral daily  timolol 0.5% Solution 1 Drop(s) Both EYES daily    Vitals:  T(C): 36.7 (12-21-24 @ 05:00), Max: 36.7 (12-20-24 @ 13:53)  HR: 100 (12-21-24 @ 05:00) (82 - 100)  BP: 109/75 (12-21-24 @ 05:00) (109/75 - 133/86)  RR: 17 (12-21-24 @ 05:00) (17 - 17)  SpO2: 98% (12-21-24 @ 05:00) (97% - 98%)  I/O's:    12-20-24 @ 07:01  -  12-21-24 @ 07:00  --------------------------------------------------------  IN: 0 mL / OUT: 1550 mL / NET: -1550 mL      Physical Exam:  GENERAL: lying in bed, NAD  HEAD: normocephalic, atraumatic  HEENT: normal conjunctiva, oral mucosa moist  CARDIAC: regular rate and rhythm, normal S1S2, no appreciable murmurs  PULM: normal breath sounds, clear to ascultation bilaterally, no rales, rhonchi, wheezing  GI: +ostomy, no blood observed in bag. Abd soft, nondistended, nontender, no rebound tenderness, no guarding, no rigidity  : +naranjo  NEURO: no focal motor or sensory deficits, AAO to person, place, event  MSK: 1+ b/l LE edema, edematous upper extremities r>l  SKIN: well-perfused, extremities warm, no visible rashes    Labs:                        9.0    6.39  )-----------( 217      ( 20 Dec 2024 14:15 )             29.5     12-20    134[L]  |  98  |  9   ----------------------------<  114[H]  4.2   |  25  |  0.52    Ca    8.9      20 Dec 2024 14:15  Phos  3.1     12-20  Mg     1.80     12-20    TPro  6.0  /  Alb  2.5[L]  /  TBili  0.4  /  DBili  x   /  AST  19  /  ALT  <5  /  AlkPhos  98  12-20        Radiology/Procedures: Reviewed.   Patient is a 79y old  Male who presents with a chief complaint of Rectal Bleeding (20 Dec 2024 07:08)      INTERVAL HX:  No acute overnight events. Pt seen and examined at bedside. ROS otherwise negative. Pt more awake and alert this AM.    Allergies:  No Known Allergies    Medications:  acetaminophen     Tablet .. 650 milliGRAM(s) Oral every 6 hours PRN  ascorbic acid 500 milliGRAM(s) Oral daily  chlorhexidine 2% Cloths 1 Application(s) Topical daily  Dakins Solution - 1/4 Strength 1 Application(s) Topical daily  latanoprost 0.005% Ophthalmic Solution 1 Drop(s) Both EYES at bedtime  levothyroxine 25 MICROGram(s) Oral daily  lidocaine   4% Patch 1 Patch Transdermal daily  losartan 25 milliGRAM(s) Oral daily  metoprolol succinate ER 50 milliGRAM(s) Oral daily  morphine   Solution 2.5 milliGRAM(s) Oral every 4 hours PRN  morphine  - Injectable 1 milliGRAM(s) IV Push every 4 hours PRN  multivitamin 1 Tablet(s) Oral daily  nicotine -   7 mG/24Hr(s) Patch 1 Patch Transdermal daily  polyethylene glycol 3350 17 Gram(s) Oral daily  rosuvastatin 10 milliGRAM(s) Oral at bedtime  senna 2 Tablet(s) Oral at bedtime  sodium chloride 2 Gram(s) Oral three times a day  spironolactone 25 milliGRAM(s) Oral daily  timolol 0.5% Solution 1 Drop(s) Both EYES daily    Vitals:  T(C): 36.7 (12-21-24 @ 05:00), Max: 36.7 (12-20-24 @ 13:53)  HR: 100 (12-21-24 @ 05:00) (82 - 100)  BP: 109/75 (12-21-24 @ 05:00) (109/75 - 133/86)  RR: 17 (12-21-24 @ 05:00) (17 - 17)  SpO2: 98% (12-21-24 @ 05:00) (97% - 98%)  I/O's:    12-20-24 @ 07:01  -  12-21-24 @ 07:00  --------------------------------------------------------  IN: 0 mL / OUT: 1550 mL / NET: -1550 mL      Physical Exam:  GENERAL: lying in bed, NAD  HEAD: normocephalic, atraumatic  HEENT: normal conjunctiva, oral mucosa moist  CARDIAC: regular rate and rhythm, normal S1S2, no appreciable murmurs  PULM: normal breath sounds, clear to ascultation bilaterally, no rales, rhonchi, wheezing  GI: +ostomy, no blood observed in bag. Abd soft, nondistended, nontender, no rebound tenderness, no guarding, no rigidity  : +naranjo  NEURO: no focal motor or sensory deficits, AAO to person, place, event  MSK: 1+ b/l LE edema, edematous upper extremities r>l  SKIN: well-perfused, extremities warm, no visible rashes    Labs:                        9.0    6.39  )-----------( 217      ( 20 Dec 2024 14:15 )             29.5     12-20    134[L]  |  98  |  9   ----------------------------<  114[H]  4.2   |  25  |  0.52    Ca    8.9      20 Dec 2024 14:15  Phos  3.1     12-20  Mg     1.80     12-20    TPro  6.0  /  Alb  2.5[L]  /  TBili  0.4  /  DBili  x   /  AST  19  /  ALT  <5  /  AlkPhos  98  12-20        Radiology/Procedures: Reviewed.

## 2024-12-21 NOTE — PROGRESS NOTE ADULT - ATTENDING COMMENTS
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

## 2024-12-21 NOTE — PROGRESS NOTE ADULT - PROBLEM SELECTOR PLAN 2
-CT A/P: Small amount of bilateral pleural effusions, Small volume pericardial effusion. Pulmonary nodules suspicious of metastasis Midline decubitus ulcer with local subcutaneous inflammation. A few foci of gas in the inflamed superficial soft tissues, nec fas not excluded, no abscess.    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 [address] [facility]. [phone]

## 2024-12-21 NOTE — PROGRESS NOTE ADULT - PROBLEM SELECTOR PROBLEM 9
Refill Requested:lisinopril (ZESTRIL) 20 MG tablet  Take 1 tablet by mouth daily. Due for appt with MD - Oral  Last office visit:11/23/2020  Last labs:CBC & CMP- 11/16/2020  Last refill: 09/09/2020 # 90 0 REFILLS      Prophylactic measure

## 2024-12-22 LAB
ALBUMIN SERPL ELPH-MCNC: 2.5 G/DL — LOW (ref 3.3–5)
ALP SERPL-CCNC: 102 U/L — SIGNIFICANT CHANGE UP (ref 40–120)
ALT FLD-CCNC: 9 U/L — SIGNIFICANT CHANGE UP (ref 4–41)
ANION GAP SERPL CALC-SCNC: 10 MMOL/L — SIGNIFICANT CHANGE UP (ref 7–14)
AST SERPL-CCNC: 34 U/L — SIGNIFICANT CHANGE UP (ref 4–40)
BILIRUB SERPL-MCNC: 0.4 MG/DL — SIGNIFICANT CHANGE UP (ref 0.2–1.2)
BUN SERPL-MCNC: 8 MG/DL — SIGNIFICANT CHANGE UP (ref 7–23)
CALCIUM SERPL-MCNC: 8.4 MG/DL — SIGNIFICANT CHANGE UP (ref 8.4–10.5)
CHLORIDE SERPL-SCNC: 101 MMOL/L — SIGNIFICANT CHANGE UP (ref 98–107)
CO2 SERPL-SCNC: 23 MMOL/L — SIGNIFICANT CHANGE UP (ref 22–31)
CREAT SERPL-MCNC: 0.44 MG/DL — LOW (ref 0.5–1.3)
EGFR: 108 ML/MIN/1.73M2 — SIGNIFICANT CHANGE UP
GLUCOSE SERPL-MCNC: 106 MG/DL — HIGH (ref 70–99)
HCT VFR BLD CALC: 28.2 % — LOW (ref 39–50)
HGB BLD-MCNC: 8.4 G/DL — LOW (ref 13–17)
MAGNESIUM SERPL-MCNC: 1.8 MG/DL — SIGNIFICANT CHANGE UP (ref 1.6–2.6)
MCHC RBC-ENTMCNC: 25 PG — LOW (ref 27–34)
MCHC RBC-ENTMCNC: 29.8 G/DL — LOW (ref 32–36)
MCV RBC AUTO: 83.9 FL — SIGNIFICANT CHANGE UP (ref 80–100)
NRBC # BLD: 0 /100 WBCS — SIGNIFICANT CHANGE UP (ref 0–0)
NRBC # FLD: 0 K/UL — SIGNIFICANT CHANGE UP (ref 0–0)
PHOSPHATE SERPL-MCNC: 2.4 MG/DL — LOW (ref 2.5–4.5)
PLATELET # BLD AUTO: 189 K/UL — SIGNIFICANT CHANGE UP (ref 150–400)
POTASSIUM SERPL-MCNC: 3.4 MMOL/L — LOW (ref 3.5–5.3)
POTASSIUM SERPL-SCNC: 3.4 MMOL/L — LOW (ref 3.5–5.3)
PROT SERPL-MCNC: 5.9 G/DL — LOW (ref 6–8.3)
RBC # BLD: 3.36 M/UL — LOW (ref 4.2–5.8)
RBC # FLD: 17.1 % — HIGH (ref 10.3–14.5)
SODIUM SERPL-SCNC: 134 MMOL/L — LOW (ref 135–145)
WBC # BLD: 8.55 K/UL — SIGNIFICANT CHANGE UP (ref 3.8–10.5)
WBC # FLD AUTO: 8.55 K/UL — SIGNIFICANT CHANGE UP (ref 3.8–10.5)

## 2024-12-22 PROCEDURE — 99232 SBSQ HOSP IP/OBS MODERATE 35: CPT

## 2024-12-22 RX ORDER — SOD PHOS DI, MONO/K PHOS MONO 250 MG
1 TABLET ORAL ONCE
Refills: 0 | Status: DISCONTINUED | OUTPATIENT
Start: 2024-12-22 | End: 2024-12-24

## 2024-12-22 RX ORDER — POTASSIUM CHLORIDE 600 MG/1
20 TABLET, FILM COATED, EXTENDED RELEASE ORAL ONCE
Refills: 0 | Status: COMPLETED | OUTPATIENT
Start: 2024-12-22 | End: 2024-12-22

## 2024-12-22 RX ADMIN — NICOTINE POLACRILEX 1 PATCH: 4 LOZENGE ORAL at 08:07

## 2024-12-22 RX ADMIN — Medication 17 GRAM(S): at 13:56

## 2024-12-22 RX ADMIN — ROSUVASTATIN 10 MILLIGRAM(S): 40 TABLET, FILM COATED ORAL at 21:05

## 2024-12-22 RX ADMIN — Medication 1 MILLIGRAM(S): at 05:27

## 2024-12-22 RX ADMIN — SODIUM CHLORIDE 2 GRAM(S): 9 INJECTION, SOLUTION INTRAMUSCULAR; INTRAVENOUS; SUBCUTANEOUS at 05:23

## 2024-12-22 RX ADMIN — POTASSIUM CHLORIDE 20 MILLIEQUIVALENT(S): 600 TABLET, FILM COATED, EXTENDED RELEASE ORAL at 14:07

## 2024-12-22 RX ADMIN — Medication 1 MILLIGRAM(S): at 20:35

## 2024-12-22 RX ADMIN — LIDOCAINE 1 PATCH: 50 OINTMENT TOPICAL at 19:25

## 2024-12-22 RX ADMIN — SODIUM CHLORIDE 2 GRAM(S): 9 INJECTION, SOLUTION INTRAMUSCULAR; INTRAVENOUS; SUBCUTANEOUS at 21:06

## 2024-12-22 RX ADMIN — LATANOPROST 1 DROP(S): 50 SOLUTION OPHTHALMIC at 21:05

## 2024-12-22 RX ADMIN — Medication 1 MILLIGRAM(S): at 06:27

## 2024-12-22 RX ADMIN — LOSARTAN POTASSIUM 25 MILLIGRAM(S): 100 TABLET, FILM COATED ORAL at 05:32

## 2024-12-22 RX ADMIN — Medication 50 MILLIGRAM(S): at 05:23

## 2024-12-22 RX ADMIN — SPIRONOLACTONE 25 MILLIGRAM(S): 50 TABLET ORAL at 05:24

## 2024-12-22 RX ADMIN — Medication 2.5 MILLIGRAM(S): at 14:32

## 2024-12-22 RX ADMIN — SENNOSIDES 2 TABLET(S): 8.6 TABLET, FILM COATED ORAL at 21:06

## 2024-12-22 RX ADMIN — NICOTINE POLACRILEX 1 PATCH: 4 LOZENGE ORAL at 20:37

## 2024-12-22 RX ADMIN — Medication 1 APPLICATION(S): at 13:53

## 2024-12-22 RX ADMIN — CHLORHEXIDINE GLUCONATE 1 APPLICATION(S): 1.2 RINSE ORAL at 14:34

## 2024-12-22 RX ADMIN — Medication 2.5 MILLIGRAM(S): at 15:32

## 2024-12-22 RX ADMIN — Medication 1 TABLET(S): at 13:57

## 2024-12-22 RX ADMIN — Medication 1 MILLIGRAM(S): at 21:35

## 2024-12-22 RX ADMIN — NICOTINE POLACRILEX 1 PATCH: 4 LOZENGE ORAL at 13:53

## 2024-12-22 RX ADMIN — Medication 500 MILLIGRAM(S): at 13:55

## 2024-12-22 RX ADMIN — SODIUM CHLORIDE 2 GRAM(S): 9 INJECTION, SOLUTION INTRAMUSCULAR; INTRAVENOUS; SUBCUTANEOUS at 13:56

## 2024-12-22 RX ADMIN — LIDOCAINE 1 PATCH: 50 OINTMENT TOPICAL at 13:54

## 2024-12-22 RX ADMIN — Medication 1 DROP(S): at 13:55

## 2024-12-22 NOTE — PROGRESS NOTE ADULT - PROBLEM SELECTOR PLAN 2
-CT A/P: Small amount of bilateral pleural effusions, Small volume pericardial effusion. Pulmonary nodules suspicious of metastasis Midline decubitus ulcer with local subcutaneous inflammation. A few foci of gas in the inflamed superficial soft tissues, nec fas not excluded, no abscess.    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 [address] [facility]. [phone]

## 2024-12-22 NOTE — PROGRESS NOTE ADULT - SUBJECTIVE AND OBJECTIVE BOX
***************************************************************  Quinn Wilson  (PGY1) Internal Medicine  On TEAMS  ***************************************************************    PROGRESS NOTE:     Patient is a 79y old  Male who presents with a chief complaint of Rectal Bleeding (21 Dec 2024 07:32)      INTERVAL EVENTS:   SUBJECTIVE / OVERNIGHT EVENTS: No acute overnight events. Patient was seen and examined by the bedside this AM.   OXYGEN:   TELEMETRY:       MEDICATIONS  (STANDING):  ascorbic acid 500 milliGRAM(s) Oral daily  chlorhexidine 2% Cloths 1 Application(s) Topical daily  Dakins Solution - 1/4 Strength 1 Application(s) Topical daily  latanoprost 0.005% Ophthalmic Solution 1 Drop(s) Both EYES at bedtime  levothyroxine 25 MICROGram(s) Oral daily  lidocaine   4% Patch 1 Patch Transdermal daily  losartan 25 milliGRAM(s) Oral daily  metoprolol succinate ER 50 milliGRAM(s) Oral daily  multivitamin 1 Tablet(s) Oral daily  nicotine -   7 mG/24Hr(s) Patch 1 Patch Transdermal daily  polyethylene glycol 3350 17 Gram(s) Oral daily  rosuvastatin 10 milliGRAM(s) Oral at bedtime  senna 2 Tablet(s) Oral at bedtime  sodium chloride 2 Gram(s) Oral three times a day  spironolactone 25 milliGRAM(s) Oral daily  timolol 0.5% Solution 1 Drop(s) Both EYES daily    MEDICATIONS  (PRN):  acetaminophen     Tablet .. 650 milliGRAM(s) Oral every 6 hours PRN Mild Pain (1 - 3)  morphine   Solution 2.5 milliGRAM(s) Oral every 4 hours PRN Moderate Pain (4 - 6)  morphine  - Injectable 1 milliGRAM(s) IV Push every 4 hours PRN Severe Pain (7 - 10)      CAPILLARY BLOOD GLUCOSE        I&O's Summary    20 Dec 2024 07:01  -  21 Dec 2024 07:00  --------------------------------------------------------  IN: 0 mL / OUT: 1550 mL / NET: -1550 mL    21 Dec 2024 07:01  -  22 Dec 2024 05:55  --------------------------------------------------------  IN: 490 mL / OUT: 600 mL / NET: -110 mL        PHYSICAL EXAM:  Vital Signs Last 24 Hrs  T(C): 36.3 (22 Dec 2024 05:19), Max: 36.4 (21 Dec 2024 12:20)  T(F): 97.4 (22 Dec 2024 05:19), Max: 97.5 (21 Dec 2024 12:20)  HR: 91 (22 Dec 2024 05:19) (88 - 91)  BP: 137/89 (22 Dec 2024 05:19) (115/75 - 137/89)  BP(mean): --  RR: 17 (22 Dec 2024 05:19) (17 - 17)  SpO2: 100% (22 Dec 2024 05:19) (99% - 100%)    Parameters below as of 22 Dec 2024 05:19  Patient On (Oxygen Delivery Method): room air        General : NAD  CV: RRR no R/M/G  Lungs: CTAB  Abd : Soft, non-tender, non-distended  Extremities : No LE Edema  Neuro : A&Ox3    LABS:                        9.0    6.39  )-----------( 217      ( 20 Dec 2024 14:15 )             29.5     12-20    134[L]  |  98  |  9   ----------------------------<  114[H]  4.2   |  25  |  0.52    Ca    8.9      20 Dec 2024 14:15  Phos  3.1     12-20  Mg     1.80     12-20    TPro  6.0  /  Alb  2.5[L]  /  TBili  0.4  /  DBili  x   /  AST  19  /  ALT  <5  /  AlkPhos  98  12-20          Urinalysis Basic - ( 20 Dec 2024 14:15 )    Color: x / Appearance: x / SG: x / pH: x  Gluc: 114 mg/dL / Ketone: x  / Bili: x / Urobili: x   Blood: x / Protein: x / Nitrite: x   Leuk Esterase: x / RBC: x / WBC x   Sq Epi: x / Non Sq Epi: x / Bacteria: x          COORDINATION OF CARE:  Care Discussed with Consultants/Other Providers [Y/N]:  Prior or Outpatient Records Reviewed [Y/N]: ***************************************************************  Quinn Wilson  (PGY1) Internal Medicine  On TEAMS  ***************************************************************    PROGRESS NOTE:     Patient is a 79y old  Male who presents with a chief complaint of Rectal Bleeding (21 Dec 2024 07:32)      INTERVAL EVENTS: No events   SUBJECTIVE / OVERNIGHT EVENTS: No acute overnight events. Patient was seen and examined by the bedside this AM.   - Patient awake this am, pain well controlled, reports colostomy output.   - Patient denies fever, chills, nausea, vomitting, chest pain, shortness of breath, abdominal pain.   OXYGEN: RA  TELEMETRY:       MEDICATIONS  (STANDING):  ascorbic acid 500 milliGRAM(s) Oral daily  chlorhexidine 2% Cloths 1 Application(s) Topical daily  Dakins Solution - 1/4 Strength 1 Application(s) Topical daily  latanoprost 0.005% Ophthalmic Solution 1 Drop(s) Both EYES at bedtime  levothyroxine 25 MICROGram(s) Oral daily  lidocaine   4% Patch 1 Patch Transdermal daily  losartan 25 milliGRAM(s) Oral daily  metoprolol succinate ER 50 milliGRAM(s) Oral daily  multivitamin 1 Tablet(s) Oral daily  nicotine -   7 mG/24Hr(s) Patch 1 Patch Transdermal daily  polyethylene glycol 3350 17 Gram(s) Oral daily  rosuvastatin 10 milliGRAM(s) Oral at bedtime  senna 2 Tablet(s) Oral at bedtime  sodium chloride 2 Gram(s) Oral three times a day  spironolactone 25 milliGRAM(s) Oral daily  timolol 0.5% Solution 1 Drop(s) Both EYES daily    MEDICATIONS  (PRN):  acetaminophen     Tablet .. 650 milliGRAM(s) Oral every 6 hours PRN Mild Pain (1 - 3)  morphine   Solution 2.5 milliGRAM(s) Oral every 4 hours PRN Moderate Pain (4 - 6)  morphine  - Injectable 1 milliGRAM(s) IV Push every 4 hours PRN Severe Pain (7 - 10)      CAPILLARY BLOOD GLUCOSE        I&O's Summary    20 Dec 2024 07:01  -  21 Dec 2024 07:00  --------------------------------------------------------  IN: 0 mL / OUT: 1550 mL / NET: -1550 mL    21 Dec 2024 07:01  -  22 Dec 2024 05:55  --------------------------------------------------------  IN: 490 mL / OUT: 600 mL / NET: -110 mL        PHYSICAL EXAM:  Vital Signs Last 24 Hrs  T(C): 36.3 (22 Dec 2024 05:19), Max: 36.4 (21 Dec 2024 12:20)  T(F): 97.4 (22 Dec 2024 05:19), Max: 97.5 (21 Dec 2024 12:20)  HR: 91 (22 Dec 2024 05:19) (88 - 91)  BP: 137/89 (22 Dec 2024 05:19) (115/75 - 137/89)  BP(mean): --  RR: 17 (22 Dec 2024 05:19) (17 - 17)  SpO2: 100% (22 Dec 2024 05:19) (99% - 100%)    Parameters below as of 22 Dec 2024 05:19  Patient On (Oxygen Delivery Method): room air      General : NAD  CV: RRR no R/M/G  Lungs: CTAB  Abd : Soft, non-tender, non-distended, Colostomy in place, no erythema at the site , Indwelling cath  Extremities : No LE Edema  Neuro : A&Ox3, Arousble to voice     LABS:                        9.0    6.39  )-----------( 217      ( 20 Dec 2024 14:15 )             29.5     12-20    134[L]  |  98  |  9   ----------------------------<  114[H]  4.2   |  25  |  0.52    Ca    8.9      20 Dec 2024 14:15  Phos  3.1     12-20  Mg     1.80     12-20    TPro  6.0  /  Alb  2.5[L]  /  TBili  0.4  /  DBili  x   /  AST  19  /  ALT  <5  /  AlkPhos  98  12-20          Urinalysis Basic - ( 20 Dec 2024 14:15 )    Color: x / Appearance: x / SG: x / pH: x  Gluc: 114 mg/dL / Ketone: x  / Bili: x / Urobili: x   Blood: x / Protein: x / Nitrite: x   Leuk Esterase: x / RBC: x / WBC x   Sq Epi: x / Non Sq Epi: x / Bacteria: x          COORDINATION OF CARE:  Care Discussed with Consultants/Other Providers [Y/N]:  Prior or Outpatient Records Reviewed [Y/N]:

## 2024-12-22 NOTE — PROGRESS NOTE ADULT - PROBLEM SELECTOR PLAN 1
Hx of Rectal Cancer, originally followed by Dr. Paez [facility] [place], when options limited, reeval with Dr. Miguel Angel Johnson surg/onc who placed end diverting colostomy. Was to be reevaluated by [facility] for a possible 2nd opinion, however deferred due to admission. Expressed interest in outpt follow up with [facility] which will be re-evaluated on discharge. Per Onc, will need outpt records to be brought for outpt follow up - daughter states she will email records    - Previous pain regimens causing excessive drowsiness and possible AMS, palliative consult placed for new pain regimen  - Appreciate palliative recs: liquid morphine 2.5mg q4 PRN for moderate pain, morphine 1mg IV q4 PRN for severe  - Pt intermittently refusing medications, lab draws, wound care, PT, vitals; family concerned for depression, psychiatry consulted for depression i/s/o advanced illness -- appreciate psychiatry ramón, psych to f/u on Monday

## 2024-12-23 LAB
ALBUMIN SERPL ELPH-MCNC: 2.4 G/DL — LOW (ref 3.3–5)
ALP SERPL-CCNC: 97 U/L — SIGNIFICANT CHANGE UP (ref 40–120)
ALT FLD-CCNC: 7 U/L — SIGNIFICANT CHANGE UP (ref 4–41)
ANION GAP SERPL CALC-SCNC: 9 MMOL/L — SIGNIFICANT CHANGE UP (ref 7–14)
AST SERPL-CCNC: 23 U/L — SIGNIFICANT CHANGE UP (ref 4–40)
BILIRUB SERPL-MCNC: 0.2 MG/DL — SIGNIFICANT CHANGE UP (ref 0.2–1.2)
BLD GP AB SCN SERPL QL: NEGATIVE — SIGNIFICANT CHANGE UP
BUN SERPL-MCNC: 10 MG/DL — SIGNIFICANT CHANGE UP (ref 7–23)
CALCIUM SERPL-MCNC: 8.6 MG/DL — SIGNIFICANT CHANGE UP (ref 8.4–10.5)
CHLORIDE SERPL-SCNC: 103 MMOL/L — SIGNIFICANT CHANGE UP (ref 98–107)
CO2 SERPL-SCNC: 25 MMOL/L — SIGNIFICANT CHANGE UP (ref 22–31)
CREAT SERPL-MCNC: 0.47 MG/DL — LOW (ref 0.5–1.3)
EGFR: 106 ML/MIN/1.73M2 — SIGNIFICANT CHANGE UP
FLUAV AG NPH QL: SIGNIFICANT CHANGE UP
FLUBV AG NPH QL: SIGNIFICANT CHANGE UP
GLUCOSE SERPL-MCNC: 107 MG/DL — HIGH (ref 70–99)
HCT VFR BLD CALC: 26.9 % — LOW (ref 39–50)
HGB BLD-MCNC: 7.9 G/DL — LOW (ref 13–17)
MAGNESIUM SERPL-MCNC: 1.8 MG/DL — SIGNIFICANT CHANGE UP (ref 1.6–2.6)
MCHC RBC-ENTMCNC: 24.5 PG — LOW (ref 27–34)
MCHC RBC-ENTMCNC: 29.4 G/DL — LOW (ref 32–36)
MCV RBC AUTO: 83.5 FL — SIGNIFICANT CHANGE UP (ref 80–100)
NRBC # BLD: 0 /100 WBCS — SIGNIFICANT CHANGE UP (ref 0–0)
NRBC # FLD: 0 K/UL — SIGNIFICANT CHANGE UP (ref 0–0)
PHOSPHATE SERPL-MCNC: 2.6 MG/DL — SIGNIFICANT CHANGE UP (ref 2.5–4.5)
PLATELET # BLD AUTO: 179 K/UL — SIGNIFICANT CHANGE UP (ref 150–400)
POTASSIUM SERPL-MCNC: 4 MMOL/L — SIGNIFICANT CHANGE UP (ref 3.5–5.3)
POTASSIUM SERPL-SCNC: 4 MMOL/L — SIGNIFICANT CHANGE UP (ref 3.5–5.3)
PROT SERPL-MCNC: 5.8 G/DL — LOW (ref 6–8.3)
RBC # BLD: 3.22 M/UL — LOW (ref 4.2–5.8)
RBC # FLD: 17.2 % — HIGH (ref 10.3–14.5)
RH IG SCN BLD-IMP: NEGATIVE — SIGNIFICANT CHANGE UP
RSV RNA NPH QL NAA+NON-PROBE: DETECTED
SARS-COV-2 RNA SPEC QL NAA+PROBE: SIGNIFICANT CHANGE UP
SODIUM SERPL-SCNC: 137 MMOL/L — SIGNIFICANT CHANGE UP (ref 135–145)
WBC # BLD: 6.59 K/UL — SIGNIFICANT CHANGE UP (ref 3.8–10.5)
WBC # FLD AUTO: 6.59 K/UL — SIGNIFICANT CHANGE UP (ref 3.8–10.5)

## 2024-12-23 PROCEDURE — 99233 SBSQ HOSP IP/OBS HIGH 50: CPT

## 2024-12-23 PROCEDURE — 99232 SBSQ HOSP IP/OBS MODERATE 35: CPT

## 2024-12-23 PROCEDURE — 99232 SBSQ HOSP IP/OBS MODERATE 35: CPT | Mod: GC

## 2024-12-23 RX ORDER — MIRTAZAPINE 7.5 MG/1
7.5 TABLET, FILM COATED ORAL AT BEDTIME
Refills: 0 | Status: DISCONTINUED | OUTPATIENT
Start: 2024-12-23 | End: 2024-12-24

## 2024-12-23 RX ORDER — MORPHINE SULFATE 15 MG
2.5 TABLET, EXTENDED RELEASE ORAL EVERY 4 HOURS
Refills: 0 | Status: DISCONTINUED | OUTPATIENT
Start: 2024-12-23 | End: 2024-12-24

## 2024-12-23 RX ADMIN — SODIUM CHLORIDE 2 GRAM(S): 9 INJECTION, SOLUTION INTRAMUSCULAR; INTRAVENOUS; SUBCUTANEOUS at 15:51

## 2024-12-23 RX ADMIN — CHLORHEXIDINE GLUCONATE 1 APPLICATION(S): 1.2 RINSE ORAL at 12:50

## 2024-12-23 RX ADMIN — LOSARTAN POTASSIUM 25 MILLIGRAM(S): 100 TABLET, FILM COATED ORAL at 12:31

## 2024-12-23 RX ADMIN — Medication 17 GRAM(S): at 12:38

## 2024-12-23 RX ADMIN — NICOTINE POLACRILEX 1 PATCH: 4 LOZENGE ORAL at 13:40

## 2024-12-23 RX ADMIN — Medication 1 APPLICATION(S): at 12:47

## 2024-12-23 RX ADMIN — SENNOSIDES 2 TABLET(S): 8.6 TABLET, FILM COATED ORAL at 23:01

## 2024-12-23 RX ADMIN — NICOTINE POLACRILEX 1 PATCH: 4 LOZENGE ORAL at 12:36

## 2024-12-23 RX ADMIN — Medication 1 DROP(S): at 12:38

## 2024-12-23 RX ADMIN — Medication 500 MILLIGRAM(S): at 12:37

## 2024-12-23 RX ADMIN — Medication 2.5 MILLIGRAM(S): at 19:24

## 2024-12-23 RX ADMIN — LATANOPROST 1 DROP(S): 50 SOLUTION OPHTHALMIC at 23:00

## 2024-12-23 RX ADMIN — Medication 2.5 MILLIGRAM(S): at 12:40

## 2024-12-23 RX ADMIN — Medication 50 MILLIGRAM(S): at 12:31

## 2024-12-23 RX ADMIN — SODIUM CHLORIDE 2 GRAM(S): 9 INJECTION, SOLUTION INTRAMUSCULAR; INTRAVENOUS; SUBCUTANEOUS at 12:29

## 2024-12-23 RX ADMIN — NICOTINE POLACRILEX 1 PATCH: 4 LOZENGE ORAL at 19:30

## 2024-12-23 RX ADMIN — LIDOCAINE 1 PATCH: 50 OINTMENT TOPICAL at 00:54

## 2024-12-23 RX ADMIN — SODIUM CHLORIDE 2 GRAM(S): 9 INJECTION, SOLUTION INTRAMUSCULAR; INTRAVENOUS; SUBCUTANEOUS at 23:00

## 2024-12-23 RX ADMIN — Medication 2.5 MILLIGRAM(S): at 18:24

## 2024-12-23 RX ADMIN — SPIRONOLACTONE 25 MILLIGRAM(S): 50 TABLET ORAL at 12:29

## 2024-12-23 RX ADMIN — Medication 1 TABLET(S): at 12:37

## 2024-12-23 RX ADMIN — ROSUVASTATIN 10 MILLIGRAM(S): 40 TABLET, FILM COATED ORAL at 23:00

## 2024-12-23 RX ADMIN — Medication 2.5 MILLIGRAM(S): at 13:40

## 2024-12-23 RX ADMIN — MIRTAZAPINE 7.5 MILLIGRAM(S): 7.5 TABLET, FILM COATED ORAL at 23:04

## 2024-12-23 RX ADMIN — NICOTINE POLACRILEX 1 PATCH: 4 LOZENGE ORAL at 06:25

## 2024-12-23 NOTE — PROGRESS NOTE ADULT - SUBJECTIVE AND OBJECTIVE BOX
Date of Service  : 12/23/2024  Indication for Geriatrics and Palliative Care Services: symptom management     SUBJECTIVE AND OBJECTIVE:  Patient seen and examined at bedside. Patient being followed up for pain  Patient Angelica (person, place- hospital, time- 1920s). Patient indicated that he was having constant pain in his head, chest, and bottom. He stated that from a scale of 0-10, pain was currently 7/10 and requested for provider to provide pain medication to him for his pain. Asked bedside RN to provide prn dose of pain medication     INTERVAL HPI/OVERNIGHT EVENTS:  In past 24 hours, received IV Morphine 1mg x1 and PO Morphine Solution 2.5mg x1     Allergies    No Known Allergies    Intolerances    MEDICATIONS  (STANDING):  ascorbic acid 500 milliGRAM(s) Oral daily  chlorhexidine 2% Cloths 1 Application(s) Topical daily  Dakins Solution - 1/4 Strength 1 Application(s) Topical daily  latanoprost 0.005% Ophthalmic Solution 1 Drop(s) Both EYES at bedtime  levothyroxine 25 MICROGram(s) Oral daily  lidocaine   4% Patch 1 Patch Transdermal daily  losartan 25 milliGRAM(s) Oral daily  metoprolol succinate ER 50 milliGRAM(s) Oral daily  mirtazapine 7.5 milliGRAM(s) Oral at bedtime  multivitamin 1 Tablet(s) Oral daily  nicotine -   7 mG/24Hr(s) Patch 1 Patch Transdermal daily  polyethylene glycol 3350 17 Gram(s) Oral daily  potassium phosphate / sodium phosphate Powder (PHOS-NaK) 1 Packet(s) Oral once  rosuvastatin 10 milliGRAM(s) Oral at bedtime  senna 2 Tablet(s) Oral at bedtime  sodium chloride 2 Gram(s) Oral three times a day  spironolactone 25 milliGRAM(s) Oral daily  timolol 0.5% Solution 1 Drop(s) Both EYES daily    MEDICATIONS  (PRN):  acetaminophen     Tablet .. 650 milliGRAM(s) Oral every 6 hours PRN Mild Pain (1 - 3)  morphine   Solution 2.5 milliGRAM(s) Oral every 4 hours PRN Moderate Pain (4 - 6)      ITEMS UNCHECKED ARE NOT PRESENT    PRESENT SYMPTOMS: [ ]Unable to self-report due to altered mental status- see [ ] CPOT [ ] PAINADS [ ] RDOS  Source if other than patient:  [ ]Family   [ ]Team     Pain:  [x ]yes [ ]no  QOL impact - moderate  Location -     head, chest, bottom                Aggravating factors - none   Quality - unable to describe   Radiation - as above   Timing- constant   Severity (0-10 scale): 7   Minimal acceptable level / Pain Goal (0-10 scale):     Dyspnea:                           [ ]Mild [ ]Moderate [ ]Severe  Anxiety:                             [ ]Mild [ ]Moderate [ ]Severe  Agitation:                          [ ]Mild [ ]Moderate [ ]Severe  Fatigue:                             [ ]Mild [ ]Moderate [ ]Severe  Nausea:                             [ ]Mild [ ]Moderate [ ]Severe  Loss of appetite:              [ ]Mild [ ]Moderate [ ]Severe  Constipation:                   [ ]Mild [ ]Moderate [ ]Severe  Diarrhea:                          [ ]Mild [ ]Moderate [ ]Severe    CPOT:    https://[email or web address]/[email or web address]/Critical-Care-Pain-Observation-Tool-(CPOT)    PCSSQ[Palliative Care Spiritual Screening Question]   Severity (0-10):  Score of 4 or > indicate consideration of Chaplaincy referral.  Chaplaincy Referral: [ ] yes [ ] refused [ ] following [x ] deferred    Caregiver [place]? : [ ] yes [ ] no [ ] Declined [x ] Deferred              Social work referral [ ] Patient & Family Centered Care Referral [ ]     Anticipatory Grief present?:  [ ] yes [ ] no  [ x] Deferred                  Social work referral [ ] Chaplaincy Referral[ ]    Other Symptoms:  [x ]All other review of systems negative   [ ] Unable to obtain due to poor mentation     PHYSICAL EXAM:  Vital Signs Last 24 Hrs  T(C): 36.3 (23 Dec 2024 07:37), Max: 36.5 (22 Dec 2024 20:32)  T(F): 97.4 (23 Dec 2024 07:37), Max: 97.7 (22 Dec 2024 20:32)  HR: 90 (23 Dec 2024 12:30) (83 - 92)  BP: 127/82 (23 Dec 2024 12:30) (104/66 - 127/82)  BP(mean): --  RR: 17 (23 Dec 2024 12:30) (17 - 18)  SpO2: 98% (23 Dec 2024 12:30) (98% - 100%)    Parameters below as of 23 Dec 2024 12:30  Patient On (Oxygen Delivery Method): room air         I&O's Summary    22 Dec 2024 07:01  -  23 Dec 2024 07:00  --------------------------------------------------------  IN: 540 mL / OUT: 1100 mL / NET: -560 mL         GENERAL:  [x ]Alert  [x ]Oriented x2   [ ]Lethargic  [ ]Cachexia  [ ]Unarousable  [x ]Verbal  [ ]Non-Verbal  [ x] No Distress  Behavioral:   [ ] Anxiety  [x ] Delirium [ ] Agitation [ x] Calm  [ ] Other  HEENT:  [x ]Normal  [ ] Temporal Wasting  [ ]Dry mouth   [ ]ET Tube/Trach  [ ]Oral lesions  [ ] Mucositis  PULMONARY: normal respiratory effort, no accessory muscle use   [ ]Clear [ ]Tachypnea  [ ]Audible excessive secretions   [ ]Rhonchi        [ ]Right [ ]Left [ ]Bilateral  [ ]Crackles        [ ]Right [ ]Left [ ]Bilateral  [ ]Wheezing     [ ]Right [ ]Left [ ]Bilateral  [ ]Diminished breath sounds [ ]right [ ]left [ ]bilateral  CARDIOVASCULAR:    [ x]Regular [ ]Irregular [ ]Tachy  [ ]Saroj [ ]Murmur [ ]Other  GASTROINTESTINAL: Colostomy   [x ]Soft  [ ]Distended   [ ]+BS  [ x]Non tender [ ]Tender  [ ]PEG [ ]OGT/ NGT  Last BM:   GENITOURINARY:  [ ]Normal [ ] Incontinent   [ ]Oliguria/Anuria   [x ]Zamarripa  MUSCULOSKELETAL:   [ ]Normal   [x ]Weakness  [x ]Bed/Wheelchair bound [ ]Edema  [  ] amputation  [  ] contraction  NEUROLOGIC:   [ x]No focal deficits  [ ]Cognitive impairment  [ ]Dysphagia [ ]Dysarthria [ ]Paresis [x ]Other -encephalopathy   SKIN: See Nursing Skin Assessment for further details  [ ]Normal    [ ]Rash  [ ]Pressure ulcer(s)       Present on admission [ ]y [ ]n   [  ]  Wound    [  ] hyperpigmentation    CRITICAL CARE:  [ ]Shock Present  [ ]Septic [ ]Cardiogenic [ ]Neurologic [ ]Hypovolemic  [ ]Vasopressors [ ]Inotropes  [ ]Respiratory failure present [ ]Mechanical Ventilation [ ]Non-invasive ventilatory support [ ]High-Flow   [ ]Acute  [ ]Chronic [ ]Hypoxic  [ ]Hypercarbic [ ]Other  [ ]Other organ failure     LABS:  reviewed                         7.9    6.59  )-----------( 179      ( 23 Dec 2024 11:13 )             26.9   12-23    137  |  103  |  10  ----------------------------<  107[H]  4.0   |  25  |  0.47[L]    Ca    8.6      23 Dec 2024 11:13  Phos  2.6     12-23  Mg     1.80     12-23    TPro  5.8[L]  /  Alb  2.4[L]  /  TBili  0.2  /  DBili  x   /  AST  23  /  ALT  7   /  AlkPhos  97  12-23    Urinalysis Basic - ( 23 Dec 2024 11:13 )    Color: x / Appearance: x / SG: x / pH: x  Gluc: 107 mg/dL / Ketone: x  / Bili: x / Urobili: x   Blood: x / Protein: x / Nitrite: x   Leuk Esterase: x / RBC: x / WBC x   Sq Epi: x / Non Sq Epi: x / Bacteria: x      CAPILLARY BLOOD GLUCOSE    RADIOLOGY & ADDITIONAL STUDIES: reviewed     Protein Calorie Malnutrition Present: [ ]mild [ ]moderate [ ]severe [ ]underweight [ ]morbid obesity  https://www.andeal.org/vault/2440/web/files/ONC/Table_Clinical%20Characteristics%20to%20Document%20Malnutrition-White%20JV%20et%20al%202012.pdf    Height (cm): 185.4 (12-07-24 @ 20:38), 185 (10-31-24 @ 11:51)  Weight (kg): 52 (12-09-24 @ 09:39), 63 (10-31-24 @ 11:51), 63.5 (08-30-24 @ 19:17)  BMI (kg/m2): 15.1 (12-09-24 @ 09:39), 18.3 (12-07-24 @ 20:38), 18.4 (10-31-24 @ 11:51)    [x ]PPSV2 < or = 30%  [ ]significant weight loss [ ]poor nutritional intake [ ]anasarca   [ ]Artificial Nutrition    REFERRALS:   [ ]Chaplaincy  [ ]Hospice  [ ]Child Life  [ ]Social Work  [ x]Case management [ ]Holistic Therapy

## 2024-12-23 NOTE — BH CONSULTATION LIAISON PROGRESS NOTE - NSBHASSESSMENTFT_PSY_ALL_CORE
78yo  Yi male domiciled w/ son and his family (also has adult daughter who is HCP), no PPHx, and PMHx of CAD s/p stents in 2012, AF not on AC d/t GIB, known h/o DVT in right leg, HFrEF, locally advanced rectal cancer s/p neoadjuvant therapy with chemoRT (completed 11/2022), consolidation CAPOX (2/2023), brachytherapy (3/2024), s/p diverting end sigmoid colostomy, cystoscopy and naranjo placement across defect, and recent admission (11/24) for Tara gangrene s/p surgical debridement, p/w bleeding in ostomy bag and sacral wound ulcer. Per primary team not candidate for DMT. Pt being followed by palliative, full code. Psychiatry consulted for depression per daughter's request.    Limited eval d/t mentation. Pt found lying in bed appearing to be sleeping, very difficult to rouse and intermittently falling asleep during evaluation, responding largely w/ single-word answers. Pt admits to being depressed w/ poor appetite/sleep. Denies ESTEFANY/I/P. Otherwise oriented to self, location, year. Per chart pt only received morphine last night, unclear why pt appeared so lethargic today, though pt's daughter thinks pt was pretending to be asleep and trying not to engage much. Daughter expressed concern that pt is depressed and advocated for antidepressant tx. She is against pt receiving any antipsychotics/neuroleptics.    Per collateral pt w/ persistent grief not only related to losses of his wife and sister but also loss of his autonomy and his identity. Pt may benefit from SSRI trial for adjustment reaction vs depression, however given his current apparent mentation (suspicion for delirium) would not start anything immediately. Will reassess Monday.    12/23----irritable, withdrawn. Daughter concerned that patient is depressed      PLAN  -defer obs to primary team, no indication for psych 1:1  - Medicine team has started Mirtazepine  -please AVOID antipsychotics in this pt per daughter/hcp request  -no indication for psych admission

## 2024-12-23 NOTE — BH CONSULTATION LIAISON PROGRESS NOTE - NSBHCHARTREVIEWVS_PSY_A_CORE FT
Vital Signs Last 24 Hrs  T(C): 36.3 (23 Dec 2024 07:37), Max: 36.5 (22 Dec 2024 20:32)  T(F): 97.4 (23 Dec 2024 07:37), Max: 97.7 (22 Dec 2024 20:32)  HR: 90 (23 Dec 2024 12:30) (77 - 92)  BP: 127/82 (23 Dec 2024 12:30) (104/66 - 127/82)  BP(mean): --  RR: 17 (23 Dec 2024 12:30) (17 - 18)  SpO2: 98% (23 Dec 2024 12:30) (96% - 100%)    Parameters below as of 23 Dec 2024 12:30  Patient On (Oxygen Delivery Method): room air

## 2024-12-23 NOTE — ADVANCED PRACTICE NURSE CONSULT - ASSESSMENT
General: A&Ox1-2, able to turn w/, incontinent of urine and stool. Skin warm, dry with increased moisture in intertriginous folds, adequate skin turgor, scattered areas of hyperpigmentation and hypopigmentation, scattered areas of ecchymosis on bilateral upper extremities with no hematomas. Blanchable erythema on bilateral heels.     LLQ Colostomy: Chronic colostomy, stoma WNL, peristomal skin w/ scattered excoriation and blanchable erythema. 7/8" x 1 1/4" Stoma pink moist viable, no bleeding from stoma itself, output bloody. Please see A&I flow sheet for full assessment details.

## 2024-12-23 NOTE — BH CONSULTATION LIAISON PROGRESS NOTE - NSBHFUPINTERVALCCFT_PSY_A_CORE
Has been more awake this weekend.   Medicine team has started patient on Mirtazepine   Case discussed with medicine-- below discussed

## 2024-12-23 NOTE — PROGRESS NOTE ADULT - PROBLEM SELECTOR PLAN 2
How Severe Are Your Spot(S)?: mild What Is The Reason For Today's Visit?: Full Body Skin Examination What Is The Reason For Today's Visit? (Being Monitored For X): concerning skin lesions on an annual basis - Patient AAOx2   - unclear etiology ; possibly delirium?  - CT Head 12/12 - No CT evidence of acute intracranial pathology.  - please coordinate care with family   -Frequent reassurance and verbal orientation   -Family members or other familiar persons by his bedside.   -Delusions and hallucinations should be neither endorsed nor challenged.   -Physical restraints should be avoided. Alternatives to restraint use, such as constant observation (preferably by someone familiar to the patient such as a family member), may be more effective.  -PT eval and early ambulation if possible.

## 2024-12-23 NOTE — CHART NOTE - NSCHARTNOTESELECT_GEN_ALL_CORE
Durable Medical Equipment: Transport Wheelchair/Event Note
Nutrition Services
Cardiology
Durable Medical Equipment: Patient Lift/Event Note
Follow Up/Nutrition Services

## 2024-12-23 NOTE — PROGRESS NOTE ADULT - ASSESSMENT
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 Patient has been previous admitted for rectal bleed with a colonoscopy (10/24) showing diverticulosis, at that time was ruled as a complication of rectal cancer. Per primary oncologist, Dr. Paez, no role for DMT at that time. Palliative Care consulted for complex symptom management

## 2024-12-23 NOTE — PROGRESS NOTE ADULT - PROBLEM SELECTOR PLAN 4
- Behavioral Health Recommendations appreciated - recommending mirtazapine   - management as per primary team

## 2024-12-23 NOTE — PROGRESS NOTE ADULT - SUBJECTIVE AND OBJECTIVE BOX
Patient is a 79y old  Male who presents with a chief complaint of Rectal Bleeding (22 Dec 2024 05:54)      INTERVAL HX:  No acute overnight events. Pt seen and examined at bedside. ROS otherwise negative   Allergies:  No Known Allergies    Medications:  acetaminophen     Tablet .. 650 milliGRAM(s) Oral every 6 hours PRN  ascorbic acid 500 milliGRAM(s) Oral daily  chlorhexidine 2% Cloths 1 Application(s) Topical daily  Dakins Solution - 1/4 Strength 1 Application(s) Topical daily  latanoprost 0.005% Ophthalmic Solution 1 Drop(s) Both EYES at bedtime  levothyroxine 25 MICROGram(s) Oral daily  lidocaine   4% Patch 1 Patch Transdermal daily  losartan 25 milliGRAM(s) Oral daily  metoprolol succinate ER 50 milliGRAM(s) Oral daily  morphine   Solution 2.5 milliGRAM(s) Oral every 4 hours PRN  morphine  - Injectable 1 milliGRAM(s) IV Push every 4 hours PRN  multivitamin 1 Tablet(s) Oral daily  nicotine -   7 mG/24Hr(s) Patch 1 Patch Transdermal daily  polyethylene glycol 3350 17 Gram(s) Oral daily  potassium phosphate / sodium phosphate Powder (PHOS-NaK) 1 Packet(s) Oral once  rosuvastatin 10 milliGRAM(s) Oral at bedtime  senna 2 Tablet(s) Oral at bedtime  sodium chloride 2 Gram(s) Oral three times a day  spironolactone 25 milliGRAM(s) Oral daily  timolol 0.5% Solution 1 Drop(s) Both EYES daily    Vitals:  T(C): 36.5 (12-22-24 @ 20:32), Max: 36.5 (12-22-24 @ 20:32)  HR: 83 (12-22-24 @ 20:32) (77 - 83)  BP: 104/66 (12-22-24 @ 20:32) (104/66 - 116/82)  RR: 18 (12-22-24 @ 20:32) (17 - 18)  SpO2: 100% (12-22-24 @ 20:32) (96% - 100%)  I/O's:    12-22-24 @ 07:01  -  12-23-24 @ 07:00  --------------------------------------------------------  IN: 540 mL / OUT: 1100 mL / NET: -560 mL      Physical Exam:    Labs:                        8.4    8.55  )-----------( 189      ( 22 Dec 2024 05:50 )             28.2     12-22    134[L]  |  101  |  8   ----------------------------<  106[H]  3.4[L]   |  23  |  0.44[L]    Ca    8.4      22 Dec 2024 05:50  Phos  2.4     12-22  Mg     1.80     12-22    TPro  5.9[L]  /  Alb  2.5[L]  /  TBili  0.4  /  DBili  x   /  AST  34  /  ALT  9   /  AlkPhos  102  12-22        Radiology/Procedures: Reviewed.   Patient is a 79y old  Male who presents with a chief complaint of Rectal Bleeding (22 Dec 2024 05:54)      INTERVAL HX:  No acute overnight events. Pt seen and examined at bedside, states that he is feeling tired, no additional acute complaints elicited.    Allergies:  No Known Allergies    Medications:  acetaminophen     Tablet .. 650 milliGRAM(s) Oral every 6 hours PRN  ascorbic acid 500 milliGRAM(s) Oral daily  chlorhexidine 2% Cloths 1 Application(s) Topical daily  Dakins Solution - 1/4 Strength 1 Application(s) Topical daily  latanoprost 0.005% Ophthalmic Solution 1 Drop(s) Both EYES at bedtime  levothyroxine 25 MICROGram(s) Oral daily  lidocaine   4% Patch 1 Patch Transdermal daily  losartan 25 milliGRAM(s) Oral daily  metoprolol succinate ER 50 milliGRAM(s) Oral daily  morphine   Solution 2.5 milliGRAM(s) Oral every 4 hours PRN  morphine  - Injectable 1 milliGRAM(s) IV Push every 4 hours PRN  multivitamin 1 Tablet(s) Oral daily  nicotine -   7 mG/24Hr(s) Patch 1 Patch Transdermal daily  polyethylene glycol 3350 17 Gram(s) Oral daily  potassium phosphate / sodium phosphate Powder (PHOS-NaK) 1 Packet(s) Oral once  rosuvastatin 10 milliGRAM(s) Oral at bedtime  senna 2 Tablet(s) Oral at bedtime  sodium chloride 2 Gram(s) Oral three times a day  spironolactone 25 milliGRAM(s) Oral daily  timolol 0.5% Solution 1 Drop(s) Both EYES daily    Vitals:  T(C): 36.5 (12-22-24 @ 20:32), Max: 36.5 (12-22-24 @ 20:32)  HR: 83 (12-22-24 @ 20:32) (77 - 83)  BP: 104/66 (12-22-24 @ 20:32) (104/66 - 116/82)  RR: 18 (12-22-24 @ 20:32) (17 - 18)  SpO2: 100% (12-22-24 @ 20:32) (96% - 100%)  I/O's:    12-22-24 @ 07:01  -  12-23-24 @ 07:00  --------------------------------------------------------  IN: 540 mL / OUT: 1100 mL / NET: -560 mL      Physical Exam:  GENERAL: lying in bed, NAD  HEAD: normocephalic, atraumatic  HEENT: normal conjunctiva, oral mucosa moist  CARDIAC: regular rate and rhythm, normal S1S2, no appreciable murmurs  PULM: normal breath sounds, clear to ascultation bilaterally, no rales, rhonchi, wheezing  GI: +ostomy, no blood observed in bag. Abd soft, nondistended, nontender, no rebound tenderness, no guarding, no rigidity  : +naranjo  NEURO: no focal motor or sensory deficits, AAO to person, place, event  MSK: 1+ b/l LE edema, edematous upper extremities r>l  SKIN: well-perfused, extremities warm, no visible rashes    Labs:                        8.4    8.55  )-----------( 189      ( 22 Dec 2024 05:50 )             28.2     12-22    134[L]  |  101  |  8   ----------------------------<  106[H]  3.4[L]   |  23  |  0.44[L]    Ca    8.4      22 Dec 2024 05:50  Phos  2.4     12-22  Mg     1.80     12-22    TPro  5.9[L]  /  Alb  2.5[L]  /  TBili  0.4  /  DBili  x   /  AST  34  /  ALT  9   /  AlkPhos  102  12-22        Radiology/Procedures: Reviewed.

## 2024-12-23 NOTE — PROVIDER CONTACT NOTE (MEDICATION) - ACTION/TREATMENT ORDERED:
Will attempt to administer later.
MD stated "ok thanks for letting me know"
MD said this is ok, nothing to do at this time. POC ongoing

## 2024-12-23 NOTE — PROVIDER CONTACT NOTE (MEDICATION) - SITUATION
patient refused 10am medication
Pt refused all night medications
Pt refusing some medications such as miralax and eyedrops

## 2024-12-23 NOTE — ADVANCED PRACTICE NURSE CONSULT - RECOMMEDATIONS
Stoma size: 7/8" x 1"  Stoma powder- item #7906    Liquid barrier film    1- Piece Convex Drainable ostomy pouch (#3883)    LLQ colostomy: Cleanse with water, pat dry. If impaired peristomal skin noted: apply stoma powder, brush off excess, and seal with liquid barrier film by using tapping motions. Apply 1 piece soft convex drainable ostomy pouch (pouch #5020). Change every 3 days and PRN if leaking.     Please contact Wound Care Service Line if we can be of further assistance (ext [phone]).

## 2024-12-23 NOTE — PROGRESS NOTE ADULT - TIME BILLING
Time spent for extensive review of the physical chart, electronic medical record, and documentation to obtain collateral information including but not limited to:  [x] Inpatient records (ED, H&P, primary team, and consultants if applicable, care coordination)  [x] Inpatient values/results (biomarkers, immunoassays, imaging, and microbiology results)  [x] Current or proposed treatment plans  [x] Pharmacotherapy review  [x] Discussion and coordinating care with primary team and interdisciplinary staff and floor staff
reviewing laboratory data, consultants' recommendations, documentation in [place], performing medically appropriate examinations/evaluations, discussion with patient/family/RN/ACP/Residents and interdisciplinary staff (such as , social workers, etc), counseling patient/family/care giver, ordering medically appropriate medication, tests, or procedures
reviewing laboratory data, consultants' recommendations, documentation in [place], performing medically appropriate examinations/evaluations, discussion with patient/family/RN/ACP/Residents and interdisciplinary staff (such as , social workers, etc), counseling patient/family/care giver, ordering medically appropriate medication, tests, or procedures
Preparing to see the patient including review of tests and other providers' notes, confirming history with patient/family member, performing medical examination and evaluation, counseling and educating the patient/family/caregiver, ordering medications, tests and procedures, communicating with other health care professionals, documenting clinical information in the EMR, independently interpreting results and communicating results to the patient/family/caregiver, care coordination
Preparing to see the patient including review of tests and other providers' notes, confirming history with patient/family member, performing medical examination and evaluation, counseling and educating the patient/family/caregiver, ordering medications, tests and procedures, communicating with other health care professionals, documenting clinical information in the EMR, independently interpreting results and communicating results to the patient/family/caregiver, care coordination

## 2024-12-23 NOTE — BH CONSULTATION LIAISON PROGRESS NOTE - NSBHFUPINTERVALHXFT_PSY_A_CORE
Met with patient. Tired, complains that he has had poor sleep at night, and feels tired during the day. Can be irritable when questions are repeated. But superficially denies any mood symptoms, adamantly denies any si or hi, or psychosis  Did not wish to answer orientation questions    Care coordinated with daughter at length this afternoon--- Concerned that patient's irritability, refusal to care at times is stemming from depression. Advocating for starting an antidepressant. Discussed above assessment and below plan. All questions answered

## 2024-12-23 NOTE — BH CONSULTATION LIAISON PROGRESS NOTE - CURRENT MEDICATION
MEDICATIONS  (STANDING):  ascorbic acid 500 milliGRAM(s) Oral daily  chlorhexidine 2% Cloths 1 Application(s) Topical daily  Dakins Solution - 1/4 Strength 1 Application(s) Topical daily  latanoprost 0.005% Ophthalmic Solution 1 Drop(s) Both EYES at bedtime  levothyroxine 25 MICROGram(s) Oral daily  lidocaine   4% Patch 1 Patch Transdermal daily  losartan 25 milliGRAM(s) Oral daily  metoprolol succinate ER 50 milliGRAM(s) Oral daily  mirtazapine 7.5 milliGRAM(s) Oral at bedtime  multivitamin 1 Tablet(s) Oral daily  nicotine -   7 mG/24Hr(s) Patch 1 Patch Transdermal daily  polyethylene glycol 3350 17 Gram(s) Oral daily  potassium phosphate / sodium phosphate Powder (PHOS-NaK) 1 Packet(s) Oral once  rosuvastatin 10 milliGRAM(s) Oral at bedtime  senna 2 Tablet(s) Oral at bedtime  sodium chloride 2 Gram(s) Oral three times a day  spironolactone 25 milliGRAM(s) Oral daily  timolol 0.5% Solution 1 Drop(s) Both EYES daily    MEDICATIONS  (PRN):  acetaminophen     Tablet .. 650 milliGRAM(s) Oral every 6 hours PRN Mild Pain (1 - 3)  morphine   Solution 2.5 milliGRAM(s) Oral every 4 hours PRN Moderate Pain (4 - 6)

## 2024-12-23 NOTE — PROVIDER CONTACT NOTE (MEDICATION) - BACKGROUND
GI bleed
Dx Hemorrhage of rectum & Anus, no active bleeding noted.
Pt was admitted for hemorrhage of the rectum

## 2024-12-23 NOTE — PROGRESS NOTE ADULT - PROBLEM SELECTOR PLAN 2
-CT A/P: Small amount of bilateral pleural effusions, Small volume pericardial effusion. Pulmonary nodules suspicious of metastasis Midline decubitus ulcer with local subcutaneous inflammation. A few foci of gas in the inflamed superficial soft tissues, nec fas not excluded, no abscess.    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 [address] [facility]. [phone]

## 2024-12-23 NOTE — PROGRESS NOTE ADULT - PROBLEM SELECTOR PLAN 1
- Patient with  Rectal cancer s/p chemo/radation (11/22,consolidation CAPOX (2/2023), brachytherapy (3/2024), s/p diverting end sigmoid colostomy, cystoscopy and naranjo placement across defect, and recent admission (11/24) for Tara gangrene s/p surgical debridement coming in for concern for rectal bleeding and sacral wound ulcer.  - Follows with oncology Dr. Paez from [facility] [place] [facility]  - Family Interested in 2nd opinion from [facility].

## 2024-12-23 NOTE — PROGRESS NOTE ADULT - ATTENDING COMMENTS
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

## 2024-12-23 NOTE — PROGRESS NOTE ADULT - PROBLEM SELECTOR PLAN 3
- Recommend Discontinuation of IV Morphine 1mg PRN in anticipation for discharge   - Switch to PO Morphine Solution 2.5mg q4 PRN moderate-severe pain (hold for hypotension, oversedation, respiratory depression)  - Bowel regimen while on opiates

## 2024-12-23 NOTE — ADVANCED PRACTICE NURSE CONSULT - REASON FOR CONSULT
Patient known to Wound Care service line. Seen for LLQ colostomy referral; wounds being managed by wound MD team.

## 2024-12-23 NOTE — PROGRESS NOTE ADULT - PROBLEM SELECTOR PLAN 7
Case reviewed with primary team  Thank you for allowing us to participate in your patient's care. Please page [phone] for any questions/concerns.

## 2024-12-23 NOTE — BH CONSULTATION LIAISON PROGRESS NOTE - NSBHCONSULTFOLLOWAFTERCARE_PSY_A_CORE FT
Oncology to refer patient to f/u with outpatient psycho-oncologist Dr Rashid at [facility]

## 2024-12-23 NOTE — PROGRESS NOTE ADULT - PROBLEM SELECTOR PLAN 1
Hx of Rectal Cancer, originally followed by Dr. Paez [facility], when options limited, reeval with Dr. Miguel Angel Johnson surg/onc who placed end diverting colostomy. Was to be reevaluated by [facility] for a possible 2nd opinion, however deferred due to admission. Expressed interest in outpt follow up with [facility] which will be re-evaluated on discharge. Per Onc, will need outpt records to be brought for outpt follow up - daughter states she will email records    - Previous pain regimens causing excessive drowsiness and possible AMS, palliative consult placed for new pain regimen  - Appreciate palliative recs: liquid morphine 2.5mg q4 PRN for moderate pain, morphine 1mg IV q4 PRN for severe  - Pt intermittently refusing medications, lab draws, wound care, PT, vitals; family concerned for depression, psychiatry consulted for depression i/s/o advanced illness -- appreciate psychiatry ramón, psych to f/u on Monday Hx of Rectal Cancer, originally followed by Dr. Paez [facility], when options limited, reeval with Dr. Miguel Angel Johnson surg/onc who placed end diverting colostomy. Was to be reevaluated by [facility] for a possible 2nd opinion, however deferred due to admission. Expressed interest in outpt follow up with [facility] which will be re-evaluated on discharge.     - Per Onc, will need outpt records to be brought for outpt follow up - records emailed 12/23, will forward to heme/onc  - Previous pain regimens causing excessive drowsiness and possible AMS, palliative consult placed for new pain regimen  - Appreciate palliative recs: liquid morphine 2.5mg q4 PRN for moderate pain, morphine 1mg IV q4 PRN for severe  - Pt intermittently refusing medications, lab draws, wound care, PT, vitals; family concerned for depression, psychiatry consulted for depression i/s/o advanced illness -- appreciate psychiatry eval, psych to f/u today Hx of Rectal Cancer, originally followed by Dr. Paez [facility] [place] [facility], when options limited, reeval with Dr. Miguel Angel Johnson surg/onc who placed end diverting colostomy. Was to be reevaluated by [facility] for a possible 2nd opinion, however deferred due to admission. Expressed interest in outpt follow up with [facility] which will be re-evaluated on discharge.     - Per Onc, will need outpt records to be brought for outpt follow up - records emailed 12/23, will forward to heme/onc  - Previous pain regimens causing excessive drowsiness and possible AMS, palliative consult placed for new pain regimen  - Appreciate palliative recs: liquid morphine 2.5mg q4 PRN for moderate pain, morphine 1mg IV q4 PRN for severe  - Pt intermittently refusing medications, lab draws, wound care, PT, vitals; decreased PO, family concerned for depression, psychiatry consulted for depression i/s/o advanced illness -- appreciate psychiatry eval, psych to f/u today  - Today Remeron 7.5mg qhs started

## 2024-12-23 NOTE — CHART NOTE - NSCHARTNOTEFT_GEN_A_CORE
NUTRITION FOLLOW UP NOTE    Pt seen for malnutrition follow up.     SOURCE: [] Patient [] Medical record [] RN/PCA [] Family/Caregiver [] Patient unavailable [] Patient inappropriate (disoriented, nonverbal, intubated/sedated) [] Other:    Medical Course:  - Per chart, pt is 79 year old male PMH CAD s/p stent (2012), AFib, R leg DVT, HFrEF, locally advanced rectal cancer s/p chemo/radiation s/p diverting end sigmoid colostomy/cystoscopy/naranjo placement across defect, recent admission (11/24) for Tara gangrene s/p surgical debridement admitted due to concern for bleeding in ostomy bag, sacral ulcer. Palliative, Psychiatry on board.    Diet Prescription:     Nutrition Course:  -     Food Allergy/Intolerance:  - NKFA    Pertinent Medications:   - ascorbic acid, levothyroxine, multivitamin, polyethylene glycol, potassium phosphate / sodium phosphate Powder, rosuvastatin, senna, sodium chloride, spironolactone     Pertinent Labs: 12-22 Na134 mmol/L[L] Glu 106 mg/dL[H] K+ 3.4 mmol/L[L] Cr  0.44 mg/dL[L] BUN 8 mg/dL 12-22 Phos 2.4 mg/dL[L] 12-22 Alb 2.5 g/dL[L]    - (12/8) HbA1c 4.8%    Weight: (12/21) 114.1 lbs / 51.8 kg, (12/11) 114.1 lbs / 51.8 kg, (12/9 dosing) 114.6 lbs / 52 kg   Weight Assessment:  Height: 73 in / 185.4 cm  IBW: 184 lbs / 83.6 kg +/-10%  BMI: kg/m^2 (at lowest weight)    Physical Assessment, per flowsheets:  Edema: 2+ right ankle/right foot, 3+ left ankle/left foot  Pressure Injury: sacrum stage 4     Estimated Needs:   [X] No change since previous assessment, based on dosing weight  lbs / kg   kcal daily @ kcal/kg,  gm protein daily @ gm/kg   [ ] recalculated, with consideration for, based on weight    Previous Nutrition Diagnosis: [X] Severe malnutrition in the context of chronic illness, ongoing   New Nutrition Diagnosis: [X] Altered nutrition related lab values related to renal related organ dysfunction, GI losses as evidenced by hyponatremia.     Education:  [X] not applicable     Interventions:   1)   2)  3)   [] Current medical condition precludes nutrition intervention at this time.     Monitor & Evaluate:  PO intake, tolerance to diet/supplement, nutrition related lab values, weight trends, BMs/GI distress, hydration status, skin integrity.    Zulma Parr RDN, CDN #[id number]  Also available on [employer] Teams NUTRITION FOLLOW UP NOTE    Pt seen for malnutrition follow up.     SOURCE: [X] Patient [X] Medical record     Medical Course:  - Per chart, pt is 79 year old male PMH CAD s/p stent (2012), AFib, R leg DVT, HFrEF, locally advanced rectal cancer s/p chemo/radiation s/p diverting end sigmoid colostomy/cystoscopy/naranjo placement across defect, recent admission (11/24) for Tara gangrene s/p surgical debridement admitted due to concern for bleeding in ostomy bag, sacral ulcer. Palliative, Psychiatry on board.    Diet Prescription:   - Soft and Bite Sized  - DASH/TLC {Sodium & Cholesterol Restricted}   - Ensure Plus High Protein 2 PO 3x daily    Nutrition Course:  - Pt does not participate, lethargic. Pt noted with ongoing poor appetite/PO intake, breakfast tray visibly untouched during time of visit. Snacks from home visible on tray table. Pt requires total assistance at meal times. Colostomy output per flowsheets: (12/22) 0 mL, (12/21) 0 mL, (12/20) 1200 mL. Labs notable for hyponatremia (continues on salt tabs 2 gm TID), hypophosphatemia and hypokalemia (repleted). Continues on diuretic.     Food Allergy/Intolerance:  - NKFA    Pertinent Medications:   - ascorbic acid, levothyroxine, multivitamin, polyethylene glycol, potassium phosphate / sodium phosphate Powder, rosuvastatin, senna, sodium chloride, spironolactone     Pertinent Labs:   - (12/22) Na 134 mmol/L[L] Glu 106 mg/dL[H] K+ 3.4 mmol/L[L] Cr 0.44 mg/dL[L] BUN 8 mg/dL Phos 2.4 mg/dL[L] Alb 2.5 g/dL[L]  - (12/8) HbA1c 4.8%    Weight: (12/21) 114.1 lbs / 51.8 kg, (12/11) 114.1 lbs / 51.8 kg, (12/9 dosing) 114.6 lbs / 52 kg   Height: 73 in / 185.4 cm  IBW: 184 lbs / 83.6 kg +/-10%  BMI: 15.1 kg/m^2 (at lowest weight)    Physical Assessment, per flowsheets:  Edema: 2+ right ankle/right foot, 3+ left ankle/left foot  Pressure Injury: sacrum stage 4     Estimated Needs:   [X] No change since previous assessment, based on dosing weight 114.6 lbs / 52 kg   6012-5697 kcal daily @30-35 kcal/kg, 72.8-93.6 gm protein daily @1.4-1.8 gm/kg     Previous Nutrition Diagnosis: [X] Severe malnutrition in the context of chronic illness, ongoing   New Nutrition Diagnosis: [X] Altered nutrition related lab values related to renal related organ dysfunction, GI losses as evidenced by hyponatremia, hypokalemia, hypophosphatemia.     Education:  [X] not applicable     Interventions:   1) Recommend diet without therapeutic restrictions, texture per medical discretion.   2) Recommend continue Ensure Plus High Protein 1 PO 3x daily (provides 350 kcal, 20 gm protein per 8 oz serving).  3) Consider appetite stimulant.  4) Obtain weekly weights.  5) Monitor electrolytes (K+, Mg, P) and replete to within desired limits as clinically indicated.   6) Recommend continue multivitamin to provide micronutrient coverage.    Monitor & Evaluate:  PO intake, tolerance to diet/supplement, nutrition related lab values, weight trends, BMs/GI distress, hydration status, skin integrity.    Zulma Parr RDN, CDN #[id number]  Also available on [employer] Teams NUTRITION FOLLOW UP NOTE    Pt seen for malnutrition follow up.     SOURCE: [X] Patient [X] Medical record     Medical Course:  - Per chart, pt is 79 year old male PMH CAD s/p stent (2012), AFib, R leg DVT, HFrEF, locally advanced rectal cancer s/p chemo/radiation s/p diverting end sigmoid colostomy/cystoscopy/naranjo placement across defect, recent admission (11/24) for Tara gangrene s/p surgical debridement admitted due to concern for bleeding in ostomy bag, sacral ulcer. Palliative, Psychiatry on board.    Diet Prescription:   - Soft and Bite Sized  - DASH/TLC {Sodium & Cholesterol Restricted}   - Ensure Plus High Protein 2 PO 3x daily    Nutrition Course:  - Pt does not participate, lethargic. Pt noted with ongoing poor appetite/PO intake, breakfast tray visibly untouched during time of visit. Snacks from home visible on tray table. Pt requires total assistance at meal times. Colostomy output per flowsheets: (12/22) 0 mL, (12/21) 0 mL, (12/20) 1200 mL. Labs notable for hyponatremia (continues on salt tabs 2 gm TID), hypophosphatemia and hypokalemia (repleted). Continues on diuretic.     Food Allergy/Intolerance:  - NKFA    Pertinent Medications:   - ascorbic acid, levothyroxine, multivitamin, polyethylene glycol, potassium phosphate / sodium phosphate Powder, rosuvastatin, senna, sodium chloride, spironolactone     Pertinent Labs:   - (12/22) Na 134 mmol/L[L] Glu 106 mg/dL[H] K+ 3.4 mmol/L[L] Cr 0.44 mg/dL[L] BUN 8 mg/dL Phos 2.4 mg/dL[L] Alb 2.5 g/dL[L]  - (12/8) HbA1c 4.8%    Weight: (12/21) 114.1 lbs / 51.8 kg, (12/11) 114.1 lbs / 51.8 kg, (12/9 dosing) 114.6 lbs / 52 kg   Height: 73 in / 185.4 cm  IBW: 184 lbs / 83.6 kg +/-10%  BMI: 15.1 kg/m^2 (at lowest weight)    Physical Assessment, per flowsheets:  Edema: 2+ right ankle/right foot, 3+ left ankle/left foot  Pressure Injury: sacrum stage 4     Estimated Needs:   [X] No change since previous assessment, based on dosing weight 114.6 lbs / 52 kg   4338-3483 kcal daily @30-35 kcal/kg, 72.8-93.6 gm protein daily @1.4-1.8 gm/kg     Previous Nutrition Diagnosis: [X] Severe malnutrition in the context of chronic illness, ongoing   New Nutrition Diagnosis: [X] Altered nutrition related lab values related to renal related organ dysfunction, GI losses as evidenced by hyponatremia, hypokalemia, hypophosphatemia.     Education:  [X] not applicable     Interventions:   1) Recommend diet without therapeutic restrictions, texture per medical discretion.   2) Recommend continue Ensure Plus High Protein 1 PO 3x daily (provides 350 kcal, 20 gm protein per 8 oz serving).  3) Consider appetite stimulant.  4) Obtain weekly weights.  5) Monitor electrolytes (K+, Mg, P) and replete to within desired limits as clinically indicated.   6) Recommend continue multivitamin to provide micronutrient coverage.    Monitor & Evaluate:  PO intake, tolerance to diet/supplement, nutrition related lab values, weight trends, ostomy output/GI distress, hydration status, skin integrity.    Zulma Parr RDN, CDN #[id number]  Also available on [employer] Teams

## 2024-12-24 VITALS
HEART RATE: 84 BPM | RESPIRATION RATE: 17 BRPM | SYSTOLIC BLOOD PRESSURE: 128 MMHG | OXYGEN SATURATION: 98 % | DIASTOLIC BLOOD PRESSURE: 86 MMHG | TEMPERATURE: 98 F

## 2024-12-24 LAB
ALBUMIN SERPL ELPH-MCNC: 2.4 G/DL — LOW (ref 3.3–5)
ALP SERPL-CCNC: 98 U/L — SIGNIFICANT CHANGE UP (ref 40–120)
ALT FLD-CCNC: 7 U/L — SIGNIFICANT CHANGE UP (ref 4–41)
ANION GAP SERPL CALC-SCNC: 11 MMOL/L — SIGNIFICANT CHANGE UP (ref 7–14)
AST SERPL-CCNC: 24 U/L — SIGNIFICANT CHANGE UP (ref 4–40)
BILIRUB SERPL-MCNC: 0.4 MG/DL — SIGNIFICANT CHANGE UP (ref 0.2–1.2)
BUN SERPL-MCNC: 8 MG/DL — SIGNIFICANT CHANGE UP (ref 7–23)
CALCIUM SERPL-MCNC: 8.6 MG/DL — SIGNIFICANT CHANGE UP (ref 8.4–10.5)
CHLORIDE SERPL-SCNC: 101 MMOL/L — SIGNIFICANT CHANGE UP (ref 98–107)
CO2 SERPL-SCNC: 24 MMOL/L — SIGNIFICANT CHANGE UP (ref 22–31)
CREAT SERPL-MCNC: 0.5 MG/DL — SIGNIFICANT CHANGE UP (ref 0.5–1.3)
EGFR: 104 ML/MIN/1.73M2 — SIGNIFICANT CHANGE UP
GLUCOSE SERPL-MCNC: 99 MG/DL — SIGNIFICANT CHANGE UP (ref 70–99)
HCT VFR BLD CALC: 31.8 % — LOW (ref 39–50)
HCT VFR BLD CALC: 37.2 % — LOW (ref 39–50)
HGB BLD-MCNC: 10.8 G/DL — LOW (ref 13–17)
HGB BLD-MCNC: 9.8 G/DL — LOW (ref 13–17)
MAGNESIUM SERPL-MCNC: 1.9 MG/DL — SIGNIFICANT CHANGE UP (ref 1.6–2.6)
MCHC RBC-ENTMCNC: 25.1 PG — LOW (ref 27–34)
MCHC RBC-ENTMCNC: 25.3 PG — LOW (ref 27–34)
MCHC RBC-ENTMCNC: 29 G/DL — LOW (ref 32–36)
MCHC RBC-ENTMCNC: 30.8 G/DL — LOW (ref 32–36)
MCV RBC AUTO: 82 FL — SIGNIFICANT CHANGE UP (ref 80–100)
MCV RBC AUTO: 86.5 FL — SIGNIFICANT CHANGE UP (ref 80–100)
NRBC # BLD: 0 /100 WBCS — SIGNIFICANT CHANGE UP (ref 0–0)
NRBC # BLD: 0 /100 WBCS — SIGNIFICANT CHANGE UP (ref 0–0)
NRBC # FLD: 0 K/UL — SIGNIFICANT CHANGE UP (ref 0–0)
NRBC # FLD: 0 K/UL — SIGNIFICANT CHANGE UP (ref 0–0)
PHOSPHATE SERPL-MCNC: 2.6 MG/DL — SIGNIFICANT CHANGE UP (ref 2.5–4.5)
PLATELET # BLD AUTO: 175 K/UL — SIGNIFICANT CHANGE UP (ref 150–400)
PLATELET # BLD AUTO: 184 K/UL — SIGNIFICANT CHANGE UP (ref 150–400)
POTASSIUM SERPL-MCNC: 4.2 MMOL/L — SIGNIFICANT CHANGE UP (ref 3.5–5.3)
POTASSIUM SERPL-SCNC: 4.2 MMOL/L — SIGNIFICANT CHANGE UP (ref 3.5–5.3)
PROT SERPL-MCNC: 6 G/DL — SIGNIFICANT CHANGE UP (ref 6–8.3)
RBC # BLD: 3.88 M/UL — LOW (ref 4.2–5.8)
RBC # BLD: 4.3 M/UL — SIGNIFICANT CHANGE UP (ref 4.2–5.8)
RBC # FLD: 17.1 % — HIGH (ref 10.3–14.5)
RBC # FLD: 17.2 % — HIGH (ref 10.3–14.5)
SODIUM SERPL-SCNC: 136 MMOL/L — SIGNIFICANT CHANGE UP (ref 135–145)
WBC # BLD: 6.34 K/UL — SIGNIFICANT CHANGE UP (ref 3.8–10.5)
WBC # BLD: 7.67 K/UL — SIGNIFICANT CHANGE UP (ref 3.8–10.5)
WBC # FLD AUTO: 6.34 K/UL — SIGNIFICANT CHANGE UP (ref 3.8–10.5)
WBC # FLD AUTO: 7.67 K/UL — SIGNIFICANT CHANGE UP (ref 3.8–10.5)

## 2024-12-24 PROCEDURE — 99239 HOSP IP/OBS DSCHRG MGMT >30: CPT

## 2024-12-24 RX ORDER — MORPHINE SULFATE 15 MG
1.25 TABLET, EXTENDED RELEASE ORAL
Qty: 37.5 | Refills: 0
Start: 2024-12-24 | End: 2024-12-28

## 2024-12-24 RX ORDER — NALOXONE HCL 0.4 MG/ML
1 VIAL (ML) INJECTION
Qty: 1 | Refills: 0
Start: 2024-12-24

## 2024-12-24 RX ORDER — LEVOTHYROXINE SODIUM 175 UG/1
1 TABLET ORAL
Qty: 30 | Refills: 0
Start: 2024-12-24 | End: 2025-01-22

## 2024-12-24 RX ORDER — POLYETHYLENE GLYCOL 3350 17 G/DOSE
17 POWDER (GRAM) ORAL
Qty: 510 | Refills: 0
Start: 2024-12-24 | End: 2025-01-22

## 2024-12-24 RX ORDER — SENNOSIDES 8.6 MG/1
2 TABLET, FILM COATED ORAL
Qty: 60 | Refills: 0
Start: 2024-12-24 | End: 2025-01-22

## 2024-12-24 RX ORDER — MIRTAZAPINE 7.5 MG/1
1 TABLET, FILM COATED ORAL
Qty: 30 | Refills: 0
Start: 2024-12-24 | End: 2025-01-22

## 2024-12-24 RX ORDER — SODIUM CHLORIDE 9 MG/ML
2 INJECTION, SOLUTION INTRAMUSCULAR; INTRAVENOUS; SUBCUTANEOUS
Qty: 84 | Refills: 0
Start: 2024-12-24 | End: 2025-01-06

## 2024-12-24 RX ADMIN — Medication 1 TABLET(S): at 11:27

## 2024-12-24 RX ADMIN — LOSARTAN POTASSIUM 25 MILLIGRAM(S): 100 TABLET, FILM COATED ORAL at 11:21

## 2024-12-24 RX ADMIN — Medication 17 GRAM(S): at 11:25

## 2024-12-24 RX ADMIN — Medication 1 APPLICATION(S): at 11:27

## 2024-12-24 RX ADMIN — CHLORHEXIDINE GLUCONATE 1 APPLICATION(S): 1.2 RINSE ORAL at 11:25

## 2024-12-24 RX ADMIN — Medication 500 MILLIGRAM(S): at 11:25

## 2024-12-24 RX ADMIN — SPIRONOLACTONE 25 MILLIGRAM(S): 50 TABLET ORAL at 11:20

## 2024-12-24 RX ADMIN — SODIUM CHLORIDE 2 GRAM(S): 9 INJECTION, SOLUTION INTRAMUSCULAR; INTRAVENOUS; SUBCUTANEOUS at 11:23

## 2024-12-24 RX ADMIN — NICOTINE POLACRILEX 1 PATCH: 4 LOZENGE ORAL at 11:24

## 2024-12-24 RX ADMIN — Medication 1 DROP(S): at 11:24

## 2024-12-24 RX ADMIN — Medication 50 MILLIGRAM(S): at 11:22

## 2024-12-24 NOTE — PROGRESS NOTE ADULT - ATTENDING COMMENTS
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   - c/w salt tabs   - monitor Na     Chronic Afib  - c/w Metoprolol  - not on AC d/t rectal bleeding     Chronic HFrEF   - Continue Toprol, Losartan, Aldactone     Depression  - Psych consulted  - started Remeron for appetite stimulation      RSV URI  - supportive care     DC planning to home with home health aid  POC d/w daughter 12/23 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

## 2024-12-24 NOTE — DISCHARGE NOTE NURSING/CASE MANAGEMENT/SOCIAL WORK - PATIENT PORTAL LINK FT
You can access the FollowMyHealth Patient Portal offered by [facility] by registering at the following website: [email or web address]. By joining [facility]’s FollowMyHealth portal, you will also be able to view your health information using other applications (apps) compatible with our system.

## 2024-12-24 NOTE — PROVIDER CONTACT NOTE (OTHER) - BACKGROUND
79 yr old male with pmhx of HF, afib, rectal cancer
79 yr old male with pmhx of HF, afib, and rectal cancer
Pt admitted for GI bleed, since resolved
sacral wound
Dx Hemorrhage of Rectum & Anus.
Pt was admitted for hemorrhage of the rectum
Pt was admitted for hemorrhage of the rectum
Pt admission dx was hemorrhage of rectum and anus. HX of afib and rectal cancer

## 2024-12-24 NOTE — PROVIDER CONTACT NOTE (OTHER) - ASSESSMENT
Pt is alert and orientedx3. Pt has no acute s/s of distress. Notified pt daughter, pt daughter and pt stated they wanted meds and blood work to be done at 10am
Pt A&Ox 3,
Pt is uncooperative
Pt alert and stable no signs of acute distress
Pt is A&Ox3, denies nausea, vomiting, sob or chest pain at this time. pt is in no acute distress and is resting comfortably. Provide also was bedside trying to convince pt but he still refused
Pt A&Ox 3. No s/s of acute distress
Pt A&Ox 3. No s/s of acute distress
Pt is A&Ox3, in no acute distress at this time. Pt is noncompliant and not allowing me to do anything even after given education.
Pt otherwise stable and asymptomatic
vitally stable, pt insists no wound care, requests to be fed snacks instead. pt states he has rules to follow and don't interupt sleep.

## 2024-12-24 NOTE — PROVIDER CONTACT NOTE (OTHER) - ACTION/TREATMENT ORDERED:
MD said ok, POC ongoing
PRovider aware, will notify for any change in status
Provider notified
Educated pt on need for SCDs, pt refused
Provider notified and aware, notify provider for any change in status
MD aware; Will continue to monitor pt
MD said he will come talk to the pt, POC ongoing
noted.
MD aware; Will continue to monitor pt
no tx ordered will continue to monitor pt

## 2024-12-24 NOTE — PROVIDER CONTACT NOTE (OTHER) - SITUATION
Pt refusing SCDs
pt refused vitals and meds
pt refusing pm meds
Pt refusing labs at this time
Pt is refusing 6 am lab draws and 6 am medications
Pt is refusing wound care
Pt is refusing morning labs and all his meds. pt also refusing sacral wound dressing change
pt refused wound care twice today in AM and 1600pm because he wants to sleep, pt education done.
pt refusing sacral wound dressing change and medications. I went in to ask to change the dressing at least 5 times, explaining the whole process and pt is refusing.
Pt refused  morning medications and vitals

## 2024-12-24 NOTE — PROGRESS NOTE ADULT - PROBLEM SELECTOR PLAN 1
Hx of Rectal Cancer, originally followed by Dr. Paez [facility] [place], when options limited, reeval with Dr. Miguel Angel Johnson surg/onc who placed end diverting colostomy. Was to be reevaluated by [facility] for a possible 2nd opinion, however deferred due to admission. Expressed interest in outpt follow up with [facility] which will be re-evaluated on discharge.     - Per Onc, will need outpt records to be brought for outpt follow up - records emailed 12/23, will forward to heme/onc  - Previous pain regimens causing excessive drowsiness and possible AMS, palliative consult placed for new pain regimen  - Appreciate palliative recs: liquid morphine 2.5mg q4 PRN for moderate pain, morphine 1mg IV q4 PRN for severe  - Pt intermittently refusing medications, lab draws, wound care, PT, vitals; decreased PO, family concerned for depression, psychiatry consulted for depression i/s/o advanced illness -- appreciate psychiatry eval, psych to f/u today  - Today Remeron 7.5mg qhs started Hx of Rectal Cancer, originally followed by Dr. Paez [facility], when options limited, reeval with Dr. Miguel Angel Johnson surg/onc who placed end diverting colostomy. Was to be reevaluated by [facility] for a possible 2nd opinion, however deferred due to admission. Expressed interest in outpt follow up with [facility] which will be re-evaluated on discharge.     - Per Onc, will need outpt records to be brought for outpt follow up - records emailed 12/23, will forward to heme/onc  - Previous pain regimens causing excessive drowsiness and possible AMS, palliative consult placed for new pain regimen  - Appreciate palliative recs: liquid morphine 2.5mg q4 PRN for moderate pain, morphine 1mg IV q4 PRN for severe  - Pt intermittently refusing medications, lab draws, wound care, PT, vitals; decreased PO, family concerned for depression, psychiatry consulted for depression i/s/o advanced illness -- appreciate psychiatry eval  - C/w Remeron 7.5mg qhs for depression, appetite stimulation

## 2024-12-24 NOTE — PROVIDER CONTACT NOTE (OTHER) - DATE AND TIME:
14-Dec-2024 10:17
19-Dec-2024 10:30
12-Dec-2024 15:57
11-Dec-2024 16:44
17-Dec-2024 02:29
18-Dec-2024 21:45
11-Dec-2024 08:35
24-Dec-2024 08:19
19-Dec-2024 17:50
19-Dec-2024 06:52

## 2024-12-24 NOTE — PROGRESS NOTE ADULT - PROBLEM SELECTOR PLAN 2
-CT A/P: Small amount of bilateral pleural effusions, Small volume pericardial effusion. Pulmonary nodules suspicious of metastasis Midline decubitus ulcer with local subcutaneous inflammation. A few foci of gas in the inflamed superficial soft tissues, nec fas not excluded, no abscess.    - Surg consulted: low concern for nec fascitis, foci of air secondary to open wound  - Wound care following for stage 4 pressure injury and Suprapubic and anterior scrotum chronic surgical wounds; per latest evaluation wound stable; wound care recs appreciated, nutrition recs appreciated  - Upon discharge follow up at outpatient [facility]. [address] [facility]. [phone]

## 2024-12-24 NOTE — DISCHARGE NOTE NURSING/CASE MANAGEMENT/SOCIAL WORK - FINANCIAL ASSISTANCE
[facility] provides services at a reduced cost to those who are determined to be eligible through [facility]’s financial assistance program. Information regarding [facility]’s financial assistance program can be found by going to [email or web address] or by calling [phone].

## 2024-12-24 NOTE — PROGRESS NOTE ADULT - PROVIDER SPECIALTY LIST ADULT
Internal Medicine
Internal Medicine
Wound Care
Internal Medicine
Internal Medicine
Palliative Care
Internal Medicine

## 2024-12-24 NOTE — PROVIDER CONTACT NOTE (OTHER) - NAME OF MD/NP/PA/DO NOTIFIED:
rajinder
Saumya Colon
Varun Vuong
Genesis Urbano
Saumya Colon
Varun Stiles
Saumya Colon
Kris Salamanca MD
Genesis Urbano

## 2024-12-24 NOTE — DISCHARGE NOTE NURSING/CASE MANAGEMENT/SOCIAL WORK - NSDCPEFALRISK_GEN_ALL_CORE
For information on Fall & Injury Prevention, visit: [email or web address] OR  [email or web address] OR  https://www.cdc.gov/steadi/patient.html

## 2024-12-24 NOTE — PROVIDER CONTACT NOTE (OTHER) - REASON
Pt is refusing 6 am lab draws and 6 am medications
pt refused vitals and meds
pt refusing sacral wound dressing change and medications
Pt is refusing morning labs and all his meds. pt also refusing sacral wound dressing change
Pt refusing wound care
Pt refused  morning medications and vitals
Pt refusing SCDs
pt refusing wound care
Pt refusing labs at this time
pt refusing pm meds

## 2024-12-24 NOTE — PROGRESS NOTE ADULT - NUTRITIONAL ASSESSMENT
This patient has been assessed with a concern for Malnutrition and has been determined to have a diagnosis/diagnoses of Severe protein-calorie malnutrition and Underweight (BMI < 19).    This patient is being managed with:   Diet Soft and Bite Sized-  DASH/TLC {Sodium & Cholesterol Restricted} (DASH)  Supplement Feeding Modality:  Oral  Ensure Plus High Protein Cans or Servings Per Day:  2       Frequency:  Three Times a day  Entered: Dec 16 2024  1:19PM  
This patient has been assessed with a concern for Malnutrition and has been determined to have a diagnosis/diagnoses of Severe protein-calorie malnutrition and Underweight (BMI < 19).    This patient is being managed with:   Diet Regular-  DASH/TLC {Sodium & Cholesterol Restricted} (DASH)  Supplement Feeding Modality:  Oral  Ensure Pudding Cans or Servings Per Day:  2       Frequency:  Daily  Ensure Plus High Protein Cans or Servings Per Day:  3       Frequency:  Daily  Entered: Dec 11 2024  8:20AM  
This patient has been assessed with a concern for Malnutrition and has been determined to have a diagnosis/diagnoses of Severe protein-calorie malnutrition and Underweight (BMI < 19).    This patient is being managed with:   Diet Regular-  DASH/TLC {Sodium & Cholesterol Restricted} (DASH)  Supplement Feeding Modality:  Oral  Ensure Plus High Protein Cans or Servings Per Day:  3       Frequency:  Daily  Entered: Dec 10 2024  1:22PM  
This patient has been assessed with a concern for Malnutrition and has been determined to have a diagnosis/diagnoses of Severe protein-calorie malnutrition and Underweight (BMI < 19).    This patient is being managed with:   Diet Regular-  DASH/TLC {Sodium & Cholesterol Restricted} (DASH)  Supplement Feeding Modality:  Oral  Ensure Pudding Cans or Servings Per Day:  2       Frequency:  Daily  Ensure Plus High Protein Cans or Servings Per Day:  3       Frequency:  Daily  Entered: Dec 11 2024  8:20AM  
This patient has been assessed with a concern for Malnutrition and has been determined to have a diagnosis/diagnoses of Severe protein-calorie malnutrition and Underweight (BMI < 19).    This patient is being managed with:   Diet Soft and Bite Sized-  DASH/TLC {Sodium & Cholesterol Restricted} (DASH)  Supplement Feeding Modality:  Oral  Ensure Plus High Protein Cans or Servings Per Day:  2       Frequency:  Three Times a day  Entered: Dec 16 2024  1:19PM  
This patient has been assessed with a concern for Malnutrition and has been determined to have a diagnosis/diagnoses of Severe protein-calorie malnutrition and Underweight (BMI < 19).    This patient is being managed with:   Diet Soft and Bite Sized-  DASH/TLC {Sodium & Cholesterol Restricted} (DASH)  Supplement Feeding Modality:  Oral  Ensure Plus High Protein Cans or Servings Per Day:  2       Frequency:  Three Times a day  Entered: Dec 16 2024  1:19PM  
36.6

## 2024-12-24 NOTE — PROGRESS NOTE ADULT - PROBLEM/PLAN-1
DISPLAY PLAN FREE TEXT
at home:

## 2024-12-24 NOTE — PROGRESS NOTE ADULT - REASON FOR ADMISSION
Rectal Bleeding

## 2024-12-24 NOTE — PROVIDER CONTACT NOTE (OTHER) - RECOMMENDATIONS
MD made aware
Notify MD
MD made aware
make md aware.
MD made aware
Notify Provider
Notified md
Notify MD

## 2024-12-24 NOTE — DISCHARGE NOTE NURSING/CASE MANAGEMENT/SOCIAL WORK - [facility]APPT_GEN_ALL_CORE_FT
APPTS ARE READY TO BE MADE: [X] YES    Best Family or Patient Contact (if needed):    Tiffanie, daughter: [phone]  Self, Gonzalor: [phone]    Additional Information about above appointments (if needed):    1: Please follow up with your PCP, Dr. Chatterjee, [phone], within 2 weeks of discharge  2: Please follow up with your cardiologist, Dr. Leiva, [phone], within 2 weeks f discharge  3: Please follow up with [facility], [phone], within 2 weeks of discharge  4: If establishing care at [facility] can follow up with Dr. Velazquez    Other comments or requests:

## 2024-12-24 NOTE — PROGRESS NOTE ADULT - SUBJECTIVE AND OBJECTIVE BOX
Patient is a 79y old  Male who presents with a chief complaint of Rectal Bleeding (23 Dec 2024 17:08)      INTERVAL HX:  No acute overnight events. Pt seen and examined at bedside. ROS otherwise negative   Allergies:  No Known Allergies    Medications:  acetaminophen     Tablet .. 650 milliGRAM(s) Oral every 6 hours PRN  ascorbic acid 500 milliGRAM(s) Oral daily  chlorhexidine 2% Cloths 1 Application(s) Topical daily  Dakins Solution - 1/4 Strength 1 Application(s) Topical daily  latanoprost 0.005% Ophthalmic Solution 1 Drop(s) Both EYES at bedtime  levothyroxine 25 MICROGram(s) Oral daily  lidocaine   4% Patch 1 Patch Transdermal daily  losartan 25 milliGRAM(s) Oral daily  metoprolol succinate ER 50 milliGRAM(s) Oral daily  mirtazapine 7.5 milliGRAM(s) Oral at bedtime  morphine   Solution 2.5 milliGRAM(s) Oral every 4 hours PRN  multivitamin 1 Tablet(s) Oral daily  nicotine -   7 mG/24Hr(s) Patch 1 Patch Transdermal daily  polyethylene glycol 3350 17 Gram(s) Oral daily  potassium phosphate / sodium phosphate Powder (PHOS-NaK) 1 Packet(s) Oral once  rosuvastatin 10 milliGRAM(s) Oral at bedtime  senna 2 Tablet(s) Oral at bedtime  sodium chloride 2 Gram(s) Oral three times a day  spironolactone 25 milliGRAM(s) Oral daily  timolol 0.5% Solution 1 Drop(s) Both EYES daily    Vitals:  T(C): 36.8 (12-23-24 @ 21:55), Max: 36.8 (12-23-24 @ 21:55)  HR: 86 (12-23-24 @ 21:55) (86 - 92)  BP: 138/92 (12-23-24 @ 21:55) (120/80 - 138/92)  RR: 17 (12-23-24 @ 21:55) (17 - 18)  SpO2: 100% (12-23-24 @ 21:55) (98% - 100%)  I/O's:    12-23-24 @ 07:01  -  12-24-24 @ 07:00  --------------------------------------------------------  IN: 580 mL / OUT: 700 mL / NET: -120 mL      Physical Exam:    Labs:                        10.8   7.67  )-----------( 175      ( 24 Dec 2024 00:22 )             37.2     12-23    137  |  103  |  10  ----------------------------<  107[H]  4.0   |  25  |  0.47[L]    Ca    8.6      23 Dec 2024 11:13  Phos  2.6     12-23  Mg     1.80     12-23    TPro  5.8[L]  /  Alb  2.4[L]  /  TBili  0.2  /  DBili  x   /  AST  23  /  ALT  7   /  AlkPhos  97  12-23        Radiology/Procedures: Reviewed.   Patient is a 79y old  Male who presents with a chief complaint of Rectal Bleeding (23 Dec 2024 17:08)      INTERVAL HX:  Pt refused PM vitals, RSV (+), mild cough. Pt seen and examined at bedside, endorses mild cough. No additional acute complaints elicited.    Allergies:  No Known Allergies    Medications:  acetaminophen     Tablet .. 650 milliGRAM(s) Oral every 6 hours PRN  ascorbic acid 500 milliGRAM(s) Oral daily  chlorhexidine 2% Cloths 1 Application(s) Topical daily  Dakins Solution - 1/4 Strength 1 Application(s) Topical daily  latanoprost 0.005% Ophthalmic Solution 1 Drop(s) Both EYES at bedtime  levothyroxine 25 MICROGram(s) Oral daily  lidocaine   4% Patch 1 Patch Transdermal daily  losartan 25 milliGRAM(s) Oral daily  metoprolol succinate ER 50 milliGRAM(s) Oral daily  mirtazapine 7.5 milliGRAM(s) Oral at bedtime  morphine   Solution 2.5 milliGRAM(s) Oral every 4 hours PRN  multivitamin 1 Tablet(s) Oral daily  nicotine -   7 mG/24Hr(s) Patch 1 Patch Transdermal daily  polyethylene glycol 3350 17 Gram(s) Oral daily  potassium phosphate / sodium phosphate Powder (PHOS-NaK) 1 Packet(s) Oral once  rosuvastatin 10 milliGRAM(s) Oral at bedtime  senna 2 Tablet(s) Oral at bedtime  sodium chloride 2 Gram(s) Oral three times a day  spironolactone 25 milliGRAM(s) Oral daily  timolol 0.5% Solution 1 Drop(s) Both EYES daily    Vitals:  T(C): 36.8 (12-23-24 @ 21:55), Max: 36.8 (12-23-24 @ 21:55)  HR: 86 (12-23-24 @ 21:55) (86 - 92)  BP: 138/92 (12-23-24 @ 21:55) (120/80 - 138/92)  RR: 17 (12-23-24 @ 21:55) (17 - 18)  SpO2: 100% (12-23-24 @ 21:55) (98% - 100%)  I/O's:    12-23-24 @ 07:01  -  12-24-24 @ 07:00  --------------------------------------------------------  IN: 580 mL / OUT: 700 mL / NET: -120 mL      Physical Exam:  GENERAL: lying in bed, NAD  HEAD: normocephalic, atraumatic  HEENT: normal conjunctiva, oral mucosa moist  CARDIAC: regular rate and rhythm, normal S1S2, no appreciable murmurs  PULM: normal breath sounds, clear to ascultation bilaterally, no rales, rhonchi, wheezing  GI: +ostomy, no blood observed in bag. Abd soft, nondistended, nontender, no rebound tenderness, no guarding, no rigidity  : +naranjo  NEURO: no focal motor or sensory deficits, AAO to person, place, event  MSK: 1+ b/l LE edema, edematous upper extremities r>l  SKIN: well-perfused, extremities warm, no visible rashes    Labs:                        10.8   7.67  )-----------( 175      ( 24 Dec 2024 00:22 )             37.2     12-23    137  |  103  |  10  ----------------------------<  107[H]  4.0   |  25  |  0.47[L]    Ca    8.6      23 Dec 2024 11:13  Phos  2.6     12-23  Mg     1.80     12-23    TPro  5.8[L]  /  Alb  2.4[L]  /  TBili  0.2  /  DBili  x   /  AST  23  /  ALT  7   /  AlkPhos  97  12-23        Radiology/Procedures: Reviewed.

## 2025-07-12 NOTE — DIETITIAN INITIAL EVALUATION ADULT - LAB (SPECIFY)
[FreeTextEntry3] : I, Dr. Choe, personally performed the evaluation and management (E/M) services for this new patient. That E/M includes conducting the clinically appropriate initial history &/or exam, assessing all conditions, and establishing the plan of care. Today, my DEDRA, Kiel Ledesma PA-C, was here to observe my evaluation and management service for this patient & follow plan of care established by me going forward.   Electrolytes

## (undated) DEVICE — PROTECTOR HEEL / ELBOW FLUFFY

## (undated) DEVICE — DRAPE COVER SNAP 36X30"

## (undated) DEVICE — DRSG BANDAID 0.75X3"

## (undated) DEVICE — SOL IRR POUR H2O 1500ML

## (undated) DEVICE — DRSG CURITY GAUZE SPONGE 4 X 4" 12-PLY NON-STERILE

## (undated) DEVICE — TUBING MEDI-VAC W MAXIGRIP CONNECTORS 1/4"X6'

## (undated) DEVICE — CONTAINER FORMALIN 80ML YELLOW

## (undated) DEVICE — TUBING IV SET GRAVITY 3Y 100" MACRO

## (undated) DEVICE — BIOPSY FORCEP RADIAL JAW 4 STANDARD WITH NEEDLE

## (undated) DEVICE — BIOPSY FORCEP COLD DISP

## (undated) DEVICE — GLV 7.5 PROTEXIS (CREAM) MICRO

## (undated) DEVICE — GOWN LG

## (undated) DEVICE — CATH INSERTION TRAY W 10CC SYRINGE

## (undated) DEVICE — BASIN EMESIS 10IN GRADUATED MAUVE

## (undated) DEVICE — TUBING THERMADX UROLOGY

## (undated) DEVICE — IRR BULB PATHFINDER + 10"

## (undated) DEVICE — PACK IV START WITH CHG

## (undated) DEVICE — CATH IV SAFE BC 22G X 1" (BLUE)

## (undated) DEVICE — SALIVA EJECTOR (BLUE)

## (undated) DEVICE — ELCTR ECG CONDUCTIVE ADHESIVE

## (undated) DEVICE — VENODYNE/SCD SLEEVE CALF MEDIUM

## (undated) DEVICE — TUBING LEVEL ONE NORMOFLO SET

## (undated) DEVICE — ADAPTER CHECK FLO 9FR STERILE

## (undated) DEVICE — WARMING BLANKET UPPER ADULT

## (undated) DEVICE — SOL IRR BAG NS 0.9% 3000ML

## (undated) DEVICE — ELCTR GROUNDING PAD ADULT COVIDIEN

## (undated) DEVICE — POSITIONER STRAP ARMBOARD VELCRO TS-30

## (undated) DEVICE — DRSG 2X2

## (undated) DEVICE — TUBING SUCTION NONCONDUCTIVE 6MM X 12FT

## (undated) DEVICE — DRSG CURITY GAUZE SPONGE 4 X 4" 12-PLY

## (undated) DEVICE — BAG URINE W METER 2L

## (undated) DEVICE — CANISTER DISPOSABLE THIN WALL 3000CC

## (undated) DEVICE — PACK CYSTO

## (undated) DEVICE — LUBRICATING JELLY HR ONE SHOT 3G